# Patient Record
Sex: MALE | Race: WHITE | NOT HISPANIC OR LATINO | Employment: OTHER | URBAN - METROPOLITAN AREA
[De-identification: names, ages, dates, MRNs, and addresses within clinical notes are randomized per-mention and may not be internally consistent; named-entity substitution may affect disease eponyms.]

---

## 2017-01-26 ENCOUNTER — OFFICE VISIT - RIVER FALLS (OUTPATIENT)
Dept: FAMILY MEDICINE | Facility: CLINIC | Age: 72
End: 2017-01-26

## 2017-02-23 ENCOUNTER — OFFICE VISIT - RIVER FALLS (OUTPATIENT)
Dept: FAMILY MEDICINE | Facility: CLINIC | Age: 72
End: 2017-02-23

## 2017-05-10 ENCOUNTER — OFFICE VISIT - RIVER FALLS (OUTPATIENT)
Dept: FAMILY MEDICINE | Facility: CLINIC | Age: 72
End: 2017-05-10

## 2017-09-11 ENCOUNTER — AMBULATORY - RIVER FALLS (OUTPATIENT)
Dept: FAMILY MEDICINE | Facility: CLINIC | Age: 72
End: 2017-09-11

## 2017-09-12 LAB
CHOLEST SERPL-MCNC: 267 MG/DL
CHOLEST/HDLC SERPL: 3.4 {RATIO}
CREAT SERPL-MCNC: 1.31 MG/DL (ref 0.7–1.18)
GLUCOSE BLD-MCNC: 105 MG/DL (ref 65–99)
HDLC SERPL-MCNC: 78 MG/DL
LDLC SERPL CALC-MCNC: 156 MG/DL
NONHDLC SERPL-MCNC: 189 MG/DL
PSA SERPL-MCNC: 11.9 NG/ML
TRIGL SERPL-MCNC: 191 MG/DL

## 2017-09-19 ENCOUNTER — OFFICE VISIT - RIVER FALLS (OUTPATIENT)
Dept: FAMILY MEDICINE | Facility: CLINIC | Age: 72
End: 2017-09-19

## 2018-01-09 ENCOUNTER — OFFICE VISIT - RIVER FALLS (OUTPATIENT)
Dept: FAMILY MEDICINE | Facility: CLINIC | Age: 73
End: 2018-01-09

## 2018-01-09 ASSESSMENT — MIFFLIN-ST. JEOR: SCORE: 1615.31

## 2018-03-29 ENCOUNTER — AMBULATORY - RIVER FALLS (OUTPATIENT)
Dept: FAMILY MEDICINE | Facility: CLINIC | Age: 73
End: 2018-03-29

## 2018-03-30 LAB
CREAT SERPL-MCNC: 1.59 MG/DL (ref 0.7–1.18)
GLUCOSE BLD-MCNC: 104 MG/DL (ref 65–99)
PSA SERPL-MCNC: 13 NG/ML

## 2018-04-05 ENCOUNTER — OFFICE VISIT - RIVER FALLS (OUTPATIENT)
Dept: FAMILY MEDICINE | Facility: CLINIC | Age: 73
End: 2018-04-05

## 2018-04-23 ENCOUNTER — OFFICE VISIT - RIVER FALLS (OUTPATIENT)
Dept: FAMILY MEDICINE | Facility: CLINIC | Age: 73
End: 2018-04-23

## 2018-04-23 ASSESSMENT — MIFFLIN-ST. JEOR: SCORE: 1598.99

## 2018-05-04 ENCOUNTER — OFFICE VISIT - RIVER FALLS (OUTPATIENT)
Dept: FAMILY MEDICINE | Facility: CLINIC | Age: 73
End: 2018-05-04

## 2018-10-15 ENCOUNTER — AMBULATORY - RIVER FALLS (OUTPATIENT)
Dept: FAMILY MEDICINE | Facility: CLINIC | Age: 73
End: 2018-10-15

## 2018-10-16 LAB
CREAT SERPL-MCNC: 1.41 MG/DL (ref 0.7–1.18)
GLUCOSE BLD-MCNC: 96 MG/DL (ref 65–99)
PSA SERPL-MCNC: 10.8 NG/ML

## 2018-10-25 ENCOUNTER — OFFICE VISIT - RIVER FALLS (OUTPATIENT)
Dept: FAMILY MEDICINE | Facility: CLINIC | Age: 73
End: 2018-10-25

## 2018-11-14 ENCOUNTER — OFFICE VISIT - RIVER FALLS (OUTPATIENT)
Dept: FAMILY MEDICINE | Facility: CLINIC | Age: 73
End: 2018-11-14

## 2018-11-19 ENCOUNTER — AMBULATORY - RIVER FALLS (OUTPATIENT)
Dept: FAMILY MEDICINE | Facility: CLINIC | Age: 73
End: 2018-11-19

## 2018-11-26 ENCOUNTER — AMBULATORY - RIVER FALLS (OUTPATIENT)
Dept: FAMILY MEDICINE | Facility: CLINIC | Age: 73
End: 2018-11-26

## 2019-05-06 ENCOUNTER — AMBULATORY - RIVER FALLS (OUTPATIENT)
Dept: FAMILY MEDICINE | Facility: CLINIC | Age: 74
End: 2019-05-06

## 2019-05-07 LAB
ALP SERPL-CCNC: 82 UNIT/L (ref 40–115)
BUN SERPL-MCNC: 24 MG/DL (ref 7–25)
BUN/CREAT RATIO - HISTORICAL: 18 (ref 6–22)
CALCIUM SERPL-MCNC: 9.9 MG/DL (ref 8.6–10.3)
CHLORIDE BLD-SCNC: 99 MMOL/L (ref 98–110)
CHOLEST SERPL-MCNC: 227 MG/DL
CHOLEST/HDLC SERPL: 3.2 {RATIO}
CO2 SERPL-SCNC: 32 MMOL/L (ref 20–32)
CREAT SERPL-MCNC: 1.32 MG/DL (ref 0.7–1.18)
CREAT UR-MCNC: 64 MG/DL (ref 20–320)
EGFRCR SERPLBLD CKD-EPI 2021: 53 ML/MIN/1.73M2
ERYTHROCYTE [DISTWIDTH] IN BLOOD BY AUTOMATED COUNT: 14 % (ref 11–15)
GLUCOSE BLD-MCNC: 96 MG/DL (ref 65–99)
HCT VFR BLD AUTO: 37 % (ref 38.5–50)
HDLC SERPL-MCNC: 70 MG/DL
HGB BLD-MCNC: 12.7 GM/DL (ref 13.2–17.1)
LDLC SERPL CALC-MCNC: 123 MG/DL
MCH RBC QN AUTO: 31.1 PG (ref 27–33)
MCHC RBC AUTO-ENTMCNC: 34.3 GM/DL (ref 32–36)
MCV RBC AUTO: 90.7 FL (ref 80–100)
NONHDLC SERPL-MCNC: 157 MG/DL
PHOSPHATE SERPL-MCNC: 3.4 MG/DL (ref 2.1–4.3)
PLATELET # BLD AUTO: 177 10*3/UL (ref 140–400)
PMV BLD: 9.3 FL (ref 7.5–12.5)
POTASSIUM BLD-SCNC: 5.2 MMOL/L (ref 3.5–5.3)
PROT UR-MCNC: 62 MG/DL (ref 5–25)
PROT/CREAT 24H UR: 969 MG/G{CREAT} (ref 22–128)
PSA SERPL-MCNC: 15.8 NG/ML
PTH-INTACT SERPL-MCNC: 25 PG/ML (ref 14–64)
RBC # BLD AUTO: 4.08 10*6/UL (ref 4.2–5.8)
SODIUM SERPL-SCNC: 137 MMOL/L (ref 135–146)
TRIGL SERPL-MCNC: 225 MG/DL
WBC # BLD AUTO: 4.3 10*3/UL (ref 3.8–10.8)

## 2019-05-14 ENCOUNTER — OFFICE VISIT - RIVER FALLS (OUTPATIENT)
Dept: FAMILY MEDICINE | Facility: CLINIC | Age: 74
End: 2019-05-14

## 2019-06-03 ENCOUNTER — OFFICE VISIT - RIVER FALLS (OUTPATIENT)
Dept: FAMILY MEDICINE | Facility: CLINIC | Age: 74
End: 2019-06-03

## 2019-06-14 ENCOUNTER — RECORDS - HEALTHEAST (OUTPATIENT)
Dept: ADMINISTRATIVE | Facility: OTHER | Age: 74
End: 2019-06-14

## 2019-07-11 ENCOUNTER — OFFICE VISIT - RIVER FALLS (OUTPATIENT)
Dept: FAMILY MEDICINE | Facility: CLINIC | Age: 74
End: 2019-07-11

## 2019-07-11 ENCOUNTER — RECORDS - HEALTHEAST (OUTPATIENT)
Dept: ADMINISTRATIVE | Facility: OTHER | Age: 74
End: 2019-07-11

## 2019-07-11 ASSESSMENT — MIFFLIN-ST. JEOR: SCORE: 1621.67

## 2019-07-12 ENCOUNTER — COMMUNICATION - RIVER FALLS (OUTPATIENT)
Dept: FAMILY MEDICINE | Facility: CLINIC | Age: 74
End: 2019-07-12

## 2019-07-12 LAB
BUN SERPL-MCNC: 36 MG/DL (ref 7–25)
BUN/CREAT RATIO - HISTORICAL: 21 (ref 6–22)
CALCIUM SERPL-MCNC: 10.1 MG/DL (ref 8.6–10.3)
CHLORIDE BLD-SCNC: 98 MMOL/L (ref 98–110)
CO2 SERPL-SCNC: 31 MMOL/L (ref 20–32)
CREAT SERPL-MCNC: 1.72 MG/DL (ref 0.7–1.18)
EGFRCR SERPLBLD CKD-EPI 2021: 38 ML/MIN/1.73M2
ERYTHROCYTE [DISTWIDTH] IN BLOOD BY AUTOMATED COUNT: 13.2 % (ref 11–15)
GLUCOSE BLD-MCNC: 96 MG/DL (ref 65–99)
HCT VFR BLD AUTO: 36.3 % (ref 38.5–50)
HGB BLD-MCNC: 12.4 GM/DL (ref 13.2–17.1)
MCH RBC QN AUTO: 31.4 PG (ref 27–33)
MCHC RBC AUTO-ENTMCNC: 34.2 GM/DL (ref 32–36)
MCV RBC AUTO: 91.9 FL (ref 80–100)
PLATELET # BLD AUTO: 152 10*3/UL (ref 140–400)
PMV BLD: 9.8 FL (ref 7.5–12.5)
POTASSIUM BLD-SCNC: 5.2 MMOL/L (ref 3.5–5.3)
RBC # BLD AUTO: 3.95 10*6/UL (ref 4.2–5.8)
SODIUM SERPL-SCNC: 137 MMOL/L (ref 135–146)
WBC # BLD AUTO: 5.3 10*3/UL (ref 3.8–10.8)

## 2019-07-25 ENCOUNTER — ANESTHESIA - HEALTHEAST (OUTPATIENT)
Dept: SURGERY | Facility: HOSPITAL | Age: 74
End: 2019-07-25

## 2019-07-25 ASSESSMENT — MIFFLIN-ST. JEOR: SCORE: 1625.76

## 2019-07-26 ENCOUNTER — SURGERY - HEALTHEAST (OUTPATIENT)
Dept: SURGERY | Facility: HOSPITAL | Age: 74
End: 2019-07-26

## 2019-11-29 ENCOUNTER — OFFICE VISIT - RIVER FALLS (OUTPATIENT)
Dept: FAMILY MEDICINE | Facility: CLINIC | Age: 74
End: 2019-11-29

## 2019-11-29 ASSESSMENT — MIFFLIN-ST. JEOR: SCORE: 1640.72

## 2019-12-04 ENCOUNTER — OFFICE VISIT - RIVER FALLS (OUTPATIENT)
Dept: FAMILY MEDICINE | Facility: CLINIC | Age: 74
End: 2019-12-04

## 2019-12-04 LAB
FLUAV AG SPEC QL IA: NEGATIVE
FLUBV AG SPEC QL IA: NEGATIVE

## 2019-12-04 ASSESSMENT — MIFFLIN-ST. JEOR: SCORE: 1633.46

## 2019-12-17 ENCOUNTER — OFFICE VISIT - RIVER FALLS (OUTPATIENT)
Dept: FAMILY MEDICINE | Facility: CLINIC | Age: 74
End: 2019-12-17

## 2019-12-17 ASSESSMENT — MIFFLIN-ST. JEOR: SCORE: 1649.79

## 2020-01-03 ENCOUNTER — RECORDS - HEALTHEAST (OUTPATIENT)
Dept: ADMINISTRATIVE | Facility: OTHER | Age: 75
End: 2020-01-03

## 2020-01-03 LAB
ALT SERPL W/O P-5'-P-CCNC: 15 IU/L (ref 8–45)
CREAT SERPL-MCNC: 6.08 MG/DL
GFR ESTIMATE EXT - HISTORICAL: 9 ML/MIN/1.73M2
GFR ESTIMATE, IF BLACK EXT - HISTORICAL: 11 ML/MIN/1.73M2

## 2020-01-04 ENCOUNTER — RECORDS - HEALTHEAST (OUTPATIENT)
Dept: ADMINISTRATIVE | Facility: OTHER | Age: 75
End: 2020-01-04

## 2020-01-06 LAB
A/G RATIO - HISTORICAL: 1.3 MG/DL
ALBUMIN SERPL-MCNC: 3.9 G/DL
ALBUMIN SERPL-MCNC: 3.9 G/DL
ALP SERPL-CCNC: 75 UNIT/L
ALT SERPL W P-5'-P-CCNC: 15 UNIT/L
ANION GAP SERPL CALCULATED.3IONS-SCNC: 13 MEQ/L
AST SERPL W P-5'-P-CCNC: 27 UNIT/L
BILIRUB DIRECT SERPL-MCNC: 0.2 MG/DL
BILIRUB SERPL-MCNC: 0.5 MG/DL
BUN SERPL-MCNC: 60 MG/DL
BUN/CREAT RATIO - HISTORICAL: 10
CALCIUM SERPL-MCNC: 9.5 MEQ/DL
CHLORIDE BLD-SCNC: 106 MEQ/L
CO2 SERPL-SCNC: 22 MEQ/L
CREAT SERPL-MCNC: 5.98 MG/DL
GFR ESTIMATE EXT - HISTORICAL: 9 ML/MIN
GLOBULIN: 3.1 G/DL
GLUCOSE BLD-MCNC: 103 MG/DL
INR PPP: 1.1
PHOSPHATE SERPL-MCNC: 4.3 MG/DL
POTASSIUM BLD-SCNC: 4.5 MEQ/L
PROT SERPL-MCNC: 7 GM/DL
PROTHROMBIN TIME: 13.8 S
SODIUM SERPL-SCNC: 141 MEQ/L

## 2020-01-08 ENCOUNTER — OFFICE VISIT - RIVER FALLS (OUTPATIENT)
Dept: FAMILY MEDICINE | Facility: CLINIC | Age: 75
End: 2020-01-08

## 2020-01-08 ASSESSMENT — MIFFLIN-ST. JEOR: SCORE: 1613.5

## 2020-01-15 ENCOUNTER — AMBULATORY - RIVER FALLS (OUTPATIENT)
Dept: FAMILY MEDICINE | Facility: CLINIC | Age: 75
End: 2020-01-15

## 2020-01-16 LAB
BUN SERPL-MCNC: 30 MG/DL (ref 7–25)
BUN/CREAT RATIO - HISTORICAL: 12 (ref 6–22)
CALCIUM SERPL-MCNC: 9.9 MG/DL (ref 8.6–10.3)
CHLORIDE BLD-SCNC: 101 MMOL/L (ref 98–110)
CO2 SERPL-SCNC: 29 MMOL/L (ref 20–32)
CREAT SERPL-MCNC: 2.5 MG/DL (ref 0.7–1.18)
EGFRCR SERPLBLD CKD-EPI 2021: 24 ML/MIN/1.73M2
GLUCOSE BLD-MCNC: 98 MG/DL (ref 65–139)
POTASSIUM BLD-SCNC: 4.4 MMOL/L (ref 3.5–5.3)
SODIUM SERPL-SCNC: 138 MMOL/L (ref 135–146)

## 2020-01-22 ENCOUNTER — OFFICE VISIT - RIVER FALLS (OUTPATIENT)
Dept: FAMILY MEDICINE | Facility: CLINIC | Age: 75
End: 2020-01-22

## 2020-01-22 ENCOUNTER — AMBULATORY - RIVER FALLS (OUTPATIENT)
Dept: FAMILY MEDICINE | Facility: CLINIC | Age: 75
End: 2020-01-22

## 2020-01-22 ASSESSMENT — MIFFLIN-ST. JEOR: SCORE: 1604.43

## 2020-01-29 ENCOUNTER — AMBULATORY - RIVER FALLS (OUTPATIENT)
Dept: FAMILY MEDICINE | Facility: CLINIC | Age: 75
End: 2020-01-29

## 2020-01-29 ENCOUNTER — COMMUNICATION - RIVER FALLS (OUTPATIENT)
Dept: FAMILY MEDICINE | Facility: CLINIC | Age: 75
End: 2020-01-29

## 2020-01-30 LAB
CHOLEST SERPL-MCNC: 180 MG/DL
CHOLEST/HDLC SERPL: 2.7 {RATIO}
HDLC SERPL-MCNC: 67 MG/DL
LDLC SERPL CALC-MCNC: 86 MG/DL
NONHDLC SERPL-MCNC: 113 MG/DL
TRIGL SERPL-MCNC: 167 MG/DL

## 2020-02-04 ENCOUNTER — COMMUNICATION - RIVER FALLS (OUTPATIENT)
Dept: FAMILY MEDICINE | Facility: CLINIC | Age: 75
End: 2020-02-04

## 2020-02-25 ENCOUNTER — AMBULATORY - RIVER FALLS (OUTPATIENT)
Dept: FAMILY MEDICINE | Facility: CLINIC | Age: 75
End: 2020-02-25

## 2020-02-25 LAB
CHOLEST SERPL-MCNC: 162 MG/DL
HDLC SERPL-MCNC: 64 MG/DL
LDLC SERPL CALC-MCNC: 66 MG/DL
NON HDL CHOL. (LDL+VLDL): 98 MG/DL
TRIGLYCERIDES (HISTORICAL CONVERSION): 308 MG/DL

## 2020-02-26 LAB
CHOLEST SERPL-MCNC: 162 MG/DL
CHOLEST/HDLC SERPL: 2.5 {RATIO}
HDLC SERPL-MCNC: 64 MG/DL
LDLC SERPL CALC-MCNC: 66 MG/DL
NONHDLC SERPL-MCNC: 98 MG/DL
TRIGL SERPL-MCNC: 308 MG/DL

## 2020-03-31 ENCOUNTER — OFFICE VISIT - RIVER FALLS (OUTPATIENT)
Dept: FAMILY MEDICINE | Facility: CLINIC | Age: 75
End: 2020-03-31

## 2020-03-31 ASSESSMENT — MIFFLIN-ST. JEOR: SCORE: 1641.62

## 2020-05-06 ENCOUNTER — AMBULATORY - RIVER FALLS (OUTPATIENT)
Dept: FAMILY MEDICINE | Facility: CLINIC | Age: 75
End: 2020-05-06

## 2020-05-06 LAB
CREAT SERPL-MCNC: 1.76 MG/DL (ref 0.7–1.18)
GFR ESTIMATE EXT - HISTORICAL: 37 ML/MIN/1.73M2
GFR ESTIMATE, IF BLACK EXT - HISTORICAL: 43 ML/MIN/1.73M2

## 2020-05-06 ASSESSMENT — MIFFLIN-ST. JEOR: SCORE: 1667.93

## 2020-05-07 LAB
BUN SERPL-MCNC: 15 MG/DL (ref 7–25)
BUN/CREAT RATIO - HISTORICAL: 9 (ref 6–22)
CALCIUM SERPL-MCNC: 9.7 MG/DL (ref 8.6–10.3)
CHLORIDE BLD-SCNC: 99 MMOL/L (ref 98–110)
CO2 SERPL-SCNC: 31 MMOL/L (ref 20–32)
CREAT SERPL-MCNC: 1.76 MG/DL (ref 0.7–1.18)
EGFRCR SERPLBLD CKD-EPI 2021: 37 ML/MIN/1.73M2
GLUCOSE BLD-MCNC: 121 MG/DL (ref 65–99)
POTASSIUM BLD-SCNC: 4.8 MMOL/L (ref 3.5–5.3)
SODIUM SERPL-SCNC: 137 MMOL/L (ref 135–146)

## 2020-05-08 ENCOUNTER — OFFICE VISIT - RIVER FALLS (OUTPATIENT)
Dept: FAMILY MEDICINE | Facility: CLINIC | Age: 75
End: 2020-05-08

## 2020-05-08 ENCOUNTER — COMMUNICATION - RIVER FALLS (OUTPATIENT)
Dept: FAMILY MEDICINE | Facility: CLINIC | Age: 75
End: 2020-05-08

## 2020-05-21 ENCOUNTER — COMMUNICATION - RIVER FALLS (OUTPATIENT)
Dept: FAMILY MEDICINE | Facility: CLINIC | Age: 75
End: 2020-05-21

## 2020-06-03 ENCOUNTER — RECORDS - HEALTHEAST (OUTPATIENT)
Dept: ADMINISTRATIVE | Facility: OTHER | Age: 75
End: 2020-06-03

## 2020-06-04 ENCOUNTER — OFFICE VISIT - RIVER FALLS (OUTPATIENT)
Dept: FAMILY MEDICINE | Facility: CLINIC | Age: 75
End: 2020-06-04

## 2020-06-04 ASSESSMENT — MIFFLIN-ST. JEOR: SCORE: 1663.4

## 2020-07-17 ENCOUNTER — OFFICE VISIT - RIVER FALLS (OUTPATIENT)
Dept: FAMILY MEDICINE | Facility: CLINIC | Age: 75
End: 2020-07-17

## 2020-07-17 ASSESSMENT — MIFFLIN-ST. JEOR: SCORE: 1677

## 2020-07-21 ENCOUNTER — AMBULATORY - HEALTHEAST (OUTPATIENT)
Dept: OTOLARYNGOLOGY | Facility: TELEHEALTH | Age: 75
End: 2020-07-21

## 2020-07-21 DIAGNOSIS — K13.70 LESION OF MOUTH: ICD-10-CM

## 2020-08-11 ENCOUNTER — OFFICE VISIT - HEALTHEAST (OUTPATIENT)
Dept: OTOLARYNGOLOGY | Facility: CLINIC | Age: 75
End: 2020-08-11

## 2020-08-11 DIAGNOSIS — K13.79 MASS OF ORAL CAVITY: ICD-10-CM

## 2020-08-14 LAB
LAB AP CHARGES (HE HISTORICAL CONVERSION): ABNORMAL
PATH REPORT.COMMENTS IMP SPEC: ABNORMAL
PATH REPORT.COMMENTS IMP SPEC: ABNORMAL
PATH REPORT.FINAL DX SPEC: ABNORMAL
PATH REPORT.GROSS SPEC: ABNORMAL
PATH REPORT.MICROSCOPIC SPEC OTHER STN: ABNORMAL
PATH REPORT.RELEVANT HX SPEC: ABNORMAL
RESULT FLAG (HE HISTORICAL CONVERSION): ABNORMAL

## 2020-08-17 ENCOUNTER — COMMUNICATION - HEALTHEAST (OUTPATIENT)
Dept: OTOLARYNGOLOGY | Facility: CLINIC | Age: 75
End: 2020-08-17

## 2020-08-17 DIAGNOSIS — C06.9 MALIGNANT NEOPLASM OF MOUTH (H): ICD-10-CM

## 2020-08-17 DIAGNOSIS — C06.0 MALIGNANT NEOPLASM OF CHEEK MUCOSA (H): ICD-10-CM

## 2020-08-31 ENCOUNTER — HOSPITAL ENCOUNTER (OUTPATIENT)
Dept: PET IMAGING | Facility: HOSPITAL | Age: 75
Discharge: HOME | End: 2020-08-31
Attending: OTOLARYNGOLOGY

## 2020-08-31 DIAGNOSIS — C06.0 MALIGNANT NEOPLASM OF CHEEK MUCOSA (H): ICD-10-CM

## 2020-08-31 DIAGNOSIS — C06.9 MALIGNANT NEOPLASM OF MOUTH (H): ICD-10-CM

## 2020-08-31 LAB — GLUCOSE BLDC GLUCOMTR-MCNC: 101 MG/DL (ref 70–139)

## 2020-09-01 ENCOUNTER — COMMUNICATION - HEALTHEAST (OUTPATIENT)
Dept: OTOLARYNGOLOGY | Facility: CLINIC | Age: 75
End: 2020-09-01

## 2020-09-15 ENCOUNTER — OFFICE VISIT - HEALTHEAST (OUTPATIENT)
Dept: OTOLARYNGOLOGY | Facility: CLINIC | Age: 75
End: 2020-09-15

## 2020-09-15 DIAGNOSIS — R94.02 ABNORMAL PET SCAN OF HEAD: ICD-10-CM

## 2020-09-15 DIAGNOSIS — R91.1 LUNG NODULE: ICD-10-CM

## 2020-09-17 ENCOUNTER — COMMUNICATION - HEALTHEAST (OUTPATIENT)
Dept: OTOLARYNGOLOGY | Facility: CLINIC | Age: 75
End: 2020-09-17

## 2020-09-21 ENCOUNTER — OFFICE VISIT - HEALTHEAST (OUTPATIENT)
Dept: PULMONOLOGY | Facility: OTHER | Age: 75
End: 2020-09-21

## 2020-09-21 DIAGNOSIS — C34.91 MALIGNANT NEOPLASM OF RIGHT LUNG, UNSPECIFIED PART OF LUNG (H): ICD-10-CM

## 2020-09-21 DIAGNOSIS — R06.09 DOE (DYSPNEA ON EXERTION): ICD-10-CM

## 2020-09-21 DIAGNOSIS — R91.8 LUNG MASS: ICD-10-CM

## 2020-09-21 ASSESSMENT — MIFFLIN-ST. JEOR: SCORE: 1676.33

## 2020-09-22 ENCOUNTER — RECORDS - HEALTHEAST (OUTPATIENT)
Dept: RADIOLOGY | Facility: CLINIC | Age: 75
End: 2020-09-22

## 2020-09-25 ENCOUNTER — COMMUNICATION - HEALTHEAST (OUTPATIENT)
Dept: OTOLARYNGOLOGY | Facility: CLINIC | Age: 75
End: 2020-09-25

## 2020-09-28 ENCOUNTER — COMMUNICATION - HEALTHEAST (OUTPATIENT)
Dept: INTENSIVE CARE | Facility: CLINIC | Age: 75
End: 2020-09-28

## 2020-09-28 DIAGNOSIS — R91.8 LUNG MASS: ICD-10-CM

## 2020-09-29 ENCOUNTER — AMBULATORY - HEALTHEAST (OUTPATIENT)
Dept: SURGERY | Facility: AMBULATORY SURGERY CENTER | Age: 75
End: 2020-09-29

## 2020-09-29 DIAGNOSIS — Z11.59 ENCOUNTER FOR SCREENING FOR OTHER VIRAL DISEASES: ICD-10-CM

## 2020-10-01 ENCOUNTER — OFFICE VISIT - HEALTHEAST (OUTPATIENT)
Dept: PULMONOLOGY | Facility: OTHER | Age: 75
End: 2020-10-01

## 2020-10-01 DIAGNOSIS — R91.8 PULMONARY NODULES: ICD-10-CM

## 2020-10-01 ASSESSMENT — MIFFLIN-ST. JEOR: SCORE: 1676.33

## 2020-10-06 ENCOUNTER — OFFICE VISIT - RIVER FALLS (OUTPATIENT)
Dept: FAMILY MEDICINE | Facility: CLINIC | Age: 75
End: 2020-10-06

## 2020-10-06 ENCOUNTER — AMBULATORY - HEALTHEAST (OUTPATIENT)
Dept: MULTI SPECIALTY CLINIC | Facility: CLINIC | Age: 75
End: 2020-10-06

## 2020-10-06 LAB
CREAT SERPL-MCNC: 7.18 MG/DL (ref 0.7–1.18)
CREAT SERPL-MCNC: 7.18 MG/DL (ref 7–25)
GFR ESTIMATE EXT - HISTORICAL: 7 ML/MIN/1.73M2
GFR ESTIMATE EXT - HISTORICAL: 7 ML/MIN/1.73M2
GFR ESTIMATE, IF BLACK EXT - HISTORICAL: 8 ML/MIN/1.73M2
GFR ESTIMATE, IF BLACK EXT - HISTORICAL: 8 ML/MIN/1.73M2

## 2020-10-06 ASSESSMENT — MIFFLIN-ST. JEOR: SCORE: 1702.41

## 2020-10-07 LAB
BUN SERPL-MCNC: 33 MG/DL (ref 7–25)
BUN/CREAT RATIO - HISTORICAL: 5 (ref 6–22)
CALCIUM SERPL-MCNC: 9.3 MG/DL (ref 8.6–10.3)
CHLORIDE BLD-SCNC: 103 MMOL/L (ref 98–110)
CO2 SERPL-SCNC: 19 MMOL/L (ref 20–32)
CREAT SERPL-MCNC: 7.18 MG/DL (ref 0.7–1.18)
EGFRCR SERPLBLD CKD-EPI 2021: 7 ML/MIN/1.73M2
GLUCOSE BLD-MCNC: 92 MG/DL (ref 65–99)
POTASSIUM BLD-SCNC: 5 MMOL/L (ref 3.5–5.3)
SODIUM SERPL-SCNC: 135 MMOL/L (ref 135–146)

## 2020-10-10 ENCOUNTER — COMMUNICATION - HEALTHEAST (OUTPATIENT)
Dept: SCHEDULING | Facility: CLINIC | Age: 75
End: 2020-10-10

## 2020-10-12 ENCOUNTER — OFFICE VISIT - RIVER FALLS (OUTPATIENT)
Dept: FAMILY MEDICINE | Facility: CLINIC | Age: 75
End: 2020-10-12

## 2020-10-12 ENCOUNTER — COMMUNICATION - HEALTHEAST (OUTPATIENT)
Dept: OTOLARYNGOLOGY | Facility: CLINIC | Age: 75
End: 2020-10-12

## 2020-10-12 ASSESSMENT — MIFFLIN-ST. JEOR: SCORE: 1679.73

## 2020-10-13 ENCOUNTER — COMMUNICATION - HEALTHEAST (OUTPATIENT)
Dept: OTOLARYNGOLOGY | Facility: CLINIC | Age: 75
End: 2020-10-13

## 2020-10-13 LAB — MAGNESIUM SERPL-MCNC: 1.4 MG/DL (ref 1.5–2.5)

## 2020-10-14 ENCOUNTER — OFFICE VISIT - RIVER FALLS (OUTPATIENT)
Dept: FAMILY MEDICINE | Facility: CLINIC | Age: 75
End: 2020-10-14

## 2020-10-14 ASSESSMENT — MIFFLIN-ST. JEOR: SCORE: 1681.54

## 2020-10-16 ENCOUNTER — AMBULATORY - HEALTHEAST (OUTPATIENT)
Dept: SURGERY | Facility: HOSPITAL | Age: 75
End: 2020-10-16

## 2020-10-16 DIAGNOSIS — Z11.59 ENCOUNTER FOR SCREENING FOR OTHER VIRAL DISEASES: ICD-10-CM

## 2020-10-19 ENCOUNTER — COMMUNICATION - RIVER FALLS (OUTPATIENT)
Dept: FAMILY MEDICINE | Facility: CLINIC | Age: 75
End: 2020-10-19

## 2020-10-19 ENCOUNTER — RECORDS - HEALTHEAST (OUTPATIENT)
Dept: HEALTH INFORMATION MANAGEMENT | Facility: CLINIC | Age: 75
End: 2020-10-19

## 2020-10-20 ENCOUNTER — OFFICE VISIT - RIVER FALLS (OUTPATIENT)
Dept: FAMILY MEDICINE | Facility: CLINIC | Age: 75
End: 2020-10-20

## 2020-10-20 ASSESSMENT — MIFFLIN-ST. JEOR: SCORE: 1681.54

## 2020-10-21 ENCOUNTER — AMBULATORY - HEALTHEAST (OUTPATIENT)
Dept: SURGERY | Facility: HOSPITAL | Age: 75
End: 2020-10-21

## 2020-10-21 DIAGNOSIS — Z11.59 ENCOUNTER FOR SCREENING FOR OTHER VIRAL DISEASES: ICD-10-CM

## 2020-10-23 ENCOUNTER — COMMUNICATION - HEALTHEAST (OUTPATIENT)
Dept: ONCOLOGY | Facility: CLINIC | Age: 75
End: 2020-10-23

## 2020-10-26 ENCOUNTER — COMMUNICATION - HEALTHEAST (OUTPATIENT)
Dept: OTOLARYNGOLOGY | Facility: CLINIC | Age: 75
End: 2020-10-26

## 2020-10-27 ENCOUNTER — AMBULATORY - HEALTHEAST (OUTPATIENT)
Dept: SURGERY | Facility: AMBULATORY SURGERY CENTER | Age: 75
End: 2020-10-27

## 2020-10-27 DIAGNOSIS — Z11.59 ENCOUNTER FOR SCREENING FOR OTHER VIRAL DISEASES: ICD-10-CM

## 2020-11-02 ENCOUNTER — AMBULATORY - HEALTHEAST (OUTPATIENT)
Dept: LAB | Facility: CLINIC | Age: 75
End: 2020-11-02

## 2020-11-02 DIAGNOSIS — Z11.59 ENCOUNTER FOR SCREENING FOR OTHER VIRAL DISEASES: ICD-10-CM

## 2020-11-04 ENCOUNTER — COMMUNICATION - HEALTHEAST (OUTPATIENT)
Dept: SCHEDULING | Facility: CLINIC | Age: 75
End: 2020-11-04

## 2020-11-05 ENCOUNTER — ANESTHESIA - HEALTHEAST (OUTPATIENT)
Dept: SURGERY | Facility: AMBULATORY SURGERY CENTER | Age: 75
End: 2020-11-05

## 2020-11-05 ASSESSMENT — MIFFLIN-ST. JEOR
SCORE: 1703.54
SCORE: 1703.54

## 2020-11-06 ENCOUNTER — HOSPITAL ENCOUNTER (OUTPATIENT)
Dept: SURGERY | Facility: AMBULATORY SURGERY CENTER | Age: 75
Discharge: HOME | End: 2020-11-06
Attending: OTOLARYNGOLOGY | Admitting: OTOLARYNGOLOGY

## 2020-11-06 ENCOUNTER — RECORDS - HEALTHEAST (OUTPATIENT)
Dept: LAB | Facility: HOSPITAL | Age: 75
End: 2020-11-06

## 2020-11-06 ENCOUNTER — SURGERY - HEALTHEAST (OUTPATIENT)
Dept: SURGERY | Facility: AMBULATORY SURGERY CENTER | Age: 75
End: 2020-11-06

## 2020-11-06 DIAGNOSIS — C02.9 TONGUE CANCER (H): ICD-10-CM

## 2020-11-06 DIAGNOSIS — R94.02 ABNORMAL PET SCAN OF HEAD: ICD-10-CM

## 2020-11-06 LAB
HBV SURFACE AG SERPL QL IA: NEGATIVE
HCV AB SERPL QL IA: NEGATIVE
HIV 1+2 AB+HIV1 P24 AG SERPL QL IA: NEGATIVE
HIV 1,2 ANTIBODY: NEGATIVE
POTASSIUM BLD-SCNC: 4.4 MMOL/L (ref 3.5–5)

## 2020-11-10 ENCOUNTER — COMMUNICATION - HEALTHEAST (OUTPATIENT)
Dept: PULMONOLOGY | Facility: OTHER | Age: 75
End: 2020-11-10

## 2020-11-11 ENCOUNTER — AMBULATORY - HEALTHEAST (OUTPATIENT)
Dept: LAB | Facility: CLINIC | Age: 75
End: 2020-11-11

## 2020-11-11 DIAGNOSIS — Z11.59 ENCOUNTER FOR SCREENING FOR OTHER VIRAL DISEASES: ICD-10-CM

## 2020-11-12 ENCOUNTER — ANESTHESIA - HEALTHEAST (OUTPATIENT)
Dept: SURGERY | Facility: HOSPITAL | Age: 75
End: 2020-11-12

## 2020-11-12 ENCOUNTER — COMMUNICATION - HEALTHEAST (OUTPATIENT)
Dept: SCHEDULING | Facility: CLINIC | Age: 75
End: 2020-11-12

## 2020-11-12 LAB
SARS-COV-2 PCR COMMENT: NORMAL
SARS-COV-2 RNA SPEC QL NAA+PROBE: NEGATIVE
SARS-COV-2 VIRUS SPECIMEN SOURCE: NORMAL

## 2020-11-12 ASSESSMENT — MIFFLIN-ST. JEOR: SCORE: 1703.54

## 2020-11-13 ENCOUNTER — SURGERY - HEALTHEAST (OUTPATIENT)
Dept: SURGERY | Facility: HOSPITAL | Age: 75
End: 2020-11-13

## 2020-11-23 ENCOUNTER — COMMUNICATION - HEALTHEAST (OUTPATIENT)
Dept: OTOLARYNGOLOGY | Facility: CLINIC | Age: 75
End: 2020-11-23

## 2020-11-23 ENCOUNTER — AMBULATORY - RIVER FALLS (OUTPATIENT)
Dept: FAMILY MEDICINE | Facility: CLINIC | Age: 75
End: 2020-11-23

## 2020-11-24 ENCOUNTER — COMMUNICATION - RIVER FALLS (OUTPATIENT)
Dept: FAMILY MEDICINE | Facility: CLINIC | Age: 75
End: 2020-11-24

## 2020-11-24 LAB
BASOPHILS # BLD MANUAL: 50 10*3/UL (ref 0–200)
BASOPHILS NFR BLD MANUAL: 0.8 %
BUN SERPL-MCNC: 18 MG/DL (ref 7–25)
BUN/CREAT RATIO - HISTORICAL: 9 (ref 6–22)
CALCIUM SERPL-MCNC: 9.5 MG/DL (ref 8.6–10.3)
CHLORIDE BLD-SCNC: 100 MMOL/L (ref 98–110)
CO2 SERPL-SCNC: 27 MMOL/L (ref 20–32)
CREAT SERPL-MCNC: 1.92 MG/DL (ref 0.7–1.18)
EGFRCR SERPLBLD CKD-EPI 2021: 33 ML/MIN/1.73M2
EOSINOPHIL # BLD MANUAL: 341 10*3/UL (ref 15–500)
EOSINOPHIL NFR BLD MANUAL: 5.5 %
ERYTHROCYTE [DISTWIDTH] IN BLOOD BY AUTOMATED COUNT: 12.6 % (ref 11–15)
GLUCOSE BLD-MCNC: 246 MG/DL (ref 65–99)
HCT VFR BLD AUTO: 32.2 % (ref 38.5–50)
HGB BLD-MCNC: 10.9 GM/DL (ref 13.2–17.1)
LYMPHOCYTES # BLD MANUAL: 1029 10*3/UL (ref 850–3900)
LYMPHOCYTES NFR BLD MANUAL: 16.6 %
MCH RBC QN AUTO: 32.3 PG (ref 27–33)
MCHC RBC AUTO-ENTMCNC: 33.9 GM/DL (ref 32–36)
MCV RBC AUTO: 95.5 FL (ref 80–100)
MONOCYTES # BLD MANUAL: 583 10*3/UL (ref 200–950)
MONOCYTES NFR BLD MANUAL: 9.4 %
NEUTROPHILS # BLD MANUAL: 4197 10*3/UL (ref 1500–7800)
NEUTROPHILS NFR BLD MANUAL: 67.7 %
PLATELET # BLD AUTO: 221 10*3/UL (ref 140–400)
PMV BLD: 9.5 FL (ref 7.5–12.5)
POTASSIUM BLD-SCNC: 4.7 MMOL/L (ref 3.5–5.3)
RBC # BLD AUTO: 3.37 10*6/UL (ref 4.2–5.8)
SODIUM SERPL-SCNC: 137 MMOL/L (ref 135–146)
WBC # BLD AUTO: 6.2 10*3/UL (ref 3.8–10.8)

## 2020-12-02 ENCOUNTER — OFFICE VISIT - RIVER FALLS (OUTPATIENT)
Dept: FAMILY MEDICINE | Facility: CLINIC | Age: 75
End: 2020-12-02

## 2020-12-02 ASSESSMENT — MIFFLIN-ST. JEOR: SCORE: 1713.29

## 2020-12-07 ENCOUNTER — COMMUNICATION - HEALTHEAST (OUTPATIENT)
Dept: ONCOLOGY | Facility: CLINIC | Age: 75
End: 2020-12-07

## 2020-12-08 ENCOUNTER — OFFICE VISIT - RIVER FALLS (OUTPATIENT)
Dept: FAMILY MEDICINE | Facility: CLINIC | Age: 75
End: 2020-12-08

## 2020-12-08 ASSESSMENT — MIFFLIN-ST. JEOR: SCORE: 1722.36

## 2020-12-11 ENCOUNTER — OFFICE VISIT - RIVER FALLS (OUTPATIENT)
Dept: FAMILY MEDICINE | Facility: CLINIC | Age: 75
End: 2020-12-11

## 2020-12-11 ASSESSMENT — MIFFLIN-ST. JEOR: SCORE: 1716.92

## 2020-12-12 ENCOUNTER — COMMUNICATION - RIVER FALLS (OUTPATIENT)
Dept: FAMILY MEDICINE | Facility: CLINIC | Age: 75
End: 2020-12-12

## 2020-12-12 LAB — BNP SERPL-MCNC: 58 PG/ML

## 2020-12-14 ENCOUNTER — AMBULATORY - RIVER FALLS (OUTPATIENT)
Dept: FAMILY MEDICINE | Facility: CLINIC | Age: 75
End: 2020-12-14

## 2020-12-14 ENCOUNTER — COMMUNICATION - RIVER FALLS (OUTPATIENT)
Dept: FAMILY MEDICINE | Facility: CLINIC | Age: 75
End: 2020-12-14

## 2020-12-16 ENCOUNTER — RECORDS - HEALTHEAST (OUTPATIENT)
Dept: RADIOLOGY | Facility: CLINIC | Age: 75
End: 2020-12-16

## 2020-12-16 ENCOUNTER — OFFICE VISIT - HEALTHEAST (OUTPATIENT)
Dept: RADIATION ONCOLOGY | Facility: CLINIC | Age: 75
End: 2020-12-16

## 2020-12-16 DIAGNOSIS — C34.12 MALIGNANT NEOPLASM OF UPPER LOBE OF LEFT LUNG (H): ICD-10-CM

## 2020-12-17 ENCOUNTER — OFFICE VISIT - HEALTHEAST (OUTPATIENT)
Dept: OTOLARYNGOLOGY | Facility: CLINIC | Age: 75
End: 2020-12-17

## 2020-12-17 DIAGNOSIS — C02.9 TONGUE CANCER (H): ICD-10-CM

## 2020-12-23 ENCOUNTER — HOSPITAL ENCOUNTER (OUTPATIENT)
Dept: RESPIRATORY THERAPY | Facility: CLINIC | Age: 75
Discharge: HOME | End: 2020-12-23
Attending: INTERNAL MEDICINE

## 2020-12-23 ENCOUNTER — OFFICE VISIT - HEALTHEAST (OUTPATIENT)
Dept: PULMONOLOGY | Facility: OTHER | Age: 75
End: 2020-12-23

## 2020-12-23 DIAGNOSIS — R06.09 DOE (DYSPNEA ON EXERTION): ICD-10-CM

## 2020-12-23 DIAGNOSIS — J44.9 COPD, GROUP B, BY GOLD 2017 CLASSIFICATION (H): ICD-10-CM

## 2020-12-23 LAB — HGB BLD-MCNC: 11.8 G/DL (ref 14–18)

## 2020-12-23 ASSESSMENT — MIFFLIN-ST. JEOR: SCORE: 1708.08

## 2020-12-29 ENCOUNTER — OFFICE VISIT - RIVER FALLS (OUTPATIENT)
Dept: FAMILY MEDICINE | Facility: CLINIC | Age: 75
End: 2020-12-29

## 2020-12-29 ASSESSMENT — MIFFLIN-ST. JEOR: SCORE: 1710.57

## 2021-01-04 ENCOUNTER — HOSPITAL ENCOUNTER (OUTPATIENT)
Dept: PET IMAGING | Facility: HOSPITAL | Age: 76
Setting detail: RADIATION/ONCOLOGY SERIES
Discharge: STILL ADMITTED OR ADMINISTRATIVE | End: 2021-01-04
Attending: RADIOLOGY

## 2021-01-04 DIAGNOSIS — C34.12 MALIGNANT NEOPLASM OF UPPER LOBE OF LEFT LUNG (H): ICD-10-CM

## 2021-01-04 LAB — GLUCOSE BLDC GLUCOMTR-MCNC: 152 MG/DL (ref 70–139)

## 2021-01-04 ASSESSMENT — MIFFLIN-ST. JEOR: SCORE: 1709.22

## 2021-01-06 ENCOUNTER — OFFICE VISIT - HEALTHEAST (OUTPATIENT)
Dept: RADIATION ONCOLOGY | Facility: CLINIC | Age: 76
End: 2021-01-06

## 2021-01-06 DIAGNOSIS — C34.12 MALIGNANT NEOPLASM OF UPPER LOBE OF LEFT LUNG (H): ICD-10-CM

## 2021-01-08 ENCOUNTER — AMBULATORY - HEALTHEAST (OUTPATIENT)
Dept: CT IMAGING | Facility: HOSPITAL | Age: 76
End: 2021-01-08

## 2021-01-08 ENCOUNTER — COMMUNICATION - HEALTHEAST (OUTPATIENT)
Dept: ONCOLOGY | Facility: CLINIC | Age: 76
End: 2021-01-08

## 2021-01-08 DIAGNOSIS — Z11.59 ENCOUNTER FOR SCREENING FOR OTHER VIRAL DISEASES: ICD-10-CM

## 2021-01-14 ENCOUNTER — COMMUNICATION - HEALTHEAST (OUTPATIENT)
Dept: ADMINISTRATIVE | Facility: HOSPITAL | Age: 76
End: 2021-01-14

## 2021-01-17 ENCOUNTER — AMBULATORY - HEALTHEAST (OUTPATIENT)
Dept: FAMILY MEDICINE | Facility: CLINIC | Age: 76
End: 2021-01-17

## 2021-01-17 DIAGNOSIS — Z11.59 ENCOUNTER FOR SCREENING FOR OTHER VIRAL DISEASES: ICD-10-CM

## 2021-01-18 ENCOUNTER — COMMUNICATION - HEALTHEAST (OUTPATIENT)
Dept: SCHEDULING | Facility: CLINIC | Age: 76
End: 2021-01-18

## 2021-01-19 ENCOUNTER — OFFICE VISIT - RIVER FALLS (OUTPATIENT)
Dept: FAMILY MEDICINE | Facility: CLINIC | Age: 76
End: 2021-01-19
Payer: COMMERCIAL

## 2021-01-19 ENCOUNTER — VIRTUAL VISIT (OUTPATIENT)
Dept: PHARMACY | Facility: PHYSICIAN GROUP | Age: 76
End: 2021-01-19
Payer: COMMERCIAL

## 2021-01-19 DIAGNOSIS — E78.5 DYSLIPIDEMIA: ICD-10-CM

## 2021-01-19 DIAGNOSIS — J44.9 CHRONIC OBSTRUCTIVE PULMONARY DISEASE, UNSPECIFIED COPD TYPE (H): Primary | ICD-10-CM

## 2021-01-19 DIAGNOSIS — I25.10 CORONARY ARTERY DISEASE, ANGINA PRESENCE UNSPECIFIED, UNSPECIFIED VESSEL OR LESION TYPE, UNSPECIFIED WHETHER NATIVE OR TRANSPLANTED HEART: ICD-10-CM

## 2021-01-19 DIAGNOSIS — R52 GENERALIZED PAIN: ICD-10-CM

## 2021-01-19 DIAGNOSIS — I10 HYPERTENSION, UNSPECIFIED TYPE: ICD-10-CM

## 2021-01-19 DIAGNOSIS — Z78.9 TAKES DIETARY SUPPLEMENTS: ICD-10-CM

## 2021-01-19 DIAGNOSIS — K21.9 GASTROESOPHAGEAL REFLUX DISEASE, UNSPECIFIED WHETHER ESOPHAGITIS PRESENT: ICD-10-CM

## 2021-01-19 DIAGNOSIS — R60.0 LOWER LEG EDEMA: ICD-10-CM

## 2021-01-19 DIAGNOSIS — K59.00 CONSTIPATION, UNSPECIFIED CONSTIPATION TYPE: ICD-10-CM

## 2021-01-19 DIAGNOSIS — N18.9 CHRONIC KIDNEY DISEASE, UNSPECIFIED CKD STAGE: ICD-10-CM

## 2021-01-19 PROCEDURE — 99607 MTMS BY PHARM ADDL 15 MIN: CPT | Mod: TEL | Performed by: PHARMACIST

## 2021-01-19 PROCEDURE — 99605 MTMS BY PHARM NP 15 MIN: CPT | Mod: TEL | Performed by: PHARMACIST

## 2021-01-19 RX ORDER — METOPROLOL SUCCINATE 25 MG/1
25 TABLET, EXTENDED RELEASE ORAL DAILY
COMMUNITY
Start: 2019-12-17 | End: 2022-03-04

## 2021-01-19 RX ORDER — ALBUTEROL SULFATE 90 UG/1
2 AEROSOL, METERED RESPIRATORY (INHALATION) EVERY 6 HOURS PRN
COMMUNITY
End: 2022-03-03

## 2021-01-19 NOTE — PROGRESS NOTES
Medication Therapy Management (MTM) Encounter    ASSESSMENT:                            Medication Adherence/Access: rosuvastatin discrepancy, inhaler cost - see below.    Renal Function:     CrCl  Cockcroft and Gault (ideal BW): 28.1 ml/min  Cockcroft and Gault (actual BW): 36 ml/min  Consider using adjusted body weight for obese patients (usually >30% over ideal BW)  Currently patient is 28.15% over IBW.  Cockcroft and Gault (adjusted BW): 31.3 ml/min  Estimated GFR  Simplified 4-variable MDRD study formula: 28.4 ml/min/1.73m2      COPD: recommend change to triple therapy inhaler to ease regimen and also to save on inhaler cost. Education provided about [employer] Patient Assistance Program. Patient needs to meet $600 out of pocket spend and then will likely qualify.    Pain: recommend using plain acetaminophen (without the diphenhydramine) due to BEERs risks with diphenhydramine and since pain in the primary concern in the evening.    Dyslipidemia:  Discrepancy noted as both PCP And [facility] provider has rosuvastatin on med list (10 mg at [facility], 40 mg at cards); Patient stopped months ago due to LEILA (unknown date?) and has not been taking. Patient would likely benefit from statin for secondary ASCVD prevention - acknowledge the caution d/t renal function.  Will discuss with PCP/nephrologist and determine whether to restart and/or contact Dr. Valentine.  With current renal function ~30 ml/min, would recommend 5-10 mg/day.  Unclear what statin therapy he was on with last fasting lipid panel (2/2020) but regardless due for annual lipid panel 2/2021.     Hypertension/CAD/CKD/ lower leg edema : BP well controlled, following with cards and vascular clinic.  Notable that cards note states both aspirin and clopidogrel, yet other chart notes state that DAPT stopped 10/2020. Patient does not note taking either med today. Will confirm with PCP.    GERD: symptoms stable, but last mag level was low. Recommend repeat magnesium  "lab.    Constipation: Stable    Vitamins/Supplements: recommend mag and vit B12 levels.    PLAN:                            1. You can finish your current supply of Advair and Spiriva.  When you finish these inhalers, stop both (at the same time) and switch to the Trelegy inhaler. When you start the Trelegy inhaler, you will inhale 1 puff by mouth once daily in the morning. Rinse your mouth after use.     The benefit of the Trelegy inhaler is that it is only once daily (and contains all 3 drugs for you!).  The other benefit is that we can get you set up to get the medication at no cost from the  once you meet certain requirements.  - Your annual household income for 2 people needs to be less than $42,264  - You need to spend $600 on medications in the calendar year 2021 (this can include ANY and ALL meds for those in your household - you and your wife).   I am sending you the paperwork for this program, but you should contact me when you get close to this $600 spend and then we will work on getting the paperwork into the  together.    2. I recommend that you use regular tylenol (acetaminophen) without the \"PM.\"  The \"PM\" means that the medication contains diphenhydramine (generic benadryl) and this medication has side effects such as daytime drowsiness, increased risk of falling and memory impairment.    3. MTM will discuss statin / aspirin with PCP.    Future labs: Recommend magnesium lab, vit B12 lab    Follow-up: recommend follow-up in 3 months - once you've been on the Trelegy inhaler for a few weeks - or sooner if you meet the $600 out of pocket spend sooner.  Feel free to reach out sooner if you have medication questions or concerns.    Addended on January 26, 2021.  Message received from PCP.  Called Patient to communicate plan:  1. Patient will start taking rosuvastatin 10 mg daily - Rx sent.  2. Patient will contact Dr. Valentine's office re: antiplatelet medication, notify them he is " not taking aspirin or clopidogrel and inquire whether to restart one or the other.    Still planning for follow-up in 3 month - Patient will call back to schedule. RTC was entered.    ---  ---------------------  From: Yong Boyd MD   To: Wiley Cottrell Kaity;     Sent: 1/25/2021 10:38:16 AM CST  Subject: RE: MTM recs     1.)  Rosuvastatin stopped in setting of LEILA.  He should restart rosuvastatin at 10mg qhs  2.) DAPT also stopped in setting of LEILA and acute anemia and concerns for GI bleed.  Previously, I told Mitchell to address this with cardiology about timeframe of reintroduction    ---------------------  From: Wiley Cottrell Kaity   To: Yong Boyd MD;     Sent: 1/22/2021 8:16:09 PM CST  Subject: MTM recs     Looks like Mitchell was referred for inhaler cost - we reviewed this, and I did a full MTM review.    Couple questions for you:  1. there is a discrepancy re: statin therapy. our med list says rosu 10, cards says rosu 40, pt states not taking stopped d/t LEILA months ago - see my assessment for details. he is on zetia. Do you want to restart statin?  2. wanted to confirm that he is not on any antiplatelet or anticoagulant therapy at this time. I see hx of anemia and concern for GIB, but wanted to make sure with his extensive CV hx.    See MTM note for other details.  KB      SUBJECTIVE/OBJECTIVE:                          Lenny Chau is a 75 year old male called for an initial visit. He was referred to me from Yong Boyd MD.      Reason for visit: questions about inhaler cost.    Allergies/ADRs: None  Tobacco: He reports that he quit smoking about 11 years ago. He has never used smokeless tobacco.  Alcohol: 1-2 drinks (vodka)/day.  Caffeine: 1-2 cups/day of coffee  Activity: no routine.  Past Medical History: Reviewed in chart - history of prostate cancer, lung cancer, unilateral nephrectomy, oropharyngeal cancer.  Social: lives with wife.    Medication Adherence/Access:   Patient uses pill box(es), sets  this up himself.  Patient takes medications 2 time(s) per day.   Per patient, misses medication 0 times per week, estimates maybe once/month.  Medication barriers: trouble affording inhalers - see below.  The patient fills medications at [facility]: NO, fills medications at [facility] [place].    AM:  Multivitamin  zinc  spiriva  advair  Omeprazole (has been skipping in the mornings)  Ezetimibe 10 mg  Metoprolol succinate ER 25 mg    PM:  advair 500  Magnesium 250 mg  Vitamin b12 500 mcg  Omeprazole 20 mg    As needed:   Albuterol inhaler -     COPD:  Patient follows with [facility] pulmonology. Last appointment 12/23/2020 - recommended for 6 mo followup.  History of R sided lung cancer x2.  Tobacco status: former smoker - quit 2009. 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  Current Medications:  Albuterol MDI. Uses 4-5x/week - using 2 puffs. Has a spacer to use this.  Patient has albuterol nebs on his med list in clinic, but does not have a nebulizer at home - removed from med list today.  ICS/LABA- Advair 500-50 mcg dose: 1 puff(s) twice daily  LAMA- Spiriva Handihaler inhale contents of 1 capsule by mouth once daily.  His biggest concern today is that these inhalers are really expensive for him and he is wondering if there is a cheaper alternative.  $360/3 months of one inhaler  $316/3 months of the other inhaler  Both inhalers were purchased in 12/2020 - so he has about 2 months remaining for both inhalers.  Called pharmacy together today during visit.  LPU dates:  spiriva 12/31/2020.   advair 12/9/2020.  Abuterol: 8/9/2020.  Patient rinses their mouth after using steroid inhaler.   Pt reports the following symptoms: cough daily, phlegm in chest, increased shortness of breath with activity.  COPD assessment test (CAT):  1. I never cough  0 - 1 - 2 - 3 - 4 - 5  I cough all the time =  2  2. I have no phlegm (mucus) in my chest  0 - 1 - 2 - 3 - 4 - 5  My chest is completely full of phlegm (mucus) = 3  3. My chest does not feel tight at all   0 - 1 - 2 - 3 - 4 - 5  my chest feels very tight = 0  4.  When I walk up a hill or one flight of stairs I am not breathless   0 - 1 - 2 - 3 - 4 - 5  when I walk up a hill or one flight of stairs I am very breathless = 3  5.  I am not limited in doing any activities at home  0 - 1 - 2 - 3 - 4 - 5  I am very limited in doing activities at home = 3  6.  I am confident leaving my home despite my lung condition  0 - 1 - 2 - 3 - 4 - 5  I am not at all confident leaving my home because of medical condition = 0  7.  I sleep soundly  0 - 1 - 2 - 3 - 4 - 5  I do not sleep soundly because of medical condition = 3  8.  I have lots of energy  0 - 1 - 2 - 3 - 4 - 5  I have no energy at all = 3.  COPD assessment test (CAT) history:  Date: 1/19/2021 (TODAY): 17    Pain:  Current Meds:  Acetaminophen-diphenhydramine: taking this before bed if he is in pain and unable to sleep. Biggest factor that keeps him from sleeping is the arthritis pain in his legs.    Dyslipidemia:  Current therapy includes Ezetimibe (Zetia) 10mg once daily. States that he is no longer taking the rosuvastatin, he is not sure why, but states that it was stopped several months ago and he has not taken since then. Notable that the rosuvastatin 10 mg is on Patient's med list prescribed by [facility] 12/29/2020.  Noted that per Dr. Valentine's note 1/8/2021 - Patient was taking rosuvastatin 40 mg.  Pt reports no significant myalgias or other side effects.  No 10-year ASCVD risk due to: secondary prevention.    Medication History: rosuvastatin- see above - Patient is not taking.    Hypertension/CAD/CKD/ lower leg edema :  Follows with Dr. Valentine. Last appointment with Dr. Valentine was 1/8/2021.  Follows with vascular clinic/Dr. David.  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  Thoracic AAA, history of abdominal AAA s/p endovascular repair.  PCI of RCA 2019 following NSTEMI; DAPT stopped 10/2020.  Current Meds:  Metoprolol succinate 25 mg: once daily  Nitroglycerin 0.4 mg sublingual tab: Place 1 tablet under the tongue at onset of chest pain.  May repeat x 3 doses q 5 min.  Call 911 after first dose if pain persists. Pt use: states that he has at home in case he needs it. Discussed ensuring that this is not .  Medication History: clopidogrel/aspirin (per chart)  Avoiding ACEi/ARB due to CKD.  ECHO: 2020 EF: 55%  Patient does not self-monitor blood pressure.           GERD:  Current medications include: Prilosec (omeprazole) 20 mg once daily. States that he used to take twice daily, but now just once daily. Seems to be effective.  Reports that years ago he had endoscopy and was really close to a bleed. Was given a medication twice daily (prevacid) for treatment, then switched to omeprazole  Mag level: takes mag supplement - see below.       Constipation: Patient normally has BMs 1-2 times/day. No concerns at this time.  Miralax (PEG powder): 17 grams (1 capful) mixed in liquid once daily - Patient  Using intermittently as needed.    Vitamins/Supplements:  Multivitamin: once daily  Magnesium oxide 250 mg: once daily in the morning. States that he has been taking this for about 1 year for the cramping in his legs, seems to help.  Vitamin B12 (cyanocobalamin) 500 mcg: once daily - states that he was started on this on , has been on ever since.  Zinc 50 m tab daily - taking for immunuty  No results found for: B12    Today's Vitals: There were no vitals taken for this visit.  ----------------    I spent 47 minutes with this patient today. All changes were made via collaborative practice agreement with Shade Boyd - other recommendations made to BRKATERINE.. A copy of the visit note was provided to the patient's primary care provider.    The patient was mailed a summary of  these recommendations. MTM Coord to mail.    Sara Cottrell PharmD  Medication Therapy Management (MTM) Pharmacist  [facility] (Tu/Th/Fr)  Clinic Phone: [phone]  Pager: [phone]    Telemedicine Visit Details  Type of service:  Telephone visit  Start Time: 2:00 pm  End Time: 2:47 pm  Originating Location (patient location): Home  Distant Location (provider location):  [facility] MTM      Medication Therapy Recommendations  Chronic obstructive pulmonary disease, unspecified COPD type (H)    Current Medication: fluticasone-salmeterol (ADVAIR) 500-50 MCG/DOSE inhaler   Rationale: Cannot afford medication product - Cost - Adherence   Recommendation: Change Medication   Status: Accepted per CPA         Dyslipidemia    Current Medication: rosuvastatin (CRESTOR) 10 MG tablet   Rationale: Does not understand instructions - Adherence - Adherence   Recommendation: Provide Adherence Intervention - checking with cards/PCP re: statin   Status: Contact Provider - Awaiting Response         Generalized pain    Current Medication: diphenhydrAMINE-acetaminophen (TYLENOL PM)  MG tablet   Rationale: Unsafe medication for the patient - Adverse medication event - Safety   Recommendation: Change Medication - acetaminophen 500 MG tablet   Status: Patient Agreed - Adherence/Education

## 2021-01-21 ENCOUNTER — HOSPITAL ENCOUNTER (OUTPATIENT)
Dept: RADIOLOGY | Facility: HOSPITAL | Age: 76
Discharge: HOME | End: 2021-01-21
Attending: RADIOLOGY

## 2021-01-21 ENCOUNTER — HOSPITAL ENCOUNTER (OUTPATIENT)
Dept: CT IMAGING | Facility: HOSPITAL | Age: 76
Setting detail: RADIATION/ONCOLOGY SERIES
Discharge: STILL ADMITTED OR ADMINISTRATIVE | End: 2021-01-21
Attending: RADIOLOGY

## 2021-01-21 ENCOUNTER — HOSPITAL ENCOUNTER (OUTPATIENT)
Dept: CT IMAGING | Facility: HOSPITAL | Age: 76
Discharge: HOME | End: 2021-01-21
Attending: RADIOLOGY

## 2021-01-21 DIAGNOSIS — C34.12 MALIGNANT NEOPLASM OF UPPER LOBE OF LEFT LUNG (H): ICD-10-CM

## 2021-01-21 DIAGNOSIS — Z98.890 STATUS POST BIOPSY: ICD-10-CM

## 2021-01-21 LAB
HGB BLD-MCNC: 13.2 G/DL (ref 14–18)
INR PPP: 0.95 (ref 0.9–1.1)
PLATELET # BLD AUTO: 211 THOU/UL (ref 140–440)

## 2021-01-21 ASSESSMENT — MIFFLIN-ST. JEOR: SCORE: 1718.29

## 2021-01-22 ENCOUNTER — COMMUNICATION - RIVER FALLS (OUTPATIENT)
Dept: FAMILY MEDICINE | Facility: CLINIC | Age: 76
End: 2021-01-22
Payer: COMMERCIAL

## 2021-01-22 LAB
CAP COMMENT: ABNORMAL
LAB AP CHARGES (HE HISTORICAL CONVERSION): ABNORMAL
LAB AP INITIAL CYTO EVAL (HE HISTORICAL CONVERSION): ABNORMAL
LAB MED GENERAL PATH INTERP (HE HISTORICAL CONVERSION): ABNORMAL
PATH REPORT.COMMENTS IMP SPEC: ABNORMAL
PATH REPORT.COMMENTS IMP SPEC: ABNORMAL
PATH REPORT.FINAL DX SPEC: ABNORMAL
PATH REPORT.MICROSCOPIC SPEC OTHER STN: ABNORMAL
PATH REPORT.RELEVANT HX SPEC: ABNORMAL
RESULT FLAG (HE HISTORICAL CONVERSION): ABNORMAL
SPECIMEN DESCRIPTION: ABNORMAL

## 2021-01-22 RX ORDER — ZINC GLUCONATE 50 MG
1 TABLET ORAL DAILY
COMMUNITY
End: 2022-07-01

## 2021-01-22 RX ORDER — POLYETHYLENE GLYCOL 3350 17 G/17G
17 POWDER, FOR SOLUTION ORAL DAILY
COMMUNITY
Start: 2020-10-12

## 2021-01-22 RX ORDER — NITROGLYCERIN 0.4 MG/1
0.4 TABLET SUBLINGUAL
COMMUNITY
Start: 2019-12-19 | End: 2022-03-08

## 2021-01-22 RX ORDER — EZETIMIBE 10 MG/1
10 TABLET ORAL DAILY
COMMUNITY
Start: 2020-02-04 | End: 2021-06-15

## 2021-01-22 RX ORDER — MULTIVITAMIN,THERAPEUTIC
1 TABLET ORAL DAILY
COMMUNITY
End: 2021-11-08

## 2021-01-22 RX ORDER — TIOTROPIUM BROMIDE 18 UG/1
CAPSULE ORAL; RESPIRATORY (INHALATION)
COMMUNITY
Start: 2020-06-04 | End: 2021-06-15

## 2021-01-22 RX ORDER — ROSUVASTATIN CALCIUM 10 MG/1
10 TABLET, COATED ORAL DAILY
COMMUNITY
End: 2021-06-15

## 2021-01-23 NOTE — PATIENT INSTRUCTIONS
"Recommendations from today's MTM visit:                                                    MTM (medication therapy management) is a service provided by a clinical pharmacist designed to help you get the most of out of your medicines.      1. You can finish your current supply of Advair and Spiriva.  When you finish these inhalers, stop both (at the same time) and switch to the Trelegy inhaler. When you start the Trelegy inhaler, you will inhale 1 puff by mouth once daily in the morning. Rinse your mouth after use.     The benefit of the Trelegy inhaler is that it is only once daily (and contains all 3 drugs for you!).  The other benefit is that we can get you set up to get the medication at no cost from the  once you meet certain requirements.  - Your annual household income for 2 people needs to be less than $42,264  - You need to spend $600 on medications in the calendar year 2021 (this can include ANY and ALL meds for those in your household - you and your wife).   I am sending you the paperwork for this program, but you should contact me when you get close to this $600 spend and then we will work on getting the paperwork into the  together.    2. I recommend that you use regular tylenol (acetaminophen) without the \"PM.\"  The \"PM\" means that the medication contains diphenhydramine (generic benadryl) and this medication has side effects such as daytime drowsiness, increased risk of falling and memory impairment.    3. I will discuss your statin therapy with Dr. Boyd and get back to you if we want you to restart this medication.    4. With next labs, we will draw your magnesium and vitamin B12 levels.    It was great to speak with you today.  I value your experience and would be very thankful for your time with providing feedback on our clinic survey. You may receive a survey via email or text message in the next few days.     Next MTM visit:  Recommend follow-up in 3 months - once you've " been on the Trelegy inhaler for a few weeks - or sooner if you meet the $600 out of pocket spend sooner.   Feel free to reach out sooner if you have medication questions or concerns.  You can call clinic to schedule an appointment.    To schedule another MTM appointment, please call the clinic directly or you may call the MTM scheduling line at [phone] or toll-free at [phone].     My Clinical Pharmacist's contact information:                                                      It was a pleasure talking with you today!  Please feel free to contact me with any questions or concerns you have.      Sara Cottrell, PharmD  Medication Therapy Management (MTM) Pharmacist  [facility] (Tu/Th/Fr)  Clinic Phone: [phone]  Pager: [phone]

## 2021-01-28 ENCOUNTER — AMBULATORY - HEALTHEAST (OUTPATIENT)
Dept: RADIATION ONCOLOGY | Facility: HOSPITAL | Age: 76
End: 2021-01-28

## 2021-01-28 DIAGNOSIS — C34.12 MALIGNANT NEOPLASM OF UPPER LOBE OF LEFT LUNG (H): ICD-10-CM

## 2021-02-11 ENCOUNTER — COMMUNICATION - RIVER FALLS (OUTPATIENT)
Dept: FAMILY MEDICINE | Facility: CLINIC | Age: 76
End: 2021-02-11
Payer: COMMERCIAL

## 2021-02-15 ENCOUNTER — AMBULATORY - HEALTHEAST (OUTPATIENT)
Dept: RADIATION ONCOLOGY | Facility: HOSPITAL | Age: 76
End: 2021-02-15

## 2021-02-15 DIAGNOSIS — C34.12 MALIGNANT NEOPLASM OF UPPER LOBE OF LEFT LUNG (H): ICD-10-CM

## 2021-02-23 ENCOUNTER — AMBULATORY - HEALTHEAST (OUTPATIENT)
Dept: RADIATION ONCOLOGY | Facility: HOSPITAL | Age: 76
End: 2021-02-23

## 2021-03-02 ENCOUNTER — AMBULATORY - HEALTHEAST (OUTPATIENT)
Dept: RADIATION ONCOLOGY | Facility: CLINIC | Age: 76
End: 2021-03-02

## 2021-03-02 ENCOUNTER — AMBULATORY - HEALTHEAST (OUTPATIENT)
Dept: LAB | Facility: CLINIC | Age: 76
End: 2021-03-02

## 2021-03-02 DIAGNOSIS — C34.12 MALIGNANT NEOPLASM OF UPPER LOBE OF LEFT LUNG (H): ICD-10-CM

## 2021-03-03 ENCOUNTER — COMMUNICATION - HEALTHEAST (OUTPATIENT)
Dept: SCHEDULING | Facility: CLINIC | Age: 76
End: 2021-03-03

## 2021-03-09 ENCOUNTER — AMBULATORY - RIVER FALLS (OUTPATIENT)
Dept: FAMILY MEDICINE | Facility: CLINIC | Age: 76
End: 2021-03-09
Payer: COMMERCIAL

## 2021-03-10 ENCOUNTER — COMMUNICATION - HEALTHEAST (OUTPATIENT)
Dept: ONCOLOGY | Facility: CLINIC | Age: 76
End: 2021-03-10

## 2021-03-10 ENCOUNTER — COMMUNICATION - RIVER FALLS (OUTPATIENT)
Dept: FAMILY MEDICINE | Facility: CLINIC | Age: 76
End: 2021-03-10
Payer: COMMERCIAL

## 2021-03-10 ENCOUNTER — OFFICE VISIT - HEALTHEAST (OUTPATIENT)
Dept: RADIATION ONCOLOGY | Facility: CLINIC | Age: 76
End: 2021-03-10

## 2021-03-10 DIAGNOSIS — C34.12 MALIGNANT NEOPLASM OF UPPER LOBE OF LEFT LUNG (H): ICD-10-CM

## 2021-03-10 LAB
A/G RATIO - HISTORICAL: 1.4 (ref 1–2.5)
ALBUMIN SERPL-MCNC: 3.8 GM/DL (ref 3.6–5.1)
ALP SERPL-CCNC: 121 UNIT/L (ref 35–144)
ALT SERPL W P-5'-P-CCNC: 24 UNIT/L (ref 9–46)
AST SERPL W P-5'-P-CCNC: 35 UNIT/L (ref 10–35)
BILIRUB DIRECT SERPL-MCNC: 0.2 MG/DL
BILIRUB INDIRECT SERPL-MCNC: 0.8 MG/DL (ref 0.2–1.2)
BILIRUB SERPL-MCNC: 1 MG/DL (ref 0.2–1.2)
BUN SERPL-MCNC: 16 MG/DL (ref 7–25)
BUN/CREAT RATIO - HISTORICAL: 9 (ref 6–22)
CALCIUM SERPL-MCNC: 10.3 MG/DL (ref 8.6–10.3)
CHLORIDE BLD-SCNC: 99 MMOL/L (ref 98–110)
CO2 SERPL-SCNC: 30 MMOL/L (ref 20–32)
CREAT SERPL-MCNC: 1.71 MG/DL (ref 0.7–1.18)
EGFRCR SERPLBLD CKD-EPI 2021: 38 ML/MIN/1.73M2
ERYTHROCYTE [DISTWIDTH] IN BLOOD BY AUTOMATED COUNT: 14.3 % (ref 11–15)
GLOBULIN: 2.7 (ref 1.9–3.7)
GLUCOSE BLD-MCNC: 129 MG/DL (ref 65–99)
HCT VFR BLD AUTO: 39.8 % (ref 38.5–50)
HGB BLD-MCNC: 13.7 GM/DL (ref 13.2–17.1)
MAGNESIUM SERPL-MCNC: 1.9 MG/DL (ref 1.5–2.5)
MCH RBC QN AUTO: 33.2 PG (ref 27–33)
MCHC RBC AUTO-ENTMCNC: 34.4 GM/DL (ref 32–36)
MCV RBC AUTO: 96.4 FL (ref 80–100)
PLATELET # BLD AUTO: 131 10*3/UL (ref 140–400)
PMV BLD: 9.5 FL (ref 7.5–12.5)
POTASSIUM BLD-SCNC: 4.9 MMOL/L (ref 3.5–5.3)
PROT SERPL-MCNC: 6.5 GM/DL (ref 6.1–8.1)
PSA SERPL-MCNC: 23.5 NG/ML
RBC # BLD AUTO: 4.13 10*6/UL (ref 4.2–5.8)
SODIUM SERPL-SCNC: 138 MMOL/L (ref 135–146)
VIT B12 SERPL-MCNC: 810 PG/ML (ref 200–1100)
WBC # BLD AUTO: 4.6 10*3/UL (ref 3.8–10.8)

## 2021-03-16 ENCOUNTER — OFFICE VISIT - RIVER FALLS (OUTPATIENT)
Dept: FAMILY MEDICINE | Facility: CLINIC | Age: 76
End: 2021-03-16
Payer: COMMERCIAL

## 2021-03-16 ASSESSMENT — MIFFLIN-ST. JEOR: SCORE: 1709.66

## 2021-03-24 ENCOUNTER — COMMUNICATION - HEALTHEAST (OUTPATIENT)
Dept: RADIATION ONCOLOGY | Facility: CLINIC | Age: 76
End: 2021-03-24

## 2021-05-09 ENCOUNTER — COMMUNICATION - HEALTHEAST (OUTPATIENT)
Dept: SCHEDULING | Facility: CLINIC | Age: 76
End: 2021-05-09

## 2021-05-11 ENCOUNTER — COMMUNICATION - HEALTHEAST (OUTPATIENT)
Dept: ADMINISTRATIVE | Facility: HOSPITAL | Age: 76
End: 2021-05-11

## 2021-05-12 ENCOUNTER — COMMUNICATION - HEALTHEAST (OUTPATIENT)
Dept: CARDIOLOGY | Facility: CLINIC | Age: 76
End: 2021-05-12

## 2021-05-12 DIAGNOSIS — I49.1 PAC (PREMATURE ATRIAL CONTRACTION): ICD-10-CM

## 2021-05-12 DIAGNOSIS — I25.118 CORONARY ARTERY DISEASE OF NATIVE HEART WITH STABLE ANGINA PECTORIS, UNSPECIFIED VESSEL OR LESION TYPE (H): ICD-10-CM

## 2021-05-12 DIAGNOSIS — R94.31 ABNORMAL ELECTROCARDIOGRAM: ICD-10-CM

## 2021-05-12 LAB
ALP SERPL-CCNC: 119 UNIT/L
ALT SERPL W P-5'-P-CCNC: 33 UNIT/L
AST SERPL W P-5'-P-CCNC: 57 UNIT/L
BILIRUB SERPL-MCNC: 1.1 MG/DL
BUN SERPL-MCNC: 16 MG/DL
CALCIUM SERPL-MCNC: 7.7 MEQ/DL
CHLORIDE BLD-SCNC: 105 MEQ/L
CREAT SERPL-MCNC: 1.66 MG/DL
HGB BLD-MCNC: 11.1 G/DL
PLATELET # BLD AUTO: 34 X10
POTASSIUM BLD-SCNC: 5 MEQ/L
PROT SERPL-MCNC: 4.6 GM/DL
SODIUM SERPL-SCNC: 136 MEQ/L
WBC # BLD AUTO: 4.9 X10

## 2021-05-13 ENCOUNTER — OFFICE VISIT - RIVER FALLS (OUTPATIENT)
Dept: FAMILY MEDICINE | Facility: CLINIC | Age: 76
End: 2021-05-13
Payer: COMMERCIAL

## 2021-05-21 ENCOUNTER — OFFICE VISIT - RIVER FALLS (OUTPATIENT)
Dept: FAMILY MEDICINE | Facility: CLINIC | Age: 76
End: 2021-05-21
Payer: COMMERCIAL

## 2021-05-21 ASSESSMENT — MIFFLIN-ST. JEOR: SCORE: 1672.47

## 2021-05-30 NOTE — ANESTHESIA PREPROCEDURE EVALUATION
Anesthesia Evaluation      Patient summary reviewed     Airway   Mallampati: II  Neck ROM: full   Pulmonary - normal exam   (+) COPD, shortness of breath, a smoker (q '09)                         Cardiovascular   (+) hypertension, CAD, , hypercholesterolemia,     Rhythm: regular  Rate: normal,      ROS comment: AAA     Neuro/Psych - negative ROS     Endo/Other - negative ROS      GI/Hepatic/Renal    (+) GERD, esophageal disease,  chronic renal disease CRI,           Dental    (+) poor dentition                       Anesthesia Plan  Planned anesthetic: MAC    ASA 3   Induction: intravenous   Anesthetic plan and risks discussed with: patient  Anesthesia plan special considerations: antiemetics,   Post-op plan: routine recovery

## 2021-05-30 NOTE — ANESTHESIA POSTPROCEDURE EVALUATION
Patient: Lenny Chau  ENDOSCOPIC ULTRASOUND FINE NEEDLE ASPIRATION, GASTRIC BIOPSIES OF POLYPS  Anesthesia type: MAC    Patient location: PACU  Last vitals:   Vitals Value Taken Time   /65 7/26/2019  9:20 AM   Temp 36.4  C (97.5  F) 7/26/2019  8:50 AM   Pulse 61 7/26/2019  9:29 AM   Resp 23 7/26/2019  9:29 AM   SpO2 95 % 7/26/2019  9:28 AM   Vitals shown include unvalidated device data.  Post vital signs: stable  Level of consciousness: awake and responds to simple questions  Post-anesthesia pain: pain controlled  Post-anesthesia nausea and vomiting: no  Pulmonary: unassisted, return to baseline  Cardiovascular: stable and blood pressure at baseline  Hydration: adequate  Anesthetic events: no    QCDR Measures:  ASA# 11 - Elisabeth-op Cardiac Arrest: ASA11B - Patient did NOT experience unanticipated cardiac arrest  ASA# 12 - Elisabeth-op Mortality Rate: ASA12B - Patient did NOT die  ASA# 13 - PACU Re-Intubation Rate: NA - No ETT / LMA used for case  ASA# 10 - Composite Anes Safety: ASA10A - No serious adverse event    Additional Notes:

## 2021-05-30 NOTE — ANESTHESIA CARE TRANSFER NOTE
Last vitals:   Vitals:    07/26/19 0850   BP: 106/61   Pulse: (!) 53   Resp: 20   Temp: 36.4  C (97.5  F)   SpO2: 100%     Patient's level of consciousness is drowsy but awake. Denies pain. Denies nausea.   Spontaneous respirations: yes  Maintains airway independently: yes  Dentition unchanged: yes  Oropharynx: oropharynx clear of all foreign objects    QCDR Measures:  ASA# 20 - Surgical Safety Checklist: WHO surgical safety checklist completed prior to induction    PQRS# 430 - Adult PONV Prevention: 4558F - Pt received => 2 anti-emetic agents (different classes) preop & intraop  ASA# 8 - Peds PONV Prevention: NA - Not pediatric patient, not GA or 2 or more risk factors NOT present  PQRS# 424 - Elisabeth-op Temp Management: 4559F - At least one body temp DOCUMENTED => 35.5C or 95.9F within required timeframe  PQRS# 426 - PACU Transfer Protocol: - Transfer of care checklist used  ASA# 14 - Acute Post-op Pain: ASA14B - Patient did NOT experience pain >= 7 out of 10   Duration Of Freeze Thaw-Cycle (Seconds): 0 Render Post-Care Instructions In Note?: no Detail Level: Detailed Total Number Of Aks Treated: 9

## 2021-06-03 VITALS — WEIGHT: 192.6 LBS | HEIGHT: 71 IN | BODY MASS INDEX: 26.96 KG/M2

## 2021-06-04 VITALS — WEIGHT: 202 LBS | BODY MASS INDEX: 28.17 KG/M2

## 2021-06-05 VITALS
SYSTOLIC BLOOD PRESSURE: 120 MMHG | WEIGHT: 209 LBS | HEIGHT: 72 IN | BODY MASS INDEX: 28.31 KG/M2 | HEART RATE: 107 BPM | OXYGEN SATURATION: 95 % | DIASTOLIC BLOOD PRESSURE: 72 MMHG

## 2021-06-05 VITALS
HEART RATE: 95 BPM | DIASTOLIC BLOOD PRESSURE: 85 MMHG | WEIGHT: 213.8 LBS | SYSTOLIC BLOOD PRESSURE: 158 MMHG | BODY MASS INDEX: 29.82 KG/M2 | OXYGEN SATURATION: 97 % | TEMPERATURE: 98.2 F

## 2021-06-05 VITALS
TEMPERATURE: 97.8 F | HEART RATE: 92 BPM | BODY MASS INDEX: 29.39 KG/M2 | SYSTOLIC BLOOD PRESSURE: 141 MMHG | DIASTOLIC BLOOD PRESSURE: 87 MMHG | WEIGHT: 213.7 LBS | OXYGEN SATURATION: 95 %

## 2021-06-05 VITALS
SYSTOLIC BLOOD PRESSURE: 106 MMHG | BODY MASS INDEX: 27.36 KG/M2 | OXYGEN SATURATION: 97 % | HEIGHT: 72 IN | WEIGHT: 202 LBS | DIASTOLIC BLOOD PRESSURE: 64 MMHG | HEART RATE: 82 BPM

## 2021-06-05 VITALS
SYSTOLIC BLOOD PRESSURE: 153 MMHG | BODY MASS INDEX: 29.02 KG/M2 | DIASTOLIC BLOOD PRESSURE: 88 MMHG | WEIGHT: 208.1 LBS | TEMPERATURE: 98.3 F | OXYGEN SATURATION: 95 % | HEART RATE: 70 BPM

## 2021-06-05 VITALS — HEIGHT: 72 IN | BODY MASS INDEX: 28.17 KG/M2 | WEIGHT: 208 LBS

## 2021-06-05 VITALS
SYSTOLIC BLOOD PRESSURE: 106 MMHG | WEIGHT: 202 LBS | RESPIRATION RATE: 12 BRPM | OXYGEN SATURATION: 97 % | DIASTOLIC BLOOD PRESSURE: 60 MMHG | HEART RATE: 68 BPM | HEIGHT: 72 IN | BODY MASS INDEX: 27.36 KG/M2

## 2021-06-05 VITALS — HEIGHT: 71 IN | BODY MASS INDEX: 29.82 KG/M2 | WEIGHT: 213 LBS

## 2021-06-05 VITALS
HEIGHT: 72 IN | BODY MASS INDEX: 28.17 KG/M2 | BODY MASS INDEX: 28.17 KG/M2 | WEIGHT: 208 LBS | WEIGHT: 208 LBS | HEIGHT: 72 IN

## 2021-06-05 VITALS — HEIGHT: 71 IN | WEIGHT: 211 LBS | BODY MASS INDEX: 29.54 KG/M2

## 2021-06-10 NOTE — TELEPHONE ENCOUNTER
Gave results of biopsy of left cheek - Squamous cell carcinoma. Patient has a history of SCC right jaw resection, reconstruction, radiation and chemotherapy. I will get PET/CT.

## 2021-06-10 NOTE — PROGRESS NOTES
HISTORY OF PRESENT ILLNESS  Asked to be seen by Dr. Boyd for evaluation of mouth lesions. Patient reports a lesion on the inside of the left cheek and tongue. He has had cancer in his mouth twice before. He had a major resection and radiation in 2009 at [facility]. He had free flap reconstruction of the oral cavity. He reports he has done fine without recurrence until recently. He felt a lesion on the left cheek that he is concerned is cancer. In addition he has one of the right cheek and something on the tongue. No new lumps or bumps in the neck.      REVIEW OF SYSTEMS  Review of Systems: a 10-system review was performed. Pertinent positives are noted in the HPI and on a separate scanned document in the chart.    PMH, PSH, FH and SH has documented in the EHR.      EXAM    CONSTITUTIONAL  General Appearance:  Normal, well developed, well nourished, no obvious distress  Ability to Communicate:  communicates appropriately.    HEAD AND FACE  Appearance and Symmetry:  Normal, no scalp or facial scarring or suspicious lesions.    EARS  Clinical speech reception threshold:  Normal, able to hear normal speech.  Auricle:  Normal, Auricles without scars, lesions, masses.  External auditory canal:  Normal, External auditory canal normal.  Tympanic membrane:  Normal, Tympanic membranes normal without swelling or erythema.    NOSE (speculum or scope)  Architecture:  Normal, Grossly normal external nasal architecture with no masses or lesions.  Mucosa:  Normal mucosa, No polyps or masses.  Septum:  Normal, Septum non-obstructing.  Turbinates:  Normal, No turbinate abnormalities    ORAL CAVITY AND OROPHARYNX  Lips:  Normal.  Dental and gingiva:  Normal, No obvious dental or gingival disease.  Mucosa:  1.5 cm lesion right and left posterior buccal mucosa. Both lesions are are suspicious..  Tongue:  5mm left lateral shallow tongue ulcer. Hard and soft palate:  Normal, Hard and soft palate without cleft or mucosal  lesions.  Oral pharynx:  Normal, Posterior pharynx without lesions or remarkable asymmetry.  Saliva:  Normal, Clear saliva.  Masses:  Normal, No palpable masses or pathologically enlarged lymph nodes.    NECK  Masses/lymph nodes:  Normal, No worrisome neck masses or lymph nodes.  Salivary glands:  Normal, Parotid and submandibular glands.  Trachea and larynx position:  Normal, Trachea and larynx midline.  Thyroid:  Normal, No thyroid abnormality.  Tenderness:  Normal, No cervical tenderness.  Suppleness:  Normal, Neck supple    NEUROLOGICAL  Speech pattern:  Normal, Proasaic    RESPIRATORY  Symmetry and Respiratory effort:  Normal, Symmetric chest movement and expansion with no increased intercostal retractions or use of accessory muscles.     IMPRESSION  Bilateral cheek lesions. Likely aphthous ulcer left lateral tongue.     RECOMMENDATION  The bases of the lesions on the buccal mucosa were infiltrated with lidocaine plus epinephrine solution. After allowing adequate time for anesthesia biopsy forceps was used to obtain tissue from each lesion. The lesions were placed into separate containers and sent to pathology.    Alon Nunez MD

## 2021-06-11 NOTE — PROGRESS NOTES
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 He has a complicated history and his oncologic history is also complicated.  He had two prior early stage right lung cancers that were apparently resected in their entirety and may have been synchronous primary lung cancers. It does not appear he had the appropriate recommended follow up at [facility]. The MELODY lesion was known to be present in 2016 and appears to be slightly larger now with significant FDG-avidity on the recent PET/CT. It is likely another primary lung cancer, although metastasis from previous lung cancers is possible. He also appears to have simultaneous left buccal squamous cell cancer as well as laryngeal cancer for which he's following with ENT.     Recommendations:  #MELODY FDG-avid lesion, suspicious for primary lung adenocarcinoma without evidence of metastasis, likely early stage IA based on imaging:  - I will review the recent PET/CT with our thoracic tumor conference given the complexity of the situation and multiple cancers present.  - PFT's ASAP  - discussed potential treatment options of the MELODY lesion including resection (will depend on PFT's) vs. SBRT. This will depend on his PFT's and lung function. May require biopsy of the lesion but hoping to avoid this given underlying emphysema.    #history of COPD, unable to access prior PFT's. Appears stable, with chronic dyspnea on exertion.   - continue Advair and spiriva for now  - PFT's as above  - will explore his COPD further at the next visit.    #RHM:  - UTD with  "pneumococcal vaccinations   - needs flu shot - he says he'll get this with his PMD next week.    All questions answered.  Follow up in approx 1 month.     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   Notable additionally history includes 2 prior right sided lung cancers - RUL and RML s/p wedge resection surgery by Dr. Carter Velazquez in 2015. He had complete margins and no lymph node involvement at the time.  He says he followed up with Dr. Velazquez \"for a while\" but then appears to have been lost to follow up. He had a known subsolid MELODY lesion measuring 2.3cm at the time that he says he was just monitoring through surveillance scans.  Regarding his COPD he had PFTs years ago but I don't have those results.  He takes medium dose Advair HFA 1 puff two times a day as well as spiriva. Rarely uses rescue inhaler. He does have chronic dyspnea on exertion but in general is able to remain active without breathing limitations.   Regarding his mouth lesions he is followed by Dr. Alon Nunez from ENT. He is planning to have surgery on the left cheek for the cheek cancer. He also has suspicious left vocal cord lesion, he is not sure what the plan is for this lesion.     ROS:  A 12-system review was obtained and was negative with the exception of the symptoms endorsed in the history of present illness.    PMH:  Past Medical History:   Diagnosis Date     AAA (abdominal aortic aneurysm) (H)      [place] esophagus      Cancer (H)     lung     Chronic kidney disease      COPD (chronic " obstructive pulmonary disease) (H)      Coronary artery disease      Degenerative joint disease      Head and neck cancer (H)      Hyperlipidemia      Hypertension      Prostate cancer (H)      Shortness of breath     with exertion       PSH:  Past Surgical History:   Procedure Laterality Date     ABDOMINAL AORTIC ANEURYSM REPAIR       ESOPHAGOGASTRODUODENOSCOPY N/A 7/26/2019    Procedure: ENDOSCOPIC ULTRASOUND FINE NEEDLE ASPIRATION, GASTRIC BIOPSIES OF POLYPS;  Surgeon: Quoc Edwards MD;  Location: [facility] [place];  Service: Gastroenterology     NEPHRECTOMY Left     for benign hemangioma     placement of stent      for AAA     right hip surgery      for fracture     wedge resection Right     isolated pulmonary nodule       Allergies:  No Known Allergies    Family HX:  No family history on file.    Social Hx:  Social History     Socioeconomic History     Marital status:      Spouse name: Not on file     Number of children: Not on file     Years of education: Not on file     Highest education level: Not on file   Occupational History     Not on file   Social Needs     Financial resource strain: Not on file     Food insecurity     Worry: Not on file     Inability: Not on file     Transportation needs     Medical: Not on file     Non-medical: Not on file   Tobacco Use     Smoking status: Former Smoker     Packs/day: 2.00     Years: 50.00     Pack years: 100.00     Last attempt to quit: 12/25/2009     Years since quitting: 10.7     Smokeless tobacco: Never Used   Substance and Sexual Activity     Alcohol use: Yes     Alcohol/week: 2.0 standard drinks     Types: 2 Shots of liquor per week     Drug use: Never     Sexual activity: Not on file   Lifestyle     Physical activity     Days per week: Not on file     Minutes per session: Not on file     Stress: Not on file   Relationships     Social connections     Talks on phone: Not on file     Gets together: Not on file     Attends Spiritism service: Not on file      Active member of club or organization: Not on file     Attends meetings of clubs or organizations: Not on file     Relationship status: Not on file     Intimate partner violence     Fear of current or ex partner: Not on file     Emotionally abused: Not on file     Physically abused: Not on file     Forced sexual activity: Not on file   Other Topics Concern     Not on file   Social History Narrative     Not on file       Current Meds:  Current Outpatient Medications   Medication Sig Dispense Refill     albuterol (PROAIR HFA;PROVENTIL HFA;VENTOLIN HFA) 90 mcg/actuation inhaler Inhale 2 puffs every 4 (four) hours as needed for wheezing.       albuterol (PROVENTIL) 2.5 mg /3 mL (0.083 %) nebulizer solution Take 2.5 mg by nebulization every 6 (six) hours as needed for wheezing.       cyanocobalamin (VITAMIN B-12) 500 MCG tablet Take 500 mcg by mouth at bedtime.              fluticasone propion-salmeterol (ADVAIR HFA) 115-21 mcg/actuation inhaler Inhale 2 puffs 2 (two) times a day.       fluticasone propionate (FLONASE) 50 mcg/actuation nasal spray Apply 1 spray into each nostril daily as needed.              ibuprofen-diphenhydramine cit (ADVIL PM) 200-38 mg Tab Take 2 tablets by mouth at bedtime as needed.       lansoprazole (PREVACID) 15 MG capsule Take 15 mg by mouth at bedtime.              lisinopril-hydrochlorothiazide (PRINZIDE,ZESTORETIC) 20-12.5 mg per tablet Take 1 tablet by mouth at bedtime.              magnesium oxide 250 mg magnesium Tab Take 250 mg by mouth at bedtime.              metoprolol tartrate (LOPRESSOR) 50 MG tablet Take 50 mg by mouth 2 (two) times a day.       multivitamin therapeutic tablet Take 1 tablet by mouth daily.       pravastatin (PRAVACHOL) 80 MG tablet Take 80 mg by mouth at bedtime.       No current facility-administered medications for this visit.        Physical Exam:  There were no vitals taken for this visit.  Gen: awake, alert, oriented, no distress  HEENT: nasal turbinates  are unremarkable, no oropharyngeal lesions, no cervical or supraclavicular lymphadenopathy  CV: RRR, no M/G/R  Resp: CTAB, no focal crackles or wheezes  Abd: soft, nontender, no palpable organomegaly  Skin: no apparent rashes  Ext: no cyanosis, clubbing or edema  Neuro: alert, nonfocal    Labs:  Reviewed  Final Diagnosis A) RIGHT UPPER LUNG LOBE, WEDGE RESECTION:  1. Adenocarcinoma (50% lepidic and 50% acinar)  2. Benign resection margins  3. Emphysema with focal bullae  4. Please see comment and Staging Parameters    B) RIGHT MIDDLE LUNG LOBE, WEDGE RESECTION:  1. Adenocarcinoma (75% lepidic and 25% acinar)  2. Benign resection margins  3. Focal emphysematous changes  4. Please see comment and Staging Parameters    C) SUBCARINAL LYMPH NODES, STATION 7, REGIONAL RESECTION:  4 benign lymph nodes    D) PARATRACHEAL LYMPH NODES, STATION 4R,  REGIONAL RESECTION:  4 benign lymph nodes     Comment For Staging purposes I have assumed that the upper and middle lobe lung cancers are related. Though they are morphologically similar, they may not be related, instead representing synchronous separate primaries. If they are synchronous separate primaries, then the correct Staging for each tumor would be pT1a(m2)N0M0, Stage IA.    Dr. MATHEUS Jerome has reviewed slides A3 and B3 concurring with the invasive tumor size and the tumor stage.     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 No change in the groundglass opacity in the left upper lobe. Multiple small pulmonary nodules appear stable.          Imaging studies:  IMPRESSION:      1. Findings suspicious for left vocal region squamous cell carcinoma without metastasis.     2. Findings suspicious for left upper lobe adenocarcinoma without metastasis.    Last CT chest from [facility], 2016:  Result Narrative   [facility] MEDICAL IMAGING  CT CHEST  W  6/8/2016 9:14 AM    INDICATION: Lung Cancer (hcc).  TECHNIQUE: Routine chest. Dose reduction techniques were used.   IV CONTRAST: Omnipaque 350 60mL.  COMPARISON: 02/17/2016.    FINDINGS:   LUNGS AND PLEURA: Post treatment changes right hemithorax. Subsolid predominantly ground-glass opacity nodule left upper lobe, stable. Paraseptal emphysema in the upper lungs. Ground-glass opacity at the right lung base, series 2 image 233, stable and this may represent atelectasis.    MEDIASTINUM: Normal pulmonary arteries. Atheromatous changes in a normal caliber thoracic aorta. Coronary artery calcification. No hilar or mediastinal lymphadenopathy.    LIMITED UPPER ABDOMEN: Patent upper abdominal aortic stent graft.    MUSCULOSKELETAL: Negative.    CONCLUSION:   1.  No change since 02/17/2016.  2.  Recommend continued follow-up of subsolid predominantly ground-glass nodule left upper lobe.         PFT's  None available

## 2021-06-11 NOTE — TELEPHONE ENCOUNTER
Spoke with pt and scheduled surgery with Dr. Nunez at [facility] on 10/16/2020. Covid test scheduled for [facility] on 10/13/2020. Will send out all surgery information via mail.

## 2021-06-11 NOTE — PROGRESS NOTES
HISTORY OF PRESENT ILLNESS  Patient returns today to discuss PET CT findings. Activity noted in the left cheek and left upper chest. Patient reports pain in the left cheek and left tongue.     REVIEW OF SYSTEMS  Review of Systems: a 10-system review was performed. Pertinent positives are noted in the HPI and on a separate scanned document in the chart.    PMH, PSH, FH and SH has documented in the EHR.      EXAM    CONSTITUTIONAL  General Appearance:  Normal, well developed, well nourished, no obvious distress  Ability to Communicate:  communicates appropriately.    HEAD AND FACE  Appearance and Symmetry:  Normal, no scalp or facial scarring or suspicious lesions.    ORAL CAVITY AND OROPHARYNX  Lips:  Normal.  Dental and gingiva:  Normal, No obvious dental or gingival disease.  Mucosa:  1.5 cm lesion left buccal mucosa which is mobile.   Tongue:  5mm suspicious lesion left anterior tongue that is also painful to touch.  Hard and soft palate:  Normal, Hard and soft palate without cleft or mucosal lesions.  Oral pharynx:  Normal, Posterior pharynx without lesions or remarkable asymmetry.  Saliva:  Normal, Clear saliva.  Masses:  Normal, No palpable masses or pathologically enlarged lymph nodes.    NECK  Masses/lymph nodes:  Normal, No worrisome neck masses or lymph nodes.  Salivary glands:  Normal, Parotid and submandibular glands.  Trachea and larynx position:  Normal, Trachea and larynx midline.  Thyroid:  Normal, No thyroid abnormality.  Tenderness:  Normal, No cervical tenderness.  Suppleness:  Normal, Neck supple    NEUROLOGICAL  Speech pattern:  Normal, Proasaic    RESPIRATORY  Symmetry and Respiratory effort:  Normal, Symmetric chest movement and expansion with no increased intercostal retractions or use of accessory muscles.     PET/CT reviewed.   Images reviewed. Hot spots left cheek, larynx and upper left lobe of lung.    IMPRESSION  1. Biopsy proven cancer left cheek. Also suspicious activity in the  larynx.  2. Suspicious lesion left lung    RECOMMENDATION  1. Excisional biopsy left cheek and tongue and DL and possible biopsy of the larynx. Discussed procedure, goals and risks. Wll arrange in near future.  3. Referral to pulmonology for evaluation and management of the left upper lobe lesion.     Alon Nunez MD

## 2021-06-11 NOTE — TELEPHONE ENCOUNTER
Left message on answering machine regarding the findings of the PET/CT. Explained that the cancer in the left cheek does not appear to have associated lymph nodes. However PET did  something in the left upper lobe of the lung. I advised follow up to discussed findings and next steps. Left the [facility] number.

## 2021-06-11 NOTE — TELEPHONE ENCOUNTER
I spoke with Mitchell on the phone.  We reviewed the recommendations by the thoracic tumor board next week, namely consultation with our thoracic surgeon to discuss MELODY segmentectomy vs. lobectomy for the suspicious MELODY lesion.   Mitchell agrees to proceed.  I will place the referral and have our office reach out to him to schedule a visit with Dr. Magaña in clinic.  All questions answered.    Naman Summers MD (Avi)  [facility] Pulmonary & Critical Care  Pager [phone]  Clinic [phone]

## 2021-06-12 NOTE — TELEPHONE ENCOUNTER
New Patient Oncology Nurse Navigator Note     Referring provider: Dr. Matt Magaña     Referring Clinic/Organization: [facility]     Referred to (specialty): Radiation Oncology    Requested provider (if applicable): NA     Date Referral Received: 10/1/2020. Request to schedule consult after surgery with ENT per providers and patient.     Evaluation for : Enlarging MELODY Lung Nodule-SBRT     Clinical History (per Nurse review of records provided):  Pt was diagnosed with oral cancer in 1994. He does not recall the name of the facility where he had his initial surgery. History of multiple recurrences with surgical resections and radiation tx to R jaw in 2009. Patient has not had systemic tx. Left renal mass was noted during a work up for surgery with ENT. He underwent left nephrectomy 3/26/09. Was found to be a benign hemangioma with extensive hemorrhage. Pt underwent R upper and middle lobe wedge resection in 2015 for adenocarcinoma. Recently he was referred to Dr. Magaña for a growing left upper lobe lung nodule, suspicious for cancer, and surgery was not advised. He was referred to radiation oncology for discussion of SBRT.         Clinical Assessment / Barriers to Care (Per Nurse): No known barriers to care       Records Location (Care Everywhere, Media, etc.):   *CT chest at [facility] 2/17/2016 and 6/8/2016. Reports in Care Everywhere and images in NIL.  *CT abd/pelvis 2/25/2019 at [facility]. Report in Care Everywhere and images in NIL.  *PET/CT 8/31/2020 at [facility].  *PFTs scheduled on 12/23/2020 at [facility].  *RIGHT THORACOTOMY, WEDGE RESECTION RIGHT UPPER AND MIDDLE LOBE, MEDIASTINAL LYMPH NODE DISSECTION with Dr. Velazquez at [facility] on 11/5/2015. Operative note and path report in Care Everywhere.  *LEFT RADICAL NEPHRECTOMY  with Dr. Agustín Owens at [facility] on 3/26/2009. Operative note and path reports available in Care Everywhere.  *RADICAL RECECTION OF  "RETROMOLAR TRIGONE WITH MARGINAL MANDIBULAR RESECTION; TITANIUM PLATE RECONSTRUCTION OF MANDIBLE; RADICAL FOREARM FREE FLAP RECONSTRUCTION OF DEFECT; SKIN GRAFT SPLIT THICKNESS FROM (L) THIGH for squamaous cell cancer oral pharynx with Dr. Alon Nunez at [facility] on 10/19/2009. Op note and path reports in Care Everywhere.  *Excision of carcinoma left buccal mucosa and left anterior tongue with Dr. Alon Nunez at [facility] on 11/6/2020. Op note and path report in [facility].   *ESOPHAGOGASTRODUODENOSCOPY,  ENDOSCOPIC ULTRASOUND with Dr. Quoc Edwards at [facility] for continued monitoring of a pancreatic cyst. Report in Epic.  *Radiation Therapy to jaw in 2009. Pt can't recall name of facility. He received treatment in [facility].  *Primary care provider notes, Dr. Shade Boyd at [facility], available in media.       Records Needed:   *Radiation treatment summary-from [facility]?   *Post op notes from Dr. Alon Nunez-appt pending.     Additional testing needed prior to consult: May require updated imaging. Patient is aware.      10/23/2020: Referral to radiation oncology for a growing MELODY lung nodule received from Dr. Matt Magaña.  I called Lenny to assist with scheduling an appt and I was unable to reach him.  I left a message on his cell phone stating my name, organization and purpose of my call.  I requested a call back at his earliest convenience.  I provided my contact information.  Lenny \"Mitchell\" returned my call.  We discussed timing of his radiation oncology consult and I relayed that Dr. Magaña and Dr. Batista, radiation oncologist, are both comfortable with scheduling his radiation consult after his oral cancer resection.  This surgery takes priority as the lung nodule is slow growing.  Mitchell said he's been very busy with appointments and is waiting to hear back from ENT to reschedule his surgery.  He prefers to wait on scheduling the radiation consult until " December.  I will call Mitchell back in December to see how he is doing at that time and assist with scheduling a consult.  I provided my contact information and welcomed calls with health updates or if he wishes to schedule an appt earlier.  12/7/2020: I called Lenny to assist with scheduling a radiation oncology consult but I was unable to reach him. I left a message on his voicemail requesting a call back at his earliest convenience. I provided my contact information.  Mitchell returned my call and he has been scheduled for a consult with Dr. Batista on Wednesday, December 16th at 1:30 pm, 1:00 RN appt. Mitchell was informed of the visitor restrictions in place due to COVID-19. He will arrive unaccompanied.  He may have his daughter, Georgette, or sister, Rebecca, join the appt by phone.   I mailed the appt letter, new patient intake, medication list, person to person communication and LUDIVINA with return stamped envelope to Mitchell's home address per his request. I asked that he sign and date the LUDIVINA and return as soon as able. I shared what he can expect with his appt. I provided my contact information and welcomed calls.

## 2021-06-12 NOTE — H&P (VIEW-ONLY)
[facility] Daily Progress Note    Assessment/Plan:  Principal Problem:    Acute renal failure (ARF) (H)     LOS: 1 day   Code status:No CPR- Do NOT Intubate  75 y.o. old male past medical history significant for chronic kidney disease stage III-IV baseline creatinine around 2 follows with Dr. 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  At preop for oral surgery 10/16/2020 creatinine noted to be elevated at 7.16.  Directly admitted to [facility].  Started on gentle IV fluids.  Lisinopril held.  Nephrology consulted.  Creatinine improving slowly.  Black stool noted by nursing staff.  Admission hemoglobin 7.9 now down to 7.3.  Patient admits to having black stools for the last 2 to 3 weeks.  Guaiac testing ordered.  GI consultation ordered.    -Acute on chronic kidney disease stage III-IV  -Solitary right kidney  -Status post left nephrectomy benign hemangioma  Patient's prior baseline creatinine is 2 from June 2020.  10/8 preop eval up to 7.1.   Admission creatinine 7.3 now down to 6.4.    Right kidney ultrasound negative.  No hydronephrosis.  Continue gentle IV hydration normal saline 75 mL/h.  Renal diet.  Holding lisinopril and daily magnesium.  Associated nephrology consultation pending.  -Black stool  -Ragsdale's esophagus history  Notes black stool for the last 2 to 3 weeks.  Has not seen anyone for this.  Estimates his last EGD was over 5 years ago.  Notes appetite has been diminished over the last few weeks as well.  Denies abdominal pain.  PPI increased to twice daily.  At this time hold his aspirin, Plavix and subcu heparin.  We will try to capture stool for Hemoccult testing.  Recheck hemoglobin this afternoon.  [place] [facility] consultation ordered.  EGD?  -Coronary artery disease.  MI history.   Stent x1.  -Essential hypertension  -Chronic systolic heart failure EF 45 to 50%  Hold his lisinopril at this time.  Continue metoprolol and Norvasc with hold parameters.  Echocardiogram from 11/25/2019 reviewed.  EF was 45 to 50%.  Had some hypokinesis consistent with prior MI.  -COPD  Continue albuterol MDIs as needed.  Continue daily Spiriva and Advair utilize today.  -Hyperlipidemia  Continue home Zetia.  Due to worsening kidney function will hold Crestor at this time.  Discussed with pharmacy and if patient's creatinine is back to his baseline of 2, then could resume his home dose of Crestor 40 mg nightly.  -COVID19 PCR status undetermined  Asymptomatic PCR COVID swab results pending.  Utilize standard precaution.  -Anticoagulation   As noted above due to black stools will hold his baby aspirin, Plavix and subcutaneous heparin.  Place SCDs.    Barriers to Discharge: Acute on chronic kidney disease, black stool, nephrology and GI consultations    Disposition: Likely here at least 2 more days    Subjective:  Last evening patient had some black stools.  Upon further discussion admits that this is been going on for last 2 to 3 weeks but has not seen a bite.  Notes that his appetite has been diminished during this time as well.  Denies any weight loss.  No nausea or vomiting.  No abdominal pain.  No shortness of breath.  No chest pain.  Tolerating a bland renal diet breakfast this morning.  Wife is present and supportive.  Questions were answered to verbalized satisfaction.    Review of Systems:   As per below and subjective, all others negative.  Pertinent items are noted in HPI.    Allergies  No Known Allergies    Current Medications Reviewed via EHR List    Objective:  Vital signs in last 24 hours:  Temp:  [97.5  F (36.4  C)-98.3  F (36.8  C)] 98.3  F (36.8  C)  Heart Rate:  [] 86  Resp:  [16-21] 16  BP: ()/(54-69) 126/63  SpO2:  [93 %-97 %] 94 %  Weight:   Wt Readings from Last 1 Encounters:    10/09/20 0205 209 lb 12.8 oz (95.2 kg)   10/08/20 1344 209 lb 12.8 oz (95.2 kg)     Weight change:   Body mass index is 28.85 kg/m .    Intake/Output last 3 shifts:  I/O last 3 completed shifts:  In: 600 [P.O.:600]  Out: 1775 [Urine:1775]  Intake/Output this shift:  I/O this shift:  In: 360 [P.O.:360]  Out: 350 [Urine:350]    Physical Exam:  General appearance: alert, appears stated age, cooperative and no distress  Head: Normocephalic, without obvious abnormality, atraumatic  Lungs: clear to auscultation bilaterally  Heart: regular rate and rhythm, S1, S2 normal, no murmur, click, rub or gallop  Abdomen: soft, non-tender; bowel sounds normal; no masses,  no organomegaly  Extremities: extremities normal, atraumatic, no cyanosis or edema, Homans sign is negative, no sign of DVT, no edema, redness or tenderness in the calves or thighs and no ulcers, gangrene or trophic changes  Pulses: 2+ and symmetric  Skin: Skin color, texture, turgor normal. No rashes or lesions  Neurologic: Grossly normal    Imaging:  Personally Reviewed.  Us Kidney Right    Result Date: 10/8/2020  EXAM: US KIDNEY RIGHT LOCATION: [facility] DATE/TIME: 10/8/2020 5:58 PM INDICATION: elevated creatinine COMPARISON: None. TECHNIQUE: Routine Right Renal and Bladder Ultrasound. FINDINGS: RIGHT KIDNEY: 11 x 6 x 6 cm. No hydronephrosis. 2.5 cm benign cyst lower pole. Left nephrectomy. BLADDER: Normal.     Normal right kidney, without hydronephrosis.      Lab Results:  Personally Reviewed.   Fingerstick Blood Glucose: No results for input(s): POCGLUFGR in the last 48 hours.    Last Hbg A1C: No results found for: HGBA1C   Lab Results   Component Value Date    INR 1.12 (H) 10/08/2020     Recent Results (from the past 24 hour(s))   ECG 12 lead MUSE    Collection Time: 10/08/20  2:40 PM   Result Value Ref Range    SYSTOLIC BLOOD PRESSURE      DIASTOLIC BLOOD PRESSURE      VENTRICULAR RATE 61 BPM    ATRIAL RATE 61 BPM    P-R INTERVAL  250 ms    QRS DURATION 156 ms    Q-T INTERVAL 460 ms    QTC CALCULATION (BEZET) 463 ms    P Axis 83 degrees    R AXIS -74 degrees    T AXIS 24 degrees    MUSE DIAGNOSIS       Sinus rhythm with 1st degree A-V block  Left axis deviation  Right bundle branch block  Abnormal ECG  No previous ECGs available  Confirmed by KRISTINA KELLER MD LOC:[facility] ([id number]) on 10/8/2020 4:27:00 PM     Basic Metabolic Panel    Collection Time: 10/08/20  2:57 PM   Result Value Ref Range    Sodium 137 136 - 145 mmol/L    Potassium 5.1 (H) 3.5 - 5.0 mmol/L    Chloride 104 98 - 107 mmol/L    CO2 20 (L) 22 - 31 mmol/L    Anion Gap, Calculation 13 5 - 18 mmol/L    Glucose 110 70 - 125 mg/dL    Calcium 9.4 8.5 - 10.5 mg/dL    BUN 36 (H) 8 - 28 mg/dL    Creatinine 7.30 (HH) 0.70 - 1.30 mg/dL    GFR MDRD Af Amer 9 (L) >60 mL/min/1.73m2    GFR MDRD Non Af Amer 7 (L) >60 mL/min/1.73m2   Hepatic Profile    Collection Time: 10/08/20  2:57 PM   Result Value Ref Range    Bilirubin, Total 0.4 0.0 - 1.0 mg/dL    Bilirubin, Direct 0.2 <=0.5 mg/dL    Protein, Total 6.6 6.0 - 8.0 g/dL    Albumin 2.9 (L) 3.5 - 5.0 g/dL    Alkaline Phosphatase 152 (H) 45 - 120 U/L    AST 34 0 - 40 U/L    ALT 19 0 - 45 U/L   INR    Collection Time: 10/08/20  2:57 PM   Result Value Ref Range    INR 1.12 (H) 0.90 - 1.10   Magnesium    Collection Time: 10/08/20  2:57 PM   Result Value Ref Range    Magnesium 2.4 1.8 - 2.6 mg/dL   Phosphorus    Collection Time: 10/08/20  2:57 PM   Result Value Ref Range    Phosphorus 3.3 2.5 - 4.5 mg/dL   Thyroid Cascade    Collection Time: 10/08/20  2:57 PM   Result Value Ref Range    TSH 1.09 0.30 - 5.00 uIU/mL   HM1 (CBC with Diff)    Collection Time: 10/08/20  2:57 PM   Result Value Ref Range    WBC 5.6 4.0 - 11.0 thou/uL    RBC 2.36 (L) 4.40 - 6.20 mill/uL    Hemoglobin 7.9 (L) 14.0 - 18.0 g/dL    Hematocrit 23.6 (L) 40.0 - 54.0 %     80 - 100 fL    MCH 33.5 27.0 - 34.0 pg    MCHC 33.5 32.0 - 36.0 g/dL    RDW 14.2 11.0 - 14.5 %    Platelets  132 (L) 140 - 440 thou/uL    MPV 8.2 (L) 8.5 - 12.5 fL   Manual Differential    Collection Time: 10/08/20  2:57 PM   Result Value Ref Range    Total Neutrophils % 46 (L) 50 - 70 %    Lymphocytes % 23 20 - 40 %    Monocytes % 5 2 - 10 %    Eosinophils %  24 (H) 0 - 6 %    Basophils % 2 0 - 2 %    Immature Granulocyte % - Manual 0 <=0 %    Total Neutrophils Absolute 2.6 2.0 - 7.7 thou/ul    Lymphocytes Absolute 1.3 0.8 - 4.4 thou/uL    Monocytes Absolute 0.3 0.0 - 0.9 thou/uL    Eosinophils Absolute 1.3 (H) 0.0 - 0.4 thou/uL    Basophils Absolute 0.1 0.0 - 0.2 thou/uL    Immature Granulocyte Absolute - Manual 0.0 <=0.0 thou/uL    Platelet Estimate Decreased (!) Normal    Ovalocytes 1+ (!) Negative   Urinalysis-UC if Indicated    Collection Time: 10/08/20  5:59 PM   Result Value Ref Range    Color, UA Straw Colorless, Yellow, Straw, Light Yellow    Clarity, UA Clear Clear    Glucose, UA Negative Negative    Bilirubin, UA Negative Negative    Ketones, UA Negative Negative    Specific Gravity, UA 1.005 1.001 - 1.030    Blood, UA Moderate (!) Negative    pH, UA 5.0 4.5 - 8.0    Protein, UA 30 mg/dL (!) Negative mg/dL    Urobilinogen, UA <2.0 E.U./dL <2.0 E.U./dL, 2.0 E.U./dL    Nitrite, UA Negative Negative    Leukocytes, UA Negative Negative    Bacteria, UA Few (!) None Seen hpf    RBC, UA 0-2 None Seen, 0-2 hpf    WBC, UA 0-5 None Seen, 0-5 hpf    Squam Epithel, UA 0-5 None Seen, 0-5 lpf   Protein/Creatinine Ratio, Urine    Collection Time: 10/08/20  5:59 PM   Result Value Ref Range     Protein, Random Urine 37 mg/dL    Creatinine, Urine 40.7 mg/dL    Protein/Creatinine Ratio, Random Urine 0.91    Basic Metabolic Panel    Collection Time: 10/09/20  5:54 AM   Result Value Ref Range    Sodium 137 136 - 145 mmol/L    Potassium 5.1 (H) 3.5 - 5.0 mmol/L    Chloride 105 98 - 107 mmol/L    CO2 23 22 - 31 mmol/L    Anion Gap, Calculation 9 5 - 18 mmol/L    Glucose 125 70 - 125 mg/dL    Calcium 8.9 8.5 - 10.5 mg/dL    BUN 39 (H)  8 - 28 mg/dL    Creatinine 6.46 (HH) 0.70 - 1.30 mg/dL    GFR MDRD Af Amer 10 (L) >60 mL/min/1.73m2    GFR MDRD Non Af Amer 8 (L) >60 mL/min/1.73m2   Hemoglobin    Collection Time: 10/09/20  9:26 AM   Result Value Ref Range    Hemoglobin 7.3 (L) 14.0 - 18.0 g/dL       Pending Labs     Order Current Status    Asymptomatic COVID-19 Virus (CORONAVIRUS) PCR In process    COVID-19 Virus PCR MRF In process          Enrique Thomas MD  Date: 10/9/2020  Time: 10:03 AM  [facility]

## 2021-06-12 NOTE — ANESTHESIA CARE TRANSFER NOTE
Last vitals:   Vitals:    11/06/20 0653   BP: 157/86   Pulse: 75   Resp: 18   Temp: 36.6  C (97.9  F)   SpO2: 95%     Patient's level of consciousness is drowsy  Spontaneous respirations: yes  Maintains airway independently: yes  Dentition unchanged: yes  Oropharynx: oropharynx clear of all foreign objects    QCDR Measures:  ASA# 20 - Surgical Safety Checklist: WHO surgical safety checklist completed prior to induction    PQRS# 430 - Adult PONV Prevention: 4558F - Pt received => 2 anti-emetic agents (different classes) preop & intraop  ASA# 8 - Peds PONV Prevention: 4558F - Pt received => 2 anti-emetic agents (different classes) preop & intraop  PQRS# 424 - Elisabeth-op Temp Management: 4559F - At least one body temp DOCUMENTED => 35.5C or 95.9F within required timeframe  PQRS# 426 - PACU Transfer Protocol: - Transfer of care checklist used  ASA# 14 - Acute Post-op Pain: ASA14B - Patient did NOT experience pain >= 7 out of 10

## 2021-06-12 NOTE — OP NOTE
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 After induction of general anesthesia, the patient was orotracheally intubated. The patient was then prepped and draped in the normal fashion for the procedure. Prior to removing the cancer I performed direct laryngoscopy using the anesthesia's laryngoscope. The oropharynx, hypopharynx and larynx were examined. There was no evidence of malignancy. There was a benign appearing cyst with white/yellow fluid located in the right pharynx. I then turned my attention to the oral cavity. The patient has a biopsy proven squamous cell carcinoma of the left buccal mucosa that was approximately 2-3 cm in maximum diameter. In addition the patient had a suspicious lesion on the left lateral tongue. Using the cautery on cutting current, the incision of the buccal tumor was outlined with visually and palpably clear margins. Using a forceps, the specimen was grasped and the cautery was used to remove the lesion. There was a small layer of muscle at the deep margin. Specimen was submitted to pathology with an additional anterior margin to ensure margins were clear. Pathology confirmed clear margins. I then turned my attention to the tongue lesion that was approximately 1cm. The incision outline was made with cutting current. The lesion was then removed with cautery down to the level of tongue musculature. Additional superior and inferior margins were sent separately. Pathology confirmed that the margins were clear. Hemostasis was obtained  with cautery. Both wounds were close with interrupted 3-0 chromic suture. The tongue was closed with simple sutures. The buccal mucosa was closed with simple alternating with mattress sutures. At the completion of the procedure the patient was returned to anesthesia. .     Findings:  Buccal squamous cell carcinoma, left lateral tongue cancer    Specimen(s):  Sent to pathology    EBL:  20ml    Complications:  None    Disposition: The patient was extubated in the operating room and was transferred to the PACU in stable condition.     Alon Nunez MD  [facility]  [phone] (o)

## 2021-06-12 NOTE — TELEPHONE ENCOUNTER
Spoke to Mitchell over the phone today to help him r/s his surgery with Dr. Nunez at [facility] to  11/6  Covid test scheduled at [facility] on 11/2 at 11:30am.    [employer] message sent.

## 2021-06-12 NOTE — ANESTHESIA POSTPROCEDURE EVALUATION
Patient: Lenny Chau  Procedure(s):  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 Need pathology for margins (Left)  Anesthesia type: general    Patient location: Phase II Recovery  Last vitals:   Vitals Value Taken Time   /91 11/06/20 1032   Temp 36  C (96.8  F) 11/06/20 0940   Pulse 85 11/06/20 1049   Resp 16 11/06/20 1032   SpO2 92 % 11/06/20 1049   Vitals shown include unvalidated device data.  Post vital signs: stable  Level of consciousness: awake and responds to simple questions  Post-anesthesia pain: pain controlled  Post-anesthesia nausea and vomiting: no  Pulmonary: unassisted, return to baseline  Cardiovascular: stable and blood pressure at baseline  Hydration: adequate  Anesthetic events: no    QCDR Measures:  ASA# 11 - Elisabeth-op Cardiac Arrest: ASA11B - Patient did NOT experience unanticipated cardiac arrest  ASA# 12 - Elisabeth-op Mortality Rate: ASA12B - Patient did NOT die  ASA# 13 - PACU Re-Intubation Rate: ASA13B - Patient did NOT require a new airway mgmt  ASA# 10 - Composite Anes Safety: ASA10A - No serious adverse event    Additional Notes:

## 2021-06-12 NOTE — ANESTHESIA PREPROCEDURE EVALUATION
Anesthesia Evaluation        Airway    Pulmonary    (+) COPD moderate, shortness of breath, a smoker (100 pack years (quit 2009))    ROS comment: HX of RUL wedge resectionfor lung CA                         Cardiovascular   (+) hypertension, past MI, CAD, CABG/stent (RICARDO after NSTEMI 11/2019 (1 yr ago) - has been off DAPT x 2 months 2/2 GI bleed), , PVD (AAA s/p stent repair)    ECG reviewed (1st degree AV block RBBB, LAD)        Neuro/Psych - negative ROS     Endo/Other       Comments: HX of head and neck cancer in 2004, now w/ recurrent lesion of L cheek + PET abnormality of VC, s/f MDL + VC lesion    GI/Hepatic/Renal    (+) GERD well controlled,   chronic renal disease (CRI (solitary kidney after nephrectomy for renal CA) - baselineCr 1.6-1.9. Recent LEILA w/ Cr 7, now impoved to 2.6 after cessation of lisinopril),           Dental                         Anesthesia Plan  Planned anesthetic: general endotracheal  ETA - 7.0 ETT taped to R (confirmed with surgeon).  Succinylcholine for intubation, low doses of rocuronium to maintain paralysis. Sugammadex for reversal.   Maintain baseline MAP given CAD  Decadron 10, Zofran 4  Preop MDI use    ASA 3     Anesthetic plan and risks discussed with: patient and child/children  Anesthesia plan special considerations: antiemetics,   Post-op plan: routine recovery  DNR/DNI status   DNR/DNI status discussed with patient.  Plan is for suspension of DNR

## 2021-06-12 NOTE — TELEPHONE ENCOUNTER
SAHLEE Whalen from [place] called to discuss patient with Dr. Nunez. He was recently hospitalized at [facility]. Message sent to Dr. Nunez.    Ada Rosa RN  [facility]  [phone]

## 2021-06-13 NOTE — TELEPHONE ENCOUNTER
Patient called stating he had a biopsy of his tongue on 11/06/20 by Dr. Nunez and he says the tip of his tongue is still sore. Pain is a 2-3/10 and is constant. He has had the pain since surgery. No other symptoms. Incision looks good per patient. Patient would like appointment scheduled with Dr. Nunez to have it looked at.      Ada Rosa RN  [facility]  [phone]

## 2021-06-13 NOTE — PROGRESS NOTES
Patient here ambulatory for radiation consult for his lung nodule.  Patient states he had previous radiation to the H&N in 2009 at [facility].  15 minutes spent in review of radiation process and potential side effects.  Written information given for review: ACS RT book, RT to chest handout, Kamar RT handouts, radiosurgery pathway reviewed, doctor bio card, insurance PA handout and welcome letter.  Seen by Dr. Batista.  Plan RTC for follow up and/or CT simulation as directed by physician.

## 2021-06-13 NOTE — ANESTHESIA CARE TRANSFER NOTE
Last vitals:   Vitals:    11/13/20 0919   BP: 105/69   Pulse: 84   Resp: 16   Temp: 36.1  C (96.9  F)   SpO2: 100%     Patient's level of consciousness is drowsy  Spontaneous respirations: yes  Maintains airway independently: yes  Dentition unchanged: yes  Oropharynx: oropharynx clear of all foreign objects    QCDR Measures:  ASA# 20 - Surgical Safety Checklist: WHO surgical safety checklist completed prior to induction    PQRS# 430 - Adult PONV Prevention: 4558F - Pt received => 2 anti-emetic agents (different classes) preop & intraop  ASA# 8 - Peds PONV Prevention: NA - Not pediatric patient, not GA or 2 or more risk factors NOT present  PQRS# 424 - Elisabeth-op Temp Management: 4559F - At least one body temp DOCUMENTED => 35.5C or 95.9F within required timeframe  PQRS# 426 - PACU Transfer Protocol: - Transfer of care checklist used  ASA# 14 - Acute Post-op Pain: ASA14B - Patient did NOT experience pain >= 7 out of 10

## 2021-06-13 NOTE — PROGRESS NOTES
HISTORY OF PRESENT ILLNESS  Patient comes in for recheck of his tongue and cheek. He reports that he was doing well after surgery. He was concerned about persistent pain under the left tongue. The cheek has felt fine. He admits that the pain under the tongue was worse when he made the appointment.    REVIEW OF SYSTEMS  Review of Systems: a 10-system review was performed. Pertinent positives are noted in the HPI and on a separate scanned document in the chart.    PMH, PSH, FH and SH has documented in the EHR.      EXAM    CONSTITUTIONAL  General Appearance:  Normal, well developed, well nourished, no obvious distress  Ability to Communicate:  communicates appropriately.    HEAD AND FACE  Appearance and Symmetry:  Normal, no scalp or facial scarring or suspicious lesions.    EYE  Normal external eye, conjunctiva, lids, cornea.     EARS  Clinical speech reception threshold:  Normal, able to hear normal speech.  Auricle:  Normal, Auricles without scars, lesions, masses.    NOSE (speculum or scope)  Architecture:  Normal, Grossly normal external nasal architecture with no masses or lesions.    ORAL CAVITY AND OROPHARYNX  Lips:  Normal.  Dental and gingiva:  Normal, No obvious dental or gingival disease.  Mucosa: Scarring left posterior buccal mucosa with 3-4mm of remaining granulation.  Tongue:  Scarring under the left tongue with and 3-4 area of remaining granulation.   Hard and soft palate:  Normal, Hard and soft palate without cleft or mucosal lesions.  Oral pharynx:  Normal, Posterior pharynx without lesions or remarkable asymmetry.  Saliva:  Normal, Clear saliva.  Masses:  Normal, No palpable masses or pathologically enlarged lymph nodes.    NECK  Masses/lymph nodes:  Normal, No worrisome neck masses or lymph nodes.  Salivary glands:  Normal, Parotid and submandibular glands.  Trachea and larynx position:  Normal, Trachea and larynx midline.  Thyroid:  Normal, No thyroid abnormality.  Tenderness:  Normal, No  cervical tenderness.  Suppleness:  Normal, Neck supple    CARDIOVASCULAR  Regular rate and rhythm.  No cyanosis, clubbing or edema.    PULSES  Carotid pulses normal    NEUROLOGICAL  Speech pattern:  Normal, Proasaic    RESPIRATORY  Symmetry and Respiratory effort:  Normal, Symmetric chest movement and expansion with no increased intercostal retractions or use of accessory muscles.     IMPRESSION  It appears that the tongue and cheek are healing as expected.    RECOMMENDATION  Patient expressed relief. He will follow up radiation for an opinion regarding his lung.    Alon Nunez MD

## 2021-06-13 NOTE — ANESTHESIA PREPROCEDURE EVALUATION
Anesthesia Evaluation      Patient summary reviewed     Airway   Mallampati: II  Neck ROM: full   Pulmonary - normal exam   (+) COPD, shortness of breath, a smoker (q '09)                         Cardiovascular   (+) hypertension, CAD, CHF, , hypercholesterolemia, PVD    Rhythm: regular  Rate: normal,      ROS comment: AAA     Neuro/Psych - negative ROS     Endo/Other - negative ROS      GI/Hepatic/Renal    (+) GERD, esophageal disease,  chronic renal disease CRI,           Dental    (+) poor dentition                         Anesthesia Plan  Planned anesthetic: MAC    ASA 3   Induction: intravenous   Anesthetic plan and risks discussed with: patient  Anesthesia plan special considerations: antiemetics,   Post-op plan: routine recovery

## 2021-06-13 NOTE — ANESTHESIA POSTPROCEDURE EVALUATION
Patient: Lenny Chau  Procedure(s):  ESOPHAGOGASTRODUODENOSCOPY,  ENDOSCOPIC ULTRASOUND  Anesthesia type: MAC    Patient location: Phase II Recovery  Last vitals:   Vitals Value Taken Time   /67 11/13/20 0948   Temp 36.7  C (98  F) 11/13/20 0948   Pulse 97 11/13/20 0948   Resp 20 11/13/20 0948   SpO2 95 % 11/13/20 0948     Post vital signs: stable  Level of consciousness: awake and responds to simple questions  Post-anesthesia pain: pain controlled  Post-anesthesia nausea and vomiting: no  Pulmonary: unassisted, return to baseline  Cardiovascular: stable and blood pressure at baseline  Hydration: adequate  Anesthetic events: no    QCDR Measures:  ASA# 11 - Elisabeth-op Cardiac Arrest: ASA11B - Patient did NOT experience unanticipated cardiac arrest  ASA# 12 - Elisabeth-op Mortality Rate: ASA12B - Patient did NOT die  ASA# 13 - PACU Re-Intubation Rate: NA - No ETT / LMA used for case  ASA# 10 - Composite Anes Safety: ASA10A - No serious adverse event    Additional Notes:

## 2021-06-13 NOTE — CONSULTS
"Consults   [facility] Radiation Oncology Consult Note     Patient: Lenny Chau  MRN: [id number]  Date of Service: 2020          Matt Magaña MD  [address] [facility] [address]  [place] [address]       Dear Dr. Magaña:    Thank you very much for referring this patient for consideration of radiotherapy. As you know Mr. Chau is a 75 y.o. 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  The patient is not candidate for surgery and is therefore referred to radiation oncology for evaluation and consideration of possible SBRT.    HISTORY OF PRESENT ILLNESS:   Mr. Chau is a 75 y.o. male who is a chronic smoker and quit smoking since .  The patient had a complicated history of multiple cancer in the past as detailed as followin.  Low risk prostate cancer, clinical stage T1c N0 M0, recent grade 3+3 \"6 and the last PSA was 10.4 in 2006.  The patient is currently on clinical observation with no therapy.  I do not have the most recent PSA at the time of evaluation.    2.  Floor of mouth cancer, status post surgical resection in 1994 with no adjuvant therapy.    3.  Left kidney hemangioma, status post left nephrectomy on 3/26/2009.    4.  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  Patient had local recurrence and is status post surgical resection on 2009.    5.  Non-small cell lung cancer involving right lung, stage I, status post right " thoracotomy and excision of right upper lobe and right middle lobe lung tumor 11/5/2015 with no evidence of lymph node metastasis and no adjuvant therapy.  Patient lost to follow-up since 2016.    6.  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    Patient had two prior early stage right lung cancers that were apparently resected in their entirety and may have been synchronous primary lung cancers. It does not appear he had the appropriate recommended follow up at [facility]. The MELODY lesion was known to be present in 2016 and appears to be slightly larger now with significant FDG-avidity on the recent PET/CT. It is likely another primary lung cancer, although metastasis from previous lung cancers is possible.  The patient had a restaging PET CT scan on 8/31/2020.  The scan showed enlarging FDG avid left upper lobe mass measuring 2.1 x 2.5 cm with central solid component consistent with primary lung cancer without metastasis.  There was also a FDG avid lesion in the left buccal region consistent with squamous cell carcinoma without metastasis.  The patient had a surgical resection for his left buccal/lateral tongue squamous cell carcinoma 11/6/2020 by KERRY Renee with pathology showed squamous cell carcinoma with negative margin.  He was determined not a good candidate for lung surgery given his complicated medical history and health status.  The patient is therefore referred to radiation oncology for evaluation and consideration of possible definitive radiation therapy using SBRT for his left upper lobe lung cancer.    CHEMOTHERAPY HISTORY: Concurrent Chemotherapy: No    RADIATION THERAPY HISTORY: Prior Radiation: Yes    IMPLANTED CARDIAC DEVICE: none     Current Outpatient Medications   Medication Sig Dispense Refill     albuterol (PROAIR HFA;PROVENTIL HFA;VENTOLIN HFA) 90 mcg/actuation  inhaler Inhale 2 puffs every 4 (four) hours as needed for wheezing.       albuterol (PROVENTIL) 2.5 mg /3 mL (0.083 %) nebulizer solution Take 2.5 mg by nebulization every 6 (six) hours as needed for wheezing.       cyanocobalamin (VITAMIN B-12) 500 MCG tablet Take 500 mcg by mouth at bedtime.              ezetimibe (ZETIA) 10 mg tablet Take 10 mg by mouth daily.       fluticasone propion-salmeteroL (ADVAIR) 500-50 mcg/dose DISKUS Inhale 2 puffs 2 (two) times a day.        magnesium oxide 250 mg magnesium Tab Take 250 mg by mouth at bedtime.              metoprolol succinate (TOPROL-XL) 25 MG Take 25 mg by mouth at bedtime.       multivitamin therapeutic tablet Take 1 tablet by mouth daily.       omeprazole (PRILOSEC) 20 MG capsule Take 1 capsule (20 mg total) by mouth 2 (two) times a day before meals. 60 capsule 0     polyethylene glycol (MIRALAX) 17 gram packet Take 1 packet (17 g total) by mouth daily.  0     SPIRIVA WITH HANDIHALER 18 mcg inhalation capsule Place 1 capsule into inhaler and inhale daily.        No current facility-administered medications for this visit.      Past Medical History:   Diagnosis Date     AAA (abdominal aortic aneurysm) (H)      Anemia      [place] esophagus      Cancer (H)     lung     CHF (congestive heart failure) (H)      Chronic kidney disease      COPD (chronic obstructive pulmonary disease) (H)      Coronary artery disease      Degenerative joint disease      Head and neck cancer (H)      History of transfusion      Hyperlipidemia      Hypertension      Lung cancer (H)     s/p RUL RML wedge resection in 2016 by Dr. Carter Velazquez     Lung mass     MELODY FDG-avid 2.5cm     Myocardial infarction (H)     November 2019     Oral cancer (H)      Prostate cancer (H)      Shortness of breath     with exertion     Past Surgical History:   Procedure Laterality Date     ABDOMINAL AORTIC ANEURYSM REPAIR       CORONARY STENT PLACEMENT  12/2019    x1     ESOPHAGOGASTRODUODENOSCOPY N/A  07/26/2019    Procedure: ENDOSCOPIC ULTRASOUND FINE NEEDLE ASPIRATION, GASTRIC BIOPSIES OF POLYPS;  Surgeon: Quoc Edwards MD;  Location: [facility];  Service: Gastroenterology     MOUTH LESION EXCISIONAL BIOPSY Left 11/6/2020    Procedure: 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 Need pathology for margins;  Surgeon: Alon Nunez MD;  Location: [facility];  Service: ENT     NEPHRECTOMY Left     for benign hemangioma     Oral cancer resection  1994     placement of stent      for AAA     TX ESOPHAGOGASTRODUODENOSCOPY US SCOPE W/ADJ STRXRS N/A 11/13/2020    Procedure: ESOPHAGOGASTRODUODENOSCOPY,  ENDOSCOPIC ULTRASOUND;  Surgeon: Quoc Edwards MD;  Location: [facility];  Service: Gastroenterology     right hip surgery      for fracture     wedge resection Right 2015    Lung cancer isolated pulmonary nodule     Patient has no known allergies.  Family History   Problem Relation Age of Onset     Cancer Sister      Kidney disease Maternal Aunt      Social History     Socioeconomic History     Marital status:      Spouse name: Not on file     Number of children: Not on file     Years of education: Not on file     Highest education level: Not on file   Occupational History     Not on file   Social Needs     Financial resource strain: Not on file     Food insecurity     Worry: Not on file     Inability: Not on file     Transportation needs     Medical: Not on file     Non-medical: Not on file   Tobacco Use     Smoking status: Former Smoker     Packs/day: 2.00     Years: 50.00     Pack years: 100.00     Quit date: 12/25/2009     Years since quitting: 10.9     Smokeless tobacco: Never Used   Substance and Sexual Activity     Alcohol use: Yes     Alcohol/week: 14.0 standard drinks     Types: 14 Shots of liquor per week     Drug use: Never     Sexual activity: Not on file   Lifestyle     Physical activity     Days per week: Not on file     Minutes per session: Not on  file     Stress: Not on file   Relationships     Social connections     Talks on phone: Not on file     Gets together: Not on file     Attends Quaker service: Not on file     Active member of club or organization: Not on file     Attends meetings of clubs or organizations: Not on file     Relationship status: Not on file     Intimate partner violence     Fear of current or ex partner: Not on file     Emotionally abused: Not on file     Physically abused: Not on file     Forced sexual activity: Not on file   Other Topics Concern     Not on file   Social History Narrative     Not on file        Review of Systems:      General  General (WDL): All general elements are within defined limits  EENT  ENT (WDL): Exceptions to WDL  Glasses or Contacts: Yes - Chronic (Greater than 3 months)(reading glasses)  Hearing loss: Yes - Chronic (Greater than 3 months)(left side)  Respiratory   Respiratory (WDL): Exceptions to WDL  Cough: Yes - Chronic (Greater than 3 months)(COPD, productive clear phlegm)  Cardiovascular  Cardiovascular (WDL): Exceptions to WDL  Irregular Heart Beat: Yes - Chronic (Greater than 3 months)  Endocrine  Endocrine (WDL): All endocrine elements are within defined limits  Gastrointestinal  Gastrointestinal (WDL): Exceptions to WDL  Difficulty Swallowing: Yes - Chronic (Greater than 3 months)(due to previous H&N cancer/Radiation)  Musculoskeletal  Musculoskeletal (WDL): Exceptions to WDL  Joint pain: Yes - Chronic (Greater than 3 months)(occ shoulders)  Pain interfering with walking: Yes - Chronic (Greater than 3 months)  Muscle pain or stiffness: Yes - Chronic (Greater than 3 months)  Recent fall: Yes - Chronic (Greater than 3 months)(d/t poor balance)  Integumentary                  Neurological  Neurological (WDL): Exceptions to WDL  Difficulty with speech: Yes - Chronic (Greater than 3 months)(H&N cancer )  Dominant Hand: Right  Difficulty with Balance: Yes - Chronic (Greater than 3 months)(past couple  years)  Psychological/Emotional   Psychological/Emotional (WDL): All psychological/emotional elements are within defined limits  Hematological/Lymphatic  Hematological/Lymphatic (WDL): All hematological/lymphatic elements are within defined limits  Dermatologic  Dermatologic (WDL): Exceptions to WDL  Hair Loss: Yes - Chronic (Greater than 3 months)  Genitourinary/Reproductive  Genitourinary/Reproductive (WDL): Exceptions to WDL  Urinary Frequency: Yes - Chronic (Greater than 3 months)  Urination more than 2 times a night: Yes - Chronic (Greater than 3 months)(nocturia x3-4)  Urinary Urgency: Yes - Chronic (Greater than 3 months)  Sensation of incomplete emptying of bladder: Yes - Chronic (Greater than 3 months)  Reproductive     Pain              Currently in Pain: No/denies  Accompanied by       Imaging: Reviewed    Pathology: Reviewed    ECOG Peformance Status  ECOG Performance Status: 0  Distress Assessment Score: No distress    Objective:      PHYSICAL EXAMINATION:    /87   Pulse 92   Temp 97.8  F (36.6  C) (Oral)   Wt 213 lb 11.2 oz (96.9 kg)   SpO2 95%   BMI 29.39 kg/m      Gen: Alert, in NAD  Eyes: PERRL, EOMI, sclera anicteric  Pulm: No wheezing, stridor or respiratory distress  CV: Well-perfused, no cyanosis, no pedal edema  Abdominal: BS+, soft, nontender, nondistended, no hepatomegaly  Back: No step-offs or pain to palpation along the thoracolumbar spine  Rectal: Deferred  : Deferred  Musculoskeletal: Normal muscle bulk and tone  Skin: Normal color and turgor  Neurologic: A/Ox3, CN II-XII intact, normal gait and station  Psychiatric: Appropriate mood and affect    Intent of Therapy: Curative  Side effects that may occur during or within weeks after Radiation Therapy      Fatigue and general weakness     Darkening, irritation, itchiness, redness, dryness and peeling of the skin of the chest    Loss of chest and armpit hair    Painful swallowing limiting solid and liquid intake and causing  dehydration    Nausea, vomiting and decrease in appetite    Side effects that may occur months or years after Radiation Therapy       Development of another tumor or cancer    Lung inflammation or fibrosis causing cough, fever, and shortness of breath     Fracture of ribs    Permanent narrowing or obstruction of the esophagus    Fluid compressing the lung (pleural effusion)    Coronary artery blockage causing angina pain or a heart attack    Thickening, telangiectasias (development of spider like blood vessels in the skin) and ulceration of the skin of the chest    Poor healing after a trauma or surgery in the irradiated areas    Nerve damage resulting in loss of strength or sensation    The risks, benefits and alternatives to radiation therapy were outlined with the patient. All questions were answered and a consent was signed.     Impression     75 y.o. 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  The patient is not candidate for surgery and is therefore referred to radiation oncology for evaluation and consideration of possible SBRT.    Assessment & Plan:     I have personally reviewed his upcoming medical record today.  I have also reviewed his most recent radiology study including PET CT scan.  This is a 75-year-old gentleman with multiple history of cancer in the past as detailed in the history of present illness.  Patient had recent finding of left upper lobe FDG avid lung mass consistent with early stage lung cancer.  Possible treatment options for lung cancer including surgery, systemic  therapy, and radiation therapy has been discussed with the patient in detail and at great lengths.  The possible risks and side effects of radiation therapy has also been explained to the patient.  Questions are answered to patient satisfaction.  He is not a good candidate for surgery due to his poor medical status.  Therefore I agree the patient is a good candidate to consider definitive radiation therapy using SBRT for his presumably early stage left lung cancer.  The pros and cons of different options has also been discussed with the patient in detail and at the great lengths.  The patient is considering SBRT for his left upper lobe lung cancer and wished to think about it before make a final decision.  I think this would be okay.  His last PET CT scan was 4 months ago and I will schedule patient to have a repeat PET CT scan to document current status of lung cancer.  Patient is scheduled return to radiation oncology 1 week after PET CT scan for office follow-up and discussion of possible treatment recommendation.  Patient will continue his follow-up with Dr. Alon Nunez, ENT for his head neck cancer.    Again, thank you very much for the referral and allowing me to participate in the care of this patient.  If you have any questions or concerns about this consultation, please do not hesitate to call.  I spent approximately 60 minutes today with the patient and 80% time was used for counseling.      Sincerely,        Mariana Batista MD, PhD  Department of Radiation Oncology   [facility]  Tel: [phone]  Page: [phone]    [facility]  [address] [place] [address]     [facility]   [address]    [place] [address]    CC:  Patient Care Team:  Shade Boyd MD as PCP - General (Internal Medicine)  Alon Nunez MD as Assigned Surgical Provider  Matt Magaña MD as Assigned Heart and Vascular Provider  Naman Summers MD as Assigned Pulmonology Provider  Mariana Batista MD  as Physician (Radiation Oncology)

## 2021-06-13 NOTE — TELEPHONE ENCOUNTER
Update: 1/6/2020. [facility] can note locate the archived 2009 paper chart.  Update: 12/30/2020.  [facility] 2009 Radiation treatment planning records not available yet. Second archive search has been requested.     Records in Epic Chart Review / Radiology Images in Nil.  Care Everywhere:  [facility], [facility] & [facility].  12/15/2020 - Medical Records has requested 2009 - 2015 [facility] records.    Epic Notes  11/13/2020 - Op Note / Endoscopy, Dr. Quoc Edwards.  11/6/2020 - Op Note / Laryngoscopy, Dr. Alon Nunez.  10/11/2020 - Progress Notes / [facility], Dr. Errol Branham.  10/11/2020 - Discharge Summary / Admission 10/8/2020  10/9/2020 - Consults / [facility], Dr Juan A Ryan.  10/1/2020 - Progress Notes / Consult - Thoracic Surgery, Dr. Magaña. Referring MD.  9/21/2020 - Progress Notes / Consult - Pulmonary, Dr. Naman Summers.  9/15/2020 - Progress Notes / ENT, Dr. Alon Nunez.  8/11/2015 - Progress Notes / Consult - ENT, Dr. Alon Nunez.    Labs  11/6/2020 - Surgical Pathology.  8/11/2020 - Surgical Pathology.    Imaging  10/8/2020 - US Kidney right.  8/31/2020 - [place] PET CT.    Media  11/13/2020 - History & Physical, Dr. Shade Boyd /[facility]. PCP    Care Everywhere - [facility]  11/5/2015 - Surgery / Right Thoracotomy & Pathology, Dr. Carter Velazquez.  10/2/2015 - OP Visit / Oncology Consult, Malig neoplasm,left lung. Dr. Martell.  1/12/2010 - OP Visit / Hosp Oncology, Oral month cancer. Dr. Zayra Rosa.  10/19/2009 - Surgery / Tracheotomy, Retromolar Trigone & Patholgy, Dr. Alon Nunez.  3/26/2009 - Surgery / Left Radical Nephrectomy & Pathology, Dr. Agustín Owens.    Other Results - [facility] Images in Nil.  2/25/2019 - CT Abdomen.  6/8/2016 - CT Chest.  2/17/2016 - CT Chest.

## 2021-06-14 NOTE — PROCEDURES
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  The biopsy confirmed moderately differentiated adenocarcinoma.      INDICATION: After discussion with patient about various treatment options, the patient elected to proceed with definitive radiation therapy using SBRT technique for his lung cancer. The patient is here for simulation.     CONSENT: The possible risks and side effects of radiation therapy have been discussed with the patient in detail and at a great length. The patient's questions are answered to patient's satisfaction. Written consent was obtained.     SIMULATION: Patient is in supine position with SBRT frame and VacLok to help keep the same position during the radiation therapy. Tentative isocenter is set in the center of thoracic region. We will acquire 4D scan to help us better locate target and design radiation therapy field. We are also going to use the respiratory gating technique to help us to better locate the movement of the tumor.     BLOCKS: Custom blocks will be drawn to minimize radiation to normal tissues and to protect normal organs including, but not limited to, spinal cord, brachial plexus, lungs, bronchial tree, esophagus, liver, heart/large vessels, spleen, stomach, small bowel, diaphragm, bone and soft tissue.     DOSAGE: I plan to give him radiation therapy at 1000 cGy each fraction to a total of 5000 cGy in 5 fractions over 2 weeks. I will consider using SBRT/IGRT technique to help us to better locate the  target and to protect normal tissue.         Mariana Batista MD, PhD  Department of Radiation Oncology   [facility]  Tel: [phone]  Page: [phone]    [facility]  [address] [place] [address]     [address]   [place] [address]

## 2021-06-14 NOTE — PROCEDURES
[facility]    Procedure: Imaging Procedure Note    Date/Time: 1/21/2021 9:23 AM  Performed by: Gato Johnson MD  Authorized by: Gato Johnson MD       Universal Protocol    Site marked: Yes    Prior images obtained and reviewed: Yes    Required items: required blood products, implants, devices, and special equipment available    Patient identity confirmed: verbally with patient and provided demographic data    Reevaluation: Patient was reevaluated immediately before administering moderate or deep sedation or anesthesia    Confirmation checklist: patient's identity using two indicators, relevant allergies, procedure was appropriate and matched the consent or emergent situation and correct equipment/implants were available    Time out: Immediately prior to procedure a time out was called to verify the correct patient, procedure, equipment, support staff and site/side marked as required    Universal Protocol: [facility] Universal Protocol was followed    Preparation: Patient was prepped and draped in the usual sterile fashion    ESBL (mL): 0    Anesthesia    Local anesthesia used?: Yes    Local anesthetic: lidocaine 1% without epinephrine    Anesthetic total (mL): 8    Sedation    Patient sedation: Yes    Sedation: fentanyl and midazolam    Vital signs: Vital signs monitored during sedation    Post-procedure    Description of procedure: EXAM:  1. PERCUTANEOUS FIDUCIAL MARKER PLACEMENT FOR LEFT UPPER LOBE NODULE   2. CT GUIDANCE  3. CONSCIOUS SEDATION    LOCATION: [facility]  DATE/TIME: 1/21/2021 9:23 AM    INDICATION: Left upper lobe hypermetabolic nodule.  TECHNIQUE: Dose reduction techniques were used.    PROCEDURE: Informed consent obtained. Time out performed. The site was prepped and draped in sterile fashion. 10 mL of 1% lidocaine was infused into the local soft tissues. Using standard technique and under direct CT guidance, a 20 gauge needle was  placed in the localized lesion. 1 fiducial marker placed.    The patient tolerated the procedure well. No immediate complications.    RADIOLOGIC SUPERVISION AND INTERPRETATION:   CT GUIDANCE: Images demonstrate the localizing needle to be in good position. Fiducial markers in good position.    SEDATION: Versed 2 mg. Fentanyl 100 mcg. The procedure was performed with administration intravenous conscious sedation with appropriate preoperative, intraoperative, and postoperative evaluation.    30 minutes of supervised face to face conscious sedation time was provided by a radiology nurse under my direct supervision.    IMPRESSION:  Successful CT-guided fiducial marker placement into left upper lobe nodule.      Patient tolerance: Patient tolerated the procedure well with no immediate complications   Length of time physician present for 1:1 monitoring during sedation: 30

## 2021-06-14 NOTE — PROCEDURES
Procedures  Clinical Treatment Planning Note    The complex radiotherapy planning will be completed for his lung cancer. The patient had a planning CT earlier today for planning. The treatment aids were used for planning, including headrest and SBRT Board to help keep the same position during the daily radiation therapy. The therapy planning is necessary to reduce radiotherapy dose to the normal critical organs which are not possible with simple treatment. In addition, dose to the target and the critical structures requires three-dimensional analysis of the isodose distribution. The planning will be done to reduce dose to lungs, liver, esophagus, heart, great vessels, bronchial trees, nerves, spinal cord, bone and soft tissue.    I will contour the clinical tumor volume  CTV , with expanded volume of planning treatment volume  PTV  on the treatment planning system. The critical structures will be outlined, including lungs, liver, esophagus, heart, great vessels, bronchial trees, nerves, spinal cord, bone and soft tissue.    Treatment planning will be done on the computer treatment planning system. The multiple fields will be used to achieve optimal coverage of the target volume. Dose distribution to the above critical structures will be reviewed. Isodose distribution along with the X, Y, Z plan will also be reviewed. Custom blocking will be used to shield normal structures. The beam s eye views will be reviewed and the digital reconstructed image will be reviewed on the planning software. The patient will receive 5000 cGy in 5 treatments targeted to the lung tumors using 6 MV photons with SBRT/IGRT.      Mariana Batista MD, PhD  Department of Radiation Oncology   [facility]  Tel: [phone]  Page: [phone]    [facility]  [address] [place] [address]     [address] [place] [address]

## 2021-06-14 NOTE — H&P (VIEW-ONLY)
[facility] Radiation Oncology Follow Up Note    Patient: Lenny Chau  MRN: [id number]  Date of Service: 2021          Mr. Chau is a 75 y.o. male who is a chronic smoker and quit smoking since .  The patient had a complicated history of multiple cancer in the past as detailed as followin.  Low risk prostate cancer, clinical stage T1c N0 M0, recent grade 3+3=6 and the last PSA was 10.4 in 2006.  The patient is currently on clinical observation with no therapy.  I do not have the most recent PSA at the time of evaluation.     2.  Floor of mouth cancer, status post surgical resection in 1994 with no adjuvant therapy.     3.  Left kidney hemangioma, status post left nephrectomy on 3/26/2009.     4.  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  Patient had local recurrence and is status post surgical resection on 2009.     5.  Non-small cell lung cancer involving right lung, stage I, status post right thoracotomy and excision of right upper lobe and right middle lobe lung tumor 2015 with no evidence of lymph node metastasis and no adjuvant therapy.  Patient lost to follow-up since .     6.  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 Alon Nunez, ENT on 2020.     Patient had two prior early stage right lung cancers that were apparently resected in their entirety and may have been synchronous primary lung cancers. It does not appear he had the appropriate recommended follow up at [facility]. The MELODY lesion was known to be present in 2016 and appears to be slightly larger now with significant FDG-avidity on the recent PET/CT. It is likely another primary lung cancer, although metastasis from previous lung cancers is possible.  The patient had a  restaging PET CT scan on 8/31/2020.  The scan showed enlarging FDG avid left upper lobe mass measuring 2.1 x 2.5 cm with central solid component consistent with primary lung cancer without metastasis.  There was also a FDG avid lesion in the left buccal region consistent with squamous cell carcinoma without metastasis.  The patient had a surgical resection for his left buccal/lateral tongue squamous cell carcinoma 11/6/2020 by Dr. Alon Nunez, ENT with pathology showed squamous cell carcinoma with negative margin.  He was determined not a good candidate for lung surgery given his complicated medical history and health status.  He was seen and evaluated initially on 12/16/2020 and a staging PET CT scan was recommended.  He had a PET CT scan 3 days ago and is here for evaluation and discussion of possible treatment options.     CHEMOTHERAPY HISTORY: Concurrent Chemotherapy: No     RADIATION THERAPY HISTORY: Prior Radiation: Yes     IMPLANTED CARDIAC DEVICE: none     The following portions of the patient's history were reviewed and updated as appropriate: allergies, current medications, past family history, past medical history, past social history, past surgical history and problem list.    Review of Systems    General  Constitutional (WDL): Exceptions to WDL  Fatigue: None  EENT  Eye Disorder (WDL): All eye disorder elements are within defined limits(wears redaing glasses)  Ear Disorder (WDL): Exceptions to WDL([place] left ear d/t H&N cancer w/radiation)  Respiratory       Respiratory (WDL): Exceptions to WDL  Cough: Mild symptoms, nonprescription intervention indicated  Dyspnea: Shortness of breath with moderate exertion  Cardiovascular  Cardiovascular (WDL): Exceptions to WDL(irregular heartbeat)  Edema: Yes  Edema Limbs: 5 - 10% inter-limb discrepancy in volume or circumference at point of greatest visible difference, swelling or obscuration of anatomic architecture on close inspection(bilateral LE's R>L)  Endocrine      Gastrointestinal  Gastrointestinal (WDL): All gastrointestinal elements are within defined limits  Musculoskeletal  Musculoskeletal and Connetive Tissue Disorders (WDL): All Musculoskeletal and Connetive Tissue Disorder elements are within defined limits  Integumentary               Integumentary (WDL): Exceptions to WDL  Neurological  Neurosensory (WDL): Exceptions to WDL  Ataxia: Asymptomatic, clinical or diagnostic observations only, intervention not indicated  Psychological/Emotional   Patient Coping: Accepting  Hematological/Lymphatic  Lymph (WDL): All lymph disorder elements are within defined limits  Dermatologic     Genitourinary/Reproductive  Genitourinary (WDL): Exceptions to WDL  Urinary Frequency: Present(nocturia x2-4)  Urinary Retention: Urinary, suprapubic or intermittent catheter placement not indicated, able to void with some residual  Reproductive     Pain              Currently in Pain: No/denies  AUA Assessment                                  Accompanied by  Accompanied by: Alone    Imaging: Imaging results 30 days: Pft Complete    Result Date: 12/23/2020  Testing met ATS standards. FEV1/FVC is 54 and is reduced. FEV1 is 61% predicted and is reduced. FVC is 84% predicted and normal. There was no improvement in spirometry after a single inhaled does of bronchodilator. TLC is 102% predicted and is normal. RV is 132% predicted and is increased. DLCO is 84% predicted and is normal when it is corrected for hemoglobin. Flow volume loop normal: No - scooping of expiratory limb consistent with obstruction Impression: Pulmonary Function Test is abnormal.  Spirometry demonstrates moderate obstruction and is not consistent with reversibility. Lung volume testing There is no hyperinflation. There is air-trapping. Diffusion capacity is normal. Magda Jones MD Pulmonary and Critical Care Medicine [facility] Office: [phone]     Nm Pet Ct Skull To Mid Thigh    Result Date: 1/4/2021  EXAM: NM PET  CT SKULL TO MID THIGH LOCATION: [facility] DATE/TIME: 1/4/2021 3:05 PM INDICATION: Lung cancer, restaging. Malignant neoplasm of upper lobe of left lung. Personal history of head and neck squamous cell carcinoma involving left buccal mucosa and tongue, interval surgical resection. Subsequent treatment strategy. COMPARISON: PET/CT 08/31/2020. TECHNIQUE: Serum glucose level 152 mg/dL. One hour post intravenous administration of 9.0 mCi F-18 FDG, PET imaging was performed from the skull base to the mid thighs utilizing attenuation correction with concurrent axial CT and PET/CT image fusion. Dose reduction techniques were used. FINDINGS: Since 08/31/2020, interval postoperative changes left oral cavity; a prior focus of uptake in the left buccal region is no longer evident. Mixed solid and groundglass pulmonary nodule in the posterior left upper lobe with solid component measuring up to approximately 1.4 cm has not substantially changed in size, morphology or degree of moderate uptake since 08/31/2020 (SUVmax 5.2, previously 4.6). No evidence of metastasis. No FDG avid adenopathy in the chest or elsewhere. No suspicious focal uptake in the liver or skeleton. Normal adrenal glands. Multiple healing bilateral rib fractures with interval decrease in associated low-level inflammatory uptake. Postoperative changes right upper lobe wedge resection. No evidence of local recurrence. Marked atherosclerotic calcifications including the coronary arteries. Diffuse hepatic steatosis. Left nephrectomy and adrenalectomy. Benign simple right renal cysts, no follow-up needed. Aortobiiliac stent graft spanning a fusiform infrarenal abdominal aortic aneurysm. Excluded aneurysm sac measures up to 6.5 cm, similar to prior. Few colonic diverticula. Right MIRNA. Moderate scattered degenerative changes in the spine. Mild thoracolumbar curve.     1. Unchanged FDG avid mixed solid and groundglass pulmonary nodule in the  posterior left upper lobe, suspicious for pulmonary adenocarcinoma. No evidence of michael or distant metastasis. 2. Interval resection of prior FDG avid left oral cavity buccal lesion. No evidence of local recurrence or metastasis.        Impression      75 y.o. male with a complicated history including right retromolar trigone tumor status post surgery and postop radiation therapy, left squamous cell carcinoma involving left buccal mucosa and the lateral tongue status post surgery on 11/6/2020, non-small cell lung cancer involving right lung status post surgical resection, low risk prostate cancer on clinical observation and recent diagnosis of FDG avid left upper lobe lung mass consistent with early stage lung cancer.  The patient is not candidate for surgery and is therefore referred to radiation oncology for evaluation and consideration of possible SBRT.     Assessment & Plan:      I have personally reviewed his upcoming medical record today.  I have also reviewed his most recent radiology study including PET CT scan.  This is a 75-year-old gentleman with multiple history of cancer in the past as detailed in the history of present illness.  Patient had recent finding of left upper lobe FDG avid lung mass consistent with early stage lung cancer.  Possible treatment options for lung cancer including surgery, systemic therapy, and radiation therapy has been discussed with the patient in detail and at great lengths.  The possible risks and side effects of radiation therapy has also been explained to the patient.  Questions are answered to patient satisfaction.  He is not a good candidate for surgery due to his poor medical status.  Therefore I agree the patient is a good candidate to consider definitive radiation therapy using SBRT for his presumably early stage left lung cancer.  The pros and cons of different options has also been discussed with the patient in detail and at the great lengths.  After long  discussion, the patient elected to proceed with SBRT as his treatment choice for his left lung cancer being aware of potential risks and side effect involved.  We therefore will schedule patient to have biopsy/fiducial placement in the near future followed by simulation 1 week after.  I plan to give her radiation therapy to a total dose of 5000 cGy in 5 treatments using SBRT.     I spent approximately 30 minutes today with the patient and 80% time was used for counseling.         Mariana Batista MD, PhD  Department of Radiation Oncology   [facility]  Tel: [phone]  Page: [phone]    [facility]  [address] [place] [address]     [id number] [place] [address]      CC:  Patient Care Team:  Shade Boyd MD as PCP - General (Internal Medicine)  Alon Nunez MD as Assigned Surgical Provider  Matt Magaña MD as Assigned Heart and Vascular Provider  Naman Summers MD as Assigned Pulmonology Provider  Mariana Batista MD as Physician (Radiation Oncology)  Mariana Batista MD as Assigned Cancer Care Provider

## 2021-06-14 NOTE — PROGRESS NOTES
[facility] Radiation Oncology Follow Up Note    Patient: Lenny Chau  MRN: [id number]  Date of Service: 2021          Mr. Chau is a 75 y.o. male who is a chronic smoker and quit smoking since .  The patient had a complicated history of multiple cancer in the past as detailed as followin.  Low risk prostate cancer, clinical stage T1c N0 M0, recent grade 3+3=6 and the last PSA was 10.4 in 2006.  The patient is currently on clinical observation with no therapy.  I do not have the most recent PSA at the time of evaluation.     2.  Floor of mouth cancer, status post surgical resection in 1994 with no adjuvant therapy.     3.  Left kidney hemangioma, status post left nephrectomy on 3/26/2009.     4.  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  Patient had local recurrence and is status post surgical resection on 2009.     5.  Non-small cell lung cancer involving right lung, stage I, status post right thoracotomy and excision of right upper lobe and right middle lobe lung tumor 2015 with no evidence of lymph node metastasis and no adjuvant therapy.  Patient lost to follow-up since .     6.  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 Alon Nunez, ENT on 2020.     Patient had two prior early stage right lung cancers that were apparently resected in their entirety and may have been synchronous primary lung cancers. It does not appear he had the appropriate recommended follow up at [facility]. The MELODY lesion was known to be present in 2016 and appears to be slightly larger now with significant FDG-avidity on the recent PET/CT. It is likely another primary lung cancer, although metastasis from previous lung cancers is possible.  The patient had a  restaging PET CT scan on 8/31/2020.  The scan showed enlarging FDG avid left upper lobe mass measuring 2.1 x 2.5 cm with central solid component consistent with primary lung cancer without metastasis.  There was also a FDG avid lesion in the left buccal region consistent with squamous cell carcinoma without metastasis.  The patient had a surgical resection for his left buccal/lateral tongue squamous cell carcinoma 11/6/2020 by Dr. Alon Nunez, ENT with pathology showed squamous cell carcinoma with negative margin.  He was determined not a good candidate for lung surgery given his complicated medical history and health status.  He was seen and evaluated initially on 12/16/2020 and a staging PET CT scan was recommended.  He had a PET CT scan 3 days ago and is here for evaluation and discussion of possible treatment options.     CHEMOTHERAPY HISTORY: Concurrent Chemotherapy: No     RADIATION THERAPY HISTORY: Prior Radiation: Yes     IMPLANTED CARDIAC DEVICE: none     The following portions of the patient's history were reviewed and updated as appropriate: allergies, current medications, past family history, past medical history, past social history, past surgical history and problem list.    Review of Systems    General  Constitutional (WDL): Exceptions to WDL  Fatigue: None  EENT  Eye Disorder (WDL): All eye disorder elements are within defined limits(wears redaing glasses)  Ear Disorder (WDL): Exceptions to WDL([place] left ear d/t H&N cancer w/radiation)  Respiratory       Respiratory (WDL): Exceptions to WDL  Cough: Mild symptoms, nonprescription intervention indicated  Dyspnea: Shortness of breath with moderate exertion  Cardiovascular  Cardiovascular (WDL): Exceptions to WDL(irregular heartbeat)  Edema: Yes  Edema Limbs: 5 - 10% inter-limb discrepancy in volume or circumference at point of greatest visible difference, swelling or obscuration of anatomic architecture on close inspection(bilateral LE's R>L)  Endocrine      Gastrointestinal  Gastrointestinal (WDL): All gastrointestinal elements are within defined limits  Musculoskeletal  Musculoskeletal and Connetive Tissue Disorders (WDL): All Musculoskeletal and Connetive Tissue Disorder elements are within defined limits  Integumentary               Integumentary (WDL): Exceptions to WDL  Neurological  Neurosensory (WDL): Exceptions to WDL  Ataxia: Asymptomatic, clinical or diagnostic observations only, intervention not indicated  Psychological/Emotional   Patient Coping: Accepting  Hematological/Lymphatic  Lymph (WDL): All lymph disorder elements are within defined limits  Dermatologic     Genitourinary/Reproductive  Genitourinary (WDL): Exceptions to WDL  Urinary Frequency: Present(nocturia x2-4)  Urinary Retention: Urinary, suprapubic or intermittent catheter placement not indicated, able to void with some residual  Reproductive     Pain              Currently in Pain: No/denies  AUA Assessment                                  Accompanied by  Accompanied by: Alone    Imaging: Imaging results 30 days: Pft Complete    Result Date: 12/23/2020  Testing met ATS standards. FEV1/FVC is 54 and is reduced. FEV1 is 61% predicted and is reduced. FVC is 84% predicted and normal. There was no improvement in spirometry after a single inhaled does of bronchodilator. TLC is 102% predicted and is normal. RV is 132% predicted and is increased. DLCO is 84% predicted and is normal when it is corrected for hemoglobin. Flow volume loop normal: No - scooping of expiratory limb consistent with obstruction Impression: Pulmonary Function Test is abnormal.  Spirometry demonstrates moderate obstruction and is not consistent with reversibility. Lung volume testing There is no hyperinflation. There is air-trapping. Diffusion capacity is normal. Magda Jones MD Pulmonary and Critical Care Medicine [facility] Office: [phone]     Nm Pet Ct Skull To Mid Thigh    Result Date: 1/4/2021  EXAM: NM PET  CT SKULL TO MID THIGH LOCATION: [facility] DATE/TIME: 1/4/2021 3:05 PM INDICATION: Lung cancer, restaging. Malignant neoplasm of upper lobe of left lung. Personal history of head and neck squamous cell carcinoma involving left buccal mucosa and tongue, interval surgical resection. Subsequent treatment strategy. COMPARISON: PET/CT 08/31/2020. TECHNIQUE: Serum glucose level 152 mg/dL. One hour post intravenous administration of 9.0 mCi F-18 FDG, PET imaging was performed from the skull base to the mid thighs utilizing attenuation correction with concurrent axial CT and PET/CT image fusion. Dose reduction techniques were used. FINDINGS: Since 08/31/2020, interval postoperative changes left oral cavity; a prior focus of uptake in the left buccal region is no longer evident. Mixed solid and groundglass pulmonary nodule in the posterior left upper lobe with solid component measuring up to approximately 1.4 cm has not substantially changed in size, morphology or degree of moderate uptake since 08/31/2020 (SUVmax 5.2, previously 4.6). No evidence of metastasis. No FDG avid adenopathy in the chest or elsewhere. No suspicious focal uptake in the liver or skeleton. Normal adrenal glands. Multiple healing bilateral rib fractures with interval decrease in associated low-level inflammatory uptake. Postoperative changes right upper lobe wedge resection. No evidence of local recurrence. Marked atherosclerotic calcifications including the coronary arteries. Diffuse hepatic steatosis. Left nephrectomy and adrenalectomy. Benign simple right renal cysts, no follow-up needed. Aortobiiliac stent graft spanning a fusiform infrarenal abdominal aortic aneurysm. Excluded aneurysm sac measures up to 6.5 cm, similar to prior. Few colonic diverticula. Right MIRNA. Moderate scattered degenerative changes in the spine. Mild thoracolumbar curve.     1. Unchanged FDG avid mixed solid and groundglass pulmonary nodule in the  posterior left upper lobe, suspicious for pulmonary adenocarcinoma. No evidence of michael or distant metastasis. 2. Interval resection of prior FDG avid left oral cavity buccal lesion. No evidence of local recurrence or metastasis.        Impression      75 y.o. male with a complicated history including right retromolar trigone tumor status post surgery and postop radiation therapy, left squamous cell carcinoma involving left buccal mucosa and the lateral tongue status post surgery on 11/6/2020, non-small cell lung cancer involving right lung status post surgical resection, low risk prostate cancer on clinical observation and recent diagnosis of FDG avid left upper lobe lung mass consistent with early stage lung cancer.  The patient is not candidate for surgery and is therefore referred to radiation oncology for evaluation and consideration of possible SBRT.     Assessment & Plan:      I have personally reviewed his upcoming medical record today.  I have also reviewed his most recent radiology study including PET CT scan.  This is a 75-year-old gentleman with multiple history of cancer in the past as detailed in the history of present illness.  Patient had recent finding of left upper lobe FDG avid lung mass consistent with early stage lung cancer.  Possible treatment options for lung cancer including surgery, systemic therapy, and radiation therapy has been discussed with the patient in detail and at great lengths.  The possible risks and side effects of radiation therapy has also been explained to the patient.  Questions are answered to patient satisfaction.  He is not a good candidate for surgery due to his poor medical status.  Therefore I agree the patient is a good candidate to consider definitive radiation therapy using SBRT for his presumably early stage left lung cancer.  The pros and cons of different options has also been discussed with the patient in detail and at the great lengths.  After long  discussion, the patient elected to proceed with SBRT as his treatment choice for his left lung cancer being aware of potential risks and side effect involved.  We therefore will schedule patient to have biopsy/fiducial placement in the near future followed by simulation 1 week after.  I plan to give her radiation therapy to a total dose of 5000 cGy in 5 treatments using SBRT.     I spent approximately 30 minutes today with the patient and 80% time was used for counseling.         Mariana Batista MD, PhD  Department of Radiation Oncology   [facility]  Tel: [phone]  Page: [phone]    [facility]  [address] [place] [address]     [id number] [facility] [place] [address]      CC:  Patient Care Team:  Shade Boyd MD as PCP - General (Internal Medicine)  Alon Nunez MD as Assigned Surgical Provider  Matt Magaña MD as Assigned Heart and Vascular Provider  Naman Summers MD as Assigned Pulmonology Provider  Mariana Batista MD as Physician (Radiation Oncology)  Mariana Batista MD as Assigned Cancer Care Provider

## 2021-06-14 NOTE — PROCEDURES
Procedures  SBRT Special Treatment Procedure Note  Date of Service: 1/28/2021    Diagnosis:  Non-small cell lung cancer involving left upper lobe, stage T1N0M0    Medical Indication:  To escalate the radiotherapy dose to a curative dose (5000 cGy) using stereotactic body radiotherapy technique and to spare surrounding lung and normal tissues from excessive toxicity.  SBRT planning was completed today to prepare for lung cancer treatment.  SBRT planning is chosen to avoid the critical structures, which cannot be done adequately with conventional planning due to the dose constraints of the normal surrounding critical organs.  In addition, the treatment will be high dose per fraction with complete course to be done in 5 sessions. The patient previously had CT scan acquisition for planning and special treatment aids used include.  The patient was simulated in a supine position. After the contouring of the GTV, ITV (MIPS 90%) and MIPS Averages, a clinical tumor volume (CTV), expanded volume of planned treatment volume (PTV) was carried out on the treatment planning system.  Critical structures outlined included both right and left lung, brachial plexus, spinal cord, esophagus, and airway. Extra efforts were required to immobilize the patient using the custom designed stereotactic frame as well as planning.  Extra efforts during the planning process included contouring of critical structures, GTV, ITV, CTV, PTV and evaluating the DVH and coverage of the PTV.    The patient is going to have SBRT technique for his lung cancer. I have explained to the patient this is a very complicated process which will require an extensive amount of time for planning, delivering and monitoring the patient. The patient understands well and wished to proceed.       Mariana Batista MD, PhD  Department of Radiation Oncology   [facility]  Tel: [phone]  Page: [phone]    [address] [address]      [facility]  [address] [address]

## 2021-06-14 NOTE — PRE-PROCEDURE
Procedure Name: CT guided left upper lobe nodule biopsy with fiducial placement and chest tube if needed. Moderate sedation  Date/Time: 1/21/2021 8:06 AM  Written consent obtained?: Yes  Risks and benefits: Risks, benefits and alternatives were discussed  Consent given by: patient  Expected level of sedation: moderate  ASA Class: Class 2- mild systemic disease, no acute problems, no functional limitations  Patient states understanding of procedure being performed: Yes  Patient's understanding of procedure matches consent: Yes  Procedure consent matches procedure scheduled: Yes  Appropriately NPO: yes  Lungs: lungs clear with good breath sounds bilaterally  Heart: normal heart sounds and rate  History & Physical reviewed: History and physical reviewed and no updates needed  Statement of review: I have reviewed the lab findings, diagnostic data, medications, and the plan for sedation

## 2021-06-14 NOTE — PRE-PROCEDURE
Procedure Name: CT guided left upper lobe nodule biopsy with fiducial placement and chest tube if needed. Moderate sedation  Date/Time: 1/21/2021 8:05 AM  Written consent obtained?: Yes  Risks and benefits: Risks, benefits and alternatives were discussed  Consent given by: patient  Expected level of sedation: moderate  ASA Class: Class 2- mild systemic disease, no acute problems, no functional limitations  Patient states understanding of procedure being performed: Yes  Patient's understanding of procedure matches consent: Yes  Procedure consent matches procedure scheduled: Yes  Appropriately NPO: yes  Lungs: lungs clear with good breath sounds bilaterally  Heart: normal heart sounds and rate  History & Physical reviewed: History and physical reviewed and no updates needed  Statement of review: I have reviewed the lab findings, diagnostic data, medications, and the plan for sedation       Dressing: dry sterile dressing

## 2021-06-14 NOTE — PROGRESS NOTES
RESPIRATORY CARE NOTE     Patient Name: Lenny Chau  Today's Date: 12/23/2020     Complete PFT done. Pt performed tests with good effort. Test results meet ATS criteria. Results scanned into epic. Pt left in no distress.       Marian Mason RRT

## 2021-06-14 NOTE — PROGRESS NOTES
Patient here ambulatory for follow up on his lung cancer.  Patient had a recent PET scan and here for results prior to starting any radiation.  Seen by Dr. Batista.  Plan RTC for follow up and/or CT simulation as directed by physician.

## 2021-06-14 NOTE — PROGRESS NOTES
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 He has a complicated oncologic history. He had two prior early stage right lung cancers that were apparently resected in their entirety and may have been synchronous primary lung cancers. He is being followed by thoracic surgery (not a surgical candidate), ENT and radiation oncology. Presents today to go over PFT's and discuss COPD management.  Lungs are clear and oxygenation saturation are normal today.      Recommendations:  #MELODY FDG-avid lesion, suspicious for primary lung adenocarcinoma without evidence of metastasis, likely early stage IA based on imaging:  - not a surgical candidate. Appreciate evaluation by Dr. Magaña from thoracic surgery.  - continue follow up with Dr. Batista from radiation oncology.   - repeat PET/CT 1/4/2021 per Dr. Batista     #COPD GOLD B as above: PFT's today confirmed moderate obstruction without reversibility, normal DLco. Appears stable on ICS/LABA + LAMA + MUKESH therapy.   - continue Advair diskus 500-50 2 puffs two times a day. Rinse/gargle after use  - continue spiriva once daily  - continue albuterol as needed. Add spacer, discussed in clinic.   - he will let me know if he wants to explore other inhalers  - pulmonary rehab referral, discussed health benefits, he agrees to proceed.      #RHM:  - UTD with pneumococcal vaccinations + flu shot.      All questions answered.  Follow up in approx 6 months or sooner if needed.       Naman Summers MD (Avi)  [facility]  Pager [phone]  Clinic [phone]    CCx: COPD, lung nodules, lung cancer follow up    HPI: Interim history: I last saw Mitchell on 9/21/2020. Since that time, he saw radiation oncology and thoracic surgery. He was deemed not to be a surgical candidate due to comorbid medical conditions.  He did undergo resection of HNSCC by Dr. Nunez from ENT.    Today, he reports he's doing OK. Breathing is stable. He does get winded after 1 flight of stairs. Minimal cough. No hemoptysis.  Using advair and spiriva as directed. Using rescue inhaler daily.     ROS:  A 12-system review was obtained and was negative with the exception of the symptoms endorsed in the history of present illness.    PMH:  Past Medical History:   Diagnosis Date     AAA (abdominal aortic aneurysm) (H)      Anemia      [place] esophagus      Cancer (H)     lung     CHF (congestive heart failure) (H)      Chronic kidney disease      COPD (chronic obstructive pulmonary disease) (H)      Coronary artery disease      Degenerative joint disease      Head and neck cancer (H)      History of transfusion      Hyperlipidemia      Hypertension      Lung cancer (H)     s/p RUL RML wedge resection in 2016 by Dr. Carter Velazquez     Lung mass     MELODY FDG-avid 2.5cm     Myocardial infarction (H)     November 2019     Oral cancer (H)      Prostate cancer (H)      Shortness of breath     with exertion       PSH:  Past Surgical History:   Procedure Laterality Date     ABDOMINAL AORTIC ANEURYSM REPAIR       CORONARY STENT PLACEMENT  12/2019    x1     ESOPHAGOGASTRODUODENOSCOPY N/A 07/26/2019    Procedure: ENDOSCOPIC ULTRASOUND FINE NEEDLE ASPIRATION, GASTRIC BIOPSIES OF POLYPS;  Surgeon: Quoc Edwards MD;  [place] [facility] [place]  Service: Gastroenterology     MOUTH LESION EXCISIONAL BIOPSY Left 11/6/2020    Procedure: Excision of carcinoma left buccal mucosa and left anterior  tongue. Need pathology for margins;  Surgeon: Alon Nunez MD;  Location: [facility] OR;  Service: ENT     NEPHRECTOMY Left     for benign hemangioma     Oral cancer resection       placement of stent      for AAA     KS ESOPHAGOGASTRODUODENOSCOPY US SCOPE W/ADJ STRXRS N/A 2020    Procedure: ESOPHAGOGASTRODUODENOSCOPY,  ENDOSCOPIC ULTRASOUND;  Surgeon: Quoc Edwards MD;  Location: [facility] OR;  Service: Gastroenterology     right hip surgery      for fracture     wedge resection Right 2015    Lung cancer isolated pulmonary nodule       Allergies:  No Known Allergies    Family HX:  Family History   Problem Relation Age of Onset     Cancer Sister      Kidney disease Maternal Aunt        Social Hx:  Social History     Socioeconomic History     Marital status:      Spouse name: Not on file     Number of children: Not on file     Years of education: Not on file     Highest education level: Not on file   Occupational History     Not on file   Social Needs     Financial resource strain: Not on file     Food insecurity     Worry: Not on file     Inability: Not on file     Transportation needs     Medical: Not on file     Non-medical: Not on file   Tobacco Use     Smoking status: Former Smoker     Packs/day: 2.00     Years: 50.00     Pack years: 100.00     Quit date: 2009     Years since quittin.0     Smokeless tobacco: Never Used   Substance and Sexual Activity     Alcohol use: Yes     Alcohol/week: 14.0 standard drinks     Types: 14 Shots of liquor per week     Drug use: Never     Sexual activity: Not on file   Lifestyle     Physical activity     Days per week: Not on file     Minutes per session: Not on file     Stress: Not on file   Relationships     Social connections     Talks on phone: Not on file     Gets together: Not on file     Attends Christian service: Not on file     Active member of club or organization: Not on file     Attends meetings of clubs or organizations: Not  "on file     Relationship status: Not on file     Intimate partner violence     Fear of current or ex partner: Not on file     Emotionally abused: Not on file     Physically abused: Not on file     Forced sexual activity: Not on file   Other Topics Concern     Not on file   Social History Narrative     Not on file       Current Meds:  Current Outpatient Medications   Medication Sig Dispense Refill     albuterol (PROAIR HFA;PROVENTIL HFA;VENTOLIN HFA) 90 mcg/actuation inhaler Inhale 2 puffs every 4 (four) hours as needed for wheezing.       cyanocobalamin (VITAMIN B-12) 500 MCG tablet Take 500 mcg by mouth at bedtime.              ezetimibe (ZETIA) 10 mg tablet Take 10 mg by mouth daily.       fluticasone propion-salmeteroL (ADVAIR) 500-50 mcg/dose DISKUS Inhale 2 puffs 2 (two) times a day.        magnesium oxide 250 mg magnesium Tab Take 250 mg by mouth at bedtime.              metoprolol succinate (TOPROL-XL) 25 MG Take 25 mg by mouth at bedtime.       multivitamin therapeutic tablet Take 1 tablet by mouth daily.       omeprazole (PRILOSEC) 20 MG capsule Take 1 capsule (20 mg total) by mouth 2 (two) times a day before meals. 60 capsule 0     polyethylene glycol (MIRALAX) 17 gram packet Take 1 packet (17 g total) by mouth daily.  0     SPIRIVA WITH HANDIHALER 18 mcg inhalation capsule Place 1 capsule into inhaler and inhale daily.        No current facility-administered medications for this visit.        Physical Exam:  /72   Pulse (!) 107   Ht 5' 11.5\" (1.816 m)   Wt 209 lb (94.8 kg)   SpO2 95%   BMI 28.74 kg/m    Gen: awake, alert, oriented, no distress  HEENT: nasal turbinates are unremarkable, no oropharyngeal lesions, no cervical or supraclavicular lymphadenopathy  CV: RRR, no M/G/R  Resp: CTAB, no focal crackles or wheezes  Abd: soft, nontender, no palpable organomegaly  Skin: no apparent rashes  Ext: no cyanosis, clubbing or edema  Neuro: alert, nonfocal    Labs:  Reviewed  Final Diagnosis A) RIGHT " UPPER LUNG LOBE, WEDGE RESECTION:  1. Adenocarcinoma (50% lepidic and 50% acinar)  2. Benign resection margins  3. Emphysema with focal bullae  4. Please see comment and Staging Parameters    B) RIGHT MIDDLE LUNG LOBE, WEDGE RESECTION:  1. Adenocarcinoma (75% lepidic and 25% acinar)  2. Benign resection margins  3. Focal emphysematous changes  4. Please see comment and Staging Parameters    C) SUBCARINAL LYMPH NODES, STATION 7, REGIONAL RESECTION:  4 benign lymph nodes    D) PARATRACHEAL LYMPH NODES, STATION 4R,  REGIONAL RESECTION:  4 benign lymph nodes     Comment For Staging purposes I have assumed that the upper and middle lobe lung cancers are related. Though they are morphologically similar, they may not be related, instead representing synchronous separate primaries. If they are synchronous separate primaries, then the correct Staging for each tumor would be pT1a(m2)N0M0, Stage IA.    Dr. MATHEUS Jerome has reviewed slides A3 and B3 concurring with the invasive tumor size and the tumor stage.     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 No change in the groundglass opacity in the left upper lobe. Multiple small pulmonary nodules appear stable.              Imaging studies:  PET/CT  IMPRESSION:      1. Findings suspicious for left vocal region squamous cell carcinoma without metastasis.     2. Findings suspicious for left upper lobe adenocarcinoma without metastasis.     Last CT chest from [facility], 2016:  Result Narrative   [facility] MEDICAL IMAGING  CT CHEST W  6/8/2016 9:14 AM    INDICATION: Lung Cancer (hcc).  TECHNIQUE: Routine chest. Dose reduction techniques were used.   IV CONTRAST: Omnipaque 350 60mL.  COMPARISON: 02/17/2016.    FINDINGS:   LUNGS AND PLEURA: Post treatment changes right hemithorax. Subsolid predominantly ground-glass opacity nodule left upper lobe, stable.  Paraseptal emphysema in the upper lungs. Ground-glass opacity at the right lung base, series 2 image 233, stable and this may represent atelectasis.    MEDIASTINUM: Normal pulmonary arteries. Atheromatous changes in a normal caliber thoracic aorta. Coronary artery calcification. No hilar or mediastinal lymphadenopathy.    LIMITED UPPER ABDOMEN: Patent upper abdominal aortic stent graft.    MUSCULOSKELETAL: Negative.    CONCLUSION:   1.  No change since 02/17/2016.  2.  Recommend continued follow-up of subsolid predominantly ground-glass nodule left upper lobe.         Pulmonary Function Testing  12/23/2020  FEV1 1.93L 61%  FVC 84%  Ratio (post-BD) 0.53  No BD response  %  %  RV/  DLco 84% silke for hgb  Impression: moderate obstruction, no BD response, no hyperinflation, no air trapping, DLco normal when corrected for hgb.

## 2021-06-14 NOTE — TELEPHONE ENCOUNTER
I called Lenyn following his appointment in the clinic this week to answer any additional questions he may have and offer resources as needed. His visit went well and he has no further questions about the plan of care at this time. He has been scheduled for fiducial placement on Jan 21st.  I told Lenny he can anticipate a call to schedule a COVID test 3-4 days prior to his procedure date. Lenny was appreciative of the call and does not have any questions or needs at this time. I welcomed calls.

## 2021-06-14 NOTE — PROCEDURES
[facility]    Procedure: Imaging Procedure Note    Date/Time: 1/21/2021 9:12 AM  Performed by: Gato Johnson MD  Authorized by: Gato Johnson MD       Universal Protocol    Site marked: Yes    Prior images obtained and reviewed: Yes    Required items: required blood products, implants, devices, and special equipment available    Patient identity confirmed: verbally with patient    Reevaluation: Patient was reevaluated immediately before administering moderate or deep sedation or anesthesia    Confirmation checklist: patient's identity using two indicators, relevant allergies, procedure was appropriate and matched the consent or emergent situation and correct equipment/implants were available    Time out: Immediately prior to procedure a time out was called to verify the correct patient, procedure, equipment, support staff and site/side marked as required    Universal Protocol: Joint Commission Universal Protocol was followed    Preparation: Patient was prepped and draped in the usual sterile fashion    ESBL (mL): 0    Anesthesia    Local anesthesia used?: Yes    Local anesthetic: lidocaine 1% without epinephrine    Anesthetic total (mL): 8    Sedation    Patient sedation: Yes    Sedation: fentanyl and midazolam    Vital signs: Vital signs monitored during sedation    Post-procedure    Description of procedure: EXAM:  1. PERCUTANEOUS BIOPSY LEFT UPPER LOBE NODULE  2. CT GUIDANCE  3. CONSCIOUS SEDATION    LOCATION: [facility]  DATE/TIME: 1/21/2021 9:12 AM    INDICATION: Left lung cancer, biopsy and markers for radiation therapy.  TECHNIQUE: Dose reduction techniques were used.    PROCEDURE: Informed consent obtained. Site marked. Prior images reviewed. Required items made available. Patient identity confirmed verbally and with arm band. Patient reevaluated immediately before administering sedation. Universal protocol was followed. Time out performed. The site  was prepped and draped in sterile fashion. 10 mL of 1% lidocaine was infused into the local soft tissues. Using standard technique and under direct CT guidance, a 20-gauge biopsy device was used to obtain four core biopsies. Tissue was submitted to Pathology and was adequate by preliminary review by a pathologist.    The patient tolerated the procedure well. No immediate complications.    RADIOLOGIC SUPERVISION AND INTERPRETATION:   CT GUIDANCE: Images demonstrate the biopsy needle to be in good position.    SEDATION: Versed 2 mg. Fentanyl 100 mcg. The procedure was performed with administration intravenous conscious sedation with appropriate preoperative, intraoperative, and postoperative evaluation.    30 minutes of supervised face to face conscious sedation time was provided by a radiology nurse under my direct supervision.    IMPRESSION:  Successful CT-guided biopsy left upper lobe nodule.      Patient tolerance: Patient tolerated the procedure well with no immediate complications   Length of time physician present for 1:1 monitoring during sedation: 30

## 2021-06-15 ENCOUNTER — OFFICE VISIT (OUTPATIENT)
Dept: PHARMACY | Facility: PHYSICIAN GROUP | Age: 76
End: 2021-06-15
Payer: COMMERCIAL

## 2021-06-15 ENCOUNTER — OFFICE VISIT - RIVER FALLS (OUTPATIENT)
Dept: FAMILY MEDICINE | Facility: CLINIC | Age: 76
End: 2021-06-15
Payer: COMMERCIAL

## 2021-06-15 DIAGNOSIS — I10 HYPERTENSION, UNSPECIFIED TYPE: ICD-10-CM

## 2021-06-15 DIAGNOSIS — Z78.9 ALCOHOL USE: ICD-10-CM

## 2021-06-15 DIAGNOSIS — N18.9 CHRONIC KIDNEY DISEASE, UNSPECIFIED CKD STAGE: ICD-10-CM

## 2021-06-15 DIAGNOSIS — E78.5 DYSLIPIDEMIA: ICD-10-CM

## 2021-06-15 DIAGNOSIS — R60.0 LOWER LEG EDEMA: ICD-10-CM

## 2021-06-15 DIAGNOSIS — J44.9 CHRONIC OBSTRUCTIVE PULMONARY DISEASE, UNSPECIFIED COPD TYPE (H): Primary | ICD-10-CM

## 2021-06-15 DIAGNOSIS — I25.10 CORONARY ARTERY DISEASE, ANGINA PRESENCE UNSPECIFIED, UNSPECIFIED VESSEL OR LESION TYPE, UNSPECIFIED WHETHER NATIVE OR TRANSPLANTED HEART: ICD-10-CM

## 2021-06-15 DIAGNOSIS — Z78.9 TAKES DIETARY SUPPLEMENTS: ICD-10-CM

## 2021-06-15 DIAGNOSIS — R52 GENERALIZED PAIN: ICD-10-CM

## 2021-06-15 PROCEDURE — 99607 MTMS BY PHARM ADDL 15 MIN: CPT | Performed by: PHARMACIST

## 2021-06-15 PROCEDURE — 99606 MTMS BY PHARM EST 15 MIN: CPT | Performed by: PHARMACIST

## 2021-06-15 RX ORDER — FERROUS SULFATE 325(65) MG
1 TABLET ORAL DAILY
COMMUNITY
End: 2021-07-08

## 2021-06-15 RX ORDER — ACETAMINOPHEN 500 MG
500-1000 TABLET ORAL EVERY 6 HOURS PRN
COMMUNITY
End: 2022-03-03

## 2021-06-15 RX ORDER — LANOLIN ALCOHOL/MO/W.PET/CERES
100 CREAM (GRAM) TOPICAL DAILY
COMMUNITY
Start: 2021-05-16 | End: 2022-09-29

## 2021-06-15 RX ORDER — FLUOROURACIL 50 MG/G
CREAM TOPICAL 2 TIMES DAILY
COMMUNITY
End: 2021-09-28

## 2021-06-15 RX ORDER — FOLIC ACID 1 MG/1
1 TABLET ORAL DAILY
COMMUNITY
Start: 2021-05-11 | End: 2021-06-24

## 2021-06-15 ASSESSMENT — MIFFLIN-ST. JEOR: SCORE: 1669.13

## 2021-06-15 NOTE — TELEPHONE ENCOUNTER
I noted Lenny completed his radiation treatment today. I called to check in and he said treatment went well. He has no questions or needs at this time. I welcomed calls and verified he has my contact information.

## 2021-06-15 NOTE — PROGRESS NOTES
RADIATION ONCOLOGY WEEKLY TREATMENT VISIT NOTE      Assessment / Impression       1. Malignant neoplasm of upper lobe of left lung (H)  CT Chest Without Contrast     Tolerating radiation therapy well.  All questions and concerns addressed.    Plan:     Follow-up with radiation oncology in 2 months.    Subjective:      HPI: Lenny hCau is a 75 y.o. male with    1. Malignant neoplasm of upper lobe of left lung (H)  CT Chest Without Contrast       The following portions of the patient's history were reviewed and updated as appropriate: allergies, current medications, past family history, past medical history, past social history, past surgical history and problem list.    Assessment                  Body Site: Thoracic Site: Left lung  Stereotactic Radiosurgery: Yes  Stereotactic Radiosurgery date: 03/10/21  Today's Dose: 5000  Total Dose for Thoracic: 5000  Today's Fraction/Total Fraction Thoracic: 5/5  Voice Chances/Stridor/Larynx: 0: Normal  Pharynx and Esphogaus: 0: No change over baseline  Constipation: 0: None  Diarrhea W/O Colostomy: 0: None  Cough: 1: Mild, relieved by nonprescription medication  Hemoptysis: 0: None  Dyspnea: 2: Dyspnea on exertion                                              Sexuality Alteration                 Emotional Alteration Copin: Effective  Comfort Alteration KPS: 90% Can perform normal activity, minor signs of disease  Fatigue (ONS scale) : 3: Mild Fatigue  Pain Location: denies   Nutrition Alteration Anorexia: 0: None  Nausea: 0: None  Vomitin: None  Dyspepsia and/or Heartburn: 0: None  Pharynx and Esphogaus: 0: No change over baseline  Skin Alteration Skin Sensation: 0: No problem  Skin Reaction: 0: None  AUA Assessment                                  Accompanied by       Objective:     Exam: Examination reviewed no significant changes.    Vitals:    03/10/21 1013   BP: 153/88   Pulse: 70   Temp: 98.3  F (36.8  C)   TempSrc: Oral   SpO2: 95%   Weight: 208  lb 1.6 oz (94.4 kg)       Wt Readings from Last 8 Encounters:   03/10/21 208 lb 1.6 oz (94.4 kg)   01/21/21 213 lb (96.6 kg)   01/06/21 213 lb 12.8 oz (97 kg)   01/04/21 211 lb (95.7 kg)   12/23/20 209 lb (94.8 kg)   12/16/20 213 lb 11.2 oz (96.9 kg)   11/12/20 208 lb (94.3 kg)   11/05/20 208 lb (94.3 kg)       General: Alert and oriented, in no acute distress  Lenny has no Erythema.  Aria chart and setup information reviewed    Mariana Batista MD

## 2021-06-15 NOTE — PROCEDURES
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  The biopsy confirmed moderately differentiated adenocarcinoma.       INDICATION: After discussion with patient about various treatment options, the patient elected to proceed with definitive radiation therapy using SBRT technique for his lung cancer.  The patient was initially simulated on 1/28/2021.  Upon evaluation of his CT scan, there is significant evidence of pneumothorax and patient is recommended to have 2 weeks break to allow him to recover from his pneumothorax.  He is here today for simulation.     CONSENT: The possible risks and side effects of radiation therapy have been discussed with the patient in detail and at a great length. The patient's questions are answered to patient's satisfaction. Written consent was obtained.      SIMULATION: Patient is in supine position with SBRT frame and VacLok to help keep the same position during the radiation therapy. Tentative isocenter is set in the center of thoracic region. We will acquire 4D scan to help us better locate target and design radiation therapy field. We are also going to use the respiratory gating technique to help us to better locate the movement of the tumor.      BLOCKS: Custom blocks will be drawn to minimize radiation to normal tissues and to protect normal organs including, but not limited to, spinal cord, brachial plexus, lungs, bronchial tree, esophagus, liver, heart/large vessels, spleen, stomach, small bowel,  diaphragm, bone and soft tissue.      DOSAGE: I plan to give him radiation therapy at 1000 cGy each fraction to a total of 5000 cGy in 5 fractions over 2 weeks. I will consider using SBRT/IGRT technique to help us to better locate the target and to protect normal tissue.        Mariana Batista MD, PhD  Department of Radiation Oncology   [facility]  Tel: [phone]  Page: [phone]    [facility]  [address] [place] [address]     [place] [id number] [place] [address]

## 2021-06-15 NOTE — PATIENT INSTRUCTIONS
Recommendations from today's MTM visit:                                                    MTM (medication therapy management) is a service provided by a clinical pharmacist designed to help you get the most of out of your medicines.      1. Complete the [employer] Patient Assistance Program paperwork and return the paperwork to clinic; then we will mail to the  who makes the inhaler to get you free medication.    2. You can try using the albuterol 15 min before the Trelegy every morning to help delivery the Trelegy further into the lungs.    3. Try not to use the tylenol PM - as the diphenhydramine is not recommended for you. It is safer to take acetaminophen (generic tylenol).    4. Recommend a repeat magnesium lab with the next lab 2021. I recommend decreasing your daily alcohol intake as this can continue to lower your magnesium.    5. Discard the nitroglycerin that is . We want to make sure you carry around nitroglycerin that is not .    6. We may want you to restart some of the vitamins / supplements that you were started on in the hospital since you are still drinking high amounts of alcohol daily. I will confirm with Dr. Boyd and we will confirm with you.    Follow-up: 2-3 weeks by telephone with MTM to check in on the Patient Assistance Program for inhaler.  It was great to speak with you today.  I value your experience and would be very thankful for your time with providing feedback on our clinic survey. You may receive a survey via email or text message in the next few days.     To schedule another MTM appointment, please call the clinic directly or you may call the MTM scheduling line at [phone] or toll-free at [phone].     My Clinical Pharmacist's contact information:                                                      Please feel free to contact me with any questions or concerns you have.      Sara Cottrell, PharmPERLITA  Medication Therapy Management (MTM) Pharmacist

## 2021-06-15 NOTE — PROGRESS NOTES
Medication Therapy Management (MTM) Encounter    ASSESSMENT:                            Medication Adherence/Access: See below for considerations    COPD: improving with Trelegy use. [employer] Patient Assistance Program application process reviewed - see plan.   Also recommend using the MUKESH scheduled 15 minutes before controller inhaler to enhance delivery of controller meds; especially when experiencing flare. Due for appointment with pulm.    Pain: preference to avoid diphenhydramine and use plain acetaminophen.    Dyslipidemia: remaining off statin / ezetimibe d/t rhabdo history.    Hypertension/CAD/CKD/ lower leg edema: due for appointment with Dr. Valentine. Education provided about ensuring home nitroglycerin is not  and discard the current one he brought with today.    Vitamins/Supplements in setting of alcohol use: MTM will review vit/sup labs results and review with PCP.  Recommend increase magnesium to either #2 250 mg tabs/day or one 400 mg tab. recommend stop vitamin B12 since level elevated and re-check in 3-6 months.  Folate level WNL, okay to continue off folic acid (Patient has self-stopped).  Hemoglobin borderline low, no vitamin B1 level on file.  Could consider restart ferrous sulfate supplementation, but due to high alcohol intake, recommend priority to restart thiamine 100 mg daily.    PLAN:                            1. Complete the [employer] Patient Assistance Program paperwork and return the paperwork to clinic; then we will mail to the  who makes the inhaler to get you free medication.    2. You can try using the albuterol 15 min before the Trelegy every morning to help deliver the Trelegy further into the lungs.    3. Try not to use the tylenol PM - as the diphenhydramine is not recommended for you. It is safer to take acetaminophen (generic tylenol).    4. Recommend a repeat magnesium lab with the next lab 2021.    5. Discard the nitroglycerin that is . We want to make sure  you carry around nitroglycerin that is not .    6. We may want you to restart some of the vitamins / supplements that you were started on in the hospital since you are still drinking high amounts of alcohol daily. I will confirm with Dr. Boyd and we will confirm with you.    MTM: confirm vitamin supplementation with Dr. Boyd and follow-up with Patient.    Future: due for pulm appointment and cards appointment with Meme.    Follow-up: 2-3 weeks by telephone with MTM to check in on the Patient Assistance Program for inhaler.    Addended on 2021  Mann received from PCP (see below).     Called Patient to communicate.  Mag: Patient has 250 mg tabs - and would prefer to take 2 of these until gone, okay. Then will either purchase 400 mg tabs or continue #2 250 mg tabs.  Thiamine 100 mg/vitamin B1 - Patient will  over-the-counter.    Also discussed update on his [employer] Patient Assistance Program.  He was unable to find part D card, will need to call insurance for a new copy.  He was able to get the out of pocket spend report from the pharmacy.  MTM follow-up in place.    Recommend hgb/hct with next labs and vitamin B12 in 3-6 months.  KB    <Add Text>  ---------------------  From: Yong Boyd MD   To: Wiley Cottrell Kaity;     Sent: 2021 3:37:23 PM CDT  Subject: RE: MTM follow-up: response requested   I'm good with magnesium oxide 400mg daily, thiamine 100mg daily  I'm fine off folate and B12  ---------------------  From: Wiley Cottrell Kaity   To: Yong Boyd MD;     Sent: 2021 11:03:01 PM CDT  Subject: MTM follow-up: response requested   I saw Mitchell for MTM follow-up.  wanted to confirm vitamin/supplement plan with you - see note for details.  Pt is not taking folic acid, thiamine, or ferrous sulfate as recommended at [facility] d/c for alcohol use.  He's on mag oxide 250 (was recommended 400 mg bid).  Based on labs in care everywhere, my recs are as follows:  Recommend increase  magnesium to either #2 250 mg tabs/day or one 400 mg tab. recommend stop vitamin B12 since level elevated and re-check in 3-6 months.  Folate level WNL, okay to continue off folic acid (Patient has self-stopped).  Hemoglobin borderline low, no vitamin B1 level on file.  Could consider restart ferrous sulfate supplementation, but due to high alcohol intake, recommend priority to restart thiamine 100 mg daily.  Let me know your thoughts.  KB    SUBJECTIVE/OBJECTIVE:                          Lenny Chau is a 75 year old male coming in for a follow-up visit. He was referred to me from Shade Boyd .  Today's visit is a follow-up MT visit from 1/19/2021.     Reason for visit: 6 month follow-up (has been on Trelegy for about 1 month now).    Allergies/ADRs: None  Tobacco: He reports that he quit smoking about 11 years ago. He has never used smokeless tobacco.  Alcohol: drinking ~4-5 oz hard liquor daily.  Caffeine: 1-2 cups/day of coffee  Activity: no routine  Past Medical History: Reviewed in tish - history of prostate cancer, lung cancer, unilateral nephrectomy, oropharyngeal cancer.  Social: lives with wife.    Medication Adherence/Access:  Patient uses pill box(es), sets this up himself.  Patient takes medications 2 time(s) per day.   Per patient, misses medication 0 times per week, estimates maybe once/month.  Medication barriers: trouble affording inhalers - see below.  The patient fills medications at [facility]: NO, fills medications at [facility] [place].     COPD:  Patient follows with [facility] pulmonology. Last appointment 12/23/2020 - recommended for 6 mo followup.  History of R sided lung cancer x2.  Tobacco status: former smoker - quit 2009. 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  Current Medications:  Trelegy Ellipta (Fluticasone 200 mcg/Umeclidinium 62.5mcg- Vilanterol 25 mcg)  Inhaler: 1 inhalation once daily (received 3 inhalers at the pharmacy and it was about $400), now planning to apply for the [employer] Patient Assistance Program. Rinsing mouth out after trelegy, history of oral thrush with advair.  Reports that his breathing has been better since switching to the trelegy.  Albuterol MDI. Using about once daily as needed - using 2 puffs/day. Has a spacer to use this - but does not use.  Medication History: spiriva, advair (stopped both when started the Trelegy 5/2021 - when ran out of these two inhalers)  Pt reports the following symptoms- see CAT from today  COPD assessment test (CAT):  1. I never cough  0 - 1 - 2 - 3 - 4 - 5  I cough all the time = 2   2. I have no phlegm (mucus) in my chest  0 - 1 - 2 - 3 - 4 - 5  My chest is completely full of phlegm (mucus) = 2  3. My chest does not feel tight at all   0 - 1 - 2 - 3 - 4 - 5  my chest feels very tight = 1  4.  When I walk up a hill or one flight of stairs I am not breathless   0 - 1 - 2 - 3 - 4 - 5  when I walk up a hill or one flight of stairs I am very breathless = 4  5.  I am not limited in doing any activities at home  0 - 1 - 2 - 3 - 4 - 5  I am very limited in doing activities at home = 2  6.  I am confident leaving my home despite my lung condition  0 - 1 - 2 - 3 - 4 - 5  I am not at all confident leaving my home because of medical condition = 0  7.  I sleep soundly  0 - 1 - 2 - 3 - 4 - 5  I do not sleep soundly because of medical condition = 2  8.  I have lots of energy  0 - 1 - 2 - 3 - 4 - 5  I have no energy at all = 2  COPD assessment test (CAT) history  Date: 6/15/2021 (TODAY): 15  Date: 1/19/2021: 17    Pain:  Current Meds:  Acetaminophen  - recommended to start after last MTM appointment, preference this over tylenol PM. Patient is still using Acetaminophen-diphenhydramine before bed for arthritis pain in legs that keep him from sleeping well.     Dyslipidemia:  Patient was taking ezetimibe and  rosuvastatin until 2021 admission; had elevated CK on admission of 2786, at which his statin and ezetimibe were held. Had been having muscle pain, chary in lower extremities, then fell at home and was unable to get up hence when called EMS and was admitted for workup.  Discharged off both lipid therapies; Patient reports no large muscle aches/pains at this time.  No 10-year ASCVD risk due to: secondary prevention.    Medication History: rosuvastatin, ezetimibe.     Hypertension/CAD/CKD/ lower leg edema:  Follows with Dr. Valentine. Last appointment with Dr. Valentine was 2021, recommended follow-up in 6 months (due now).  Follows with vascular clinic/Dr. David.  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 Thoracic AAA, history of abdominal AAA s/p endovascular repair.  PCI of RCA 2019 following NSTEMI; DAPT stopped 10/2020.  Current Meds:  Aspirin 81 mg: once daily - restarted with Dr. Valentine since last MTM appt.  Metoprolol succinate 25 mg: once daily  Nitroglycerin 0.4 mg sublingual tab: Place 1 tablet under the tongue at onset of chest pain.  May repeat x 3 doses q 5 min.  Call 911 after first dose if pain persists. Pt use: has 2 bottles, one with him today and another at home. Bottle with today  some times in , Patient states one at home is newer.  Medication History: clopidogrel/aspirin (per chart)  Avoiding ACEi/ARB due to CKD.  ECHO: 2020 EF: 55%  Patient does not self-monitor blood pressure.  BP Readings from Last 3 Encounters:   21 108/74   03/10/21 (!) 153/88   21 (!) 158/85     Vitamins/Supplements in setting of alcohol use:  Per hospital discharge notes from 2021, recommended to continue folate, thiamine, MVI, increase magnesium oxide to 400 mg twice daily and recommend decreasing alcohol intake. Continue ferrous sulfate 325 mg.  Patient reports taking MVI, mag oxide (see below), no folate, thiamine or ferrous sulfate.  Multivitamin: once  daily  Magnesium oxide 250 mg: once daily in the morning - started for leg cramping in the past, history of hypomagnesemia. (per Patient on 6/15 - only taking 1 250 mg tab once daily, not twice daily).      Vitamin B12 (cyanocobalamin) 500 mcg: once daily - states that he was started on this on , has been on ever since.  Zinc 50 m tab daily - taking for immunuty            Today's Vitals: see vitals section.  ----------------    I spent 45 minutes with this patient today. All changes were made via collaborative practice agreement with Shade Boyd , other recommendations made to Patient's PCP. A copy of the visit note was provided to the patient's primary care provider.    The patient was given a summary of these recommendations.     Sara Cottrell, PharmD  Medication Therapy Management (MTM) Pharmacist  [place] [place] [facility] ()  Clinic Phone: [phone]  Pager: [phone]      Medication Therapy Recommendations  Chronic obstructive pulmonary disease, unspecified COPD type (H)    Current Medication: albuterol (PROAIR HFA/PROVENTIL HFA/VENTOLIN HFA) 108 (90 Base) MCG/ACT inhaler   Rationale: Frequency inappropriate - Dosage too low - Effectiveness   Recommendation: Change Administration Time   Status: Patient Agreed - Adherence/Education          Current Medication: Fluticasone-Umeclidin-Vilant (TRELEGY ELLIPTA) 200-62.5-25 MCG/INH AEPB   Rationale: Cannot afford medication product - Cost - Adherence   Recommendation: Referral to Service    Status: Patient Agreed - Adherence/Education   Note: 6/15: pt will apply for [employer] PAP         Coronary artery disease of native heart with stable angina pectoris, unspecified vessel or lesion type (H)    Current Medication: nitroGLYcerin (NITROSTAT) 0.4 MG sublingual tablet   Rationale: Does not understand instructions - Adherence - Adherence   Recommendation: Provide Education   Status: Patient Agreed - Adherence/Education          Generalized pain    Current Medication: diphenhydrAMINE-acetaminophen (TYLENOL PM)  MG tablet   Rationale: Unsafe medication for the patient - Adverse medication event - Safety   Recommendation: Change Medication   Status: Patient Agreed - Adherence/Education         Takes dietary supplements    Current Medication: Magnesium Oxide 250 MG TABS   Rationale: Patient prefers not to take - Adherence - Adherence   Recommendation: Provide Education   Status: Contact Provider - Awaiting Response   Note: will confirm preferred vit/sup with PCP

## 2021-06-15 NOTE — PROGRESS NOTES
SBRT/SRS Treatment Summary    Patient: Lenny Chau   MRN: [id number]  : 1945  Care Provider: Mariana Batista    Date of Service: 03/10/2021        Matt Magaña MD  [address] [place] [address] [address]  [place] [address]           Dear Dr. Magaña:     Your patient Mr. Lenny Chau completed his radiation therapy on 3/10/2021. As you know Mr. Chau is a 75 y.o. 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  The patient is not candidate for surgery due to his poor medical status.  He therefore received definitive radiation therapy using SBRT with a total dose of 5000 cGy in 5 treatments given from 3/2/2021-3/10/2021.  The patient tolerated radiation therapy very well with minimal side effect.  He is scheduled to return to radiation oncology in 2 months for routine post radiation therapy office follow-up and restaging CT chest.    Again, thank you very much for the referral and allowing me to participate in the care of this patient.  If you have any questions or concerns about this consultation, please do not hesitate to call.           Sincerely,      Mariana Batista MD, PhD  Department of Radiation Oncology   [facility]  Tel: [phone]  Page: [phone]    [facility]  [address] [place] [address]     [facility]  [address] [facility] [address]   [place] [address]      CC:  Patient Care Team:  Shade Boyd MD as PCP - General (Internal Medicine)  Alon Nunez MD as Assigned Surgical  Provider  Matt Magaña MD as Assigned Heart and Vascular Provider  Naman Summers MD as Assigned Pulmonology Provider  Mariana Batista MD as Physician (Radiation Oncology)  Mariana Batista MD as Assigned Cancer Care Provider  Yesi Garcia RN as Oncology Nurse Navigator (Hematology and Oncology)

## 2021-06-15 NOTE — PROGRESS NOTES
Pt ambulatory to radiation clinic for last tx. Discharge instructions given. Informed to call with any questions or concerns. Follow up appointment to be made on discharge.

## 2021-06-16 ENCOUNTER — HOSPITAL ENCOUNTER (OUTPATIENT)
Dept: CT IMAGING | Facility: CLINIC | Age: 76
Setting detail: RADIATION/ONCOLOGY SERIES
Discharge: STILL ADMITTED OR ADMINISTRATIVE | End: 2021-06-16
Attending: RADIOLOGY
Payer: COMMERCIAL

## 2021-06-16 DIAGNOSIS — C34.12 MALIGNANT NEOPLASM OF UPPER LOBE OF LEFT LUNG (H): ICD-10-CM

## 2021-06-16 PROBLEM — R94.02 ABNORMAL PET SCAN OF HEAD: Status: ACTIVE | Noted: 2020-09-29

## 2021-06-16 PROBLEM — K92.2 ACUTE UPPER GI BLEED: Status: ACTIVE | Noted: 2020-10-11

## 2021-06-16 PROBLEM — I49.1 PAC (PREMATURE ATRIAL CONTRACTION): Status: ACTIVE | Noted: 2021-05-10

## 2021-06-16 PROBLEM — M62.82 RHABDOMYOLYSIS: Status: ACTIVE | Noted: 2021-05-07

## 2021-06-16 PROBLEM — N18.30 ACUTE WORSENING OF STAGE 3 CHRONIC KIDNEY DISEASE (H): Status: ACTIVE | Noted: 2020-10-11

## 2021-06-16 PROBLEM — N17.9 ACUTE RENAL FAILURE (ARF) (H): Status: ACTIVE | Noted: 2020-10-08

## 2021-06-16 LAB
CREAT BLD-MCNC: 1.8 MG/DL (ref 0.7–1.3)
GFR SERPL CREATININE-BSD FRML MDRD: 37 ML/MIN/1.73M2

## 2021-06-16 NOTE — TELEPHONE ENCOUNTER
Called patient for routine follow up call s/p radiation for his lung cancer.  Patient states he is doing well and has no needs or complaints today.  Confirmed follow up appointments with patient and told him to call us if he needed anything between now and his next appointment.

## 2021-06-17 NOTE — TELEPHONE ENCOUNTER
----- Message from Neha Baumann MD sent at 5/11/2021  8:08 AM CDT -----  This is a patient Dr. Enrique Way so was discharged from [facility], needs an outpatient pharmacological stress nuclear, needs an outpatient 2-week ACT monitor, and need to follow-up with Dr. Way in [place] after this test, approximately 2 months.LF        Chart reviewed; orders placed for outpatient stress and 2 week CLARENCE. Message sent to schedulers to arrange these tests and then subsequent follow up with [facility] [facility]. [place] [place]

## 2021-06-18 NOTE — PATIENT INSTRUCTIONS - HE
Patient Instructions by Naman Summers MD at 12/23/2020  1:00 PM     Author: Naman Summers MD Service: -- Author Type: Physician    Filed: 12/23/2020  1:18 PM Encounter Date: 12/23/2020 Status: Signed    : Naman Summers MD (Physician)       Patient Education     Pulmonary Rehabilitation: Getting Started  Pulmonary rehabilitation (rehab) programs can help people with chronic lung disease like chronic obstructive pulmonary disease (COPD) which includes emphysema and chronic bronchitis. And, other conditions like cystic fibrosis, pulmonary fibrosis, and sarcoidosis. The programs are provided by healthcare professionals who will:    Teach you the skills you need to live and breathe better    Encourage you to put these skills to good use through lifestyle changes    Help you set realistic goals so you can make these changes gradually and effectively    Pulmonary rehabilitation professionals  The programs are usually led by healthcare providers including nurses and respiratory therapists. The team may also include exercise specialists, physical and occupational therapists, dietitians, pharmacists, and counselors. Although most programs take place in a group setting, these team members will help you one-on-one when you need it. If you are not in a pulmonary rehab program, ask your healthcare provider or nurse about programs in your area.  Pulmonary rehab programs  The programs may include:    Exercise training    Breathing techniques    How to do things more easily    Nutritional support    Information about your condition    Self-management skills    Help to stop smoking    Emotional support  Making changes that work for you  To reach your goals, youll probably need to make changes to your lifestyle. To help make changes go more smoothly:    Expect new emotions. Its common to resist or feel angry or scared about having to make changes. Youre not alone. Share your feelings with the pulmonary rehab team and  people close to you.    Prepare yourself for slow, steady progress. Change doesnt happen overnight. To feel your best, you need to commit yourself to practicing your new skills. Over time, youll be stronger, have more control of shortness of breath, and be able to do more. But only if you keep at it.    Get support. Get support from family and friends as you try new things. Tell the people in your life how they can help you reach your goals. Share your ideas and tips for success with other members of your pulmonary rehab group. And dont be embarrassed to ask for help.  My goals  Are there things you cant do now that youd like to be able to do when your pulmonary rehab program is finished? Check off the statements below that may apply to you.  I want to:  ? Breathe better.  ? Understand my lung disease and what I can do to feel better.  ? Have more energy to enjoy my family and friends.  ? Rely less on others.  ? Be able to walk further.  ? Be stronger.  ? Enjoy my hobbies and leisure activities.  ? Eat healthier foods.  ? Quit smoking.  ? Feel less anxious and depressed about my condition.  ? Travel and enjoy myself.  ? Have fewer visits to the hospital or emergency room.  Other goals:  ___________________________________________________________  ___________________________________________________________  ___________________________________________________________  ___________________________________________________________   Date Last Reviewed: 11/1/2016 2000-2019 [employer]. [address] [place] [address], [place] [address]. All rights reserved. This information is not intended as a substitute for professional medical care. Always follow your healthcare professional's instructions.

## 2021-06-20 ENCOUNTER — COMMUNICATION - RIVER FALLS (OUTPATIENT)
Dept: FAMILY MEDICINE | Facility: CLINIC | Age: 76
End: 2021-06-20
Payer: COMMERCIAL

## 2021-06-20 VITALS
BODY MASS INDEX: 28.35 KG/M2 | HEART RATE: 88 BPM | SYSTOLIC BLOOD PRESSURE: 108 MMHG | WEIGHT: 203.3 LBS | DIASTOLIC BLOOD PRESSURE: 74 MMHG

## 2021-06-20 NOTE — LETTER
Letter by Naman Summers MD at      Author: Naman Summers MD Service: -- Author Type: --    Filed:  Encounter Date: 9/21/2020 Status: (Other)         Alon BOOTH MD  [facility]  [address] [facility] [address]                                  September 21, 2020    Patient: Lenny Chau   MR Number: [id number]   YOB: 1945   Date of Visit: 9/21/2020     Dear Dr. Enrique MD:    Thank you for referring Lenny Chau to me for evaluation. Below are the relevant portions of my assessment and plan of care.    If you have questions, please do not hesitate to call me. I look forward to following Lenny along with you.    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 He has a complicated history and his oncologic history is also complicated.  He had two prior early stage right lung cancers that were apparently resected in their entirety and may have been synchronous primary lung cancers. It does not appear he had the appropriate recommended follow up at [facility]. The MELODY lesion was known to be present in 2016 and appears to be slightly larger now with significant FDG-avidity on the recent PET/CT. It is likely another primary lung cancer, although metastasis from previous lung cancers is possible. He also appears to have simultaneous left buccal squamous cell cancer as well as laryngeal cancer for which he's following with ENT.  "    Recommendations:  #MELODY FDG-avid lesion, suspicious for primary lung adenocarcinoma without evidence of metastasis, likely early stage IA based on imaging:  - I will review the recent PET/CT with our thoracic tumor conference given the complexity of the situation and multiple cancers present.  - PFT's ASAP  - discussed potential treatment options of the MELODY lesion including resection (will depend on PFT's) vs. SBRT. This will depend on his PFT's and lung function. May require biopsy of the lesion but hoping to avoid this given underlying emphysema.    #history of COPD, unable to access prior PFT's. Appears stable, with chronic dyspnea on exertion.   - continue Advair and spiriva for now  - PFT's as above  - will explore his COPD further at the next visit.    #RHM:  - UTD with pneumococcal vaccinations   - needs flu shot - he says he'll get this with his PMD next week.    All questions answered.  Follow up in approx 1 month.     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   Notable additionally history includes 2 prior right sided lung cancers - RUL and RML s/p wedge resection surgery by Dr. Carter Velazquez in 2015. He had complete margins and no lymph node involvement at the time.  He says he followed up with Dr. Velazquez \"for a while\" but then appears to have been lost to follow up. He had a known subsolid MELODY lesion measuring 2.3cm at the time that he says he was just monitoring through surveillance scans.  Regarding his COPD he had PFTs years ago but I don't have " those results.  He takes medium dose Advair HFA 1 puff two times a day as well as spiriva. Rarely uses rescue inhaler. He does have chronic dyspnea on exertion but in general is able to remain active without breathing limitations.   Regarding his mouth lesions he is followed by Dr. Alon Nunez from ENT. He is planning to have surgery on the left cheek for the cheek cancer. He also has suspicious left vocal cord lesion, he is not sure what the plan is for this lesion.     ROS:  A 12-system review was obtained and was negative with the exception of the symptoms endorsed in the history of present illness.    PMH:  Past Medical History:   Diagnosis Date   ? AAA (abdominal aortic aneurysm) (H)    ? Ragsdale esophagus    ? Cancer (H)     lung   ? Chronic kidney disease    ? COPD (chronic obstructive pulmonary disease) (H)    ? Coronary artery disease    ? Degenerative joint disease    ? Head and neck cancer (H)    ? Hyperlipidemia    ? Hypertension    ? Prostate cancer (H)    ? Shortness of breath     with exertion       PSH:  Past Surgical History:   Procedure Laterality Date   ? ABDOMINAL AORTIC ANEURYSM REPAIR     ? ESOPHAGOGASTRODUODENOSCOPY N/A 7/26/2019    Procedure: ENDOSCOPIC ULTRASOUND FINE NEEDLE ASPIRATION, GASTRIC BIOPSIES OF POLYPS;  Surgeon: Quoc Edwards MD;  [place] [facility] [place]  Service: Gastroenterology   ? NEPHRECTOMY Left     for benign hemangioma   ? placement of stent      for AAA   ? right hip surgery      for fracture   ? wedge resection Right     isolated pulmonary nodule       Allergies:  No Known Allergies    Family HX:  No family history on file.    Social Hx:  Social History     Socioeconomic History   ? Marital status:      Spouse name: Not on file   ? Number of children: Not on file   ? Years of education: Not on file   ? Highest education level: Not on file   Occupational History   ? Not on file   Social Needs   ? Financial resource strain: Not on file   ? Food insecurity      Worry: Not on file     Inability: Not on file   ? Transportation needs     Medical: Not on file     Non-medical: Not on file   Tobacco Use   ? Smoking status: Former Smoker     Packs/day: 2.00     Years: 50.00     Pack years: 100.00     Last attempt to quit: 12/25/2009     Years since quitting: 10.7   ? Smokeless tobacco: Never Used   Substance and Sexual Activity   ? Alcohol use: Yes     Alcohol/week: 2.0 standard drinks     Types: 2 Shots of liquor per week   ? Drug use: Never   ? Sexual activity: Not on file   Lifestyle   ? Physical activity     Days per week: Not on file     Minutes per session: Not on file   ? Stress: Not on file   Relationships   ? Social connections     Talks on phone: Not on file     Gets together: Not on file     Attends Cheondo[employer] service: Not on file     Active member of club or organization: Not on file     Attends meetings of clubs or organizations: Not on file     Relationship status: Not on file   ? Intimate partner violence     Fear of current or ex partner: Not on file     Emotionally abused: Not on file     Physically abused: Not on file     Forced sexual activity: Not on file   Other Topics Concern   ? Not on file   Social History Narrative   ? Not on file       Current Meds:  Current Outpatient Medications   Medication Sig Dispense Refill   ? albuterol (PROAIR HFA;PROVENTIL HFA;VENTOLIN HFA) 90 mcg/actuation inhaler Inhale 2 puffs every 4 (four) hours as needed for wheezing.     ? albuterol (PROVENTIL) 2.5 mg /3 mL (0.083 %) nebulizer solution Take 2.5 mg by nebulization every 6 (six) hours as needed for wheezing.     ? cyanocobalamin (VITAMIN B-12) 500 MCG tablet Take 500 mcg by mouth at bedtime.            ? fluticasone propion-salmeterol (ADVAIR HFA) 115-21 mcg/actuation inhaler Inhale 2 puffs 2 (two) times a day.     ? fluticasone propionate (FLONASE) 50 mcg/actuation nasal spray Apply 1 spray into each nostril daily as needed.            ? ibuprofen-diphenhydramine cit  (ADVIL PM) 200-38 mg Tab Take 2 tablets by mouth at bedtime as needed.     ? lansoprazole (PREVACID) 15 MG capsule Take 15 mg by mouth at bedtime.            ? lisinopril-hydrochlorothiazide (PRINZIDE,ZESTORETIC) 20-12.5 mg per tablet Take 1 tablet by mouth at bedtime.            ? magnesium oxide 250 mg magnesium Tab Take 250 mg by mouth at bedtime.            ? metoprolol tartrate (LOPRESSOR) 50 MG tablet Take 50 mg by mouth 2 (two) times a day.     ? multivitamin therapeutic tablet Take 1 tablet by mouth daily.     ? pravastatin (PRAVACHOL) 80 MG tablet Take 80 mg by mouth at bedtime.       No current facility-administered medications for this visit.        Physical Exam:  There were no vitals taken for this visit.  Gen: awake, alert, oriented, no distress  HEENT: nasal turbinates are unremarkable, no oropharyngeal lesions, no cervical or supraclavicular lymphadenopathy  CV: RRR, no M/G/R  Resp: CTAB, no focal crackles or wheezes  Abd: soft, nontender, no palpable organomegaly  Skin: no apparent rashes  Ext: no cyanosis, clubbing or edema  Neuro: alert, nonfocal    Labs:  Reviewed  Final Diagnosis A) RIGHT UPPER LUNG LOBE, WEDGE RESECTION:  1. Adenocarcinoma (50% lepidic and 50% acinar)  2. Benign resection margins  3. Emphysema with focal bullae  4. Please see comment and Staging Parameters    B) RIGHT MIDDLE LUNG LOBE, WEDGE RESECTION:  1. Adenocarcinoma (75% lepidic and 25% acinar)  2. Benign resection margins  3. Focal emphysematous changes  4. Please see comment and Staging Parameters    C) SUBCARINAL LYMPH NODES, STATION 7, REGIONAL RESECTION:  4 benign lymph nodes    D) PARATRACHEAL LYMPH NODES, STATION 4R,  REGIONAL RESECTION:  4 benign lymph nodes     Comment For Staging purposes I have assumed that the upper and middle lobe lung cancers are related. Though they are morphologically similar, they may not be related, instead representing synchronous separate primaries. If they are synchronous separate  primaries, then the correct Staging for each tumor would be pT1a(m2)N0M0, Stage IA.    Dr. MATHEUS Jerome has reviewed slides A3 and B3 concurring with the invasive tumor size and the tumor stage.     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 No change in the groundglass opacity in the left upper lobe. Multiple small pulmonary nodules appear stable.          Imaging studies:  IMPRESSION:      1. Findings suspicious for left vocal region squamous cell carcinoma without metastasis.     2. Findings suspicious for left upper lobe adenocarcinoma without metastasis.    Last CT chest from [facility], 2016:  Result Narrative   [facility] MEDICAL IMAGING  CT CHEST W  6/8/2016 9:14 AM    INDICATION: Lung Cancer (hcc).  TECHNIQUE: Routine chest. Dose reduction techniques were used.   IV CONTRAST: Omnipaque 350 60mL.  COMPARISON: 02/17/2016.    FINDINGS:   LUNGS AND PLEURA: Post treatment changes right hemithorax. Subsolid predominantly ground-glass opacity nodule left upper lobe, stable. Paraseptal emphysema in the upper lungs. Ground-glass opacity at the right lung base, series 2 image 233, stable and this may represent atelectasis.    MEDIASTINUM: Normal pulmonary arteries. Atheromatous changes in a normal caliber thoracic aorta. Coronary artery calcification. No hilar or mediastinal lymphadenopathy.    LIMITED UPPER ABDOMEN: Patent upper abdominal aortic stent graft.    MUSCULOSKELETAL: Negative.    CONCLUSION:   1.  No change since 02/17/2016.  2.  Recommend continued follow-up of subsolid predominantly ground-glass nodule left upper lobe.         PFT's  None available

## 2021-06-20 NOTE — LETTER
Letter by Matt Magaña MD at      Author: Matt Magaña MD Service: -- Author Type: --    Filed:  Encounter Date: 10/1/2020 Status: (Other)         Naman Summers MD  [address]  [facility] [address]                                  October 1, 2020    Patient: Lenny Chau   MR Number: [id number]   YOB: 1945   Date of Visit: 10/1/2020     Dear Dr. Kristopher MD:    Thank you for referring Lenny Chau to me for evaluation. Below are the relevant portions of my assessment and plan of care.    If you have questions, please do not hesitate to call me. I look forward to following Lenny along with you.    Sincerely,        MD KRYSTLE Sanders MD Jon Thomas V, MD Andrade, Rafael S, MD  10/1/2020  2:13 PM  Sign when Signing Visit  THORACIC SURGERY OFFICE NEW PATIENT VISIT      Dear Dr. Boyd,    I saw Mr. Chau at Dr. Persaud request in consultation for the evaluation and treatment of a growing LEFT upper lobe nodule, suspicious for malignancy.      HPI  Mr. Lenny Chau is a 75 y.o. year-old male with 2 stage I NSCLC resected with wedge resection on the right side by Dr. Velazquez in 2015. He has had a GGO since at least 2016 in the MELODY, but was then lost to follow-up. Now he has a solid nodule, PET avid, very consistent with a NSCLC.    Additionally, he has many health problems, he has a very unsteady gate, he is dealing with a possible vocal cord lesion as well as with oral cancer. He is scheduled for oral cancer resection on 10/16/2020.      Tests   PET-CT 8/31/2020: 2.5 cm GGO with 1.4 cm solid component (increased in size since 2016) with SUV 4.6. Bilateral rib fractures.      PFT (pending)    PMH  Cystic lesion of the body of the pancreas - he has an EUS 7/26/2019; aspiration of the cyst was negative on cytology; 1 year follow-up EUS was recommended.  Past Medical History:   Diagnosis Date   ? AAA (abdominal aortic aneurysm) (H)    ? Ragsdale  "esophagus    ? Cancer (H)     lung   ? Chronic kidney disease    ? COPD (chronic obstructive pulmonary disease) (H)    ? Coronary artery disease    ? Degenerative joint disease    ? Head and neck cancer (H)    ? Hyperlipidemia    ? Hypertension    ? Lung cancer (H)     s/p RUL RML wedge resection in 2016 by Dr. Carter Velazquez   ? Lung mass     MELODY FDG-avid 2.5cm   ? Prostate cancer (H)    ? Shortness of breath     with exertion      Past Surgical History:   Procedure Laterality Date   ? ABDOMINAL AORTIC ANEURYSM REPAIR     ? ESOPHAGOGASTRODUODENOSCOPY N/A 7/26/2019    Procedure: ENDOSCOPIC ULTRASOUND FINE NEEDLE ASPIRATION, GASTRIC BIOPSIES OF POLYPS;  Surgeon: Quoc Edwards MD;  Location: [facility] - [place];  Service: Gastroenterology   ? NEPHRECTOMY Left     for benign hemangioma   ? placement of stent      for AAA   ? right hip surgery      for fracture   ? wedge resection Right     isolated pulmonary nodule         ETOH 2-3 drinks/day  TOB quit smoking 2009, 100 pack years    Physical examination  /60   Pulse 68   Resp 12   Ht 5' 11.5\" (1.816 m)   Wt 202 lb (91.6 kg)   SpO2 97%   BMI 27.78 kg/m    He appears frail and older than hos stated age, he has an unsteady gait and had some difficulty getting up from his chair.    From a personal perspective, he is here with his daughter, Georgette. His wife was not feeling well, and didn't come to clinic.      IMPRESSION   1. Pulmonary nodules  Ambulatory referral to Radiation Therapy      75 y.o. year-old male with growing LEFT upper lobe nodule, suspicious for malignancy.  Oral cancer  Questionable PET + vocal cord lesion  Possible pancreatic mass    PLAN  I spent a total of 60 minutes with Mr. Chau and his daughter, Georgette, more than 50% of which were spent in counseling, coordination of care, and face-to-face time. I reviewed the plan as follows:    1) LEFT upper lobe nodule: we talked about TTNA, stereotactic radiation, and surgery. He does not want a " TTNA, and surgery is not a good option for him. He would require a S1-3 LEFT segmentectomy, and I do not think he would tolerate that, regardless of PFT. We will discuss him at Tumor Conference on 10/22, and I have referred him to Radiation Oncology for evaluation.    2) Oral cancer and questionable VC lesion: he has an exam under anesthesia with resection of oral lesion scheduled for 10/16 with Dr. Alon Nunez.  3) Pancreatic cyst: I have contacted Dr. Boyd's office (Dr. Boyd was not available, but I talked to a nurse on the team) so they can help with the necessary follow-up.    No further follow-up required in Thoracic Surgery clinic.      They had all their questions answered and were in agreement with the plan.  I appreciate the opportunity to participate in the care of your patient and will keep you updated.    Sincerely,

## 2021-06-21 NOTE — LETTER
Letter by Naman Summers MD at      Author: Naman Summers MD Service: -- Author Type: --    Filed:  Encounter Date: 12/23/2020 Status: (Other)         Shade Boyd MD  [address] [facility] [address]                                  December 23, 2020    Patient: Lenny Chau   MR Number: [id number]   YOB: 1945   Date of Visit: 12/23/2020     Dear Dr. Oliver MD:    Thank you for referring Lenny Chau to me for evaluation. Below are the relevant portions of my assessment and plan of care.    If you have questions, please do not hesitate to call me. I look forward to following Lenny along with you.    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 He has a complicated oncologic history. He had two prior early stage right lung cancers that were apparently resected in their entirety and may have been synchronous primary lung cancers. He is being followed by thoracic surgery (not a surgical candidate), ENT and radiation oncology. Presents today to go over PFT's and discuss COPD management.  Lungs are clear and oxygenation saturation are normal today.      Recommendations:  #MELODY FDG-avid lesion, suspicious for primary lung adenocarcinoma without evidence of metastasis, likely early stage IA based on  imaging:  - not a surgical candidate. Appreciate evaluation by Dr. Magaña from thoracic surgery.  - continue follow up with Dr. Batista from radiation oncology.   - repeat PET/CT 1/4/2021 per Dr. Batista     #COPD GOLD B as above: PFT's today confirmed moderate obstruction without reversibility, normal DLco. Appears stable on ICS/LABA + LAMA + MUKESH therapy.   - continue Advair diskus 500-50 2 puffs two times a day. Rinse/gargle after use  - continue spiriva once daily  - continue albuterol as needed. Add spacer, discussed in clinic.   - he will let me know if he wants to explore other inhalers  - pulmonary rehab referral, discussed health benefits, he agrees to proceed.      #RHM:  - UTD with pneumococcal vaccinations + flu shot.      All questions answered.  Follow up in approx 6 months or sooner if needed.      Naman (Arnold Summers MD  [facility] Pulmonary & Critical Care  Pager [phone]  Clinic [phone]    CCx: COPD, lung nodules, lung cancer follow up    HPI: Interim history: I last saw Mitchell on 9/21/2020. Since that time, he saw radiation oncology and thoracic surgery. He was deemed not to be a surgical candidate due to comorbid medical conditions.  He did undergo resection of HNSCC by Dr. Nunez from ENT.    Today, he reports he's doing OK. Breathing is stable. He does get winded after 1 flight of stairs. Minimal cough. No hemoptysis.  Using advair and spiriva as directed. Using rescue inhaler daily.     ROS:  A 12-system review was obtained and was negative with the exception of the symptoms endorsed in the history of present illness.    PMH:  Past Medical History:   Diagnosis Date   ? AAA (abdominal aortic aneurysm) (H)    ? Anemia    ? Ragsdale esophagus    ? Cancer (H)     lung   ? CHF (congestive heart failure) (H)    ? Chronic kidney disease    ? COPD (chronic obstructive pulmonary disease) (H)    ? Coronary artery disease    ? Degenerative joint disease    ? Head and neck cancer  (H)    ? History of transfusion    ? Hyperlipidemia    ? Hypertension    ? Lung cancer (H)     s/p RUL RML wedge resection in 2016 by Dr. Carter Velazquez   ? Lung mass     MELODY FDG-avid 2.5cm   ? Myocardial infarction (H)     November 2019   ? Oral cancer (H)    ? Prostate cancer (H)    ? Shortness of breath     with exertion       PSH:  Past Surgical History:   Procedure Laterality Date   ? ABDOMINAL AORTIC ANEURYSM REPAIR     ? CORONARY STENT PLACEMENT  12/2019    x1   ? ESOPHAGOGASTRODUODENOSCOPY N/A 07/26/2019    Procedure: ENDOSCOPIC ULTRASOUND FINE NEEDLE ASPIRATION, GASTRIC BIOPSIES OF POLYPS;  Surgeon: Quoc Edwards MD;  Location: [facility];  Service: Gastroenterology   ? MOUTH LESION EXCISIONAL BIOPSY Left 11/6/2020    Procedure: 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 Need pathology for margins;  Surgeon: Alon Nunez MD;  Location: [facility];  Service: ENT   ? NEPHRECTOMY Left     for benign hemangioma   ? Oral cancer resection  1994   ? placement of stent      for AAA   ? MO ESOPHAGOGASTRODUODENOSCOPY US SCOPE W/ADJ STRXRS N/A 11/13/2020    Procedure: ESOPHAGOGASTRODUODENOSCOPY,  ENDOSCOPIC ULTRASOUND;  Surgeon: Quoc Edwards MD;  Location: [facility];  Service: Gastroenterology   ? right hip surgery      for fracture   ? wedge resection Right 2015    Lung cancer isolated pulmonary nodule       Allergies:  No Known Allergies    Family HX:  Family History   Problem Relation Age of Onset   ? Cancer Sister    ? Kidney disease Maternal Aunt        Social Hx:  Social History     Socioeconomic History   ? Marital status:      Spouse name: Not on file   ? Number of children: Not on file   ? Years of education: Not on file   ? Highest education level: Not on file   Occupational History   ? Not on file   Social Needs   ? Financial resource strain: Not on file   ? Food insecurity     Worry: Not on file     Inability: Not on file   ? Transportation needs      Medical: Not on file     Non-medical: Not on file   Tobacco Use   ? Smoking status: Former Smoker     Packs/day: 2.00     Years: 50.00     Pack years: 100.00     Quit date: 2009     Years since quittin.0   ? Smokeless tobacco: Never Used   Substance and Sexual Activity   ? Alcohol use: Yes     Alcohol/week: 14.0 standard drinks     Types: 14 Shots of liquor per week   ? Drug use: Never   ? Sexual activity: Not on file   Lifestyle   ? Physical activity     Days per week: Not on file     Minutes per session: Not on file   ? Stress: Not on file   Relationships   ? Social connections     Talks on phone: Not on file     Gets together: Not on file     Attends Spiritism service: Not on file     Active member of club or organization: Not on file     Attends meetings of clubs or organizations: Not on file     Relationship status: Not on file   ? Intimate partner violence     Fear of current or ex partner: Not on file     Emotionally abused: Not on file     Physically abused: Not on file     Forced sexual activity: Not on file   Other Topics Concern   ? Not on file   Social History Narrative   ? Not on file       Current Meds:  Current Outpatient Medications   Medication Sig Dispense Refill   ? albuterol (PROAIR HFA;PROVENTIL HFA;VENTOLIN HFA) 90 mcg/actuation inhaler Inhale 2 puffs every 4 (four) hours as needed for wheezing.     ? cyanocobalamin (VITAMIN B-12) 500 MCG tablet Take 500 mcg by mouth at bedtime.            ? ezetimibe (ZETIA) 10 mg tablet Take 10 mg by mouth daily.     ? fluticasone propion-salmeteroL (ADVAIR) 500-50 mcg/dose DISKUS Inhale 2 puffs 2 (two) times a day.      ? magnesium oxide 250 mg magnesium Tab Take 250 mg by mouth at bedtime.            ? metoprolol succinate (TOPROL-XL) 25 MG Take 25 mg by mouth at bedtime.     ? multivitamin therapeutic tablet Take 1 tablet by mouth daily.     ? omeprazole (PRILOSEC) 20 MG capsule Take 1 capsule (20 mg total) by mouth 2 (two) times a day  "before meals. 60 capsule 0   ? polyethylene glycol (MIRALAX) 17 gram packet Take 1 packet (17 g total) by mouth daily.  0   ? SPIRIVA WITH HANDIHALER 18 mcg inhalation capsule Place 1 capsule into inhaler and inhale daily.        No current facility-administered medications for this visit.        Physical Exam:  /72   Pulse (!) 107   Ht 5' 11.5\" (1.816 m)   Wt 209 lb (94.8 kg)   SpO2 95%   BMI 28.74 kg/m    Gen: awake, alert, oriented, no distress  HEENT: nasal turbinates are unremarkable, no oropharyngeal lesions, no cervical or supraclavicular lymphadenopathy  CV: RRR, no M/G/R  Resp: CTAB, no focal crackles or wheezes  Abd: soft, nontender, no palpable organomegaly  Skin: no apparent rashes  Ext: no cyanosis, clubbing or edema  Neuro: alert, nonfocal    Labs:  Reviewed  Final Diagnosis A) RIGHT UPPER LUNG LOBE, WEDGE RESECTION:  1. Adenocarcinoma (50% lepidic and 50% acinar)  2. Benign resection margins  3. Emphysema with focal bullae  4. Please see comment and Staging Parameters    B) RIGHT MIDDLE LUNG LOBE, WEDGE RESECTION:  1. Adenocarcinoma (75% lepidic and 25% acinar)  2. Benign resection margins  3. Focal emphysematous changes  4. Please see comment and Staging Parameters    C) SUBCARINAL LYMPH NODES, STATION 7, REGIONAL RESECTION:  4 benign lymph nodes    D) PARATRACHEAL LYMPH NODES, STATION 4R,  REGIONAL RESECTION:  4 benign lymph nodes     Comment For Staging purposes I have assumed that the upper and middle lobe lung cancers are related. Though they are morphologically similar, they may not be related, instead representing synchronous separate primaries. If they are synchronous separate primaries, then the correct Staging for each tumor would be pT1a(m2)N0M0, Stage IA.    Dr. MATHEUS Jerome has reviewed slides A3 and B3 concurring with the invasive tumor size and the tumor stage.     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 No change in the groundglass opacity in the left upper lobe. Multiple small pulmonary nodules appear stable.              Imaging studies:  PET/CT  IMPRESSION:      1. Findings suspicious for left vocal region squamous cell carcinoma without metastasis.     2. Findings suspicious for left upper lobe adenocarcinoma without metastasis.     Last CT chest from [facility], 2016:  Result Narrative   [facility] MEDICAL IMAGING  CT CHEST W  6/8/2016 9:14 AM    INDICATION: Lung Cancer (hcc).  TECHNIQUE: Routine chest. Dose reduction techniques were used.   IV CONTRAST: Omnipaque 350 60mL.  COMPARISON: 02/17/2016.    FINDINGS:   LUNGS AND PLEURA: Post treatment changes right hemithorax. Subsolid predominantly ground-glass opacity nodule left upper lobe, stable. Paraseptal emphysema in the upper lungs. Ground-glass opacity at the right lung base, series 2 image 233, stable and this may represent atelectasis.    MEDIASTINUM: Normal pulmonary arteries. Atheromatous changes in a normal caliber thoracic aorta. Coronary artery calcification. No hilar or mediastinal lymphadenopathy.    LIMITED UPPER ABDOMEN: Patent upper abdominal aortic stent graft.    MUSCULOSKELETAL: Negative.    CONCLUSION:   1.  No change since 02/17/2016.  2.  Recommend continued follow-up of subsolid predominantly ground-glass nodule left upper lobe.         Pulmonary Function Testing  12/23/2020  FEV1 1.93L 61%  FVC 84%  Ratio (post-BD) 0.53  No BD response  %  %  RV/  DLco 84% silke for hgb  Impression: moderate obstruction, no BD response, no hyperinflation, no air trapping, DLco normal when corrected for hgb.

## 2021-06-21 NOTE — LETTER
Letter by Naman Summers MD at      Author: Naman Summers MD Service: -- Author Type: --    Filed:  Encounter Date: 11/10/2020 Status: (Other)         Lenny Chau  [address]  [place] [address]    November 10, 2020    Dear Mr. Chau,    Welcome to [facility]! Your appointment information is below.   Please bring the following to your appointment:    Insurance Card, so we may scan it for our records    Drivers license or valid ID, so we may scan it for our records    Co-pay (as applicable per your insurance plan)    A current list of your medications including over the counter products such as vitamins and supplements    Your medical records including copies of X-Ray films if you are transferring your care from another clinic.  If you do not have your records, please fill out the release of information form and we will request those records.     Provider: Naman Summers MD  Appointment Date: December 23, 2020  Arrival Time: 1:00  PM    Location: [facility]         [address]        [place], [address]    **Please allow adequate time for your commute and parking. If you are more than 10 minutes late, you may be asked to reschedule.     If you need to cancel or reschedule your appointment, please notify us at least 24 hours prior to your appointment time so we are able to make this time available for another patient.    Thank you for choosing the [facility] for your health care needs. If you have any questions, please do not hesitate to contact us at any time at   [phone]. We look forward to caring for you.     Sincerely,     [facility] staff

## 2021-06-21 NOTE — LETTER
Letter by Yesi Garcia RN at      Author: Yesi Garcia RN Service: -- Author Type: --    Filed:  Encounter Date: 12/7/2020 Status: (Other)       Dear Lenny Chau    Thank you for choosing [facility] for your care.  We are committed to providing you with the highest quality and compassionate healthcare services.  The following information pertains to your first appointment with our clinic.    Date/Time of appointment:  Wednesday, December 16th 2020 at 1:00 pm.      Note: Please arrive by 1:00 pm.  This allows time to complete forms, possible labs and nursing assessment.    Due to the current COVID-19 pandemic, visitor restrictions are place.  You must arrive to your appointment unaccompanied, however, a friend or family member is welcome to join the appointment by phone.      For your protection and the protection of other patients and staff, please arrive for your appointment wearing a mask. Homemade masks, earloop masks, and clothing, such as a bandana or scarf, that covers your nose and mouth are acceptable. If you are interested, our website [email or web address] has instructions on how to make a mask.      Name of your Physician: Dr. Mariana Batista    What to bring to your appointment:    Completed Patient History/Initial Nursing Assessment, Medication/Allergy List and Person to Person Communication (these forms were sent to you).    Your current insurance card(s).    Parking:    Please refer to the map included to direct you to [facility].    The Radiation Oncology parking lot is west of the main entrance, this is free parking and is right next to the Cancer Care Entrance.    Come in the Cancer Care Entrance and check in.        We hope these instructions are helpful to you.  If you have any questions or concerns, please call us at [phone].  It is our pleasure to assist you.    Warm Regards,  Yesi Garcia  Nurse  Navigator  [phone]

## 2021-06-23 ENCOUNTER — OFFICE VISIT - HEALTHEAST (OUTPATIENT)
Dept: RADIATION ONCOLOGY | Facility: CLINIC | Age: 76
End: 2021-06-23

## 2021-06-23 DIAGNOSIS — C34.12 MALIGNANT NEOPLASM OF UPPER LOBE OF LEFT LUNG (H): ICD-10-CM

## 2021-06-25 ENCOUNTER — OFFICE VISIT - HEALTHEAST (OUTPATIENT)
Dept: PULMONOLOGY | Facility: OTHER | Age: 76
End: 2021-06-25

## 2021-06-25 DIAGNOSIS — J44.9 COPD, GROUP B, BY GOLD 2017 CLASSIFICATION (H): ICD-10-CM

## 2021-06-25 ASSESSMENT — MIFFLIN-ST. JEOR: SCORE: 1672.93

## 2021-06-26 NOTE — PROGRESS NOTES
Patient here ambulatory accompanied by daughter for follow up s/p radiation for lung cancer.  Patient had recent scan and is here today for results.  Seen by Dr. Batista.  Plan RTC for follow up as directed by physician.

## 2021-06-26 NOTE — PROGRESS NOTES
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  The biopsy confirmed moderately differentiated adenocarcinoma.        TYPE OF RADIATION THERAPY ADMINISTERED: SBRT to the left upper lobe with a total dose of 5000 cGy in 5 treatments given from 3/2/2021-3/10/202.     INTERVAL SINCE COMPLETION OF RADIATION THERAPY: 3 months.      SUBJECTIVE:  Mr. Chau is a 75 y.o. male who is a chronic smoker and quit smoking since .  The patient had a complicated history of multiple cancer in the past as detailed as followin.  Low risk prostate cancer, clinical stage T1c N0 M0, recent grade 3+3 =6 and the last PSA was 10.4 in 2006.  The patient is currently on clinical observation with no therapy.  I do not have the most recent PSA at the time of evaluation.     2.  Floor of mouth cancer, status post surgical resection in 1994 with no adjuvant therapy.     3.  Left kidney hemangioma, status post left nephrectomy on 3/26/2009.     4.  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  Patient had local recurrence and is status post surgical resection on 2009.     5.  Non-small cell lung cancer involving right lung, stage I, status post right thoracotomy and excision of right upper lobe and right middle lobe  lung tumor 11/5/2015 with no evidence of lymph node metastasis and no adjuvant therapy.  Patient lost to follow-up since 2016.     6.  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     Patient had two prior early stage right lung cancers that were apparently resected in their entirety and may have been synchronous primary lung cancers. It does not appear he had the appropriate recommended follow up at [facility]. The MELODY lesion was known to be present in 2016 and appears to be slightly larger now with significant FDG-avidity on the recent PET/CT. It is likely another primary lung cancer, although metastasis from previous lung cancers is possible.  The patient had a restaging PET CT scan on 8/31/2020.  The scan showed enlarging FDG avid left upper lobe mass measuring 2.1 x 2.5 cm with central solid component consistent with primary lung cancer without metastasis.  There was also a FDG avid lesion in the left buccal region consistent with squamous cell carcinoma without metastasis.  The patient had a surgical resection for his left buccal/lateral tongue squamous cell carcinoma 11/6/2020 by KERRY Renee with pathology showed squamous cell carcinoma with negative margin.  He was determined not a good candidate for lung surgery given his complicated medical history and health status.  He therefore received definitive radiation therapy using SBRT with a total dose of 5000 cGy in 5 treatments given from 3/2/2021-3/10/2021. The patient tolerated radiation therapy very well with minimal side effect.    The patient has been doing well since completion of radiation therapy.  He denies any pain or discomfort related to the therapy at the time of evaluation.  The patient is here for routine post therapy office follow-up.    Medications were reviewed and are up to date on EPIC.    The following portions of  the patient's history were reviewed and updated as appropriate: allergies, current medications, past family history, past medical history, past social history, past surgical history and problem list.    Review of Systems:      General  Constitutional (WDL): All constitutional elements are within defined limits  EENT  Eye Disorder (WDL): All eye disorder elements are within defined limits(wears reading glasses)  Ear Disorder (WDL): Exceptions to WDL([place] left ear d/t H&N cancer w/radiation)  Respiratory   Respiratory (WDL): Exceptions to WDL  Cough: Mild symptoms, nonprescription intervention indicated  Dyspnea: Shortness of breath with moderate exertion  Cardiovascular  Cardiovascular (WDL): Exceptions to WDL(irregular heartbeat)  Edema: Yes  Edema Limbs: 5 - 10% inter-limb discrepancy in volume or circumference at point of greatest visible difference, swelling or obscuration of anatomic architecture on close inspection(bilateral LE R>L)  Endocrine     Gastrointestinal  Gastrointestinal (WDL): All gastrointestinal elements are within defined limits  Musculoskeletal  Musculoskeletal and Connetive Tissue Disorders (WDL): All Musculoskeletal and Connetive Tissue Disorder elements are within defined limits  Integumentary               Integumentary (WDL): Exceptions to WDL(skin cancer top of head)  Neurological  Neurosensory (WDL): Exceptions to WDL  Ataxia: Asymptomatic, clinical or diagnostic observations only, intervention not indicated  Psychological/Emotional   Patient Coping: Open/discussion  Hematological/Lymphatic  Lymph (WDL): All lymph disorder elements are within defined limits  Dermatologic     Genitourinary/Reproductive  Genitourinary (WDL): Exceptions to WDL  Urinary Frequency: Present(nocturia x2-4)  Urinary Retention: Urinary, suprapubic or intermittent catheter placement not indicated, able to void with some residual  Reproductive     Pain              Currently in Pain: Yes  Pain Frequency:  Intermittent  Location: right hip  Pain Intervention(s): Repositioned;Rest  Response to Interventions: good  Accompanied by  Accompanied by: Alone    Objective:     PHYSICAL EXAMINATION:    BP (!) 156/92   Pulse 88   Temp 97.7  F (36.5  C) (Oral)   Wt 203 lb 8 oz (92.3 kg)   SpO2 95%   BMI 28.38 kg/m      Gen: Alert, in NAD  Eyes: PERRL, EOMI, sclera anicteric  HENT     Head: NC/AT     Ears: No external auricular lesions     Nose/sinus: No rhinorrhea or epistaxis     Oropharynx: MMM, no visible oral lesions  Neck: Supple, full ROM, no LAD  Pulm: No wheezing, stridor or respiratory distress  CV: Well-perfused, no cyanosis, no pedal edema  Abdominal: BS+, soft, nontender, nondistended, no hepatomegaly  Back: No step-offs or pain to palpation along the thoracolumbar spine  Rectal: Deferred  : Deferred  Musculoskeletal: Normal muscle bulk and tone  Skin: Normal color and turgor  Neurologic: A/Ox3, CN II-XII intact, normal gait and station  Psychiatric: Appropriate mood and affect     Imaging: Imaging results 30 days: Ct Chest With Contrast    Result Date: 6/17/2021  EXAM: CT CHEST W CONTRAST LOCATION: [facility] DATE/TIME: 6/16/2021 2:27 PM INDICATION: lung cancer, post SBRT in 3/2021 follow up COMPARISON: 01/04/2021. TECHNIQUE: CT chest with IV contrast. Multiplanar reformats were obtained. Dose reduction techniques were used. CONTRAST: Iopamidol ([id number]-370) 75mL FINDINGS: LUNGS AND PLEURA: Spiculated left upper lobe nodule measures 16 x 14 x 13 mm on axial 67, coronal 85, and sagittal 127. Fiducial marker noted within the anterior aspect of the lesion. Postsurgical change in the right upper lobe. Mild emphysema. No pulmonary mass or consolidation. No pleural effusion or pneumothorax. MEDIASTINUM/AXILLAE: Prominent atherosclerotic calcification and mild atheromatous plaque along the aortic arch with prominent ductus diverticulum. No aortic aneurysm. Pulmonary arteries normal in  caliber. No abnormally enlarged axillary or intrathoracic  lymph nodes. Cardiac chambers unremarkable. No pericardial effusion. CORONARY ARTERY CALCIFICATION: Severe. UPPER ABDOMEN: Stable cyst in the tail the pancreas measuring approximately 2 cm without associated FDG uptake. MUSCULOSKELETAL: No suspicious bone lesion. Multiple old rib fractures bilaterally.     1.  Spiculated left upper lobe nodule consistent with history of lung cancer. When compared to prior PET/CT from 01/04/2021, the surrounding groundglass component has largely resolved, and the solid component is unchanged.       Impression     The patient is a 75-year-old gentleman with multiple history of cancer in the past and a new finding of left upper lobe lung cancer.  The patient received stereotactic radiosurgery 3 months ago with restaging CT chest showed no evidence of recurrence.    Assessment & Plan:     1.  I have personally reviewed his restaging CT scan and compared to the previous CT scan and radiation therapy field.  The findings is consistent with post radiation therapy changes with no evidence of recurrence.  He is scheduled return to radiation oncology in 4 months for office follow-up and CT chest.    2.  Continue follow-up with Dr. Alon Nunez, ENT for his head neck cancer as planned.    3.  Continue follow-up with Dr. Shade Boyd, PCP for other medical needs.      Face to face time  30 minutes with > 75% spent on consultation, education and coordination of care.    Mariana Batista MD, PhD  Department of Radiation Oncology   [facility]  Tel: [phone]  Page: [phone]    [facility]  [address] [place] [address]     [facility]   [address] [place] [address]    CC:  Patient Care Team:  Shade Boyd MD as PCP - General (Internal Medicine)  Alon Nunez MD as Assigned Surgical Provider  Matt Magaña MD as Assigned Heart and Vascular Provider  Naman Summers MD as Assigned  Pulmonology Provider  Mariana Batista MD as Physician (Radiation Oncology)  Mariana Batista MD as Assigned Cancer Care Provider

## 2021-06-29 NOTE — PROGRESS NOTES
Progress Notes by Matt Magaña MD at 10/1/2020 10:00 AM     Author: Matt Magaña MD Service: -- Author Type: Physician    Filed: 10/1/2020  2:14 PM Encounter Date: 10/1/2020 Status: Signed    : Matt Magaña MD (Physician)       THORACIC SURGERY OFFICE NEW PATIENT VISIT      Dear Dr. Boyd,    I saw Mr. Chau at Dr. Persaud request in consultation for the evaluation and treatment of a growing LEFT upper lobe nodule, suspicious for malignancy.      HPI  Mr. Lenny Chau is a 75 y.o. year-old male with 2 stage I NSCLC resected with wedge resection on the right side by Dr. Velazquez in 2015. He has had a GGO since at least 2016 in the MELODY, but was then lost to follow-up. Now he has a solid nodule, PET avid, very consistent with a NSCLC.    Additionally, he has many health problems, he has a very unsteady gate, he is dealing with a possible vocal cord lesion as well as with oral cancer. He is scheduled for oral cancer resection on 10/16/2020.      Tests   PET-CT 8/31/2020: 2.5 cm GGO with 1.4 cm solid component (increased in size since 2016) with SUV 4.6. Bilateral rib fractures.      PFT (pending)    PMH  Cystic lesion of the body of the pancreas - he has an EUS 7/26/2019; aspiration of the cyst was negative on cytology; 1 year follow-up EUS was recommended.  Past Medical History:   Diagnosis Date   ? AAA (abdominal aortic aneurysm) (H)    ? [place] esophagus    ? Cancer (H)     lung   ? Chronic kidney disease    ? COPD (chronic obstructive pulmonary disease) (H)    ? Coronary artery disease    ? Degenerative joint disease    ? Head and neck cancer (H)    ? Hyperlipidemia    ? Hypertension    ? Lung cancer (H)     s/p RUL RML wedge resection in 2016 by Dr. Carter Velazquez   ? Lung mass     [place] FDG-avid 2.5cm   ? Prostate cancer (H)    ? Shortness of breath     with exertion      Past Surgical History:   Procedure Laterality Date   ? ABDOMINAL AORTIC ANEURYSM REPAIR     ? ESOPHAGOGASTRODUODENOSCOPY  "N/A 7/26/2019    Procedure: ENDOSCOPIC ULTRASOUND FINE NEEDLE ASPIRATION, GASTRIC BIOPSIES OF POLYPS;  Surgeon: Quoc Edwards MD;  Location: [facility];  Service: Gastroenterology   ? NEPHRECTOMY Left     for benign hemangioma   ? placement of stent      for AAA   ? right hip surgery      for fracture   ? wedge resection Right     isolated pulmonary nodule         ETOH 2-3 drinks/day  TOB quit smoking 2009, 100 pack years    Physical examination  /60   Pulse 68   Resp 12   Ht 5' 11.5\" (1.816 m)   Wt 202 lb (91.6 kg)   SpO2 97%   BMI 27.78 kg/m    He appears frail and older than hos stated age, he has an unsteady gait and had some difficulty getting up from his chair.    From a personal perspective, he is here with his daughter, Georgette. His wife was not feeling well, and didn't come to clinic.      IMPRESSION   1. Pulmonary nodules  Ambulatory referral to Radiation Therapy      75 y.o. year-old male with growing LEFT upper lobe nodule, suspicious for malignancy.  Oral cancer  Questionable PET + vocal cord lesion  Possible pancreatic mass    PLAN  I spent a total of 60 minutes with Mr. Chau and his daughter, Georgette, more than 50% of which were spent in counseling, coordination of care, and face-to-face time. I reviewed the plan as follows:    1) LEFT upper lobe nodule: we talked about TTNA, stereotactic radiation, and surgery. He does not want a TTNA, and surgery is not a good option for him. He would require a S1-3 LEFT segmentectomy, and I do not think he would tolerate that, regardless of PFT. We will discuss him at Tumor Conference on 10/22, and I have referred him to Radiation Oncology for evaluation.    2) Oral cancer and questionable VC lesion: he has an exam under anesthesia with resection of oral lesion scheduled for 10/16 with Dr. Alon Nunez.  3) Pancreatic cyst: I have contacted Dr. Boyd's office (Dr. Boyd was not available, but I talked to a nurse on the team) so they can help " with the necessary follow-up.    No further follow-up required in Thoracic Surgery clinic.      They had all their questions answered and were in agreement with the plan.  I appreciate the opportunity to participate in the care of your patient and will keep you updated.    Sincerely,

## 2021-07-03 NOTE — ADDENDUM NOTE
Addendum Note by Mariana Jordan MD at 1/28/2021 10:00 AM     Author: Mariana Jordan MD Service: -- Author Type: Physician    Filed: 1/28/2021 10:56 AM Encounter Date: 1/28/2021 Status: Signed    : Mariana Jordan MD (Physician)    Addended by: MARIANA JORDAN on: 1/28/2021 10:56 AM        Modules accepted: Orders

## 2021-07-03 NOTE — ADDENDUM NOTE
Addendum Note by Salbador Nunez MD at 8/11/2020  1:20 PM     Author: Salbador Nunez MD Service: -- Author Type: Physician    Filed: 8/11/2020  2:43 PM Encounter Date: 8/11/2020 Status: Signed    : Salbador Nunez MD (Physician)    Addended by: SALBADOR NUNEZ on: 8/11/2020 02:43 PM        Modules accepted: Orders

## 2021-07-05 PROBLEM — N28.89 RENAL MASS: Status: ACTIVE | Noted: 2020-02-14

## 2021-07-05 PROBLEM — I49.9 CARDIAC ARRHYTHMIA: Status: ACTIVE | Noted: 2021-06-18

## 2021-07-06 VITALS
OXYGEN SATURATION: 95 % | SYSTOLIC BLOOD PRESSURE: 130 MMHG | BODY MASS INDEX: 28.42 KG/M2 | HEART RATE: 76 BPM | DIASTOLIC BLOOD PRESSURE: 88 MMHG | HEIGHT: 71 IN | WEIGHT: 203 LBS

## 2021-07-06 VITALS
OXYGEN SATURATION: 95 % | BODY MASS INDEX: 28.38 KG/M2 | DIASTOLIC BLOOD PRESSURE: 92 MMHG | HEART RATE: 88 BPM | TEMPERATURE: 97.7 F | SYSTOLIC BLOOD PRESSURE: 156 MMHG | WEIGHT: 203.5 LBS

## 2021-07-07 NOTE — LETTER
Letter by Naman Summers MD at      Author: Naman Summers MD Service: -- Author Type: --    Filed:  Encounter Date: 6/25/2021 Status: (Other)         Shade Boyd MD  [address] [facility] [address]                                  June 25, 2021    Patient: Lenny Chau   MR Number: [id number]   YOB: 1945   Date of Visit: 6/25/2021     Dear Dr. Oliver MD:    Thank you for referring Lenny Chau to me for evaluation. Below are the relevant portions of my assessment and plan of care.    If you have questions, please do not hesitate to call me. I look forward to following Lenny along with you.    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 He is doing well with his cancer treatment. COPD seems well controlled and now on ICS/LABA/LAMA triple inhaler (started by his PMD).      Recommendations:  #MELODY FDG-avid lesion, suspicious for primary lung adenocarcinoma without evidence of metastasis, likely early stage IA based on imaging:  - not a surgical candidate. Appreciate evaluation by Dr. Magaña from thoracic surgery.  - continue follow up with Dr. Batista from radiation oncology.   - repeat imaging per Dr. Batista.      #COPD GOLD:  - continue once daily trelegy, rinse/gargle/spit after use.   - continue albuterol as needed  - declines pulmonary  rehab at this time   - he is a candidate for LDCT, but currently undergoing surveillance imaging under the direction of Dr. Batista - hold off for now.       #RHM:  - UTD with pneumococcal vaccinations + flu shot.  Also received both doses of COVID-19 vaccine.      All questions answered.  Follow up in approx 6 months.     Naman Summers MD (Avi)  [facility] Pulmonary & Critical Care  Pager [phone]  Clinic [phone]    CCx: COPD, lung nodules, lung cancer follow up    HPI: Interim history: I last saw Mitchell on 12/23/2020. Since that time, he's been doing well.  He was switched to Trelegy. He likes it better than advair + spiriva due to convenience. Using albuterol daily before the trelegy, this was recommended by the pharmacist at his PMD's office.   I referred him to pulmonary rehab but he's not interested.     ROS:  A 12-system review was obtained and was negative with the exception of the symptoms endorsed in the history of present illness.    PMH:  Past Medical History:   Diagnosis Date   ? AAA (abdominal aortic aneurysm) (H)    ? Anemia    ? Ragsdale esophagus    ? Cancer (H)     lung   ? CHF (congestive heart failure) (H)    ? Chronic kidney disease    ? COPD, group B, by GOLD 2017 classification (H)    ? Coronary artery disease    ? Coronary artery disease of native heart with stable angina pectoris, unspecified vessel or lesion type (H)    ? Degenerative joint disease    ? Head and neck cancer (H)    ? History of transfusion    ? Hyperlipidemia    ? Hypertension    ? Lung cancer (H)     s/p RUL RML wedge resection in 2016 by Dr. Carter Velazquez   ? Lung mass     MELODY FDG-avid 2.5cm   ? Myocardial infarction (H)     November 2019   ? Oral cancer (H)    ? Prostate cancer (H)    ? Shortness of breath     with exertion       PSH:  Past Surgical History:   Procedure Laterality Date   ? ABDOMINAL AORTIC ANEURYSM REPAIR     ? CORONARY STENT PLACEMENT  12/2019    x1   ? CT BIOPSY LUNG  1/21/2021    ? ESOPHAGOGASTRODUODENOSCOPY N/A 2019    Procedure: ENDOSCOPIC ULTRASOUND FINE NEEDLE ASPIRATION, GASTRIC BIOPSIES OF POLYPS;  Surgeon: Quoc Edwards MD;  [place] [facility] [place]  Service: Gastroenterology   ? MOUTH LESION EXCISIONAL BIOPSY Left 2020    Procedure: 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 Need pathology for margins;  Surgeon: Alon Nunez MD;  [place] [facility] [place];  Service: ENT   ? NEPHRECTOMY Left     for benign hemangioma   ? Oral cancer resection     ? placement of stent      for AAA   ? MS ESOPHAGOGASTRODUODENOSCOPY US SCOPE W/ADJ STRXRS N/A 2020    Procedure: ESOPHAGOGASTRODUODENOSCOPY,  ENDOSCOPIC ULTRASOUND;  Surgeon: Quoc Edwards MD;  [place] [facility] [place]  Service: Gastroenterology   ? right hip surgery      for fracture   ? wedge resection Right 2015    Lung cancer isolated pulmonary nodule       Allergies:  No Known Allergies    Family HX:  Family History   Problem Relation Age of Onset   ? Cancer Sister    ? Kidney disease Maternal Aunt        Social Hx:  Social History     Socioeconomic History   ? Marital status:      Spouse name: Not on file   ? Number of children: Not on file   ? Years of education: Not on file   ? Highest education level: Not on file   Occupational History   ? Not on file   Social Needs   ? Financial resource strain: Not on file   ? Food insecurity     Worry: Not on file     Inability: Not on file   ? Transportation needs     Medical: Not on file     Non-medical: Not on file   Tobacco Use   ? Smoking status: Former Smoker     Packs/day: 2.00     Years: 50.00     Pack years: 100.00     Quit date: 2009     Years since quittin.5   ? Smokeless tobacco: Never Used   Substance and Sexual Activity   ? Alcohol use: Yes     Alcohol/week: 14.0 standard drinks     Types: 14 Shots of liquor per week   ? Drug use: Never   ? Sexual activity: Not on file   Lifestyle   ? Physical activity      Days per week: Not on file     Minutes per session: Not on file   ? Stress: Not on file   Relationships   ? Social connections     Talks on phone: Not on file     Gets together: Not on file     Attends Sikhism service: Not on file     Active member of club or organization: Not on file     Attends meetings of clubs or organizations: Not on file     Relationship status: Not on file   ? Intimate partner violence     Fear of current or ex partner: Not on file     Emotionally abused: Not on file     Physically abused: Not on file     Forced sexual activity: Not on file   Other Topics Concern   ? Not on file   Social History Narrative   ? Not on file       Current Meds:  Current Outpatient Medications   Medication Sig Dispense Refill   ? acetaminophen (TYLENOL) 325 MG tablet Take 325-650 mg by mouth.     ? albuterol (PROAIR HFA;PROVENTIL HFA;VENTOLIN HFA) 90 mcg/actuation inhaler Inhale 2 puffs every 4 (four) hours as needed for wheezing.     ? aspirin 81 MG EC tablet Take 81 mg by mouth every evening.      ? cholecalciferol, vitamin D3, 1,000 unit (25 mcg) tablet Take 1,000 Units by mouth daily.     ? cyanocobalamin (VITAMIN B-12) 500 MCG tablet Take 500 mcg by mouth at bedtime.            ? ferrous sulfate 325 (65 FE) MG tablet Take 1 tablet by mouth daily with breakfast. Not currently taking     ? fluorouraciL (EFUDEX) 5 % cream      ? fluticasone-umeclidin-vilanter (TRELEGY ELLIPTA) 200-62.5-25 mcg DsDv Inhale 1 puff daily. (this replaces advair and spiriva)     ? folic acid (FOLVITE) 1 MG tablet Take 1 tablet (1 mg total) by mouth daily. 30 tablet 3   ? magnesium oxide (MAG-OX) 400 mg (241.3 mg magnesium) tablet Take 1 tablet (400 mg total) by mouth daily. 30 tablet 0   ? metoprolol succinate (TOPROL-XL) 25 MG Take 25 mg by mouth daily.      ? multivitamin therapeutic tablet Take 1 tablet by mouth daily.     ? nitroglycerin (NITROSTAT) 0.4 MG SL tablet Place 0.4 mg under the tongue every 5 (five) minutes as  "needed for chest pain.     ? omeprazole (PRILOSEC) 20 MG capsule Take 1 capsule (20 mg total) by mouth 2 (two) times a day before meals. 60 capsule 0   ? polyethylene glycol (MIRALAX) 17 gram packet Take 17 g by mouth daily as needed.     ? thiamine 100 MG tablet Take 1 tablet (100 mg total) by mouth daily.  0   ? zinc gluconate 50 mg tablet Take 1 tablet by mouth daily.        No current facility-administered medications for this visit.        Physical Exam:  /88   Pulse 76   Ht 5' 11\" (1.803 m)   Wt 203 lb (92.1 kg)   SpO2 95%   BMI 28.31 kg/m    Gen: awake, alert, oriented, no distress  HEENT: nasal turbinates are unremarkable, no oropharyngeal lesions, no cervical or supraclavicular lymphadenopathy  CV: RRR, no M/G/R  Resp: CTAB, no focal crackles or wheezes  Abd: soft, nontender, no palpable organomegaly  Skin: no apparent rashes  Ext: no cyanosis, clubbing or edema  Neuro: alert, nonfocal    Labs:  Reviewed  Final Diagnosis A) RIGHT UPPER LUNG LOBE, WEDGE RESECTION:  1. Adenocarcinoma (50% lepidic and 50% acinar)  2. Benign resection margins  3. Emphysema with focal bullae  4. Please see comment and Staging Parameters    B) RIGHT MIDDLE LUNG LOBE, WEDGE RESECTION:  1. Adenocarcinoma (75% lepidic and 25% acinar)  2. Benign resection margins  3. Focal emphysematous changes  4. Please see comment and Staging Parameters    C) SUBCARINAL LYMPH NODES, STATION 7, REGIONAL RESECTION:  4 benign lymph nodes    D) PARATRACHEAL LYMPH NODES, STATION 4R,  REGIONAL RESECTION:  4 benign lymph nodes     Comment For Staging purposes I have assumed that the upper and middle lobe lung cancers are related. Though they are morphologically similar, they may not be related, instead representing synchronous separate primaries. If they are synchronous separate primaries, then the correct Staging for each tumor would be pT1a(m2)N0M0, Stage IA.    Dr. MATHEUS Jerome has reviewed slides A3 and B3 concurring with the invasive tumor " size and the tumor stage.     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 No change in the groundglass opacity in the left upper lobe. Multiple small pulmonary nodules appear stable.              Imaging studies:  PET/CT  IMPRESSION:      1. Findings suspicious for left vocal region squamous cell carcinoma without metastasis.     2. Findings suspicious for left upper lobe adenocarcinoma without metastasis.     Last CT chest from [facility], 2016:  Result Narrative   [facility] MEDICAL IMAGING  CT CHEST W  6/8/2016 9:14 AM    INDICATION: Lung Cancer (hcc).  TECHNIQUE: Routine chest. Dose reduction techniques were used.   IV CONTRAST: Omnipaque 350 60mL.  COMPARISON: 02/17/2016.    FINDINGS:   LUNGS AND PLEURA: Post treatment changes right hemithorax. Subsolid predominantly ground-glass opacity nodule left upper lobe, stable. Paraseptal emphysema in the upper lungs. Ground-glass opacity at the right lung base, series 2 image 233, stable and this may represent atelectasis.    MEDIASTINUM: Normal pulmonary arteries. Atheromatous changes in a normal caliber thoracic aorta. Coronary artery calcification. No hilar or mediastinal lymphadenopathy.    LIMITED UPPER ABDOMEN: Patent upper abdominal aortic stent graft.    MUSCULOSKELETAL: Negative.    CONCLUSION:   1.  No change since 02/17/2016.  2.  Recommend continued follow-up of subsolid predominantly ground-glass nodule left upper lobe.         Pulmonary Function Testing  12/23/2020  FEV1 1.93L 61%  FVC 84%  Ratio (post-BD) 0.53  No BD response  %  %  RV/  DLco 84% silke for hgb  Impression: moderate obstruction, no BD response, no hyperinflation, no air trapping, DLco normal when corrected for hgb.

## 2021-07-07 NOTE — PROGRESS NOTES
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 He is doing well with his cancer treatment. COPD seems well controlled and now on ICS/LABA/LAMA triple inhaler (started by his PMD).      Recommendations:  #MELODY FDG-avid lesion, suspicious for primary lung adenocarcinoma without evidence of metastasis, likely early stage IA based on imaging:  - not a surgical candidate. Appreciate evaluation by Dr. Magaña from thoracic surgery.  - continue follow up with Dr. Batista from radiation oncology.   - repeat imaging per Dr. Batista.      #COPD GOLD:  - continue once daily trelegy, rinse/gargle/spit after use.   - continue albuterol as needed  - declines pulmonary rehab at this time   - he is a candidate for LDCT, but currently undergoing surveillance imaging under the direction of Dr. Batista - hold off for now.       #RHM:  - UTD with pneumococcal vaccinations + flu shot.  Also received both doses of COVID-19 vaccine.   - recommend he get the flu shot every Fall.      All questions answered.  Follow up in approx 6 months.     Naman Summers MD (Avi)  [facility] Pulmonary & Critical Care  Pager [phone]  Clinic [phone]    CCx: COPD, lung nodules, lung cancer follow up    HPI: Interim history: I last saw Mitchell on 12/23/2020. Since that time, he's been doing well.  He was switched to Trelegy. He likes it better than advair + spiriva due to convenience. Using albuterol daily before the trelegy,  this was recommended by the pharmacist at his PMD's office.   I referred him to pulmonary rehab but he's not interested.     ROS:  A 12-system review was obtained and was negative with the exception of the symptoms endorsed in the history of present illness.    PMH:  Past Medical History:   Diagnosis Date     AAA (abdominal aortic aneurysm) (H)      Anemia      Ragsdale esophagus      Cancer (H)     lung     CHF (congestive heart failure) (H)      Chronic kidney disease      COPD, group B, by GOLD 2017 classification (H)      Coronary artery disease      Coronary artery disease of native heart with stable angina pectoris, unspecified vessel or lesion type (H)      Degenerative joint disease      Head and neck cancer (H)      History of transfusion      Hyperlipidemia      Hypertension      Lung cancer (H)     s/p RUL RML wedge resection in 2016 by Dr. Carter Velazquez     Lung mass     MELODY FDG-avid 2.5cm     Myocardial infarction (H)     November 2019     Oral cancer (H)      Prostate cancer (H)      Shortness of breath     with exertion       PSH:  Past Surgical History:   Procedure Laterality Date     ABDOMINAL AORTIC ANEURYSM REPAIR       CORONARY STENT PLACEMENT  12/2019    x1     CT BIOPSY LUNG  1/21/2021     ESOPHAGOGASTRODUODENOSCOPY N/A 07/26/2019    Procedure: ENDOSCOPIC ULTRASOUND FINE NEEDLE ASPIRATION, GASTRIC BIOPSIES OF POLYPS;  Surgeon: Quoc Edwards MD;  [place] [facility] [place]     MOUTH LESION EXCISIONAL BIOPSY Left 11/6/2020    Procedure: 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 Need pathology for margins;  Surgeon: Alon Nunez MD;  [facility];  Service: ENT     NEPHRECTOMY Left     for benign hemangioma     Oral cancer resection  1994     placement of stent      for AAA     CT ESOPHAGOGASTRODUODENOSCOPY US SCOPE W/ADJ STRXRS N/A 11/13/2020    Procedure: ESOPHAGOGASTRODUODENOSCOPY,  ENDOSCOPIC ULTRASOUND;  Surgeon: Grace  Quoc ESCALERA MD;  Location: [facility] OR;  Service: Gastroenterology     right hip surgery      for fracture     wedge resection Right 2015    Lung cancer isolated pulmonary nodule       Allergies:  No Known Allergies    Family HX:  Family History   Problem Relation Age of Onset     Cancer Sister      Kidney disease Maternal Aunt        Social Hx:  Social History     Socioeconomic History     Marital status:      Spouse name: Not on file     Number of children: Not on file     Years of education: Not on file     Highest education level: Not on file   Occupational History     Not on file   Social Needs     Financial resource strain: Not on file     Food insecurity     Worry: Not on file     Inability: Not on file     Transportation needs     Medical: Not on file     Non-medical: Not on file   Tobacco Use     Smoking status: Former Smoker     Packs/day: 2.00     Years: 50.00     Pack years: 100.00     Quit date: 2009     Years since quittin.5     Smokeless tobacco: Never Used   Substance and Sexual Activity     Alcohol use: Yes     Alcohol/week: 14.0 standard drinks     Types: 14 Shots of liquor per week     Drug use: Never     Sexual activity: Not on file   Lifestyle     Physical activity     Days per week: Not on file     Minutes per session: Not on file     Stress: Not on file   Relationships     Social connections     Talks on phone: Not on file     Gets together: Not on file     Attends Anabaptism service: Not on file     Active member of club or organization: Not on file     Attends meetings of clubs or organizations: Not on file     Relationship status: Not on file     Intimate partner violence     Fear of current or ex partner: Not on file     Emotionally abused: Not on file     Physically abused: Not on file     Forced sexual activity: Not on file   Other Topics Concern     Not on file   Social History Narrative     Not on file       Current Meds:  Current Outpatient Medications   Medication  "Sig Dispense Refill     acetaminophen (TYLENOL) 325 MG tablet Take 325-650 mg by mouth.       albuterol (PROAIR HFA;PROVENTIL HFA;VENTOLIN HFA) 90 mcg/actuation inhaler Inhale 2 puffs every 4 (four) hours as needed for wheezing.       aspirin 81 MG EC tablet Take 81 mg by mouth every evening.        cholecalciferol, vitamin D3, 1,000 unit (25 mcg) tablet Take 1,000 Units by mouth daily.       cyanocobalamin (VITAMIN B-12) 500 MCG tablet Take 500 mcg by mouth at bedtime.              ferrous sulfate 325 (65 FE) MG tablet Take 1 tablet by mouth daily with breakfast. Not currently taking       fluorouraciL (EFUDEX) 5 % cream        fluticasone-umeclidin-vilanter (TRELEGY ELLIPTA) 200-62.5-25 mcg DsDv Inhale 1 puff daily. (this replaces advair and spiriva)       folic acid (FOLVITE) 1 MG tablet Take 1 tablet (1 mg total) by mouth daily. 30 tablet 3     magnesium oxide (MAG-OX) 400 mg (241.3 mg magnesium) tablet Take 1 tablet (400 mg total) by mouth daily. 30 tablet 0     metoprolol succinate (TOPROL-XL) 25 MG Take 25 mg by mouth daily.        multivitamin therapeutic tablet Take 1 tablet by mouth daily.       nitroglycerin (NITROSTAT) 0.4 MG SL tablet Place 0.4 mg under the tongue every 5 (five) minutes as needed for chest pain.       omeprazole (PRILOSEC) 20 MG capsule Take 1 capsule (20 mg total) by mouth 2 (two) times a day before meals. 60 capsule 0     polyethylene glycol (MIRALAX) 17 gram packet Take 17 g by mouth daily as needed.       thiamine 100 MG tablet Take 1 tablet (100 mg total) by mouth daily.  0     zinc gluconate 50 mg tablet Take 1 tablet by mouth daily.        No current facility-administered medications for this visit.        Physical Exam:  /88   Pulse 76   Ht 5' 11\" (1.803 m)   Wt 203 lb (92.1 kg)   SpO2 95%   BMI 28.31 kg/m    Gen: awake, alert, oriented, no distress  HEENT: nasal turbinates are unremarkable, no oropharyngeal lesions, no cervical or supraclavicular lymphadenopathy  CV: " RRR, no M/G/R  Resp: CTAB, no focal crackles or wheezes  Abd: soft, nontender, no palpable organomegaly  Skin: no apparent rashes  Ext: no cyanosis, clubbing or edema  Neuro: alert, nonfocal    Labs:  Reviewed  Final Diagnosis A) RIGHT UPPER LUNG LOBE, WEDGE RESECTION:  1. Adenocarcinoma (50% lepidic and 50% acinar)  2. Benign resection margins  3. Emphysema with focal bullae  4. Please see comment and Staging Parameters    B) RIGHT MIDDLE LUNG LOBE, WEDGE RESECTION:  1. Adenocarcinoma (75% lepidic and 25% acinar)  2. Benign resection margins  3. Focal emphysematous changes  4. Please see comment and Staging Parameters    C) SUBCARINAL LYMPH NODES, STATION 7, REGIONAL RESECTION:  4 benign lymph nodes    D) PARATRACHEAL LYMPH NODES, STATION 4R,  REGIONAL RESECTION:  4 benign lymph nodes     Comment For Staging purposes I have assumed that the upper and middle lobe lung cancers are related. Though they are morphologically similar, they may not be related, instead representing synchronous separate primaries. If they are synchronous separate primaries, then the correct Staging for each tumor would be pT1a(m2)N0M0, Stage IA.    Dr. MATHEUS Jerome has reviewed slides A3 and B3 concurring with the invasive tumor size and the tumor stage.     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 No change in the groundglass opacity in the left upper lobe. Multiple small pulmonary nodules appear stable.              Imaging studies:  PET/CT  IMPRESSION:      1. Findings suspicious for left vocal region squamous cell carcinoma without metastasis.     2. Findings suspicious for left upper lobe adenocarcinoma without metastasis.     Last CT chest from [facility], 2016:  Result Narrative   [facility] MEDICAL IMAGING  CT CHEST W  6/8/2016 9:14 AM    INDICATION: Lung Cancer (hcc).  TECHNIQUE: Routine chest. Dose  reduction techniques were used.   IV CONTRAST: Omnipaque 350 60mL.  COMPARISON: 02/17/2016.    FINDINGS:   LUNGS AND PLEURA: Post treatment changes right hemithorax. Subsolid predominantly ground-glass opacity nodule left upper lobe, stable. Paraseptal emphysema in the upper lungs. Ground-glass opacity at the right lung base, series 2 image 233, stable and this may represent atelectasis.    MEDIASTINUM: Normal pulmonary arteries. Atheromatous changes in a normal caliber thoracic aorta. Coronary artery calcification. No hilar or mediastinal lymphadenopathy.    LIMITED UPPER ABDOMEN: Patent upper abdominal aortic stent graft.    MUSCULOSKELETAL: Negative.    CONCLUSION:   1.  No change since 02/17/2016.  2.  Recommend continued follow-up of subsolid predominantly ground-glass nodule left upper lobe.         Pulmonary Function Testing  12/23/2020  FEV1 1.93L 61%  FVC 84%  Ratio (post-BD) 0.53  No BD response  %  %  RV/  DLco 84% silke for hgb  Impression: moderate obstruction, no BD response, no hyperinflation, no air trapping, DLco normal when corrected for hgb.

## 2021-07-08 ENCOUNTER — OFFICE VISIT - RIVER FALLS (OUTPATIENT)
Dept: FAMILY MEDICINE | Facility: CLINIC | Age: 76
End: 2021-07-08
Payer: COMMERCIAL

## 2021-07-08 ENCOUNTER — VIRTUAL VISIT (OUTPATIENT)
Dept: PHARMACY | Facility: PHYSICIAN GROUP | Age: 76
End: 2021-07-08
Payer: COMMERCIAL

## 2021-07-08 DIAGNOSIS — Z78.9 TAKES DIETARY SUPPLEMENTS: ICD-10-CM

## 2021-07-08 DIAGNOSIS — J44.9 CHRONIC OBSTRUCTIVE PULMONARY DISEASE, UNSPECIFIED COPD TYPE (H): Primary | ICD-10-CM

## 2021-07-08 DIAGNOSIS — Z78.9 ALCOHOL USE: ICD-10-CM

## 2021-07-08 PROCEDURE — 99606 MTMS BY PHARM EST 15 MIN: CPT | Performed by: PHARMACIST

## 2021-07-08 NOTE — PATIENT INSTRUCTIONS
"Recommendations from today's MTM visit:                                                    MTM (medication therapy management) is a service provided by a clinical pharmacist designed to help you get the most of out of your medicines.      1. See updated medication list for the vitamins/supplements that you should be taking.  Multivitamin once daily  Thiamine 100 mg once daily  Vitamin D3 200 international unit(s) once daily  Zinc 50 mg once daily  Magnesium oxide 400-500 mg once daily  - no vitamin B12 (cyanocobalamin) or ferrous sulfate. We will draw these labs in a few months.    2. Once we receive your Patient Assistance Program paperwork in clinic, we will send to the  for processing.    3. If your breathing does not return to your baseline or if it gets worse, follow-up with Dr. Boyd in clinic for lung check. We may consider using a different rescue inhaler with two medications in it or changing to nebulized medications.    Follow-up: MTM will process Patient Assistance Program paperwork and then arrange follow-up with pt.    It was great to speak with you today.  I value your experience and would be very thankful for your time with providing feedback on our clinic survey. You may receive a survey via email or text message in the next few days.     To schedule another MTM appointment, please call the clinic directly or you may call the MTM scheduling line at [phone] or toll-free at [phone].     My Clinical Pharmacist's contact information:                                                      Please feel free to contact me with any questions or concerns you have.      Sara Cottrell, PharmD  Medication Therapy Management (MTM) Pharmacist    Patient Education   1\">     Patient Education    Thiamine (Vitamin B1) Oral tablet    Thiamine Hydrochloride (Vitamin B1) Oral tablet    Thiamine Hydrochloride (Vitamin B1) Solution for injection    Thiamine Mononitrate (Vitamin B1) Oral " tablet  Thiamine (Vitamin B1) Oral tablet  What is this medicine?  THIAMINE (THAHY uh min) is a vitamin B1. It is added to a healthy diet to prevent or to treat low vitamin B1 levels.  This vitamin may be used for other purposes; ask your health care provider or pharmacist if you have questions.  What should I tell my health care provider before I take this medicine?  They need to know if you have any of the following conditions:    Wernicke's disease    an unusual or allergic reaction to B vitamins, other medicines, foods, dyes, or preservatives    pregnant or trying to get pregnant    breast-feeding  How should I use this medicine?  Take this medicine by mouth with a glass of water. Follow the directions on the package or prescription label. For best results take this medicine with food. Take your medicine at regular intervals. Do not take your medicine more often than directed.  Talk to your pediatrician regarding the use of this medicine in children. While this medicine may be prescribed for selected conditions, precautions do apply.  Overdosage: If you think you have taken too much of this medicine contact a poison control center or emergency room at once.  NOTE: This medicine is only for you. Do not share this medicine with others.  What if I miss a dose?  If you miss a dose, take it as soon as you can. If it is almost time for your next dose, take only that dose. Do not take double or extra doses.  What may interact with this medicine?  Interactions are not expected.  This list may not describe all possible interactions. Give your health care provider a list of all the medicines, herbs, non-prescription drugs, or dietary supplements you use. Also tell them if you smoke, drink alcohol, or use illegal drugs. Some items may interact with your medicine.  What should I watch for while using this medicine?  Follow a healthy diet. Taking a vitamin supplement does not replace the need for a balanced diet. Some foods  that have this vitamin naturally are yeast, beans, peas, nuts, pork, and beef. Limit alcohol, smoking and stress.  Too much of this vitamin can be unsafe. Talk to your doctor or health care provider about how much is right for you.  What side effects may I notice from receiving this medicine?  Side effects that you should report to your doctor or health care professional as soon as possible:    allergic reactions like skin rash, itching or hives, swelling of the face, lips, or tongue    chest tightness    fast, irregular heartbeat    irritable, restless    nausea, vomiting    sweating    unusually bleeding or bruising  Side effects that usually do not require medical attention (report to your doctor or health care professional if they continue or are bothersome):    sneezing  This list may not describe all possible side effects. Call your doctor for medical advice about side effects. You may report side effects to FDA at 0-062-FDA-1413.  Where should I keep my medicine?  Keep out of the reach of children.  Store at room temperature between 15 and 30 degrees C (59 and 85 degrees F). Protect from heat and light. Throw away any unused medicine after the expiration date.  NOTE:This sheet is a summary. It may not cover all possible information. If you have questions about this medicine, talk to your doctor, pharmacist, or health care provider. Copyright  2016 Gold Standard

## 2021-07-08 NOTE — PROGRESS NOTES
Medication Therapy Management (MTM) Encounter    ASSESSMENT:                            Medication Adherence/Access: See below for considerations    COPD: plan in place to process Patient Assistance Program.   Breathing worsened with increased humidity as expected. Encouraged continued MUKESH usage as needed as Patient is. Discussed option for future adding EMMANUEL (with neb machine) or in combivent inhaler. Patient will self-monitor breathing and follow-up with primary care provider if does not return to his baseline.    Vitamins/Supplements in setting of alcohol use: Patient misunderstanding about recommended vitamins/supplements. Education provided about correct vitamins/supplements to take.    PLAN:                            1. See updated medication list for the vitamins/supplements that you should be taking.  Multivitamin once daily  Thiamine 100 mg once daily  Vitamin D3 200 international unit(s) once daily  Zinc 50 mg once daily  Magnesium oxide 400-500 mg once daily  - no vitamin B12 (cyanocobalamin) or ferrous sulfate. We will draw these labs in a few months.    2. Once we receive your Patient Assistance Program paperwork in clinic, we will send to the  for processing.    3. If your breathing does not return to your baseline or if it gets worse, follow-up with Dr. Boyd in clinic for lung check. We may consider using a different rescue inhaler with two medications in it or changing to nebulized medications.    Future: vit B12, hgb/hct in 2-5 months.    Follow-up: MTM will process Patient Assistance Program paperwork and then arrange follow-up with pt. Awaiting receipt in clinic.    Addended on July 13, 2021  [place] Patient Assistance Program paperwork received from Patient.  Will fax paperwork to  today.Called to Patient to communicate.  Planning for MTM follow-up in 2 months, sooner if needed. RTC entered.  KB    Addended on August 3, 2021  Paperwork received with social security benefit  info, faxed to [employer] today.  KB    SUBJECTIVE/OBJECTIVE:                          Lenny Chau is a 75 year old male called for a follow-up visit. He was referred to me from Shade Boyd.  Today's visit is a follow-up MT visit from 6/15/2021.    Reason for visit: COPD follow-up and vitamins.    Allergies/ADRs: None  Tobacco: He reports that he quit smoking about 11 years ago. He quit after 50.00 years of use. He has never used smokeless tobacco.  Alcohol: drinking ~4-5 oz hard liquor daily.  Caffeine: 1-2 cups/day of coffee  Activity: no routine  Past Medical History: Reviewed in [facility] - history of prostate cancer, lung cancer, unilateral nephrectomy, oropharyngeal cancer.  Social: lives with wife.    Medication Adherence/Access:  Patient uses pill box(es), sets this up himself.  Patient takes medications 2 time(s) per day.   Per patient, misses medication 0 times per week, estimates maybe once/month.  Medication barriers: trouble affording inhalers - see below.  The patient fills medications at [facility]: NO, fills medications at [facility] [place].    COPD:  Patient follows with [facility]ology. Last appointment 6/2021- no med changes.  History of R sided lung cancer x2.  Tobacco status: former smoker - quit 2009.  Current Medications:  Trelegy Ellipta (Fluticasone 200 mcg/Umeclidinium 62.5mcg- Vilanterol 25 mcg) Inhaler: 1 inhalation once daily (received 3 inhalers at the pharmacy and it was about $400). Rinsing mouth out after trelegy, history of oral thrush with advair.  Completed Patient Assistance Program paperwork and mailed to clinic last week, not yet in Patient chart at this time.  Albuterol MDI. Patient use: states that he has need about three times daily over the past few days, which is more than he historically needed. Also using the albuterol scheduled before the Trelegy in the AM.  Has a spacer for the MDI, but does not use it.  Medication History: spiriva, advair (switched to Trelegy  2021))  Pt reports the following symptoms - increased shortness of breath past few days with increased heat/humidity - hence why using the MUKESH more frequently, and does get relief from it.  COPD assessment test (CAT) history  Date: 6/15/2021: 15  Date: 2021: 17    Vitamins/Supplements in setting of alcohol use:  Multivitamin: once daily  Magnesium oxide 400-500 mg: once daily in the morning (okay to take #2 250 mg tabs or #1 400 mg tab).      Zinc 50 m tab daily - taking for immunity  Thiamine 100 mg: once daily - recommended to start at hosp discharge, Patient didn't then recommended to start at 2021 MTM appointment. Patient use - states that he has not picked this up yet. Thought we were waiting on labs/ info from Dr. Boyd.  Still taking a Vit B12 - although was recommended to stop 2021 d/t elevated level.  Medication History: folate (stopped 2021 d/t normal level), ferrous sulfate (was on hosp discharge list but Patient is not taking).                 Today's Vitals: There were no vitals taken for this visit.  ----------------    I spent 9 minutes with this patient today. All changes were made via collaborative practice agreement with Shade Boyd. A copy of the visit note was provided to the patient's primary care provider.    The patient was mailed a summary of these recommendations. MTM Coord to mail.     Sara Cottrell PharmD  Medication Therapy Management (MTM) Pharmacist  [place] [place] [facility] ()  Clinic Phone: [phone]  Pager: [phone]    Telemedicine Visit Details  Type of service:  Telephone visit  Start Time: 1:07 pm  End Time: 1:16 pm  Originating Location (patient location): Home  Distant Location (provider location):  [facility]      Medication Therapy Recommendations  Takes dietary supplements    Current Medication: thiamine (B-1) 100 MG tablet   Rationale: Does not understand instructions - Adherence - Adherence    Recommendation: Provide Education   Status: Accepted per CPA   Note: edu provided about which supplements/vitamins to take

## 2021-07-20 ENCOUNTER — COMMUNICATION - RIVER FALLS (OUTPATIENT)
Dept: FAMILY MEDICINE | Facility: CLINIC | Age: 76
End: 2021-07-20
Payer: COMMERCIAL

## 2021-07-23 ENCOUNTER — AMBULATORY - RIVER FALLS (OUTPATIENT)
Dept: FAMILY MEDICINE | Facility: CLINIC | Age: 76
End: 2021-07-23
Payer: COMMERCIAL

## 2021-07-26 LAB
BUN SERPL-MCNC: 29 MG/DL (ref 7–25)
BUN/CREAT RATIO - HISTORICAL: 18 (ref 6–22)
CALCIUM SERPL-MCNC: 10 MG/DL (ref 8.6–10.3)
CALCIUM SERPL-MCNC: 10 MG/DL (ref 8.6–10.3)
CHLORIDE BLD-SCNC: 101 MMOL/L (ref 98–110)
CO2 SERPL-SCNC: 30 MMOL/L (ref 20–32)
CREAT SERPL-MCNC: 1.59 MG/DL (ref 0.7–1.18)
CREAT UR-MCNC: 86 MG/DL (ref 20–320)
EGFRCR SERPLBLD CKD-EPI 2021: 42 ML/MIN/1.73M2
GLUCOSE BLD-MCNC: 121 MG/DL (ref 65–99)
HGB BLD-MCNC: 14.6 GM/DL (ref 13.2–17.1)
MICROALBUMIN UR-MCNC: 58.7 MG/DL
MICROALBUMIN/CREAT UR: 683 MG/G{CREAT}
PHOSPHATE SERPL-MCNC: 3.5 MG/DL (ref 2.1–4.3)
POTASSIUM BLD-SCNC: 5 MMOL/L (ref 3.5–5.3)
PTH-INTACT SERPL-MCNC: 29 PG/ML (ref 14–64)
SODIUM SERPL-SCNC: 139 MMOL/L (ref 135–146)

## 2021-07-28 ENCOUNTER — COMMUNICATION - RIVER FALLS (OUTPATIENT)
Dept: FAMILY MEDICINE | Facility: CLINIC | Age: 76
End: 2021-07-28
Payer: COMMERCIAL

## 2021-07-29 ENCOUNTER — OFFICE VISIT - RIVER FALLS (OUTPATIENT)
Dept: FAMILY MEDICINE | Facility: CLINIC | Age: 76
End: 2021-07-29
Payer: COMMERCIAL

## 2021-07-29 ASSESSMENT — MIFFLIN-ST. JEOR: SCORE: 1676.55

## 2021-09-05 ENCOUNTER — HEALTH MAINTENANCE LETTER (OUTPATIENT)
Age: 76
End: 2021-09-05

## 2021-09-21 ENCOUNTER — AMBULATORY - RIVER FALLS (OUTPATIENT)
Dept: FAMILY MEDICINE | Facility: CLINIC | Age: 76
End: 2021-09-21
Payer: COMMERCIAL

## 2021-09-22 ENCOUNTER — COMMUNICATION - RIVER FALLS (OUTPATIENT)
Dept: FAMILY MEDICINE | Facility: CLINIC | Age: 76
End: 2021-09-22
Payer: COMMERCIAL

## 2021-09-22 LAB
HCT VFR BLD AUTO: 38 % (ref 38.5–50)
HGB BLD-MCNC: 13.7 GM/DL (ref 13.2–17.1)
VIT B12 SERPL-MCNC: 561 PG/ML (ref 200–1100)

## 2021-09-28 ENCOUNTER — OFFICE VISIT - RIVER FALLS (OUTPATIENT)
Dept: FAMILY MEDICINE | Facility: CLINIC | Age: 76
End: 2021-09-28
Payer: COMMERCIAL

## 2021-09-28 ENCOUNTER — OFFICE VISIT (OUTPATIENT)
Dept: PHARMACY | Facility: PHYSICIAN GROUP | Age: 76
End: 2021-09-28
Payer: COMMERCIAL

## 2021-09-28 VITALS
BODY MASS INDEX: 29.37 KG/M2 | SYSTOLIC BLOOD PRESSURE: 168 MMHG | HEIGHT: 71 IN | OXYGEN SATURATION: 96 % | WEIGHT: 209.8 LBS | HEART RATE: 65 BPM | DIASTOLIC BLOOD PRESSURE: 103 MMHG

## 2021-09-28 DIAGNOSIS — Z78.9 TAKES DIETARY SUPPLEMENTS: ICD-10-CM

## 2021-09-28 DIAGNOSIS — I25.10 CORONARY ARTERY DISEASE, ANGINA PRESENCE UNSPECIFIED, UNSPECIFIED VESSEL OR LESION TYPE, UNSPECIFIED WHETHER NATIVE OR TRANSPLANTED HEART: ICD-10-CM

## 2021-09-28 DIAGNOSIS — N18.9 CHRONIC KIDNEY DISEASE, UNSPECIFIED CKD STAGE: ICD-10-CM

## 2021-09-28 DIAGNOSIS — J44.9 CHRONIC OBSTRUCTIVE PULMONARY DISEASE, UNSPECIFIED COPD TYPE (H): Primary | ICD-10-CM

## 2021-09-28 DIAGNOSIS — Z78.9 ALCOHOL USE: ICD-10-CM

## 2021-09-28 DIAGNOSIS — I10 HYPERTENSION, UNSPECIFIED TYPE: ICD-10-CM

## 2021-09-28 DIAGNOSIS — R60.0 LOWER LEG EDEMA: ICD-10-CM

## 2021-09-28 DIAGNOSIS — R52 GENERALIZED PAIN: ICD-10-CM

## 2021-09-28 PROCEDURE — 99607 MTMS BY PHARM ADDL 15 MIN: CPT | Performed by: PHARMACIST

## 2021-09-28 PROCEDURE — 99606 MTMS BY PHARM EST 15 MIN: CPT | Performed by: PHARMACIST

## 2021-09-28 RX ORDER — FERROUS SULFATE 325(65) MG
325 TABLET ORAL EVERY OTHER DAY
COMMUNITY

## 2021-09-28 ASSESSMENT — MIFFLIN-ST. JEOR
SCORE: 1698.78
SCORE: 1698.78

## 2021-09-28 NOTE — PATIENT INSTRUCTIONS
Recommendations from today's MTM visit:                                                    MTM (medication therapy management) is a service provided by a clinical pharmacist designed to help you get the most of out of your medicines.      1. Start using the albuterol about 15 minutes before activity as needed. This can help to open up your lungs and decrease shortness of breath with activity.  2. Continue the ferrous sulfate tabs - take 1 tablet by mouth every other day.  3. If drinking alcohol, limit acetaminophen to 2000 mg/day. If not drinking alcohol, then can increase to 8102-1079 mg/day.  4. Recommend stopping ibuprofen due to risks on your stomach and kidneys, and the potential that it is increasing your blood pressure.  5. Refill Trelegy prescription before the end of the year.  6. Start checking your blood pressure at home about 3 days/week and report numbers back to clinic in 2 weeks.    Follow-up: call clinic with BP readings in 2 weeks.  MTM appointment  - 4 months, make an appointment at the .    It was great to speak with you today.  I value your experience and would be very thankful for your time with providing feedback on our clinic survey. You may receive a survey via email or text message in the next few days.     To schedule another MTM appointment, please call the clinic directly or you may call the MTM scheduling line at [phone] or toll-free at [phone].     My Clinical Pharmacist's contact information:                                                      Please feel free to contact me with any questions or concerns you have.      Sara Cottrell, PharmD  Medication Therapy Management (MTM) Pharmacist

## 2021-09-28 NOTE — PROGRESS NOTES
Medication Therapy Management (MTM) Encounter    ASSESSMENT:                            Medication Adherence/Access: See below for considerations    COPD: relatively stable. Recommend using MUKESH pre-activity to decrease shortness of breath with exertion.  Will send albuterol Rx to [facility] for free inhaler through Patient Assistance Program.    Vitamins/Supplements in setting of alcohol use: recommend continuing current vit/sup. Education provided again today about recommendation to decrease alcohol intake.   Despite okay to stop ferrous sulfate in the past, reasonable to continue today if Patient willing to take as his hgb/hct are improved compared to historical labs. Recommend taking iron every other day.    Pain: discussed risk / benefit of oral pain medications with his sole kidney, high alcohol intake, esophageal history. Recommend max 2000 mg acetaminophen with current alcohol use, but if Patient stopped drinking, okay to increase to 3000 to 4000 mg acetaminophen /day.  recommend against NSAID use completely.  With high alcohol intake, and age risks, caution other oral medications addressing pain - although did discuss options to consider: TCA (caution anticholinergic), SNRI such as duloxetine, gabapentin/pregabalin. Stressed the safety concerns with high alcohol intake and using these medications.   Will review with primary care provider.    Hypertension/CAD/CKD/ lower leg edema:  Blood pressure has been elevated past few months in clinic with 4/5 last readings over 140/90.  Primary care provider is out of clinic today and metoprolol is prescribed by cardiology, therefore will contact cardiology with blood pressure readings and consider metoprolol dose increase.  Recommend that Patient start checking home blood pressure and call clinic with readings in 2 weeks.  Also discussed that increased NSAID use is likely contributing to increased blood pressures.    PLAN:                            1. Start using the  albuterol about 15 minutes before activity as needed. This can help to open up your lungs and decrease shortness of breath with activity.  2. Continue the ferrous sulfate tabs - take 1 tablet by mouth every other day.  3. If drinking alcohol, limit acetaminophen to 2000 mg/day. If not drinking alcohol, then can increase to 3425-4459 mg/day.  4. recommend stopping ibuprofen due to risks on your stomach and kidneys.  5. Refill Trelegy prescription before the end of the year.  6. Start checking your blood pressure at home about 3 days/week and report numbers back to clinic in 2 weeks.    MTM called to cardiology, Dr. Valentine's office.  Left VM with Debra, nurse with Dr. Valentine today regarding higher blood pressures and consider dose increase of metoprolol succinate. Requested call back with recommendations and if changes, communicate to Patient directly.    MTM to send albuterol prescription through [facility] - will have primary care provider sign when he returns to office.    Follow-up: 4 months - make an appointment at the .    Addended on September 30, 2021  Patient called - see below.  I returned call and discussed; agree with recommendation. Added to med list. Recommend Patient take the amlodipine 2.5 mg at bedtime and check blood pressure over next 2 weeks and call to clinic with results. Patient also instructed to make follow-up appointment with cards/PCP.  Patient's home readings (from wrist cuff):  Yesterday: 125/72  Today: 164/84    <Add Addendum>  ---------------------  From: Elvis/Connie TORRES (Phone Messages Pool ([id number]_[facility]))   To: Ronit, Pharm D , Sara;     Sent: 9/30/2021 3:54:22 PM CDT  Subject: General Message   Call from pt at 1551 stating that Cardiologist Dr. Mckeon called him and said that his BP is high and he needs to start Amlodipine. Patient wanting to check in with ANNA MARIE and see if this is ok to take with the rest of his medications. He states that he was just seen by KB. Please  call pt back at [phone]    Addended on October 8, 2021  Mann from PCP - see below.  Call attempt to check on BP for patient and notify of communication below. No answer, left voicemail to return call to clinic with blood pressure results and make appointment with primary care provider.  ANNA MARIE    <Add Text>  ---------------------  From: Yong Boyd MD   To: Wiley Cottrell Kaity;     Sent: 10/5/2021 6:23:25 AM CDT  Subject: RE: MTM note route - response requested   Lots of not great options here.  For bp, I'm fine with amlodipine introduction.  I've been avoiding ARB/ACEi given his repeated LEILA hx  Agree with recs to stop NSAIDs for many, many reasons.  Could offer him voltaren.  Alternatively, I would discuss butrans patch or tramadol for him in the future.  ---------------------  From: Wiley Cottrell Kaity   To: Yong Boyd MD;     Sent: 9/30/2021 4:52:28 PM CDT  Subject: FW: MTM note route - response requested     FYI re: BP - cards started amlodipine 2.5 mg and recommended clinic visit with cards / PCP next available, so I entered RTC to see you for BP check.  ANNA MARIE  ---------------------  From: Wiley Cottrell Kaity   To: Yong Boyd MD;     Sent: 9/29/2021 10:52:05 AM CDT  Subject: MTM note route - response requested     I know you're out of office so will look for your response upon your return.  1. Mitchell's bp has been elevated the past 4/5 clinic readings. I recommended he check at home and called to Dr. Valentine's office bc you're out and Dr. Valentine is prescriber of his metoprolol. (note that he's been taking ibuprofen which may be contributing to worsened htn)  2. pain management - looking for your thoughts on this.  see my note subj and assessment for details. I'm hesitant to start medication therapy with concomitant alcohol intake.. but if we could replace alcohol with medication..  ANNA MARIE    Addended on October 8, 2021  Patient returned call.  States his blood pressure have been improving. Home  readings:  10/4: 133/89  10/5: 140/94  10/6: 132/90  10/7: 134/82    States that he's tried the voltaren gel for pain, and it helps temporarily. Has tried x1 application (bought OTC).   I recommended that he increase use to up to four times daily.     Patient planning to follow-up with [facility] - transferred to scheduling to make an appointment.    MTM will check in after primary care provider appointment.  KB    I recommend trying Diclofenac (voltaren) 1% topical gel: apply 2 gram(s) topically to upper extremities (max of 8 grams/joint/day) or 4 gram(s) topically to lower extremities (max of 16 grams/joint per day) up to 4 times daily as needed for pain. Max of 32 grams/day total.    SUBJECTIVE/OBJECTIVE:                          Lenny Chau is a 76 year old male coming in for a follow-up visit. He was referred to me from Shade Boyd.  Today's visit is a follow-up MTM visit from 7/8/2021.     Reason for visit: 2 month follow-up.    Allergies/ADRs: Reviewed in chart  Past Medical History: Reviewed in chart  Tobacco: He reports that he quit smoking about 11 years ago. He has a 100.00 pack-year smoking history. He has never used smokeless tobacco.  Alcohol: 2-3 drinks/day, each containing 1-2 oz of vodka. Using this for pain management.  Caffeine: 1-2 cups/day of coffee  Activity: no routine  Past Medical History: Reviewed in tish - history of prostate cancer, lung cancer, unilateral nephrectomy, oropharyngeal cancer.  Social: lives with wife.    Medication Adherence/Access:   Patient uses pill box(es), sets this up himself.  Patient takes medications 2 time(s) per day.   Per patient, misses medication 0 times per week, estimates maybe once/month.  Medication barriers: trouble affording inhalers - has [facility] Patient Assistance Program for 2021 year.  The patient fills medications at [facility]: NO, fills medications at [facility] in [place] in [place].    COPD:  Patient follows with [facility] pulmonology. Last appointment 6/2021- no med  changes.  History of R sided lung cancer x2.  Tobacco status: former smoker - quit 2009.  Current Medications:  Trelegy Ellipta (Fluticasone 200 mcg/Umeclidinium 62.5mcg- Vilanterol 25 mcg) Inhaler: 1 inhalation once daily. Rinsing mouth out after trelegy, history of oral thrush with advair.  Access through [employer] Patient Assistance Program through 12/31/2021. Reminded to refill before the end of the year.  Albuterol MDI. Patient use:  using the albuterol scheduled before the Trelegy in the AM, then as needed - finds that he often needs this with activity.  Interested in getting this through the [employer] Patient Assistance Program as well.  Has a spacer for the MDI, but does not use it.  Medication History: spiriva, advair (switched to Trelegy 5/2021)  Pt reports the following symptoms - increased shortness of breath with activity  COPD assessment test (CAT):  1. I never cough  0 - 1 - 2 - 3 - 4 - 5  I cough all the time = 1  2. I have no phlegm (mucus) in my chest  0 - 1 - 2 - 3 - 4 - 5  My chest is completely full of phlegm (mucus) = 1  3. My chest does not feel tight at all   0 - 1 - 2 - 3 - 4 - 5  my chest feels very tight = 3  4. When I walk up a hill or a flight of stairs I am not out of breath  0 - 1 - 2 - 3 - 4 - 5  when I walk up a hill or one flight of stairs I am completely out of breath = 5  5. I am not limited to doing any activities at home  0 - 1 - 2 - 3 - 4 - 5  I am very limited in doing all activities at home = 2  6. I am confident leaving my home despite my lung condition  0 - 1 - 2 - 3 - 4 - 5  I am not at all confident leaving my home because of my lung condition = 1  7. I sleep soundly  0 - 1 - 2 - 3 - 4 - 5  I do not sleep soundly because of medical condition = 2  8. I have lots of energy  0 - 1 - 2 - 3 - 4 - 5  I have no energy at all = 1  COPD assessment test (CAT) history  Date 9/28/2021 (TODAY): 16  Date: 6/15/2021: 15  Date: 1/19/2021: 17     Vitamins/Supplements in setting of  alcohol use:  States that he did make the vitamin /supplement changes recommended at last MT appointment.  Ferrous Sulfate 325 mg (65 FE): 2-3 times/week - he had restarted this on his own. He is planning to increase this d/t low hematocrit on his last labs, which he brings with to the appointment today.  Multivitamin: once daily  Magnesium oxide 250 m tabs once daily in the morning.  Zinc gluconate 50 m tab daily - taking for immunity  Vitamin D 5000 int units: once daily (states that sometimes he has 1000 international unit(s))  Thiamine (vit B1) 100 mg: once daily.  Medication History: folate (stopped 2021 d/t normal level), ferrous sulfate (was on hosp discharge list but Patient is not taking), vit B12  (stopped d/t high level)    Pain:  Pain is most bothersome in right hip; history of hip surgery, per Patient they repaired his ball/femus, not the full joint (socket).unfortunately did not fully resolve issue and pain has returned.  Pain is worse as the day goes on, every step he takes it worsens it. Achy pain at hip site. If pivot or twist in the evening, this worsens that pain. Lately has been having numbness/prickly feeling in his shin, no pain in knee.  Uses a cold pack in the evening when watching TV.  Tried physical therapy - did not feel that this was effective. Discussed with ortho provider who stated the only other option is surgery with full replacement, but Mitchell is not interested in this and he said the surgeon said they wouldn't necessarily recommend it either.  Therefore, Mitchell states that he's drinking alcohol to treat the pain.  Current Meds:  Acetaminophen 500 mg: takes 2 tabs at a time, usually later in the daytime, recommended max 2000 mg. Per Patient he is taking up to 6 tabs/day.  Ibuprofen 200 mg: using 1-2 tabs 5 nights/week.  Medication History: lower back pain about 10-12 years ago and took oxycodone, hydrocodone and gabapentin; had therapy for about 8 weeks and a minor surgery.  "Felt that the oxycodone was the most beneficial for this pain, although not sure if it was much more helpful than acetaminophen.    Hypertension/CAD/CKD/ lower leg edema :  Follows with Dr. Valentine. Last appointment with Dr. Valentine was 1/8/2021.  Follows with vascular clinic/Dr. David.  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 Thoracic AAA, history of abdominal AAA s/p endovascular repair.  PCI of RCA 11/2019 following NSTEMI; DAPT stopped 10/2020.  Current Meds:  Metoprolol succinate 25 mg: once daily  Nitroglycerin 0.4 mg sublingual tab: Place 1 tablet under the tongue at onset of chest pain.  May repeat x 3 doses q 5 min.  Call 911 after first dose if pain persists. Pt use: none recently; has not used in long time.  Medication History: clopidogrel/aspirin (per chart)  Avoiding ACEi/ARB due to CKD.  ECHO: 12/28/2020 EF: 55%  Patient does not self-monitor blood pressure. used to and has home blood pressure monitor cuff.  No chest pain or headaches today. Seldom has headaches.  BP Readings from Last 3 Encounters:   09/28/21 (!) 168/103   06/25/21 130/88   06/23/21 (!) 156/92     Pulse Readings from Last 3 Encounters:   09/28/21 65   06/25/21 76   06/23/21 88       Today's Vitals: BP (!) 168/103   Pulse 65   Ht 5' 11\" (1.803 m)   Wt 209 lb 12.8 oz (95.2 kg)   SpO2 96%   BMI 29.26 kg/m    ----------------    I spent 40 minutes with this patient today. All changes were made via collaborative practice agreement with Shade Boyd MD. Other changes recommended to cardiology. A copy of the visit note was provided to the patient's primary care provider.    The patient was given a summary of these recommendations.     Sara Cottrell, Sky  Medication Therapy Management (MTM) Pharmacist  [place] [place] [facility] (Tu/Th/Fr)  Clinic Phone: [phone]  Pager: [phone]     Medication Therapy Recommendations  Chronic obstructive pulmonary disease, unspecified COPD type (H) "    Current Medication: Fluticasone-Umeclidin-Vilant (TRELEGY ELLIPTA) 200-62.5-25 MCG/INH AEPB   Rationale: Cannot afford medication product - Cost - Adherence   Recommendation: Referral to Service    Status: Patient Agreed - Adherence/Education   Note: 6/15: pt will apply for [employer] PAP  7/13 update: ppk faxed to [facility]  9/29: pt received med through [employer] [facility]         COPD, group B, by GOLD 2017 classification (H)    Current Medication: albuterol (PROAIR HFA/PROVENTIL HFA/VENTOLIN HFA) 108 (90 Base) MCG/ACT inhaler   Rationale: Incorrect administration - Dosage too low - Effectiveness   Recommendation: Increase Frequency   Status: Patient Agreed - Adherence/Education          Current Medication: albuterol (PROAIR HFA/PROVENTIL HFA/VENTOLIN HFA) 108 (90 Base) MCG/ACT inhaler   Rationale: Cannot afford medication product - Cost - Adherence   Recommendation: Referral to Service    Status: Contact Provider - Awaiting Response   Note: will send Rx to [facility] for albuterol         Generalized pain    Current Medication: acetaminophen (TYLENOL) 500 MG tablet   Rationale: Dose too high - Dosage too high - Safety   Recommendation: Decrease Dose   Status: Patient Agreed - Adherence/Education          Rationale: Unsafe medication for the patient - Adverse medication event - Safety   Recommendation: Discontinue Medication   Status: Patient Agreed - Adherence/Education   Note: stop ibuprofen         Hypertension    Current Medication: metoprolol succinate ER (TOPROL-XL) 25 MG 24 hr tablet   Rationale: Dose too low - Dosage too low - Effectiveness   Recommendation: Increase Dose   Status: Contact Provider - Awaiting Response   Note: recommendation called to cards

## 2021-09-29 ENCOUNTER — COMMUNICATION - RIVER FALLS (OUTPATIENT)
Dept: FAMILY MEDICINE | Facility: CLINIC | Age: 76
End: 2021-09-29
Payer: COMMERCIAL

## 2021-09-30 RX ORDER — AMLODIPINE BESYLATE 2.5 MG/1
2.5 TABLET ORAL AT BEDTIME
COMMUNITY
Start: 2021-09-29 | End: 2021-10-28

## 2021-10-18 ENCOUNTER — HOSPITAL ENCOUNTER (OUTPATIENT)
Dept: CT IMAGING | Facility: CLINIC | Age: 76
Discharge: HOME | End: 2021-10-18
Attending: RADIOLOGY | Admitting: RADIOLOGY
Payer: MEDICARE

## 2021-10-18 DIAGNOSIS — C34.12 MALIGNANT NEOPLASM OF UPPER LOBE OF LEFT LUNG (H): ICD-10-CM

## 2021-10-18 LAB
CREAT BLD-MCNC: 1.6 MG/DL (ref 0.7–1.3)
GFR SERPL CREATININE-BSD FRML MDRD: 41 ML/MIN/1.73M2

## 2021-10-18 PROCEDURE — 82565 ASSAY OF CREATININE: CPT

## 2021-10-18 PROCEDURE — 71260 CT THORAX DX C+: CPT

## 2021-10-18 PROCEDURE — 250N000011 HC RX IP 250 OP 636: Performed by: RADIOLOGY

## 2021-10-18 RX ORDER — IOPAMIDOL 755 MG/ML
75 INJECTION, SOLUTION INTRAVASCULAR ONCE
Status: COMPLETED | OUTPATIENT
Start: 2021-10-18 | End: 2021-10-18

## 2021-10-18 RX ADMIN — IOPAMIDOL 75 ML: 755 INJECTION, SOLUTION INTRAVENOUS at 13:35

## 2021-10-20 ENCOUNTER — OFFICE VISIT (OUTPATIENT)
Dept: RADIATION ONCOLOGY | Facility: CLINIC | Age: 76
End: 2021-10-20
Attending: RADIOLOGY
Payer: COMMERCIAL

## 2021-10-20 VITALS
HEART RATE: 89 BPM | RESPIRATION RATE: 20 BRPM | TEMPERATURE: 97.8 F | SYSTOLIC BLOOD PRESSURE: 118 MMHG | OXYGEN SATURATION: 94 % | DIASTOLIC BLOOD PRESSURE: 65 MMHG

## 2021-10-20 DIAGNOSIS — C34.12 MALIGNANT NEOPLASM OF UPPER LOBE OF LEFT LUNG (H): Primary | ICD-10-CM

## 2021-10-20 PROCEDURE — 99214 OFFICE O/P EST MOD 30 MIN: CPT | Performed by: RADIOLOGY

## 2021-10-20 PROCEDURE — G0463 HOSPITAL OUTPT CLINIC VISIT: HCPCS

## 2021-10-20 NOTE — PROGRESS NOTES
"Patient here ambulatory with wheeled walker accompanied by daughter for follow up s/p radiation for his lung cancer.  Patient has some shortness of breath and cough with his COPD but feels this has not worsened.  CT scan done last week and here today for results.  Seen by Dr. Batista.  Plan return to clinic for follow up as directed by physician.    Oncology Rooming Note    October 20, 2021 2:18 PM   Lenny Chau is a 76 year old male who presents for:    Chief Complaint   Patient presents with     Oncology Clinic Visit     Follow up with Dr. Batista     Initial Vitals: /65 (BP Location: Right arm, Patient Position: Sitting, Cuff Size: Adult Regular)   Pulse 89   Temp 97.8  F (36.6  C) (Oral)   Resp 20   SpO2 94%  Estimated body mass index is 29.26 kg/m  as calculated from the following:    Height as of 9/28/21: 1.803 m (5' 11\").    Weight as of 9/28/21: 95.2 kg (209 lb 12.8 oz). There is no height or weight on file to calculate BSA.  Mild Pain (2) Comment: Data Unavailable   No LMP for male patient.  Allergies reviewed: Yes  Medications reviewed: Yes    Medications: Medication refills not needed today.  Pharmacy name entered into [employer]: [id number] [place] [address]    Clinical concerns: Right hip pain seeing orthopedic surgeon about this Dr. Batista was notified.      Anne-Marie Durbin RN            "

## 2021-10-20 NOTE — LETTER
10/20/2021         RE: Lenny Chau  [address]  [place] [address]        Dear Colleague,    Thank you for referring your patient, Lenny Chau, to the [facility] RADIATION ONCOLOGY [place]. Please see a copy of my visit note below.    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  The biopsy confirmed moderately differentiated adenocarcinoma.        TYPE OF RADIATION THERAPY ADMINISTERED: SBRT to the left upper lobe with a total dose of 5000 cGy in 5 treatments given from 3/2/2021-3/10/202.     INTERVAL SINCE COMPLETION OF RADIATION THERAPY: 7 months.      SUBJECTIVE:  Mr. Chau is a 76 y.o. male who is a chronic smoker and quit smoking since .  The patient had a complicated history of multiple cancer in the past as detailed as followin.  Low risk prostate cancer, clinical stage T1c N0 M0, recent grade 3+3 =6 and the last PSA was 10.4 in 2006.  The patient is currently on clinical observation with no therapy.  I do not have the most recent PSA at the time of evaluation.     2.  Floor of mouth cancer, status post surgical resection in 1994 with no adjuvant therapy.     3.  Left kidney hemangioma, status post left nephrectomy on 3/26/2009.     4.  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  Patient had local recurrence and is status post surgical resection on 9/11/2009.     5.  Non-small cell lung cancer involving right lung, stage I, status post right thoracotomy and excision of right upper lobe and right middle lobe lung tumor 11/5/2015 with no evidence of lymph node metastasis and no adjuvant therapy.  Patient lost to follow-up since 2016.     6.  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     Patient had two prior early stage right lung cancers that were apparently resected in their entirety and may have been synchronous primary lung cancers. It does not appear he had the appropriate recommended follow up at [facility]. The MELODY lesion was known to be present in 2016 and appears to be slightly larger now with significant FDG-avidity on the recent PET/CT. It is likely another primary lung cancer, although metastasis from previous lung cancers is possible.  The patient had a restaging PET CT scan on 8/31/2020.  The scan showed enlarging FDG avid left upper lobe mass measuring 2.1 x 2.5 cm with central solid component consistent with primary lung cancer without metastasis.  There was also a FDG avid lesion in the left buccal region consistent with squamous cell carcinoma without metastasis.  The patient had a surgical resection for his left buccal/lateral tongue squamous cell carcinoma 11/6/2020 by KERRY Renee with pathology showed squamous cell carcinoma with negative margin.  He was determined not a good candidate for lung surgery given his complicated medical history and health status.  He therefore received definitive radiation therapy using SBRT with a total dose of 5000 cGy in 5 treatments given from 3/2/2021-3/10/2021. The patient tolerated radiation therapy very well with minimal side effect.     The patient has been doing well since  completion of radiation therapy.  He denies any pain or discomfort related to the therapy at the time of evaluation.  The patient is here for routine post therapy office follow-up.    Medications were reviewed and are up to date on EPIC.    The following portions of the patient's history were reviewed and updated as appropriate: allergies, current medications, past family history, past medical history, past social history, past surgical history and problem list.    Review of Systems:      General  Constitutional  Constitutional (WDL): All constitutional elements are within defined limits  EENT  Eye Disorders  Eye Disorder (WDL): All eye disorder elements are within defined limits (wears glasses)  Ear Disorders  Ear Disorder (WDL): All ear disorder elements are within defined limits  Respiratory  Respiratory  Respiratory (WDL): Exceptions to WDL  Cough: Mild symptoms OR nonprescription intervention indicated  Dyspnea: Shortness of breath with minimal exertion OR limiting instrumental ADL  Cardiovascular  Cardiovascular  Cardiovascular (WDL): Exceptions to WDL  Edema: Yes  Edema Limbs: 5 - 10% inter-limb discrepancy in volume or circumference at point of greatest visible difference OR swelling or obscuration of anatomic architecture on close inspection (occasionally bilateral LE)  Gastrointestinal  Gastrointestinal  Gastrointestinal (WDL): Exceptions to WDL  Anorexia: Loss of appetite without alteration in eating habits (vodka every day)  Nausea: Loss of appetite without alteration in eating habits  Dysphagia: Symptomatic, able to eat regular diet (since H&N cancer treatment)  Dry Mouth: Symptomatic (e.g., dry or thick saliva) without significant dietary alteration OR unstimulated saliva flow greater than 0.2 mL/min  Musculoskeletal  Musculoskeletal and Connective Tissue Disorders  Musculoskeletal & Connective (WDL): Exceptions to WDL  Bone Pain: Mild pain (right hip seeing  orthopedic)  Integumentary  Integumentary  Integumentary (WDL): All integumentary elements are within defined limits  Neurological  Neurosensory  Neurosensory (WDL): Exceptions to WDL  Ataxia: Asymptomatic OR clinical or diagnostic observations only OR intervention not indicated (uses walker)  Peripheral Sensory Neuropathy: Asymptomatic (right shin)  Genitourinary/Reproductive  Genitourinary  Genitourinary (WDL): Exceptions to WDL  Urinary Frequency: Present (nocturia x4)  Lymphatic  Lymph System Disorders  Lymph (WDL): All lymph elements are within defined limits  Pain  Pain Score: Mild Pain (2)  AUA Assessment                                                              Accompanied by  Accompanied By: family (daughter, Georgette)    Objective:      PHYSICAL EXAMINATION:    /65 (BP Location: Right arm, Patient Position: Sitting, Cuff Size: Adult Regular)   Pulse 89   Temp 97.8  F (36.6  C) (Oral)   Resp 20   SpO2 94%     Gen: Alert, in NAD  Eyes: PERRL, EOMI, sclera anicteric  HENT     Head: NC/AT     Ears: No external auricular lesions     Nose/sinus: No rhinorrhea or epistaxis     Oropharynx: MMM, no visible oral lesions  Neck: Supple, full ROM, no LAD  Pulm: No wheezing, stridor or respiratory distress  CV: Well-perfused, no cyanosis, no pedal edema  Abdominal: BS+, soft, nontender, nondistended, no hepatomegaly  Back: No step-offs or pain to palpation along the thoracolumbar spine  Rectal: Deferred  : Deferred  Musculoskeletal: Normal muscle bulk and tone  Skin: Normal color and turgor  Neurologic: A/Ox3, CN II-XII intact, normal gait and station  Psychiatric: Appropriate mood and affect     Imaging: Imaging results 30 days: CT Chest w Contrast    Result Date: 10/18/2021  EXAM: CT CHEST W CONTRAST LOCATION: [facility] DATE/TIME: 10/18/2021 1:19 PM INDICATION: Follow-up left upper lobe lung cancer, post SBRT in 3/2021. Previous history of right upper and middle lobe  adenocarcinoma status post wedge resections. COMPARISON: CT 6/16/2021 and 8/31/2020 PET CT TECHNIQUE: CT chest with IV contrast. Multiplanar reformats were obtained. Dose reduction techniques were used. CONTRAST: 75 mL Isovue-370. FINDINGS: LUNGS AND PLEURA: Left upper lobe fiducial marker with previous spiculated left upper lobe mass obscured by new surrounding consolidative and groundglass opacity compatible with posttreatment radiation fibrosis. New 8 mm irregular solid nodular opacity image 113 series 4 suspicious for metastatic disease versus new primary lung cancer. Stable postop changes from right upper and middle lobe wedge resections. Biapical centrilobular and paraseptal emphysema redemonstrated. Central airways are patent. No pleural effusion MEDIASTINUM/AXILLAE: No mass/adenopathy nor pericardial effusion. The thoracic aorta and heart are normal in size. CORONARY ARTERY CALCIFICATION: Severe. UPPER ABDOMEN: Severe hepatic steatosis. Stable 17 mm cyst within the pancreatic tail compatible with IPMN. Previous left nephrectomy. MUSCULOSKELETAL: Old healed rib fracture deformities. No suspicious osseous lesions.     IMPRESSION: 1.  New consolidative and groundglass opacities around previous left upper lobe nodule compatible with posttreatment radiation fibrosis. 2.  New 8 mm irregular left upper lobe solid nodule which could represent either metastatic lesion or primary lung cancer. 3.  Stable 17 mm pancreatic cystic lesion likely representing IPMN. This could be followed up with CT in 2 years. REFERENCE: Revisions of international consensus Fukuoka guidelines for the management of IPMN of the pancreas. Pancreatology 2017;17(5):738-753. Largest cyst between 10-20 mm: CT or MRI/MRCP every 6 months for 1 year and then yearly for 2 years and then every 2 years if no change.        Impression     The patient is a 76-year-old gentleman with multiple history of cancer in the past and a new finding of left upper  lobe lung cancer.  The patient received stereotactic radiosurgery 7 months ago.    Assessment & Plan:     1.  I have personally reviewed his restaging CT scan and compared to the previous CT scan and radiation therapy field.  The findings of treatment area is consistent with post radiation therapy changes.  There is a new 8 mm small nodule in the left upper lobe right outside a treatment area could represent either metastatic lesion or primary lung cancer. The area is small to biopsy.  The patient is recommended to return to radiation oncology in 2 months for office follow-up and CT chest.  If the lesion continue to progress, we will consider staging PET and biopsy at that time.     2.  Continue follow-up with Dr. Alon Nunez, ENT for his head neck cancer as planned.     3.  Continue follow-up with Dr. Shade Boyd, PCP for other medical needs.        Face to face time  30 minutes with > 75% spent on consultation, education and coordination of care.        Mariana Batista MD, PhD  Department of Radiation Oncology   [facility]  Tel: [phone]  Page: [phone]    [facility]  [address] [place] [address]     [place] [id number]   [place] [address]    CC:  Patient Care Team:  Shade Boyd MD as PCP - General (Internal Medicine)  Mariana Batista MD as MD (Hematology & Oncology)  [facility] (Pharmacist)  Alon Nunez MD as Assigned Surgical Provider  Mariana Batista MD as Assigned Cancer Care Provider  Matt Magaña MD as Assigned Heart and Vascular Provider      Patient here ambulatory with wheeled walker accompanied by daughter for follow up s/p radiation for his lung cancer.  Patient has some shortness of breath and cough with his COPD but feels this has not worsened.  CT scan done last week and here today for results.  Seen by Dr. Batista.  Plan return to clinic for follow up as directed by physician.    Oncology Rooming Note    October 20,  "2021 2:18 PM   Lenny Chau is a 76 year old male who presents for:    Chief Complaint   Patient presents with     Oncology Clinic Visit     Follow up with Dr. Batista     Initial Vitals: /65 (BP Location: Right arm, Patient Position: Sitting, Cuff Size: Adult Regular)   Pulse 89   Temp 97.8  F (36.6  C) (Oral)   Resp 20   SpO2 94%  Estimated body mass index is 29.26 kg/m  as calculated from the following:    Height as of 9/28/21: 1.803 m (5' 11\").    Weight as of 9/28/21: 95.2 kg (209 lb 12.8 oz). There is no height or weight on file to calculate BSA.  Mild Pain (2) Comment: Data Unavailable   No LMP for male patient.  Allergies reviewed: Yes  Medications reviewed: Yes    Medications: Medication refills not needed today.  Pharmacy name entered into [employer]: [facility] [id number] [place] [address]    Clinical concerns: Right hip pain seeing orthopedic surgeon about this Dr. Batista was notified.      Anne-Marie Durbin RN                Again, thank you for allowing me to participate in the care of your patient.        Sincerely,        Mariana Batista MD    "

## 2021-10-20 NOTE — PROGRESS NOTES
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  The biopsy confirmed moderately differentiated adenocarcinoma.        TYPE OF RADIATION THERAPY ADMINISTERED: SBRT to the left upper lobe with a total dose of 5000 cGy in 5 treatments given from 3/2/2021-3/10/202.     INTERVAL SINCE COMPLETION OF RADIATION THERAPY: 7 months.      SUBJECTIVE:  Mr. Chau is a 76 y.o. male who is a chronic smoker and quit smoking since .  The patient had a complicated history of multiple cancer in the past as detailed as followin.  Low risk prostate cancer, clinical stage T1c N0 M0, recent grade 3+3 =6 and the last PSA was 10.4 in 2006.  The patient is currently on clinical observation with no therapy.  I do not have the most recent PSA at the time of evaluation.     2.  Floor of mouth cancer, status post surgical resection in 1994 with no adjuvant therapy.     3.  Left kidney hemangioma, status post left nephrectomy on 3/26/2009.     4.  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  Patient had local recurrence and is status post surgical resection on 2009.     5.  Non-small cell lung cancer involving right lung, stage I, status post right thoracotomy and excision of right upper lobe and right middle  lobe lung tumor 11/5/2015 with no evidence of lymph node metastasis and no adjuvant therapy.  Patient lost to follow-up since 2016.     6.  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     Patient had two prior early stage right lung cancers that were apparently resected in their entirety and may have been synchronous primary lung cancers. It does not appear he had the appropriate recommended follow up at [facility]. The MELODY lesion was known to be present in 2016 and appears to be slightly larger now with significant FDG-avidity on the recent PET/CT. It is likely another primary lung cancer, although metastasis from previous lung cancers is possible.  The patient had a restaging PET CT scan on 8/31/2020.  The scan showed enlarging FDG avid left upper lobe mass measuring 2.1 x 2.5 cm with central solid component consistent with primary lung cancer without metastasis.  There was also a FDG avid lesion in the left buccal region consistent with squamous cell carcinoma without metastasis.  The patient had a surgical resection for his left buccal/lateral tongue squamous cell carcinoma 11/6/2020 by KERRY Renee with pathology showed squamous cell carcinoma with negative margin.  He was determined not a good candidate for lung surgery given his complicated medical history and health status.  He therefore received definitive radiation therapy using SBRT with a total dose of 5000 cGy in 5 treatments given from 3/2/2021-3/10/2021. The patient tolerated radiation therapy very well with minimal side effect.     The patient has been doing well since completion of radiation therapy.  He denies any pain or discomfort related to the therapy at the time of evaluation.  The patient is here for routine post therapy office follow-up.    Medications were reviewed and are up to date on EPIC.    The following  portions of the patient's history were reviewed and updated as appropriate: allergies, current medications, past family history, past medical history, past social history, past surgical history and problem list.    Review of Systems:      General  Constitutional  Constitutional (WDL): All constitutional elements are within defined limits  EENT  Eye Disorders  Eye Disorder (WDL): All eye disorder elements are within defined limits (wears glasses)  Ear Disorders  Ear Disorder (WDL): All ear disorder elements are within defined limits  Respiratory  Respiratory  Respiratory (WDL): Exceptions to WDL  Cough: Mild symptoms OR nonprescription intervention indicated  Dyspnea: Shortness of breath with minimal exertion OR limiting instrumental ADL  Cardiovascular  Cardiovascular  Cardiovascular (WDL): Exceptions to WDL  Edema: Yes  Edema Limbs: 5 - 10% inter-limb discrepancy in volume or circumference at point of greatest visible difference OR swelling or obscuration of anatomic architecture on close inspection (occasionally bilateral LE)  Gastrointestinal  Gastrointestinal  Gastrointestinal (WDL): Exceptions to WDL  Anorexia: Loss of appetite without alteration in eating habits (vodka every day)  Nausea: Loss of appetite without alteration in eating habits  Dysphagia: Symptomatic, able to eat regular diet (since H&N cancer treatment)  Dry Mouth: Symptomatic (e.g., dry or thick saliva) without significant dietary alteration OR unstimulated saliva flow greater than 0.2 mL/min  Musculoskeletal  Musculoskeletal and Connective Tissue Disorders  Musculoskeletal & Connective (WDL): Exceptions to WDL  Bone Pain: Mild pain (right hip seeing orthopedic)  Integumentary  Integumentary  Integumentary (WDL): All integumentary elements are within defined limits  Neurological  Neurosensory  Neurosensory (WDL): Exceptions to WDL  Ataxia: Asymptomatic OR clinical or diagnostic observations only OR intervention not indicated (uses  walker)  Peripheral Sensory Neuropathy: Asymptomatic (right shin)  Genitourinary/Reproductive  Genitourinary  Genitourinary (WDL): Exceptions to WDL  Urinary Frequency: Present (nocturia x4)  Lymphatic  Lymph System Disorders  Lymph (WDL): All lymph elements are within defined limits  Pain  Pain Score: Mild Pain (2)  AUA Assessment                                                              Accompanied by  Accompanied By: family (daughter, Georgette)    Objective:      PHYSICAL EXAMINATION:    /65 (BP Location: Right arm, Patient Position: Sitting, Cuff Size: Adult Regular)   Pulse 89   Temp 97.8  F (36.6  C) (Oral)   Resp 20   SpO2 94%     Gen: Alert, in NAD  Eyes: PERRL, EOMI, sclera anicteric  HENT     Head: NC/AT     Ears: No external auricular lesions     Nose/sinus: No rhinorrhea or epistaxis     Oropharynx: MMM, no visible oral lesions  Neck: Supple, full ROM, no LAD  Pulm: No wheezing, stridor or respiratory distress  CV: Well-perfused, no cyanosis, no pedal edema  Abdominal: BS+, soft, nontender, nondistended, no hepatomegaly  Back: No step-offs or pain to palpation along the thoracolumbar spine  Rectal: Deferred  : Deferred  Musculoskeletal: Normal muscle bulk and tone  Skin: Normal color and turgor  Neurologic: A/Ox3, CN II-XII intact, normal gait and station  Psychiatric: Appropriate mood and affect     Imaging: Imaging results 30 days: CT Chest w Contrast    Result Date: 10/18/2021  EXAM: CT CHEST W CONTRAST LOCATION: [facility] DATE/TIME: 10/18/2021 1:19 PM INDICATION: Follow-up left upper lobe lung cancer, post SBRT in 3/2021. Previous history of right upper and middle lobe adenocarcinoma status post wedge resections. COMPARISON: CT 6/16/2021 and 8/31/2020 PET CT TECHNIQUE: CT chest with IV contrast. Multiplanar reformats were obtained. Dose reduction techniques were used. CONTRAST: 75 mL Isovue-370. FINDINGS: LUNGS AND PLEURA: Left upper lobe fiducial marker with  previous spiculated left upper lobe mass obscured by new surrounding consolidative and groundglass opacity compatible with posttreatment radiation fibrosis. New 8 mm irregular solid nodular opacity image 113 series 4 suspicious for metastatic disease versus new primary lung cancer. Stable postop changes from right upper and middle lobe wedge resections. Biapical centrilobular and paraseptal emphysema redemonstrated. Central airways are patent. No pleural effusion MEDIASTINUM/AXILLAE: No mass/adenopathy nor pericardial effusion. The thoracic aorta and heart are normal in size. CORONARY ARTERY CALCIFICATION: Severe. UPPER ABDOMEN: Severe hepatic steatosis. Stable 17 mm cyst within the pancreatic tail compatible with IPMN. Previous left nephrectomy. MUSCULOSKELETAL: Old healed rib fracture deformities. No suspicious osseous lesions.     IMPRESSION: 1.  New consolidative and groundglass opacities around previous left upper lobe nodule compatible with posttreatment radiation fibrosis. 2.  New 8 mm irregular left upper lobe solid nodule which could represent either metastatic lesion or primary lung cancer. 3.  Stable 17 mm pancreatic cystic lesion likely representing IPMN. This could be followed up with CT in 2 years. REFERENCE: Revisions of international consensus Fukuoka guidelines for the management of IPMN of the pancreas. Pancreatology 2017;17(5):738-753. Largest cyst between 10-20 mm: CT or MRI/MRCP every 6 months for 1 year and then yearly for 2 years and then every 2 years if no change.        Impression     The patient is a 76-year-old gentleman with multiple history of cancer in the past and a new finding of left upper lobe lung cancer.  The patient received stereotactic radiosurgery 7 months ago.    Assessment & Plan:     1.  I have personally reviewed his restaging CT scan and compared to the previous CT scan and radiation therapy field.  The findings of treatment area is consistent with post radiation therapy  changes.  There is a new 8 mm small nodule in the left upper lobe right outside a treatment area could represent either metastatic lesion or primary lung cancer. The area is small to biopsy.  The patient is recommended to return to radiation oncology in 2 months for office follow-up and CT chest.  If the lesion continue to progress, we will consider staging PET and biopsy at that time.     2.  Continue follow-up with Dr. Alon Nunez, ENT for his head neck cancer as planned.     3.  Continue follow-up with Dr. Shade Boyd, PCP for other medical needs.        Face to face time  30 minutes with > 75% spent on consultation, education and coordination of care.        Mariana Batista MD, PhD  Department of Radiation Oncology   [facility]  Tel: [phone]  Page: [phone]    [facility]  [address] [place] [address]     [address]   [place] [address]    CC:  Patient Care Team:  Shade Boyd MD as PCP - General (Internal Medicine)  Mariana Batista MD as MD (Hematology & Oncology)  [facility] as Pharmacist (Pharmacist)  Alon Nunez MD as Assigned Surgical Provider  Mariana Batista MD as Assigned Cancer Care Provider  Matt Magaña MD as Assigned Heart and Vascular Provider

## 2021-10-26 ENCOUNTER — OFFICE VISIT - RIVER FALLS (OUTPATIENT)
Dept: FAMILY MEDICINE | Facility: CLINIC | Age: 76
End: 2021-10-26
Payer: COMMERCIAL

## 2021-10-26 ASSESSMENT — MIFFLIN-ST. JEOR: SCORE: 1734.61

## 2021-10-28 ENCOUNTER — TELEPHONE (OUTPATIENT)
Dept: PHARMACY | Facility: PHYSICIAN GROUP | Age: 76
End: 2021-10-28

## 2021-10-28 NOTE — TELEPHONE ENCOUNTER
[facility] Outbound Call:    Reason for call:   Patient left letter in MTM box with home blood pressure readings and note about [employer] denial, stating he thinks he has to pay $147/month for Trelegy.    Home blood pressure readings are:  9/29: 7pm 125/72  9/30: 4:30 164/84  10/4: 5:40 133/89  10/5: 7:15 140/94  10/6: 6:30 132/90  10/7: 11:15 134/82  10/8: 6:00 123/75  10/10: 8:00 11/94  10/12: 7:20 136/81  10/14: 5:45 132/79  10/16: 7:30 133/83  10/20: 2:33 118/85  *note that since this time, Patient has had appointment with PCP and amlodipine was stopped d/t orthostatic hypotension.    I called [employer] Patient assistance program inquiring about Trelegy [facility] Patient Assistance Program access and [facility] confirmed that Patient is approved for Trelegy through 12/31/2021. However, the denial letter was for ventolin - as [facility] is removing this from their Patient Assistance Program.    Then I called and communicated with Patient informing him to request a Trelegy refill from [employer] before the end of the year.  Planning for MTM follow-up 1/2021 - RTC in place.    Sraa Cottrell PharmD  Medication Therapy Management (MTM) Pharmacist  [place] [place] [facility] (Tu/Th/Fr)  Clinic Phone: [phone]  Pager: [phone]

## 2021-11-01 ENCOUNTER — OFFICE VISIT - RIVER FALLS (OUTPATIENT)
Dept: FAMILY MEDICINE | Facility: CLINIC | Age: 76
End: 2021-11-01
Payer: COMMERCIAL

## 2021-11-01 ASSESSMENT — MIFFLIN-ST. JEOR: SCORE: 1726.9

## 2021-11-02 LAB
BUN SERPL-MCNC: 10 MG/DL (ref 7–25)
BUN/CREAT RATIO - HISTORICAL: 4 (ref 6–22)
CALCIUM SERPL-MCNC: 10.3 MG/DL (ref 8.6–10.3)
CHLORIDE BLD-SCNC: 91 MMOL/L (ref 98–110)
CO2 SERPL-SCNC: 29 MMOL/L (ref 20–32)
CREAT SERPL-MCNC: 2.49 MG/DL (ref 0.7–1.18)
EGFRCR SERPLBLD CKD-EPI 2021: 24 ML/MIN/1.73M2
GLUCOSE BLD-MCNC: 166 MG/DL (ref 65–99)
POTASSIUM BLD-SCNC: 4.6 MMOL/L (ref 3.5–5.3)
SODIUM SERPL-SCNC: 133 MMOL/L (ref 135–146)

## 2021-11-03 ENCOUNTER — COMMUNICATION - RIVER FALLS (OUTPATIENT)
Dept: FAMILY MEDICINE | Facility: CLINIC | Age: 76
End: 2021-11-03
Payer: COMMERCIAL

## 2021-11-08 ENCOUNTER — APPOINTMENT (OUTPATIENT)
Dept: ULTRASOUND IMAGING | Facility: CLINIC | Age: 76
DRG: 682 | End: 2021-11-08
Attending: EMERGENCY MEDICINE
Payer: MEDICARE

## 2021-11-08 ENCOUNTER — HOSPITAL ENCOUNTER (INPATIENT)
Facility: CLINIC | Age: 76
LOS: 15 days | Discharge: NURSING, REHAB OR LONG-TERM CARE | DRG: 682 | End: 2021-11-23
Attending: EMERGENCY MEDICINE | Admitting: FAMILY MEDICINE
Payer: MEDICARE

## 2021-11-08 ENCOUNTER — APPOINTMENT (OUTPATIENT)
Dept: NUCLEAR MEDICINE | Facility: CLINIC | Age: 76
DRG: 682 | End: 2021-11-08
Attending: EMERGENCY MEDICINE
Payer: MEDICARE

## 2021-11-08 ENCOUNTER — APPOINTMENT (OUTPATIENT)
Dept: RADIOLOGY | Facility: CLINIC | Age: 76
DRG: 682 | End: 2021-11-08
Attending: EMERGENCY MEDICINE
Payer: MEDICARE

## 2021-11-08 DIAGNOSIS — I95.1 ORTHOSTATIC HYPOTENSION: ICD-10-CM

## 2021-11-08 DIAGNOSIS — N18.30 ACUTE RENAL FAILURE SUPERIMPOSED ON STAGE 3 CHRONIC KIDNEY DISEASE, UNSPECIFIED ACUTE RENAL FAILURE TYPE, UNSPECIFIED WHETHER STAGE 3A OR 3B CKD (H): ICD-10-CM

## 2021-11-08 DIAGNOSIS — F32.A DEPRESSION, UNSPECIFIED DEPRESSION TYPE: ICD-10-CM

## 2021-11-08 DIAGNOSIS — R42 ORTHOSTATIC LIGHTHEADEDNESS: ICD-10-CM

## 2021-11-08 DIAGNOSIS — R09.89 SUSPECTED PULMONARY EMBOLISM: ICD-10-CM

## 2021-11-08 DIAGNOSIS — R60.0 BILATERAL LOWER EXTREMITY EDEMA: ICD-10-CM

## 2021-11-08 DIAGNOSIS — I10 HYPERTENSION: ICD-10-CM

## 2021-11-08 DIAGNOSIS — I49.9 CARDIAC ARRHYTHMIA: ICD-10-CM

## 2021-11-08 DIAGNOSIS — R17 ELEVATED BILIRUBIN: ICD-10-CM

## 2021-11-08 DIAGNOSIS — I25.118 CORONARY ARTERY DISEASE OF NATIVE HEART WITH STABLE ANGINA PECTORIS, UNSPECIFIED VESSEL OR LESION TYPE (H): Primary | ICD-10-CM

## 2021-11-08 DIAGNOSIS — N17.9 ACUTE RENAL FAILURE SUPERIMPOSED ON STAGE 3 CHRONIC KIDNEY DISEASE, UNSPECIFIED ACUTE RENAL FAILURE TYPE, UNSPECIFIED WHETHER STAGE 3A OR 3B CKD (H): ICD-10-CM

## 2021-11-08 DIAGNOSIS — F10.10 ALCOHOL ABUSE: ICD-10-CM

## 2021-11-08 LAB
ALBUMIN SERPL-MCNC: 2.4 G/DL (ref 3.5–5)
ALBUMIN UR-MCNC: 50 MG/DL
ALP SERPL-CCNC: 217 U/L (ref 45–120)
ALT SERPL W P-5'-P-CCNC: 45 U/L (ref 0–45)
ANION GAP SERPL CALCULATED.3IONS-SCNC: 13 MMOL/L (ref 5–18)
APPEARANCE UR: CLEAR
AST SERPL W P-5'-P-CCNC: 98 U/L (ref 0–40)
BASOPHILS # BLD AUTO: 0.2 10E3/UL (ref 0–0.2)
BASOPHILS NFR BLD AUTO: 2 %
BILIRUB DIRECT SERPL-MCNC: 1.1 MG/DL
BILIRUB SERPL-MCNC: 2.5 MG/DL (ref 0–1)
BILIRUB UR QL STRIP: ABNORMAL
BNP SERPL-MCNC: 99 PG/ML (ref 0–80)
BUN SERPL-MCNC: 11 MG/DL (ref 8–28)
CALCIUM SERPL-MCNC: 10.1 MG/DL (ref 8.5–10.5)
CHLORIDE BLD-SCNC: 95 MMOL/L (ref 98–107)
CO2 SERPL-SCNC: 26 MMOL/L (ref 22–31)
COLOR UR AUTO: YELLOW
CREAT SERPL-MCNC: 2.26 MG/DL (ref 0.7–1.3)
D DIMER PPP FEU-MCNC: 1.79 UG/ML FEU (ref 0–0.5)
EOSINOPHIL # BLD AUTO: 0.5 10E3/UL (ref 0–0.7)
EOSINOPHIL NFR BLD AUTO: 7 %
ERYTHROCYTE [DISTWIDTH] IN BLOOD BY AUTOMATED COUNT: 13.4 % (ref 10–15)
GFR SERPL CREATININE-BSD FRML MDRD: 27 ML/MIN/1.73M2
GLUCOSE BLD-MCNC: 139 MG/DL (ref 70–125)
GLUCOSE UR STRIP-MCNC: NEGATIVE MG/DL
HCT VFR BLD AUTO: 42.6 % (ref 40–53)
HGB BLD-MCNC: 14.1 G/DL (ref 13.3–17.7)
HGB UR QL STRIP: NEGATIVE
HOLD SPECIMEN: NORMAL
HOLD SPECIMEN: NORMAL
HYALINE CASTS: 49 /LPF
IMM GRANULOCYTES # BLD: 0.1 10E3/UL
IMM GRANULOCYTES NFR BLD: 2 %
INR PPP: 1.22 (ref 0.85–1.15)
KETONES UR STRIP-MCNC: 10 MG/DL
LACTATE SERPL-SCNC: 1.4 MMOL/L (ref 0.7–2)
LACTATE SERPL-SCNC: 2.3 MMOL/L (ref 0.7–2)
LACTATE SERPL-SCNC: 2.6 MMOL/L (ref 0.7–2)
LEUKOCYTE ESTERASE UR QL STRIP: ABNORMAL
LYMPHOCYTES # BLD AUTO: 1.5 10E3/UL (ref 0.8–5.3)
LYMPHOCYTES NFR BLD AUTO: 23 %
MAGNESIUM SERPL-MCNC: 1.9 MG/DL (ref 1.8–2.6)
MCH RBC QN AUTO: 36.2 PG (ref 26.5–33)
MCHC RBC AUTO-ENTMCNC: 33.1 G/DL (ref 31.5–36.5)
MCV RBC AUTO: 109 FL (ref 78–100)
MONOCYTES # BLD AUTO: 1.1 10E3/UL (ref 0–1.3)
MONOCYTES NFR BLD AUTO: 17 %
MUCOUS THREADS #/AREA URNS LPF: PRESENT /LPF
NEUTROPHILS # BLD AUTO: 3.3 10E3/UL (ref 1.6–8.3)
NEUTROPHILS NFR BLD AUTO: 49 %
NITRATE UR QL: NEGATIVE
NRBC # BLD AUTO: 0 10E3/UL
NRBC BLD AUTO-RTO: 0 /100
PH UR STRIP: 5.5 [PH] (ref 5–7)
PHOSPHATE SERPL-MCNC: 2 MG/DL (ref 2.5–4.5)
PLATELET # BLD AUTO: 246 10E3/UL (ref 150–450)
POTASSIUM BLD-SCNC: 4.1 MMOL/L (ref 3.5–5)
PROT SERPL-MCNC: 5.8 G/DL (ref 6–8)
RBC # BLD AUTO: 3.9 10E6/UL (ref 4.4–5.9)
RBC URINE: 3 /HPF
SARS-COV-2 RNA RESP QL NAA+PROBE: NEGATIVE
SODIUM SERPL-SCNC: 134 MMOL/L (ref 136–145)
SP GR UR STRIP: 1.02 (ref 1–1.03)
SQUAMOUS EPITHELIAL: 1 /HPF
TROPONIN I SERPL-MCNC: 0.06 NG/ML (ref 0–0.29)
TROPONIN I SERPL-MCNC: 0.07 NG/ML (ref 0–0.29)
TSH SERPL DL<=0.005 MIU/L-ACNC: 3.21 UIU/ML (ref 0.3–5)
UROBILINOGEN UR STRIP-MCNC: 3 MG/DL
WBC # BLD AUTO: 6.6 10E3/UL (ref 4–11)
WBC URINE: 5 /HPF

## 2021-11-08 PROCEDURE — 250N000011 HC RX IP 250 OP 636: Performed by: EMERGENCY MEDICINE

## 2021-11-08 PROCEDURE — 258N000003 HC RX IP 258 OP 636: Performed by: FAMILY MEDICINE

## 2021-11-08 PROCEDURE — 83605 ASSAY OF LACTIC ACID: CPT | Performed by: EMERGENCY MEDICINE

## 2021-11-08 PROCEDURE — 76705 ECHO EXAM OF ABDOMEN: CPT

## 2021-11-08 PROCEDURE — 343N000001 HC RX 343: Performed by: EMERGENCY MEDICINE

## 2021-11-08 PROCEDURE — 99223 1ST HOSP IP/OBS HIGH 75: CPT | Performed by: FAMILY MEDICINE

## 2021-11-08 PROCEDURE — 82746 ASSAY OF FOLIC ACID SERUM: CPT | Performed by: FAMILY MEDICINE

## 2021-11-08 PROCEDURE — C9803 HOPD COVID-19 SPEC COLLECT: HCPCS

## 2021-11-08 PROCEDURE — 83605 ASSAY OF LACTIC ACID: CPT | Performed by: FAMILY MEDICINE

## 2021-11-08 PROCEDURE — 81001 URINALYSIS AUTO W/SCOPE: CPT | Performed by: EMERGENCY MEDICINE

## 2021-11-08 PROCEDURE — 82248 BILIRUBIN DIRECT: CPT | Performed by: EMERGENCY MEDICINE

## 2021-11-08 PROCEDURE — 84100 ASSAY OF PHOSPHORUS: CPT | Performed by: FAMILY MEDICINE

## 2021-11-08 PROCEDURE — 85379 FIBRIN DEGRADATION QUANT: CPT | Performed by: EMERGENCY MEDICINE

## 2021-11-08 PROCEDURE — 84443 ASSAY THYROID STIM HORMONE: CPT | Performed by: FAMILY MEDICINE

## 2021-11-08 PROCEDURE — 250N000011 HC RX IP 250 OP 636: Performed by: FAMILY MEDICINE

## 2021-11-08 PROCEDURE — 93005 ELECTROCARDIOGRAM TRACING: CPT | Performed by: EMERGENCY MEDICINE

## 2021-11-08 PROCEDURE — 250N000013 HC RX MED GY IP 250 OP 250 PS 637: Performed by: FAMILY MEDICINE

## 2021-11-08 PROCEDURE — 258N000003 HC RX IP 258 OP 636: Performed by: EMERGENCY MEDICINE

## 2021-11-08 PROCEDURE — 210N000002 HC R&B HEART CARE

## 2021-11-08 PROCEDURE — 82310 ASSAY OF CALCIUM: CPT | Performed by: EMERGENCY MEDICINE

## 2021-11-08 PROCEDURE — 99285 EMERGENCY DEPT VISIT HI MDM: CPT | Mod: 25

## 2021-11-08 PROCEDURE — 82607 VITAMIN B-12: CPT | Performed by: FAMILY MEDICINE

## 2021-11-08 PROCEDURE — 85025 COMPLETE CBC W/AUTO DIFF WBC: CPT | Performed by: EMERGENCY MEDICINE

## 2021-11-08 PROCEDURE — 96361 HYDRATE IV INFUSION ADD-ON: CPT

## 2021-11-08 PROCEDURE — 83880 ASSAY OF NATRIURETIC PEPTIDE: CPT | Performed by: EMERGENCY MEDICINE

## 2021-11-08 PROCEDURE — 36415 COLL VENOUS BLD VENIPUNCTURE: CPT | Performed by: FAMILY MEDICINE

## 2021-11-08 PROCEDURE — 78580 LUNG PERFUSION IMAGING: CPT

## 2021-11-08 PROCEDURE — A9540 TC99M MAA: HCPCS | Performed by: EMERGENCY MEDICINE

## 2021-11-08 PROCEDURE — 87635 SARS-COV-2 COVID-19 AMP PRB: CPT | Performed by: FAMILY MEDICINE

## 2021-11-08 PROCEDURE — 93970 EXTREMITY STUDY: CPT

## 2021-11-08 PROCEDURE — 80053 COMPREHEN METABOLIC PANEL: CPT | Performed by: EMERGENCY MEDICINE

## 2021-11-08 PROCEDURE — 83735 ASSAY OF MAGNESIUM: CPT | Performed by: FAMILY MEDICINE

## 2021-11-08 PROCEDURE — 36415 COLL VENOUS BLD VENIPUNCTURE: CPT | Performed by: EMERGENCY MEDICINE

## 2021-11-08 PROCEDURE — 71046 X-RAY EXAM CHEST 2 VIEWS: CPT

## 2021-11-08 PROCEDURE — 87040 BLOOD CULTURE FOR BACTERIA: CPT | Performed by: EMERGENCY MEDICINE

## 2021-11-08 PROCEDURE — 85610 PROTHROMBIN TIME: CPT | Performed by: FAMILY MEDICINE

## 2021-11-08 PROCEDURE — 84484 ASSAY OF TROPONIN QUANT: CPT | Performed by: FAMILY MEDICINE

## 2021-11-08 PROCEDURE — 96374 THER/PROPH/DIAG INJ IV PUSH: CPT

## 2021-11-08 PROCEDURE — 84484 ASSAY OF TROPONIN QUANT: CPT | Performed by: EMERGENCY MEDICINE

## 2021-11-08 RX ORDER — HALOPERIDOL 5 MG/ML
2.5-5 INJECTION INTRAMUSCULAR EVERY 6 HOURS PRN
Status: DISCONTINUED | OUTPATIENT
Start: 2021-11-08 | End: 2021-11-11

## 2021-11-08 RX ORDER — FLUMAZENIL 0.1 MG/ML
0.2 INJECTION, SOLUTION INTRAVENOUS
Status: DISCONTINUED | OUTPATIENT
Start: 2021-11-08 | End: 2021-11-23 | Stop reason: HOSPADM

## 2021-11-08 RX ORDER — HEPARIN SODIUM 5000 [USP'U]/.5ML
5000 INJECTION, SOLUTION INTRAVENOUS; SUBCUTANEOUS EVERY 12 HOURS
Status: DISCONTINUED | OUTPATIENT
Start: 2021-11-08 | End: 2021-11-23 | Stop reason: HOSPADM

## 2021-11-08 RX ORDER — POLYETHYLENE GLYCOL 3350 17 G/17G
17 POWDER, FOR SOLUTION ORAL DAILY PRN
Status: DISCONTINUED | OUTPATIENT
Start: 2021-11-08 | End: 2021-11-23 | Stop reason: HOSPADM

## 2021-11-08 RX ORDER — LORAZEPAM 2 MG/ML
1-2 INJECTION INTRAMUSCULAR EVERY 30 MIN PRN
Status: DISCONTINUED | OUTPATIENT
Start: 2021-11-08 | End: 2021-11-11

## 2021-11-08 RX ORDER — ONDANSETRON 4 MG/1
4 TABLET, ORALLY DISINTEGRATING ORAL EVERY 6 HOURS PRN
Status: DISCONTINUED | OUTPATIENT
Start: 2021-11-08 | End: 2021-11-23 | Stop reason: HOSPADM

## 2021-11-08 RX ORDER — POLYETHYLENE GLYCOL 3350 17 G/17G
17 POWDER, FOR SOLUTION ORAL DAILY
Status: DISCONTINUED | OUTPATIENT
Start: 2021-11-09 | End: 2021-11-23 | Stop reason: HOSPADM

## 2021-11-08 RX ORDER — NITROGLYCERIN 0.4 MG/1
0.4 TABLET SUBLINGUAL EVERY 5 MIN PRN
Status: DISCONTINUED | OUTPATIENT
Start: 2021-11-08 | End: 2021-11-23 | Stop reason: HOSPADM

## 2021-11-08 RX ORDER — MULTIPLE VITAMINS W/ MINERALS TAB 9MG-400MCG
1 TAB ORAL DAILY
Status: DISCONTINUED | OUTPATIENT
Start: 2021-11-09 | End: 2021-11-23 | Stop reason: HOSPADM

## 2021-11-08 RX ORDER — AMOXICILLIN 250 MG
2 CAPSULE ORAL 2 TIMES DAILY PRN
Status: DISCONTINUED | OUTPATIENT
Start: 2021-11-08 | End: 2021-11-23 | Stop reason: HOSPADM

## 2021-11-08 RX ORDER — MULTIPLE VITAMINS W/ MINERALS TAB 9MG-400MCG
1 TAB ORAL DAILY
COMMUNITY

## 2021-11-08 RX ORDER — ONDANSETRON 2 MG/ML
4 INJECTION INTRAMUSCULAR; INTRAVENOUS ONCE
Status: COMPLETED | OUTPATIENT
Start: 2021-11-08 | End: 2021-11-08

## 2021-11-08 RX ORDER — PANTOPRAZOLE SODIUM 20 MG/1
40 TABLET, DELAYED RELEASE ORAL
Status: DISCONTINUED | OUTPATIENT
Start: 2021-11-08 | End: 2021-11-23 | Stop reason: HOSPADM

## 2021-11-08 RX ORDER — SODIUM CHLORIDE, SODIUM LACTATE, POTASSIUM CHLORIDE, CALCIUM CHLORIDE 600; 310; 30; 20 MG/100ML; MG/100ML; MG/100ML; MG/100ML
INJECTION, SOLUTION INTRAVENOUS CONTINUOUS
Status: DISCONTINUED | OUTPATIENT
Start: 2021-11-08 | End: 2021-11-09

## 2021-11-08 RX ORDER — ALBUTEROL SULFATE 90 UG/1
2 AEROSOL, METERED RESPIRATORY (INHALATION) EVERY 6 HOURS PRN
Status: DISCONTINUED | OUTPATIENT
Start: 2021-11-08 | End: 2021-11-23 | Stop reason: HOSPADM

## 2021-11-08 RX ORDER — POLYETHYLENE GLYCOL 3350 17 G/17G
17 POWDER, FOR SOLUTION ORAL DAILY
Status: DISCONTINUED | OUTPATIENT
Start: 2021-11-09 | End: 2021-11-08

## 2021-11-08 RX ORDER — ASPIRIN 81 MG/1
81 TABLET ORAL AT BEDTIME
Status: DISCONTINUED | OUTPATIENT
Start: 2021-11-08 | End: 2021-11-23 | Stop reason: HOSPADM

## 2021-11-08 RX ORDER — GABAPENTIN 100 MG/1
100 CAPSULE ORAL 3 TIMES DAILY
Status: DISCONTINUED | OUTPATIENT
Start: 2021-11-08 | End: 2021-11-11

## 2021-11-08 RX ORDER — AMOXICILLIN 250 MG
1 CAPSULE ORAL 2 TIMES DAILY PRN
Status: DISCONTINUED | OUTPATIENT
Start: 2021-11-08 | End: 2021-11-23 | Stop reason: HOSPADM

## 2021-11-08 RX ORDER — CLONIDINE HYDROCHLORIDE 0.1 MG/1
0.1 TABLET ORAL EVERY 8 HOURS
Status: DISCONTINUED | OUTPATIENT
Start: 2021-11-08 | End: 2021-11-10

## 2021-11-08 RX ORDER — MAGNESIUM OXIDE 400 MG/1
400 TABLET ORAL AT BEDTIME
Status: DISCONTINUED | OUTPATIENT
Start: 2021-11-08 | End: 2021-11-23 | Stop reason: HOSPADM

## 2021-11-08 RX ORDER — ONDANSETRON 2 MG/ML
4 INJECTION INTRAMUSCULAR; INTRAVENOUS EVERY 6 HOURS PRN
Status: DISCONTINUED | OUTPATIENT
Start: 2021-11-08 | End: 2021-11-23 | Stop reason: HOSPADM

## 2021-11-08 RX ORDER — OLANZAPINE 5 MG/1
5-10 TABLET, ORALLY DISINTEGRATING ORAL EVERY 6 HOURS PRN
Status: DISCONTINUED | OUTPATIENT
Start: 2021-11-08 | End: 2021-11-11

## 2021-11-08 RX ORDER — LIDOCAINE 40 MG/G
CREAM TOPICAL
Status: DISCONTINUED | OUTPATIENT
Start: 2021-11-08 | End: 2021-11-23 | Stop reason: HOSPADM

## 2021-11-08 RX ORDER — ZINC GLUCONATE 50 MG
50 TABLET ORAL DAILY
Status: DISCONTINUED | OUTPATIENT
Start: 2021-11-09 | End: 2021-11-23 | Stop reason: HOSPADM

## 2021-11-08 RX ORDER — LORAZEPAM 1 MG/1
1-2 TABLET ORAL EVERY 30 MIN PRN
Status: DISCONTINUED | OUTPATIENT
Start: 2021-11-08 | End: 2021-11-11

## 2021-11-08 RX ADMIN — GABAPENTIN 100 MG: 100 CAPSULE ORAL at 20:52

## 2021-11-08 RX ADMIN — ASPIRIN 81 MG: 81 TABLET, COATED ORAL at 20:52

## 2021-11-08 RX ADMIN — HEPARIN SODIUM 5000 UNITS: 5000 INJECTION, SOLUTION INTRAVENOUS; SUBCUTANEOUS at 21:19

## 2021-11-08 RX ADMIN — CLONIDINE HYDROCHLORIDE 0.1 MG: 0.1 TABLET ORAL at 20:52

## 2021-11-08 RX ADMIN — KIT FOR THE PREPARATION OF TECHNETIUM TC 99M ALBUMIN AGGREGATED 8 MILLICURIE: 2.5 INJECTION, POWDER, FOR SOLUTION INTRAVENOUS at 15:10

## 2021-11-08 RX ADMIN — ONDANSETRON 4 MG: 2 INJECTION INTRAMUSCULAR; INTRAVENOUS at 13:54

## 2021-11-08 RX ADMIN — PANTOPRAZOLE SODIUM 40 MG: 20 TABLET, DELAYED RELEASE ORAL at 20:52

## 2021-11-08 RX ADMIN — MAGNESIUM OXIDE TAB 400 MG (241.3 MG ELEMENTAL MG) 400 MG: 400 (241.3 MG) TAB at 21:17

## 2021-11-08 RX ADMIN — SODIUM CHLORIDE, POTASSIUM CHLORIDE, SODIUM LACTATE AND CALCIUM CHLORIDE: 600; 310; 30; 20 INJECTION, SOLUTION INTRAVENOUS at 20:46

## 2021-11-08 RX ADMIN — SODIUM CHLORIDE 1000 ML: 9 INJECTION, SOLUTION INTRAVENOUS at 17:04

## 2021-11-08 RX ADMIN — SODIUM CHLORIDE 1000 ML: 9 INJECTION, SOLUTION INTRAVENOUS at 11:41

## 2021-11-08 ASSESSMENT — ACTIVITIES OF DAILY LIVING (ADL)
ADLS_ACUITY_SCORE: 8
ADLS_ACUITY_SCORE: 8
ADLS_ACUITY_SCORE: 10
ADLS_ACUITY_SCORE: 10

## 2021-11-08 ASSESSMENT — MIFFLIN-ST. JEOR: SCORE: 1654.32

## 2021-11-08 NOTE — ED NOTES
Per EDT pt is vomiting and his daughter reported to her that he is a daily drinker.  MD notified of emesis and alcohol use history and pt given zofran.  Pt reports he drinks about a liter of vodka a day and last drank this morning about 0800.

## 2021-11-08 NOTE — ED NOTES
Hourly rounding completed on patient.  Updated on plan of care.  Will continue to monitor.  Call light in reach.    Pt complains of buttocks pain due to the bed and given a pillow to help prop him to his left side.  Pt updated waiting for d-dimer lab test and that CT scan was cancelled due to kidney function.  Daughter at bedside.

## 2021-11-08 NOTE — ED PROVIDER NOTES
EMERGENCY DEPARTMENT ENCOUNTER      NAME: Lenny Chau  AGE: 76 year old male  YOB: 1945  MRN: [id number]  EVALUATION DATE & TIME: No admission date for patient encounter.    PCP: Shade Boyd    ED PROVIDER: Cary Schaefer MD    Chief Complaint   Patient presents with     Hypotension     Leg Swelling         FINAL IMPRESSION:  1. Orthostatic lightheadedness    2. Orthostatic hypotension          ED COURSE & MEDICAL DECISION MAKING:    Pertinent Labs & Imaging studies reviewed. (See chart for details)  76 year old male with history of CAD, CHF, head neck prostate lung cancer, CKD, HTN HLD who presents to the Emergency Department for evaluation of orthostatic hypotension lightheadedness dizziness upon standing and edema of his bilateral legs.  His edema started after taking Norvasc, which has since been discontinued.  I strongly suspect that the edema is related to medication side effect.  Patient hypotensive initially concern for dehydration, electrolyte abnormality, symptomatic anemia, arrhythmia.  My concern for bilateral DVT/PE is low.  He has mild erythema of the legs but this does not look frankly cellulitic nor has he had fevers or chills for sepsis.    Patient placed on monitor, IV established and blood obtained.  Given 1 L normal saline bolus.  Had prompt improvement of blood pressure with this.  CBC unremarkable.  BMP notable for creatinine of 2.26 up from 1.8 just recently on 10/26.  LFTs minimally elevated with T bili of 2.5, alk phos 217 (both up slightly from previous)  and AST 98 (down slightly from previous).  Lactate slightly elevated at 2.6.  Troponin unremarkable.  BNP 99.  D-dimer very low at 1.79.  Renal function is poor and he has solitary kidney so will obtain VQ scan.  Results of this are pending at time of dictation.  Urinalysis and repeat lactate also pending at time of dictation.  Patient signed out to oncoming physician awaiting results of the above. If above are  unremarkable and able to ambulate without issues, would be okay with discharge to home and discontinue metoprolol given this has been ongoing issue for weeks. Close pcp f/u for BP check and repeat renal function.      ED Course as of 11/08/21 1418   Mon Nov 08, 2021   1120 I met with the patient to gather history and to perform my initial exam. I discussed the plan for care while in the Emergency Department. PPE (gloves, eye protection, and surgical mask) was worn by me during patient encounters while patient wore mask.    1151 Lactic Acid(!): 2.6   1151 BP: 118/74   1204 Creatinine(!): 2.26  Up from 1.8, just recently from [place] 10/26   1205 Hepatic function panel(!)  Similar to prev   1250 D-Dimer Quantitative(!): 1.79   1329 Patient feels improved. Daughter is at bedside.   1359 Episode of vomiting. Notes heavy etoh use. Drinks 1L vodka usually daily, last this AM          At the conclusion of the encounter I discussed the results of all of the tests and the disposition. The questions were answered. The patient or family acknowledged understanding and was agreeable with the care plan.      MEDICATIONS GIVEN IN THE EMERGENCY:  Medications   sodium chloride (PF) 0.9% PF flush 3 mL (has no administration in time range)   sodium chloride (PF) 0.9% PF flush 3 mL (has no administration in time range)   0.9% sodium chloride BOLUS (0 mLs Intravenous Stopped 11/8/21 1355)   ondansetron (ZOFRAN) injection 4 mg (4 mg Intravenous Given 11/8/21 1354)       NEW PRESCRIPTIONS STARTED AT TODAY'S ER VISIT  New Prescriptions    No medications on file          =================================================================    HPI    Patient information was obtained from: Patient    Use of : N/A        Lenny Chau is a 76 year old male with pertinent medical history of CHF, CAD s/p stent, lung cancer, CKD3, s/p left nephrectomy, who presents for evaluation of hypotension.    Per chart review, patient was seen in ED  "on 11/26  for orthostatic hypotension, weakness, fatigue, lower extremity swelling, and being unstable on hs feet for several days after starting amlodipine. Slightly elevated creatinine at 1.8 and slightly low sodium. No signs of heart failure. Patient improved with IV fluids. He had slightly elevated troponin at 0.066 but did not want to stay in the hospital and denied chest pain. Troponin appears to chronically run at 0.066. Hemoglobin was normal.    Patient reports 4 days of hypotension with associated lightheadedness and dizziness with standing. At home he reports blood pressures blow 100 systolic. He notes 1 week of swelling and redness to bilateral lower extremities and notes pain and tingling to bilateral lower legs described as \"walking on needles\". Denies black or bloody stool. No chest pain or shortness of breath. Denies open sores or wounds.    Metoprolol was decreased from 52mg to 12.5mg 4 days ago. He was recently placed on amlodipine but discontinued use after 4 days due to orthostatic hypotension. Patient has not taken amlodipine for over one week but swelling has persisted. Patient denies a history of DM, blood clots, or a bleeding disorder. Denies prescribed diuretics.      No additional medical concerns or complaints at this time.      REVIEW OF SYSTEMS  Constitutional:  Denies fever, chills, weight loss or weakness  Eyes:  No pain, discharge, redness  HENT:  Denies sore throat, ear pain, congestion  Respiratory: No SOB, wheeze or cough  Cardiovascular:  No CP, palpitations  GI:  Denies abdominal pain, nausea, vomiting, diarrhea, melena, hematochezia  : Denies dysuria, denies hematuria  Musculoskeletal: Positive for pain and swelling to bilateral lower extremities. Denies any new joint pain, or loss of function.  Skin: Positive for redness to bilateral lower legs. Denies rash, pallor, open sores or wounds  Neurologic: Positive for lightheadedness and dizziness. Denies headache, focal weakness or " sensory changes  Lymph: Denies swollen nodes    All other systems negative unless noted in HPI.      PAST MEDICAL HISTORY:  Past Medical History:   Diagnosis Date     AAA (abdominal aortic aneurysm) (H)      Anemia      [place] esophagus      Cancer (H)     lung     CHF (congestive heart failure) (H)      Chronic kidney disease      COPD, group B, by GOLD 2017 classification (H)      Coronary artery disease      Coronary artery disease of native heart with stable angina pectoris, unspecified vessel or lesion type (H)      Degenerative joint disease      Head and neck cancer (H)      History of transfusion      Hyperlipidemia      Hypertension      Lung cancer (H)     s/p RUL RML wedge resection in 2016 by Dr. Carter Velazquez     Lung mass     MELODY FDG-avid 2.5cm     Myocardial infarction (H)     November 2019     Oral cancer (H)      Prostate cancer (H)      Shortness of breath     with exertion       PAST SURGICAL HISTORY:  Past Surgical History:   Procedure Laterality Date     ABDOMINAL AORTIC ANEURYSM REPAIR       CORONARY STENT PLACEMENT  12/2019    x1     CT BIOPSY LUNG  1/21/2021     ESOPHAGOSCOPY, GASTROSCOPY, DUODENOSCOPY (EGD), COMBINED N/A 07/26/2019    Procedure: ENDOSCOPIC ULTRASOUND FINE NEEDLE ASPIRATION, GASTRIC BIOPSIES OF POLYPS;  Surgeon: Quoc Edwards MD;  [place] [facility] [place]  Service: [place]enterology     EXCISE LESION INTRAORAL Left 11/6/2020    Procedure: 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 Need pathology for margins;  Surgeon: Alon Nunez MD;  Location: [facility];  Service: ENT     NEPHRECTOMY Left     for benign hemangioma     OTHER SURGICAL HISTORY Right 2015    wedge resectionLung cancer isolated pulmonary nodule     OTHER SURGICAL HISTORY      placement of stentfor AAA     OTHER SURGICAL HISTORY      right hip surgeryfor fracture     OTHER SURGICAL HISTORY  1994    Oral cancer resection     IA ESOPHAGOGASTRODUODENOSCOPY US SCOPE W/ADJ  STRXRS N/A 11/13/2020    Procedure: ESOPHAGOGASTRODUODENOSCOPY,  ENDOSCOPIC ULTRASOUND;  Surgeon: Quoc Edwards MD;  [place] [facility] [place];  Service: Gastroenterology       CURRENT MEDICATIONS:    Prior to Admission Medications   Prescriptions Last Dose Informant Patient Reported? Taking?   Fluticasone-Umeclidin-Vilant (TRELEGY ELLIPTA) 200-62.5-25 MCG/INH AEPB   Yes No   Sig: Inhale 1 puff into the lungs daily    MAGNESIUM OXIDE PO   Yes No   Sig: Take 500 mg by mouth daily Taking #2 250 mg tabs once daily   Multiple Vitamins-Minerals (ONCOVITE) TABS   Yes No   Sig: Take 1 tablet by mouth daily   Vitamin D3 (CHOLECALCIFEROL) 125 MCG (5000 UT) tablet   Yes No   Sig: Take 1 tablet by mouth daily    acetaminophen (TYLENOL) 500 MG tablet   Yes No   Sig: Take 500-1,000 mg by mouth every 6 hours as needed for mild pain Not to exceed 2000 mg/day   albuterol (PROAIR HFA/PROVENTIL HFA/VENTOLIN HFA) 108 (90 Base) MCG/ACT inhaler   Yes No   Sig: Inhale 2 puffs into the lungs every 6 hours as needed   aspirin (ASA) 81 MG EC tablet   Yes No   Sig: Take 81 mg by mouth daily   diclofenac (VOLTAREN) 1 % topical gel   Yes No   Sig: apply 2 gram(s) topically to upper extremities (max of 8 grams/joint/day) or 4 gram(s) topically to lower extremities (max of 16 grams/joint per day) up to 4 times daily as needed for pain. Max of 32 grams/day total.   ferrous sulfate (FEROSUL) 325 (65 Fe) MG tablet   Yes No   Sig: Take 325 mg by mouth every other day With breakfast   metoprolol succinate ER (TOPROL-XL) 25 MG 24 hr tablet   Yes No   Sig: Take 1 tablet by mouth daily   nitroGLYcerin (NITROSTAT) 0.4 MG sublingual tablet   Yes No   Sig: Place 0.4 mg under the tongue Place 1 tablet under the tongue at onset of chest pain.  May repeat x 3 doses q 5 min.  Call 911 after first dose if pain persists.   omeprazole (PRILOSEC) 20 MG DR capsule   Yes No   Sig: Take 20 mg by mouth daily Before a meal   polyethylene glycol (MIRALAX) 17  "GM/Dose powder   Yes No   Sig: Take 17 g by mouth daily As needed   thiamine (B-1) 100 MG tablet   Yes No   Sig: Take 100 mg by mouth daily   zinc gluconate 50 MG tablet   Yes No   Sig: Take 1 tablet by mouth daily      Facility-Administered Medications: None       ALLERGIES:  No Known Allergies    FAMILY HISTORY:  Family History   Problem Relation Age of Onset     Cancer Sister      Kidney Disease Maternal Aunt        SOCIAL HISTORY:  Social History     Tobacco Use     Smoking status: Former Smoker     Packs/day: 2.00     Years: 50.00     Pack years: 100.00     Quit date: 2009     Years since quittin.8     Smokeless tobacco: Never Used   Substance Use Topics     Alcohol use: Yes     Alcohol/week: 14.0 standard drinks     Drug use: Never        VITALS:  Patient Vitals for the past 24 hrs:   BP Temp Temp src Pulse Resp SpO2 Height Weight   21 1415 110/63 -- -- 91 22 94 % -- --   21 1345 111/61 -- -- 98 24 96 % -- --   21 1300 121/72 -- -- 98 24 99 % -- --   21 1230 129/72 -- -- 91 18 99 % -- --   21 1145 118/74 -- -- 84 25 98 % -- --   21 1120 (!) 64/48 97.7  F (36.5  C) Oral 83 24 97 % 1.803 m (5' 11\") 90.7 kg (200 lb)       PHYSICAL EXAM    General Appearance: Well-appearing, well-nourished, no acute distress. Cantankerous. Generally has poor color.  Head:  Normocephalic, atraumatic  Eyes:  PERRL, conjunctiva/corneas clear, EOM's intact  ENT:  Lips, mucosa, and tongue normal; membranes are moist without pallor  Neck:  Supple, symmetrical, trachea midline, no adenopathy  Chest:  No tenderness or deformity, no crepitus  Cardio: Hypotensive with 60s/40s. Not reflexively tachycardic. Regular rate and rhythm, no murmur/gallop/rub, 2+ pulses symmetric in all extremities  Pulm:  No respiratory distress, clear to auscultation bilaterally  Back:  No midline tenderness to palpation, no paraspinal tenderness, No CVA tenderness, normal ROM  Abdomen:  Soft, non-tender, non " distended,no rebound or guarding.  Extremities: From mid-shin distally bilaterally mild erythema with 2+ edema. No open sores. Chronic venous stasis skin changes. Extremities normal, atraumatic, no cyanosis or edema, full ROM and motor tone intact, bilateral pulses intact upper and lower  Skin:  Skin warm, dry, no rashes  Neuro:  Alert and oriented ×3, moving all extremities, no gross sensory defects     RADIOLOGY/LABS:  Reviewed all pertinent imaging. Please see official radiology report. All pertinent labs reviewed and interpreted.    Results for orders placed or performed during the hospital encounter of 11/08/21   Lactic acid whole blood   Result Value Ref Range    Lactic Acid 2.6 (H) 0.7 - 2.0 mmol/L   Basic metabolic panel   Result Value Ref Range    Sodium 134 (L) 136 - 145 mmol/L    Potassium 4.1 3.5 - 5.0 mmol/L    Chloride 95 (L) 98 - 107 mmol/L    Carbon Dioxide (CO2) 26 22 - 31 mmol/L    Anion Gap 13 5 - 18 mmol/L    Urea Nitrogen 11 8 - 28 mg/dL    Creatinine 2.26 (H) 0.70 - 1.30 mg/dL    Calcium 10.1 8.5 - 10.5 mg/dL    Glucose 139 (H) 70 - 125 mg/dL    GFR Estimate 27 (L) >60 mL/min/1.73m2   Hepatic function panel   Result Value Ref Range    Bilirubin Total 2.5 (H) 0.0 - 1.0 mg/dL    Bilirubin Direct 1.1 (H) <=0.5 mg/dL    Protein Total 5.8 (L) 6.0 - 8.0 g/dL    Albumin 2.4 (L) 3.5 - 5.0 g/dL    Alkaline Phosphatase 217 (H) 45 - 120 U/L    AST 98 (H) 0 - 40 U/L    ALT 45 0 - 45 U/L   Troponin I (now)   Result Value Ref Range    Troponin I 0.06 0.00 - 0.29 ng/mL   B-Type Natriuretic Peptide ([facility] Only)   Result Value Ref Range    BNP 99 (H) 0 - 80 pg/mL   CBC with platelets and differential   Result Value Ref Range    WBC Count 6.6 4.0 - 11.0 10e3/uL    RBC Count 3.90 (L) 4.40 - 5.90 10e6/uL    Hemoglobin 14.1 13.3 - 17.7 g/dL    Hematocrit 42.6 40.0 - 53.0 %     (H) 78 - 100 fL    MCH 36.2 (H) 26.5 - 33.0 pg    MCHC 33.1 31.5 - 36.5 g/dL    RDW 13.4 10.0 - 15.0 %    Platelet Count 246 150 -  450 10e3/uL    % Neutrophils 49 %    % Lymphocytes 23 %    % Monocytes 17 %    % Eosinophils 7 %    % Basophils 2 %    % Immature Granulocytes 2 %    NRBCs per 100 WBC 0 <1 /100    Absolute Neutrophils 3.3 1.6 - 8.3 10e3/uL    Absolute Lymphocytes 1.5 0.8 - 5.3 10e3/uL    Absolute Monocytes 1.1 0.0 - 1.3 10e3/uL    Absolute Eosinophils 0.5 0.0 - 0.7 10e3/uL    Absolute Basophils 0.2 0.0 - 0.2 10e3/uL    Absolute Immature Granulocytes 0.1 (H) <=0.0 10e3/uL    Absolute NRBCs 0.0 10e3/uL   Extra Red Top Tube   Result Value Ref Range    Hold Specimen JIC    Extra Blue Top Tube   Result Value Ref Range    Hold Specimen JIC    D dimer quantitative   Result Value Ref Range    D-Dimer Quantitative 1.79 (H) 0.00 - 0.50 ug/mL FEU       EKG:  Performed at: 1132    Impression: Sinus rhythm with 1st degree AV block. Right bundle branch block. Left anterior fascicular block. Bifascicular block.    Rate: 82 bpm  Rhythm: Sinus  Axis: -88  NC Interval: 248 ms  QRS Interval: 152 ms  QTc Interval: 495 ms  ST Changes: None  Comparison: 5/8/21; T wave inversion no longer evident in inferior leads.  I have independently reviewed and interpreted the EKG(s) documented above.    The creation of this record is based on the scribe s observations of the work being performed by Cary Schaefer MD and the provider s statements to them. It was created on his behalf by Donya, a trained medical scribe. This document has been checked and approved by the attending provider.    Cary Schaefer MD  Emergency Medicine  [facility] EMERGENCY ROOM  [address] [id number] [phone]  Dept: [phone]     Cary Schaefer MD  11/08/21 1697       Cary Schaefer MD  11/08/21 8314

## 2021-11-08 NOTE — ED TRIAGE NOTES
Patient has hypotension, bilateral leg swelling, lightheadedness with position changes x 4 days. He also had metoprolol dose change 4 days ago from 25 down to 12.5mg.

## 2021-11-09 ENCOUNTER — APPOINTMENT (OUTPATIENT)
Dept: PHYSICAL THERAPY | Facility: CLINIC | Age: 76
DRG: 682 | End: 2021-11-09
Attending: FAMILY MEDICINE
Payer: MEDICARE

## 2021-11-09 ENCOUNTER — APPOINTMENT (OUTPATIENT)
Dept: CARDIOLOGY | Facility: CLINIC | Age: 76
DRG: 682 | End: 2021-11-09
Attending: FAMILY MEDICINE
Payer: MEDICARE

## 2021-11-09 LAB
ALBUMIN SERPL-MCNC: 2 G/DL (ref 3.5–5)
ALP SERPL-CCNC: 174 U/L (ref 45–120)
ALT SERPL W P-5'-P-CCNC: 40 U/L (ref 0–45)
ANION GAP SERPL CALCULATED.3IONS-SCNC: 10 MMOL/L (ref 5–18)
AST SERPL W P-5'-P-CCNC: 104 U/L (ref 0–40)
ATRIAL RATE - MUSE: 82 BPM
BILIRUB DIRECT SERPL-MCNC: 1 MG/DL
BILIRUB SERPL-MCNC: 2 MG/DL (ref 0–1)
BUN SERPL-MCNC: 10 MG/DL (ref 8–28)
CALCIUM SERPL-MCNC: 9 MG/DL (ref 8.5–10.5)
CHLORIDE BLD-SCNC: 99 MMOL/L (ref 98–107)
CO2 SERPL-SCNC: 27 MMOL/L (ref 22–31)
CREAT SERPL-MCNC: 2.06 MG/DL (ref 0.7–1.3)
DIASTOLIC BLOOD PRESSURE - MUSE: NORMAL MMHG
ERYTHROCYTE [DISTWIDTH] IN BLOOD BY AUTOMATED COUNT: 13.4 % (ref 10–15)
FOLATE SERPL-MCNC: 14.2 NG/ML
GFR SERPL CREATININE-BSD FRML MDRD: 30 ML/MIN/1.73M2
GLUCOSE BLD-MCNC: 100 MG/DL (ref 70–125)
HCT VFR BLD AUTO: 37.6 % (ref 40–53)
HGB BLD-MCNC: 11.7 G/DL (ref 13.3–17.7)
INR PPP: 1.24 (ref 0.85–1.15)
INTERPRETATION ECG - MUSE: NORMAL
LACTATE SERPL-SCNC: 1.1 MMOL/L (ref 0.7–2)
LVEF ECHO: NORMAL
MCH RBC QN AUTO: 35.1 PG (ref 26.5–33)
MCHC RBC AUTO-ENTMCNC: 31.1 G/DL (ref 31.5–36.5)
MCV RBC AUTO: 113 FL (ref 78–100)
P AXIS - MUSE: 18 DEGREES
PLATELET # BLD AUTO: 146 10E3/UL (ref 150–450)
POTASSIUM BLD-SCNC: 3.9 MMOL/L (ref 3.5–5)
PR INTERVAL - MUSE: 248 MS
PROT SERPL-MCNC: 4.8 G/DL (ref 6–8)
QRS DURATION - MUSE: 152 MS
QT - MUSE: 424 MS
QTC - MUSE: 495 MS
R AXIS - MUSE: -88 DEGREES
RBC # BLD AUTO: 3.33 10E6/UL (ref 4.4–5.9)
SODIUM SERPL-SCNC: 136 MMOL/L (ref 136–145)
SYSTOLIC BLOOD PRESSURE - MUSE: NORMAL MMHG
T AXIS - MUSE: 66 DEGREES
TROPONIN I SERPL-MCNC: 0.08 NG/ML (ref 0–0.29)
VENTRICULAR RATE- MUSE: 82 BPM
VIT B12 SERPL-MCNC: 1673 PG/ML (ref 213–816)
WBC # BLD AUTO: 4 10E3/UL (ref 4–11)

## 2021-11-09 PROCEDURE — 83605 ASSAY OF LACTIC ACID: CPT | Performed by: FAMILY MEDICINE

## 2021-11-09 PROCEDURE — 84484 ASSAY OF TROPONIN QUANT: CPT | Performed by: FAMILY MEDICINE

## 2021-11-09 PROCEDURE — 99233 SBSQ HOSP IP/OBS HIGH 50: CPT | Performed by: FAMILY MEDICINE

## 2021-11-09 PROCEDURE — 210N000002 HC R&B HEART CARE

## 2021-11-09 PROCEDURE — 85014 HEMATOCRIT: CPT | Performed by: FAMILY MEDICINE

## 2021-11-09 PROCEDURE — 250N000013 HC RX MED GY IP 250 OP 250 PS 637: Performed by: FAMILY MEDICINE

## 2021-11-09 PROCEDURE — 97162 PT EVAL MOD COMPLEX 30 MIN: CPT | Mod: GP

## 2021-11-09 PROCEDURE — 255N000002 HC RX 255 OP 636: Performed by: FAMILY MEDICINE

## 2021-11-09 PROCEDURE — 258N000003 HC RX IP 258 OP 636: Performed by: FAMILY MEDICINE

## 2021-11-09 PROCEDURE — 97530 THERAPEUTIC ACTIVITIES: CPT | Mod: GP

## 2021-11-09 PROCEDURE — 250N000011 HC RX IP 250 OP 636: Performed by: FAMILY MEDICINE

## 2021-11-09 PROCEDURE — 80053 COMPREHEN METABOLIC PANEL: CPT | Performed by: FAMILY MEDICINE

## 2021-11-09 PROCEDURE — 36415 COLL VENOUS BLD VENIPUNCTURE: CPT | Performed by: FAMILY MEDICINE

## 2021-11-09 PROCEDURE — 85610 PROTHROMBIN TIME: CPT | Performed by: FAMILY MEDICINE

## 2021-11-09 PROCEDURE — P9041 ALBUMIN (HUMAN),5%, 50ML: HCPCS | Performed by: FAMILY MEDICINE

## 2021-11-09 PROCEDURE — 93306 TTE W/DOPPLER COMPLETE: CPT | Mod: 26 | Performed by: INTERNAL MEDICINE

## 2021-11-09 RX ORDER — FUROSEMIDE 10 MG/ML
20 INJECTION INTRAMUSCULAR; INTRAVENOUS EVERY 12 HOURS
Status: DISCONTINUED | OUTPATIENT
Start: 2021-11-09 | End: 2021-11-10

## 2021-11-09 RX ORDER — ALBUMIN, HUMAN INJ 5% 5 %
25 SOLUTION INTRAVENOUS ONCE
Status: COMPLETED | OUTPATIENT
Start: 2021-11-09 | End: 2021-11-09

## 2021-11-09 RX ADMIN — ALBUMIN HUMAN 25 G: 0.05 INJECTION, SOLUTION INTRAVENOUS at 16:01

## 2021-11-09 RX ADMIN — MAGNESIUM OXIDE TAB 400 MG (241.3 MG ELEMENTAL MG) 400 MG: 400 (241.3 MG) TAB at 20:37

## 2021-11-09 RX ADMIN — METOPROLOL SUCCINATE 12.5 MG: 25 TABLET, EXTENDED RELEASE ORAL at 12:25

## 2021-11-09 RX ADMIN — Medication 125 MCG: at 12:26

## 2021-11-09 RX ADMIN — GABAPENTIN 100 MG: 100 CAPSULE ORAL at 12:25

## 2021-11-09 RX ADMIN — CLONIDINE HYDROCHLORIDE 0.1 MG: 0.1 TABLET ORAL at 12:24

## 2021-11-09 RX ADMIN — HEPARIN SODIUM 5000 UNITS: 5000 INJECTION, SOLUTION INTRAVENOUS; SUBCUTANEOUS at 20:38

## 2021-11-09 RX ADMIN — GABAPENTIN 100 MG: 100 CAPSULE ORAL at 20:37

## 2021-11-09 RX ADMIN — HEPARIN SODIUM 5000 UNITS: 5000 INJECTION, SOLUTION INTRAVENOUS; SUBCUTANEOUS at 12:26

## 2021-11-09 RX ADMIN — GABAPENTIN 100 MG: 100 CAPSULE ORAL at 16:10

## 2021-11-09 RX ADMIN — PANTOPRAZOLE SODIUM 40 MG: 20 TABLET, DELAYED RELEASE ORAL at 17:06

## 2021-11-09 RX ADMIN — SODIUM CHLORIDE, POTASSIUM CHLORIDE, SODIUM LACTATE AND CALCIUM CHLORIDE: 600; 310; 30; 20 INJECTION, SOLUTION INTRAVENOUS at 12:33

## 2021-11-09 RX ADMIN — ASPIRIN 81 MG: 81 TABLET, COATED ORAL at 20:37

## 2021-11-09 RX ADMIN — Medication 100 MG: at 12:24

## 2021-11-09 RX ADMIN — MULTIPLE VITAMINS W/ MINERALS TAB 1 TABLET: TAB at 12:25

## 2021-11-09 RX ADMIN — FUROSEMIDE 20 MG: 10 INJECTION, SOLUTION INTRAMUSCULAR; INTRAVENOUS at 17:04

## 2021-11-09 RX ADMIN — Medication 50 MG: at 12:25

## 2021-11-09 RX ADMIN — CLONIDINE HYDROCHLORIDE 0.1 MG: 0.1 TABLET ORAL at 20:40

## 2021-11-09 RX ADMIN — PERFLUTREN 3 ML: 6.52 INJECTION, SUSPENSION INTRAVENOUS at 10:43

## 2021-11-09 ASSESSMENT — ACTIVITIES OF DAILY LIVING (ADL)
ADLS_ACUITY_SCORE: 10
WHICH_OF_THE_ABOVE_FUNCTIONAL_RISKS_HAD_A_RECENT_ONSET_OR_CHANGE?: AMBULATION
DOING_ERRANDS_INDEPENDENTLY_DIFFICULTY: YES
ADLS_ACUITY_SCORE: 10
DIFFICULTY_EATING/SWALLOWING: NO
ADLS_ACUITY_SCORE: 10
WALKING_OR_CLIMBING_STAIRS_DIFFICULTY: YES
ADLS_ACUITY_SCORE: 7
TOILETING_ISSUES: NO
ADLS_ACUITY_SCORE: 11
DRESSING/BATHING_DIFFICULTY: NO
ADLS_ACUITY_SCORE: 10
ADLS_ACUITY_SCORE: 7
ADLS_ACUITY_SCORE: 10
ADLS_ACUITY_SCORE: 10
FALL_HISTORY_WITHIN_LAST_SIX_MONTHS: NO
ADLS_ACUITY_SCORE: 10
ADLS_ACUITY_SCORE: 7
DEPENDENT_IADLS:: INDEPENDENT
CONCENTRATING,_REMEMBERING_OR_MAKING_DECISIONS_DIFFICULTY: NO
ADLS_ACUITY_SCORE: 10
WALKING_OR_CLIMBING_STAIRS: AMBULATION DIFFICULTY, REQUIRES EQUIPMENT
ADLS_ACUITY_SCORE: 10
ADLS_ACUITY_SCORE: 7
EQUIPMENT_CURRENTLY_USED_AT_HOME: WALKER, ROLLING
WEAR_GLASSES_OR_BLIND: NO
ADLS_ACUITY_SCORE: 10
DIFFICULTY_COMMUNICATING: NO
ADLS_ACUITY_SCORE: 10
ADLS_ACUITY_SCORE: 10
ADLS_ACUITY_SCORE: 7
ADLS_ACUITY_SCORE: 11
ADLS_ACUITY_SCORE: 10

## 2021-11-09 ASSESSMENT — MIFFLIN-ST. JEOR: SCORE: 1731.44

## 2021-11-09 NOTE — PROGRESS NOTES
11/09/21 1530   Quick Adds   Type of Visit Initial PT Evaluation   Living Environment   People in home spouse   Current Living Arrangements apartment   Home Accessibility no concerns   Living Environment Comments lives on main level of apartment   Self-Care   Usual Activity Tolerance good   Current Activity Tolerance fair   Equipment Currently Used at Home   (4WW)   Disability/Function   Mobility Management uses 4WW   Fall history within last six months no   General Information   Onset of Illness/Injury or Date of Surgery 11/08/21   Referring Physician Enrique Thomas MD   Patient/Family Therapy Goals Statement (PT) home   Pertinent History of Current Problem (include personal factors and/or comorbidities that impact the POC) bilat LE edema, hypotension, ETOH, orthostatic lightheadedness   Existing Precautions/Restrictions no known precautions/restrictions   Weight-Bearing Status - LLE full weight-bearing   Weight-Bearing Status - RLE full weight-bearing   Bed Mobility   Bed Mobility supine-sit   Supine-Sit [place] (Bed Mobility) moderate assist (50% patient effort);verbal cues   Transfers   Transfers sit-stand transfer   Sit-Stand Transfer   Sit-Stand [place] (Transfers) moderate assist (50% patient effort);verbal cues   Assistive Device (Sit-Stand Transfers) walker, 4-wheeled   Gait/Stairs (Locomotion)   [place] Level (Gait) contact guard;minimum assist (75% patient effort);verbal cues   Assistive Device (Gait) walker, front-wheeled   Distance in Feet (Required for LE Total Joints) 2x10'   Pattern (Gait) step-through   Deviations/Abnormal Patterns (Gait) fr[place] decreased;gait speed decreased;stride length decreased   Clinical Impression   Criteria for Skilled Therapeutic Intervention yes, treatment indicated   PT Diagnosis (PT) impaired functional mobility   Influenced by the following impairments weakness, impaired balance   Functional limitations due to impairments gait, transfers   Clinical  Presentation Stable/Uncomplicated   Clinical Presentation Rationale pt presents as medically diagnosed   Clinical Decision Making (Complexity) moderate complexity   Therapy Frequency (PT) Daily   Predicted Duration of Therapy Intervention (days/wks) 1 week   Planned Therapy Interventions (PT) gait training;home exercise program;patient/family education;stair training;strengthening;transfer training   Anticipated Equipment Needs at Discharge (PT) walker, rolling  (4WW)   Risk & Benefits of therapy have been explained care plan/treatment goals reviewed;patient   PT Discharge Planning    PT Discharge Recommendation (DC Rec) Transitional Care Facility   PT Rationale for DC Rec requiring Mod Ax1 for sit to stands and bed mobility, limited tolerance for activity due to weakness and dizziness   Total Evaluation Time   Total Evaluation Time (Minutes) 5

## 2021-11-09 NOTE — PROGRESS NOTES
[facility] MEDICINE  PROGRESS NOTE     Code Status: No CPR- Do NOT Intubate       Identification/Summary:   76 year old male with PMH signficant for CAD, lung cancer, prostate and oral cancer, CHF, COPD, Ragsdale's, CKD and alcohol abuse.  11/8 admitted with bilateral lower extremity edema, pain, LHD.  LEILA creatinine 2.2.  Admits to 1 L vodka per day.  After IV fluids lactic acidosis resolved and creatinine 2.06.  Low albumin.  Will give Lasix 20 mg IV every 12 hours and albumin infusion.  Likely here 2-3 more days.     Assessment and Plan:  -Bilateral lower extremity edema  -Bilateral lower extremity pain/neuropathy  Significant findings: BNP 99, troponin 0.06, D-dimer 1.79.  Normal B12 and folate.  VQ scan low probability for pulmonary embolism.  Chest x-ray shows prior lung resections otherwise negative.   Bilateral lower extremity ultrasound negative for DVT.  Right upper quadrant abdominal ultrasound shows hepatic steatosis with gallbladder sludge otherwise normal.  11/8 given gabapentin 3 times daily.  Lactated Ringer's at 75 mL/h.  11/9 with kidney function improved will try to diurese.  Give albumin infusion x1 and Lasix 20 mg IV every 12 hours.  Follow strict I's and O's and daily weights.  -Lactic acidosis  -Acute on chronic kidney disease stage III  -Hyponatremia  May 2021 creatinine 1.66.    Given IV fluids at admission.  Admission creatinine 2.26--> 2.06.  Admission lactic 2.6--> 2.3--> 1.4.    11/9 okay discontinue IV fluids.  Diuresis and albumin as above.  Follow BMP.  -Alcohol abuse  Patient admits to drinking 1 L of vodka per day.  Denies history of withdrawal but also cannot recall the last time he had a day of abstinence from alcohol.  Utilize CIWA protocols at this time with lorazepam.  Social work chemical dependency consultation.  -Hypoalbuminemia  -Moderate protein caloric malnutrition  Albumin infusion as noted above.  RD consultation ordered.  -Coronary  artery disease.  MI 2019.  Stent x1.  -Essential hypertension  -Chronic systolic heart failure  May 2021 echocardiogram EF 55%.  Mild hypertrophy otherwise normal.  11/8 given home metoprolol XL 12.5 mg daily with hold parameters.  11/9 echocardiogram EF 55 to 60% borderline LVH and mild MR.  -New bifascicular block  Previous EKGs had left axis deviation along with right bundle branch block.  Admission EKG shows new bifascicular block.  Serial troponins negative.  Echo as above.  No further work-up at this time.  -Ragsdale's esophagus  Continue home omeprazole 20 mg daily.  -COPD  Continue home Trelegy Ellipta inhaler and albuterol as needed.  -Abnormal UA  Unclear if patient could be dealing with a UTI or not.  Denies any dysuria.  Urinalysis was abnormal but not severe enough to trigger urine culture.  Hold off on antibiotics at this time.      -COVID19 PCR status negative from 11/8/2021  Patient is vaccinated.  Standard precautions.  -Anticoagulation   INR 1.22.  Continue home aspirin 81 mg nightly.  Utilize subcutaneous heparin.  Fluids: Discontinue IV fluids  Pain meds: na  Therapy: PT OT ordered for tomorrow.  Urban:Not present  Current Diet  Orders Placed This Encounter      No Added Salt 4 Gram Sodium    Supplements  None    Barriers to Discharge: LEILA, lower extremity edema, alcohol withdrawal    Disposition: Hopeful discharge in 2 to 3 days    Interval History/Subjective:  Patient feeling a little bit better.  His feet are less painful.  No chest pain.  No shortness of breath.  No nausea or vomiting.  No evidence of withdrawal at this time.  Questions answered to verbalized satisfaction.    Physical Exam/Objective:  Temp:  [97.6  F (36.4  C)-99  F (37.2  C)] 98.3  F (36.8  C)  Pulse:  [68-99] 74  Resp:  [11-27] 27  BP: (102-134)/(55-71) 114/71  SpO2:  [92 %-99 %] 94 %  Wt Readings from Last 4 Encounters:   11/08/21 90.7 kg (200 lb)   09/28/21 95.2 kg (209 lb 12.8 oz)   06/25/21 92.1 kg (203 lb)   06/23/21  92.3 kg (203 lb 8 oz)     Body mass index is 27.89 kg/m .    Constitutional: awake, alert, cooperative, no apparent distress, and appears stated age and fatigued  ENT: Normocephalic, without obvious abnormality, atraumatic, external ears without lesions, oral pharynx with moist mucous membranes, tonsils without erythema or exudates, gums normal and good dentition.  Respiratory: No increased work of breathing, good air exchange, clear to auscultation bilaterally, no crackles or wheezing  Cardiovascular: Normal apical impulse, regular rate and rhythm, normal S1 and S2, no S3 or S4, and no murmur noted  GI: Positive bowel sounds soft nondistended.  Right upper quadrant liver enlargement noted.  Skin: Stable erythema to the lower extremities bilaterally.  Musculoskeletal: Motor strength is 5 out of 5.  Lower extremity edema stable at 2+ bilaterally.   Neurologic: Cranial nerves II-XII are grossly intact. Sensory:  Sensory intact  Neuropsychiatric: General: normal, calm and normal eye contact Level of consciousness: alert / normal Affect: normal Orientation: oriented to self, place, time and situation Memory and insight: normal, memory for past and recent events intact and thought process normal      Medications:   Personally Reviewed.  Medications     lactated ringers 75 mL/hr at 11/09/21 1233       aspirin  81 mg Oral At Bedtime     cloNIDine  0.1 mg Oral Q8H     Fluticasone-Umeclidin-Vilanterol  1 puff Inhalation Daily     gabapentin  100 mg Oral TID     heparin ANTICOAGULANT  5,000 Units Subcutaneous Q12H     magnesium oxide  400 mg Oral At Bedtime     metoprolol succinate ER  12.5 mg Oral Daily     multivitamin w/minerals  1 tablet Oral Daily     pantoprazole  40 mg Oral Daily with supper     polyethylene glycol  17 g Oral Daily     sodium chloride (PF)  3 mL Intracatheter Q8H     sodium chloride (PF)  3 mL Intracatheter Q8H     thiamine  100 mg Oral Daily     Vitamin D3  125 mcg Oral Daily     zinc gluconate  50 mg  Oral Daily       Data reviewed today: I personally reviewed all new medications, labs, imaging/diagnostics reports over the past 24 hours. Pertinent findings include:    Imaging:   Recent Results (from the past 24 hour(s))   NM Lung Scan Perfusion Particulate    Narrative    EXAM: NM LUNG SCAN PERFUSION PARTICULATE  LOCATION: [facility]  DATE/TIME: 11/8/2021 3:09 PM    INDICATION: 4 days of hypotension, lightheadedness hypoxia and elevated d-dimer. Pulmonary embolus suspected, low to intermediate probability. History of left upper lobe lung cancer, status post SBRT and wedge resections of right upper and middle lobe   adenocarcinoma.  COMPARISON: 2 view chest 11/8/2021, 6 chest CT 10/18/2021  TECHNIQUE: 8.0 mCi technetium-99m MAA, IV. Standard lung perfusion imaging.    FINDINGS: Matched left upper lobe subsegmental perfusion defect corresponding to radiation fibrosis/posttreatment changes. No additional segmental/subsegmental perfusion defects.      Impression    IMPRESSION:   1.  Pulmonary embolus absent, low probability scan.   XR Chest 2 Views    Narrative    EXAM: XR CHEST 2 VW  LOCATION: [facility]  DATE/TIME: 11/8/2021 3:27 PM    INDICATION: PE suspected, low/intermediate prob, positive D-dimer  COMPARISON: Chest CT 10/18/2021      Impression    IMPRESSION: Stable posttreatment changes left upper lobe and postop wedge resections right upper and middle lobes. No new focal consolidation, pneumothorax nor pleural effusion.   US Lower Extremity Venous Duplex Bilateral    Narrative    EXAM: US LOWER EXTREMITY VENOUS DUPLEX BILATERAL  LOCATION: [facility]  DATE/TIME: 11/8/2021 7:36 PM    INDICATION: bilateral lower leg swelling and pain  COMPARISON: 1/25/2021  TECHNIQUE: Venous Duplex ultrasound of bilateral lower extremities with and without compression, augmentation and duplex. Color flow and spectral Doppler with waveform analysis  performed.    FINDINGS: Exam includes the common femoral, femoral, popliteal veins as well as segmentally visualized deep calf veins and greater saphenous vein.     RIGHT: No deep vein thrombosis. No superficial thrombophlebitis. No popliteal cyst.    LEFT: No deep vein thrombosis. No superficial thrombophlebitis. No popliteal cyst.      Impression    IMPRESSION:  1.  No deep venous thrombosis in the bilateral lower extremities.   Abdomen US, limited (RUQ only)    Narrative    EXAM: US ABDOMEN LIMITED  LOCATION: [facility]  DATE/TIME: 2021 7:36 PM    INDICATION: elevated bilirubin. Lung cancer.  COMPARISON: CT 10/18/2021  TECHNIQUE: Limited abdominal ultrasound.    FINDINGS:    GALLBLADDER: Filled with sludge. No definite stone seen. Normal wall thickness.    BILE DUCTS: No biliary dilatation. The common duct measures 4 mm.    LIVER: Increased echogenicity from diffuse fatty infiltration. Heterogeneous echotexture. Deeper portions of the liver are not diagnostically visualized. No focal mass seen.    RIGHT KIDNEY: No hydronephrosis.It contains a 2.9 cm cyst.    PANCREAS: The visualized portions are normal.    No ascites.      Impression    IMPRESSION:  1.  Diffuse hepatic steatosis.  2.  Sludge containing gallbladder.       Echocardiogram Complete   Result Value    LVEF  55-60%    Narrative    [id number]  [id number]  [id number]  [id number]^[id number] [address]     Name: CELIA LUTZ  MRN: [id number]  : 1945  Study Date: 2021 09:14 AM  Age: 76 yrs  Gender: Male  Patient Location: [facility]  Reason For Study: RBBB & Left Anterior Fasicular Block, Edema  Ordering Physician: DHEERAJ MATHEWS  Performed By: ANGELO     BSA: 2.1 m2  Height: 71 in  Weight: 200 lb  ______________________________________________________________________________  Procedure  Complete Portable Echo Adult. Definity (NDC #80818-584) given  intravenously.  ______________________________________________________________________________  Interpretation Summary     There is borderline concentric left ventricular hypertrophy.  The visual ejection fraction is 55-60%.  There is mild (1+) mitral regurgitation.  There is trace to mild tricuspid regurgitation.  ______________________________________________________________________________  Left Ventricle  The left ventricle is normal in size. There is borderline concentric left  ventricular hypertrophy. Diastolic Doppler findings (E/E' ratio and/or other  parameters) suggest left ventricular filling pressures are indeterminate. The  visual ejection fraction is 55-60%. No regional wall motion abnormalities  noted.     Right Ventricle  The right ventricle is normal in size and function.     Atria  Normal left atrial size. Right atrial size is normal. Intact atrial septum.     Mitral Valve  Mitral valve leaflets appear normal. There is mild (1+) mitral regurgitation.     Tricuspid Valve  Tricuspid valve leaflets appear normal. There is trace to mild tricuspid  regurgitation.     Aortic Valve  Aortic valve leaflets appear normal. There is no evidence of aortic stenosis  or clinically significant aortic regurgitation.     Pulmonic Valve  The pulmonic valve is not well visualized.     Vessels  Borderline aortic root dilation. Normal size ascending aorta. IVC diameter  <2.1 cm collapsing >50% with sniff suggests a normal RA pressure of 3 mmHg.     Pericardium  There is no pericardial effusion.  ______________________________________________________________________________  MMode/2D Measurements & Calculations  IVSd: 0.95 cm     LVIDd: 5.4 cm  LVIDs: 3.9 cm  LVPWd: 1.2 cm  FS: 28.4 %  LV mass(C)d: 226.5 grams  LV mass(C)dI: 107.4 grams/m2  Ao root diam: 3.9 cm  LA dimension: 3.9 cm  asc Aorta Diam: 3.8 cm  LA/Ao: 1.0  LVOT diam: 2.2 cm  LVOT area: 3.7 cm2  LA Volume Indexed (AL/bp): 21.9 ml/m2  RWT: 0.44     Time  Measurements  MM HR: 61.0 BPM     Doppler Measurements & Calculations  MV E max rafa: 65.5 cm/sec  MV A max rafa: 78.6 cm/sec  MV E/A: 0.83  MV dec time: 0.20 sec  Ao V2 max: 186.8 cm/sec  Ao max P.0 mmHg  Ao V2 mean: 122.0 cm/sec  Ao mean P.0 mmHg  Ao V2 VTI: 34.8 cm  CAMILLE(I,D): 1.8 cm2  CAMILLE(V,D): 1.6 cm2  LV V1 max P.6 mmHg  LV V1 max: 80.0 cm/sec  LV V1 VTI: 16.8 cm  SV(LVOT): 63.1 ml  SI(LVOT): 29.9 ml/m2  AV Rafa Ratio (DI): 0.43  CAMILLE Index (cm2/m2): 0.86  E/E' av.9  Lateral E/e': 6.7  Medial E/e': 11.1     ______________________________________________________________________________  Report approved by: Eliza Narvaez 2021 11:16 AM             Labs:  Echocardiogram Complete   Final Result      Abdomen US, limited (RUQ only)   Final Result   IMPRESSION:   1.  Diffuse hepatic steatosis.   2.  Sludge containing gallbladder.            US Lower Extremity Venous Duplex Bilateral   Final Result   IMPRESSION:   1.  No deep venous thrombosis in the bilateral lower extremities.      XR Chest 2 Views   Final Result   IMPRESSION: Stable posttreatment changes left upper lobe and postop wedge resections right upper and middle lobes. No new focal consolidation, pneumothorax nor pleural effusion.      NM Lung Scan Perfusion Particulate   Final Result   IMPRESSION:    1.  Pulmonary embolus absent, low probability scan.        Recent Results (from the past 24 hour(s))   Lactic acid whole blood    Collection Time: 21  2:22 PM   Result Value Ref Range    Lactic Acid 2.3 (H) 0.7 - 2.0 mmol/L   UA with Microscopic reflex to Culture    Collection Time: 21  5:38 PM    Specimen: Urine, Midstream   Result Value Ref Range    Color Urine Yellow Colorless, Straw, Light Yellow, Yellow    Appearance Urine Clear Clear    Glucose Urine Negative Negative mg/dL    Bilirubin Urine 0.5 mg/dL (A) Negative    Ketones Urine 10  (A) Negative mg/dL    Specific Gravity Urine 1.018 1.001 - 1.030    Blood Urine  Negative Negative    pH Urine 5.5 5.0 - 7.0    Protein Albumin Urine 50  (A) Negative mg/dL    Urobilinogen Urine 3.0 (A) <2.0 mg/dL    Nitrite Urine Negative Negative    Leukocyte Esterase Urine 25 Sarah/uL (A) Negative    Mucus Urine Present (A) None Seen /LPF    RBC Urine 3 (H) <=2 /HPF    WBC Urine 5 <=5 /HPF    Squamous Epithelials Urine 1 <=1 /HPF    Hyaline Casts Urine 49 (H) <=2 /LPF   Asymptomatic COVID-19 Virus (Coronavirus) by PCR Nasopharyngeal    Collection Time: 11/08/21  7:13 PM    Specimen: Nasopharyngeal; Swab   Result Value Ref Range    SARS CoV2 PCR Negative Negative   Troponin I    Collection Time: 11/08/21  8:44 PM   Result Value Ref Range    Troponin I 0.07 0.00 - 0.29 ng/mL   TSH with free T4 reflex    Collection Time: 11/08/21  8:44 PM   Result Value Ref Range    TSH 3.21 0.30 - 5.00 uIU/mL   Magnesium    Collection Time: 11/08/21  8:44 PM   Result Value Ref Range    Magnesium 1.9 1.8 - 2.6 mg/dL   Phosphorus    Collection Time: 11/08/21  8:44 PM   Result Value Ref Range    Phosphorus 2.0 (L) 2.5 - 4.5 mg/dL   INR    Collection Time: 11/08/21  8:44 PM   Result Value Ref Range    INR 1.22 (H) 0.85 - 1.15   Folate    Collection Time: 11/08/21  8:44 PM   Result Value Ref Range    Folic Acid 14.2 >=3.5 ng/mL   Lactic acid whole blood    Collection Time: 11/08/21  8:44 PM   Result Value Ref Range    Lactic Acid 1.4 0.7 - 2.0 mmol/L   Lactic acid whole blood    Collection Time: 11/09/21 12:24 AM   Result Value Ref Range    Lactic Acid 1.1 0.7 - 2.0 mmol/L   Troponin I    Collection Time: 11/09/21  2:40 AM   Result Value Ref Range    Troponin I 0.08 0.00 - 0.29 ng/mL   Hepatic function panel    Collection Time: 11/09/21  2:40 AM   Result Value Ref Range    Bilirubin Total 2.0 (H) 0.0 - 1.0 mg/dL    Bilirubin Direct 1.0 (H) <=0.5 mg/dL    Protein Total 4.8 (L) 6.0 - 8.0 g/dL    Albumin 2.0 (L) 3.5 - 5.0 g/dL    Alkaline Phosphatase 174 (H) 45 - 120 U/L     (H) 0 - 40 U/L    ALT 40 0 - 45 U/L    Basic metabolic panel    Collection Time: 11/09/21  2:40 AM   Result Value Ref Range    Sodium 136 136 - 145 mmol/L    Potassium 3.9 3.5 - 5.0 mmol/L    Chloride 99 98 - 107 mmol/L    Carbon Dioxide (CO2) 27 22 - 31 mmol/L    Anion Gap 10 5 - 18 mmol/L    Urea Nitrogen 10 8 - 28 mg/dL    Creatinine 2.06 (H) 0.70 - 1.30 mg/dL    Calcium 9.0 8.5 - 10.5 mg/dL    Glucose 100 70 - 125 mg/dL    GFR Estimate 30 (L) >60 mL/min/1.73m2   CBC with platelets    Collection Time: 11/09/21  2:40 AM   Result Value Ref Range    WBC Count 4.0 4.0 - 11.0 10e3/uL    RBC Count 3.33 (L) 4.40 - 5.90 10e6/uL    Hemoglobin 11.7 (L) 13.3 - 17.7 g/dL    Hematocrit 37.6 (L) 40.0 - 53.0 %     (H) 78 - 100 fL    MCH 35.1 (H) 26.5 - 33.0 pg    MCHC 31.1 (L) 31.5 - 36.5 g/dL    RDW 13.4 10.0 - 15.0 %    Platelet Count 146 (L) 150 - 450 10e3/uL   INR    Collection Time: 11/09/21  2:40 AM   Result Value Ref Range    INR 1.24 (H) 0.85 - 1.15   Echocardiogram Complete    Collection Time: 11/09/21  9:45 AM   Result Value Ref Range    LVEF  55-60%        Pending Labs:  Unresulted Labs Ordered in the Past 30 Days of this Admission     Date and Time Order Name Status Description    11/8/2021 11:25 AM Blood Culture Peripheral Blood Preliminary     11/8/2021 11:25 AM Blood Culture Peripheral Blood Preliminary           Enrique Thomas MD  [facility]  Phone: #[phone]

## 2021-11-09 NOTE — PHARMACY-ADMISSION MEDICATION HISTORY
Pharmacy Note - Admission Medication History    Pertinent Provider Information: N/A     ______________________________________________________________________    Prior To Admission (PTA) med list completed and updated in EMR.       PTA Med List   Medication Sig Last Dose     acetaminophen (TYLENOL) 500 MG tablet Take 500-1,000 mg by mouth every 6 hours as needed for mild pain Not to exceed 2000 mg/day Unknown at Unknown time     albuterol (PROAIR HFA/PROVENTIL HFA/VENTOLIN HFA) 108 (90 Base) MCG/ACT inhaler Inhale 2 puffs into the lungs every 6 hours as needed Unknown at will bring     aspirin (ASA) 81 MG EC tablet Take 81 mg by mouth At Bedtime  11/7/2021 at PM     ferrous sulfate (FEROSUL) 325 (65 Fe) MG tablet Take 325 mg by mouth every other day With breakfast Unknown at Unknown time     Fluticasone-Umeclidin-Vilant (TRELEGY ELLIPTA) 200-62.5-25 MCG/INH AEPB Inhale 1 puff into the lungs daily  11/6/2021 at AM, will bring in AM     Magnesium Oxide 250 MG TABS Take 500 mg by mouth At Bedtime 11/7/2021 at PM     metoprolol succinate ER (TOPROL-XL) 25 MG 24 hr tablet Take 12.5 mg by mouth daily  11/7/2021 at AM     multivitamin w/minerals (THERA-VIT-M) tablet Take 1 tablet by mouth daily 11/7/2021 at AM     nitroGLYcerin (NITROSTAT) 0.4 MG sublingual tablet Place 0.4 mg under the tongue Place 1 tablet under the tongue at onset of chest pain.  May repeat x 3 doses q 5 min.  Call 911 after first dose if pain persists. Unknown at Unknown time     omeprazole (PRILOSEC) 20 MG DR capsule Take 20 mg by mouth daily (with dinner) Before a meal 11/7/2021 at PM     polyethylene glycol (MIRALAX) 17 GM/Dose powder Take 17 g by mouth daily As needed Unknown at Unknown time     thiamine (B-1) 100 MG tablet Take 100 mg by mouth daily 11/7/2021 at AM     Vitamin D3 (CHOLECALCIFEROL) 125 MCG (5000 UT) tablet Take 1 tablet by mouth daily  11/7/2021 at AM     zinc gluconate 50 MG tablet Take 1 tablet by mouth daily 11/7/2021 at AM        Information source(s): Patient and CareEverywhere/SureScripts  Method of interview communication: in-person    Summary of Changes to PTA Med List  New: N/A  Discontinued: Voltaren gel  Changed: metoprolol dose    Patient was asked about OTC/herbal products specifically.  PTA med list reflects this.    In the past week, patient estimated taking medication this percent of the time:  greater than 90%.    Allergies were reviewed, assessed, and updated with the patient.      Medications currently not available for use during hospital stay. Family/Patient representative states they will bring albuterol, Trelegy inhalers to [facility].    The information provided in this note is only as accurate as the sources available at the time of the update(s).    Thank you for the opportunity to participate in the care of this patient.    [facility]  11/8/2021 7:21 PM

## 2021-11-09 NOTE — ED NOTES
Pt is alert. VSS. IV in place and infusing per orders. CIWA scores minimal this shift. Pt's last drink was 0800 yesterday. Pt states he typically drinks 1L of vodka a day. Pt had difficulty voiding this shift, although able to void 100mL. Telemetry running SR with 1st AVB, BBB, and prolonged QT. Pt ambulates with assist of one and walker (home). Calls appropriately. ...Debra Garcia RN

## 2021-11-09 NOTE — H&P
[facility] MEDICINE ADMISSION HISTORY AND PHYSICAL   Date of Admission: 11/8/2021  Lenny Chau, 1945, [id number]    Identification/Summary:   Lenny Chau is a 76 year old male with PMH signficant for CAD, lung cancer, prostate and oral cancer, CHF, COPD, Ragsdale's, CKD and alcohol abuse.  Presented with bilateral lower extremity edema, pain, LHD..    Assessment and Plan:  -Bilateral lower extremity edema  -Bilateral lower extremity pain/neuropathy  Inpatient cardiac telemetry admission.  Expect length of stay greater than 2 nights.  Significant findings: BNP 99, troponin 0 0.06, D-dimer 1.79.  VQ scan low probability for pulmonary embolism.  Chest x-ray shows prior lung resections otherwise negative.  Unclear etiology to patient's symptoms.  Could be due to combination of DVTs versus hepatic failure from his alcoholism.  Check bilateral lower extremity ultrasounds as well as a ultrasound of the right upper quadrant.  Order gabapentin 100 mg 3 times daily for the foot pain.  Check B12 and folate levels.  -Lactic acidosis  -Acute on chronic kidney disease stage III  -Hyponatremia  May 2021 creatinine 1.66.  Admission creatinine 2.26.  Admission lactic 2.6--> 2.3 after given 2 L of normal saline bolus.  We will continue IV hydration with lactated Ringer's at 75 mL/h.  Follow BMP and lactic acid level.  -Alcohol abuse  Patient admits to drinking 1 L of vodka per day.  Denies going through withdrawal but also cannot recall the last time he had a day of abstinence from alcohol.  Utilize [facility] protocols at this time with lorazepam.  Social work chemical dependency consultation.  -Coronary artery disease.  MI 2019.  Stent x1.  -Essential hypertension  -Chronic systolic heart failure  May 2021 echocardiogram EF 55%.  Mild hypertrophy otherwise normal.  If no clear answers on ultrasound may need to repeat echocardiogram.  Order home metoprolol XL 12.5 mg daily with hold parameters.  Follow  strict I's and O's and daily weights.  -Bifascicular block  Previous EKGs had left axis deviation along with right bundle branch block.  Admission EKG shows new bifascicular block.  Repeat echocardiogram.  Check serial troponins x3.  -Ragsdale's esophagus  Order home omeprazole 20 mg daily.  -COPD  Order home Trelegy Ellipta inhaler and albuterol as needed.  -Abnormal UA  Unclear if patient could be dealing with a UTI or not.  Denies any dysuria.  Urinalysis was abnormal.  Hold off on antibiotics at this time.  Follow urine culture.  -COVID19 PCR status negative from 11/8/2021  Patient is vaccinated.  Standard precautions.  -Anticoagulation   Order home aspirin 81 mg nightly.  Check an INR due to a potential liver disease.  If INR is not severely elevated utilize subcutaneous heparin.    -FoleyNot present    Code Status: No CPR- Do NOT Intubate    Disposition: Inpatient     Chief Complaint  lightheadedness dizziness lower extremity edema and redness     HISTORY     Lenny Chau is a 76 year old male with PMH of lung cancer, CHF, COPD, CAD, prostate cancer, AAA repair and Ragsdale's esophagus.  For 7 days has had increasing lower extremity edema and redness.  For the last 4 days has had lightheadedness and dizziness.  Was at the [place] ED little over a week ago with similar complaints.  LFTs were quite elevated and had mild troponin elevation.  They recommended admission to [facility] but patient declined.  Came home.  Symptoms worsened and so 11/8 presented to the [facility] emergency room.  Lactic acid was 2.6.  D-dimer 1.79.  Creatinine was elevated up to 2.26 normal baseline is 1.6.  Patient admits to drinking 1 L of vodka per day for an extended period of time.  Was given 2 L of normal saline bolus and lactic acid did somewhat improved.  VQ scan was negative for PE..  Has been vaccinated against Covid.  Has noted some increased swelling to his belly as well.  Denies personal history of stroke, diabetes,  DVT, PE, peptic ulcer disease or sleep apnea.  Daughter is present and supportive..  Questions answered to verbalize satisfaction.    Past Medical History     Past Medical History:  No date: AAA (abdominal aortic aneurysm) (H)  No date: Anemia  No date: Ragsdale esophagus  No date: Cancer (H)      Comment:  lung  No date: CHF (congestive heart failure) (H)  No date: Chronic kidney disease  No date: COPD, group B, by GOLD 2017 classification (H)  No date: Coronary artery disease  No date: Coronary artery disease of native heart with stable angina   pectoris, unspecified vessel or lesion type (H)  No date: Degenerative joint disease  No date: Head and neck cancer (H)  No date: History of transfusion  No date: Hyperlipidemia  No date: Hypertension  No date: Lung cancer (H)      Comment:  s/p RUL RML wedge resection in 2016 by Dr. Carter Velazquez  No date: Lung mass      Comment:  MELODY FDG-avid 2.5cm  No date: Myocardial infarction (H)      Comment:  November 2019  No date: Oral cancer (H)  No date: Prostate cancer (H)  No date: Shortness of breath      Comment:  with exertion     Patient Active Problem List    Diagnosis Date Noted     Orthostatic hypotension 11/08/2021     Priority: Medium     Alcohol abuse 11/08/2021     Priority: Medium     Orthostatic lightheadedness 11/08/2021     Priority: Medium     Elevated bilirubin 11/08/2021     Priority: Medium     Bilateral lower extremity edema 11/08/2021     Priority: Medium     Suspected pulmonary embolism 11/08/2021     Priority: Medium     Cardiac arrhythmia 06/18/2021     Priority: Medium     Formatting of this note might be different from the original.  IRBBB/ LAFB on EKG         Malignant neoplasm of head, neck and face (H) 06/18/2021     Priority: Medium     Formatting of this note might be different from the original.  Surgery right side- 10/19/2009- DrThomas- [facility]    Recent biopsy-  Recent recurrence 2009- will need adjuvant treatment         PAC (premature atrial  contraction) 05/10/2021     Priority: Medium     Coronary artery disease of native heart with stable angina pectoris, unspecified vessel or lesion type (H)      Priority: Medium     Adverse effect of drug, subsequent encounter      Priority: Medium     Stage 3a chronic kidney disease (H)      Priority: Medium     Rhabdomyolysis 05/07/2021     Priority: Medium     COPD, group B, by GOLD 2017 classification (H)      Priority: Medium     Acute worsening of stage 3 chronic kidney disease (H) 10/11/2020     Priority: Medium     Acute upper GI bleed 10/11/2020     Priority: Medium     Acute renal failure (ARF) (H) 10/08/2020     Priority: Medium     Abnormal PET scan of head 09/29/2020     Priority: Medium     Added automatically from request for surgery [id number]         Lung cancer (H)      Priority: Medium     s/p RUL RML wedge resection in 2016 by Dr. Carter Velazquez         Lung mass      Priority: Medium     MELODY FDG-avid 2.5cm         Renal mass 02/14/2020     Priority: Medium     Formatting of this note might be different from the original.  Enormous benign hemangioma removed with -Dr. Owens  Formatting of this note might be different from the original.  Enormous benign hemangioma removed with -Dr. Owens         Abdominal aortic aneurysm (AAA) (H) 03/30/2010     Priority: Medium     Formatting of this note might be different from the original.  5.6 cm infrarenal AAA, on 12/21/2010 CTA  Formatting of this note might be different from the original.  5.6 cm infrarenal AAA, on 12/21/2010 CTA         Uropathy, obstructive 03/30/2010     Priority: Medium     Formatting of this note might be different from the original.  Nocturia, frequency, prostate calcifications         Cellulitis and abscess of trunk 03/30/2009     Priority: Medium     Formatting of this note might be different from the original.  L flank         Hypertension 03/20/2009     Priority: Medium     Formatting of this note might be different from the  original.  Updated by system to replace inactive record  Formatting of this note might be different from the original.  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 Need pathology for margins;  Surgeon: Alon Nunez MD;  Location: [facility];  Service: ENT     NEPHRECTOMY Left     for benign hemangioma     OTHER SURGICAL HISTORY Right 2015    wedge resectionLung cancer isolated pulmonary nodule     OTHER SURGICAL HISTORY      placement of stentfor AAA     OTHER SURGICAL HISTORY      right hip surgeryfor fracture     OTHER SURGICAL HISTORY      Oral cancer resection     NC ESOPHAGOGASTRODUODENOSCOPY US SCOPE W/ADJ STRXRS N/A 2020    Procedure: ESOPHAGOGASTRODUODENOSCOPY,  ENDOSCOPIC ULTRASOUND;  Surgeon: Quoc Edwards MD;  Location: [facility] [place];  Service: Gastroenterology     Family History      Family History   Problem Relation Age of Onset     Cancer Sister      Kidney Disease Maternal Aunt       Social History      Social History     Tobacco Use     Smoking status: Former Smoker     Packs/day: 2.00     Years: 50.00     Pack years: 100.00     Quit date: 2009     Years since quittin.8     Smokeless tobacco: Never Used   Substance Use Topics     Alcohol use: Yes     Alcohol/week: 14.0 standard drinks     Comment: 1 liter of vodka per day     Drug use: Never      Allergies   No Known Allergies  Prior to Admission  Medications      Prior to Admission Medications   Prescriptions Last Dose Informant Patient Reported? Taking?   Fluticasone-Umeclidin-Vilant (TRELEGY ELLIPTA) 200-62.5-25 MCG/INH AEPB 11/6/2021 at AM, will bring in AM  Yes Yes   Sig: Inhale 1 puff into the lungs daily    Magnesium Oxide 250 MG TABS 11/7/2021 at PM  Yes Yes   Sig: Take 500 mg by mouth At Bedtime   Vitamin D3 (CHOLECALCIFEROL) 125 MCG (5000 UT) tablet 11/7/2021 at AM  Yes Yes   Sig: Take 1 tablet by mouth daily    acetaminophen (TYLENOL) 500 MG tablet Unknown at Unknown time  Yes Yes   Sig: Take 500-1,000 mg by mouth every 6 hours as needed for mild pain Not to exceed 2000 mg/day   albuterol (PROAIR HFA/PROVENTIL HFA/VENTOLIN HFA) 108 (90 Base) MCG/ACT inhaler Unknown at will bring  Yes Yes   Sig: Inhale 2 puffs into the lungs every 6 hours as needed   aspirin (ASA) 81 MG EC tablet 11/7/2021 at PM  Yes Yes   Sig: Take 81 mg by mouth At Bedtime    ferrous sulfate (FEROSUL) 325 (65 Fe) MG tablet Unknown at Unknown time  Yes Yes   Sig: Take 325 mg by mouth every other day With breakfast   metoprolol succinate ER (TOPROL-XL) 25 MG 24 hr tablet 11/7/2021 at AM  Yes Yes   Sig: Take 12.5 mg by mouth daily    multivitamin w/minerals (THERA-VIT-M) tablet 11/7/2021 at AM  Yes Yes   Sig: Take 1 tablet by mouth daily   nitroGLYcerin (NITROSTAT) 0.4 MG sublingual tablet Unknown at Unknown time  Yes Yes   Sig: Place 0.4 mg under the tongue Place 1 tablet under the tongue at onset of chest pain.  May repeat x 3 doses q 5 min.  Call 911 after first dose if pain persists.   omeprazole (PRILOSEC) 20 MG DR capsule 11/7/2021 at PM  Yes Yes   Sig: Take 20 mg by mouth daily (with dinner) Before a meal   polyethylene glycol (MIRALAX) 17 GM/Dose powder Unknown at Unknown time  Yes Yes   Sig: Take 17 g by mouth daily As needed   thiamine (B-1) 100 MG tablet 11/7/2021 at AM  Yes Yes   Sig: Take 100 mg by mouth daily   zinc gluconate 50 MG tablet 11/7/2021 at AM  Yes Yes   Sig:  Take 1 tablet by mouth daily      Facility-Administered Medications: None      Review of Systems     A 12 point comprehensive review of systems was negative except as noted above in HPI.    PHYSICAL EXAMINATION     Vitals      Temp:  [97.7  F (36.5  C)] 97.7  F (36.5  C)  Pulse:  [83-99] 91  Resp:  [14-26] 14  BP: ()/(48-74) 102/56  SpO2:  [94 %-99 %] 95 %    Examination     Constitutional: Very fatigued and weak.  Discomfort with lower extremity movement.  Eyes: Lids and lashes normal, pupils equal, round and reactive to light, extra ocular muscles intact, sclera clear, conjunctiva normal  ENT: Normocephalic, without obvious abnormality, atraumatic, sinuses nontender on palpation, external ears without lesions, oral pharynx with moist mucous membranes, tonsils without erythema or exudates, gums normal and good dentition.  Hematologic / Lymphatic: no cervical lymphadenopathy and no supraclavicular lymphadenopathy  Respiratory: No increased work of breathing, good air exchange, clear to auscultation bilaterally, no crackles or wheezing  Cardiovascular: Normal apical impulse, regular rate and rhythm, normal S1 and S2, no S3 or S4, and no murmur noted  GI: Mild enlargement to his abdomen.  Does have some right upper quadrant hepatomegaly.  No tenderness to palpation.  Positive bowel sounds.  Skin: Does have some redness to his lower extremities bilaterally.  And normal skin color, texture, turgor, no redness, warmth, or swelling, and no rashes  Musculoskeletal: There is no redness, warmth, or swelling of the joints.  Full range of motion noted.  Motor strength is 5 out of 5 all extremities bilaterally.  Tone is normal.  1-2+ lower extremity edema bilaterally.  Neurologic: Cranial nerves II-XII are grossly intact. Sensory:  Sensory intact  Significant increased sensitivity to the lower extremities bilaterally.  Deep Tendon Reflexes:  Reflexes are intact and symmetrical bilaterally  Neuropsychiatric: General:  normal, calm and normal eye contact Level of consciousness: alert / normal Affect: normal Orientation: oriented to self, place, time and situation Memory and insight: normal, memory for past and recent events intact and thought process normal      Pertinent Lab     Results for orders placed or performed during the hospital encounter of 11/08/21 (from the past 24 hour(s))   CBC with platelets differential    Narrative    The following orders were created for panel order CBC with platelets differential.  Procedure                               Abnormality         Status                     ---------                               -----------         ------                     CBC with platelets and d...[[id number]]  Abnormal            Final result                 Please view results for these tests on the individual orders.   Lactic acid whole blood   Result Value Ref Range    Lactic Acid 2.6 (H) 0.7 - 2.0 mmol/L   Basic metabolic panel   Result Value Ref Range    Sodium 134 (L) 136 - 145 mmol/L    Potassium 4.1 3.5 - 5.0 mmol/L    Chloride 95 (L) 98 - 107 mmol/L    Carbon Dioxide (CO2) 26 22 - 31 mmol/L    Anion Gap 13 5 - 18 mmol/L    Urea Nitrogen 11 8 - 28 mg/dL    Creatinine 2.26 (H) 0.70 - 1.30 mg/dL    Calcium 10.1 8.5 - 10.5 mg/dL    Glucose 139 (H) 70 - 125 mg/dL    GFR Estimate 27 (L) >60 mL/min/1.73m2   Hepatic function panel   Result Value Ref Range    Bilirubin Total 2.5 (H) 0.0 - 1.0 mg/dL    Bilirubin Direct 1.1 (H) <=0.5 mg/dL    Protein Total 5.8 (L) 6.0 - 8.0 g/dL    Albumin 2.4 (L) 3.5 - 5.0 g/dL    Alkaline Phosphatase 217 (H) 45 - 120 U/L    AST 98 (H) 0 - 40 U/L    ALT 45 0 - 45 U/L   Troponin I (now)   Result Value Ref Range    Troponin I 0.06 0.00 - 0.29 ng/mL   B-Type Natriuretic Peptide ([facility] Only)   Result Value Ref Range    BNP 99 (H) 0 - 80 pg/mL   CBC with platelets and differential   Result Value Ref Range    WBC Count 6.6 4.0 - 11.0 10e3/uL    RBC Count 3.90 (L) 4.40 - 5.90 10e6/uL     Hemoglobin 14.1 13.3 - 17.7 g/dL    Hematocrit 42.6 40.0 - 53.0 %     (H) 78 - 100 fL    MCH 36.2 (H) 26.5 - 33.0 pg    MCHC 33.1 31.5 - 36.5 g/dL    RDW 13.4 10.0 - 15.0 %    Platelet Count 246 150 - 450 10e3/uL    % Neutrophils 49 %    % Lymphocytes 23 %    % Monocytes 17 %    % Eosinophils 7 %    % Basophils 2 %    % Immature Granulocytes 2 %    NRBCs per 100 WBC 0 <1 /100    Absolute Neutrophils 3.3 1.6 - 8.3 10e3/uL    Absolute Lymphocytes 1.5 0.8 - 5.3 10e3/uL    Absolute Monocytes 1.1 0.0 - 1.3 10e3/uL    Absolute Eosinophils 0.5 0.0 - 0.7 10e3/uL    Absolute Basophils 0.2 0.0 - 0.2 10e3/uL    Absolute Immature Granulocytes 0.1 (H) <=0.0 10e3/uL    Absolute NRBCs 0.0 10e3/uL   Extra Tube ([place] Draw)    Narrative    The following orders were created for panel order Extra Tube ([place] Draw).  Procedure                               Abnormality         Status                     ---------                               -----------         ------                     Extra Red Top Tube[[id number]]                               Final result                 Please view results for these tests on the individual orders.   Extra Red Top Tube   Result Value Ref Range    Hold Specimen JIC    Extra Tube ([place] Draw)    Narrative    The following orders were created for panel order Extra Tube ([place] Draw).  Procedure                               Abnormality         Status                     ---------                               -----------         ------                     Extra Blue Top Tube[[id number]]                              Final result                 Please view results for these tests on the individual orders.   Extra Blue Top Tube   Result Value Ref Range    Hold Specimen JIC    D dimer quantitative   Result Value Ref Range    D-Dimer Quantitative 1.79 (H) 0.00 - 0.50 ug/mL FEU    Narrative    This D-dimer assay is intended for use in conjunction with a clinical pretest probability assessment  model to exclude pulmonary embolism (PE) and deep venous thrombosis (DVT) in outpatients suspected of PE or DVT. The cut-off value is 0.50 ug/mL FEU.   Lactic acid whole blood   Result Value Ref Range    Lactic Acid 2.3 (H) 0.7 - 2.0 mmol/L   NM Lung Scan Perfusion Particulate    Narrative    EXAM: NM LUNG SCAN PERFUSION PARTICULATE  LOCATION: [facility]  DATE/TIME: 11/8/2021 3:09 PM    INDICATION: 4 days of hypotension, lightheadedness hypoxia and elevated d-dimer. Pulmonary embolus suspected, low to intermediate probability. History of left upper lobe lung cancer, status post SBRT and wedge resections of right upper and middle lobe   adenocarcinoma.  COMPARISON: 2 view chest 11/8/2021, 6 chest CT 10/18/2021  TECHNIQUE: 8.0 mCi technetium-99m MAA, IV. Standard lung perfusion imaging.    FINDINGS: Matched left upper lobe subsegmental perfusion defect corresponding to radiation fibrosis/posttreatment changes. No additional segmental/subsegmental perfusion defects.      Impression    IMPRESSION:   1.  Pulmonary embolus absent, low probability scan.   XR Chest 2 Views    Narrative    EXAM: XR CHEST 2 VW  LOCATION: [facility]  DATE/TIME: 11/8/2021 3:27 PM    INDICATION: PE suspected, low/intermediate prob, positive D-dimer  COMPARISON: Chest CT 10/18/2021      Impression    IMPRESSION: Stable posttreatment changes left upper lobe and postop wedge resections right upper and middle lobes. No new focal consolidation, pneumothorax nor pleural effusion.   UA with Microscopic reflex to Culture    Specimen: Urine, Midstream   Result Value Ref Range    Color Urine Yellow Colorless, Straw, Light Yellow, Yellow    Appearance Urine Clear Clear    Glucose Urine Negative Negative mg/dL    Bilirubin Urine 0.5 mg/dL (A) Negative    Ketones Urine 10  (A) Negative mg/dL    Specific Gravity Urine 1.018 1.001 - 1.030    Blood Urine Negative Negative    pH Urine 5.5 5.0 - 7.0    Protein  Albumin Urine 50  (A) Negative mg/dL    Urobilinogen Urine 3.0 (A) <2.0 mg/dL    Nitrite Urine Negative Negative    Leukocyte Esterase Urine 25 Sarah/uL (A) Negative    Mucus Urine Present (A) None Seen /LPF    RBC Urine 3 (H) <=2 /HPF    WBC Urine 5 <=5 /HPF    Squamous Epithelials Urine 1 <=1 /HPF    Hyaline Casts Urine 49 (H) <=2 /LPF    Narrative    Urine Culture not indicated   Asymptomatic COVID-19 Virus (Coronavirus) by PCR Nasopharyngeal    Specimen: Nasopharyngeal; Swab   Result Value Ref Range    SARS CoV2 PCR Negative Negative    Narrative    Testing was performed using the daina  SARS-CoV-2 & Influenza A/B Assay on the daina  [place]  System.  This test should be ordered for the detection of SARS-COV-2 in individuals who meet SARS-CoV-2 clinical and/or epidemiological criteria. Test performance is unknown in asymptomatic patients.  This test is for in vitro diagnostic use under the FDA EUA for laboratories certified under CLIA to perform moderate and/or high complexity testing. This test has not been FDA cleared or approved.  A negative test does not rule out the presence of PCR inhibitors in the specimen or target RNA in concentration below the limit of detection for the assay. The possibility of a false negative should be considered if the patient's recent exposure or clinical presentation suggests COVID-19.  [facility] are certified under the Clinical Laboratory Improvement Amendments of 1988 (CLIA-88) as qualified to perform moderate and/or high complexity laboratory testing.   US Lower Extremity Venous Duplex Bilateral    Narrative    EXAM: US LOWER EXTREMITY VENOUS DUPLEX BILATERAL  LOCATION: [facility]  DATE/TIME: 11/8/2021 7:36 PM    INDICATION: bilateral lower leg swelling and pain  COMPARISON: 1/25/2021  TECHNIQUE: Venous Duplex ultrasound of bilateral lower extremities with and without compression, augmentation and duplex. Color flow and spectral  Doppler with waveform analysis performed.    FINDINGS: Exam includes the common femoral, femoral, popliteal veins as well as segmentally visualized deep calf veins and greater saphenous vein.     RIGHT: No deep vein thrombosis. No superficial thrombophlebitis. No popliteal cyst.    LEFT: No deep vein thrombosis. No superficial thrombophlebitis. No popliteal cyst.      Impression    IMPRESSION:  1.  No deep venous thrombosis in the bilateral lower extremities.   Abdomen US, limited ([facility] only)    Narrative    EXAM: US ABDOMEN LIMITED  LOCATION: [facility]  DATE/TIME: 11/8/2021 7:36 PM    INDICATION: elevated bilirubin. Lung cancer.  COMPARISON: CT 10/18/2021  TECHNIQUE: Limited abdominal ultrasound.    FINDINGS:    GALLBLADDER: Filled with sludge. No definite stone seen. Normal wall thickness.    BILE DUCTS: No biliary dilatation. The common duct measures 4 mm.    LIVER: Increased echogenicity from diffuse fatty infiltration. Heterogeneous echotexture. Deeper portions of the liver are not diagnostically visualized. No focal mass seen.    RIGHT KIDNEY: No hydronephrosis.It contains a 2.9 cm cyst.    PANCREAS: The visualized portions are normal.    No ascites.      Impression    IMPRESSION:  1.  Diffuse hepatic steatosis.  2.  Sludge containing gallbladder.           Pertinent Radiology     Radiology Results:   Recent Results (from the past 24 hour(s))   NM Lung Scan Perfusion Particulate    Narrative    EXAM: NM LUNG SCAN PERFUSION PARTICULATE  LOCATION: [facility]  DATE/TIME: 11/8/2021 3:09 PM    INDICATION: 4 days of hypotension, lightheadedness hypoxia and elevated d-dimer. Pulmonary embolus suspected, low to intermediate probability. History of left upper lobe lung cancer, status post SBRT and wedge resections of right upper and middle lobe   adenocarcinoma.  COMPARISON: 2 view chest 11/8/2021, 6 chest CT 10/18/2021  TECHNIQUE: 8.0 mCi technetium-99m MAA, IV.  Standard lung perfusion imaging.    FINDINGS: Matched left upper lobe subsegmental perfusion defect corresponding to radiation fibrosis/posttreatment changes. No additional segmental/subsegmental perfusion defects.      Impression    IMPRESSION:   1.  Pulmonary embolus absent, low probability scan.   XR Chest 2 Views    Narrative    EXAM: XR CHEST 2 VW  LOCATION: [facility]  DATE/TIME: 11/8/2021 3:27 PM    INDICATION: PE suspected, low/intermediate prob, positive D-dimer  COMPARISON: Chest CT 10/18/2021      Impression    IMPRESSION: Stable posttreatment changes left upper lobe and postop wedge resections right upper and middle lobes. No new focal consolidation, pneumothorax nor pleural effusion.   US Lower Extremity Venous Duplex Bilateral    Narrative    EXAM: US LOWER EXTREMITY VENOUS DUPLEX BILATERAL  LOCATION: [facility]  DATE/TIME: 11/8/2021 7:36 PM    INDICATION: bilateral lower leg swelling and pain  COMPARISON: 1/25/2021  TECHNIQUE: Venous Duplex ultrasound of bilateral lower extremities with and without compression, augmentation and duplex. Color flow and spectral Doppler with waveform analysis performed.    FINDINGS: Exam includes the common femoral, femoral, popliteal veins as well as segmentally visualized deep calf veins and greater saphenous vein.     RIGHT: No deep vein thrombosis. No superficial thrombophlebitis. No popliteal cyst.    LEFT: No deep vein thrombosis. No superficial thrombophlebitis. No popliteal cyst.      Impression    IMPRESSION:  1.  No deep venous thrombosis in the bilateral lower extremities.   Abdomen US, limited (RUQ only)    Narrative    EXAM: US ABDOMEN LIMITED  LOCATION: [facility]  DATE/TIME: 11/8/2021 7:36 PM    INDICATION: elevated bilirubin. Lung cancer.  COMPARISON: CT 10/18/2021  TECHNIQUE: Limited abdominal ultrasound.    FINDINGS:    GALLBLADDER: Filled with sludge. No definite stone seen.  Normal wall thickness.    BILE DUCTS: No biliary dilatation. The common duct measures 4 mm.    LIVER: Increased echogenicity from diffuse fatty infiltration. Heterogeneous echotexture. Deeper portions of the liver are not diagnostically visualized. No focal mass seen.    RIGHT KIDNEY: No hydronephrosis.It contains a 2.9 cm cyst.    PANCREAS: The visualized portions are normal.    No ascites.      Impression    IMPRESSION:  1.  Diffuse hepatic steatosis.  2.  Sludge containing gallbladder.         EKG Results: personally reviewed.   Sinus rhythm with 1st degree A-V block Right bundle branch block Left anterior fascicular block ** Bifascicular block ** Abnormal ECG When compared with ECG of 08-MAY-2021 12:54, T wave inversion no longer evident in Inferior leads    Contains abnormal data Echocardiogram Complete  Order: [id number]   Status: Final result     Visible to patient: Yes (not seen)     Next appt: 12/20/2021 at 01:20 PM in Radiology. ([employer] [facility] CT Rm 2)     0 Result Notes    Details    Reading Physician Reading Date Result Priority   Enrique Way MD  [phone] 5/9/2021 Routine   Provider, Historical 5/9/2021      Narrative & Impression    Left Ventricle: Normal left ventricular size and systolic function.The calculated left ventricular ejection fraction is 55%. This represents a normal ejection fraction. Mild concentric hypertrophy noted. E/e' 8 to 15, which is equivocal for estimating   LV filling pressures.Left ventricular diastolic function is normal.    The left ventricular wall motion is normal.    Right Ventricle: Normal right ventricular size and systolic function. TAPSE is normal, which is consistent with normal right ventricular systolic function.    Thoracic Aorta: The aortic root is mildly dilated.    No hemodynamically significant valvular heart abnormalities.    No previous study for comparison       Advance Care Planning        Enrique Thomas MD  [facility]  [facility]   Phone: #[phone]

## 2021-11-09 NOTE — CONSULTS
Care Management Initial Consult    General Information  Assessment completed with: Patient, Mitchell  Type of CM/SW Visit: Initial Assessment    Primary Care Provider verified and updated as needed: Yes   Readmission within the last 30 days: no previous admission in last 30 days      Reason for Consult: discharge planning  Advance Care Planning: Advance Care Planning Reviewed: no concerns identified          Communication Assessment  Patient's communication style: spoken language (English or Bilingual)    Hearing Difficulty or Deaf: no   Wear Glasses or Blind: no    Cognitive  Cognitive/Neuro/Behavioral: WDL                      Living Environment:   People in home: spouse     Current living Arrangements: apartment      Able to return to prior arrangements: yes       Family/Social Support:  Care provided by: self  Provides care for: spouse  Marital Status:   Wife,Children          Description of Support System: Supportive,Involved    Support Assessment: Adequate social supports    Current Resources:   Patient receiving home care services: No     Community Resources: None  Equipment currently used at home: walker, rolling,shower chair  Supplies currently used at home: None    Employment/Financial:  Employment Status: retired        Financial Concerns: No concerns identified   Referral to Financial Counselor: No       Lifestyle & Psychosocial Needs:  Social Determinants of Health     Tobacco Use: Medium Risk     Smoking Tobacco Use: Former Smoker     Smokeless Tobacco Use: Never Used   Alcohol Use: Not on file   Financial Resource Strain: Not on file   Food Insecurity: Not on file   Transportation Needs: Not on file   Physical Activity: Not on file   Stress: Not on file   Social Connections: Not on file   Intimate Partner Violence: Not on file   Depression: Not on file   Housing Stability: Not on file       Functional Status:  Prior to admission patient needed assistance:   Dependent ADLs:: Independent  Dependent  IADLs:: Independent  Assesssment of Functional Status: At functional baseline    Mental Health Status:  Mental Health Status: No Current Concerns       Chemical Dependency Status:  Chemical Dependency Status: Current Concern             Values/Beliefs:  Spiritual, Cultural Beliefs, Latter-day Practices, Values that affect care: no               Additional Information:  ERICA assessed. Pt resides in an apt with his his wife, he reports independence with ADLs and IADLs at baseline. Pt states that he is the primary caregiver for his wife who is physically independent but has severe anxiety and requires some supervision. Pt uses a 4WW and shower chair at home, no other equipment or services. Of note, pt endorses drinking 1L vodka per day, however he states he does not wish to be approached with CD resources. Discharge goal to return home with family transport. No anticipated CM needs.    Nika Barajas LGSW

## 2021-11-10 ENCOUNTER — APPOINTMENT (OUTPATIENT)
Dept: PHYSICAL THERAPY | Facility: CLINIC | Age: 76
DRG: 682 | End: 2021-11-10
Payer: MEDICARE

## 2021-11-10 ENCOUNTER — APPOINTMENT (OUTPATIENT)
Dept: OCCUPATIONAL THERAPY | Facility: CLINIC | Age: 76
DRG: 682 | End: 2021-11-10
Attending: FAMILY MEDICINE
Payer: MEDICARE

## 2021-11-10 ENCOUNTER — APPOINTMENT (OUTPATIENT)
Dept: RADIOLOGY | Facility: CLINIC | Age: 76
DRG: 682 | End: 2021-11-10
Attending: HOSPITALIST
Payer: MEDICARE

## 2021-11-10 LAB
ALBUMIN SERPL-MCNC: 2.1 G/DL (ref 3.5–5)
ALBUMIN UR-MCNC: NEGATIVE MG/DL
ALP SERPL-CCNC: 155 U/L (ref 45–120)
ALT SERPL W P-5'-P-CCNC: 35 U/L (ref 0–45)
ANION GAP SERPL CALCULATED.3IONS-SCNC: 9 MMOL/L (ref 5–18)
APPEARANCE UR: CLEAR
AST SERPL W P-5'-P-CCNC: 89 U/L (ref 0–40)
BILIRUB DIRECT SERPL-MCNC: 0.9 MG/DL
BILIRUB SERPL-MCNC: 1.7 MG/DL (ref 0–1)
BILIRUB UR QL STRIP: NEGATIVE
BUN SERPL-MCNC: 9 MG/DL (ref 8–28)
CALCIUM SERPL-MCNC: 9.1 MG/DL (ref 8.5–10.5)
CHLORIDE BLD-SCNC: 101 MMOL/L (ref 98–107)
CO2 SERPL-SCNC: 27 MMOL/L (ref 22–31)
COLOR UR AUTO: NORMAL
CREAT SERPL-MCNC: 1.97 MG/DL (ref 0.7–1.3)
ERYTHROCYTE [DISTWIDTH] IN BLOOD BY AUTOMATED COUNT: 13.3 % (ref 10–15)
GFR SERPL CREATININE-BSD FRML MDRD: 32 ML/MIN/1.73M2
GLUCOSE BLD-MCNC: 111 MG/DL (ref 70–125)
GLUCOSE UR STRIP-MCNC: NEGATIVE MG/DL
HCT VFR BLD AUTO: 32.9 % (ref 40–53)
HGB BLD-MCNC: 10.5 G/DL (ref 13.3–17.7)
HGB UR QL STRIP: NEGATIVE
KETONES UR STRIP-MCNC: NEGATIVE MG/DL
LACTATE SERPL-SCNC: 1.4 MMOL/L (ref 0.7–2)
LEUKOCYTE ESTERASE UR QL STRIP: NEGATIVE
MCH RBC QN AUTO: 35.1 PG (ref 26.5–33)
MCHC RBC AUTO-ENTMCNC: 31.9 G/DL (ref 31.5–36.5)
MCV RBC AUTO: 110 FL (ref 78–100)
NITRATE UR QL: NEGATIVE
PH UR STRIP: 5 [PH] (ref 5–7)
PLATELET # BLD AUTO: 151 10E3/UL (ref 150–450)
POTASSIUM BLD-SCNC: 3.4 MMOL/L (ref 3.5–5)
PROCALCITONIN SERPL-MCNC: 0.72 NG/ML (ref 0–0.49)
PROT SERPL-MCNC: 4.5 G/DL (ref 6–8)
RBC # BLD AUTO: 2.99 10E6/UL (ref 4.4–5.9)
SODIUM SERPL-SCNC: 137 MMOL/L (ref 136–145)
SP GR UR STRIP: 1.01 (ref 1–1.03)
UROBILINOGEN UR STRIP-MCNC: <2 MG/DL
WBC # BLD AUTO: 3.8 10E3/UL (ref 4–11)

## 2021-11-10 PROCEDURE — 250N000013 HC RX MED GY IP 250 OP 250 PS 637: Performed by: FAMILY MEDICINE

## 2021-11-10 PROCEDURE — 97166 OT EVAL MOD COMPLEX 45 MIN: CPT | Mod: GO

## 2021-11-10 PROCEDURE — 81003 URINALYSIS AUTO W/O SCOPE: CPT | Performed by: HOSPITALIST

## 2021-11-10 PROCEDURE — 83605 ASSAY OF LACTIC ACID: CPT | Performed by: FAMILY MEDICINE

## 2021-11-10 PROCEDURE — 97535 SELF CARE MNGMENT TRAINING: CPT | Mod: GO

## 2021-11-10 PROCEDURE — 97116 GAIT TRAINING THERAPY: CPT | Mod: GP

## 2021-11-10 PROCEDURE — 85027 COMPLETE CBC AUTOMATED: CPT | Performed by: FAMILY MEDICINE

## 2021-11-10 PROCEDURE — 71045 X-RAY EXAM CHEST 1 VIEW: CPT

## 2021-11-10 PROCEDURE — 80053 COMPREHEN METABOLIC PANEL: CPT | Performed by: FAMILY MEDICINE

## 2021-11-10 PROCEDURE — 36415 COLL VENOUS BLD VENIPUNCTURE: CPT | Performed by: FAMILY MEDICINE

## 2021-11-10 PROCEDURE — 258N000003 HC RX IP 258 OP 636: Performed by: FAMILY MEDICINE

## 2021-11-10 PROCEDURE — 210N000002 HC R&B HEART CARE

## 2021-11-10 PROCEDURE — 250N000011 HC RX IP 250 OP 636: Performed by: FAMILY MEDICINE

## 2021-11-10 PROCEDURE — 82728 ASSAY OF FERRITIN: CPT | Performed by: FAMILY MEDICINE

## 2021-11-10 PROCEDURE — 87149 DNA/RNA DIRECT PROBE: CPT | Performed by: HOSPITALIST

## 2021-11-10 PROCEDURE — 82310 ASSAY OF CALCIUM: CPT | Performed by: FAMILY MEDICINE

## 2021-11-10 PROCEDURE — 84145 PROCALCITONIN (PCT): CPT | Performed by: FAMILY MEDICINE

## 2021-11-10 PROCEDURE — 87186 SC STD MICRODIL/AGAR DIL: CPT | Performed by: HOSPITALIST

## 2021-11-10 PROCEDURE — 82248 BILIRUBIN DIRECT: CPT | Performed by: FAMILY MEDICINE

## 2021-11-10 PROCEDURE — 99233 SBSQ HOSP IP/OBS HIGH 50: CPT | Performed by: FAMILY MEDICINE

## 2021-11-10 RX ORDER — PIPERACILLIN SODIUM, TAZOBACTAM SODIUM 3; .375 G/15ML; G/15ML
3.38 INJECTION, POWDER, LYOPHILIZED, FOR SOLUTION INTRAVENOUS EVERY 8 HOURS
Status: DISCONTINUED | OUTPATIENT
Start: 2021-11-10 | End: 2021-11-15

## 2021-11-10 RX ORDER — FUROSEMIDE 20 MG
20 TABLET ORAL
Status: DISCONTINUED | OUTPATIENT
Start: 2021-11-10 | End: 2021-11-11

## 2021-11-10 RX ORDER — PIPERACILLIN SODIUM, TAZOBACTAM SODIUM 3; .375 G/15ML; G/15ML
3.38 INJECTION, POWDER, LYOPHILIZED, FOR SOLUTION INTRAVENOUS ONCE
Status: COMPLETED | OUTPATIENT
Start: 2021-11-10 | End: 2021-11-10

## 2021-11-10 RX ADMIN — Medication 125 MCG: at 08:35

## 2021-11-10 RX ADMIN — VANCOMYCIN HYDROCHLORIDE 1750 MG: 5 INJECTION, POWDER, LYOPHILIZED, FOR SOLUTION INTRAVENOUS at 14:07

## 2021-11-10 RX ADMIN — FUROSEMIDE 20 MG: 20 TABLET ORAL at 12:16

## 2021-11-10 RX ADMIN — GABAPENTIN 100 MG: 100 CAPSULE ORAL at 14:07

## 2021-11-10 RX ADMIN — POLYETHYLENE GLYCOL 3350 17 G: 17 POWDER, FOR SOLUTION ORAL at 08:33

## 2021-11-10 RX ADMIN — GABAPENTIN 100 MG: 100 CAPSULE ORAL at 19:37

## 2021-11-10 RX ADMIN — HEPARIN SODIUM 5000 UNITS: 5000 INJECTION, SOLUTION INTRAVENOUS; SUBCUTANEOUS at 08:33

## 2021-11-10 RX ADMIN — FUROSEMIDE 20 MG: 20 TABLET ORAL at 18:08

## 2021-11-10 RX ADMIN — PIPERACILLIN AND TAZOBACTAM 3.38 G: 3; .375 INJECTION, POWDER, LYOPHILIZED, FOR SOLUTION INTRAVENOUS at 18:08

## 2021-11-10 RX ADMIN — GABAPENTIN 100 MG: 100 CAPSULE ORAL at 08:35

## 2021-11-10 RX ADMIN — PANTOPRAZOLE SODIUM 40 MG: 20 TABLET, DELAYED RELEASE ORAL at 18:08

## 2021-11-10 RX ADMIN — MAGNESIUM OXIDE TAB 400 MG (241.3 MG ELEMENTAL MG) 400 MG: 400 (241.3 MG) TAB at 19:37

## 2021-11-10 RX ADMIN — PIPERACILLIN AND TAZOBACTAM 3.38 G: 3; .375 INJECTION, POWDER, LYOPHILIZED, FOR SOLUTION INTRAVENOUS at 12:13

## 2021-11-10 RX ADMIN — Medication 100 MG: at 08:35

## 2021-11-10 RX ADMIN — ASPIRIN 81 MG: 81 TABLET, COATED ORAL at 19:37

## 2021-11-10 RX ADMIN — CLONIDINE HYDROCHLORIDE 0.1 MG: 0.1 TABLET ORAL at 05:02

## 2021-11-10 RX ADMIN — MULTIPLE VITAMINS W/ MINERALS TAB 1 TABLET: TAB at 08:35

## 2021-11-10 RX ADMIN — CLONIDINE HYDROCHLORIDE 0.1 MG: 0.1 TABLET ORAL at 12:16

## 2021-11-10 RX ADMIN — LORAZEPAM 1 MG: 1 TABLET ORAL at 19:37

## 2021-11-10 RX ADMIN — Medication 50 MG: at 08:36

## 2021-11-10 ASSESSMENT — ACTIVITIES OF DAILY LIVING (ADL)
ADLS_ACUITY_SCORE: 11
ADLS_ACUITY_SCORE: 12
ADLS_ACUITY_SCORE: 11
ADLS_ACUITY_SCORE: 11
ADLS_ACUITY_SCORE: 12
ADLS_ACUITY_SCORE: 11
ADLS_ACUITY_SCORE: 11
ADLS_ACUITY_SCORE: 12
ADLS_ACUITY_SCORE: 11
ADLS_ACUITY_SCORE: 12
ADLS_ACUITY_SCORE: 11
ADLS_ACUITY_SCORE: 11

## 2021-11-10 ASSESSMENT — MIFFLIN-ST. JEOR: SCORE: 1725.99

## 2021-11-10 NOTE — PROGRESS NOTES
"CLINICAL NUTRITION SERVICES - ASSESSMENT NOTE      REASON FOR ASSESSMENT  Lenny Chau is a/an 76 year old male assessed by the dietitian for Provider Order - malnutrition risk    Pt admitted with fever, bilateral LE edema, CKD, ETOH abuse, COPD, CHF, Barretts esophagus; hx of lung CA    NUTRITION HISTORY  He states his appetite is better here. He was to the point of mostly drinking (ETOH) with very little food intake at home. He states he has never been through treatment but doesn't want to be here again. He is not wanting any supplements at this time.     CURRENT NUTRITION ORDERS  Diet: LILLIAM  Intake/Tolerance: 100%    LABS  Labs reviewed; k-3.4, Cr-1.97, Alb-2.1    MEDICATIONS  Medications reviewed; lasix, MVI    ANTHROPOMETRICS  Height: 180.3 cm (5' 11\")  Most Recent Weight: 97.9 kg (215 lb 12.8 oz)    IBW: 75 kg  BMI: Obesity Grade I BMI 30-34.9  Weight History:   Wt Readings from Last 5 Encounters:   11/10/21 97.9 kg (215 lb 12.8 oz)   09/28/21 95.2 kg (209 lb 12.8 oz)   06/25/21 92.1 kg (203 lb)   06/23/21 92.3 kg (203 lb 8 oz)   06/15/21 92.2 kg (203 lb 4.8 oz)       Dosing Weight: 75 kg    ASSESSED NUTRITION NEEDS  Estimated Energy Needs: 4254-1253 kcals/day (25 - 30 kcals/kg)  Justification: Maintenance  Estimated Protein Needs: 75-90 grams protein/day (1 - 1.2 grams of pro/kg)  Justification: Maintenance  Estimated Fluid Needs: 1323-0848 mL/day (25 - 30 mL/kg)   Justification: Maintenance    PHYSICAL FINDINGS  See malnutrition section below.  +2-+3 bilateral LE edema       MALNUTRITION:  % Weight Loss:  None noted  % Intake:  </= 50% for >/= 5 days (severe malnutrition)  Subcutaneous Fat Loss:  None observed  Muscle Loss:  None observed  Fluid Retention:  Severe +2-+3    NUTRITION DIAGNOSIS  None at this time     INTERVENTIONS  Implementation  Continue to encourage po  Pt not wanting supplements    Goals  Continue to meet est needs      Monitoring/Evaluation  Progress toward goals will be monitored and " evaluated per protocol.

## 2021-11-10 NOTE — PLAN OF CARE
Problem: Adult Inpatient Plan of Care  Goal: Readiness for Transition of Care  Outcome: Improving     Problem: Risk for Delirium  Goal: Improved Sleep  Outcome: Improving     Patient is alert and oriented. Pt is a CIWA scoring 2 and 0 this shift. Pt's BP dropped to 81/49 during transfer from bed to chair with OT. Writer went into room and assess Pt. He stated he felt lightheaded during transfer, but felt better when sitting in the chair. BP recheck was 105/57. MD was notified and clonidine was discontinued. Pt is normal sinus with a first degree block and a bundle branch block. Will continue to monitor.     Corrina Zaragoza RN

## 2021-11-10 NOTE — PLAN OF CARE
Problem: Adult Inpatient Plan of Care  Goal: Optimal Comfort and Wellbeing  Outcome: No Change     Problem: Adult Inpatient Plan of Care  Goal: Readiness for Transition of Care  Outcome: No Change     Patient is alert and oriented. Pt denies pain. Admission was completed with daughter Georgette Mitchell) at the bedside to help answer questions when needed. Pt is up with assistance of 1, gait belt, and 4 wheeled walker. Pt is unsteady on his feet. He states he feels weak and has pins and needles in his feet when ambulating. Pt is agreeable to using bedside commode and urinal for safety. Bed alarms are also activated for safety. Pt is a CIWA and is not scoring currently. Albumin bolus was given (see MAR) and IV lasix was started. Pt bladder scanned for 284 and outputted 200. Pt is normal sinus rhythm with a first degree block and a bundle branch block. Will continue to monitor.     Corrina Zaragoza RN

## 2021-11-10 NOTE — PROGRESS NOTES
11/10/21 1315   Quick Adds   Type of Visit Initial Occupational Therapy Evaluation   Living Environment   People in home spouse   Current Living Arrangements apartment   Living Environment Comments handicap accessible   Self-Care   Equipment Currently Used at Home shower chair;grab bar, toilet;grab bar, tub/shower  (4WW)   Activity/Exercise/Self-Care Comment pt indep ADLs/IADLs at baseline, caregiver for wife (for cognition, not physically)   Disability/Function   Fall history within last six months no   General Information   Onset of Illness/Injury or Date of Surgery 11/08/21   Referring Physician Enrique Thomas   Additional Occupational Profile Info/Pertinent History of Current Problem BLE edema, ETOH- drinks 1L vodka per day, lung cancer   General Observations and Info BP dropped once in chair, nursing aware. entered in vitals flowsheet   Cognitive Status Examination   Affect/Mental Status (Cognitive) agitated  (but cooperative)   Cognitive Status Comments further assessment rec'd   Pain Assessment   Patient Currently in Pain Yes, see Vital Sign flowsheet   Integumentary/Edema   Integumentary/Edema Comments BLE edema, painful   Range of Motion Comprehensive   General Range of Motion no range of motion deficits identified   Strength Comprehensive (MMT)   General Manual Muscle Testing (MMT) Assessment no strength deficits identified   Bed Mobility   Bed Mobility supine-sit;scooting/bridging   Scooting/Bridging [place] (Bed Mobility) minimum assist (75% patient effort)   Supine-Sit [place] (Bed Mobility) minimum assist (75% patient effort)   Assistive Device (Bed Mobility) bed rails   Transfers   Transfers sit-stand transfer;bed-chair transfer   Transfer Comments CGA   Balance   Balance Comments pt unsteady   Activities of Daily Living   BADL Assessment lower body dressing   Lower Body Dressing Assessment   [place] Level (Lower Body Dressing) contact guard assist   Clinical Impression   Criteria  for Skilled Therapeutic Interventions Met (OT) yes   OT Diagnosis decr ADL indep   OT Problem List-Impairments impacting ADL pain;mobility;activity tolerance impaired   Assessment of Occupational Performance 3-5 Performance Deficits   Identified Performance Deficits bed mobility, dressing, transfers   Planned Therapy Interventions (OT) ADL retraining   Clinical Decision Making Complexity (OT) moderate complexity   Therapy Frequency (OT) Daily   Predicted Duration of Therapy 3-5 days   Risk & Benefits of therapy have been explained evaluation/treatment results reviewed;patient   OT Discharge Planning    OT Discharge Recommendation (DC Rec) Transitional Care Facility   OT Rationale for DC Rec pt needing A of 1, not at baseline. pt unsteady   Total Evaluation Time (Minutes)   Total Evaluation Time (Minutes) 5   Nikia Martins, OTR/L

## 2021-11-10 NOTE — PLAN OF CARE
Problem: Fever  Goal: Body Temperature in Desired Range  Outcome: No Change   Elevated temp., patient flushed and warm to touch. Dr. Groves notified and new orders received. CXR and Blood cx pending.    Problem: Hypertension Comorbidity  Goal: Blood Pressure in Desired Range  Outcome: Declining   Low bp. Lactic acid protocol ran with lactic 1.4. Reinforced to patient to not get OOB without assistance. Bed alarm in place. Will continue to monitor.

## 2021-11-10 NOTE — PROGRESS NOTES
[facility] MEDICINE  PROGRESS NOTE     Code Status: No CPR- Do NOT Intubate       Identification/Summary:   76 year old male with PMH signficant for CAD, lung cancer, prostate and oral cancer, CHF, COPD, Ragsdale's, CKD and alcohol abuse.  11/8 admitted with bilateral lower extremity edema, pain, LHD.  LEILA creatinine 2.2.  Admits to 1 L vodka per day.  After IV fluids lactic acidosis resolved and creatinine 2.06.  Low albumin.  11/9 given Lasix 20 mg IV every 12 hours and albumin infusion.    Overnight fever spike 1-1.8.  Unclear etiology.  Empirically starting Zosyn and vancomycin.  Likely here 2-3 more days.     Assessment and Plan:  -Nocturnal fever  Unclear etiology.  T-max 101.8.  Blood work done last evening normal.  Chest x-ray clear.  Negative urinalysis.  This morning procalcitonin 0.72 and white count at 3.8.  Lower extremities erythema appear improved.  Will empirically treat with Zosyn and vancomycin.  Pharmacy to dose.  -Bilateral lower extremity edema  -Bilateral lower extremity pain/neuropathy  Significant findings: BNP 99, troponin 0.06, D-dimer 1.79.  Normal B12 and folate.  VQ scan low probability for pulmonary embolism.  Chest x-ray shows prior lung resections otherwise negative.   Bilateral lower extremity ultrasound negative for DVT.  Right upper quadrant abdominal ultrasound shows hepatic steatosis with gallbladder sludge otherwise normal.  11/8 given gabapentin 3 times daily.  Lactated Ringer's at 75 mL/h.  11/9 with kidney function improved will try to diurese.  Gave albumin infusion x1 and Lasix 20 mg IV every 12 hours.  11/10 some slight improvement.  Transition Lasix to orals 20 twice daily.  Follow strict I's and O's and daily weights.  -Lactic acidosis  -Acute on chronic kidney disease stage III  -Hyponatremia  May 2021 creatinine 1.66.    Given IV fluids at admission.  Admission creatinine 2.26--> 2.06--> 1.97.  Admission lactic 2.6--> 2.3--> 1.4.     11/9 okay discontinue IV fluids.    Diuresis and albumin as above.  Follow BMP.  -Alcohol abuse  Patient admits to drinking 1 L of vodka per day.  Denies history of withdrawal but also cannot recall the last time he had a day of abstinence from alcohol.  Utilize [facility] protocols at this time with lorazepam.    11/10 no evidence of withdrawal so far.  Social work chemical dependency consultation.  -Hypoalbuminemia  -Moderate protein caloric malnutrition  Albumin infusion as noted above.  RD consultation ordered.  -Coronary artery disease.  MI 2019.  Stent x1.  -Essential hypertension  -Chronic systolic heart failure  May 2021 echocardiogram EF 55%.  Mild hypertrophy otherwise normal.  11/8 given home metoprolol XL 12.5 mg daily with hold parameters.  11/9 echocardiogram EF 55 to 60% borderline LVH and mild MR.  -New bifascicular block  Previous EKGs had left axis deviation along with right bundle branch block.  Admission EKG shows new bifascicular block.  Serial troponins negative.  Echo as above.  No further work-up at this time.  -Ragsdale's esophagus  Continue home omeprazole 20 mg daily.  -COPD  Continue home Trelegy Ellipta inhaler and albuterol as needed.  -Abnormal UA  Unclear if patient could be dealing with a UTI or not.  Denies any dysuria.  Urinalysis was abnormal but not severe enough to trigger urine culture.  Hold off on antibiotics at this time.       -COVID19 PCR status negative from 11/8/2021  Patient is vaccinated.  Standard precautions.  -Anticoagulation   INR 1.22.  Continue home aspirin 81 mg nightly.  Utilize subcutaneous heparin.  Fluids: Discontinue IV fluids  Pain meds: na  Therapy: PT OT ordered  Urban:Not present  Current Diet  Orders Placed This Encounter      No Added Salt 4 Gram Sodium    Supplements  None    Barriers to Discharge: Fever, LEILA and IV antibiotics    Disposition: At least 2 to 3 days    Interval History/Subjective:  Last evening had a fever spike up to 1-1.8.  No respiratory  symptoms.  No dysuria.  No nausea or vomiting.  No symptoms of alcohol withdrawal.  Fatigued this morning from staying up last night.  Daughter is present and supportive.  Questions answered to verbalized satisfaction.    Physical Exam/Objective:  Temp:  [98.2  F (36.8  C)-101.7  F (38.7  C)] 99  F (37.2  C)  Pulse:  [64-85] 72  Resp:  [21-28] 24  BP: ()/(51-71) 108/57  SpO2:  [88 %-96 %] 92 %  Wt Readings from Last 4 Encounters:   11/10/21 97.9 kg (215 lb 12.8 oz)   09/28/21 95.2 kg (209 lb 12.8 oz)   06/25/21 92.1 kg (203 lb)   06/23/21 92.3 kg (203 lb 8 oz)     Body mass index is 30.1 kg/m .    Constitutional: awake, alert, cooperative, no apparent distress, and appears stated age and fatigued  ENT: Normocephalic, without obvious abnormality, atraumatic, external ears without lesions, oral pharynx with moist mucous membranes, tonsils without erythema or exudates, gums normal and good dentition.  Respiratory: No increased work of breathing, good air exchange, clear to auscultation bilaterally, no crackles or wheezing  Cardiovascular: Normal apical impulse, regular rate and rhythm, normal S1 and S2, no S3 or S4, and no murmur noted  GI: No scars, normal bowel sounds, soft, non-distended, non-tender, no masses palpated, no hepatosplenomegally  Skin: Mild erythema to lower extremities right greater than left appear perhaps slightly improved compared to previous exam.  Musculoskeletal: Strength 5 out of 5 and equal bilaterally.  Slight improvement to edema bilaterally.   Neurologic: Cranial nerves II-XII are grossly intact. Sensory:  Sensory intact  Neuropsychiatric: General: normal, calm and normal eye contact Level of consciousness: alert / normal Affect: normal Orientation: oriented to self, place, time and situation Memory and insight: normal, memory for past and recent events intact and thought process normal      Medications:   Personally Reviewed.  Medications       aspirin  81 mg Oral At Bedtime      cloNIDine  0.1 mg Oral Q8H     Fluticasone-Umeclidin-Vilanterol  1 puff Inhalation Daily     furosemide  20 mg Intravenous Q12H     gabapentin  100 mg Oral TID     heparin ANTICOAGULANT  5,000 Units Subcutaneous Q12H     magnesium oxide  400 mg Oral At Bedtime     metoprolol succinate ER  12.5 mg Oral Daily     multivitamin w/minerals  1 tablet Oral Daily     pantoprazole  40 mg Oral Daily with supper     piperacillin-tazobactam  3.375 g Intravenous Once    Followed by     piperacillin-tazobactam  3.375 g Intravenous Q8H     polyethylene glycol  17 g Oral Daily     sodium chloride (PF)  3 mL Intracatheter Q8H     sodium chloride (PF)  3 mL Intracatheter Q8H     thiamine  100 mg Oral Daily     Vitamin D3  125 mcg Oral Daily     zinc gluconate  50 mg Oral Daily       Data reviewed today: I personally reviewed all new medications, labs, imaging/diagnostics reports over the past 24 hours. Pertinent findings include:    Imaging:   Recent Results (from the past 24 hour(s))   XR Chest Port 1 View    Narrative    EXAM: XR CHEST PORT 1 VIEW  LOCATION: [facility]  DATE/TIME: 11/10/2021 2:29 AM    INDICATION: Fever.  COMPARISON: 11/8/2021.    FINDINGS: The heart size is normal. The thoracic aorta is calcified. Stable surgical clip and scarring in the left upper lobe. The lungs are otherwise clear. Old right rib fractures. No pneumothorax.      Impression    IMPRESSION: No acute abnormality.       Labs:  XR Chest Port 1 View   Final Result   IMPRESSION: No acute abnormality.      Echocardiogram Complete   Final Result      Abdomen US, limited (RUQ only)   Final Result   IMPRESSION:   1.  Diffuse hepatic steatosis.   2.  Sludge containing gallbladder.            US Lower Extremity Venous Duplex Bilateral   Final Result   IMPRESSION:   1.  No deep venous thrombosis in the bilateral lower extremities.      XR Chest 2 Views   Final Result   IMPRESSION: Stable posttreatment changes left upper lobe and  postop wedge resections right upper and middle lobes. No new focal consolidation, pneumothorax nor pleural effusion.      NM Lung Scan Perfusion Particulate   Final Result   IMPRESSION:    1.  Pulmonary embolus absent, low probability scan.        Recent Results (from the past 24 hour(s))   UA reflex to Microscopic and Culture    Collection Time: 11/10/21 12:14 AM    Specimen: Urine, Midstream   Result Value Ref Range    Color Urine Light Yellow Colorless, Straw, Light Yellow, Yellow    Appearance Urine Clear Clear    Glucose Urine Negative Negative mg/dL    Bilirubin Urine Negative Negative    Ketones Urine Negative Negative mg/dL    Specific Gravity Urine 1.007 1.001 - 1.030    Blood Urine Negative Negative    pH Urine 5.0 5.0 - 7.0    Protein Albumin Urine Negative Negative mg/dL    Urobilinogen Urine <2.0 <2.0 mg/dL    Nitrite Urine Negative Negative    Leukocyte Esterase Urine Negative Negative   Lactic Acid STAT    Collection Time: 11/10/21 12:15 AM   Result Value Ref Range    Lactic Acid 1.4 0.7 - 2.0 mmol/L   Hepatic function panel    Collection Time: 11/10/21  5:33 AM   Result Value Ref Range    Bilirubin Total 1.7 (H) 0.0 - 1.0 mg/dL    Bilirubin Direct 0.9 (H) <=0.5 mg/dL    Protein Total 4.5 (L) 6.0 - 8.0 g/dL    Albumin 2.1 (L) 3.5 - 5.0 g/dL    Alkaline Phosphatase 155 (H) 45 - 120 U/L    AST 89 (H) 0 - 40 U/L    ALT 35 0 - 45 U/L   Basic metabolic panel    Collection Time: 11/10/21  5:33 AM   Result Value Ref Range    Sodium 137 136 - 145 mmol/L    Potassium 3.4 (L) 3.5 - 5.0 mmol/L    Chloride 101 98 - 107 mmol/L    Carbon Dioxide (CO2) 27 22 - 31 mmol/L    Anion Gap 9 5 - 18 mmol/L    Urea Nitrogen 9 8 - 28 mg/dL    Creatinine 1.97 (H) 0.70 - 1.30 mg/dL    Calcium 9.1 8.5 - 10.5 mg/dL    Glucose 111 70 - 125 mg/dL    GFR Estimate 32 (L) >60 mL/min/1.73m2   CBC with platelets    Collection Time: 11/10/21  8:49 AM   Result Value Ref Range    WBC Count 3.8 (L) 4.0 - 11.0 10e3/uL    RBC Count 2.99 (L)  4.40 - 5.90 10e6/uL    Hemoglobin 10.5 (L) 13.3 - 17.7 g/dL    Hematocrit 32.9 (L) 40.0 - 53.0 %     (H) 78 - 100 fL    MCH 35.1 (H) 26.5 - 33.0 pg    MCHC 31.9 31.5 - 36.5 g/dL    RDW 13.3 10.0 - 15.0 %    Platelet Count 151 150 - 450 10e3/uL   Procalcitonin    Collection Time: 11/10/21  8:49 AM   Result Value Ref Range    Procalcitonin 0.72 (H) 0.00 - 0.49 ng/mL       Pending Labs:  Unresulted Labs Ordered in the Past 30 Days of this Admission     Date and Time Order Name Status Description    11/9/2021 11:59 PM Blood Culture Arm, Right In process     11/9/2021 11:59 PM Blood Culture Hand, Right In process     11/8/2021 11:25 AM Blood Culture Peripheral Blood Preliminary     11/8/2021 11:25 AM Blood Culture Peripheral Blood Preliminary             Enrique Thomas MD  [facility]  Phone: #[phone]

## 2021-11-10 NOTE — PROGRESS NOTES
Care Management Follow Up    Length of Stay (days): 2    Expected Discharge Date: 11/12/2021     Concerns to be Addressed: diagnostic work up in process        Patient plan of care discussed at interdisciplinary rounds: Yes    Anticipated Discharge Disposition:  TCU     Anticipated Discharge Services: TCU   Anticipated Discharge DME:  n/a    Education Provided on the Discharge Plan:  yes  Patient/Family in Agreement with the Plan:  yes    Referrals Placed by CM/SW:  TCU      Additional Information: Diagnostic work up for fever.     Assessment History: Pt resides in an apt with his his wife, he reports independence with ADLs and IADLs at baseline. Pt states that he is the primary caregiver for his wife who is physically independent but has severe anxiety and requires some supervision. Pt uses a 4WW and shower chair at home, no other equipment or services. Of note, pt endorses drinking 1L vodka per day, however he states he does not wish to be approached with CD resources.    Covid Vaccination Moderna 1/28/21, 2/28/21.    Met with patient and his daughter Georgette to discuss recommendation for TCU. Patient states agreement but would not want to go to [facility] where he has been before. Both state agreement for TCU near [place], [place] or [place] or even [facility]. Referrals faxed. PAS needed.        Meera Rhodes RN

## 2021-11-10 NOTE — PHARMACY-VANCOMYCIN DOSING SERVICE
"Pharmacy Vancomycin Initial Note  Date of Service November 10, 2021  Patient's  [id number]  76 year old, male    Indication: Sepsis    Current estimated CrCl = Estimated Creatinine Clearance: 37.4 mL/min (A) (based on SCr of 1.97 mg/dL (H)).    Creatinine for last 3 days  2021: 11:32 AM Creatinine 2.26 mg/dL  2021:  2:40 AM Creatinine 2.06 mg/dL  11/10/2021:  5:33 AM Creatinine 1.97 mg/dL    Recent Vancomycin Level(s) for last 3 days  No results found for requested labs within last 72 hours.      Vancomycin IV Administrations (past 72 hours)      No vancomycin orders with administrations in past 72 hours.                Nephrotoxins and other renal medications (From now, onward)    Start     Dose/Rate Route Frequency Ordered Stop    21 1200  vancomycin 1000 mg in 0.9% NaCl 250 mL intermittent infusion 1,000 mg         1,000 mg  over 60 Minutes Intravenous EVERY 24 HOURS 11/10/21 1124      11/10/21 1702  piperacillin-tazobactam (ZOSYN) 3.375 g vial to attach to  mL bag        Note to Pharmacy: Extended infusion dosing to start 6 hours after initial infusion.   \"Followed by\" Linked Group Details    3.375 g  over 240 Minutes Intravenous EVERY 8 HOURS 11/10/21 1103      11/10/21 1130  piperacillin-tazobactam (ZOSYN) 3.375 g vial to attach to  mL bag        \"Followed by\" Linked Group Details    3.375 g  over 30 Minutes Intravenous ONCE 11/10/21 1103      11/10/21 1130  vancomycin 1750 mg in 0.9% NaCl 500 ml intermittent infusion 1,750 mg         1,750 mg  over 2 Hours Intravenous ONCE 11/10/21 1113      21 1430  furosemide (LASIX) injection 20 mg         20 mg  over 1-3 Minutes Intravenous EVERY 12 HOURS 21 1410            Contrast Orders - past 72 hours (72h ago, onward)            Start     Dose/Rate Route Frequency Ordered Stop    21 1100  perflutren lipid microsphere (DEFINITY) injection SUSP 3 mL         3 mL Intravenous ONCE 21 1043 21 1043    21 " 1530  technetium pertechnetate with albumin (Tc99m MAA) radioisotope injection 7-9 millicurie         7-9 millicurie Intravenous ONCE 11/08/21 1509 11/08/21 1510          [employer] Prediction of Planned Initial Vancomycin Regimen  Loading dose: 1750 mg at 12:00 11/10/2021.  Regimen: 1000 mg IV every 24 hours.  Start time: 11:23 on 11/10/2021  Exposure target: AUC24 (range)400-600 mg/L.hr   AUC24,ss: 487 mg/L.hr  Probability of AUC24 > 400: 71 %  Ctrough,ss: 16.2 mg/L  Probability of Ctrough,ss > 20: 30 %  Probability of nephrotoxicity (Lodise LINA 2009): 12 %      Hx of CKDIII.  Follow scr closely       Plan:  1. Start vancomycin  1750 mg loading dose, followed by 1000 mg IV q24h.   2. Vancomycin monitoring method: AUC  3. Vancomycin therapeutic monitoring goal: 400-600 mg*h/L  4. Pharmacy will check vancomycin levels as appropriate in 1-3 Days.    5. Serum creatinine levels will be ordered daily for the first week of therapy and at least twice weekly for subsequent weeks.      Mary Kay Keller [facility]

## 2021-11-11 ENCOUNTER — APPOINTMENT (OUTPATIENT)
Dept: PHYSICAL THERAPY | Facility: CLINIC | Age: 76
DRG: 682 | End: 2021-11-11
Payer: MEDICARE

## 2021-11-11 ENCOUNTER — APPOINTMENT (OUTPATIENT)
Dept: CARDIOLOGY | Facility: CLINIC | Age: 76
DRG: 682 | End: 2021-11-11
Attending: INTERNAL MEDICINE
Payer: MEDICARE

## 2021-11-11 ENCOUNTER — APPOINTMENT (OUTPATIENT)
Dept: CT IMAGING | Facility: CLINIC | Age: 76
DRG: 682 | End: 2021-11-11
Attending: INTERNAL MEDICINE
Payer: MEDICARE

## 2021-11-11 LAB
ALBUMIN SERPL-MCNC: 2 G/DL (ref 3.5–5)
ALP SERPL-CCNC: 160 U/L (ref 45–120)
ALT SERPL W P-5'-P-CCNC: 39 U/L (ref 0–45)
ANION GAP SERPL CALCULATED.3IONS-SCNC: 8 MMOL/L (ref 5–18)
AST SERPL W P-5'-P-CCNC: 100 U/L (ref 0–40)
BILIRUB DIRECT SERPL-MCNC: 0.9 MG/DL
BILIRUB SERPL-MCNC: 1.6 MG/DL (ref 0–1)
BUN SERPL-MCNC: 8 MG/DL (ref 8–28)
CALCIUM SERPL-MCNC: 9.2 MG/DL (ref 8.5–10.5)
CHLORIDE BLD-SCNC: 102 MMOL/L (ref 98–107)
CO2 SERPL-SCNC: 29 MMOL/L (ref 22–31)
CREAT SERPL-MCNC: 2.23 MG/DL (ref 0.7–1.3)
ENTEROCOCCUS FAECALIS: NOT DETECTED
ENTEROCOCCUS FAECIUM: NOT DETECTED
FERRITIN SERPL-MCNC: 461 NG/ML (ref 27–300)
GFR SERPL CREATININE-BSD FRML MDRD: 27 ML/MIN/1.73M2
GLUCOSE BLD-MCNC: 125 MG/DL (ref 70–125)
IRON SATN MFR SERPL: 56 % (ref 20–50)
IRON SERPL-MCNC: 58 UG/DL (ref 42–175)
LACTATE SERPL-SCNC: 2.2 MMOL/L (ref 0.7–2)
LISTERIA SPECIES (DETECTED/NOT DETECTED): NOT DETECTED
LVEF ECHO: NORMAL
PLATELET # BLD AUTO: 148 10E3/UL (ref 150–450)
POTASSIUM BLD-SCNC: 3.3 MMOL/L (ref 3.5–5)
PROT SERPL-MCNC: 4.7 G/DL (ref 6–8)
SODIUM SERPL-SCNC: 139 MMOL/L (ref 136–145)
STAPHYLOCOCCUS AUREUS: NOT DETECTED
STAPHYLOCOCCUS EPIDERMIDIS: NOT DETECTED
STAPHYLOCOCCUS LUGDUNENSIS: NOT DETECTED
STAPHYLOCOCCUS SPECIES: NOT DETECTED
STREPTOCOCCUS AGALACTIAE: NOT DETECTED
STREPTOCOCCUS ANGINOSUS GROUP: NOT DETECTED
STREPTOCOCCUS PNEUMONIAE: NOT DETECTED
STREPTOCOCCUS PYOGENES: NOT DETECTED
STREPTOCOCCUS SPECIES: NOT DETECTED
TIBC SERPL-MCNC: 103 UG/DL (ref 313–563)
TRANSFERRIN SERPL-MCNC: 82 MG/DL (ref 212–360)

## 2021-11-11 PROCEDURE — 250N000013 HC RX MED GY IP 250 OP 250 PS 637: Performed by: PHYSICIAN ASSISTANT

## 2021-11-11 PROCEDURE — 85049 AUTOMATED PLATELET COUNT: CPT | Performed by: FAMILY MEDICINE

## 2021-11-11 PROCEDURE — 258N000003 HC RX IP 258 OP 636: Performed by: FAMILY MEDICINE

## 2021-11-11 PROCEDURE — 250N000011 HC RX IP 250 OP 636: Performed by: PHYSICIAN ASSISTANT

## 2021-11-11 PROCEDURE — 87040 BLOOD CULTURE FOR BACTERIA: CPT | Performed by: FAMILY MEDICINE

## 2021-11-11 PROCEDURE — 250N000011 HC RX IP 250 OP 636: Performed by: FAMILY MEDICINE

## 2021-11-11 PROCEDURE — 255N000002 HC RX 255 OP 636: Performed by: FAMILY MEDICINE

## 2021-11-11 PROCEDURE — 97530 THERAPEUTIC ACTIVITIES: CPT | Mod: GP

## 2021-11-11 PROCEDURE — 83605 ASSAY OF LACTIC ACID: CPT | Performed by: FAMILY MEDICINE

## 2021-11-11 PROCEDURE — 93306 TTE W/DOPPLER COMPLETE: CPT | Mod: 26 | Performed by: INTERNAL MEDICINE

## 2021-11-11 PROCEDURE — 99223 1ST HOSP IP/OBS HIGH 75: CPT | Performed by: PHYSICIAN ASSISTANT

## 2021-11-11 PROCEDURE — 99233 SBSQ HOSP IP/OBS HIGH 50: CPT | Performed by: FAMILY MEDICINE

## 2021-11-11 PROCEDURE — P9047 ALBUMIN (HUMAN), 25%, 50ML: HCPCS | Performed by: PHYSICIAN ASSISTANT

## 2021-11-11 PROCEDURE — 83540 ASSAY OF IRON: CPT | Performed by: PHYSICIAN ASSISTANT

## 2021-11-11 PROCEDURE — 999N000208 ECHOCARDIOGRAM COMPLETE

## 2021-11-11 PROCEDURE — 99223 1ST HOSP IP/OBS HIGH 75: CPT | Performed by: INTERNAL MEDICINE

## 2021-11-11 PROCEDURE — 80053 COMPREHEN METABOLIC PANEL: CPT | Performed by: FAMILY MEDICINE

## 2021-11-11 PROCEDURE — 210N000002 HC R&B HEART CARE

## 2021-11-11 PROCEDURE — 71250 CT THORAX DX C-: CPT

## 2021-11-11 PROCEDURE — 36415 COLL VENOUS BLD VENIPUNCTURE: CPT | Performed by: FAMILY MEDICINE

## 2021-11-11 PROCEDURE — 250N000013 HC RX MED GY IP 250 OP 250 PS 637: Performed by: FAMILY MEDICINE

## 2021-11-11 RX ORDER — ALBUMIN (HUMAN) 12.5 G/50ML
50 SOLUTION INTRAVENOUS 2 TIMES DAILY
Status: COMPLETED | OUTPATIENT
Start: 2021-11-11 | End: 2021-11-13

## 2021-11-11 RX ORDER — BUMETANIDE 0.5 MG/1
0.5 TABLET ORAL
Status: DISCONTINUED | OUTPATIENT
Start: 2021-11-11 | End: 2021-11-13

## 2021-11-11 RX ORDER — GABAPENTIN 100 MG/1
200 CAPSULE ORAL 3 TIMES DAILY
Status: DISCONTINUED | OUTPATIENT
Start: 2021-11-11 | End: 2021-11-13

## 2021-11-11 RX ADMIN — VANCOMYCIN HYDROCHLORIDE 1000 MG: 5 INJECTION, POWDER, LYOPHILIZED, FOR SOLUTION INTRAVENOUS at 13:12

## 2021-11-11 RX ADMIN — GABAPENTIN 100 MG: 100 CAPSULE ORAL at 08:59

## 2021-11-11 RX ADMIN — PIPERACILLIN AND TAZOBACTAM 3.38 G: 3; .375 INJECTION, POWDER, LYOPHILIZED, FOR SOLUTION INTRAVENOUS at 16:45

## 2021-11-11 RX ADMIN — METOPROLOL SUCCINATE 12.5 MG: 25 TABLET, EXTENDED RELEASE ORAL at 08:59

## 2021-11-11 RX ADMIN — PERFLUTREN 5 ML: 6.52 INJECTION, SUSPENSION INTRAVENOUS at 14:44

## 2021-11-11 RX ADMIN — HEPARIN SODIUM 5000 UNITS: 5000 INJECTION, SOLUTION INTRAVENOUS; SUBCUTANEOUS at 20:58

## 2021-11-11 RX ADMIN — ASPIRIN 81 MG: 81 TABLET, COATED ORAL at 22:02

## 2021-11-11 RX ADMIN — GABAPENTIN 200 MG: 100 CAPSULE ORAL at 20:58

## 2021-11-11 RX ADMIN — MULTIPLE VITAMINS W/ MINERALS TAB 1 TABLET: TAB at 08:59

## 2021-11-11 RX ADMIN — MAGNESIUM OXIDE TAB 400 MG (241.3 MG ELEMENTAL MG) 400 MG: 400 (241.3 MG) TAB at 22:02

## 2021-11-11 RX ADMIN — POLYETHYLENE GLYCOL 3350 17 G: 17 POWDER, FOR SOLUTION ORAL at 08:58

## 2021-11-11 RX ADMIN — Medication 100 MG: at 08:59

## 2021-11-11 RX ADMIN — Medication 50 MG: at 08:59

## 2021-11-11 RX ADMIN — PANTOPRAZOLE SODIUM 40 MG: 20 TABLET, DELAYED RELEASE ORAL at 16:45

## 2021-11-11 RX ADMIN — PIPERACILLIN AND TAZOBACTAM 3.38 G: 3; .375 INJECTION, POWDER, LYOPHILIZED, FOR SOLUTION INTRAVENOUS at 08:58

## 2021-11-11 RX ADMIN — BUMETANIDE 0.5 MG: 0.5 TABLET ORAL at 12:13

## 2021-11-11 RX ADMIN — Medication 125 MCG: at 08:59

## 2021-11-11 RX ADMIN — HEPARIN SODIUM 5000 UNITS: 5000 INJECTION, SOLUTION INTRAVENOUS; SUBCUTANEOUS at 08:59

## 2021-11-11 RX ADMIN — ALBUMIN HUMAN 50 G: 0.25 SOLUTION INTRAVENOUS at 12:13

## 2021-11-11 RX ADMIN — BUMETANIDE 0.5 MG: 0.5 TABLET ORAL at 16:45

## 2021-11-11 RX ADMIN — PIPERACILLIN AND TAZOBACTAM 3.38 G: 3; .375 INJECTION, POWDER, LYOPHILIZED, FOR SOLUTION INTRAVENOUS at 00:30

## 2021-11-11 RX ADMIN — ALBUMIN HUMAN 50 G: 0.25 SOLUTION INTRAVENOUS at 22:01

## 2021-11-11 RX ADMIN — ALBUTEROL SULFATE 2 PUFF: 90 INHALANT RESPIRATORY (INHALATION) at 08:59

## 2021-11-11 RX ADMIN — GABAPENTIN 200 MG: 100 CAPSULE ORAL at 13:12

## 2021-11-11 ASSESSMENT — ACTIVITIES OF DAILY LIVING (ADL)
ADLS_ACUITY_SCORE: 12

## 2021-11-11 ASSESSMENT — MIFFLIN-ST. JEOR: SCORE: 1730.13

## 2021-11-11 NOTE — PLAN OF CARE
A&O, increased agitation, CIWA 8, ativan x1. BLE edema +2/3, zee. Male purewick in place. Up to commode w AO1, no BM.

## 2021-11-11 NOTE — CONSULTS
[facility]    Infectious Disease Consultation     Date of Admission:  11/8/2021  Date of Consult (When I saw the patient): 11/11/21    Assessment & Plan   Lenny Chau is a 76 year old male who was admitted on 11/8/2021.     Impression:  1. Coronary artery disease, lung cancer, prostate cancer, oral cancer  2. Congestive heart failure, worsening lower extremity edema  3. Ragsdale's  4. Alcohol use  5. Chronic kidney disease  6. Admitted with worsening lower extremity edema, acute kidney injury  7. Blood cultures positive for gram-positive cocci in clusters  8. Fever  9. Hepatic steatosis, gallbladder sludge    Recommendations  1. Follow blood cultures, identification and susceptibility pending  2. Repeat blood cultures in process  3. CT chest abdomen and pelvis, echocardiogram  4. In empiric vancomycin and Zosyn, focus based on final identification and susceptibility results  5. Monitor CBC, CMP  6. Discussed with patient and patient's family member at bedside  Thank you for consulting infectious disease.  Will follow with you.  Anticipate patient will remain hospitalized until repeat blood cultures remain negative for at least 48 hours and there is significant clinical improvement.    Sapna Simms MD  [facility]  [phone]        Reason for Consult   Reason for consult: I was asked to evaluate this patient for positive blood culture    Primary Care Physician   Shade Boyd    Chief Complaint   Worsening lower extremity edema    History is obtained from the patient and medical records    History of Present Illness   Lenny Chau is a 76 year old male who presents with worsening lower extremity edema weakness.  Patient has multiple comorbid condition including cancer, heart failure, alcohol intake, presented with worsening edema, noticed to have weakness and fever.  Blood culture showed gram-positive cocci in 1 blood culture bottle out of 2.  Patient  has been started on empiric antibiotics and ID is consulted to assist with further evaluation and management.  Patient's main complaint right now is weakness in lower extremity edema and pain.  Denies any  recent sick contacts.      Past Medical History   I have reviewed this patient's medical history and updated it with pertinent information if needed.   Past Medical History:   Diagnosis Date     AAA (abdominal aortic aneurysm) (H)      Anemia      Ragsdale esophagus      Cancer (H)     lung     CHF (congestive heart failure) (H)      Chronic kidney disease      COPD, group B, by GOLD 2017 classification (H)      Coronary artery disease      Coronary artery disease of native heart with stable angina pectoris, unspecified vessel or lesion type (H)      Degenerative joint disease      Head and neck cancer (H)      History of transfusion      Hyperlipidemia      Hypertension      Lung cancer (H)     s/p RUL RML wedge resection in 2016 by Dr. Carter Velazquez     Lung mass     MELODY FDG-avid 2.5cm     Myocardial infarction (H)     November 2019     Oral cancer (H)      Prostate cancer (H)      Shortness of breath     with exertion       Past Surgical History   I have reviewed this patient's surgical history and updated it with pertinent information if needed.  Past Surgical History:   Procedure Laterality Date     ABDOMINAL AORTIC ANEURYSM REPAIR       CORONARY STENT PLACEMENT  12/2019    x1     CT BIOPSY LUNG  1/21/2021     ESOPHAGOSCOPY, GASTROSCOPY, DUODENOSCOPY (EGD), COMBINED N/A 07/26/2019    Procedure: ENDOSCOPIC ULTRASOUND FINE NEEDLE ASPIRATION, GASTRIC BIOPSIES OF POLYPS;  Surgeon: Quoc Edwards MD;  [place] [facility] [place]  Service: Gastroenterology     EXCISE LESION INTRAORAL Left 11/6/2020    Procedure: 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 Need pathology for margins;  Surgeon: Alon Nunez MD;  Location: [facility];  Service: ENT     NEPHRECTOMY Left     for benign  hemangioma     OTHER SURGICAL HISTORY Right 2015    wedge resectionLung cancer isolated pulmonary nodule     OTHER SURGICAL HISTORY      placement of stentfor AAA     OTHER SURGICAL HISTORY      right hip surgeryfor fracture     OTHER SURGICAL HISTORY  1994    Oral cancer resection     ME ESOPHAGOGASTRODUODENOSCOPY US SCOPE W/ADJ STRXRS N/A 11/13/2020    Procedure: ESOPHAGOGASTRODUODENOSCOPY,  ENDOSCOPIC ULTRASOUND;  Surgeon: Quoc Edwards MD;  Location: [facility];  Service: Gastroenterology       Prior to Admission Medications   Prior to Admission Medications   Prescriptions Last Dose Informant Patient Reported? Taking?   Fluticasone-Umeclidin-Vilant (TRELEGY ELLIPTA) 200-62.5-25 MCG/INH AEPB 11/6/2021 at AM, will bring in AM  Yes Yes   Sig: Inhale 1 puff into the lungs daily    Magnesium Oxide 250 MG TABS 11/7/2021 at PM  Yes Yes   Sig: Take 500 mg by mouth At Bedtime   Vitamin D3 (CHOLECALCIFEROL) 125 MCG (5000 UT) tablet 11/7/2021 at AM  Yes Yes   Sig: Take 1 tablet by mouth daily    acetaminophen (TYLENOL) 500 MG tablet Unknown at Unknown time  Yes Yes   Sig: Take 500-1,000 mg by mouth every 6 hours as needed for mild pain Not to exceed 2000 mg/day   albuterol (PROAIR HFA/PROVENTIL HFA/VENTOLIN HFA) 108 (90 Base) MCG/ACT inhaler Unknown at will bring  Yes Yes   Sig: Inhale 2 puffs into the lungs every 6 hours as needed   aspirin (ASA) 81 MG EC tablet 11/7/2021 at PM  Yes Yes   Sig: Take 81 mg by mouth At Bedtime    ferrous sulfate (FEROSUL) 325 (65 Fe) MG tablet Unknown at Unknown time  Yes Yes   Sig: Take 325 mg by mouth every other day With breakfast   metoprolol succinate ER (TOPROL-XL) 25 MG 24 hr tablet 11/7/2021 at AM  Yes Yes   Sig: Take 12.5 mg by mouth daily    multivitamin w/minerals (THERA-VIT-M) tablet 11/7/2021 at AM  Yes Yes   Sig: Take 1 tablet by mouth daily   nitroGLYcerin (NITROSTAT) 0.4 MG sublingual tablet Unknown at Unknown time  Yes Yes   Sig: Place 0.4 mg under the tongue Place  1 tablet under the tongue at onset of chest pain.  May repeat x 3 doses q 5 min.  Call 911 after first dose if pain persists.   omeprazole (PRILOSEC) 20 MG DR capsule 11/7/2021 at PM  Yes Yes   Sig: Take 20 mg by mouth daily (with dinner) Before a meal   polyethylene glycol (MIRALAX) 17 GM/Dose powder Unknown at Unknown time  Yes Yes   Sig: Take 17 g by mouth daily As needed   thiamine (B-1) 100 MG tablet 11/7/2021 at AM  Yes Yes   Sig: Take 100 mg by mouth daily   zinc gluconate 50 MG tablet 11/7/2021 at AM  Yes Yes   Sig: Take 1 tablet by mouth daily      Facility-Administered Medications: None     Allergies   No Known Allergies    Immunization History   Immunization History   Administered Date(s) Administered     COVID-19,PF,Moderna 01/28/2021, 02/28/2021     FLU 6-35 months 11/07/2006     Flu 65+ Years 11/05/2019     Flu, Unspecified 09/24/2012     Influenza (High Dose) 3 valent vaccine 09/08/2011, 10/08/2013     Influenza (IIV3) PF 10/31/2008, 09/16/2014     Influenza Vaccine IM > 6 months Valent IIV4 (Alfuria,Fluzone) 10/07/2014     Influenza, Quad, High Dose, Pf, 65yr+ (Fluzone HD) 10/09/2020     Pneumo Conj 13-V (2010&after) 04/22/2015     Pneumococcal 23 valent 02/16/2007, 03/30/2009, 03/06/2012, 10/16/2014     Td (Adult), Adsorbed 02/03/2005     Zoster vaccine, live 03/23/2013       Social History   I have reviewed this patient's social history and updated it with pertinent information if needed. Lenny Chau  reports that he quit smoking about 11 years ago. He has a 100.00 pack-year smoking history. He has never used smokeless tobacco. He reports current alcohol use of about 14.0 standard drinks of alcohol per week. He reports that he does not use drugs.    Family History   I have reviewed this patient's family history and updated it with pertinent information if needed.   Family History   Problem Relation Age of Onset     Cancer Sister      Kidney Disease Maternal Aunt        Review of Systems   The 10  point Review of Systems is negative other than noted in the HPI or here.     Physical Exam   Temp: 98.2  F (36.8  C) Temp src: Oral BP: 134/69 Pulse: 96   Resp: 20 SpO2: 90 % O2 Device: None (Room air) Oxygen Delivery: 2 LPM  Vital Signs with Ranges  Temp:  [98.2  F (36.8  C)-99.7  F (37.6  C)] 98.2  F (36.8  C)  Pulse:  [70-96] 96  Resp:  [20-24] 20  BP: (100-134)/(55-90) 134/69  SpO2:  [86 %-95 %] 90 %  216 lbs 11.39 oz  Body mass index is 30.23 kg/m .    GENERAL APPEARANCE: Fatigued, lying in bed  EYES: Eyes grossly normal to inspection  HENT: Dry mucosa  NECK: Supple  RESP: Normal breathing pattern at rest  CV: Lower extremity edema  LYMPHATICS: No extremity swelling  ABDOMEN: soft, nontender, without hepatosplenomegaly or masses and bowel sounds normal  MS: extremities edema, tender  SKIN: Tenderness on leg  Neuro: Deconditioned      Data   All laboratory data reviewed  Lab Results   Component Value Date    WBC 3.8 (L) 11/10/2021    WBC 4.0 11/09/2021    WBC 6.6 11/08/2021    WBC 4.9 05/09/2021    WBC 5.1 05/08/2021    HGB 10.5 (L) 11/10/2021    HGB 11.7 (L) 11/09/2021    HGB 14.1 11/08/2021    HGB 11.1 (L) 05/10/2021    HGB 11.9 (L) 05/09/2021    HCT 32.9 (L) 11/10/2021    HCT 37.6 (L) 11/09/2021    HCT 42.6 11/08/2021    HCT 35.6 (L) 05/09/2021    HCT 36.7 (L) 05/08/2021     (L) 11/11/2021     11/10/2021     (L) 11/09/2021     11/08/2021    PLT 84 (L) 05/09/2021     11/11/2021     11/10/2021     11/09/2021     (L) 11/08/2021     05/10/2021    POTASSIUM 3.3 (L) 11/11/2021    POTASSIUM 3.4 (L) 11/10/2021    POTASSIUM 3.9 11/09/2021    POTASSIUM 4.1 11/08/2021    POTASSIUM 5.0 05/10/2021    CHLORIDE 102 11/11/2021    CHLORIDE 101 11/10/2021    CHLORIDE 99 11/09/2021    CHLORIDE 95 (L) 11/08/2021    CHLORIDE 105 05/10/2021    CO2 29 11/11/2021    CO2 27 11/10/2021    CO2 27 11/09/2021    CO2 26 11/08/2021    CO2 27 05/10/2021    BUN 8 11/11/2021    BUN 9  11/10/2021    BUN 10 11/09/2021    BUN 11 11/08/2021    BUN 16 05/10/2021    CR 2.23 (H) 11/11/2021    CR 1.97 (H) 11/10/2021    CR 2.06 (H) 11/09/2021    CR 2.26 (H) 11/08/2021    CR 1.6 (H) 10/18/2021     11/11/2021     11/10/2021     11/09/2021     (H) 11/08/2021     (H) 05/10/2021    DD 1.79 (H) 11/08/2021     (H) 11/11/2021    AST 89 (H) 11/10/2021     (H) 11/09/2021    AST 98 (H) 11/08/2021    AST 67 (H) 05/10/2021    ALT 39 11/11/2021    ALT 35 11/10/2021    ALT 40 11/09/2021    ALT 45 11/08/2021    ALT 33 05/10/2021     (H) 05/08/2021    ALKPHOS 160 (H) 11/11/2021    ALKPHOS 155 (H) 11/10/2021    ALKPHOS 174 (H) 11/09/2021    ALKPHOS 217 (H) 11/08/2021    ALKPHOS 119 05/10/2021    BILITOTAL 1.6 (H) 11/11/2021    BILITOTAL 1.7 (H) 11/10/2021    BILITOTAL 2.0 (H) 11/09/2021    BILITOTAL 2.5 (H) 11/08/2021    BILITOTAL 1.1 (H) 05/10/2021    INR 1.24 (H) 11/09/2021    INR 1.22 (H) 11/08/2021    INR 0.95 01/21/2021    INR 1.12 (H) 10/08/2020     MICRO:  30-Day Micro Results    Collected Updated Procedure Result Status    11/11/2021 1210 11/11/2021 1213 Blood Culture Peripheral Blood [[id number]]   Peripheral Blood    In process Component Value   No component results              11/11/2021 1210 11/11/2021 1213 Blood Culture Peripheral Blood [[id number]]   Peripheral Blood    In process Component Value   No component results              11/10/2021 0020 11/11/2021 0946 Blood Culture Hand, Right [[id number]]   Blood from Hand, Right    Preliminary result Component Value   Culture No growth after 1 day P              11/10/2021 0015 11/11/2021 1127 Blood Culture Arm, Right [[id number]]   (Abnormal)   Blood from Arm, Right    Preliminary result Component Value   Culture Positive on the 1st day of incubation Abnormal  P    Gram positive cocci in clusters Panic  P    1 of 2 bottles              11/10/2021 0015 11/11/2021 1400 Verigene GP Panel  [[id number]]    Blood from Arm, Right    Final result Component Value   Staphylococcus species Not Detected   Staphylococcus aureus Not Detected   Staphylococcus epidermidis Not Detected   Staphylococcus lugdunensis Not Detected   Enterococcus faecalis Not Detected   Enterococcus faecium Not Detected   Streptococcus species Not Detected   Streptococcus agalactiae Not Detected   Streptococcus anginosus group Not Detected   Streptococcus pneumoniae Not Detected   Streptococcus pyogenes Not Detected   Listeria species Not Detected           11/08/2021 1913 11/08/2021 1957 Asymptomatic COVID-19 Virus (Coronavirus) by PCR Nasopharyngeal [[id number]]    Swab from Nasopharyngeal    Final result Component Value   SARS CoV2 PCR Negative   NEGATIVE: SARS-CoV-2 (COVID-19) RNA not detected, presumed negative.           11/08/2021 1140 11/11/2021 1646 Blood Culture Peripheral Blood [[id number]]   Peripheral Blood    Preliminary result Component Value   Culture No growth after 3 days P              11/08/2021 1131 11/11/2021 1646 Blood Culture Peripheral Blood [[id number]]   Peripheral Blood    Preliminary result        RADIOLOGY:    XR Chest 2 Views    Result Date: 11/8/2021  EXAM: XR CHEST 2 VW LOCATION: [facility] DATE/TIME: 11/8/2021 3:27 PM INDICATION: PE suspected, low/intermediate prob, positive D-dimer COMPARISON: Chest CT 10/18/2021     IMPRESSION: Stable posttreatment changes left upper lobe and postop wedge resections right upper and middle lobes. No new focal consolidation, pneumothorax nor pleural effusion.    NM Lung Scan Perfusion Particulate    Result Date: 11/8/2021  EXAM: NM LUNG SCAN PERFUSION PARTICULATE LOCATION: [facility] DATE/TIME: 11/8/2021 3:09 PM INDICATION: 4 days of hypotension, lightheadedness hypoxia and elevated d-dimer. Pulmonary embolus suspected, low to intermediate probability. History of left upper lobe lung cancer, status post SBRT  and wedge resections of right upper and middle lobe adenocarcinoma. COMPARISON: 2 view chest 2021, 6 chest CT 10/18/2021 TECHNIQUE: 8.0 mCi technetium-99m MAA, IV. Standard lung perfusion imaging. FINDINGS: Matched left upper lobe subsegmental perfusion defect corresponding to radiation fibrosis/posttreatment changes. No additional segmental/subsegmental perfusion defects.     IMPRESSION: 1.  Pulmonary embolus absent, low probability scan.    Echocardiogram Complete    Result Date: 2021  [id number]^LUCR[place]IA^[place] [address]  Name: CELIA LUTZ MRN: [id number] : 1945 Study Date: 2021 02:15 PM Age: 76 yrs Gender: Male Patient [facility]: [facility] Reason For Study: Endocarditis Ordering Physician: SAMUEL SINGER Performed By: ACE  BSA: 2.2 m2 Height: 71 in Weight: 216 lb HR: 72 ______________________________________________________________________________ Procedure Complete Echo Adult. Definity (NDC #09895-259) given intravenously. 5mL given. LOT#[id number]. Technically difficult study. Poor acoustic windows. ______________________________________________________________________________ Interpretation Summary  The left ventricle is mildly dilated with normal left ventricular wall thickness. Left ventricular function is normal.The ejection fraction is 55-60%. Normal right ventricle size and systolic function. No significant valve disease identified. No evidence of endocarditis identified, however (especially with poor acoustic windows), transthoracic echocardiography does not have high sensitivity for identifying endocarditis. If suspicion remains for this condition consider evaluation with a transesophageal echocardiogram. ______________________________________________________________________________ I      WMSI = 1.00     % Normal = 100  X - Cannot   0 -                      (2) - Mildly 2 -          Segments  Size Interpret     Hyperkinetic 1 - Normal  Hypokinetic  Hypokinetic  1-2     small                                                    7 -          3-5    moderate 3 - Akinetic 4 -          5 -         6 - Akinetic Dyskinetic   6-14    large              Dyskinetic   Aneurysmal  w/scar       w/scar       15-16   diffuse  Left Ventricle The left ventricle is mildly dilated. Left ventricular function is normal.The ejection fraction is 55-60%. There is normal left ventricular wall thickness. Left ventricular diastolic function is indeterminate. No regional wall motion abnormalities noted.  Right Ventricle Normal right ventricle size and systolic function.  Atria Normal left atrial size. Right atrial size is normal. There is no color Doppler evidence of an atrial shunt.  Mitral Valve Mitral valve leaflets appear normal. There is no evidence of mitral stenosis or clinically significant mitral regurgitation.  Tricuspid Valve Tricuspid valve leaflets appear normal. There is no evidence of tricuspid stenosis or clinically significant tricuspid regurgitation. Right ventricle systolic pressure estimate normal.  Aortic Valve The aortic valve is trileaflet with aortic valve sclerosis. No aortic regurgitation is present. No aortic stenosis is present.  Pulmonic Valve The pulmonic valve is not well seen, but is grossly normal. This degree of valvular regurgitation is within normal limits. There is trace pulmonic valvular regurgitation.  Vessels The aorta root is normal. Normal size ascending aorta. IVC diameter <2.1 cm collapsing >50% with sniff suggests a normal RA pressure of 3 mmHg.  Pericardium There is no pericardial effusion.  Rhythm Sinus rhythm was noted.  ______________________________________________________________________________ MMode/2D Measurements & Calculations IVSd: 0.65 cm LVIDd: 6.0 cm LVIDs: 4.4 cm LVPWd: 0.96 cm  FS: 26.7 % LV mass(C)d: 187.1 grams LV mass(C)dI: 85.9 grams/m2 LVOT diam: 2.5 cm LVOT area: 5.0 cm2 LA Volume  (BP): 63.7 ml LA Volume Index (BP): 29.2 ml/m2  LA Volume Indexed (AL/bp): 31.2 ml/m2 RWT: 0.32  Time Measurements MM HR: 72.0 BPM  Doppler Measurements & Calculations MV E max rafa: 65.1 cm/sec MV A max rafa: 66.5 cm/sec MV E/A: 0.98 MV dec slope: 440.4 cm/sec2 MV dec time: 0.15 sec Ao V2 max: 161.3 cm/sec Ao max PG: 10.0 mmHg Ao V2 mean: 113.8 cm/sec Ao mean P.8 mmHg Ao V2 VTI: 32.8 cm CAMILLE(I,D): 3.2 cm2 CAMILLE(V,D): 3.2 cm2 LV V1 max P.3 mmHg LV V1 max: 103.7 cm/sec LV V1 VTI: 20.8 cm SV(LVOT): 104.9 ml SI(LVOT): 48.2 ml/m2 PA acc time: 0.13 sec AV Rafa Ratio (DI): 0.64  CAMILLE Index (cm2/m2): 1.5 E/E': 6.9 E/E' av.8 Lateral E/e': 6.9 Medial E/e': 16.6 Peak E' Rafa: 9.5 cm/sec  ______________________________________________________________________________ Report approved by: Eliza Mullen 2021 03:31 PM       Echocardiogram Complete    Result Date: 2021  [id number]^[place] [address]  Name: CELIA LUTZ MRN: [id number] : 1945 Study Date: 2021 09:14 AM Age: 76 yrs Gender: Male Patient Location: [facility] Reason For Study: RBBB & Left Anterior Fasicular Block, Edema Ordering Physician: DHEERAJ MATHEWS Performed By: ANGELO  BSA: 2.1 m2 Height: 71 in Weight: 200 lb ______________________________________________________________________________ Procedure Complete Portable Echo Adult. Definity (NDC #07869-341) given intravenously. ______________________________________________________________________________ Interpretation Summary  There is borderline concentric left ventricular hypertrophy. The visual ejection fraction is 55-60%. There is mild (1+) mitral regurgitation. There is trace to mild tricuspid regurgitation. ______________________________________________________________________________ Left Ventricle The left ventricle is normal in size. There is borderline concentric left ventricular hypertrophy.  Diastolic Doppler findings (E/E' ratio and/or other parameters) suggest left ventricular filling pressures are indeterminate. The visual ejection fraction is 55-60%. No regional wall motion abnormalities noted.  Right Ventricle The right ventricle is normal in size and function.  Atria Normal left atrial size. Right atrial size is normal. Intact atrial septum.  Mitral Valve Mitral valve leaflets appear normal. There is mild (1+) mitral regurgitation.  Tricuspid Valve Tricuspid valve leaflets appear normal. There is trace to mild tricuspid regurgitation.  Aortic Valve Aortic valve leaflets appear normal. There is no evidence of aortic stenosis or clinically significant aortic regurgitation.  Pulmonic Valve The pulmonic valve is not well visualized.  Vessels Borderline aortic root dilation. Normal size ascending aorta. IVC diameter <2.1 cm collapsing >50% with sniff suggests a normal RA pressure of 3 mmHg.  Pericardium There is no pericardial effusion. ______________________________________________________________________________ MMode/2D Measurements & Calculations IVSd: 0.95 cm  LVIDd: 5.4 cm LVIDs: 3.9 cm LVPWd: 1.2 cm FS: 28.4 % LV mass(C)d: 226.5 grams LV mass(C)dI: 107.4 grams/m2 Ao root diam: 3.9 cm LA dimension: 3.9 cm asc Aorta Diam: 3.8 cm LA/Ao: 1.0 LVOT diam: 2.2 cm LVOT area: 3.7 cm2 LA Volume Indexed (AL/bp): 21.9 ml/m2 RWT: 0.44  Time Measurements MM HR: 61.0 BPM  Doppler Measurements & Calculations MV E max rafa: 65.5 cm/sec MV A max rafa: 78.6 cm/sec MV E/A: 0.83 MV dec time: 0.20 sec Ao V2 max: 186.8 cm/sec Ao max P.0 mmHg Ao V2 mean: 122.0 cm/sec Ao mean P.0 mmHg Ao V2 VTI: 34.8 cm CAMILLE(I,D): 1.8 cm2 CAMILLE(V,D): 1.6 cm2 LV V1 max P.6 mmHg LV V1 max: 80.0 cm/sec LV V1 VTI: 16.8 cm SV(LVOT): 63.1 ml SI(LVOT): 29.9 ml/m2 AV Rafa Ratio (DI): 0.43 CAMILLE Index (cm2/m2): 0.86 E/E' av.9 Lateral E/e': 6.7 Medial E/e': 11.1   ______________________________________________________________________________ Report approved by: Eliza Narvaez 11/09/2021 11:16 AM       US Lower Extremity Venous Duplex Bilateral    Result Date: 11/8/2021  EXAM: US LOWER EXTREMITY VENOUS DUPLEX BILATERAL LOCATION: [facility] DATE/TIME: 11/8/2021 7:36 PM INDICATION: bilateral lower leg swelling and pain COMPARISON: 1/25/2021 TECHNIQUE: Venous Duplex ultrasound of bilateral lower extremities with and without compression, augmentation and duplex. Color flow and spectral Doppler with waveform analysis performed. FINDINGS: Exam includes the common femoral, femoral, popliteal veins as well as segmentally visualized deep calf veins and greater saphenous vein. RIGHT: No deep vein thrombosis. No superficial thrombophlebitis. No popliteal cyst. LEFT: No deep vein thrombosis. No superficial thrombophlebitis. No popliteal cyst.     IMPRESSION: 1.  No deep venous thrombosis in the bilateral lower extremities.    Abdomen US, limited (RUQ only)    Result Date: 11/8/2021  EXAM: US ABDOMEN LIMITED LOCATION: [facility] DATE/TIME: 11/8/2021 7:36 PM INDICATION: elevated bilirubin. Lung cancer. COMPARISON: CT 10/18/2021 TECHNIQUE: Limited abdominal ultrasound. FINDINGS: GALLBLADDER: Filled with sludge. No definite stone seen. Normal wall thickness. BILE DUCTS: No biliary dilatation. The common duct measures 4 mm. LIVER: Increased echogenicity from diffuse fatty infiltration. Heterogeneous echotexture. Deeper portions of the liver are not diagnostically visualized. No focal mass seen. RIGHT KIDNEY: No hydronephrosis.It contains a 2.9 cm cyst. PANCREAS: The visualized portions are normal. No ascites.     IMPRESSION: 1.  Diffuse hepatic steatosis. 2.  Sludge containing gallbladder.     XR Chest Port 1 View    Result Date: 11/10/2021  EXAM: XR CHEST PORT 1 VIEW LOCATION: [facility] DATE/TIME: 11/10/2021  2:29 AM INDICATION: Fever. COMPARISON: 11/8/2021. FINDINGS: The heart size is normal. The thoracic aorta is calcified. Stable surgical clip and scarring in the left upper lobe. The lungs are otherwise clear. Old right rib fractures. No pneumothorax.     IMPRESSION: No acute abnormality.    CT Chest w Contrast    Result Date: 10/18/2021  EXAM: CT CHEST W CONTRAST LOCATION: [facility] DATE/TIME: 10/18/2021 1:19 PM INDICATION: Follow-up left upper lobe lung cancer, post SBRT in 3/2021. Previous history of right upper and middle lobe adenocarcinoma status post wedge resections. COMPARISON: CT 6/16/2021 and 8/31/2020 PET CT TECHNIQUE: CT chest with IV contrast. Multiplanar reformats were obtained. Dose reduction techniques were used. CONTRAST: 75 mL Isovue-370. FINDINGS: LUNGS AND PLEURA: Left upper lobe fiducial marker with previous spiculated left upper lobe mass obscured by new surrounding consolidative and groundglass opacity compatible with posttreatment radiation fibrosis. New 8 mm irregular solid nodular opacity image 113 series 4 suspicious for metastatic disease versus new primary lung cancer. Stable postop changes from right upper and middle lobe wedge resections. Biapical centrilobular and paraseptal emphysema redemonstrated. Central airways are patent. No pleural effusion MEDIASTINUM/AXILLAE: No mass/adenopathy nor pericardial effusion. The thoracic aorta and heart are normal in size. CORONARY ARTERY CALCIFICATION: Severe. UPPER ABDOMEN: Severe hepatic steatosis. Stable 17 mm cyst within the pancreatic tail compatible with IPMN. Previous left nephrectomy. MUSCULOSKELETAL: Old healed rib fracture deformities. No suspicious osseous lesions.     IMPRESSION: 1.  New consolidative and groundglass opacities around previous left upper lobe nodule compatible with posttreatment radiation fibrosis. 2.  New 8 mm irregular left upper lobe solid nodule which could represent either metastatic  lesion or primary lung cancer. 3.  Stable 17 mm pancreatic cystic lesion likely representing IPMN. This could be followed up with CT in 2 years. REFERENCE: Revisions of international consensus [place] guidelines for the management of IPMN of the pancreas. Pancreatology 2017;17(5):738-753. Largest cyst between 10-20 mm: CT or MRI/MRCP every 6 months for 1 year and then yearly for 2 years and then every 2 years if no change.

## 2021-11-11 NOTE — PLAN OF CARE
Pt had uneventful shift. CIWA 2,2. No medication needed. IV abx infusing. Pt remains afebrile. Vitally stable. Asleep between cares overnight. Will continue to monitor and follow plan of care.    Problem: Adult Inpatient Plan of Care  Goal: Absence of Hospital-Acquired Illness or Injury  Outcome: Improving  Intervention: Identify and Manage Fall Risk  Recent Flowsheet Documentation  Taken 11/11/2021 0000 by Shelley Gao RN  Safety Promotion/Fall Prevention:   activity supervised   assistive device/personal items within reach   bed alarm on   lighting adjusted   room door open   room near nurse's station   room organization consistent   safety round/check completed  Intervention: Prevent Skin Injury  Recent Flowsheet Documentation  Taken 11/11/2021 0000 by Shelley Gao RN  Body Position: position changed independently     Problem: Risk for Delirium  Goal: Improved Behavioral Control  Outcome: Improving     Problem: Fever  Goal: Body Temperature in Desired Range  Outcome: Improving

## 2021-11-11 NOTE — PROGRESS NOTES
Care Management Follow Up    Length of Stay (days): 3    Expected Discharge Date: 11/13/2021     Concerns to be Addressed: infection work up in process        Patient plan of care discussed at interdisciplinary rounds: yes    Anticipated Discharge Disposition:  TCU     Anticipated Discharge Services:  TCU    Anticipated Discharge DME:  n/a     Education Provided on the Discharge Plan:  yes  Patient/Family in Agreement with the Plan:  yes    Referrals Placed by CM/SW:  TCU           Additional Information: Diagnostic work up for fever. Therapy recommending TCU. ID consult.     Assessment History: Pt resides in an apt with his his wife, he reports independence with ADLs and IADLs at baseline. Pt states that he is the primary caregiver for his wife who is physically independent but has severe anxiety and requires some supervision. Pt uses a 4WW and shower chair at home, no other equipment or services. Of note, pt endorses drinking 1L vodka per day, however he states he does not wish to be approached with CD resources.     Covid Vaccination Moderna 1/28/21, 2/28/21.     Met with patient and his daughter Georgette to discuss recommendation for TCU 11/10. Patient states agreement but would not want to go to [facility] where he has been before. Both state agreement for TCU near [place], [place] or [facility] or even [facility].     Unable to reach Georgette today for update. [facility] unable to accept. Two referrals pending. Another referral faxed. Patient is not medically ready for discharge.        Meera Rhodes RN

## 2021-11-11 NOTE — CONSULTS
RENAL CONSULT NOTE    REQUESTING PHYSICIAN: Dr Thomas     REASON FOR CONSULT: LEILA on CKD, Hypervolemia     ASSESSMENT/PLAN:  LEILA on CKD IIIb: Baseline cr in the 1.5-1.8 range on chart review.  Now with rising serum creatinine to the low 2 range.  Nonoliguric.  Very bland urinalysis 11/10/2021, no proteinuria, pyuria, or microscopic hematuria.  Does have reduced nephron mass, status post left nephrectomy on 3/26/2009 left kidney hemangioma.  Patient tells me the last 2 weeks he has had not much of an appetite, has had some nausea, decreased p.o. intake and overall not feeling well, likely contributing to a prerenal state with his mild elevation in creatinine upon admission.  Creatinine did get a little bit better with IV fluids, but he is volume up on exam and will need diuresis.  Did have quite brisk diuresis net in 2.3 L negative 11/10/21, looking little bit more intravascularly dry on labs.  Likely mild LEILA secondary to the above.     Volume: Patient appears to be volume up on exam, pitting peripheral edema lower extremities extending up into the thighs.  Patient also has some degree of liver disease, and albumin quite low.  He is likely third spacing some of the fluids secondary to his hypoalbuminemia.  He is appearing a little bit more intravascularly dry despite being volume up on exam as evidenced by escalating sodium, chloride, and bicarb.  For that reason we will dose some albumin 50 g twice daily for 2 days, change diuretics from Lasix to Bumex as this will be more bioavailable.  Bumex will be dosed 0.5 mg p.o. twice daily for the next 2 days.  Patient with poor appetite, moderate protein caloric malnutrition, will start patient on Nepro protein supplements with meals as well.    Lytes/acid/base: Initially with mild hyponatremia, likely secondary to hypervolemia.  Improving with diuresis.  Mild hypokalemia likely secondary to loop diuretics.  Being replaced.     Anemia: Macrocytic.  History of alcohol  abuse.  B12 and folic acid within normal limits this admit.  Will check iron studies.  Erythropoietin level.  Also has history of Ragsdale's esophagus and previous GIB.  No obvious signs of GI blood loss.    Nocturnal fever: Unclear etiology.  T-max 101.8 Fahrenheit.  Blood cultures no growth to date.  Chest x-ray clear.  UA clear.  Mildly elevated pro-Kota 0.72, white count at 3.8.  Lower extremity erythema improving, still warm to the touch.  Empirically started on Zosyn and vancomycin.    Bilateral lower extremity pain/neuropathy: Did have elevated D-dimer, negative VQ scan and lower extremity US.  Started on gabapentin renally dosed with some relief.  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    Chronic systolic heart failure: 5/2021 echocardiogram showed EF 55%.  Mild hypertrophy otherwise normal.  On low-dose beta-blocker at baseline.  11/9 echocardiogram EF 55 to 60% borderline LVH and mild MR.    CAD: MI in 2019.  PCI x1.    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  Follows with oncology.    Ragsdale's esophagus: Continue home PPI    Alcohol abuse: Drinks a liter of vodka per day.  On protocols.  Social work chemical dependency consultation.    Discussed with Dr Thomas       HPI:   Mitchell is a 76-year-old male past medical history significant for CAD, lung cancer, prostate cancer, oral cancer, skin cancer, left hemangioma s/p nephrectomy 2009, COPD, Ragsdale's esophagus, alcohol abuse, and CKD stage IIIb.  Admitted 11/8 for bilateral lower extremity edema, pain, and mild LEILA on CKD.    Was given some albumin, and diuretics now with rising serum creatinine.  Spiking fever overnight 11/8 unclear etiology and was empirically started on Zosyn and vancomycin    Tells me 2 weeks prior to admission he was not feeling well, having increased  swelling in his lower extremities and pain in the bilateral lower extremities  His appetite has been quite decreased, not eating much over the past couple weeks  Has had some nausea but no vomiting  Does mention some occasional looser stools  Denies any history of NSAID use, but does take Tylenol occasionally  Good urine output, denies any dysuria or gross hematuria  No hematochezia or melena  Mentions some shortness of breath with exertion, but not typically at rest  No orthopneic symptoms  He drinks 1 L of vodka per day for many years  He is interested in cutting back and stopping      REVIEW OF SYSTEMS:  Complete 12 point review of systems was negative other than those noted in the HPI      Past Medical History:   Diagnosis Date     AAA (abdominal aortic aneurysm) (H)      Anemia      Ragsdale esophagus      Cancer (H)     lung     CHF (congestive heart failure) (H)      Chronic kidney disease      COPD, group B, by GOLD 2017 classification (H)      Coronary artery disease      Coronary artery disease of native heart with stable angina pectoris, unspecified vessel or lesion type (H)      Degenerative joint disease      Head and neck cancer (H)      History of transfusion      Hyperlipidemia      Hypertension      Lung cancer (H)     s/p RUL RML wedge resection in 2016 by Dr. Carter Velazquez     Lung mass     MELODY FDG-avid 2.5cm     Myocardial infarction (H)     November 2019     Oral cancer (H)      Prostate cancer (H)      Shortness of breath     with exertion       No current facility-administered medications on file prior to encounter.  acetaminophen (TYLENOL) 500 MG tablet, Take 500-1,000 mg by mouth every 6 hours as needed for mild pain Not to exceed 2000 mg/day  albuterol (PROAIR HFA/PROVENTIL HFA/VENTOLIN HFA) 108 (90 Base) MCG/ACT inhaler, Inhale 2 puffs into the lungs every 6 hours as needed  aspirin (ASA) 81 MG EC tablet, Take 81 mg by mouth At Bedtime   ferrous sulfate (FEROSUL) 325 (65 Fe) MG tablet, Take  325 mg by mouth every other day With breakfast  Fluticasone-Umeclidin-Vilant (TRELEGY ELLIPTA) 200-62.5-25 MCG/INH AEPB, Inhale 1 puff into the lungs daily   Magnesium Oxide 250 MG TABS, Take 500 mg by mouth At Bedtime  metoprolol succinate ER (TOPROL-XL) 25 MG 24 hr tablet, Take 12.5 mg by mouth daily   multivitamin w/minerals (THERA-VIT-M) tablet, Take 1 tablet by mouth daily  nitroGLYcerin (NITROSTAT) 0.4 MG sublingual tablet, Place 0.4 mg under the tongue Place 1 tablet under the tongue at onset of chest pain.  May repeat x 3 doses q 5 min.  Call 911 after first dose if pain persists.  omeprazole (PRILOSEC) 20 MG DR capsule, Take 20 mg by mouth daily (with dinner) Before a meal  polyethylene glycol (MIRALAX) 17 GM/Dose powder, Take 17 g by mouth daily As needed  thiamine (B-1) 100 MG tablet, Take 100 mg by mouth daily  Vitamin D3 (CHOLECALCIFEROL) 125 MCG (5000 UT) tablet, Take 1 tablet by mouth daily   zinc gluconate 50 MG tablet, Take 1 tablet by mouth daily        No current outpatient medications on file.      ALLERGIES/SENSITIVITIES:  No Known Allergies  Social History     Tobacco Use     Smoking status: Former Smoker     Packs/day: 2.00     Years: 50.00     Pack years: 100.00     Quit date: 2009     Years since quittin.8     Smokeless tobacco: Never Used   Substance Use Topics     Alcohol use: Yes     Alcohol/week: 14.0 standard drinks     Comment: 1 liter of vodka per day     Drug use: Never     I have reviewed this patient's family history and updated it with pertinent information if needed.  Family History   Problem Relation Age of Onset     Cancer Sister      Kidney Disease Maternal Aunt          PHYSICAL EXAM:  Physical Exam   Temp: 99.1  F (37.3  C) Temp src: Oral BP: (!) 119/90 (reported to the RN) Pulse: 76   Resp: 21 SpO2: 95 % O2 Device: Nasal cannula Oxygen Delivery: 2 LPM  Vitals:    21 1515 11/10/21 0555 21 0600   Weight: 98.4 kg (217 lb) 97.9 kg (215 lb 12.8 oz) 98.3 kg  (216 lb 11.4 oz)     Vital Signs with Ranges  Temp:  [98.2  F (36.8  C)-99.7  F (37.6  C)] 99.1  F (37.3  C)  Pulse:  [73-81] 76  Resp:  [21-24] 21  BP: ()/(47-90) 119/90  SpO2:  [86 %-95 %] 95 %  I/O last 3 completed shifts:  In: 513 [P.O.:410; I.V.:103]  Out: 2850 [Urine:2850]    Patient Vitals for the past 72 hrs:   Weight   11/11/21 0600 98.3 kg (216 lb 11.4 oz)   11/10/21 0555 97.9 kg (215 lb 12.8 oz)   11/09/21 1515 98.4 kg (217 lb)   11/08/21 1120 90.7 kg (200 lb)       General: Alert, NAD  HENT: Supple, Non-tender, no obvious JVD  Eyes: No scleral icterus  Cardiovascular: RRR, no rub, gallop, or murmur.  Pitting lower extremity edema extending from ankles into thighs  Respiratory: CTAB, non-labored.   Gastrointestinal: Soft, non-tender, non-distended  Musculoskeletal: Grossly intact  Integumentary: Mild erythema to the bilateral lower extremities, right greater than left.  Warm to touch.  Neurologic: Grossly intact  Psychiatric: Cooperative, appropriate mood and affect    Laboratory:     Recent Labs   Lab 11/11/21  0523 11/10/21  0849 11/09/21 0240 11/08/21  1132   WBC  --  3.8* 4.0 6.6   RBC  --  2.99* 3.33* 3.90*   HGB  --  10.5* 11.7* 14.1   HCT  --  32.9* 37.6* 42.6   * 151 146* 246       Basic Metabolic Panel:  Recent Labs   Lab 11/11/21  0523 11/10/21  0533 11/09/21  0240 11/08/21  1132    137 136 134*   POTASSIUM 3.3* 3.4* 3.9 4.1   CHLORIDE 102 101 99 95*   CO2 29 27 27 26   BUN 8 9 10 11   CR 2.23* 1.97* 2.06* 2.26*    111 100 139*   BRADLEY 9.2 9.1 9.0 10.1       INR  Recent Labs   Lab 11/09/21  0240 11/08/21  2044   INR 1.24* 1.22*       Recent Labs   Lab Test 11/11/21  0523 11/10/21  0533   POTASSIUM 3.3* 3.4*   CHLORIDE 102 101   BUN 8 9      Recent Labs   Lab Test 11/11/21  0523 11/10/21  0533 11/10/21  0014 11/09/21 0240 11/08/21  1738   ALBUMIN 2.0* 2.1*  --    < >  --    BILITOTAL 1.6* 1.7*  --    < >  --    ALT 39 35  --    < >  --    * 89*  --    < >  --     PROTEIN  --   --  Negative  --  50 *    < > = values in this interval not displayed.       Personally reviewed today's laboratory studies      Thank you for involving us in the care of this patient. We will continue to follow along with you.      Mono Richardson  [facility]  [phone]

## 2021-11-11 NOTE — PROGRESS NOTES
[facility] MEDICINE  PROGRESS NOTE     Code Status: No CPR- Do NOT Intubate       Identification/Summary:   76 year old male with PMH signficant for CAD, lung cancer, prostate and oral cancer, CHF, COPD, Ragsdale's, CKD and alcohol abuse.  11/8 admitted with bilateral lower extremity edema, pain, LHD.  LEILA creatinine 2.2.  Admits to 1 L vodka per day.  After IV fluids lactic acidosis resolved and creatinine 2.06.  Low albumin.  Nephrology consultation.  Utilizing albumin and diuresis.  11/9 PM fever spikes 101.8.  Unclear etiology.  11/10 empirically started Zosyn and vancomycin.  11/11 some fever improvement.  1 of 2 blood cultures GPC in clusters.  Repeat blood cultures drawn and ID consulted.  Likely here 2-3 more days.     Assessment and Plan:  -Nocturnal fever  -Positive blood culture 1 of 2 GPC in clusters  Unclear etiology.  T-max 101.8.  Blood work done normal.  Chest x-ray clear.  Negative urinalysis.  Blood cultures drawn.  11/10 procalcitonin 0.72 and white count at 3.8.  Lower extremities erythema appear improved.  Empirically started Zosyn and vancomycin.    11/11 + blood culture 1 of 2 with GPC in clusters.  Continue current antibiotics.  Infectious disease consulted.  2 more blood cultures drawn.  -Bilateral lower extremity edema  Significant findings: BNP 99, troponin 0.06, D-dimer 1.79.  Normal B12 and folate.  VQ scan low probability for pulmonary embolism.  Chest x-ray shows prior lung resections otherwise negative.   Bilateral lower extremity ultrasound negative for DVT.  Right upper quadrant abdominal ultrasound shows hepatic steatosis with gallbladder sludge otherwise normal.  11/8 Lactated Ringer's at 75 mL/h.  11/9 with kidney function improved initiated diurese.  Gave albumin infusion x1 and Lasix 20 mg IV every 12 hours.  11/10 Transitioned Lasix to orals 20 twice daily.  11/11 creatinine worsened to 2.23.  Appreciate nephrology consultation.  Discussed  case with ASHLEE Gomez.  Will utilize albumin and low-dose Bumex.    -Bilateral lower extremity pain/neuropathy  11/8 given gabapentin 100 mg 3 times daily.  Some slight improvement to pain.  11/11 increase gabapentin to 200 mg 3 times daily.  -Lactic acidosis  -Acute on chronic kidney disease stage III  -Hyponatremia  May 2021 creatinine 1.66.    Given IV fluids at admission.  Admission creatinine 2.26--> 2.06--> 1.97--> 2.23.  Admission lactic 2.6--> 2.3--> 1.4.    11/9 okay discontinue IV fluids.    Diuresis, albumin and nephrology consultation as above.  Follow BMP.  -Alcohol abuse  Patient admits to drinking 1 L of vodka per day.  Denies history of withdrawal but also cannot recall the last time he had a day of abstinence from alcohol.  Utilize CIWA protocols at this time with lorazepam.    11/11 no evidence of withdrawal.  Okay to discontinue CIWA protocols.  Social work chemical dependency consultation.  -Hypoalbuminemia  -Moderate protein caloric malnutrition  Albumin infusion as noted above.  RD consultation ordered.  -Coronary artery disease.  MI 2019.  Stent x1.  -Essential hypertension  -Chronic systolic heart failure  May 2021 echocardiogram EF 55%.  Mild hypertrophy otherwise normal.  11/8 given home metoprolol XL 12.5 mg daily with hold parameters.  11/9 echocardiogram EF 55 to 60% borderline LVH and mild MR.  11/10 discontinued clonidine as it may be contributing to some hypotension.  -New bifascicular block  Previous EKGs had left axis deviation along with right bundle branch block.  Admission EKG shows new bifascicular block.  Serial troponins negative.  Echo as above.  No further work-up at this time.  -Ragsdale's esophagus  Continue home omeprazole 20 mg daily.  -COPD  Continue home Trelegy Ellipta inhaler and albuterol as needed.  -Abnormal UA  Unclear if patient could be dealing with a UTI or not.  Denies any dysuria.  Urinalysis was abnormal but not severe enough to trigger urine  culture.  Hold off on antibiotics at this time.       -COVID19 PCR status negative from 11/8/2021  Patient is vaccinated.  Standard precautions.  -Anticoagulation   INR 1.22.  Continue home aspirin 81 mg nightly.  Utilize subcutaneous heparin.  Fluids: Saline lock  Therapy: PT OT recommending TCU at this time  Urban:Not present  Current Diet  Orders Placed This Encounter      No Added Salt 4 Gram Sodium    Supplements  Active Nourishment Order   Procedures     Snacks/Supplements Adult: Nepro Oral Supplement; With Meals     Barriers to Discharge: Positive blood culture, LEILA, IV antibiotics    Disposition: Likely here least 2-3 more days    Interval History/Subjective:  Patient fevers last evening improved.  Just went up to 99.  Overall doing okay.  Scoring low on CIWA protocols.  No chest pain.  No cough.  No shortness of breath.  No nausea or vomiting.  Daughter is present and supportive.  Questions answered to verbalized satisfaction.    Physical Exam/Objective:  Temp:  [98.2  F (36.8  C)-99.7  F (37.6  C)] 99.1  F (37.3  C)  Pulse:  [73-81] 76  Resp:  [21-24] 21  BP: ()/(47-90) 119/90  SpO2:  [86 %-95 %] 95 %  Wt Readings from Last 4 Encounters:   11/11/21 98.3 kg (216 lb 11.4 oz)   09/28/21 95.2 kg (209 lb 12.8 oz)   06/25/21 92.1 kg (203 lb)   06/23/21 92.3 kg (203 lb 8 oz)     Body mass index is 30.23 kg/m .    Constitutional: awake, alert, cooperative, no apparent distress, and appears stated age and fatigued  ENT: Normocephalic, without obvious abnormality, atraumatic, external ears without lesions, oral pharynx with moist mucous membranes, tonsils without erythema or exudates, gums normal and good dentition.  Respiratory: No increased work of breathing, good air exchange, clear to auscultation bilaterally, no crackles or wheezing  Cardiovascular: Normal apical impulse, regular rate and rhythm, normal S1 and S2, no S3 or S4, and no murmur noted  GI: No scars, normal bowel sounds, soft, non-distended,  non-tender, no masses palpated, no hepatosplenomegally  Skin: normal skin color, texture, turgor, no redness, warmth, or swelling, and no rashes  Musculoskeletal: Lower extremity edema improved.  Erythema decreased in intensity.    Neurologic: Cranial nerves II-XII are grossly intact. Sensory:  Sensory intact  Not quite as sensitive to palpation as before.  Neuropsychiatric: General: normal, calm and normal eye contact Level of consciousness: alert / normal Affect: normal Orientation: oriented to self, place, time and situation Memory and insight: normal, memory for past and recent events intact and thought process normal      Medications:   Personally Reviewed.  Medications       albumin human  50 g Intravenous BID     aspirin  81 mg Oral At Bedtime     bumetanide  0.5 mg Oral BID     Fluticasone-Umeclidin-Vilanterol  1 puff Inhalation Daily     gabapentin  100 mg Oral TID     heparin ANTICOAGULANT  5,000 Units Subcutaneous Q12H     magnesium oxide  400 mg Oral At Bedtime     metoprolol succinate ER  12.5 mg Oral Daily     multivitamin w/minerals  1 tablet Oral Daily     pantoprazole  40 mg Oral Daily with supper     piperacillin-tazobactam  3.375 g Intravenous Q8H     polyethylene glycol  17 g Oral Daily     sodium chloride (PF)  3 mL Intracatheter Q8H     sodium chloride (PF)  3 mL Intracatheter Q8H     thiamine  100 mg Oral Daily     vancomycin  1,000 mg Intravenous Q24H     Vitamin D3  125 mcg Oral Daily     zinc gluconate  50 mg Oral Daily       Data reviewed today: I personally reviewed all new medications, labs, imaging/diagnostics reports over the past 24 hours. Pertinent findings include:    Imaging:   No results found for this or any previous visit (from the past 24 hour(s)).    Labs:  XR Chest Port 1 View   Final Result   IMPRESSION: No acute abnormality.      Echocardiogram Complete   Final Result      Abdomen US, limited (RUQ only)   Final Result   IMPRESSION:   1.  Diffuse hepatic steatosis.   2.   Sludge containing gallbladder.            US Lower Extremity Venous Duplex Bilateral   Final Result   IMPRESSION:   1.  No deep venous thrombosis in the bilateral lower extremities.      XR Chest 2 Views   Final Result   IMPRESSION: Stable posttreatment changes left upper lobe and postop wedge resections right upper and middle lobes. No new focal consolidation, pneumothorax nor pleural effusion.      NM Lung Scan Perfusion Particulate   Final Result   IMPRESSION:    1.  Pulmonary embolus absent, low probability scan.        Recent Results (from the past 24 hour(s))   Hepatic function panel    Collection Time: 11/11/21  5:23 AM   Result Value Ref Range    Bilirubin Total 1.6 (H) 0.0 - 1.0 mg/dL    Bilirubin Direct 0.9 (H) <=0.5 mg/dL    Protein Total 4.7 (L) 6.0 - 8.0 g/dL    Albumin 2.0 (L) 3.5 - 5.0 g/dL    Alkaline Phosphatase 160 (H) 45 - 120 U/L     (H) 0 - 40 U/L    ALT 39 0 - 45 U/L   Basic metabolic panel    Collection Time: 11/11/21  5:23 AM   Result Value Ref Range    Sodium 139 136 - 145 mmol/L    Potassium 3.3 (L) 3.5 - 5.0 mmol/L    Chloride 102 98 - 107 mmol/L    Carbon Dioxide (CO2) 29 22 - 31 mmol/L    Anion Gap 8 5 - 18 mmol/L    Urea Nitrogen 8 8 - 28 mg/dL    Creatinine 2.23 (H) 0.70 - 1.30 mg/dL    Calcium 9.2 8.5 - 10.5 mg/dL    Glucose 125 70 - 125 mg/dL    GFR Estimate 27 (L) >60 mL/min/1.73m2   Platelet count    Collection Time: 11/11/21  5:23 AM   Result Value Ref Range    Platelet Count 148 (L) 150 - 450 10e3/uL       Pending Labs:  Unresulted Labs Ordered in the Past 30 Days of this Admission     Date and Time Order Name Status Description    11/11/2021  9:29 AM Ferritin In process     11/11/2021  9:19 AM Iron binding panel In process     11/9/2021 11:59 PM Blood Culture Arm, Right Preliminary     11/9/2021 11:59 PM Blood Culture Hand, Right Preliminary     11/8/2021 11:25 AM Blood Culture Peripheral Blood Preliminary     11/8/2021 11:25 AM Blood Culture Peripheral Blood Preliminary            Enrique Thomas MD  [facility] Medicine  [facility]  Phone: #[phone]

## 2021-11-12 ENCOUNTER — APPOINTMENT (OUTPATIENT)
Dept: OCCUPATIONAL THERAPY | Facility: CLINIC | Age: 76
DRG: 682 | End: 2021-11-12
Payer: MEDICARE

## 2021-11-12 LAB
ALBUMIN SERPL-MCNC: 3.1 G/DL (ref 3.5–5)
ALP SERPL-CCNC: 127 U/L (ref 45–120)
ALT SERPL W P-5'-P-CCNC: 28 U/L (ref 0–45)
ANION GAP SERPL CALCULATED.3IONS-SCNC: 14 MMOL/L (ref 5–18)
AST SERPL W P-5'-P-CCNC: 70 U/L (ref 0–40)
BILIRUB DIRECT SERPL-MCNC: 0.8 MG/DL
BILIRUB SERPL-MCNC: 1.6 MG/DL (ref 0–1)
BUN SERPL-MCNC: 6 MG/DL (ref 8–28)
CALCIUM SERPL-MCNC: 9.9 MG/DL (ref 8.5–10.5)
CHLORIDE BLD-SCNC: 98 MMOL/L (ref 98–107)
CO2 SERPL-SCNC: 30 MMOL/L (ref 22–31)
CREAT SERPL-MCNC: 2.07 MG/DL (ref 0.7–1.3)
ERYTHROCYTE [DISTWIDTH] IN BLOOD BY AUTOMATED COUNT: 13.7 % (ref 10–15)
GFR SERPL CREATININE-BSD FRML MDRD: 30 ML/MIN/1.73M2
GLUCOSE BLD-MCNC: 126 MG/DL (ref 70–125)
HCT VFR BLD AUTO: 33.8 % (ref 40–53)
HGB BLD-MCNC: 11 G/DL (ref 13.3–17.7)
LACTATE SERPL-SCNC: 2.1 MMOL/L (ref 0.7–2)
MCH RBC QN AUTO: 35.6 PG (ref 26.5–33)
MCHC RBC AUTO-ENTMCNC: 32.5 G/DL (ref 31.5–36.5)
MCV RBC AUTO: 109 FL (ref 78–100)
PHOSPHATE SERPL-MCNC: 2.6 MG/DL (ref 2.5–4.5)
PLATELET # BLD AUTO: 166 10E3/UL (ref 150–450)
POTASSIUM BLD-SCNC: 3.2 MMOL/L (ref 3.5–5)
POTASSIUM BLD-SCNC: 3.5 MMOL/L (ref 3.5–5)
PROT SERPL-MCNC: 5.5 G/DL (ref 6–8)
RBC # BLD AUTO: 3.09 10E6/UL (ref 4.4–5.9)
SODIUM SERPL-SCNC: 142 MMOL/L (ref 136–145)
WBC # BLD AUTO: 3.8 10E3/UL (ref 4–11)

## 2021-11-12 PROCEDURE — 36415 COLL VENOUS BLD VENIPUNCTURE: CPT | Performed by: INTERNAL MEDICINE

## 2021-11-12 PROCEDURE — P9047 ALBUMIN (HUMAN), 25%, 50ML: HCPCS | Performed by: PHYSICIAN ASSISTANT

## 2021-11-12 PROCEDURE — 99233 SBSQ HOSP IP/OBS HIGH 50: CPT | Performed by: INTERNAL MEDICINE

## 2021-11-12 PROCEDURE — 250N000013 HC RX MED GY IP 250 OP 250 PS 637: Performed by: FAMILY MEDICINE

## 2021-11-12 PROCEDURE — 82668 ASSAY OF ERYTHROPOIETIN: CPT | Performed by: PHYSICIAN ASSISTANT

## 2021-11-12 PROCEDURE — 84075 ASSAY ALKALINE PHOSPHATASE: CPT | Performed by: FAMILY MEDICINE

## 2021-11-12 PROCEDURE — 258N000003 HC RX IP 258 OP 636: Performed by: FAMILY MEDICINE

## 2021-11-12 PROCEDURE — 84132 ASSAY OF SERUM POTASSIUM: CPT | Performed by: FAMILY MEDICINE

## 2021-11-12 PROCEDURE — 97535 SELF CARE MNGMENT TRAINING: CPT | Mod: GO

## 2021-11-12 PROCEDURE — 87040 BLOOD CULTURE FOR BACTERIA: CPT | Performed by: INTERNAL MEDICINE

## 2021-11-12 PROCEDURE — 99233 SBSQ HOSP IP/OBS HIGH 50: CPT | Performed by: PHYSICIAN ASSISTANT

## 2021-11-12 PROCEDURE — 83605 ASSAY OF LACTIC ACID: CPT | Performed by: FAMILY MEDICINE

## 2021-11-12 PROCEDURE — 82248 BILIRUBIN DIRECT: CPT | Performed by: FAMILY MEDICINE

## 2021-11-12 PROCEDURE — 99233 SBSQ HOSP IP/OBS HIGH 50: CPT | Performed by: FAMILY MEDICINE

## 2021-11-12 PROCEDURE — 36415 COLL VENOUS BLD VENIPUNCTURE: CPT | Performed by: FAMILY MEDICINE

## 2021-11-12 PROCEDURE — 85027 COMPLETE CBC AUTOMATED: CPT | Performed by: PHYSICIAN ASSISTANT

## 2021-11-12 PROCEDURE — 250N000011 HC RX IP 250 OP 636: Performed by: FAMILY MEDICINE

## 2021-11-12 PROCEDURE — 84460 ALANINE AMINO (ALT) (SGPT): CPT | Performed by: FAMILY MEDICINE

## 2021-11-12 PROCEDURE — 84450 TRANSFERASE (AST) (SGOT): CPT | Performed by: FAMILY MEDICINE

## 2021-11-12 PROCEDURE — 210N000002 HC R&B HEART CARE

## 2021-11-12 PROCEDURE — 250N000013 HC RX MED GY IP 250 OP 250 PS 637: Performed by: PHYSICIAN ASSISTANT

## 2021-11-12 PROCEDURE — 84100 ASSAY OF PHOSPHORUS: CPT | Performed by: PHYSICIAN ASSISTANT

## 2021-11-12 PROCEDURE — 250N000011 HC RX IP 250 OP 636: Performed by: PHYSICIAN ASSISTANT

## 2021-11-12 RX ORDER — BUMETANIDE 0.25 MG/ML
1 INJECTION INTRAMUSCULAR; INTRAVENOUS ONCE
Status: COMPLETED | OUTPATIENT
Start: 2021-11-12 | End: 2021-11-12

## 2021-11-12 RX ORDER — BUMETANIDE 0.5 MG/1
0.5 TABLET ORAL
Status: DISCONTINUED | OUTPATIENT
Start: 2021-11-13 | End: 2021-11-13

## 2021-11-12 RX ORDER — MIDODRINE HYDROCHLORIDE 2.5 MG/1
2.5 TABLET ORAL ONCE
Status: COMPLETED | OUTPATIENT
Start: 2021-11-12 | End: 2021-11-12

## 2021-11-12 RX ORDER — MIDODRINE HYDROCHLORIDE 2.5 MG/1
2.5 TABLET ORAL 2 TIMES DAILY
Status: DISCONTINUED | OUTPATIENT
Start: 2021-11-12 | End: 2021-11-23 | Stop reason: HOSPADM

## 2021-11-12 RX ADMIN — PIPERACILLIN AND TAZOBACTAM 3.38 G: 3; .375 INJECTION, POWDER, LYOPHILIZED, FOR SOLUTION INTRAVENOUS at 16:20

## 2021-11-12 RX ADMIN — ALBUMIN HUMAN 50 G: 0.25 SOLUTION INTRAVENOUS at 23:02

## 2021-11-12 RX ADMIN — GABAPENTIN 200 MG: 100 CAPSULE ORAL at 13:48

## 2021-11-12 RX ADMIN — ASPIRIN 81 MG: 81 TABLET, COATED ORAL at 22:00

## 2021-11-12 RX ADMIN — PIPERACILLIN AND TAZOBACTAM 3.38 G: 3; .375 INJECTION, POWDER, LYOPHILIZED, FOR SOLUTION INTRAVENOUS at 09:08

## 2021-11-12 RX ADMIN — HEPARIN SODIUM 5000 UNITS: 5000 INJECTION, SOLUTION INTRAVENOUS; SUBCUTANEOUS at 22:00

## 2021-11-12 RX ADMIN — MULTIPLE VITAMINS W/ MINERALS TAB 1 TABLET: TAB at 09:01

## 2021-11-12 RX ADMIN — PIPERACILLIN AND TAZOBACTAM 3.38 G: 3; .375 INJECTION, POWDER, LYOPHILIZED, FOR SOLUTION INTRAVENOUS at 01:34

## 2021-11-12 RX ADMIN — VANCOMYCIN HYDROCHLORIDE 1000 MG: 5 INJECTION, POWDER, LYOPHILIZED, FOR SOLUTION INTRAVENOUS at 11:18

## 2021-11-12 RX ADMIN — GABAPENTIN 200 MG: 100 CAPSULE ORAL at 22:00

## 2021-11-12 RX ADMIN — Medication 100 MG: at 09:01

## 2021-11-12 RX ADMIN — Medication 50 MG: at 09:01

## 2021-11-12 RX ADMIN — BUMETANIDE 1 MG: 0.25 INJECTION, SOLUTION INTRAMUSCULAR; INTRAVENOUS at 14:45

## 2021-11-12 RX ADMIN — HEPARIN SODIUM 5000 UNITS: 5000 INJECTION, SOLUTION INTRAVENOUS; SUBCUTANEOUS at 09:00

## 2021-11-12 RX ADMIN — POLYETHYLENE GLYCOL 3350 17 G: 17 POWDER, FOR SOLUTION ORAL at 09:00

## 2021-11-12 RX ADMIN — Medication 125 MCG: at 09:01

## 2021-11-12 RX ADMIN — PANTOPRAZOLE SODIUM 40 MG: 20 TABLET, DELAYED RELEASE ORAL at 16:19

## 2021-11-12 RX ADMIN — SODIUM CHLORIDE 500 ML: 9 INJECTION, SOLUTION INTRAVENOUS at 09:07

## 2021-11-12 RX ADMIN — ALBUMIN HUMAN 50 G: 0.25 SOLUTION INTRAVENOUS at 09:00

## 2021-11-12 RX ADMIN — ALBUTEROL SULFATE 2 PUFF: 90 INHALANT RESPIRATORY (INHALATION) at 09:06

## 2021-11-12 RX ADMIN — MIDODRINE HYDROCHLORIDE 2.5 MG: 2.5 TABLET ORAL at 13:48

## 2021-11-12 RX ADMIN — MAGNESIUM OXIDE TAB 400 MG (241.3 MG ELEMENTAL MG) 400 MG: 400 (241.3 MG) TAB at 22:04

## 2021-11-12 RX ADMIN — GABAPENTIN 200 MG: 100 CAPSULE ORAL at 09:01

## 2021-11-12 RX ADMIN — MIDODRINE HYDROCHLORIDE 2.5 MG: 2.5 TABLET ORAL at 16:19

## 2021-11-12 RX ADMIN — BUMETANIDE 0.5 MG: 0.5 TABLET ORAL at 17:21

## 2021-11-12 ASSESSMENT — ACTIVITIES OF DAILY LIVING (ADL)
ADLS_ACUITY_SCORE: 11
ADLS_ACUITY_SCORE: 12
ADLS_ACUITY_SCORE: 11

## 2021-11-12 NOTE — PROGRESS NOTES
RENAL PROGRESS NOTE    ASSESSMENT & PLAN:   LEILA on CKD IIIb: Baseline cr in the 1.5-1.8 range on chart review.  Now with rising serum creatinine to the low 2 range.  Nonoliguric.  Very bland urinalysis 11/10/2021, no proteinuria, pyuria, or microscopic hematuria.  Does have reduced nephron mass, status post left nephrectomy on 3/26/2009 left kidney hemangioma.  Patient tells me the last 2 weeks he has had not much of an appetite, has had some nausea, decreased p.o. intake and overall not feeling well, likely contributing to a prerenal state with his mild elevation in creatinine upon admission.  Creatinine did get a little bit better with IV fluids initially, but he is volume up on exam and will need diuresis.  Looking little bit more intravascularly dry on labs.  Likely mild LEILA secondary to the above.  We will plan on starting low-dose midodrine to support blood pressure and increase renal perfusion, continue albumin today, continue diuresis with Bumex.  Creatinine now downtrending and good response to Bumex.  Discussed with Dr. Thomas    Volume: Patient appears to be volume up on exam, pitting peripheral edema lower extremities extending up into the thighs.  Patient also has some degree of liver disease, and albumin quite low.  He is likely third spacing some of the fluids secondary to his hypoalbuminemia.  He is appearing a little bit more intravascularly dry despite being volume up on exam as evidenced by escalating sodium, chloride, and bicarb.  For that reason we will dose some albumin 50 g twice daily for 2 days, change diuretics from Lasix to Bumex as this will be more bioavailable. Patient with poor appetite, moderate protein caloric malnutrition, will start patient on Nepro protein supplements with meals as well.    Lytes/acid/base: Initially with mild hyponatremia, likely secondary to hypervolemia.  Improving with diuresis.  Mild hypokalemia likely secondary to loop diuretics.  Being replaced.      Anemia: Macrocytic.  History of alcohol abuse.  B12 and folic acid within normal limits this admit.  Will check iron studies.  Erythropoietin level.  Also has history of Ragsdale's esophagus and previous GIB.  No obvious signs of GI blood loss.     Nocturnal fever: Unclear etiology.  T-max 101.8 Fahrenheit.    Afebrile today.  Positive blood culture 1 of 2 GPC in clusters.  Chest x-ray clear.  UA clear.  Mildly elevated pro-Kota 0.72, white count at 3.8.  Lower extremity erythema improving, still warm to the touch.  Empirically started on Zosyn and vancomycin.     Bilateral lower extremity pain/neuropathy: Did have elevated D-dimer, negative VQ scan and lower extremity US.  Started on gabapentin renally dosed with some relief.  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     Chronic systolic heart failure: 5/2021 echocardiogram showed EF 55%.  Mild hypertrophy otherwise normal.  On low-dose beta-blocker at baseline.  11/9 echocardiogram EF 55 to 60% borderline LVH and mild MR.     CAD: MI in 2019.  PCI x1.     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  Follows with oncology.     Ragsdale's esophagus: Continue home PPI     Alcohol abuse: Drinks a liter of vodka per day.  On protocols.  Social work chemical dependency consultation.     Discussed with Dr Thomas     SUBJECTIVE:    Patient's daughter present during encounter  Some softer blood pressures today  Primary physician gave saline bolus  Patient asymptomatic at this time, no orthostatic symptoms  No shortness of breath  Still feels quite fatigued and weak  Appetite remains poor  We will start low-dose midodrine to support blood pressure  Diuresis with Bumex  Creatinine downtrending   Good output last 24    OBJECTIVE:  Physical Exam   Temp: 97.9  F (36.6  C) Temp src: Oral BP: 119/60 Pulse:  88   Resp: 18 SpO2: 91 % O2 Device: None (Room air)    Vitals:    11/09/21 1515 11/10/21 0555 11/11/21 0600   Weight: 98.4 kg (217 lb) 97.9 kg (215 lb 12.8 oz) 98.3 kg (216 lb 11.4 oz)     Vital Signs with Ranges  Temp:  [97.9  F (36.6  C)-99.5  F (37.5  C)] 97.9  F (36.6  C)  Pulse:  [78-96] 88  Resp:  [18-20] 18  BP: ()/(55-69) 119/60  SpO2:  [90 %-95 %] 91 %  I/O last 3 completed shifts:  In: 480 [P.O.:480]  Out: 1950 [Urine:1950]      Patient Vitals for the past 72 hrs:   Weight   11/11/21 0600 98.3 kg (216 lb 11.4 oz)   11/10/21 0555 97.9 kg (215 lb 12.8 oz)   11/09/21 1515 98.4 kg (217 lb)       Intake/Output Summary (Last 24 hours) at 11/12/2021 1300  Last data filed at 11/12/2021 0934  Gross per 24 hour   Intake 860 ml   Output 1950 ml   Net -1090 ml       PHYSICAL EXAM:  General: Alert, NAD  HENT: Supple, Non-tender, no obvious JVD  Eyes: No scleral icterus  Cardiovascular: RRR, no rub, gallop, or murmur.  Pitting lower extremity edema extending from ankles into thighs  Respiratory: CTAB, non-labored.   Gastrointestinal: Soft, non-tender, non-distended  Musculoskeletal: Grossly intact  Integumentary: Mild erythema to the bilateral lower extremities, right greater than left.  Warm to touch.  Neurologic: Grossly intact  Psychiatric: Cooperative, appropriate mood and affect    LABORATORY STUDIES:     Recent Labs   Lab 11/12/21  0520 11/11/21  0523 11/10/21  0849 11/09/21  0240 11/08/21  1132   WBC 3.8*  --  3.8* 4.0 6.6   RBC 3.09*  --  2.99* 3.33* 3.90*   HGB 11.0*  --  10.5* 11.7* 14.1   HCT 33.8*  --  32.9* 37.6* 42.6    148* 151 146* 246       Basic Metabolic Panel:  Recent Labs   Lab 11/12/21  0824 11/12/21  0520 11/11/21  0523 11/10/21  0533 11/09/21  0240 11/08/21  1132   NA  --  142 139 137 136 134*   POTASSIUM 3.5 3.2* 3.3* 3.4* 3.9 4.1   CHLORIDE  --  98 102 101 99 95*   CO2  --  30 29 27 27 26   BUN  --  6* 8 9 10 11   CR  --  2.07* 2.23* 1.97* 2.06* 2.26*   GLC  --  126* 125 111 100  139*   BRADLEY  --  9.9 9.2 9.1 9.0 10.1       INR  Recent Labs   Lab 11/09/21  0240 11/08/21 2044   INR 1.24* 1.22*        Recent Labs   Lab Test 11/12/21  0520 11/11/21  0523 11/10/21  0849 11/09/21 0240 11/08/21 2044   INR  --   --   --  1.24* 1.22*   WBC 3.8*  --  3.8* 4.0  --    HGB 11.0*  --  10.5* 11.7*  --     148* 151 146*  --        Personally reviewed current labs    Mono Richardson  [facility]  [phone]

## 2021-11-12 NOTE — PROGRESS NOTES
Care Management Follow Up    Length of Stay (days): 4    Expected Discharge Date: 11/14/2021     Concerns to be Addressed:     Discharge planning  Patient plan of care discussed at interdisciplinary rounds: Yes    Anticipated Discharge Disposition:  TCU     Anticipated Discharge Services:    Anticipated Discharge DME:        Private pay costs discussed: Not applicable    Additional Information:  SW left  for admissions at [facility] requesting call back about ability to accept pt. SW spoke with [facility] She reports [facility] full but will have [facility] review pt. She reports they are not taking weekend admissions but can review to see if they could potentially accept Monday.       MALA MoralesSW

## 2021-11-12 NOTE — PROGRESS NOTES
[facility] MEDICINE  PROGRESS NOTE     Code Status: No CPR- Do NOT Intubate       Identification/Summary:   76 year old male with PMH signficant for CAD, lung cancer, prostate and oral cancer, CHF, COPD, Ragsdale's, CKD and alcohol abuse.  11/8 admitted with bilateral lower extremity edema, pain, LHD.  LEILA creatinine 2.2.  Admits to 1 L vodka per day.  After IV fluids lactic acidosis resolved and creatinine 2.06.  Low albumin.  Nephrology consultation.  Utilizing albumin and diuresis.  11/9 PM fever spikes 101.8.  Unclear etiology.  11/10 empirically started Zosyn and vancomycin.  11/11 some fever improvement.  1 of 2 blood cultures GPC in clusters.  Repeat blood cultures drawn and ID consulted.   CT scan negative. 11/12 systolic blood pressure 70s. Asymptomatic. Holding Bumex and given 500 bolus. BP improved.     Assessment and Plan:  -Coronary artery disease.  MI 2019.  Stent x1.  -Essential hypertension  -Chronic systolic heart failure  -Hypotension  May 2021 echocardiogram EF 55%.  Mild hypertrophy otherwise normal.  11/8 given home metoprolol XL 12.5 mg daily with hold parameters.  11/9 echocardiogram EF 55 to 60% borderline LVH and mild MR.  11/10 discontinued clonidine as it may be contributing to some hypotension.  11/12 systolic BP down to the 70s. Asymptomatic. Giving normal saline bolus 500 mL x 1. Holding Bumex.  Further adjustments per nephrology.  -Nocturnal fever  -Positive blood culture 1 of 2 GPC in clusters  Unclear etiology.  T-max 101.8.  Blood work done normal.  Chest x-ray clear.  Negative urinalysis.  Blood cultures drawn.  11/10 procalcitonin 0.72 and white count at 3.8.  Lower extremities erythema appear improved.  Empirically started Zosyn and vancomycin.    11/11 + blood culture 1 of 2 with GPC in clusters.  Continue current antibiotics.  Infectious disease consult appreciated.  2 more blood cultures drawn. CT scan chest abdomen pelvis negative for acute  findings.  -Bilateral lower extremity edema  Significant findings: BNP 99, troponin 0.06, D-dimer 1.79.  Normal B12 and folate.  VQ scan low probability for pulmonary embolism.  Chest x-ray shows prior lung resections otherwise negative.   Bilateral lower extremity ultrasound negative for DVT.  Right upper quadrant abdominal ultrasound shows hepatic steatosis with gallbladder sludge otherwise normal.  11/8 Lactated Ringer's at 75 mL/h.  11/9 with kidney function improved initiated diurese.  Gave albumin infusion x1 and Lasix 20 mg IV every 12 hours.  11/10 Transitioned Lasix to orals 20 twice daily.  11/11 creatinine worsened to 2.23.  Appreciate nephrology consultation.  Utilize albumin and low-dose Bumex.    11/12 creatinine 2.07. Hypotension issues as described above.  -Bilateral lower extremity pain/neuropathy  11/8 given gabapentin 100 mg 3 times daily.  Some slight improvement to pain.  11/11 increased gabapentin to 200 mg 3 times daily.  -Lactic acidosis  -Acute on chronic kidney disease stage III  -Hyponatremia  May 2021 creatinine 1.66.    Given IV fluids at admission.  Admission creatinine 2.26--> 2.06--> 1.97--> 2.23--> 2.07.  Admission lactic 2.6--> 2.3--> 1.4.    11/9 okay discontinue IV fluids.    Diuresis, albumin and nephrology consultation as above.  Follow BMP.  -Alcohol abuse  Patient admits to drinking 1 L of vodka per day.  Denies history of withdrawal but also cannot recall the last time he had a day of abstinence from alcohol.  Utilize CIWA protocols at this time with lorazepam.    11/11 no evidence of withdrawal.   Discontinued CIWA protocols.  Social work chemical dependency consultation.  -Hypoalbuminemia  -Moderate protein caloric malnutrition  Albumin infusion as noted above.  RD consultation appreciated.  -New bifascicular block  Previous EKGs had left axis deviation along with right bundle branch block.  Admission EKG shows new bifascicular block.  Serial troponins negative.  Echo as  above.  No further work-up at this time.  -Ragsdale's esophagus  Continue home omeprazole 20 mg daily.  -Abnormal UA  Unclear if patient could be dealing with a UTI or not.  Denies any dysuria.  Urinalysis was abnormal but not severe enough to trigger urine culture.  -COPD  -Left upper lobe nodule 9 x 7 mm  -History of lung cancer.   -Status post right wedge resection 2016  MELODY nodule appears 1 mm larger compared to 10/18/2021. Irregular in shape.  Concern for possible primary cancer versus metastatic disease. Should follow-up with his pulmonologist/oncologist after discharge.  Continue home Trelegy Ellipta inhaler and albuterol as needed.  -Pancreatic cyst 17 mm  Follow-up CT in 2 years versus EUS.     -COVID19 PCR status negative from 11/8/2021  Patient is vaccinated.  Standard precautions.  -Anticoagulation   INR 1.22.  Continue home aspirin 81 mg nightly.  Utilize subcutaneous heparin.  Fluids: Saline lock  Therapy: PT OT recommending TCU at this time   Urban:Not present  Current Diet  Orders Placed This Encounter      No Added Salt 4 Gram Sodium    Supplements  Active Nourishment Order   Procedures     Snacks/Supplements Adult: Nepro Oral Supplement; With Meals     Barriers to Discharge: Hypotension, LEILA, IV antibiotics    Disposition: TCU recommended. Timing to be determined.    Interval History/Subjective:  Patient was hypotensive this morning systolically into the 70s when seated in the chair. Was asymptomatic. Denies any lightheadedness shortness of breath chest pain. After receiving 500 normal saline bolus blood pressure improved to 107 systolically. T-max 99 degrees. No family present at this time. Eating breakfast without any difficulty. Questions answered to verbalized satisfaction.    Physical Exam/Objective:  Temp:  [98.1  F (36.7  C)-99.5  F (37.5  C)] 99.5  F (37.5  C)  Pulse:  [70-96] 80  Resp:  [18-24] 18  BP: ()/(55-69) 113/66  SpO2:  [89 %-95 %] 94 %  Wt Readings from Last 4 Encounters:    11/11/21 98.3 kg (216 lb 11.4 oz)   09/28/21 95.2 kg (209 lb 12.8 oz)   06/25/21 92.1 kg (203 lb)   06/23/21 92.3 kg (203 lb 8 oz)     Body mass index is 30.23 kg/m .    Constitutional: awake, alert, cooperative, no apparent distress, and appears stated age  ENT: Normocephalic, without obvious abnormality, atraumatic, external ears without lesions, oral pharynx with moist mucous membranes, tonsils without erythema or exudates, gums normal and good dentition.  Respiratory: No increased work of breathing, good air exchange, clear to auscultation bilaterally, no crackles or wheezing  Cardiovascular: Normal apical impulse, regular rate and rhythm, normal S1 and S2, no S3 or S4, and no murmur noted  GI: No scars, normal bowel sounds, soft, non-distended, non-tender, no masses palpated, no hepatosplenomegally  Skin: normal skin color, texture, turgor, no redness, warmth, or swelling, and no rashes  Musculoskeletal: Stable mild erythema right greater than left lower extremities. There is trace edema now.   Neurologic: Cranial nerves II-XII are grossly intact. Sensory:  Sensory intact  Neuropsychiatric: General: normal, calm and normal eye contact Level of consciousness: alert / normal Affect: normal Orientation: oriented to self, place, time and situation Memory and insight: normal, memory for past and recent events intact and thought process normal      Medications:   Personally Reviewed.  Medications       albumin human  50 g Intravenous BID     aspirin  81 mg Oral At Bedtime     [Held by provider] bumetanide  0.5 mg Oral BID     Fluticasone-Umeclidin-Vilanterol  1 puff Inhalation Daily     gabapentin  200 mg Oral TID     heparin ANTICOAGULANT  5,000 Units Subcutaneous Q12H     magnesium oxide  400 mg Oral At Bedtime     metoprolol succinate ER  12.5 mg Oral Daily     multivitamin w/minerals  1 tablet Oral Daily     pantoprazole  40 mg Oral Daily with supper     piperacillin-tazobactam  3.375 g Intravenous Q8H      polyethylene glycol  17 g Oral Daily     sodium chloride (PF)  3 mL Intracatheter Q8H     sodium chloride (PF)  3 mL Intracatheter Q8H     thiamine  100 mg Oral Daily     vancomycin  1,000 mg Intravenous Q24H     Vitamin D3  125 mcg Oral Daily     zinc gluconate  50 mg Oral Daily       Data reviewed today: I personally reviewed all new medications, labs, imaging/diagnostics reports over the past 24 hours. Pertinent findings include:    Imaging:   Recent Results (from the past 24 hour(s))   Echocardiogram Complete   Result Value    LVEF  55-60%    [facility]    [id number]  [id number]  [id number] [address]     Name: CELIA LUTZ  MRN: [id number]  : 1945  Study Date: 2021 02:15 PM  Age: 76 yrs  Gender: Male  Patient Location: [facility]  Reason For Study: Endocarditis  Ordering Physician: SAMUEL SINGER  Performed By: ACE     BSA: 2.2 m2  Height: 71 in  Weight: 216 lb  HR: 72  ______________________________________________________________________________  Procedure  Complete Echo Adult. Definity (NDC #95035-354) given intravenously. 5mL given.  LOT#[id number]. Technically difficult study. Poor acoustic windows.  ______________________________________________________________________________  Interpretation Summary     The left ventricle is mildly dilated with normal left ventricular wall  thickness.  Left ventricular function is normal.The ejection fraction is 55-60%.  Normal right ventricle size and systolic function.  No significant valve disease identified.  No evidence of endocarditis identified, however (especially with poor acoustic  windows), transthoracic echocardiography does not have high sensitivity for  identifying endocarditis. If suspicion remains for this condition consider  evaluation with a transesophageal echocardiogram.  ______________________________________________________________________________  I      WMSI = 1.00     %  Normal = 100     X - Cannot   0 -                      (2) - Mildly 2 -          Segments  Size  Interpret    Hyperkinetic 1 - Normal  Hypokinetic  Hypokinetic  1-2     small                                                     7 -          3-5      moderate  3 - Akinetic 4 -          5 -         6 - Akinetic Dyskinetic   6-14    large               Dyskinetic   Aneurysmal  w/scar       w/scar       15-16   diffuse     Left Ventricle  The left ventricle is mildly dilated. Left ventricular function is normal.The  ejection fraction is 55-60%. There is normal left ventricular wall thickness.  Left ventricular diastolic function is indeterminate. No regional wall motion  abnormalities noted.     Right Ventricle  Normal right ventricle size and systolic function.     Atria  Normal left atrial size. Right atrial size is normal. There is no color  Doppler evidence of an atrial shunt.     Mitral Valve  Mitral valve leaflets appear normal. There is no evidence of mitral stenosis  or clinically significant mitral regurgitation.     Tricuspid Valve  Tricuspid valve leaflets appear normal. There is no evidence of tricuspid  stenosis or clinically significant tricuspid regurgitation. Right ventricle  systolic pressure estimate normal.     Aortic Valve  The aortic valve is trileaflet with aortic valve sclerosis. No aortic  regurgitation is present. No aortic stenosis is present.     Pulmonic Valve  The pulmonic valve is not well seen, but is grossly normal. This degree of  valvular regurgitation is within normal limits. There is trace pulmonic  valvular regurgitation.     Vessels  The aorta root is normal. Normal size ascending aorta. IVC diameter <2.1 cm  collapsing >50% with sniff suggests a normal RA pressure of 3 mmHg.     Pericardium  There is no pericardial effusion.     Rhythm  Sinus rhythm was noted.     ______________________________________________________________________________  MMode/2D Measurements &  Calculations  IVSd: 0.65 cm  LVIDd: 6.0 cm  LVIDs: 4.4 cm  LVPWd: 0.96 cm     FS: 26.7 %  LV mass(C)d: 187.1 grams  LV mass(C)dI: 85.9 grams/m2  LVOT diam: 2.5 cm  LVOT area: 5.0 cm2  LA Volume (BP): 63.7 ml  LA Volume Index (BP): 29.2 ml/m2     LA Volume Indexed (AL/bp): 31.2 ml/m2  RWT: 0.32     Time Measurements  MM HR: 72.0 BPM     Doppler Measurements & Calculations  MV E max rafa: 65.1 cm/sec  MV A max rafa: 66.5 cm/sec  MV E/A: 0.98  MV dec slope: 440.4 cm/sec2  MV dec time: 0.15 sec  Ao V2 max: 161.3 cm/sec  Ao max PG: 10.0 mmHg  Ao V2 mean: 113.8 cm/sec  Ao mean P.8 mmHg  Ao V2 VTI: 32.8 cm  CAMILLE(I,D): 3.2 cm2  CAMILLE(V,D): 3.2 cm2  LV V1 max P.3 mmHg  LV V1 max: 103.7 cm/sec  LV V1 VTI: 20.8 cm  SV(LVOT): 104.9 ml  SI(LVOT): 48.2 ml/m2  PA acc time: 0.13 sec  AV Rafa Ratio (DI): 0.64     CAMILLE Index (cm2/m2): 1.5  E/E': 6.9  E/E' av.8  Lateral E/e': 6.9  Medial E/e': 16.6  Peak E' Rafa: 9.5 cm/sec     ______________________________________________________________________________  Report approved by: Eliza Mullen 2021 03:31 PM         CT Chest Abdomen Pelvis w/o Contrast    Narrative    EXAM: CT CHEST ABDOMEN PELVIS W/O CONTRAST  LOCATION: [facility]  DATE/TIME: 2021 8:43 PM    INDICATION: Sepsis  COMPARISON: 10/18/2021 and 2019  TECHNIQUE: CT scan of the chest, abdomen, and pelvis was performed without IV contrast. Multiplanar reformats were obtained. Dose reduction techniques were used.   CONTRAST: None.    FINDINGS:   LUNGS AND PLEURA: Small pleural effusions. A left suprahilar opacity has not changed. There are fiducial markers in this region. A 9 mm x 7 mm left upper lobe nodule was previously 8 mm x 6 mm (series 4 image 80).  There are suture lines in the right   lung with right upper lung architectural distortion. Emphysema is present.    MEDIASTINUM/AXILLAE: The main pulmonary artery is 35 mm in diameter, which can be seen with pulmonary  hypertension.  The ascending aorta is 4.2 cm in diameter, which is unchanged.    CORONARY ARTERY CALCIFICATION: Severe.    HEPATOBILIARY: Hepatic steatosis.     PANCREAS: A pancreatic lesion has not significantly changed in size.     SPLEEN: Normal.    ADRENAL GLANDS: Normal.    KIDNEYS/BLADDER: A low-attenuation right renal lesion is probably a simple cyst and does not require follow-up. Status post left nephrectomy. No new soft tissue in the nephrectomy bed.     BOWEL: Normal.    LYMPH NODES: Normal.    VASCULATURE: Status post aortobiiliac stent graft placement for an infrarenal abdominal aorta aneurysm.     PELVIC ORGANS: Normal.    MUSCULOSKELETAL: Right hip arthroplasty. Nonacute rib fractures are again seen.       Impression    IMPRESSION:  1.  No source of sepsis identified.  2.  A left upper lobe nodule is 1 mm larger. Metastatic disease is possible.  3.  Otherwise no significant change from the prior study.  The pancreatic lesion can be followed as detailed below.      REFERENCE:  Revisions of international consensus [place] guidelines for the management of IPMN of the pancreas. Pancreatology 2017;17(5):738-753.    Largest cyst between 20-30 mm: EUS in 3-6 months and then annual surveillance alternating between MRI and EUS as appropriate.         Labs:  CT Chest Abdomen Pelvis w/o Contrast   Final Result   IMPRESSION:   1.  No source of sepsis identified.   2.  A left upper lobe nodule is 1 mm larger. Metastatic disease is possible.   3.  Otherwise no significant change from the prior study.   The pancreatic lesion can be followed as detailed below.         REFERENCE:   Revisions of international consensus [place] guidelines for the management of IPMN of the pancreas. Pancreatology 2017;17(5):738-753.      Largest cyst between 20-30 mm: EUS in 3-6 months and then annual surveillance alternating between MRI and EUS as appropriate.         Echocardiogram Complete   Final Result      XR Chest Port 1 View    Final Result   IMPRESSION: No acute abnormality.      Echocardiogram Complete   Final Result      Abdomen US, limited (RUQ only)   Final Result   IMPRESSION:   1.  Diffuse hepatic steatosis.   2.  Sludge containing gallbladder.            US Lower Extremity Venous Duplex Bilateral   Final Result   IMPRESSION:   1.  No deep venous thrombosis in the bilateral lower extremities.      XR Chest 2 Views   Final Result   IMPRESSION: Stable posttreatment changes left upper lobe and postop wedge resections right upper and middle lobes. No new focal consolidation, pneumothorax nor pleural effusion.      NM Lung Scan Perfusion Particulate   Final Result   IMPRESSION:    1.  Pulmonary embolus absent, low probability scan.        Recent Results (from the past 24 hour(s))   Blood Culture Peripheral Blood    Collection Time: 11/11/21 12:10 PM    Specimen: Peripheral Blood   Result Value Ref Range    Culture No growth after 12 hours    Blood Culture Peripheral Blood    Collection Time: 11/11/21 12:10 PM    Specimen: Peripheral Blood   Result Value Ref Range    Culture No growth after 12 hours    Lactic Acid STAT    Collection Time: 11/11/21 12:43 PM   Result Value Ref Range    Lactic Acid 2.2 (H) 0.7 - 2.0 mmol/L   Echocardiogram Complete    Collection Time: 11/11/21  2:56 PM   Result Value Ref Range    LVEF  55-60%    Renal panel    Collection Time: 11/12/21  5:20 AM   Result Value Ref Range    Sodium 142 136 - 145 mmol/L    Potassium 3.2 (L) 3.5 - 5.0 mmol/L    Chloride 98 98 - 107 mmol/L    Carbon Dioxide (CO2) 30 22 - 31 mmol/L    Anion Gap 14 5 - 18 mmol/L    Urea Nitrogen 6 (L) 8 - 28 mg/dL    Creatinine 2.07 (H) 0.70 - 1.30 mg/dL    Calcium 9.9 8.5 - 10.5 mg/dL    Glucose 126 (H) 70 - 125 mg/dL    Albumin 3.1 (L) 3.5 - 5.0 g/dL    Phosphorus 2.6 2.5 - 4.5 mg/dL    GFR Estimate 30 (L) >60 mL/min/1.73m2   CBC with platelets    Collection Time: 11/12/21  5:20 AM   Result Value Ref Range    WBC Count 3.8 (L) 4.0 - 11.0 10e3/uL     RBC Count 3.09 (L) 4.40 - 5.90 10e6/uL    Hemoglobin 11.0 (L) 13.3 - 17.7 g/dL    Hematocrit 33.8 (L) 40.0 - 53.0 %     (H) 78 - 100 fL    MCH 35.6 (H) 26.5 - 33.0 pg    MCHC 32.5 31.5 - 36.5 g/dL    RDW 13.7 10.0 - 15.0 %    Platelet Count 166 150 - 450 10e3/uL   ALT    Collection Time: 11/12/21  5:20 AM   Result Value Ref Range    ALT 28 0 - 45 U/L   AST    Collection Time: 11/12/21  5:20 AM   Result Value Ref Range    AST 70 (H) 0 - 40 U/L   Alkaline phosphatase    Collection Time: 11/12/21  5:20 AM   Result Value Ref Range    Alkaline Phosphatase 127 (H) 45 - 120 U/L   Bilirubin direct    Collection Time: 11/12/21  5:20 AM   Result Value Ref Range    Bilirubin Direct 0.8 (H) <=0.5 mg/dL   Bilirubin  total    Collection Time: 11/12/21  5:20 AM   Result Value Ref Range    Bilirubin Total 1.6 (H) 0.0 - 1.0 mg/dL   Protein total    Collection Time: 11/12/21  5:20 AM   Result Value Ref Range    Protein Total 5.5 (L) 6.0 - 8.0 g/dL   Potassium    Collection Time: 11/12/21  8:24 AM   Result Value Ref Range    Potassium 3.5 3.5 - 5.0 mmol/L       Pending Labs:  Unresulted Labs Ordered in the Past 30 Days of this Admission     Date and Time Order Name Status Description    11/12/2021 12:01 AM Blood Culture Hand, Right In process     11/12/2021 12:01 AM Blood Culture Peripheral Blood In process     11/12/2021 12:01 AM Erythropoietin In process     11/11/2021 11:42 AM Blood Culture Peripheral Blood Preliminary     11/11/2021 11:42 AM Blood Culture Peripheral Blood Preliminary     11/9/2021 11:59 PM Blood Culture Arm, Right Preliminary     11/9/2021 11:59 PM Blood Culture Hand, Right Preliminary     11/8/2021 11:25 AM Blood Culture Peripheral Blood Preliminary     11/8/2021 11:25 AM Blood Culture Peripheral Blood Preliminary             Enrique Thomas MD  [facility]  Phone: #[phone]

## 2021-11-12 NOTE — PROGRESS NOTES
Mitchell had a fairly decent day today. BP's low this morning, provider aware, 500cc bolus given, Bumex held than ultimately restarted and midodrine was added. Blood pressures were stable the reminder of the day. IV abx administered. Worked with therapies and up 1-2 assist with a walker. On K+ protocol, 3.5, will recheck in the morning. Plan to discharge to TCU when able. Daughter at the bedside and aware of plans.

## 2021-11-12 NOTE — PROGRESS NOTES
Mitchell had a fairly good day. ID and Nephrology consults placed. Lasix discontinued and albumin and Bumex started. IV abx given. Blood cultures positive, provider aware. ECHO completed. Plan for chest, abdomen, and pelvic CT sometime this evening. He worked with therapies and is transferring well 1 assist. Plan to discharge to TCU when cleared.

## 2021-11-12 NOTE — PROGRESS NOTES
[facility]    Infectious Disease Progress Note    Date of Service (when I saw the patient): 11/12/2021     Assessment & Plan     Lenny Chau is a 76 year old male who was admitted on 11/8/2021.   1. Coronary artery disease, cancer, prostate overall  2. Congestive heart failure which is worsening lower extremity edema  3. Alcohol use  4. Ragsdale's esophagus  5. Chronic kidney disease  6. Admitted worsening lower extremity edema  7. Blood cultures positive for gram gram-positive cocci: Micrococcus which is likely contaminant  8. Fever improving.  CT chest abdomen pelvis did not show any source of infection.  Echocardiogram without any evidence of endocarditis identified even though limited sensitivity with transthoracic echocardiogram  9. Hepatic steatosis gallbladder sludge    Recommendations:    1. Follow repeat blood cultures  2. Empiric vancomycin and Zosyn, de-escalate or stop antibiotics based on repeat culture results and clinical response  3. Test with patient, patient's daughter, and nurse at bedside  4. Infectious disease will chart check over the weekend and will follow up on 11/15/2021.  Dr. Robles is rounding this weekend.  Please call with questions    Sapna Simms MD  [facility]  [phone]          Interval History   Slightly better today, able to sit up  Intermittent coughing  Lower extremity swelling improved, still weak  Fever improved    Physical Exam   Temp: 98.4  F (36.9  C) Temp src: Oral BP: 138/73 Pulse: 75   Resp: 24 SpO2: 90 % O2 Device: None (Room air)    Vitals:    11/09/21 1515 11/10/21 0555 11/11/21 0600   Weight: 98.4 kg (217 lb) 97.9 kg (215 lb 12.8 oz) 98.3 kg (216 lb 11.4 oz)     Vital Signs with Ranges  Temp:  [97.9  F (36.6  C)-99.5  F (37.5  C)] 98.4  F (36.9  C)  Pulse:  [75-88] 75  Resp:  [18-24] 24  BP: ()/(55-73) 138/73  SpO2:  [90 %-95 %] 90 %    Constitutional: Except, chronically fatigued.  Slightly better  than 11/11/2021  Lungs: Basilar crackles  Cardiovascular: S1-S2  Abdomen: Normal bowel sounds, soft, non-distended, non-tender  Skin: Stasis lower extremity  Lower extremity: Edema  Neuro: Deconditioned awake coherent able to follow commands    Medications       albumin human  50 g Intravenous BID     aspirin  81 mg Oral At Bedtime     [START ON 11/13/2021] bumetanide  0.5 mg Oral BID     bumetanide  0.5 mg Oral BID     Fluticasone-Umeclidin-Vilanterol  1 puff Inhalation Daily     gabapentin  200 mg Oral TID     heparin ANTICOAGULANT  5,000 Units Subcutaneous Q12H     magnesium oxide  400 mg Oral At Bedtime     metoprolol succinate ER  12.5 mg Oral Daily     midodrine  2.5 mg Oral BID 09 12     multivitamin w/minerals  1 tablet Oral Daily     pantoprazole  40 mg Oral Daily with supper     piperacillin-tazobactam  3.375 g Intravenous Q8H     polyethylene glycol  17 g Oral Daily     sodium chloride (PF)  3 mL Intracatheter Q8H     sodium chloride (PF)  3 mL Intracatheter Q8H     thiamine  100 mg Oral Daily     vancomycin  1,000 mg Intravenous Q24H     Vitamin D3  125 mcg Oral Daily     zinc gluconate  50 mg Oral Daily       Data   All microbiology laboratory data reviewed.  Recent Labs   Lab Test 11/12/21  0520 11/11/21  0523 11/10/21  0849 11/09/21  0240   WBC 3.8*  --  3.8* 4.0   HGB 11.0*  --  10.5* 11.7*   HCT 33.8*  --  32.9* 37.6*   *  --  110* 113*    148* 151 146*     Recent Labs   Lab Test 11/12/21  0520 11/11/21  0523 11/10/21  0533   CR 2.07* 2.23* 1.97*       Collected Updated Procedure Result Status    11/12/2021 0526 11/12/2021 0529 Blood Culture Hand, Right [[id number]]   Blood from Hand, Right    In process Component Value   No component results              11/12/2021 0520 11/12/2021 0529 Blood Culture Peripheral Blood [[id number]]   Peripheral Blood    In process Component Value   No component results              11/11/2021 1210 11/12/2021 1746 Blood Culture Peripheral Blood  [[id number]]   Peripheral Blood    Preliminary result Component Value   Culture No growth after 1 day P              11/11/2021 1210 11/12/2021 1746 Blood Culture Peripheral Blood [[id number]]   Peripheral Blood    Preliminary result Component Value   Culture No growth after 1 day P              11/10/2021 0020 11/12/2021 0946 Blood Culture Hand, Right [[id number]]   Blood from Hand, Right    Preliminary result Component Value   Culture No growth after 2 days P              11/10/2021 0015 11/12/2021 1216 Blood Culture Arm, Right [[id number]]   (Abnormal)   Blood from Arm, Right    Preliminary result Component Value   Culture Positive on the 1st day of incubation Abnormal  P    Micrococcus species Panic  P    1 of 2 bottles              11/10/2021 0015 11/11/2021 1400 Verigene GP Panel [[id number]]    Blood from Arm, Right    Final result Component Value   Staphylococcus species Not Detected   Staphylococcus aureus Not Detected   Staphylococcus epidermidis Not Detected   Staphylococcus lugdunensis Not Detected   Enterococcus faecalis Not Detected   Enterococcus faecium Not Detected   Streptococcus species Not Detected   Streptococcus agalactiae Not Detected   Streptococcus anginosus group Not Detected   Streptococcus pneumoniae Not Detected   Streptococcus pyogenes Not Detected   Listeria species Not Detected        RADIOLOGY:    XR Chest 2 Views    Result Date: 11/8/2021  EXAM: XR CHEST 2 VW LOCATION: [facility] DATE/TIME: 11/8/2021 3:27 PM INDICATION: PE suspected, low/intermediate prob, positive D-dimer COMPARISON: Chest CT 10/18/2021     IMPRESSION: Stable posttreatment changes left upper lobe and postop wedge resections right upper and middle lobes. No new focal consolidation, pneumothorax nor pleural effusion.    NM Lung Scan Perfusion Particulate    Result Date: 11/8/2021  EXAM: NM LUNG SCAN PERFUSION PARTICULATE LOCATION: [facility] DATE/TIME:  2021 3:09 PM INDICATION: 4 days of hypotension, lightheadedness hypoxia and elevated d-dimer. Pulmonary embolus suspected, low to intermediate probability. History of left upper lobe lung cancer, status post SBRT and wedge resections of right upper and middle lobe adenocarcinoma. COMPARISON: 2 view chest 2021, 6 chest CT 10/18/2021 TECHNIQUE: 8.0 mCi technetium-99m MAA, IV. Standard lung perfusion imaging. FINDINGS: Matched left upper lobe subsegmental perfusion defect corresponding to radiation fibrosis/posttreatment changes. No additional segmental/subsegmental perfusion defects.     IMPRESSION: 1.  Pulmonary embolus absent, low probability scan.    Echocardiogram Complete    Result Date: 2021  [id number]^LUCRECIA^[place] [address]  Name: CELIA LUTZ MRN: [id number] : 1945 Study Date: 2021 02:15 PM Age: 76 yrs Gender: Male Patient Location: [facility] Reason For Study: Endocarditis Ordering Physician: SAMUEL SINGER Performed By: ACE  BSA: 2.2 m2 Height: 71 in Weight: 216 lb HR: 72 ______________________________________________________________________________ Procedure Complete Echo Adult. Definity (NDC #27545-675) given intravenously. 5mL given. LOT#[id number]. Technically difficult study. Poor acoustic windows. ______________________________________________________________________________ Interpretation Summary  The left ventricle is mildly dilated with normal left ventricular wall thickness. Left ventricular function is normal.The ejection fraction is 55-60%. Normal right ventricle size and systolic function. No significant valve disease identified. No evidence of endocarditis identified, however (especially with poor acoustic windows), transthoracic echocardiography does not have high sensitivity for identifying endocarditis. If suspicion remains for this condition consider evaluation with a transesophageal  echocardiogram. ______________________________________________________________________________ I      WMSI = 1.00     % Normal = 100  X - Cannot   0 -                      (2) - Mildly 2 -          Segments  Size Interpret    Hyperkinetic 1 - Normal  Hypokinetic  Hypokinetic  1-2     small                                                    7 -          3-5    moderate 3 - Akinetic 4 -          5 -         6 - Akinetic Dyskinetic   6-14    large              Dyskinetic   Aneurysmal  w/scar       w/scar       15-16   diffuse  Left Ventricle The left ventricle is mildly dilated. Left ventricular function is normal.The ejection fraction is 55-60%. There is normal left ventricular wall thickness. Left ventricular diastolic function is indeterminate. No regional wall motion abnormalities noted.  Right Ventricle Normal right ventricle size and systolic function.  Atria Normal left atrial size. Right atrial size is normal. There is no color Doppler evidence of an atrial shunt.  Mitral Valve Mitral valve leaflets appear normal. There is no evidence of mitral stenosis or clinically significant mitral regurgitation.  Tricuspid Valve Tricuspid valve leaflets appear normal. There is no evidence of tricuspid stenosis or clinically significant tricuspid regurgitation. Right ventricle systolic pressure estimate normal.  Aortic Valve The aortic valve is trileaflet with aortic valve sclerosis. No aortic regurgitation is present. No aortic stenosis is present.  Pulmonic Valve The pulmonic valve is not well seen, but is grossly normal. This degree of valvular regurgitation is within normal limits. There is trace pulmonic valvular regurgitation.  Vessels The aorta root is normal. Normal size ascending aorta. IVC diameter <2.1 cm collapsing >50% with sniff suggests a normal RA pressure of 3 mmHg.  Pericardium There is no pericardial effusion.  Rhythm Sinus rhythm was noted.   ______________________________________________________________________________ MMode/2D Measurements & Calculations IVSd: 0.65 cm LVIDd: 6.0 cm LVIDs: 4.4 cm LVPWd: 0.96 cm  FS: 26.7 % LV mass(C)d: 187.1 grams LV mass(C)dI: 85.9 grams/m2 LVOT diam: 2.5 cm LVOT area: 5.0 cm2 LA Volume (BP): 63.7 ml LA Volume Index (BP): 29.2 ml/m2  LA Volume Indexed (AL/bp): 31.2 ml/m2 RWT: 0.32  Time Measurements MM HR: 72.0 BPM  Doppler Measurements & Calculations MV E max rafa: 65.1 cm/sec MV A max rafa: 66.5 cm/sec MV E/A: 0.98 MV dec slope: 440.4 cm/sec2 MV dec time: 0.15 sec Ao V2 max: 161.3 cm/sec Ao max PG: 10.0 mmHg Ao V2 mean: 113.8 cm/sec Ao mean P.8 mmHg Ao V2 VTI: 32.8 cm CAMILLE(I,D): 3.2 cm2 CAMILLE(V,D): 3.2 cm2 LV V1 max P.3 mmHg LV V1 max: 103.7 cm/sec LV V1 VTI: 20.8 cm SV(LVOT): 104.9 ml SI(LVOT): 48.2 ml/m2 PA acc time: 0.13 sec AV Rafa Ratio (DI): 0.64  CAMILLE Index (cm2/m2): 1.5 E/E': 6.9 E/E' av.8 Lateral E/e': 6.9 Medial E/e': 16.6 Peak E' Rafa: 9.5 cm/sec  ______________________________________________________________________________ Report approved by: Eliza Mullen 2021 03:31 PM       Echocardiogram Complete    Result Date: 2021  [id number]^BISI^[place] [address]  Name: CELIA LUTZ MRN: [id number] : 1945 Study Date: 2021 09:14 AM Age: 76 yrs Gender: Male Patient Location: [facility] Reason For Study: RBBB & Left Anterior Fasicular Block, Edema Ordering Physician: DHEERAJ MATHEWS Performed By: ANGELO  BSA: 2.1 m2 Height: 71 in Weight: 200 lb ______________________________________________________________________________ Procedure Complete Portable Echo Adult. Definity (NDC #27002-128) given intravenously. ______________________________________________________________________________ Interpretation Summary  There is borderline concentric left ventricular hypertrophy. The visual ejection fraction is 55-60%.  There is mild (1+) mitral regurgitation. There is trace to mild tricuspid regurgitation. ______________________________________________________________________________ Left Ventricle The left ventricle is normal in size. There is borderline concentric left ventricular hypertrophy. Diastolic Doppler findings (E/E' ratio and/or other parameters) suggest left ventricular filling pressures are indeterminate. The visual ejection fraction is 55-60%. No regional wall motion abnormalities noted.  Right Ventricle The right ventricle is normal in size and function.  Atria Normal left atrial size. Right atrial size is normal. Intact atrial septum.  Mitral Valve Mitral valve leaflets appear normal. There is mild (1+) mitral regurgitation.  Tricuspid Valve Tricuspid valve leaflets appear normal. There is trace to mild tricuspid regurgitation.  Aortic Valve Aortic valve leaflets appear normal. There is no evidence of aortic stenosis or clinically significant aortic regurgitation.  Pulmonic Valve The pulmonic valve is not well visualized.  Vessels Borderline aortic root dilation. Normal size ascending aorta. IVC diameter <2.1 cm collapsing >50% with sniff suggests a normal RA pressure of 3 mmHg.  Pericardium There is no pericardial effusion. ______________________________________________________________________________ MMode/2D Measurements & Calculations IVSd: 0.95 cm  LVIDd: 5.4 cm LVIDs: 3.9 cm LVPWd: 1.2 cm FS: 28.4 % LV mass(C)d: 226.5 grams LV mass(C)dI: 107.4 grams/m2 Ao root diam: 3.9 cm LA dimension: 3.9 cm asc Aorta Diam: 3.8 cm LA/Ao: 1.0 LVOT diam: 2.2 cm LVOT area: 3.7 cm2 LA Volume Indexed (AL/bp): 21.9 ml/m2 RWT: 0.44  Time Measurements MM HR: 61.0 BPM  Doppler Measurements & Calculations MV E max raffy: 65.5 cm/sec MV A max raffy: 78.6 cm/sec MV E/A: 0.83 MV dec time: 0.20 sec Ao V2 max: 186.8 cm/sec Ao max P.0 mmHg Ao V2 mean: 122.0 cm/sec Ao mean P.0 mmHg Ao V2 VTI: 34.8 cm CAMILLE(I,D): 1.8 cm2 CAMILLE(V,D): 1.6  cm2 LV V1 max P.6 mmHg LV V1 max: 80.0 cm/sec LV V1 VTI: 16.8 cm SV(LVOT): 63.1 ml SI(LVOT): 29.9 ml/m2 AV Rafa Ratio (DI): 0.43 CAMILLE Index (cm2/m2): 0.86 E/E' av.9 Lateral E/e': 6.7 Medial E/e': 11.1  ______________________________________________________________________________ Report approved by: Eliza Narvaez 2021 11:16 AM       US Lower Extremity Venous Duplex Bilateral    Result Date: 2021  EXAM: US LOWER EXTREMITY VENOUS DUPLEX BILATERAL LOCATION: [facility] DATE/TIME: 2021 7:36 PM INDICATION: bilateral lower leg swelling and pain COMPARISON: 2021 TECHNIQUE: Venous Duplex ultrasound of bilateral lower extremities with and without compression, augmentation and duplex. Color flow and spectral Doppler with waveform analysis performed. FINDINGS: Exam includes the common femoral, femoral, popliteal veins as well as segmentally visualized deep calf veins and greater saphenous vein. RIGHT: No deep vein thrombosis. No superficial thrombophlebitis. No popliteal cyst. LEFT: No deep vein thrombosis. No superficial thrombophlebitis. No popliteal cyst.     IMPRESSION: 1.  No deep venous thrombosis in the bilateral lower extremities.    Abdomen US, limited (RUQ only)    Result Date: 2021  EXAM: US ABDOMEN LIMITED LOCATION: [facility] DATE/TIME: 2021 7:36 PM INDICATION: elevated bilirubin. Lung cancer. COMPARISON: CT 10/18/2021 TECHNIQUE: Limited abdominal ultrasound. FINDINGS: GALLBLADDER: Filled with sludge. No definite stone seen. Normal wall thickness. BILE DUCTS: No biliary dilatation. The common duct measures 4 mm. LIVER: Increased echogenicity from diffuse fatty infiltration. Heterogeneous echotexture. Deeper portions of the liver are not diagnostically visualized. No focal mass seen. RIGHT KIDNEY: No hydronephrosis.It contains a 2.9 cm cyst. PANCREAS: The visualized portions are normal. No ascites.     IMPRESSION: 1.   Diffuse hepatic steatosis. 2.  Sludge containing gallbladder.     XR Chest Port 1 View    Result Date: 11/10/2021  EXAM: XR CHEST PORT 1 VIEW LOCATION: [facility] DATE/TIME: 11/10/2021 2:29 AM INDICATION: Fever. COMPARISON: 11/8/2021. FINDINGS: The heart size is normal. The thoracic aorta is calcified. Stable surgical clip and scarring in the left upper lobe. The lungs are otherwise clear. Old right rib fractures. No pneumothorax.     IMPRESSION: No acute abnormality.    CT Chest Abdomen Pelvis w/o Contrast    Result Date: 11/11/2021  EXAM: CT CHEST ABDOMEN PELVIS W/O CONTRAST LOCATION: [facility] DATE/TIME: 11/11/2021 8:43 PM INDICATION: Sepsis COMPARISON: 10/18/2021 and 02/25/2019 TECHNIQUE: CT scan of the chest, abdomen, and pelvis was performed without IV contrast. Multiplanar reformats were obtained. Dose reduction techniques were used. CONTRAST: None. FINDINGS: LUNGS AND PLEURA: Small pleural effusions. A left suprahilar opacity has not changed. There are fiducial markers in this region. A 9 mm x 7 mm left upper lobe nodule was previously 8 mm x 6 mm (series 4 image 80).  There are suture lines in the right lung with right upper lung architectural distortion. Emphysema is present. MEDIASTINUM/AXILLAE: The main pulmonary artery is 35 mm in diameter, which can be seen with pulmonary hypertension.  The ascending aorta is 4.2 cm in diameter, which is unchanged. CORONARY ARTERY CALCIFICATION: Severe. HEPATOBILIARY: Hepatic steatosis. PANCREAS: A pancreatic lesion has not significantly changed in size. SPLEEN: Normal. ADRENAL GLANDS: Normal. KIDNEYS/BLADDER: A low-attenuation right renal lesion is probably a simple cyst and does not require follow-up. Status post left nephrectomy. No new soft tissue in the nephrectomy bed. BOWEL: Normal. LYMPH NODES: Normal. VASCULATURE: Status post aortobiiliac stent graft placement for an infrarenal abdominal aorta aneurysm.  PELVIC ORGANS: Normal. MUSCULOSKELETAL: Right hip arthroplasty. Nonacute rib fractures are again seen.     IMPRESSION: 1.  No source of sepsis identified. 2.  A left upper lobe nodule is 1 mm larger. Metastatic disease is possible. 3.  Otherwise no significant change from the prior study. The pancreatic lesion can be followed as detailed below. REFERENCE: Revisions of international consensus [place] guidelines for the management of IPMN of the pancreas. Pancreatology 2017;17(5):738-753. Largest cyst between 20-30 mm: EUS in 3-6 months and then annual surveillance alternating between MRI and EUS as appropriate.     CT Chest w Contrast    Result Date: 10/18/2021  EXAM: CT CHEST W CONTRAST LOCATION: [facility] DATE/TIME: 10/18/2021 1:19 PM INDICATION: Follow-up left upper lobe lung cancer, post SBRT in 3/2021. Previous history of right upper and middle lobe adenocarcinoma status post wedge resections. COMPARISON: CT 6/16/2021 and 8/31/2020 PET CT TECHNIQUE: CT chest with IV contrast. Multiplanar reformats were obtained. Dose reduction techniques were used. CONTRAST: 75 mL Isovue-370. FINDINGS: LUNGS AND PLEURA: Left upper lobe fiducial marker with previous spiculated left upper lobe mass obscured by new surrounding consolidative and groundglass opacity compatible with posttreatment radiation fibrosis. New 8 mm irregular solid nodular opacity image 113 series 4 suspicious for metastatic disease versus new primary lung cancer. Stable postop changes from right upper and middle lobe wedge resections. Biapical centrilobular and paraseptal emphysema redemonstrated. Central airways are patent. No pleural effusion MEDIASTINUM/AXILLAE: No mass/adenopathy nor pericardial effusion. The thoracic aorta and heart are normal in size. CORONARY ARTERY CALCIFICATION: Severe. UPPER ABDOMEN: Severe hepatic steatosis. Stable 17 mm cyst within the pancreatic tail compatible with IPMN. Previous left nephrectomy.  MUSCULOSKELETAL: Old healed rib fracture deformities. No suspicious osseous lesions.     IMPRESSION: 1.  New consolidative and groundglass opacities around previous left upper lobe nodule compatible with posttreatment radiation fibrosis. 2.  New 8 mm irregular left upper lobe solid nodule which could represent either metastatic lesion or primary lung cancer. 3.  Stable 17 mm pancreatic cystic lesion likely representing IPMN. This could be followed up with CT in 2 years. REFERENCE: Revisions of international consensus [place] guidelines for the management of IPMN of the pancreas. Pancreatology 2017;17(5):738-753. Largest cyst between 10-20 mm: CT or MRI/MRCP every 6 months for 1 year and then yearly for 2 years and then every 2 years if no change.     Attestation:  Total time on the floor involved in the patient's care: 35 minutes. Total time spent in chart review, Counseling/care coordination: >50%

## 2021-11-13 ENCOUNTER — APPOINTMENT (OUTPATIENT)
Dept: OCCUPATIONAL THERAPY | Facility: CLINIC | Age: 76
DRG: 682 | End: 2021-11-13
Payer: MEDICARE

## 2021-11-13 ENCOUNTER — APPOINTMENT (OUTPATIENT)
Dept: RADIOLOGY | Facility: CLINIC | Age: 76
DRG: 682 | End: 2021-11-13
Attending: FAMILY MEDICINE
Payer: MEDICARE

## 2021-11-13 PROBLEM — F32.A DEPRESSION: Status: ACTIVE | Noted: 2021-11-13

## 2021-11-13 PROBLEM — R50.9 FEVER: Status: ACTIVE | Noted: 2021-11-13

## 2021-11-13 LAB
ALBUMIN SERPL-MCNC: 3.7 G/DL (ref 3.5–5)
ALP SERPL-CCNC: 110 U/L (ref 45–120)
ALT SERPL W P-5'-P-CCNC: 20 U/L (ref 0–45)
ANION GAP SERPL CALCULATED.3IONS-SCNC: 14 MMOL/L (ref 5–18)
AST SERPL W P-5'-P-CCNC: 56 U/L (ref 0–40)
BACTERIA BLD CULT: ABNORMAL
BACTERIA BLD CULT: ABNORMAL
BACTERIA BLD CULT: NO GROWTH
BACTERIA BLD CULT: NO GROWTH
BILIRUB DIRECT SERPL-MCNC: 0.9 MG/DL
BILIRUB SERPL-MCNC: 1.9 MG/DL (ref 0–1)
BUN SERPL-MCNC: 7 MG/DL (ref 8–28)
CALCIUM SERPL-MCNC: 10.4 MG/DL (ref 8.5–10.5)
CHLORIDE BLD-SCNC: 96 MMOL/L (ref 98–107)
CO2 SERPL-SCNC: 32 MMOL/L (ref 22–31)
CREAT SERPL-MCNC: 2.13 MG/DL (ref 0.7–1.3)
EPO SERPL-ACNC: 49 MU/ML
GFR SERPL CREATININE-BSD FRML MDRD: 29 ML/MIN/1.73M2
GLUCOSE BLD-MCNC: 126 MG/DL (ref 70–125)
PHOSPHATE SERPL-MCNC: 2.1 MG/DL (ref 2.5–4.5)
POTASSIUM BLD-SCNC: 3 MMOL/L (ref 3.5–5)
POTASSIUM BLD-SCNC: 3.5 MMOL/L (ref 3.5–5)
PROT SERPL-MCNC: 5.8 G/DL (ref 6–8)
SODIUM SERPL-SCNC: 142 MMOL/L (ref 136–145)
VANCOMYCIN SERPL-MCNC: 11.6 MG/L

## 2021-11-13 PROCEDURE — 250N000013 HC RX MED GY IP 250 OP 250 PS 637: Performed by: PHYSICIAN ASSISTANT

## 2021-11-13 PROCEDURE — 82247 BILIRUBIN TOTAL: CPT | Performed by: FAMILY MEDICINE

## 2021-11-13 PROCEDURE — 250N000011 HC RX IP 250 OP 636: Performed by: FAMILY MEDICINE

## 2021-11-13 PROCEDURE — 84155 ASSAY OF PROTEIN SERUM: CPT | Performed by: FAMILY MEDICINE

## 2021-11-13 PROCEDURE — 82040 ASSAY OF SERUM ALBUMIN: CPT | Performed by: PHYSICIAN ASSISTANT

## 2021-11-13 PROCEDURE — 250N000013 HC RX MED GY IP 250 OP 250 PS 637: Performed by: INTERNAL MEDICINE

## 2021-11-13 PROCEDURE — 250N000013 HC RX MED GY IP 250 OP 250 PS 637: Performed by: FAMILY MEDICINE

## 2021-11-13 PROCEDURE — 258N000003 HC RX IP 258 OP 636: Performed by: FAMILY MEDICINE

## 2021-11-13 PROCEDURE — 36415 COLL VENOUS BLD VENIPUNCTURE: CPT | Performed by: INTERNAL MEDICINE

## 2021-11-13 PROCEDURE — 210N000002 HC R&B HEART CARE

## 2021-11-13 PROCEDURE — 84450 TRANSFERASE (AST) (SGOT): CPT | Performed by: FAMILY MEDICINE

## 2021-11-13 PROCEDURE — 80202 ASSAY OF VANCOMYCIN: CPT | Performed by: FAMILY MEDICINE

## 2021-11-13 PROCEDURE — 99232 SBSQ HOSP IP/OBS MODERATE 35: CPT | Performed by: INTERNAL MEDICINE

## 2021-11-13 PROCEDURE — 71046 X-RAY EXAM CHEST 2 VIEWS: CPT

## 2021-11-13 PROCEDURE — 84132 ASSAY OF SERUM POTASSIUM: CPT | Performed by: FAMILY MEDICINE

## 2021-11-13 PROCEDURE — 99233 SBSQ HOSP IP/OBS HIGH 50: CPT | Performed by: FAMILY MEDICINE

## 2021-11-13 PROCEDURE — 87040 BLOOD CULTURE FOR BACTERIA: CPT | Performed by: INTERNAL MEDICINE

## 2021-11-13 PROCEDURE — 97110 THERAPEUTIC EXERCISES: CPT | Mod: GO

## 2021-11-13 PROCEDURE — 36415 COLL VENOUS BLD VENIPUNCTURE: CPT | Performed by: FAMILY MEDICINE

## 2021-11-13 PROCEDURE — 82248 BILIRUBIN DIRECT: CPT | Performed by: FAMILY MEDICINE

## 2021-11-13 RX ORDER — GABAPENTIN 100 MG/1
200 CAPSULE ORAL 2 TIMES DAILY
Status: DISCONTINUED | OUTPATIENT
Start: 2021-11-13 | End: 2021-11-23 | Stop reason: HOSPADM

## 2021-11-13 RX ORDER — BUMETANIDE 0.5 MG/1
0.5 TABLET ORAL
Status: DISCONTINUED | OUTPATIENT
Start: 2021-11-13 | End: 2021-11-17

## 2021-11-13 RX ORDER — ACETAMINOPHEN 325 MG/1
325 TABLET ORAL EVERY 4 HOURS PRN
Status: DISCONTINUED | OUTPATIENT
Start: 2021-11-13 | End: 2021-11-23 | Stop reason: HOSPADM

## 2021-11-13 RX ORDER — ACETAMINOPHEN 650 MG/1
325 SUPPOSITORY RECTAL EVERY 4 HOURS PRN
Status: DISCONTINUED | OUTPATIENT
Start: 2021-11-13 | End: 2021-11-23 | Stop reason: HOSPADM

## 2021-11-13 RX ORDER — BUMETANIDE 0.5 MG/1
0.5 TABLET ORAL ONCE
Status: COMPLETED | OUTPATIENT
Start: 2021-11-13 | End: 2021-11-13

## 2021-11-13 RX ORDER — POTASSIUM CHLORIDE 1500 MG/1
40 TABLET, EXTENDED RELEASE ORAL ONCE
Status: COMPLETED | OUTPATIENT
Start: 2021-11-13 | End: 2021-11-13

## 2021-11-13 RX ORDER — GABAPENTIN 300 MG/1
300 CAPSULE ORAL AT BEDTIME
Status: DISCONTINUED | OUTPATIENT
Start: 2021-11-13 | End: 2021-11-19

## 2021-11-13 RX ORDER — DULOXETIN HYDROCHLORIDE 20 MG/1
20 CAPSULE, DELAYED RELEASE ORAL DAILY
Status: DISCONTINUED | OUTPATIENT
Start: 2021-11-13 | End: 2021-11-19

## 2021-11-13 RX ADMIN — HEPARIN SODIUM 5000 UNITS: 5000 INJECTION, SOLUTION INTRAVENOUS; SUBCUTANEOUS at 20:37

## 2021-11-13 RX ADMIN — Medication 100 MG: at 09:24

## 2021-11-13 RX ADMIN — ACETAMINOPHEN 325 MG: 325 TABLET ORAL at 15:25

## 2021-11-13 RX ADMIN — MAGNESIUM OXIDE TAB 400 MG (241.3 MG ELEMENTAL MG) 400 MG: 400 (241.3 MG) TAB at 21:40

## 2021-11-13 RX ADMIN — BUMETANIDE 0.5 MG: 0.5 TABLET ORAL at 15:24

## 2021-11-13 RX ADMIN — HEPARIN SODIUM 5000 UNITS: 5000 INJECTION, SOLUTION INTRAVENOUS; SUBCUTANEOUS at 09:24

## 2021-11-13 RX ADMIN — POTASSIUM CHLORIDE 40 MEQ: 1500 TABLET, EXTENDED RELEASE ORAL at 09:28

## 2021-11-13 RX ADMIN — Medication 50 MG: at 09:25

## 2021-11-13 RX ADMIN — MIDODRINE HYDROCHLORIDE 2.5 MG: 2.5 TABLET ORAL at 13:40

## 2021-11-13 RX ADMIN — Medication 5 MG: at 21:40

## 2021-11-13 RX ADMIN — PIPERACILLIN AND TAZOBACTAM 3.38 G: 3; .375 INJECTION, POWDER, LYOPHILIZED, FOR SOLUTION INTRAVENOUS at 01:44

## 2021-11-13 RX ADMIN — GABAPENTIN 200 MG: 100 CAPSULE ORAL at 13:39

## 2021-11-13 RX ADMIN — MIDODRINE HYDROCHLORIDE 2.5 MG: 2.5 TABLET ORAL at 09:25

## 2021-11-13 RX ADMIN — PIPERACILLIN AND TAZOBACTAM 3.38 G: 3; .375 INJECTION, POWDER, LYOPHILIZED, FOR SOLUTION INTRAVENOUS at 17:04

## 2021-11-13 RX ADMIN — DULOXETINE HYDROCHLORIDE 20 MG: 20 CAPSULE, DELAYED RELEASE ORAL at 15:32

## 2021-11-13 RX ADMIN — BUMETANIDE 0.5 MG: 0.5 TABLET ORAL at 20:37

## 2021-11-13 RX ADMIN — PANTOPRAZOLE SODIUM 40 MG: 20 TABLET, DELAYED RELEASE ORAL at 17:04

## 2021-11-13 RX ADMIN — GABAPENTIN 200 MG: 100 CAPSULE ORAL at 09:25

## 2021-11-13 RX ADMIN — Medication 125 MCG: at 09:24

## 2021-11-13 RX ADMIN — MULTIPLE VITAMINS W/ MINERALS TAB 1 TABLET: TAB at 09:25

## 2021-11-13 RX ADMIN — GABAPENTIN 200 MG: 100 CAPSULE ORAL at 15:25

## 2021-11-13 RX ADMIN — POLYETHYLENE GLYCOL 3350 17 G: 17 POWDER, FOR SOLUTION ORAL at 09:24

## 2021-11-13 RX ADMIN — VANCOMYCIN HYDROCHLORIDE 1000 MG: 5 INJECTION, POWDER, LYOPHILIZED, FOR SOLUTION INTRAVENOUS at 13:48

## 2021-11-13 RX ADMIN — GABAPENTIN 300 MG: 300 CAPSULE ORAL at 20:37

## 2021-11-13 RX ADMIN — PIPERACILLIN AND TAZOBACTAM 3.38 G: 3; .375 INJECTION, POWDER, LYOPHILIZED, FOR SOLUTION INTRAVENOUS at 09:24

## 2021-11-13 RX ADMIN — ASPIRIN 81 MG: 81 TABLET, COATED ORAL at 21:40

## 2021-11-13 ASSESSMENT — ACTIVITIES OF DAILY LIVING (ADL)
ADLS_ACUITY_SCORE: 15
ADLS_ACUITY_SCORE: 11
ADLS_ACUITY_SCORE: 15
ADLS_ACUITY_SCORE: 11
ADLS_ACUITY_SCORE: 15
ADLS_ACUITY_SCORE: 11
ADLS_ACUITY_SCORE: 15

## 2021-11-13 ASSESSMENT — MIFFLIN-ST. JEOR: SCORE: 1709.66

## 2021-11-13 NOTE — PROGRESS NOTES
RENAL PROGRESS NOTE    ASSESSMENT & PLAN:   LEILA on CKD IIIb: Baseline cr in the 1.5-1.8 range on chart review.  Now with rising serum creatinine to the low 2 range.  Diuretics responsive.  Very bland urinalysis 11/10/2021, no proteinuria, pyuria, or microscopic hematuria.  Overall appears volume up and needs to be on some diuretics. Cr stable compared to yesterday with diuretics and midodrine  Recs:  - resume low dose bumex along with midodrine if CXR with pulmonary edema  - trend I/Os and creatinine    Volume: Patient appears to be volume up on exam and definitely volume up by weights.  Patient also has some degree of liver disease, and albumin quite low.  He is likely third spacing some of the fluids secondary to his hypoalbuminemia.  Now on bumex    Lytes/acid/base: Initially with mild hyponatremia, likely secondary to hypervolemia.  Improving with diuresis.  Mild hypokalemia likely secondary to loop diuretics.  Being replaced.     Anemia: Macrocytic.  History of alcohol abuse.  B12 and folic acid within normal limits this admit.  Iron studies okay and Erythropoietin level reasonable.  Also has history of Ragsdale's esophagus and previous GIB.  No obvious signs of GI blood loss.  No need for FRANCISCO JAVIER at this time     Nocturnal fever: Unclear etiology. Did have a fever but positive blood culture 1 of 2 GPC in clusters.  ID now following and repeat cultures pending.  On Vanco and Zosyn, ideally we can stop these if cultures remain negative.     Bilateral lower extremity pain/neuropathy: Did have elevated D-dimer, negative VQ scan and lower extremity US.  Started on gabapentin renally dosed with some relief.  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     Chronic systolic heart failure: 5/2021 echocardiogram showed EF 55%.  Mild hypertrophy otherwise normal.  On low-dose beta-blocker at baseline.  11/9 echocardiogram EF 55 to 60% borderline LVH and mild MR.     CAD: MI in 2019.  PCI x1.     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  Follows with oncology.     Ragsdale's esophagus: Continue home PPI     Alcohol abuse: Drinks a liter of vodka per day.  On protocols.  Social work chemical dependency consultation.     Discussed with Dr Thomas             SUBJECTIVE:  Patient new to me.  Primary team put hold on bumex this morning but also needing some O2 this morning.  Being sent for XR.  Discussed with dr. Thomas, if some pulmonary edema on CXR, we need to continue with bumex.      OBJECTIVE:  Physical Exam   Temp: 98.4  F (36.9  C) Temp src: Oral BP: 136/63 Pulse: 73   Resp: 22 SpO2: 95 % O2 Device: Nasal cannula Oxygen Delivery: 2 LPM  Vitals:    11/10/21 0555 11/11/21 0600 11/13/21 0407   Weight: 97.9 kg (215 lb 12.8 oz) 98.3 kg (216 lb 11.4 oz) 96.3 kg (212 lb 3.2 oz)     Vital Signs with Ranges  Temp:  [97.9  F (36.6  C)-101.3  F (38.5  C)] 98.4  F (36.9  C)  Pulse:  [73-88] 73  Resp:  [18-25] 22  BP: ()/(55-73) 136/63  SpO2:  [85 %-95 %] 95 %  I/O last 3 completed shifts:  In: 1860 [P.O.:1060; I.V.:100; IV Piggyback:500]  Out: 2250 [Urine:2250]      Patient Vitals for the past 72 hrs:   Weight   11/13/21 0407 96.3 kg (212 lb 3.2 oz)   11/11/21 0600 98.3 kg (216 lb 11.4 oz)       Intake/Output Summary (Last 24 hours) at 11/12/2021 1300  Last data filed at 11/12/2021 0934  Gross per 24 hour   Intake 860 ml   Output 1950 ml   Net -1090 ml       PHYSICAL EXAM:  General: Alert, NAD  Eyes: No scleral icterus  Cardiovascular: RRR, no rub, gallop, or murmur.  Pitting lower extremity edema in legs  Respiratory: CTAB, non-labored.   Gastrointestinal: Soft, non-tender, non-distended  Musculoskeletal: Grossly intact  Integumentary: Mild erythema to the bilateral lower extremities, right greater than left.  Warm to touch.  Neurologic: Grossly  intact  Psychiatric: Cooperative, appropriate mood and affect    LABORATORY STUDIES:     Recent Labs   Lab 11/12/21  0520 11/11/21  0523 11/10/21  0849 11/09/21  0240 11/08/21  1132   WBC 3.8*  --  3.8* 4.0 6.6   RBC 3.09*  --  2.99* 3.33* 3.90*   HGB 11.0*  --  10.5* 11.7* 14.1   HCT 33.8*  --  32.9* 37.6* 42.6    148* 151 146* 246       Basic Metabolic Panel:  Recent Labs   Lab 11/13/21  0615 11/12/21  0824 11/12/21 0520 11/11/21  0523 11/10/21  0533 11/09/21  0240 11/08/21  1132     --  142 139 137 136 134*   POTASSIUM 3.0* 3.5 3.2* 3.3* 3.4* 3.9 4.1   CHLORIDE 96*  --  98 102 101 99 95*   CO2 32*  --  30 29 27 27 26   BUN 7*  --  6* 8 9 10 11   CR 2.13*  --  2.07* 2.23* 1.97* 2.06* 2.26*   *  --  126* 125 111 100 139*   BRADLEY 10.4  --  9.9 9.2 9.1 9.0 10.1       INR  Recent Labs   Lab 11/09/21  0240 11/08/21  2044   INR 1.24* 1.22*        Recent Labs   Lab Test 11/12/21  0520 11/11/21  0523 11/10/21  0849 11/09/21  0240 11/08/21  2044   INR  --   --   --  1.24* 1.22*   WBC 3.8*  --  3.8* 4.0  --    HGB 11.0*  --  10.5* 11.7*  --     148* 151 146*  --        Personally reviewed current labs    Mariusz Morales  [facility]  [phone]

## 2021-11-13 NOTE — PROGRESS NOTES
[facility]    Infectious Disease Progress Note    Date of Service (when I saw the patient): 11/13/2021     Assessment & Plan     Lenyn Chau is a 76 year old male who was admitted on 11/8/2021.   1. Coronary artery disease, cancer, prostate overall  2. Congestive heart failure which is worsening lower extremity edema  3. Alcohol use  4. Ragsdale's esophagus  5. Chronic kidney disease  6. Admitted worsening lower extremity edema  7. Blood cultures positive for gram gram-positive cocci: Micrococcus which is likely contaminant  8. Fever ongoing wihtout clear focus/source/cause.  CT chest abdomen pelvis did not show any source of infection.  Echocardiogram without any evidence of endocarditis identified even though limited sensitivity with transthoracic echocardiogram  9. Hepatic steatosis gallbladder sludge    Recommendations:    1. Follow repeat blood cultures--two more sent this am, early  2. Empiric vancomycin and Zosyn, de-escalate or stop antibiotics based on repeat culture results and clinical response      GENET Robles MD  [facility]  [phone]          Interval History   Constipation. Fever to 101 overnight.  Nothing obvious on exam points toward infection.     Physical Exam   Temp: 98.4  F (36.9  C) Temp src: Oral BP: 136/63 Pulse: 73   Resp: 22 SpO2: 95 % O2 Device: Nasal cannula Oxygen Delivery: 4 LPM  Vitals:    11/10/21 0555 11/11/21 0600 11/13/21 0407   Weight: 97.9 kg (215 lb 12.8 oz) 98.3 kg (216 lb 11.4 oz) 96.3 kg (212 lb 3.2 oz)     Vital Signs with Ranges  Temp:  [97.9  F (36.6  C)-101.3  F (38.5  C)] 98.4  F (36.9  C)  Pulse:  [73-88] 73  Resp:  [18-25] 22  BP: (109-138)/(55-73) 136/63  SpO2:  [85 %-95 %] 95 %    Constitutional: Except, chronically fatigued.  Slightly better than 11/11/2021  Lungs: Basilar crackles  Cardiovascular: S1-S2  Abdomen: Normal bowel sounds, soft, non-distended, non-tender  Skin: Stasis lower extremity  Lower  extremity: Edema  Neuro: Deconditioned awake coherent able to follow commands    Medications       aspirin  81 mg Oral At Bedtime     [Held by provider] bumetanide  0.5 mg Oral BID     bumetanide  0.5 mg Oral BID     Fluticasone-Umeclidin-Vilanterol  1 puff Inhalation Daily     gabapentin  200 mg Oral TID     heparin ANTICOAGULANT  5,000 Units Subcutaneous Q12H     magnesium oxide  400 mg Oral At Bedtime     metoprolol succinate ER  12.5 mg Oral Daily     midodrine  2.5 mg Oral BID 09 12     multivitamin w/minerals  1 tablet Oral Daily     pantoprazole  40 mg Oral Daily with supper     piperacillin-tazobactam  3.375 g Intravenous Q8H     polyethylene glycol  17 g Oral Daily     sodium chloride (PF)  3 mL Intracatheter Q8H     sodium chloride (PF)  3 mL Intracatheter Q8H     thiamine  100 mg Oral Daily     vancomycin  1,000 mg Intravenous Q24H     Vitamin D3  125 mcg Oral Daily     zinc gluconate  50 mg Oral Daily       Data   All microbiology laboratory data reviewed.  Recent Labs   Lab Test 11/12/21  0520 11/11/21  0523 11/10/21  0849 11/09/21  0240   WBC 3.8*  --  3.8* 4.0   HGB 11.0*  --  10.5* 11.7*   HCT 33.8*  --  32.9* 37.6*   *  --  110* 113*    148* 151 146*     Recent Labs   Lab Test 11/13/21  0615 11/12/21  0520 11/11/21  0523   CR 2.13* 2.07* 2.23*       Collected Updated Procedure Result Status    11/12/2021 0526 11/12/2021 0529 Blood Culture Hand, Right [[id number]]   Blood from Hand, Right    In process Component Value   No component results              11/12/2021 0520 11/12/2021 0529 Blood Culture Peripheral Blood [[id number]]   Peripheral Blood    In process Component Value   No component results              11/11/2021 1210 11/12/2021 1746 Blood Culture Peripheral Blood [[id number]]   Peripheral Blood    Preliminary result Component Value   Culture No growth after 1 day P              11/11/2021 1210 11/12/2021 1746 Blood Culture Peripheral Blood [[id number]]   Peripheral  Blood    Preliminary result Component Value   Culture No growth after 1 day P              11/10/2021 0020 11/12/2021 0946 Blood Culture Hand, Right [[id number]]   Blood from Hand, Right    Preliminary result Component Value   Culture No growth after 2 days P              11/10/2021 0015 11/12/2021 1216 Blood Culture Arm, Right [[id number]]   (Abnormal)   Blood from Arm, Right    Preliminary result Component Value   Culture Positive on the 1st day of incubation Abnormal  P    Micrococcus species Panic  P    1 of 2 bottles              11/10/2021 0015 11/11/2021 1400 Verigene GP Panel [[id number]]    Blood from Arm, Right    Final result Component Value   Staphylococcus species Not Detected   Staphylococcus aureus Not Detected   Staphylococcus epidermidis Not Detected   Staphylococcus lugdunensis Not Detected   Enterococcus faecalis Not Detected   Enterococcus faecium Not Detected   Streptococcus species Not Detected   Streptococcus agalactiae Not Detected   Streptococcus anginosus group Not Detected   Streptococcus pneumoniae Not Detected   Streptococcus pyogenes Not Detected   Listeria species Not Detected        RADIOLOGY:    XR Chest 2 Views    Result Date: 11/8/2021  EXAM: XR CHEST 2 VW LOCATION: [facility] DATE/TIME: 11/8/2021 3:27 PM INDICATION: PE suspected, low/intermediate prob, positive D-dimer COMPARISON: Chest CT 10/18/2021     IMPRESSION: Stable posttreatment changes left upper lobe and postop wedge resections right upper and middle lobes. No new focal consolidation, pneumothorax nor pleural effusion.    NM Lung Scan Perfusion Particulate    Result Date: 11/8/2021  EXAM: NM LUNG SCAN PERFUSION PARTICULATE LOCATION: [facility] DATE/TIME: 11/8/2021 3:09 PM INDICATION: 4 days of hypotension, lightheadedness hypoxia and elevated d-dimer. Pulmonary embolus suspected, low to intermediate probability. History of left upper lobe lung cancer, status post  SBRT and wedge resections of right upper and middle lobe adenocarcinoma. COMPARISON: 2 view chest 2021, 6 chest CT 10/18/2021 TECHNIQUE: 8.0 mCi technetium-99m MAA, IV. Standard lung perfusion imaging. FINDINGS: Matched left upper lobe subsegmental perfusion defect corresponding to radiation fibrosis/posttreatment changes. No additional segmental/subsegmental perfusion defects.     IMPRESSION: 1.  Pulmonary embolus absent, low probability scan.    Echocardiogram Complete    Result Date: 2021  [id number]^LUCRECIA^[place] [address]  Name: CELIA LUTZ MRN: [id number] : 1945 Study Date: 2021 02:15 PM Age: 76 yrs Gender: Male Patient Location: [facility] Reason For Study: Endocarditis Ordering Physician: SAMUEL SINGER Performed By: ACE  BSA: 2.2 m2 Height: 71 in Weight: 216 lb HR: 72 ______________________________________________________________________________ Procedure Complete Echo Adult. Definity (NDC #39907-647) given intravenously. 5mL given. LOT#[id number]. Technically difficult study. Poor acoustic windows. ______________________________________________________________________________ Interpretation Summary  The left ventricle is mildly dilated with normal left ventricular wall thickness. Left ventricular function is normal.The ejection fraction is 55-60%. Normal right ventricle size and systolic function. No significant valve disease identified. No evidence of endocarditis identified, however (especially with poor acoustic windows), transthoracic echocardiography does not have high sensitivity for identifying endocarditis. If suspicion remains for this condition consider evaluation with a transesophageal echocardiogram. ______________________________________________________________________________ I      WMSI = 1.00     % Normal = 100  X - Cannot   0 -                      (2) - Mildly 2 -          Segments  Size Interpret     Hyperkinetic 1 - Normal  Hypokinetic  Hypokinetic  1-2     small                                                    7 -          3-5    moderate 3 - Akinetic 4 -          5 -         6 - Akinetic Dyskinetic   6-14    large              Dyskinetic   Aneurysmal  w/scar       w/scar       15-16   diffuse  Left Ventricle The left ventricle is mildly dilated. Left ventricular function is normal.The ejection fraction is 55-60%. There is normal left ventricular wall thickness. Left ventricular diastolic function is indeterminate. No regional wall motion abnormalities noted.  Right Ventricle Normal right ventricle size and systolic function.  Atria Normal left atrial size. Right atrial size is normal. There is no color Doppler evidence of an atrial shunt.  Mitral Valve Mitral valve leaflets appear normal. There is no evidence of mitral stenosis or clinically significant mitral regurgitation.  Tricuspid Valve Tricuspid valve leaflets appear normal. There is no evidence of tricuspid stenosis or clinically significant tricuspid regurgitation. Right ventricle systolic pressure estimate normal.  Aortic Valve The aortic valve is trileaflet with aortic valve sclerosis. No aortic regurgitation is present. No aortic stenosis is present.  Pulmonic Valve The pulmonic valve is not well seen, but is grossly normal. This degree of valvular regurgitation is within normal limits. There is trace pulmonic valvular regurgitation.  Vessels The aorta root is normal. Normal size ascending aorta. IVC diameter <2.1 cm collapsing >50% with sniff suggests a normal RA pressure of 3 mmHg.  Pericardium There is no pericardial effusion.  Rhythm Sinus rhythm was noted.  ______________________________________________________________________________ MMode/2D Measurements & Calculations IVSd: 0.65 cm LVIDd: 6.0 cm LVIDs: 4.4 cm LVPWd: 0.96 cm  FS: 26.7 % LV mass(C)d: 187.1 grams LV mass(C)dI: 85.9 grams/m2 LVOT diam: 2.5 cm LVOT area: 5.0 cm2 LA Volume  (BP): 63.7 ml LA Volume Index (BP): 29.2 ml/m2  LA Volume Indexed (AL/bp): 31.2 ml/m2 RWT: 0.32  Time Measurements MM HR: 72.0 BPM  Doppler Measurements & Calculations MV E max rafa: 65.1 cm/sec MV A max rafa: 66.5 cm/sec MV E/A: 0.98 MV dec slope: 440.4 cm/sec2 MV dec time: 0.15 sec Ao V2 max: 161.3 cm/sec Ao max PG: 10.0 mmHg Ao V2 mean: 113.8 cm/sec Ao mean P.8 mmHg Ao V2 VTI: 32.8 cm CAMILLE(I,D): 3.2 cm2 CAMILLE(V,D): 3.2 cm2 LV V1 max P.3 mmHg LV V1 max: 103.7 cm/sec LV V1 VTI: 20.8 cm SV(LVOT): 104.9 ml SI(LVOT): 48.2 ml/m2 PA acc time: 0.13 sec AV Rafa Ratio (DI): 0.64  CAMILLE Index (cm2/m2): 1.5 E/E': 6.9 E/E' av.8 Lateral E/e': 6.9 Medial E/e': 16.6 Peak E' Rafa: 9.5 cm/sec  ______________________________________________________________________________ Report approved by: Eliza Mullen 2021 03:31 PM       Echocardiogram Complete    Result Date: 2021  [id number]^[place] [address]  Name: CELIA LUTZ MRN: [id number] : 1945 Study Date: 2021 09:14 AM Age: 76 yrs Gender: Male Patient Location: [facility] Reason For Study: RBBB & Left Anterior Fasicular Block, Edema Ordering Physician: DHEERAJ MATHEWS Performed By: ANGELO  BSA: 2.1 m2 Height: 71 in Weight: 200 lb ______________________________________________________________________________ Procedure Complete Portable Echo Adult. Definity (NDC #21679-909) given intravenously. ______________________________________________________________________________ Interpretation Summary  There is borderline concentric left ventricular hypertrophy. The visual ejection fraction is 55-60%. There is mild (1+) mitral regurgitation. There is trace to mild tricuspid regurgitation. ______________________________________________________________________________ Left Ventricle The left ventricle is normal in size. There is borderline concentric left ventricular hypertrophy.  Diastolic Doppler findings (E/E' ratio and/or other parameters) suggest left ventricular filling pressures are indeterminate. The visual ejection fraction is 55-60%. No regional wall motion abnormalities noted.  Right Ventricle The right ventricle is normal in size and function.  Atria Normal left atrial size. Right atrial size is normal. Intact atrial septum.  Mitral Valve Mitral valve leaflets appear normal. There is mild (1+) mitral regurgitation.  Tricuspid Valve Tricuspid valve leaflets appear normal. There is trace to mild tricuspid regurgitation.  Aortic Valve Aortic valve leaflets appear normal. There is no evidence of aortic stenosis or clinically significant aortic regurgitation.  Pulmonic Valve The pulmonic valve is not well visualized.  Vessels Borderline aortic root dilation. Normal size ascending aorta. IVC diameter <2.1 cm collapsing >50% with sniff suggests a normal RA pressure of 3 mmHg.  Pericardium There is no pericardial effusion. ______________________________________________________________________________ MMode/2D Measurements & Calculations IVSd: 0.95 cm  LVIDd: 5.4 cm LVIDs: 3.9 cm LVPWd: 1.2 cm FS: 28.4 % LV mass(C)d: 226.5 grams LV mass(C)dI: 107.4 grams/m2 Ao root diam: 3.9 cm LA dimension: 3.9 cm asc Aorta Diam: 3.8 cm LA/Ao: 1.0 LVOT diam: 2.2 cm LVOT area: 3.7 cm2 LA Volume Indexed (AL/bp): 21.9 ml/m2 RWT: 0.44  Time Measurements MM HR: 61.0 BPM  Doppler Measurements & Calculations MV E max rafa: 65.5 cm/sec MV A max arfa: 78.6 cm/sec MV E/A: 0.83 MV dec time: 0.20 sec Ao V2 max: 186.8 cm/sec Ao max P.0 mmHg Ao V2 mean: 122.0 cm/sec Ao mean P.0 mmHg Ao V2 VTI: 34.8 cm CAMILLE(I,D): 1.8 cm2 CAMILLE(V,D): 1.6 cm2 LV V1 max P.6 mmHg LV V1 max: 80.0 cm/sec LV V1 VTI: 16.8 cm SV(LVOT): 63.1 ml SI(LVOT): 29.9 ml/m2 AV Rafa Ratio (DI): 0.43 CAMILLE Index (cm2/m2): 0.86 E/E' av.9 Lateral E/e': 6.7 Medial E/e': 11.1   ______________________________________________________________________________ Report approved by: Eliza Narvaez 11/09/2021 11:16 AM       US Lower Extremity Venous Duplex Bilateral    Result Date: 11/8/2021  EXAM: US LOWER EXTREMITY VENOUS DUPLEX BILATERAL LOCATION: [facility] DATE/TIME: 11/8/2021 7:36 PM INDICATION: bilateral lower leg swelling and pain COMPARISON: 1/25/2021 TECHNIQUE: Venous Duplex ultrasound of bilateral lower extremities with and without compression, augmentation and duplex. Color flow and spectral Doppler with waveform analysis performed. FINDINGS: Exam includes the common femoral, femoral, popliteal veins as well as segmentally visualized deep calf veins and greater saphenous vein. RIGHT: No deep vein thrombosis. No superficial thrombophlebitis. No popliteal cyst. LEFT: No deep vein thrombosis. No superficial thrombophlebitis. No popliteal cyst.     IMPRESSION: 1.  No deep venous thrombosis in the bilateral lower extremities.    Abdomen US, limited (RUQ only)    Result Date: 11/8/2021  EXAM: US ABDOMEN LIMITED LOCATION: [facility] DATE/TIME: 11/8/2021 7:36 PM INDICATION: elevated bilirubin. Lung cancer. COMPARISON: CT 10/18/2021 TECHNIQUE: Limited abdominal ultrasound. FINDINGS: GALLBLADDER: Filled with sludge. No definite stone seen. Normal wall thickness. BILE DUCTS: No biliary dilatation. The common duct measures 4 mm. LIVER: Increased echogenicity from diffuse fatty infiltration. Heterogeneous echotexture. Deeper portions of the liver are not diagnostically visualized. No focal mass seen. RIGHT KIDNEY: No hydronephrosis.It contains a 2.9 cm cyst. PANCREAS: The visualized portions are normal. No ascites.     IMPRESSION: 1.  Diffuse hepatic steatosis. 2.  Sludge containing gallbladder.     XR Chest Port 1 View    Result Date: 11/10/2021  EXAM: XR CHEST PORT 1 VIEW LOCATION: [facility] DATE/TIME: 11/10/2021  2:29 AM INDICATION: Fever. COMPARISON: 11/8/2021. FINDINGS: The heart size is normal. The thoracic aorta is calcified. Stable surgical clip and scarring in the left upper lobe. The lungs are otherwise clear. Old right rib fractures. No pneumothorax.     IMPRESSION: No acute abnormality.    CT Chest Abdomen Pelvis w/o Contrast    Result Date: 11/11/2021  EXAM: CT CHEST ABDOMEN PELVIS W/O CONTRAST LOCATION: [facility] DATE/TIME: 11/11/2021 8:43 PM INDICATION: Sepsis COMPARISON: 10/18/2021 and 02/25/2019 TECHNIQUE: CT scan of the chest, abdomen, and pelvis was performed without IV contrast. Multiplanar reformats were obtained. Dose reduction techniques were used. CONTRAST: None. FINDINGS: LUNGS AND PLEURA: Small pleural effusions. A left suprahilar opacity has not changed. There are fiducial markers in this region. A 9 mm x 7 mm left upper lobe nodule was previously 8 mm x 6 mm (series 4 image 80).  There are suture lines in the right lung with right upper lung architectural distortion. Emphysema is present. MEDIASTINUM/AXILLAE: The main pulmonary artery is 35 mm in diameter, which can be seen with pulmonary hypertension.  The ascending aorta is 4.2 cm in diameter, which is unchanged. CORONARY ARTERY CALCIFICATION: Severe. HEPATOBILIARY: Hepatic steatosis. PANCREAS: A pancreatic lesion has not significantly changed in size. SPLEEN: Normal. ADRENAL GLANDS: Normal. KIDNEYS/BLADDER: A low-attenuation right renal lesion is probably a simple cyst and does not require follow-up. Status post left nephrectomy. No new soft tissue in the nephrectomy bed. BOWEL: Normal. LYMPH NODES: Normal. VASCULATURE: Status post aortobiiliac stent graft placement for an infrarenal abdominal aorta aneurysm. PELVIC ORGANS: Normal. MUSCULOSKELETAL: Right hip arthroplasty. Nonacute rib fractures are again seen.     IMPRESSION: 1.  No source of sepsis identified. 2.  A left upper lobe nodule is 1 mm larger. Metastatic  disease is possible. 3.  Otherwise no significant change from the prior study. The pancreatic lesion can be followed as detailed below. REFERENCE: Revisions of international consensus [place] guidelines for the management of IPMN of the pancreas. Pancreatology 2017;17(5):738-753. Largest cyst between 20-30 mm: EUS in 3-6 months and then annual surveillance alternating between MRI and EUS as appropriate.     CT Chest w Contrast    Result Date: 10/18/2021  EXAM: CT CHEST W CONTRAST LOCATION: [facility] DATE/TIME: 10/18/2021 1:19 PM INDICATION: Follow-up left upper lobe lung cancer, post SBRT in 3/2021. Previous history of right upper and middle lobe adenocarcinoma status post wedge resections. COMPARISON: CT 6/16/2021 and 8/31/2020 PET CT TECHNIQUE: CT chest with IV contrast. Multiplanar reformats were obtained. Dose reduction techniques were used. CONTRAST: 75 mL Isovue-370. FINDINGS: LUNGS AND PLEURA: Left upper lobe fiducial marker with previous spiculated left upper lobe mass obscured by new surrounding consolidative and groundglass opacity compatible with posttreatment radiation fibrosis. New 8 mm irregular solid nodular opacity image 113 series 4 suspicious for metastatic disease versus new primary lung cancer. Stable postop changes from right upper and middle lobe wedge resections. Biapical centrilobular and paraseptal emphysema redemonstrated. Central airways are patent. No pleural effusion MEDIASTINUM/AXILLAE: No mass/adenopathy nor pericardial effusion. The thoracic aorta and heart are normal in size. CORONARY ARTERY CALCIFICATION: Severe. UPPER ABDOMEN: Severe hepatic steatosis. Stable 17 mm cyst within the pancreatic tail compatible with IPMN. Previous left nephrectomy. MUSCULOSKELETAL: Old healed rib fracture deformities. No suspicious osseous lesions.     IMPRESSION: 1.  New consolidative and groundglass opacities around previous left upper lobe nodule compatible with  posttreatment radiation fibrosis. 2.  New 8 mm irregular left upper lobe solid nodule which could represent either metastatic lesion or primary lung cancer. 3.  Stable 17 mm pancreatic cystic lesion likely representing IPMN. This could be followed up with CT in 2 years. REFERENCE: Revisions of international consensus [place] guidelines for the management of IPMN of the pancreas. Pancreatology 2017;17(5):738-753. Largest cyst between 10-20 mm: CT or MRI/MRCP every 6 months for 1 year and then yearly for 2 years and then every 2 years if no change.     Attestation:  Total time on the floor involved in the patient's care: 35 minutes. Total time spent in chart review, Counseling/care coordination: >50%

## 2021-11-13 NOTE — PHARMACY-VANCOMYCIN DOSING SERVICE
Pharmacy Vancomycin Note  Date of Service 2021  Patient's  1945   76 year old, male    Indication: Sepsis  Day of Therapy: 3  Current vancomycin regimen:  1,000 mg IV q24h  Current vancomycin monitoring method: AUC  Current vancomycin therapeutic monitoring goal: 400-600 mg*h/L    Insight[employer] Prediction of Current Vancomycin Regimen    Regimen: 1000 mg IV every 24 hours.  Exposure target: AUC24 (range)400-600 mg/L.hr   AUC24,ss: 457 mg/L.hr  Probability of AUC24 > 400: 71 %  Ctrough,ss: 15.5 mg/L  Probability of Ctrough,ss > 20: 22 %  Probability of nephrotoxicity (Lodise LINA ): 11 %    Current estimated CrCl = Estimated Creatinine Clearance: 34.4 mL/min (A) (based on SCr of 2.13 mg/dL (H)).    Creatinine for last 3 days  2021:  5:23 AM Creatinine 2.23 mg/dL  2021:  5:20 AM Creatinine 2.07 mg/dL  2021:  6:15 AM Creatinine 2.13 mg/dL    Recent Vancomycin Levels (past 3 days)  2021:  6:15 AM Vancomycin 11.6 mg/L    Vancomycin IV Administrations (past 72 hours)                   vancomycin 1000 mg in 0.9% NaCl 250 mL intermittent infusion 1,000 mg (mg) 1,000 mg New Bag 21 1118     1,000 mg New Bag 21 1312    vancomycin 1750 mg in 0.9% NaCl 500 ml intermittent infusion 1,750 mg (mg) 1,750 mg Given 11/10/21 1407                Nephrotoxins and other renal medications (From now, onward)    Start     Dose/Rate Route Frequency Ordered Stop    21 0900  [Held by provider]  bumetanide (BUMEX) tablet 0.5 mg        (Held by provider since Sat 2021 at 0746 by Enrique Thomas MD.Hold Reason: Acute Kidney Injury.)    0.5 mg Oral 2 TIMES DAILY. 21 1310      21 1200  vancomycin 1000 mg in 0.9% NaCl 250 mL intermittent infusion 1,000 mg         1,000 mg  over 60 Minutes Intravenous EVERY 24 HOURS 11/10/21 1124      11/10/21 1702  piperacillin-tazobactam (ZOSYN) 3.375 g vial to attach to  mL bag        Note to Pharmacy: Extended infusion dosing  "to start 6 hours after initial infusion.   \"Followed by\" Linked Group Details    3.375 g  over 240 Minutes Intravenous EVERY 8 HOURS 11/10/21 1103               Contrast Orders - past 72 hours (72h ago, onward)            Start     Dose/Rate Route Frequency Ordered Stop    11/11/21 1500  perflutren lipid microsphere (DEFINITY) injection SUSP 5 mL         5 mL Intravenous ONCE 11/11/21 1505 11/11/21 1444          Interpretation of levels and current regimen:  Vancomycin level is reflective of -600    Has serum creatinine changed greater than 50% in last 72 hours: No    Renal Function: Worsening    [employer]ight[employer] Prediction of Planned New Vancomycin Regimen    Regimen: 1000 mg IV every 24 hours.  Exposure target: AUC24 (range)400-600 mg/L.hr   AUC24,ss: 457 mg/L.hr  Probability of AUC24 > 400: 71 %  Ctrough,ss: 15.5 mg/L  Probability of Ctrough,ss > 20: 22 %  Probability of nephrotoxicity (Lodise LINA 2009): 11 %      Plan:  1. Continue Current Dose  2. Vancomycin monitoring method: AUC  3. Vancomycin therapeutic monitoring goal: 400-600 mg*h/L  4. Pharmacy will check vancomycin levels as appropriate in 1-3 Days.  5. Serum creatinine levels will be ordered daily for the first week of therapy and at least twice weekly for subsequent weeks.    Kristie Mayo, PharmD, BCPS    "

## 2021-11-13 NOTE — PROGRESS NOTES
Mitchell did well today. Vitally stable, afebrile. Did remain on 2L nasal canula throughout the day. K+ replaced this morning, rechecked this afternoon, 3.5, redraw in the morning.Chest x-ray completed. Started on Cymbalta. Plan is to discharge to TCU when able.

## 2021-11-13 NOTE — PLAN OF CARE
Pt A&Ox4, VSS. RA majority of HS Now at 2L NC. Denies pain Although state BLE are sensitive. Good UOP. Slept adequately. Will continue to monitor and carry out plan of care.Zaynab Ospina, KENAN          Problem: Adult Inpatient Plan of Care  Goal: Plan of Care Review  Outcome: Improving  Flowsheets (Taken 11/13/2021 0617)  Plan of Care Reviewed With: (chitra) patient  Goal: Patient-Specific Goal (Individualized)  Outcome: Improving  Goal: Absence of Hospital-Acquired Illness or Injury  Outcome: Improving  Intervention: Prevent Skin Injury  Recent Flowsheet Documentation  Taken 11/13/2021 0400 by Zaynab Ospina, RN  Body Position: position changed independently  Taken 11/13/2021 0200 by Zaynab Ospina RN  Body Position: position changed independently  Taken 11/13/2021 0000 by Zaynab Ospina, RN  Body Position: position changed independently  Goal: Optimal Comfort and Wellbeing  Outcome: Improving  Goal: Readiness for Transition of Care  Outcome: Improving

## 2021-11-13 NOTE — PROGRESS NOTES
Familial info regarding antidepressants that have worked per MD request (MD alerted):  2 daughters on Prozac, 1 on Celexa, 1 on wellbutrin. All stated some amount of efficacy.

## 2021-11-13 NOTE — PROGRESS NOTES
[facility] MEDICINE  PROGRESS NOTE     Code Status: No CPR- Do NOT Intubate       Identification/Summary:   76 year old male with PMH signficant for CAD, lung cancer, prostate and oral cancer, CHF, COPD, Ragsdale's, CKD and alcohol abuse.  11/8 admitted with bilateral lower extremity edema, pain, LHD.  LEILA creatinine 2.2.  Admits to 1 L vodka per day.  After IV fluids lactic acidosis resolved and creatinine 2.06.  Low albumin.  Nephrology consultation.  Utilizing albumin and Bumex diuresis.  11/9 PM fever spikes 101.8.  Unclear etiology.  11/10 empirically started Zosyn and vancomycin.  11/11 some fever improvement.  1 of 2 blood cultures + micrococcus.  Repeat blood cultures NGTD and ID consulted.   CT scan negative. 11/12 hypotension responded to bolus.  11/13 hypoxia and congestion on chest x-ray.  Adding Cymbalta.     Assessment and Plan:  -Coronary artery disease.  MI 2019.  Stent x1.  -Essential hypertension  -Chronic systolic heart failure  -Hypotension  May 2021 echocardiogram EF 55%.  Mild hypertrophy otherwise normal.  11/8 given home metoprolol XL 12.5 mg daily with hold parameters.  11/9 echocardiogram EF 55 to 60% borderline LVH and mild MR.  11/10 discontinued clonidine as it may be contributing to some hypotension.  11/12 systolic BP down to the 70s. Asymptomatic. Giving normal saline bolus 500 mL x 1 with good BP response.  Bumex later resumed.  11/13 some hypoxia and temp 101.8.  Chest x-ray appears to show pulmonary edema.  Discussed case with Dr. Morales and will continue Bumex.  -Nocturnal fever  -Positive blood culture 1 of 2 GPC in clusters  Unclear etiology.  T-max 101.8.  Blood work done normal.  Chest x-ray clear.  Negative urinalysis.  Blood cultures drawn.  11/10 procalcitonin 0.72 and white count at 3.8.  Lower extremities erythema appear improved.  Empirically started Zosyn and vancomycin.    11/11 + blood culture 1 of 2 with micrococcus.  Continue  current antibiotics.   2 more blood cultures drawn. CT scan chest abdomen pelvis negative for acute findings.  11/13 subsequent blood cultures no growth to date.  Discussed potentially monitoring off antibiotics but patient wishes to continue at this time.  Infectious disease consult appreciated.  -Bilateral lower extremity edema  Significant findings: BNP 99, troponin 0.06, D-dimer 1.79.  Normal B12 and folate.  VQ scan low probability for pulmonary embolism.  Chest x-ray shows prior lung resections otherwise negative.   Bilateral lower extremity ultrasound negative for DVT.  Right upper quadrant abdominal ultrasound shows hepatic steatosis with gallbladder sludge otherwise normal.  11/8 Lactated Ringer's at 75 mL/h.  11/9 with kidney function improved initiated diurese.  Gave albumin infusion x1 and Lasix 20 mg IV every 12 hours.  11/10 Transitioned Lasix to orals 20 twice daily.  11/11 creatinine worsened to 2.23.  Appreciate nephrology consultation.  Utilize albumin and low-dose Bumex.    11/12 creatinine 2.07. Hypotension issues as described above.  -Bilateral lower extremity pain/neuropathy  -Depression  11/8 given gabapentin 100 mg 3 times daily.  Some slight improvement to pain.  11/11 increased gabapentin to 200 mg 3 times daily.  11/13 noting still some discomfort.  Has been feeling down.  No prior antidepressants.  Will increase bedtime gabapentin to 300 mg and start Cymbalta 20 mg daily.  -Lactic acidosis  -Acute on chronic kidney disease stage III  -Hyponatremia  May 2021 creatinine 1.66.    Given IV fluids at admission.  Admission creatinine 2.26--> 2.06--> 1.97--> 2.23--> 2.07--> 2.13.  Admission lactic 2.6--> 2.3--> 1.4.    11/9 discontinued IV fluids.    Diuresis, albumin and nephrology consultation as above.  Follow BMP.  -Alcohol abuse  Patient admits to drinking 1 L of vodka per day.  Denies history of withdrawal but also cannot recall the last time he had a day of abstinence from  alcohol.  Utilize CIWA protocols at this time with lorazepam.    11/11 no evidence of withdrawal.   Discontinued CIWA protocols.  Social work chemical dependency consultation.  -Hypoalbuminemia  -Moderate protein caloric malnutrition  Albumin infusion as noted above.  RD consultation appreciated.  -New bifascicular block  Previous EKGs had left axis deviation along with right bundle branch block.  Admission EKG shows new bifascicular block.  Serial troponins negative.  Echo as above.  No further work-up at this time.  -Ragsdale's esophagus  Continue home omeprazole 20 mg daily.  -Abnormal UA  Unclear if patient could be dealing with a UTI or not.  Denies any dysuria.  Urinalysis was abnormal but not severe enough to trigger urine culture.  -COPD  -Left upper lobe nodule 9 x 7 mm  -History of lung cancer.   -Status post right wedge resection 2016  MELODY nodule appears 1 mm larger compared to 10/18/2021. Irregular in shape.  Concern for possible primary cancer versus metastatic disease.   Will be following with Dr. Batista [facility] Rad Onc in late December.  Continue home Trelegy Ellipta inhaler and albuterol as needed.  -Pancreatic cyst 17 mm  Follow-up CT in 2 years versus EUS.     -COVID19 PCR status negative from 11/8/2021  Patient is vaccinated.  Standard precautions.  -Anticoagulation   INR 1.22.  Continue home aspirin 81 mg nightly.  Utilize subcutaneous heparin.  Fluids: Saline lock  Pain: Okay to give low-dose Tylenol as needed.  Therapy: PT OT recommending TCU at this time.  Urban:Not present  Current Diet  Orders Placed This Encounter      No Added Salt 4 Gram Sodium    Supplements  Active Nourishment Order   Procedures     Snacks/Supplements Adult: Nepro Oral Supplement; With Meals     Barriers to Discharge: Intermittent hypoxia, Bumex, IV antibiotics and fevers    Disposition: TCU recommended possibly in 2 to 3 days    Interval History/Subjective:  Last evening patient had a fever spike up to 101.8.  Did require  some supplemental oxygen.  Able to wean off of it this morning.  Admits he is feeling kind of down and depressed.  Lower extremities are substantially better with the edema but still quite painful.  Daughter is present and supportive.  Patient does have an upcoming appointment with [place] radiation oncologist in late December.  Has not been on antidepressants in the past.  Questions answered to verbalized satisfaction.    Physical Exam/Objective:  Temp:  [98.4  F (36.9  C)-101.3  F (38.5  C)] 98.4  F (36.9  C)  Pulse:  [73-86] 73  Resp:  [22-25] 22  BP: (100-138)/(55-73) 100/63  SpO2:  [85 %-95 %] 95 %  Wt Readings from Last 4 Encounters:   11/13/21 96.3 kg (212 lb 3.2 oz)   09/28/21 95.2 kg (209 lb 12.8 oz)   06/25/21 92.1 kg (203 lb)   06/23/21 92.3 kg (203 lb 8 oz)     Body mass index is 29.6 kg/m .    Constitutional: awake, alert, cooperative, no apparent distress, and appears stated age  ENT: Normocephalic, without obvious abnormality, atraumatic, external ears without lesions, oral pharynx with moist mucous membranes, tonsils without erythema or exudates, gums normal and good dentition.  Respiratory: Slight crackles throughout  Cardiovascular: Normal apical impulse, regular rate and rhythm, normal S1 and S2, no S3 or S4, and no murmur noted  GI: No scars, normal bowel sounds, soft, non-distended, non-tender, no masses palpated, no hepatosplenomegally  Skin: Improved erythema right greater than left lower leg.  Musculoskeletal: There is no redness, warmth, or swelling of the joints.  Motor strength is 5 out of 5 all extremities bilaterally.  Tone is normal.  Trace lower extremity edema bilaterally.  Neurologic: Cranial nerves II-XII are grossly intact. Sensory:  Sensory intact  Neuropsychiatric: General: normal, calm and normal eye contact Level of consciousness: alert / normal Affect: normal Orientation: oriented to self, place, time and situation Memory and insight: normal, memory for past and recent events  intact and thought process normal      Medications:   Personally Reviewed.  Medications       aspirin  81 mg Oral At Bedtime     [Held by provider] bumetanide  0.5 mg Oral BID     Fluticasone-Umeclidin-Vilanterol  1 puff Inhalation Daily     gabapentin  200 mg Oral TID     heparin ANTICOAGULANT  5,000 Units Subcutaneous Q12H     magnesium oxide  400 mg Oral At Bedtime     metoprolol succinate ER  12.5 mg Oral Daily     midodrine  2.5 mg Oral BID 09 12     multivitamin w/minerals  1 tablet Oral Daily     pantoprazole  40 mg Oral Daily with supper     piperacillin-tazobactam  3.375 g Intravenous Q8H     polyethylene glycol  17 g Oral Daily     sodium chloride (PF)  3 mL Intracatheter Q8H     sodium chloride (PF)  3 mL Intracatheter Q8H     thiamine  100 mg Oral Daily     vancomycin  1,000 mg Intravenous Q24H     Vitamin D3  125 mcg Oral Daily     zinc gluconate  50 mg Oral Daily       Data reviewed today: I personally reviewed all new medications, labs, imaging/diagnostics reports over the past 24 hours. Pertinent findings include:    Imaging:   No results found for this or any previous visit (from the past 24 hour(s)).    Labs:  CT Chest Abdomen Pelvis w/o Contrast   Final Result   IMPRESSION:   1.  No source of sepsis identified.   2.  A left upper lobe nodule is 1 mm larger. Metastatic disease is possible.   3.  Otherwise no significant change from the prior study.   The pancreatic lesion can be followed as detailed below.         REFERENCE:   Revisions of international consensus [place] guidelines for the management of IPMN of the pancreas. Pancreatology 2017;17(5):738-753.      Largest cyst between 20-30 mm: EUS in 3-6 months and then annual surveillance alternating between MRI and EUS as appropriate.         Echocardiogram Complete   Final Result      XR Chest Port 1 View   Final Result   IMPRESSION: No acute abnormality.      Echocardiogram Complete   Final Result      Abdomen US, limited (RUQ only)   Final  Result   IMPRESSION:   1.  Diffuse hepatic steatosis.   2.  Sludge containing gallbladder.            US Lower Extremity Venous Duplex Bilateral   Final Result   IMPRESSION:   1.  No deep venous thrombosis in the bilateral lower extremities.      XR Chest 2 Views   Final Result   IMPRESSION: Stable posttreatment changes left upper lobe and postop wedge resections right upper and middle lobes. No new focal consolidation, pneumothorax nor pleural effusion.      NM Lung Scan Perfusion Particulate   Final Result   IMPRESSION:    1.  Pulmonary embolus absent, low probability scan.      XR Chest 2 Views    (Results Pending)     Recent Results (from the past 24 hour(s))   Lactic Acid STAT    Collection Time: 11/12/21  5:18 PM   Result Value Ref Range    Lactic Acid 2.1 (H) 0.7 - 2.0 mmol/L   Vancomycin level    Collection Time: 11/13/21  6:15 AM   Result Value Ref Range    Vancomycin 11.6   mg/L   Renal panel    Collection Time: 11/13/21  6:15 AM   Result Value Ref Range    Sodium 142 136 - 145 mmol/L    Potassium 3.0 (L) 3.5 - 5.0 mmol/L    Chloride 96 (L) 98 - 107 mmol/L    Carbon Dioxide (CO2) 32 (H) 22 - 31 mmol/L    Anion Gap 14 5 - 18 mmol/L    Urea Nitrogen 7 (L) 8 - 28 mg/dL    Creatinine 2.13 (H) 0.70 - 1.30 mg/dL    Calcium 10.4 8.5 - 10.5 mg/dL    Glucose 126 (H) 70 - 125 mg/dL    Albumin 3.7 3.5 - 5.0 g/dL    Phosphorus 2.1 (L) 2.5 - 4.5 mg/dL    GFR Estimate 29 (L) >60 mL/min/1.73m2   ALT    Collection Time: 11/13/21  6:15 AM   Result Value Ref Range    ALT 20 0 - 45 U/L   AST    Collection Time: 11/13/21  6:15 AM   Result Value Ref Range    AST 56 (H) 0 - 40 U/L   Alkaline phosphatase    Collection Time: 11/13/21  6:15 AM   Result Value Ref Range    Alkaline Phosphatase 110 45 - 120 U/L   Bilirubin direct    Collection Time: 11/13/21  6:15 AM   Result Value Ref Range    Bilirubin Direct 0.9 (H) <=0.5 mg/dL   Bilirubin  total    Collection Time: 11/13/21  6:15 AM   Result Value Ref Range    Bilirubin Total 1.9  (H) 0.0 - 1.0 mg/dL   Protein total    Collection Time: 11/13/21  6:15 AM   Result Value Ref Range    Protein Total 5.8 (L) 6.0 - 8.0 g/dL   Potassium    Collection Time: 11/13/21 12:50 PM   Result Value Ref Range    Potassium 3.5 3.5 - 5.0 mmol/L       Pending Labs:  Unresulted Labs Ordered in the Past 30 Days of this Admission     Date and Time Order Name Status Description    11/13/2021 12:01 AM Blood Culture Peripheral Blood In process     11/13/2021 12:01 AM Blood Culture Peripheral Blood In process     11/12/2021 12:01 AM Blood Culture Hand, Right Preliminary     11/12/2021 12:01 AM Blood Culture Peripheral Blood Preliminary     11/11/2021 11:42 AM Blood Culture Peripheral Blood Preliminary     11/11/2021 11:42 AM Blood Culture Peripheral Blood Preliminary     11/9/2021 11:59 PM Blood Culture Hand, Right Preliminary     11/8/2021 11:25 AM Blood Culture Peripheral Blood Preliminary     11/8/2021 11:25 AM Blood Culture Peripheral Blood Preliminary             Enrique Thomas MD  [facility] Medicine  [facility]  Phone: #[phone]

## 2021-11-14 ENCOUNTER — APPOINTMENT (OUTPATIENT)
Dept: OCCUPATIONAL THERAPY | Facility: CLINIC | Age: 76
DRG: 682 | End: 2021-11-14
Payer: MEDICARE

## 2021-11-14 ENCOUNTER — APPOINTMENT (OUTPATIENT)
Dept: PHYSICAL THERAPY | Facility: CLINIC | Age: 76
DRG: 682 | End: 2021-11-14
Payer: MEDICARE

## 2021-11-14 LAB
ALBUMIN SERPL-MCNC: 3.2 G/DL (ref 3.5–5)
ALP SERPL-CCNC: 108 U/L (ref 45–120)
ALT SERPL W P-5'-P-CCNC: 21 U/L (ref 0–45)
ANION GAP SERPL CALCULATED.3IONS-SCNC: 11 MMOL/L (ref 5–18)
AST SERPL W P-5'-P-CCNC: 56 U/L (ref 0–40)
BILIRUB DIRECT SERPL-MCNC: 0.8 MG/DL
BILIRUB SERPL-MCNC: 1.8 MG/DL (ref 0–1)
BUN SERPL-MCNC: 9 MG/DL (ref 8–28)
CALCIUM SERPL-MCNC: 10.3 MG/DL (ref 8.5–10.5)
CHLORIDE BLD-SCNC: 96 MMOL/L (ref 98–107)
CO2 SERPL-SCNC: 32 MMOL/L (ref 22–31)
CREAT SERPL-MCNC: 2.11 MG/DL (ref 0.7–1.3)
GFR SERPL CREATININE-BSD FRML MDRD: 29 ML/MIN/1.73M2
GLUCOSE BLD-MCNC: 136 MG/DL (ref 70–125)
PLATELET # BLD AUTO: 177 10E3/UL (ref 150–450)
POTASSIUM BLD-SCNC: 3.5 MMOL/L (ref 3.5–5)
POTASSIUM BLD-SCNC: 3.7 MMOL/L (ref 3.5–5)
PROT SERPL-MCNC: 5.6 G/DL (ref 6–8)
SODIUM SERPL-SCNC: 139 MMOL/L (ref 136–145)

## 2021-11-14 PROCEDURE — 250N000013 HC RX MED GY IP 250 OP 250 PS 637: Performed by: INTERNAL MEDICINE

## 2021-11-14 PROCEDURE — 97535 SELF CARE MNGMENT TRAINING: CPT | Mod: GO

## 2021-11-14 PROCEDURE — 99232 SBSQ HOSP IP/OBS MODERATE 35: CPT | Performed by: INTERNAL MEDICINE

## 2021-11-14 PROCEDURE — 36415 COLL VENOUS BLD VENIPUNCTURE: CPT | Performed by: INTERNAL MEDICINE

## 2021-11-14 PROCEDURE — 85049 AUTOMATED PLATELET COUNT: CPT | Performed by: FAMILY MEDICINE

## 2021-11-14 PROCEDURE — 97110 THERAPEUTIC EXERCISES: CPT | Mod: GO

## 2021-11-14 PROCEDURE — 82040 ASSAY OF SERUM ALBUMIN: CPT | Performed by: FAMILY MEDICINE

## 2021-11-14 PROCEDURE — 36415 COLL VENOUS BLD VENIPUNCTURE: CPT | Performed by: FAMILY MEDICINE

## 2021-11-14 PROCEDURE — 210N000002 HC R&B HEART CARE

## 2021-11-14 PROCEDURE — 97110 THERAPEUTIC EXERCISES: CPT | Mod: GP | Performed by: PHYSICAL THERAPIST

## 2021-11-14 PROCEDURE — 99233 SBSQ HOSP IP/OBS HIGH 50: CPT | Performed by: INTERNAL MEDICINE

## 2021-11-14 PROCEDURE — 250N000013 HC RX MED GY IP 250 OP 250 PS 637: Performed by: PHYSICIAN ASSISTANT

## 2021-11-14 PROCEDURE — 250N000011 HC RX IP 250 OP 636: Performed by: FAMILY MEDICINE

## 2021-11-14 PROCEDURE — 80053 COMPREHEN METABOLIC PANEL: CPT | Performed by: FAMILY MEDICINE

## 2021-11-14 PROCEDURE — 258N000003 HC RX IP 258 OP 636: Performed by: FAMILY MEDICINE

## 2021-11-14 PROCEDURE — 250N000013 HC RX MED GY IP 250 OP 250 PS 637: Performed by: FAMILY MEDICINE

## 2021-11-14 PROCEDURE — 84132 ASSAY OF SERUM POTASSIUM: CPT | Performed by: INTERNAL MEDICINE

## 2021-11-14 RX ORDER — CALCIUM CARBONATE 500 MG/1
500 TABLET, CHEWABLE ORAL 3 TIMES DAILY PRN
Status: DISCONTINUED | OUTPATIENT
Start: 2021-11-14 | End: 2021-11-23 | Stop reason: HOSPADM

## 2021-11-14 RX ORDER — LORAZEPAM 0.5 MG/1
0.25 TABLET ORAL EVERY 8 HOURS PRN
Status: DISCONTINUED | OUTPATIENT
Start: 2021-11-14 | End: 2021-11-23 | Stop reason: HOSPADM

## 2021-11-14 RX ADMIN — BUMETANIDE 0.5 MG: 0.5 TABLET ORAL at 08:57

## 2021-11-14 RX ADMIN — PIPERACILLIN AND TAZOBACTAM 3.38 G: 3; .375 INJECTION, POWDER, LYOPHILIZED, FOR SOLUTION INTRAVENOUS at 04:50

## 2021-11-14 RX ADMIN — Medication 100 MG: at 08:57

## 2021-11-14 RX ADMIN — HEPARIN SODIUM 5000 UNITS: 5000 INJECTION, SOLUTION INTRAVENOUS; SUBCUTANEOUS at 08:58

## 2021-11-14 RX ADMIN — BUMETANIDE 0.5 MG: 0.5 TABLET ORAL at 16:55

## 2021-11-14 RX ADMIN — ALBUTEROL SULFATE 2 PUFF: 90 INHALANT RESPIRATORY (INHALATION) at 21:25

## 2021-11-14 RX ADMIN — CALCIUM CARBONATE (ANTACID) CHEW TAB 500 MG 500 MG: 500 CHEW TAB at 16:55

## 2021-11-14 RX ADMIN — PANTOPRAZOLE SODIUM 40 MG: 20 TABLET, DELAYED RELEASE ORAL at 16:14

## 2021-11-14 RX ADMIN — MIDODRINE HYDROCHLORIDE 2.5 MG: 2.5 TABLET ORAL at 08:58

## 2021-11-14 RX ADMIN — LORAZEPAM 0.25 MG: 0.5 TABLET ORAL at 11:48

## 2021-11-14 RX ADMIN — MULTIPLE VITAMINS W/ MINERALS TAB 1 TABLET: TAB at 08:58

## 2021-11-14 RX ADMIN — PIPERACILLIN AND TAZOBACTAM 3.38 G: 3; .375 INJECTION, POWDER, LYOPHILIZED, FOR SOLUTION INTRAVENOUS at 13:24

## 2021-11-14 RX ADMIN — Medication 50 MG: at 08:58

## 2021-11-14 RX ADMIN — GABAPENTIN 200 MG: 100 CAPSULE ORAL at 11:48

## 2021-11-14 RX ADMIN — VANCOMYCIN HYDROCHLORIDE 1000 MG: 5 INJECTION, POWDER, LYOPHILIZED, FOR SOLUTION INTRAVENOUS at 11:48

## 2021-11-14 RX ADMIN — DULOXETINE HYDROCHLORIDE 20 MG: 20 CAPSULE, DELAYED RELEASE ORAL at 08:57

## 2021-11-14 RX ADMIN — ASPIRIN 81 MG: 81 TABLET, COATED ORAL at 21:26

## 2021-11-14 RX ADMIN — POLYETHYLENE GLYCOL 3350 17 G: 17 POWDER, FOR SOLUTION ORAL at 08:57

## 2021-11-14 RX ADMIN — HEPARIN SODIUM 5000 UNITS: 5000 INJECTION, SOLUTION INTRAVENOUS; SUBCUTANEOUS at 21:26

## 2021-11-14 RX ADMIN — Medication 5 MG: at 23:47

## 2021-11-14 RX ADMIN — MAGNESIUM OXIDE TAB 400 MG (241.3 MG ELEMENTAL MG) 400 MG: 400 (241.3 MG) TAB at 21:26

## 2021-11-14 RX ADMIN — GABAPENTIN 200 MG: 100 CAPSULE ORAL at 08:57

## 2021-11-14 RX ADMIN — Medication 125 MCG: at 08:57

## 2021-11-14 RX ADMIN — CALCIUM CARBONATE (ANTACID) CHEW TAB 500 MG 500 MG: 500 CHEW TAB at 20:17

## 2021-11-14 RX ADMIN — PIPERACILLIN AND TAZOBACTAM 3.38 G: 3; .375 INJECTION, POWDER, LYOPHILIZED, FOR SOLUTION INTRAVENOUS at 21:26

## 2021-11-14 RX ADMIN — GABAPENTIN 300 MG: 300 CAPSULE ORAL at 21:26

## 2021-11-14 RX ADMIN — MIDODRINE HYDROCHLORIDE 2.5 MG: 2.5 TABLET ORAL at 11:48

## 2021-11-14 ASSESSMENT — ACTIVITIES OF DAILY LIVING (ADL)
ADLS_ACUITY_SCORE: 15
ADLS_ACUITY_SCORE: 17
ADLS_ACUITY_SCORE: 15
ADLS_ACUITY_SCORE: 17
ADLS_ACUITY_SCORE: 17
ADLS_ACUITY_SCORE: 15
ADLS_ACUITY_SCORE: 17
ADLS_ACUITY_SCORE: 15
ADLS_ACUITY_SCORE: 13
ADLS_ACUITY_SCORE: 17
ADLS_ACUITY_SCORE: 15
ADLS_ACUITY_SCORE: 15
ADLS_ACUITY_SCORE: 17
ADLS_ACUITY_SCORE: 15
ADLS_ACUITY_SCORE: 17
ADLS_ACUITY_SCORE: 15
ADLS_ACUITY_SCORE: 15
ADLS_ACUITY_SCORE: 17

## 2021-11-14 ASSESSMENT — MIFFLIN-ST. JEOR: SCORE: 1713.75

## 2021-11-14 NOTE — PROGRESS NOTES
[facility] MEDICINE PROGRESS NOTE      Length of stay: Day # 6    Identification/Summary: Lenny Chau is a 76 year old male with a past medical history of coronary artery disease, lung cancer, prostate and oral cancer, CHF, COPD, Ragsdale's esophagus, CKD and alcohol abuse  who was admitted with chief complaint of bilateral lower extremity edema on 11/8/2021.    Admitting impression of bilateral lower extremity edema    Brief history: For 7 days patient has been having increasing edema of the lower extremities with redness.  Has been feeling lightheaded and dizzy.  Was brought to the emergency room.  ER course: LFTs were quite elevated and had mild troponin elevation.  Patient was advised to be admitted at [facility] but patient declined and went home instead.  His symptoms worsened and so on 11/8/2021 went to the emergency room at [facility], lactic acid was elevated, D-dimer elevated, creatinine elevated at 2.26.  Patient does drink 1 L of vodka daily.  VQ scan was negative for PE.  He also has had some increasing distention of the abdomen.    Assessment and Plan:  Bilateral lower extremity edema, improving (from venous stasis, chronic liver, third spacing, congestive heart failure)  Third spacing.  Chronic systolic heart failure preserved ejection fraction, stable  Coronary artery disease with previous MI in 2019, stable  BNP is 99, troponin negative  Elevated D-dimer, VQ scan low probability for pulmonary embolism.  Chest x-ray showed prior lung resections otherwise negative.  Bilateral lower extremity ultrasound negative for DVT  Received albumin infusion and Lasix  Nephrology advising on fluid and electrolyte management  Echocardiogram unremarkable.  EF of 55 to 60% borderline LVH and mild MR  Currently on Bumex, midodrine    Bacteremia gram-positive cocci  Fever  Currently on Zosyn and vancomycin empirically  Negative CAT scan of the abdomen and pelvis and chest  Second blood  cultures so far negative to date.  Infectious disease consulting-no clear focus.  Echocardiogram without any evidence of endocarditis.  Limited sensitivity with transthoracic echocardiogram.  Continue empiric vancomycin and Zosyn.    Lactic acidosis, improved probably volume related  Initially received IV fluids  Subsequently received albumin infusion and Lasix  Currently hemodynamically stable  Blood cultures negative  CAT scan of the abdomen chest and pelvis was negative for any source of infection    Acute on chronic kidney disease, stage III, gradually improving  Hyponatremia, hypokalemia, electrolyte abnormalities  Hyponatremia  Bilateral lower extremity pain and neuropathy  Nephrology consulting on the case  Received albumin infusion and Lasix    History of depression  Alcohol abuse, alcoholic hepatitis, improved  Hepatic steatosis   on right upper quadrant abdominal ultrasound with gallbladder sludge   consult.    Hypoalbuminemia  Moderate protein caloric malnutrition  Nutritional support  Has received albumin infusion and Lasix    COPD, acute on chronic respiratory failure  Left upper lobe nodule 9 x 7 mm  History of lung cancer, status post wedge resection in 2016  Change in supplementation as needed  Concern for possible primary cancer versus metastatic cancer.  Will be following with radiation oncology in late December  Continue inhalers including Trelegy Ellipta inhaler and albuterol  Needing 2 L of oxygen right now. He quit smoking 20 years ago    Pancreatic cyst  Follow-up CAT scan in 2 years versus endoscopic ultrasound  Follow-up with primary care or GI  This is not an inpatient issue at this time.    History of prostate cancer, mouth cancer, non-small cell lung cancer  Follows up with oncology  Ragsdale's esophagus continue PPI      --------------------------------------------------------------------    Diet: No Added Salt 4 Gram Sodium  Snacks/Supplements Adult: Nepro Oral Supplement;  With Meals  DVT Prophylaxis: Heparin subcutaneous every 12 and aspirin  Code Status: No CPR- Do NOT Intubate  Living situation: Will need PT and OT evaluation possibly.  Anticipated possible discharge: To be determined    ----------------------------------------------------------------------    Cumulative essential/pertinent data reviewed:  Labs:  Creatinine initially 2.2 currently 2.1  CBC overall unremarkable.  Blood cultures so far negative    Transaminases slightly elevated ALT currently 108 AST 21  Imaging:  CT of the abdomen and chest and pelvis shows the following:  IMPRESSION:  1.  No source of sepsis identified.  2.  A left upper lobe nodule is 1 mm larger. Metastatic disease is possible.  3.  Otherwise no significant change from the prior study.  The pancreatic lesion can be followed as detailed below.    EKG: --  Echocardiogram shows the following:The left ventricle is mildly dilated with normal left ventricular wall  thickness.  Left ventricular function is normal.The ejection fraction is 55-60%.  Normal right ventricle size and systolic function.  No significant valve disease identified.  No evidence of endocarditis identified, however (especially with poor acoustic  windows), transthoracic echocardiography does not have high sensitivity for  identifying endocarditis. If suspicion remains for this condition consider  evaluation with a transesophageal echocardiogram.  ----------------------------------------------------------------------    Interval History/Subjective:  Looking comfortable.  He denies any chest pain at this time.  No shortness of breath.  No abdominal pain.  He uses inhalers at home.  He quit smoking about 20 years ago but he continues to drink regularly.  He lives with his wife in an apartment.  Rest of review of systems unremarkable.    Physical Exam/Objective:  Temp:  [97.9  F (36.6  C)-98.8  F (37.1  C)] 98.4  F (36.9  C)  Pulse:  [67-74] 74  Resp:  [18-20] 18  BP: (100-133)/(63-67)  104/64  SpO2:  [94 %-97 %] 97 %    Body mass index is 29.72 kg/m .    GENERAL:   Alert and conversant;  appears comfortable, in no acute distress,   HEAD:  Normocephalic, without obvious abnormality, atraumatic   EYES:  Grossly normal; pupils unremarkable    NOSE: Grossly normal   THROAT: Lips and mouth external: grossly normal,    NECK: No mass noted; no stiffness   BACK:      LUNGS:   Auscultation: Negative for wheezing or crackles, symmetric chest rise on inhalation, respirations unlabored    CHEST WALL:  No tenderness or deformity   HEART:  Regular rate and rhythm, significant murmurs: Negative,    ABDOMEN:   Soft, non-tender, bowel sounds normal; no masses, no peritoneal signs   EXTREMITIES:  Significantly improved edema of the lower extremities.  Some residual redness but no tenderness or warmth.  No tenderness of the calves   SKIN:  see extremities   NEURO: no signs of acute stroke or change from prior state   PSYCH: Cooperative, behavior is appropriate      Medications:   Personally Reviewed.    aspirin  81 mg Oral At Bedtime     bumetanide  0.5 mg Oral BID     DULoxetine  20 mg Oral Daily     Fluticasone-Umeclidin-Vilanterol  1 puff Inhalation Daily     gabapentin  200 mg Oral BID 09 12     gabapentin  300 mg Oral At Bedtime     heparin ANTICOAGULANT  5,000 Units Subcutaneous Q12H     magnesium oxide  400 mg Oral At Bedtime     metoprolol succinate ER  12.5 mg Oral Daily     midodrine  2.5 mg Oral BID 09 12     multivitamin w/minerals  1 tablet Oral Daily     pantoprazole  40 mg Oral Daily with supper     piperacillin-tazobactam  3.375 g Intravenous Q8H     polyethylene glycol  17 g Oral Daily     sodium chloride (PF)  3 mL Intracatheter Q8H     sodium chloride (PF)  3 mL Intracatheter Q8H     thiamine  100 mg Oral Daily     vancomycin  1,000 mg Intravenous Q24H     Vitamin D3  125 mcg Oral Daily     zinc gluconate  50 mg Oral Daily     Current Facility-Administered Medications   Medication Dose Route  Frequency     acetaminophen  325 mg Oral Q4H PRN    Or     acetaminophen  325 mg Rectal Q4H PRN     albuterol  2 puff Inhalation Q6H PRN     flumazenil  0.2 mg Intravenous q1 min prn     lidocaine 4%   Topical Q1H PRN     lidocaine (buffered or not buffered)  0.1-1 mL Other Q1H PRN     melatonin  5 mg Oral QPM PRN     nitroGLYcerin  0.4 mg Sublingual Q5 Min PRN     ondansetron  4 mg Oral Q6H PRN    Or     ondansetron  4 mg Intravenous Q6H PRN     polyethylene glycol  17 g Oral Daily PRN     senna-docusate  1 tablet Oral BID PRN    Or     senna-docusate  2 tablet Oral BID PRN     sodium chloride (PF)  3 mL Intracatheter q1 min prn     sodium chloride (PF)  3 mL Intracatheter q1 min prn           Fabrizio Vasquez MD  Hospitalist  [facility] Medicine  [facility]  Phone: #[phone]

## 2021-11-14 NOTE — PROGRESS NOTES
RENAL PROGRESS NOTE    ASSESSMENT & PLAN:   LEILA on CKD IIIb: Baseline cr in the 1.5-1.8 range on chart review.  Now with rising serum creatinine to the low 2 range.  Diuretics responsive.  Very bland urinalysis 11/10/2021, no proteinuria, pyuria, or microscopic hematuria.  Overall appears volume up and needs to be on some diuretics. Cr stable compared to yesterday with diuretics and midodrine  Recs:  - continue low dose bumex along with midodrine  - trend I/Os and creatinine    Volume: Patient appears to be volume up on exam and definitely volume up by weights.  Patient also has some degree of liver disease, and albumin quite low.  He is likely third spacing some of the fluids secondary to his hypoalbuminemia.  Continue on bumex    Lytes/acid/base: hyponatremia improved with diuresis.  Mild hypokalemia likely secondary to loop diuretics.  Being replaced.  His CO2 is trending up, will get a VBG tomorrow    Anemia: Macrocytic.  History of alcohol abuse.  B12 and folic acid within normal limits this admit.  Iron studies okay and Erythropoietin level reasonable.  Also has history of Ragsdale's esophagus and previous GIB.  No obvious signs of GI blood loss.  No need for FRANCISCO JAVIER at this time     Nocturnal fever: Unclear etiology. Did have a fever but positive blood culture 1 of 2 GPC in clusters.  ID now following and repeat cultures pending.  On Vanco and Zosyn, ideally we can stop these if cultures remain negative.     Bilateral lower extremity pain/neuropathy: Did have elevated D-dimer, negative VQ scan and lower extremity US.  Started on gabapentin renally dosed with some relief.  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     Chronic systolic heart failure: 5/2021 echocardiogram showed EF 55%.  Mild hypertrophy otherwise normal.  On low-dose beta-blocker at baseline.  11/9 echocardiogram EF 55 to 60% borderline LVH and mild MR.     CAD: MI in 2019.  PCI x1.     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  Follows with oncology.     Ragsdael's esophagus: Continue home PPI     Alcohol abuse: Drinks a liter of vodka per day.  On protocols.  Social work chemical dependency consultation.             SUBJECTIVE:  More awake than yesterday for me.  Edema appears about the same, better than on admit per patient.  Still on O2.  CXR looked hazy to me but read as no pulmonary edema.  He is GONZALES however, I think we should keep him on his same diuretic regimen.  Overall he remains ill from his multiple comorbidities and ongoing alcohol use.    OBJECTIVE:  Physical Exam   Temp: 98.4  F (36.9  C) Temp src: Oral BP: 104/64 Pulse: 74   Resp: 18 SpO2: 97 % O2 Device: Nasal cannula Oxygen Delivery: 2 LPM  Vitals:    11/11/21 0600 11/13/21 0407 11/14/21 0012   Weight: 98.3 kg (216 lb 11.4 oz) 96.3 kg (212 lb 3.2 oz) 96.7 kg (213 lb 1.6 oz)     Vital Signs with Ranges  Temp:  [97.9  F (36.6  C)-98.8  F (37.1  C)] 98.4  F (36.9  C)  Pulse:  [67-74] 74  Resp:  [18-20] 18  BP: (100-133)/(63-67) 104/64  SpO2:  [94 %-97 %] 97 %  I/O last 3 completed shifts:  In: 560 [P.O.:360; I.V.:200]  Out: 900 [Urine:900]      Patient Vitals for the past 72 hrs:   Weight   11/14/21 0012 96.7 kg (213 lb 1.6 oz)   11/13/21 0407 96.3 kg (212 lb 3.2 oz)       Intake/Output Summary (Last 24 hours) at 11/12/2021 1300  Last data filed at 11/12/2021 0934  Gross per 24 hour   Intake 860 ml   Output 1950 ml   Net -1090 ml       PHYSICAL EXAM:  General: Alert, NAD  Eyes: No scleral icterus  Cardiovascular: RRR, no rub, gallop, or murmur.  Pitting lower extremity edema in legs, stable  Respiratory: CTAB, non-labored.   Gastrointestinal: Soft, non-tender, non-distended  Musculoskeletal: Grossly intact  Integumentary: Mild erythema to the bilateral lower extremities, right greater than left.  Warm to  touch.  Neurologic: Grossly intact  Psychiatric: Cooperative, appropriate mood and affect    LABORATORY STUDIES:     Recent Labs   Lab 11/14/21  0250 11/12/21  0520 11/11/21  0523 11/10/21  0849 11/09/21  0240 11/08/21  1132   WBC  --  3.8*  --  3.8* 4.0 6.6   RBC  --  3.09*  --  2.99* 3.33* 3.90*   HGB  --  11.0*  --  10.5* 11.7* 14.1   HCT  --  33.8*  --  32.9* 37.6* 42.6    166 148* 151 146* 246       Basic Metabolic Panel:  Recent Labs   Lab 11/14/21  0250 11/13/21  1250 11/13/21 0615 11/12/21 0824 11/12/21 0520 11/11/21 0523 11/10/21  0533 11/09/21  0240     --  142  --  142 139 137 136   POTASSIUM 3.5 3.5 3.0* 3.5 3.2* 3.3* 3.4* 3.9   CHLORIDE 96*  --  96*  --  98 102 101 99   CO2 32*  --  32*  --  30 29 27 27   BUN 9  --  7*  --  6* 8 9 10   CR 2.11*  --  2.13*  --  2.07* 2.23* 1.97* 2.06*   *  --  126*  --  126* 125 111 100   BRADLEY 10.3  --  10.4  --  9.9 9.2 9.1 9.0       INR  Recent Labs   Lab 11/09/21 0240 11/08/21 2044   INR 1.24* 1.22*        Recent Labs   Lab Test 11/14/21  0250 11/12/21  0520 11/11/21  0523 11/10/21  0849 11/09/21 0240 11/08/21 2044   INR  --   --   --   --  1.24* 1.22*   WBC  --  3.8*  --  3.8* 4.0  --    HGB  --  11.0*  --  10.5* 11.7*  --     166   < > 151 146*  --     < > = values in this interval not displayed.       Personally reviewed current labs    [facility]  [facility]  [phone]

## 2021-11-14 NOTE — PROGRESS NOTES
[facility]    Infectious Disease Progress Note    Date of Service (when I saw the patient): 11/14/2021     Assessment & Plan     Lenny Chau is a 76 year old male who was admitted on 11/8/2021.   1. Coronary artery disease, cancer, prostate overall  2. Congestive heart failure which is worsening lower extremity edema  3. Alcohol use  4. Ragsdale's esophagus  5. Chronic kidney disease  6. Admitted worsening lower extremity edema  7. Blood cultures positive for gram gram-positive cocci: Micrococcus which is likely contaminant  8. Fever ongoing wihtout clear focus/source/cause.  CT chest abdomen pelvis did not show any source of infection.  Echocardiogram without any evidence of endocarditis identified even though limited sensitivity with transthoracic echocardiogram  9. Hepatic steatosis gallbladder sludge    Recommendations:    1. Follow repeat blood cultures--two more sent this am, early  Empiric vancomycin and Zosyn, stop probably Monday AM unless clinically indicated.      GENET Robles MD  [facility]  [phone]          Interval History   CC is anxiety  Exam benign for any new infection temps down.     Physical Exam   Temp: 98.4  F (36.9  C) Temp src: Oral BP: 104/64 Pulse: 74   Resp: 18 SpO2: 97 % O2 Device: Nasal cannula Oxygen Delivery: 2 LPM  Vitals:    11/11/21 0600 11/13/21 0407 11/14/21 0012   Weight: 98.3 kg (216 lb 11.4 oz) 96.3 kg (212 lb 3.2 oz) 96.7 kg (213 lb 1.6 oz)     Vital Signs with Ranges  Temp:  [97.9  F (36.6  C)-98.8  F (37.1  C)] 98.4  F (36.9  C)  Pulse:  [67-74] 74  Resp:  [18-20] 18  BP: (100-133)/(63-67) 104/64  SpO2:  [94 %-97 %] 97 %    Constitutional: Except, chronically fatigued.  Slightly better than 11/11/2021  Lungs: Basilar crackles  Cardiovascular: S1-S2  Abdomen: Normal bowel sounds, soft, non-distended, non-tender  Skin: Stasis lower extremity  Lower extremity: Edema  Neuro: Deconditioned awake coherent able to follow  commands    Medications       aspirin  81 mg Oral At Bedtime     bumetanide  0.5 mg Oral BID     DULoxetine  20 mg Oral Daily     Fluticasone-Umeclidin-Vilanterol  1 puff Inhalation Daily     gabapentin  200 mg Oral BID 09 12     gabapentin  300 mg Oral At Bedtime     heparin ANTICOAGULANT  5,000 Units Subcutaneous Q12H     magnesium oxide  400 mg Oral At Bedtime     metoprolol succinate ER  12.5 mg Oral Daily     midodrine  2.5 mg Oral BID 09 12     multivitamin w/minerals  1 tablet Oral Daily     pantoprazole  40 mg Oral Daily with supper     piperacillin-tazobactam  3.375 g Intravenous Q8H     polyethylene glycol  17 g Oral Daily     sodium chloride (PF)  3 mL Intracatheter Q8H     sodium chloride (PF)  3 mL Intracatheter Q8H     thiamine  100 mg Oral Daily     vancomycin  1,000 mg Intravenous Q24H     Vitamin D3  125 mcg Oral Daily     zinc gluconate  50 mg Oral Daily       Data   All microbiology laboratory data reviewed.  Recent Labs   Lab Test 11/14/21  0250 11/12/21  0520 11/11/21  0523 11/10/21  0849 11/09/21  0240   WBC  --  3.8*  --  3.8* 4.0   HGB  --  11.0*  --  10.5* 11.7*   HCT  --  33.8*  --  32.9* 37.6*   MCV  --  109*  --  110* 113*    166 148* 151 146*     Recent Labs   Lab Test 11/14/21  0250 11/13/21  0615 11/12/21  0520   CR 2.11* 2.13* 2.07*       Collected Updated Procedure Result Status    11/12/2021 0526 11/12/2021 0529 Blood Culture Hand, Right [[id number]]   Blood from Hand, Right    In process Component Value   No component results              11/12/2021 0520 11/12/2021 0529 Blood Culture Peripheral Blood [[id number]]   Peripheral Blood    In process Component Value   No component results              11/11/2021 1210 11/12/2021 1746 Blood Culture Peripheral Blood [[id number]]   Peripheral Blood    Preliminary result Component Value   Culture No growth after 1 day P              11/11/2021 1210 11/12/2021 1746 Blood Culture Peripheral Blood [[id number]]   Peripheral Blood     Preliminary result Component Value   Culture No growth after 1 day P              11/10/2021 0020 11/12/2021 0946 Blood Culture Hand, Right [[id number]]   Blood from Hand, Right    Preliminary result Component Value   Culture No growth after 2 days P              11/10/2021 0015 11/12/2021 1216 Blood Culture Arm, Right [[id number]]   (Abnormal)   Blood from Arm, Right    Preliminary result Component Value   Culture Positive on the 1st day of incubation Abnormal  P    Micrococcus species Panic  P    1 of 2 bottles              11/10/2021 0015 11/11/2021 1400 Verigene GP Panel [[id number]]    Blood from Arm, Right    Final result Component Value   Staphylococcus species Not Detected   Staphylococcus aureus Not Detected   Staphylococcus epidermidis Not Detected   Staphylococcus lugdunensis Not Detected   Enterococcus faecalis Not Detected   Enterococcus faecium Not Detected   Streptococcus species Not Detected   Streptococcus agalactiae Not Detected   Streptococcus anginosus group Not Detected   Streptococcus pneumoniae Not Detected   Streptococcus pyogenes Not Detected   Listeria species Not Detected        RADIOLOGY:    XR Chest 2 Views    Result Date: 11/8/2021  EXAM: XR CHEST 2 VW LOCATION: [facility] DATE/TIME: 11/8/2021 3:27 PM INDICATION: PE suspected, low/intermediate prob, positive D-dimer COMPARISON: Chest CT 10/18/2021     IMPRESSION: Stable posttreatment changes left upper lobe and postop wedge resections right upper and middle lobes. No new focal consolidation, pneumothorax nor pleural effusion.    NM Lung Scan Perfusion Particulate    Result Date: 11/8/2021  EXAM: NM LUNG SCAN PERFUSION PARTICULATE LOCATION: [facility] DATE/TIME: 11/8/2021 3:09 PM INDICATION: 4 days of hypotension, lightheadedness hypoxia and elevated d-dimer. Pulmonary embolus suspected, low to intermediate probability. History of left upper lobe lung cancer, status post SBRT  and wedge resections of right upper and middle lobe adenocarcinoma. COMPARISON: 2 view chest 2021, 6 chest CT 10/18/2021 TECHNIQUE: 8.0 mCi technetium-99m MAA, IV. Standard lung perfusion imaging. FINDINGS: Matched left upper lobe subsegmental perfusion defect corresponding to radiation fibrosis/posttreatment changes. No additional segmental/subsegmental perfusion defects.     IMPRESSION: 1.  Pulmonary embolus absent, low probability scan.    Echocardiogram Complete    Result Date: 2021  [id number]^LUCRECIA^[place] [address]  Name: CELIA LUTZ MRN: [id number] : 1945 Study Date: 2021 02:15 PM Age: 76 yrs Gender: Male Patient Location: [facility] Reason For Study: Endocarditis Ordering Physician: SAMUEL SINGER Performed By: ACE  BSA: 2.2 m2 Height: 71 in Weight: 216 lb HR: 72 ______________________________________________________________________________ Procedure Complete Echo Adult. Definity (NDC #73983-358) given intravenously. 5mL given. LOT#[id number]. Technically difficult study. Poor acoustic windows. ______________________________________________________________________________ Interpretation Summary  The left ventricle is mildly dilated with normal left ventricular wall thickness. Left ventricular function is normal.The ejection fraction is 55-60%. Normal right ventricle size and systolic function. No significant valve disease identified. No evidence of endocarditis identified, however (especially with poor acoustic windows), transthoracic echocardiography does not have high sensitivity for identifying endocarditis. If suspicion remains for this condition consider evaluation with a transesophageal echocardiogram. ______________________________________________________________________________ I      WMSI = 1.00     % Normal = 100  X - Cannot   0 -                      (2) - Mildly 2 -          Segments  Size Interpret     Hyperkinetic 1 - Normal  Hypokinetic  Hypokinetic  1-2     small                                                    7 -          3-5    moderate 3 - Akinetic 4 -          5 -         6 - Akinetic Dyskinetic   6-14    large              Dyskinetic   Aneurysmal  w/scar       w/scar       15-16   diffuse  Left Ventricle The left ventricle is mildly dilated. Left ventricular function is normal.The ejection fraction is 55-60%. There is normal left ventricular wall thickness. Left ventricular diastolic function is indeterminate. No regional wall motion abnormalities noted.  Right Ventricle Normal right ventricle size and systolic function.  Atria Normal left atrial size. Right atrial size is normal. There is no color Doppler evidence of an atrial shunt.  Mitral Valve Mitral valve leaflets appear normal. There is no evidence of mitral stenosis or clinically significant mitral regurgitation.  Tricuspid Valve Tricuspid valve leaflets appear normal. There is no evidence of tricuspid stenosis or clinically significant tricuspid regurgitation. Right ventricle systolic pressure estimate normal.  Aortic Valve The aortic valve is trileaflet with aortic valve sclerosis. No aortic regurgitation is present. No aortic stenosis is present.  Pulmonic Valve The pulmonic valve is not well seen, but is grossly normal. This degree of valvular regurgitation is within normal limits. There is trace pulmonic valvular regurgitation.  Vessels The aorta root is normal. Normal size ascending aorta. IVC diameter <2.1 cm collapsing >50% with sniff suggests a normal RA pressure of 3 mmHg.  Pericardium There is no pericardial effusion.  Rhythm Sinus rhythm was noted.  ______________________________________________________________________________ MMode/2D Measurements & Calculations IVSd: 0.65 cm LVIDd: 6.0 cm LVIDs: 4.4 cm LVPWd: 0.96 cm  FS: 26.7 % LV mass(C)d: 187.1 grams LV mass(C)dI: 85.9 grams/m2 LVOT diam: 2.5 cm LVOT area: 5.0 cm2 LA Volume  (BP): 63.7 ml LA Volume Index (BP): 29.2 ml/m2  LA Volume Indexed (AL/bp): 31.2 ml/m2 RWT: 0.32  Time Measurements MM HR: 72.0 BPM  Doppler Measurements & Calculations MV E max rafa: 65.1 cm/sec MV A max rafa: 66.5 cm/sec MV E/A: 0.98 MV dec slope: 440.4 cm/sec2 MV dec time: 0.15 sec Ao V2 max: 161.3 cm/sec Ao max PG: 10.0 mmHg Ao V2 mean: 113.8 cm/sec Ao mean P.8 mmHg Ao V2 VTI: 32.8 cm CAMILLE(I,D): 3.2 cm2 CAMILLE(V,D): 3.2 cm2 LV V1 max P.3 mmHg LV V1 max: 103.7 cm/sec LV V1 VTI: 20.8 cm SV(LVOT): 104.9 ml SI(LVOT): 48.2 ml/m2 PA acc time: 0.13 sec AV Rafa Ratio (DI): 0.64  CAMILLE Index (cm2/m2): 1.5 E/E': 6.9 E/E' av.8 Lateral E/e': 6.9 Medial E/e': 16.6 Peak E' Rafa: 9.5 cm/sec  ______________________________________________________________________________ Report approved by: Eliza Mullen 2021 03:31 PM       Echocardiogram Complete    Result Date: 2021  [id number]^[place] [address]  Name: CELIA LUTZ MRN: [id number] : 1945 Study Date: 2021 09:14 AM Age: 76 yrs Gender: Male Patient Location: [facility] Reason For Study: RBBB & Left Anterior Fasicular Block, Edema Ordering Physician: DHEERAJ MATHEWS Performed By: ANGELO  BSA: 2.1 m2 Height: 71 in Weight: 200 lb ______________________________________________________________________________ Procedure Complete Portable Echo Adult. Definity (NDC #47630-985) given intravenously. ______________________________________________________________________________ Interpretation Summary  There is borderline concentric left ventricular hypertrophy. The visual ejection fraction is 55-60%. There is mild (1+) mitral regurgitation. There is trace to mild tricuspid regurgitation. ______________________________________________________________________________ Left Ventricle The left ventricle is normal in size. There is borderline concentric left ventricular hypertrophy.  Diastolic Doppler findings (E/E' ratio and/or other parameters) suggest left ventricular filling pressures are indeterminate. The visual ejection fraction is 55-60%. No regional wall motion abnormalities noted.  Right Ventricle The right ventricle is normal in size and function.  Atria Normal left atrial size. Right atrial size is normal. Intact atrial septum.  Mitral Valve Mitral valve leaflets appear normal. There is mild (1+) mitral regurgitation.  Tricuspid Valve Tricuspid valve leaflets appear normal. There is trace to mild tricuspid regurgitation.  Aortic Valve Aortic valve leaflets appear normal. There is no evidence of aortic stenosis or clinically significant aortic regurgitation.  Pulmonic Valve The pulmonic valve is not well visualized.  Vessels Borderline aortic root dilation. Normal size ascending aorta. IVC diameter <2.1 cm collapsing >50% with sniff suggests a normal RA pressure of 3 mmHg.  Pericardium There is no pericardial effusion. ______________________________________________________________________________ MMode/2D Measurements & Calculations IVSd: 0.95 cm  LVIDd: 5.4 cm LVIDs: 3.9 cm LVPWd: 1.2 cm FS: 28.4 % LV mass(C)d: 226.5 grams LV mass(C)dI: 107.4 grams/m2 Ao root diam: 3.9 cm LA dimension: 3.9 cm asc Aorta Diam: 3.8 cm LA/Ao: 1.0 LVOT diam: 2.2 cm LVOT area: 3.7 cm2 LA Volume Indexed (AL/bp): 21.9 ml/m2 RWT: 0.44  Time Measurements MM HR: 61.0 BPM  Doppler Measurements & Calculations MV E max rafa: 65.5 cm/sec MV A max rafa: 78.6 cm/sec MV E/A: 0.83 MV dec time: 0.20 sec Ao V2 max: 186.8 cm/sec Ao max P.0 mmHg Ao V2 mean: 122.0 cm/sec Ao mean P.0 mmHg Ao V2 VTI: 34.8 cm CAMILLE(I,D): 1.8 cm2 CAMILLE(V,D): 1.6 cm2 LV V1 max P.6 mmHg LV V1 max: 80.0 cm/sec LV V1 VTI: 16.8 cm SV(LVOT): 63.1 ml SI(LVOT): 29.9 ml/m2 AV Rafa Ratio (DI): 0.43 CAMILLE Index (cm2/m2): 0.86 E/E' av.9 Lateral E/e': 6.7 Medial E/e': 11.1   ______________________________________________________________________________ Report approved by: Eliza Narvaez 11/09/2021 11:16 AM       US Lower Extremity Venous Duplex Bilateral    Result Date: 11/8/2021  EXAM: US LOWER EXTREMITY VENOUS DUPLEX BILATERAL LOCATION: [facility] DATE/TIME: 11/8/2021 7:36 PM INDICATION: bilateral lower leg swelling and pain COMPARISON: 1/25/2021 TECHNIQUE: Venous Duplex ultrasound of bilateral lower extremities with and without compression, augmentation and duplex. Color flow and spectral Doppler with waveform analysis performed. FINDINGS: Exam includes the common femoral, femoral, popliteal veins as well as segmentally visualized deep calf veins and greater saphenous vein. RIGHT: No deep vein thrombosis. No superficial thrombophlebitis. No popliteal cyst. LEFT: No deep vein thrombosis. No superficial thrombophlebitis. No popliteal cyst.     IMPRESSION: 1.  No deep venous thrombosis in the bilateral lower extremities.    Abdomen US, limited (RUQ only)    Result Date: 11/8/2021  EXAM: US ABDOMEN LIMITED LOCATION: [facility] DATE/TIME: 11/8/2021 7:36 PM INDICATION: elevated bilirubin. Lung cancer. COMPARISON: CT 10/18/2021 TECHNIQUE: Limited abdominal ultrasound. FINDINGS: GALLBLADDER: Filled with sludge. No definite stone seen. Normal wall thickness. BILE DUCTS: No biliary dilatation. The common duct measures 4 mm. LIVER: Increased echogenicity from diffuse fatty infiltration. Heterogeneous echotexture. Deeper portions of the liver are not diagnostically visualized. No focal mass seen. RIGHT KIDNEY: No hydronephrosis.It contains a 2.9 cm cyst. PANCREAS: The visualized portions are normal. No ascites.     IMPRESSION: 1.  Diffuse hepatic steatosis. 2.  Sludge containing gallbladder.     XR Chest Port 1 View    Result Date: 11/10/2021  EXAM: XR CHEST PORT 1 VIEW LOCATION: [facility] DATE/TIME: 11/10/2021  2:29 AM INDICATION: Fever. COMPARISON: 11/8/2021. FINDINGS: The heart size is normal. The thoracic aorta is calcified. Stable surgical clip and scarring in the left upper lobe. The lungs are otherwise clear. Old right rib fractures. No pneumothorax.     IMPRESSION: No acute abnormality.    CT Chest Abdomen Pelvis w/o Contrast    Result Date: 11/11/2021  EXAM: CT CHEST ABDOMEN PELVIS W/O CONTRAST LOCATION: [facility] DATE/TIME: 11/11/2021 8:43 PM INDICATION: Sepsis COMPARISON: 10/18/2021 and 02/25/2019 TECHNIQUE: CT scan of the chest, abdomen, and pelvis was performed without IV contrast. Multiplanar reformats were obtained. Dose reduction techniques were used. CONTRAST: None. FINDINGS: LUNGS AND PLEURA: Small pleural effusions. A left suprahilar opacity has not changed. There are fiducial markers in this region. A 9 mm x 7 mm left upper lobe nodule was previously 8 mm x 6 mm (series 4 image 80).  There are suture lines in the right lung with right upper lung architectural distortion. Emphysema is present. MEDIASTINUM/AXILLAE: The main pulmonary artery is 35 mm in diameter, which can be seen with pulmonary hypertension.  The ascending aorta is 4.2 cm in diameter, which is unchanged. CORONARY ARTERY CALCIFICATION: Severe. HEPATOBILIARY: Hepatic steatosis. PANCREAS: A pancreatic lesion has not significantly changed in size. SPLEEN: Normal. ADRENAL GLANDS: Normal. KIDNEYS/BLADDER: A low-attenuation right renal lesion is probably a simple cyst and does not require follow-up. Status post left nephrectomy. No new soft tissue in the nephrectomy bed. BOWEL: Normal. LYMPH NODES: Normal. VASCULATURE: Status post aortobiiliac stent graft placement for an infrarenal abdominal aorta aneurysm. PELVIC ORGANS: Normal. MUSCULOSKELETAL: Right hip arthroplasty. Nonacute rib fractures are again seen.     IMPRESSION: 1.  No source of sepsis identified. 2.  A left upper lobe nodule is 1 mm larger. Metastatic  disease is possible. 3.  Otherwise no significant change from the prior study. The pancreatic lesion can be followed as detailed below. REFERENCE: Revisions of international consensus [place] guidelines for the management of IPMN of the pancreas. Pancreatology 2017;17(5):738-753. Largest cyst between 20-30 mm: EUS in 3-6 months and then annual surveillance alternating between MRI and EUS as appropriate.     CT Chest w Contrast    Result Date: 10/18/2021  EXAM: CT CHEST W CONTRAST LOCATION: [facility] DATE/TIME: 10/18/2021 1:19 PM INDICATION: Follow-up left upper lobe lung cancer, post SBRT in 3/2021. Previous history of right upper and middle lobe adenocarcinoma status post wedge resections. COMPARISON: CT 6/16/2021 and 8/31/2020 PET CT TECHNIQUE: CT chest with IV contrast. Multiplanar reformats were obtained. Dose reduction techniques were used. CONTRAST: 75 mL Isovue-370. FINDINGS: LUNGS AND PLEURA: Left upper lobe fiducial marker with previous spiculated left upper lobe mass obscured by new surrounding consolidative and groundglass opacity compatible with posttreatment radiation fibrosis. New 8 mm irregular solid nodular opacity image 113 series 4 suspicious for metastatic disease versus new primary lung cancer. Stable postop changes from right upper and middle lobe wedge resections. Biapical centrilobular and paraseptal emphysema redemonstrated. Central airways are patent. No pleural effusion MEDIASTINUM/AXILLAE: No mass/adenopathy nor pericardial effusion. The thoracic aorta and heart are normal in size. CORONARY ARTERY CALCIFICATION: Severe. UPPER ABDOMEN: Severe hepatic steatosis. Stable 17 mm cyst within the pancreatic tail compatible with IPMN. Previous left nephrectomy. MUSCULOSKELETAL: Old healed rib fracture deformities. No suspicious osseous lesions.     IMPRESSION: 1.  New consolidative and groundglass opacities around previous left upper lobe nodule compatible with  posttreatment radiation fibrosis. 2.  New 8 mm irregular left upper lobe solid nodule which could represent either metastatic lesion or primary lung cancer. 3.  Stable 17 mm pancreatic cystic lesion likely representing IPMN. This could be followed up with CT in 2 years. REFERENCE: Revisions of international consensus [place] guidelines for the management of IPMN of the pancreas. Pancreatology 2017;17(5):738-753. Largest cyst between 10-20 mm: CT or MRI/MRCP every 6 months for 1 year and then yearly for 2 years and then every 2 years if no change.     Attestation:  Total time on the floor involved in the patient's care: 35 minutes. Total time spent in chart review, Counseling/care coordination: >50%

## 2021-11-14 NOTE — PROGRESS NOTES
Care Management Follow Up    Length of Stay (days): 6    Expected Discharge Date: 11/16/2021     Concerns to be Addressed:       Patient plan of care discussed at interdisciplinary rounds: Yes    Anticipated Discharge Disposition:       Anticipated Discharge Services:    Anticipated Discharge DME:      Patient/family educated on Medicare website which has current facility and service quality ratings:    Education Provided on the Discharge Plan:    Patient/Family in Agreement with the Plan:      Referrals Placed by CM/SW:    Private pay costs discussed: Not applicable    Additional Information:  Message left with admissions at [facility] to check on possible bed available 11/15      DAVI Dale

## 2021-11-15 LAB
ALBUMIN SERPL-MCNC: 3 G/DL (ref 3.5–5)
ALP SERPL-CCNC: 118 U/L (ref 45–120)
ALT SERPL W P-5'-P-CCNC: 20 U/L (ref 0–45)
ANION GAP SERPL CALCULATED.3IONS-SCNC: 13 MMOL/L (ref 5–18)
AST SERPL W P-5'-P-CCNC: 57 U/L (ref 0–40)
BACTERIA BLD CULT: NO GROWTH
BASE EXCESS BLDV CALC-SCNC: 11.5 MMOL/L
BILIRUB DIRECT SERPL-MCNC: 0.7 MG/DL
BILIRUB SERPL-MCNC: 1.7 MG/DL (ref 0–1)
BUN SERPL-MCNC: 13 MG/DL (ref 8–28)
CALCIUM SERPL-MCNC: 10 MG/DL (ref 8.5–10.5)
CHLORIDE BLD-SCNC: 94 MMOL/L (ref 98–107)
CO2 SERPL-SCNC: 29 MMOL/L (ref 22–31)
CREAT SERPL-MCNC: 2.15 MG/DL (ref 0.7–1.3)
ERYTHROCYTE [DISTWIDTH] IN BLOOD BY AUTOMATED COUNT: 14 % (ref 10–15)
GFR SERPL CREATININE-BSD FRML MDRD: 29 ML/MIN/1.73M2
GLUCOSE BLD-MCNC: 126 MG/DL (ref 70–125)
HCO3 BLDV-SCNC: 33 MMOL/L (ref 24–30)
HCT VFR BLD AUTO: 36.5 % (ref 40–53)
HGB BLD-MCNC: 11.7 G/DL (ref 13.3–17.7)
MCH RBC QN AUTO: 35.1 PG (ref 26.5–33)
MCHC RBC AUTO-ENTMCNC: 32.1 G/DL (ref 31.5–36.5)
MCV RBC AUTO: 110 FL (ref 78–100)
OXYHGB MFR BLDV: 82.2 % (ref 70–75)
PCO2 BLDV: 51 MM HG (ref 35–50)
PH BLDV: 7.45 [PH] (ref 7.35–7.45)
PLATELET # BLD AUTO: 211 10E3/UL (ref 150–450)
PO2 BLDV: 50 MM HG (ref 25–47)
POTASSIUM BLD-SCNC: 3.6 MMOL/L (ref 3.5–5)
PROT SERPL-MCNC: 5.6 G/DL (ref 6–8)
RBC # BLD AUTO: 3.33 10E6/UL (ref 4.4–5.9)
SAO2 % BLDV: 84.2 % (ref 70–75)
SODIUM SERPL-SCNC: 136 MMOL/L (ref 136–145)
WBC # BLD AUTO: 6.1 10E3/UL (ref 4–11)

## 2021-11-15 PROCEDURE — 36415 COLL VENOUS BLD VENIPUNCTURE: CPT | Performed by: FAMILY MEDICINE

## 2021-11-15 PROCEDURE — 210N000002 HC R&B HEART CARE

## 2021-11-15 PROCEDURE — 99232 SBSQ HOSP IP/OBS MODERATE 35: CPT | Performed by: INTERNAL MEDICINE

## 2021-11-15 PROCEDURE — 80053 COMPREHEN METABOLIC PANEL: CPT | Performed by: FAMILY MEDICINE

## 2021-11-15 PROCEDURE — 250N000013 HC RX MED GY IP 250 OP 250 PS 637: Performed by: PHYSICIAN ASSISTANT

## 2021-11-15 PROCEDURE — 250N000013 HC RX MED GY IP 250 OP 250 PS 637: Performed by: INTERNAL MEDICINE

## 2021-11-15 PROCEDURE — 82805 BLOOD GASES W/O2 SATURATION: CPT | Performed by: INTERNAL MEDICINE

## 2021-11-15 PROCEDURE — 250N000013 HC RX MED GY IP 250 OP 250 PS 637: Performed by: FAMILY MEDICINE

## 2021-11-15 PROCEDURE — 258N000003 HC RX IP 258 OP 636: Performed by: FAMILY MEDICINE

## 2021-11-15 PROCEDURE — 85027 COMPLETE CBC AUTOMATED: CPT | Performed by: INTERNAL MEDICINE

## 2021-11-15 PROCEDURE — 99233 SBSQ HOSP IP/OBS HIGH 50: CPT | Performed by: INTERNAL MEDICINE

## 2021-11-15 PROCEDURE — 250N000011 HC RX IP 250 OP 636: Performed by: FAMILY MEDICINE

## 2021-11-15 PROCEDURE — 99233 SBSQ HOSP IP/OBS HIGH 50: CPT | Performed by: NURSE PRACTITIONER

## 2021-11-15 RX ADMIN — HEPARIN SODIUM 5000 UNITS: 5000 INJECTION, SOLUTION INTRAVENOUS; SUBCUTANEOUS at 19:49

## 2021-11-15 RX ADMIN — CALCIUM CARBONATE (ANTACID) CHEW TAB 500 MG 500 MG: 500 CHEW TAB at 08:46

## 2021-11-15 RX ADMIN — PANTOPRAZOLE SODIUM 40 MG: 20 TABLET, DELAYED RELEASE ORAL at 16:54

## 2021-11-15 RX ADMIN — MIDODRINE HYDROCHLORIDE 2.5 MG: 2.5 TABLET ORAL at 11:21

## 2021-11-15 RX ADMIN — ASPIRIN 81 MG: 81 TABLET, COATED ORAL at 21:02

## 2021-11-15 RX ADMIN — Medication 50 MG: at 08:34

## 2021-11-15 RX ADMIN — GABAPENTIN 200 MG: 100 CAPSULE ORAL at 11:21

## 2021-11-15 RX ADMIN — GABAPENTIN 200 MG: 100 CAPSULE ORAL at 08:34

## 2021-11-15 RX ADMIN — PIPERACILLIN AND TAZOBACTAM 3.38 G: 3; .375 INJECTION, POWDER, LYOPHILIZED, FOR SOLUTION INTRAVENOUS at 05:24

## 2021-11-15 RX ADMIN — Medication 5 MG: at 21:02

## 2021-11-15 RX ADMIN — HEPARIN SODIUM 5000 UNITS: 5000 INJECTION, SOLUTION INTRAVENOUS; SUBCUTANEOUS at 08:33

## 2021-11-15 RX ADMIN — DULOXETINE HYDROCHLORIDE 20 MG: 20 CAPSULE, DELAYED RELEASE ORAL at 08:34

## 2021-11-15 RX ADMIN — MAGNESIUM OXIDE TAB 400 MG (241.3 MG ELEMENTAL MG) 400 MG: 400 (241.3 MG) TAB at 21:02

## 2021-11-15 RX ADMIN — ONDANSETRON 4 MG: 2 INJECTION INTRAMUSCULAR; INTRAVENOUS at 10:02

## 2021-11-15 RX ADMIN — BUMETANIDE 0.5 MG: 0.5 TABLET ORAL at 08:34

## 2021-11-15 RX ADMIN — Medication 100 MG: at 08:34

## 2021-11-15 RX ADMIN — MIDODRINE HYDROCHLORIDE 2.5 MG: 2.5 TABLET ORAL at 08:34

## 2021-11-15 RX ADMIN — GABAPENTIN 300 MG: 300 CAPSULE ORAL at 21:02

## 2021-11-15 RX ADMIN — BUMETANIDE 0.5 MG: 0.5 TABLET ORAL at 16:54

## 2021-11-15 RX ADMIN — Medication 125 MCG: at 08:34

## 2021-11-15 RX ADMIN — PIPERACILLIN AND TAZOBACTAM 3.38 G: 3; .375 INJECTION, POWDER, LYOPHILIZED, FOR SOLUTION INTRAVENOUS at 12:24

## 2021-11-15 RX ADMIN — MULTIPLE VITAMINS W/ MINERALS TAB 1 TABLET: TAB at 08:34

## 2021-11-15 RX ADMIN — METOPROLOL SUCCINATE 12.5 MG: 25 TABLET, EXTENDED RELEASE ORAL at 08:34

## 2021-11-15 RX ADMIN — VANCOMYCIN HYDROCHLORIDE 1000 MG: 5 INJECTION, POWDER, LYOPHILIZED, FOR SOLUTION INTRAVENOUS at 11:22

## 2021-11-15 ASSESSMENT — ACTIVITIES OF DAILY LIVING (ADL)
ADLS_ACUITY_SCORE: 17

## 2021-11-15 ASSESSMENT — MIFFLIN-ST. JEOR: SCORE: 1694.69

## 2021-11-15 NOTE — PLAN OF CARE
Problem: Adult Inpatient Plan of Care  Goal: Patient-Specific Goal (Individualized)  Outcome: Improving    Pt on bed alarms for safety. Assist of one with ambulation. He has a male pure wick in place with urine output. TELE: First degree A block, with a BBB x2. He received his IV antibiotics, vitally stable. Potassium protocol, recheck tomorrow.

## 2021-11-15 NOTE — PROGRESS NOTES
"Care Management Follow Up    Length of Stay (days): 7    Expected Discharge Date: 11/16/2021     Concerns to be Addressed: 02 Progression   Patient plan of care discussed at interdisciplinary rounds: Yes    Anticipated Discharge Disposition:  TCU    Anticipated Discharge Services:  TCU   Anticipated Discharge DME:  TBD     Patient/family educated on Medicare website which has current facility and service quality ratings:  Yes   Education Provided on the Discharge Plan:  Yes  Patient/Family in Agreement with the Plan:  Yes     Referrals Placed by CM/SW:  TCU's in [place]   Private pay costs discussed: Yes     Additional Information:  BRAN met with Pt and daughter,  Georgette who was visiting.  Pt has recommendation for TCU.  Additional referrals placed to TCU\"s in [place]- [facility] [phone] [facility] and should have openings possible tomorrow and will assess.  VM left for Karime with [facility]- [phone] and referral sent to [facility] with [facility] [phone] and fax [phone]- with currently no visitors due to outbreak. [facility] left VM for Heena with [facility].     4:02 PM  BRAN received call from Heena with [facility] and can accept Pt if ready tomorrow.  Pt may not be medically ready and Pt prefers [place] TCU.     DAVI Vance              "

## 2021-11-15 NOTE — PROGRESS NOTES
[facility] MEDICINE PROGRESS NOTE       Length of stay: Day # 7    Identification/Summary: Lenny Chau is a 76 year old male with a past medical history of coronary artery disease, lung cancer, prostate and oral cancer, CHF, COPD, Ragsdale's esophagus, CKD and alcohol abuse  who was admitted with chief complaint of bilateral lower extremity edema on 11/8/2021.     Admitting impression of bilateral lower extremity edema     Brief history: For 7 days patient has been having increasing edema of the lower extremities with redness.  Has been feeling lightheaded and dizzy.  Was brought to the emergency room.  ER course: LFTs were quite elevated and had mild troponin elevation.  Patient was advised to be admitted at [facility] but patient declined and went home instead.  His symptoms worsened and so on 11/8/2021 went to the emergency room at [facility], lactic acid was elevated, D-dimer elevated, creatinine elevated at 2.26.  Patient does drink 1 L of vodka daily.  VQ scan was negative for PE.  He also has had some increasing distention of the abdomen.     Assessment and Plan:  Bilateral lower extremity edema, improving (from venous stasis, chronic liver, third spacing, congestive heart failure), improved.  Third spacing, improved.  Chronic systolic heart failure preserved ejection fraction, stable  Coronary artery disease with previous MI in 2019, stable  BNP is 99, troponin negative x2  Elevated D-dimer, VQ scan low probability for pulmonary embolism.  Chest x-ray showed prior lung resections otherwise negative.  Bilateral lower extremity ultrasound negative for DVT  Received albumin infusion and Lasix  Nephrology advising on fluid and electrolyte management  Echocardiogram unremarkable.  EF of 55 to 60% borderline LVH and mild MR  Currently on Bumex, midodrine   Troponin negative x 3     Bacteremia gram-positive cocci  Fever  Currently on Zosyn and vancomycin empirically-consider  stopping them if cultures are negative.  Defer to ID.  Negative CAT scan of the abdomen and pelvis and chest  Second blood cultures so far negative to date.  Infectious disease consulting-no clear focus.  Echocardiogram without any evidence of endocarditis.  Limited sensitivity with transthoracic echocardiogram.  Continue empiric vancomycin and Zosyn.     Lactic acidosis, improved probably volume related  Initially received IV fluids  Subsequently received albumin infusion and Lasix  Currently hemodynamically stable  Blood cultures negative  CAT scan of the abdomen chest and pelvis was negative for any source of infection     Acute on chronic kidney disease, stage III, gradually improving and stable.  Hyponatremia, hypokalemia, electrolyte abnormalities  Hyponatremia  Bilateral lower extremity pain and neuropathy  Nephrology consulting on the case  Received albumin infusion and Lasix     History of depression  Alcohol abuse, alcoholic hepatitis, improved  Hepatic steatosis   on right upper quadrant abdominal ultrasound with gallbladder sludge   consult.     Hypoalbuminemia  Moderate protein caloric malnutrition  Nutritional support  Has received albumin infusion and Lasix     COPD, acute on chronic respiratory failure  Left upper lobe nodule 9 x 7 mm  History of lung cancer, status post wedge resection in 2016  Change in supplementation as needed  Concern for possible primary cancer versus metastatic cancer.  Will be following with radiation oncology in late December  Continue inhalers including Trelegy Ellipta inhaler and albuterol  Needing 2 L of oxygen right now. He quit smoking 20 years ago     Pancreatic cyst  Follow-up CAT scan in 2 years versus endoscopic ultrasound  Follow-up with primary care or GI  This is not an inpatient issue at this time.     History of prostate cancer, mouth cancer, non-small cell lung cancer  Follows up with oncology  Rgasdale's esophagus continue  PPI        --------------------------------------------------------------------     Diet: No Added Salt 4 Gram Sodium  Snacks/Supplements Adult: Nepro Oral Supplement; With Meals  DVT Prophylaxis: Heparin subcutaneous every 12 and aspirin  Code Status: No CPR- Do NOT Intubate  Living situation: Looking for TCU placement.  PT and OT evaluation appreciated.  Anticipated possible discharge: To be determined     ----------------------------------------------------------------------     Cumulative essential/pertinent data reviewed:  Labs:  Creatinine initially 2.2 currently 2.1  CBC overall unremarkable.  Blood cultures so far negative     Transaminases slightly elevated ALT currently 108 AST 21  Imaging:  CT of the abdomen and chest and pelvis shows the following:  IMPRESSION:  1.  No source of sepsis identified.  2.  A left upper lobe nodule is 1 mm larger. Metastatic disease is possible.  3.  Otherwise no significant change from the prior study.  The pancreatic lesion can be followed as detailed below.     EKG: --  Echocardiogram shows the following:The left ventricle is mildly dilated with normal left ventricular wall  thickness.  Left ventricular function is normal.The ejection fraction is 55-60%.  Normal right ventricle size and systolic function.  No significant valve disease identified.  No evidence of endocarditis identified, however (especially with poor acoustic  windows), transthoracic echocardiography does not have high sensitivity for  identifying endocarditis. If suspicion remains for this condition consider  evaluation with a transesophageal echocardiogram.    ----------------------------------------------------------------------    Interval History/Subjective:  Patient's daughter is present in the room during this examination and interview.  Overall doing fine.  Denies any major pain.  Has poor appetite.  No chest pressure or shortness of breath.  No abdominal pain.  Legs are much better in terms of  edema.  Rest of review of systems unremarkable.    Physical Exam/Objective:  Temp:  [97.6  F (36.4  C)-99.1  F (37.3  C)] 98  F (36.7  C)  Pulse:  [66-85] 80  Resp:  [18-20] 18  BP: ()/(58-73) 116/68  SpO2:  [91 %-96 %] 91 %    Body mass index is 29.14 kg/m .    GENERAL:   Alert and coherent and conversant.;  appears comfortable, in no acute distress,   HEAD:  Normocephalic, without obvious abnormality, atraumatic   EYES:  Grossly normal; pupils unremarkable    NOSE: Grossly normal   THROAT: Lips and mouth external: grossly normal,    NECK: No mass noted; no stiffness   BACK:      LUNGS:   Auscultation: Negative for wheezing or crackles, symmetric chest rise on inhalation, respirations unlabored    CHEST WALL:  No tenderness or deformity   HEART:  Regular rate and rhythm, significant murmurs: Negative,    ABDOMEN:   Soft, non-tender, bowel sounds normal; no masses, no peritoneal signs   EXTREMITIES: Defer examination of the operative site to surgery; status post   No tenderness of the calves.  Edema is  much improved.   SKIN:  No significant erythema or warmth but there is some venous stasis changes that are chronic over the pretibial area bilaterally.   NEURO: no signs of acute stroke or change from prior state   PSYCH: Cooperative, behavior is appropriate      Medications:   Personally Reviewed.    aspirin  81 mg Oral At Bedtime     bumetanide  0.5 mg Oral BID     DULoxetine  20 mg Oral Daily     Fluticasone-Umeclidin-Vilanterol  1 puff Inhalation Daily     gabapentin  200 mg Oral BID 09 12     gabapentin  300 mg Oral At Bedtime     heparin ANTICOAGULANT  5,000 Units Subcutaneous Q12H     magnesium oxide  400 mg Oral At Bedtime     metoprolol succinate ER  12.5 mg Oral Daily     midodrine  2.5 mg Oral BID 09 12     multivitamin w/minerals  1 tablet Oral Daily     pantoprazole  40 mg Oral Daily with supper     piperacillin-tazobactam  3.375 g Intravenous Q8H     polyethylene glycol  17 g Oral Daily     sodium  chloride (PF)  3 mL Intracatheter Q8H     sodium chloride (PF)  3 mL Intracatheter Q8H     thiamine  100 mg Oral Daily     vancomycin  1,000 mg Intravenous Q24H     Vitamin D3  125 mcg Oral Daily     zinc gluconate  50 mg Oral Daily     Current Facility-Administered Medications   Medication Dose Route Frequency     acetaminophen  325 mg Oral Q4H PRN    Or     acetaminophen  325 mg Rectal Q4H PRN     albuterol  2 puff Inhalation Q6H PRN     calcium carbonate  500 mg Oral TID PRN     flumazenil  0.2 mg Intravenous q1 min prn     lidocaine 4%   Topical Q1H PRN     lidocaine (buffered or not buffered)  0.1-1 mL Other Q1H PRN     LORazepam  0.25 mg Oral Q8H PRN     melatonin  5 mg Oral QPM PRN     nitroGLYcerin  0.4 mg Sublingual Q5 Min PRN     ondansetron  4 mg Oral Q6H PRN    Or     ondansetron  4 mg Intravenous Q6H PRN     polyethylene glycol  17 g Oral Daily PRN     senna-docusate  1 tablet Oral BID PRN    Or     senna-docusate  2 tablet Oral BID PRN     sodium chloride (PF)  3 mL Intracatheter q1 min prn     sodium chloride (PF)  3 mL Intracatheter q1 min prn           Fabrizio Vasquez MD  [facility]  Phone: #[phone]

## 2021-11-15 NOTE — PROGRESS NOTES
RENAL PROGRESS NOTE    PLAN:  NO changes today from renal perspective  Continue current low dose diuretics  TCU plans per [facility]    LEILA on CKD IIIb: creat stalled in the 2.1 range  Baseline cr in the 1.5-1.8 range on chart review.    Diuretics responsive.    Very bland urinalysis 11/10/2021, no proteinuria, pyuria, or microscopic hematuria.    PLAN:  - continue low dose bumex along with midodrine  - trend I/Os and creatinine    Volume: Patient appears to be volume up on exam and definitely volume up by weights.  Patient also has some degree of liver disease, and albumin quite low.  He is likely third spacing some of the fluids secondary to his hypoalbuminemia.  Continue on bumex    Lytes/acid/base: stabilized, hyponatremia improved with diuresis.  Mild hypokalemia likely secondary to loop diuretics, also stable, . Contraction alkalosis: mild, trend, no changes in diuretics today     Anemia: Macrocytic.  History of alcohol abuse.  B12 and folic acid within normal limits this admit.  Iron studies okay and Erythropoietin level reasonable.  Also has history of Ragsdale's esophagus and previous GIB.  No obvious signs of GI blood loss.  No need for FRANCISCO JAVIER at this time     Bacteremia:. Unclear source  ID following On Vanco and Zosyn, ECHO without veg     Bilateral lower extremity pain/neuropathy: Did have elevated D-dimer, negative VQ scan and lower extremity US.  Started on gabapentin renally dosed with some relief.  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     Chronic systolic heart failure: 5/2021 echocardiogram showed EF 55%.  Mild hypertrophy otherwise normal.  On low-dose beta-blocker at baseline.  11/9 echocardiogram EF 55 to 60% borderline LVH and mild MR.     CAD: MI in 2019.  PCI x1.    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  Follows with oncology.     Ragsdale's esophagus: Continue home PPI     Alcohol abuse: Drinks a liter of vodka per day.  On protocols.  Social work chemical dependency consultation.    Malnutrition:  On nutritional supp, had Alb infusions    SUBJECTIVE:  No sig complaints, feels a bit on the dry side (mouth), daughter is present.  NO sig pain, denies SOB, says edema is better    OBJECTIVE:  Physical Exam   Temp: 98  F (36.7  C) Temp src: Oral BP: 116/68 Pulse: 80   Resp: 18 SpO2: 91 % O2 Device: Nasal cannula Oxygen Delivery: 1 LPM  Vitals:    11/13/21 0407 11/14/21 0012 11/15/21 0343   Weight: 96.3 kg (212 lb 3.2 oz) 96.7 kg (213 lb 1.6 oz) 94.8 kg (208 lb 14.4 oz)     Vital Signs with Ranges  Temp:  [97.6  F (36.4  C)-99.1  F (37.3  C)] 98  F (36.7  C)  Pulse:  [66-85] 80  Resp:  [18-20] 18  BP: ()/(58-73) 116/68  SpO2:  [91 %-96 %] 91 %  I/O last 3 completed shifts:  In: 1000 [P.O.:900; I.V.:100]  Out: 1300 [Urine:1300]    Temp (24hrs), Av.5  F (36.9  C), Min:97.7  F (36.5  C), Max:99.1  F (37.3  C)      Patient Vitals for the past 72 hrs:   Weight   11/15/21 0343 94.8 kg (208 lb 14.4 oz)   21 001 96.7 kg (213 lb 1.6 oz)   21 96.3 kg (212 lb 3.2 oz)       Intake/Output Summary (Last 24 hours) at 11/15/2021 0919  Last data filed at 11/15/2021 0600  Gross per 24 hour   Intake 1000 ml   Output 1000 ml   Net 0 ml       PHYSICAL EXAM:  General - Alert and oriented x3, appears comfortable, NAD, sitting up in chair, legs elevated   Cardiovascular - BP controlled with Midodrine   Respiratory - on 2L 02 per NC   Abd: BS present, no guarding or pain with palpation, no ascites  Extremities - trace lower extremity edema bilaterally  Skin: dry, intact, no rash, good turgor, skin changes c/w prior edema improved   Neuro:  Grossly intact, no focal deficits  MSK:  Grossly intact  Psych:  Normal affect  :  Good UO     LABORATORY STUDIES:      Recent Labs   Lab 11/15/21  0419 11/14/21  0250 11/12/21  0520 11/11/21  0523 11/10/21  0849 11/09/21  0240 11/08/21  1132   WBC 6.1  --  3.8*  --  3.8* 4.0 6.6   RBC 3.33*  --  3.09*  --  2.99* 3.33* 3.90*   HGB 11.7*  --  11.0*  --  10.5* 11.7* 14.1   HCT 36.5*  --  33.8*  --  32.9* 37.6* 42.6    177 166 148* 151 146* 246       Basic Metabolic Panel:  Recent Labs   Lab 11/15/21  0419 11/14/21  1503 11/14/21 0250 11/13/21  1250 11/13/21 0615 11/12/21 0824 11/12/21  0520 11/11/21  0523 11/10/21  0533     --  139  --  142  --  142 139 137   POTASSIUM 3.6 3.7 3.5 3.5 3.0* 3.5 3.2* 3.3* 3.4*   CHLORIDE 94*  --  96*  --  96*  --  98 102 101   CO2 29  --  32*  --  32*  --  30 29 27   BUN 13  --  9  --  7*  --  6* 8 9   CR 2.15*  --  2.11*  --  2.13*  --  2.07* 2.23* 1.97*   *  --  136*  --  126*  --  126* 125 111   BRADLEY 10.0  --  10.3  --  10.4  --  9.9 9.2 9.1       INR  Recent Labs   Lab 11/09/21 0240 11/08/21  2044   INR 1.24* 1.22*        Recent Labs   Lab Test 11/15/21  0419 11/14/21  0250 11/12/21  0520 11/10/21  0849 11/09/21  0240 11/08/21  2044   INR  --   --   --   --  1.24* 1.22*   WBC 6.1  --  3.8*   < > 4.0  --    HGB 11.7*  --  11.0*   < > 11.7*  --     177 166   < > 146*  --     < > = values in this interval not displayed.       Personally reviewed current labs     ~ 25 minutes spent in exam, POC, education regarding renal disease and management.  >90% time spent in education and counseling and/or discussion with patient's care team    Miguelina Abel, [facility]  [facility]  [phone]

## 2021-11-15 NOTE — PROGRESS NOTES
Mitchell had a fairly good day today. States he feels really tired as he did not get much sleep last night. Continued to encourage him to be up to the chair throughout the day. Remained on 1L nasal canula. Male pure wick in place. Plan to discharge to TCU when appropriate.

## 2021-11-15 NOTE — PLAN OF CARE
Pt A&Ox3, VSS, HR 60-80 NSR w/ 1AVB, BBB. 1LNC, Denies pain unless legs are touched. Pt diuresing well. On purewick. Pt unable to stay asleep at night. Has intermittent confusion. Will continue to monitor and carry out plan of care.Zaynab Ospina RN                 Problem: Adult Inpatient Plan of Care  Goal: Plan of Care Review  Outcome: Improving  Flowsheets (Taken 11/15/2021 0635)  Plan of Care Reviewed With: patient  Goal: Patient-Specific Goal (Individualized)  Outcome: Improving  Goal: Absence of Hospital-Acquired Illness or Injury  Outcome: Improving  Intervention: Identify and Manage Fall Risk  Recent Flowsheet Documentation  Taken 11/15/2021 0400 by Zaynab Ospina RN  Safety Promotion/Fall Prevention:   activity supervised   bed alarm on  Taken 11/15/2021 0000 by Zaynab Ospina RN  Safety Promotion/Fall Prevention:   activity supervised   bed alarm on  Intervention: Prevent Skin Injury  Recent Flowsheet Documentation  Taken 11/15/2021 0400 by Zaynab Ospina RN  Body Position: position changed independently  Taken 11/15/2021 0000 by Zaynab Ospina RN  Body Position: position changed independently  Intervention: Prevent and Manage VTE (Venous Thromboembolism) Risk  Recent Flowsheet Documentation  Taken 11/15/2021 0400 by Zaynab Ospina RN  VTE Prevention/Management: anticoagulant therapy maintained  Taken 11/15/2021 0000 by Zaynab Ospina RN  VTE Prevention/Management: anticoagulant therapy maintained  Goal: Optimal Comfort and Wellbeing  Outcome: Improving  Goal: Readiness for Transition of Care  Outcome: Improving

## 2021-11-16 ENCOUNTER — APPOINTMENT (OUTPATIENT)
Dept: OCCUPATIONAL THERAPY | Facility: CLINIC | Age: 76
DRG: 682 | End: 2021-11-16
Payer: MEDICARE

## 2021-11-16 LAB
ALBUMIN SERPL-MCNC: 2.9 G/DL (ref 3.5–5)
ALP SERPL-CCNC: 123 U/L (ref 45–120)
ALT SERPL W P-5'-P-CCNC: 20 U/L (ref 0–45)
ANION GAP SERPL CALCULATED.3IONS-SCNC: 12 MMOL/L (ref 5–18)
AST SERPL W P-5'-P-CCNC: 55 U/L (ref 0–40)
BACTERIA BLD CULT: NO GROWTH
BACTERIA BLD CULT: NO GROWTH
BILIRUB DIRECT SERPL-MCNC: 0.6 MG/DL
BILIRUB SERPL-MCNC: 1.3 MG/DL (ref 0–1)
BUN SERPL-MCNC: 16 MG/DL (ref 8–28)
CALCIUM SERPL-MCNC: 10.4 MG/DL (ref 8.5–10.5)
CHLORIDE BLD-SCNC: 95 MMOL/L (ref 98–107)
CO2 SERPL-SCNC: 31 MMOL/L (ref 22–31)
CREAT SERPL-MCNC: 2.13 MG/DL (ref 0.7–1.3)
GFR SERPL CREATININE-BSD FRML MDRD: 29 ML/MIN/1.73M2
GLUCOSE BLD-MCNC: 125 MG/DL (ref 70–125)
POTASSIUM BLD-SCNC: 3.6 MMOL/L (ref 3.5–5)
PROT SERPL-MCNC: 5.8 G/DL (ref 6–8)
SARS-COV-2 RNA RESP QL NAA+PROBE: NEGATIVE
SODIUM SERPL-SCNC: 138 MMOL/L (ref 136–145)

## 2021-11-16 PROCEDURE — 36415 COLL VENOUS BLD VENIPUNCTURE: CPT | Performed by: FAMILY MEDICINE

## 2021-11-16 PROCEDURE — 80053 COMPREHEN METABOLIC PANEL: CPT | Performed by: FAMILY MEDICINE

## 2021-11-16 PROCEDURE — 87635 SARS-COV-2 COVID-19 AMP PRB: CPT | Performed by: INTERNAL MEDICINE

## 2021-11-16 PROCEDURE — 250N000011 HC RX IP 250 OP 636: Performed by: FAMILY MEDICINE

## 2021-11-16 PROCEDURE — 97535 SELF CARE MNGMENT TRAINING: CPT | Mod: GO

## 2021-11-16 PROCEDURE — 97129 THER IVNTJ 1ST 15 MIN: CPT | Mod: GO

## 2021-11-16 PROCEDURE — 97110 THERAPEUTIC EXERCISES: CPT | Mod: GO

## 2021-11-16 PROCEDURE — 99232 SBSQ HOSP IP/OBS MODERATE 35: CPT | Performed by: NURSE PRACTITIONER

## 2021-11-16 PROCEDURE — 99233 SBSQ HOSP IP/OBS HIGH 50: CPT | Performed by: INTERNAL MEDICINE

## 2021-11-16 PROCEDURE — 250N000013 HC RX MED GY IP 250 OP 250 PS 637: Performed by: PHYSICIAN ASSISTANT

## 2021-11-16 PROCEDURE — 250N000013 HC RX MED GY IP 250 OP 250 PS 637: Performed by: FAMILY MEDICINE

## 2021-11-16 PROCEDURE — 210N000002 HC R&B HEART CARE

## 2021-11-16 RX ADMIN — Medication 50 MG: at 08:52

## 2021-11-16 RX ADMIN — GABAPENTIN 300 MG: 300 CAPSULE ORAL at 20:34

## 2021-11-16 RX ADMIN — Medication 125 MCG: at 08:51

## 2021-11-16 RX ADMIN — MULTIPLE VITAMINS W/ MINERALS TAB 1 TABLET: TAB at 08:51

## 2021-11-16 RX ADMIN — GABAPENTIN 200 MG: 100 CAPSULE ORAL at 11:49

## 2021-11-16 RX ADMIN — POLYETHYLENE GLYCOL 3350 17 G: 17 POWDER, FOR SOLUTION ORAL at 08:50

## 2021-11-16 RX ADMIN — METOPROLOL SUCCINATE 12.5 MG: 25 TABLET, EXTENDED RELEASE ORAL at 08:52

## 2021-11-16 RX ADMIN — BUMETANIDE 0.5 MG: 0.5 TABLET ORAL at 17:24

## 2021-11-16 RX ADMIN — DULOXETINE HYDROCHLORIDE 20 MG: 20 CAPSULE, DELAYED RELEASE ORAL at 08:51

## 2021-11-16 RX ADMIN — MIDODRINE HYDROCHLORIDE 2.5 MG: 2.5 TABLET ORAL at 08:52

## 2021-11-16 RX ADMIN — MAGNESIUM OXIDE TAB 400 MG (241.3 MG ELEMENTAL MG) 400 MG: 400 (241.3 MG) TAB at 20:34

## 2021-11-16 RX ADMIN — MIDODRINE HYDROCHLORIDE 2.5 MG: 2.5 TABLET ORAL at 11:49

## 2021-11-16 RX ADMIN — BUMETANIDE 0.5 MG: 0.5 TABLET ORAL at 08:51

## 2021-11-16 RX ADMIN — Medication 100 MG: at 08:51

## 2021-11-16 RX ADMIN — HEPARIN SODIUM 5000 UNITS: 5000 INJECTION, SOLUTION INTRAVENOUS; SUBCUTANEOUS at 08:51

## 2021-11-16 RX ADMIN — HEPARIN SODIUM 5000 UNITS: 5000 INJECTION, SOLUTION INTRAVENOUS; SUBCUTANEOUS at 20:34

## 2021-11-16 RX ADMIN — PANTOPRAZOLE SODIUM 40 MG: 20 TABLET, DELAYED RELEASE ORAL at 17:24

## 2021-11-16 RX ADMIN — GABAPENTIN 200 MG: 100 CAPSULE ORAL at 08:51

## 2021-11-16 RX ADMIN — ASPIRIN 81 MG: 81 TABLET, COATED ORAL at 20:34

## 2021-11-16 ASSESSMENT — ACTIVITIES OF DAILY LIVING (ADL)
ADLS_ACUITY_SCORE: 19
ADLS_ACUITY_SCORE: 17
ADLS_ACUITY_SCORE: 19
ADLS_ACUITY_SCORE: 17
ADLS_ACUITY_SCORE: 19
ADLS_ACUITY_SCORE: 19
ADLS_ACUITY_SCORE: 17
ADLS_ACUITY_SCORE: 19
ADLS_ACUITY_SCORE: 17
ADLS_ACUITY_SCORE: 19
ADLS_ACUITY_SCORE: 19
ADLS_ACUITY_SCORE: 17
ADLS_ACUITY_SCORE: 19
ADLS_ACUITY_SCORE: 17
ADLS_ACUITY_SCORE: 17

## 2021-11-16 ASSESSMENT — MIFFLIN-ST. JEOR: SCORE: 1709.66

## 2021-11-16 NOTE — PROGRESS NOTES
RENAL PROGRESS NOTE    PLAN:  NO changes today from renal perspective  Continue current low dose diuretics with Midodrine suppot for discharge  TCU plans per [facility]  rec daily wts to monitor volume status outpt     LEILA on CKD IIIb: creat stalled in the 2.1 range  Baseline cr in the 1.5-1.8 range on chart review.    Diuretics responsive.    Very bland urinalysis 11/10/2021, no proteinuria, pyuria, or microscopic hematuria.    PLAN:  continue low dose bumex/ midodrine for discharge  trend I/Os  rec daily wts after discharge to better gauge volume status.     Volume: pt appears near euvolemic by exam, wts a bit erratic,   some degree of liver disease, and albumin quite low.  He is likely third spacing some of the fluids secondary to his hypoalbuminemia.  Continue on bumex, low Na diet     Lytes/acid/base: stabilized, hyponatremia improved with diuresis.  Mild hypokalemia likely secondary to loop diuretics, also stable, .     Anemia: Macrocytic.  History of alcohol abuse.  B12 and folic acid within normal limits this admit.  Iron studies okay and Erythropoietin level reasonable.  Also has history of Ragsdale's esophagus and previous GIB.  No obvious signs of GI blood loss.  No need for FRANCISCO JAVIER at this time     Bacteremia:. Now thought to be a contaminant.  ID following, DC'd  Vanco and Zosyn, ECHO without veg, monitoring off antibx     Bilateral lower extremity pain/neuropathy: Did have elevated D-dimer, negative VQ scan and lower extremity US.  Started on gabapentin renally dosed with some relief.  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     Chronic systolic heart failure: 5/2021 echocardiogram showed EF 55%.  Mild hypertrophy otherwise normal.  On low-dose beta-blocker at baseline.  11/9 echocardiogram EF 55 to 60% borderline LVH and mild MR.     CAD: MI in 2019.  PCI x1.    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  Follows with oncology.     Ragsdale's esophagus: Continue home PPI     Alcohol abuse: Drinks a liter of vodka per day.  On protocols.  Social work chemical dependency consultation.    Malnutrition:  On nutritional supp, had Alb infusions earlier in admit    SUBJECTIVE:  Chart reviewed, pt sound asleep during my rounds.     OBJECTIVE:  Physical Exam   Temp: 98.7  F (37.1  C) Temp src: Oral BP: 112/62 Pulse: 67   Resp: 16 SpO2: 95 % O2 Device: Nasal cannula Oxygen Delivery: 2 LPM  Vitals:    21 0012 11/15/21 0343 21 0600   Weight: 96.7 kg (213 lb 1.6 oz) 94.8 kg (208 lb 14.4 oz) 96.3 kg (212 lb 3.2 oz)     Vital Signs with Ranges  Temp:  [97.8  F (36.6  C)-98.9  F (37.2  C)] 98.7  F (37.1  C)  Pulse:  [67-74] 67  Resp:  [16-22] 16  BP: (105-127)/(56-86) 112/62  SpO2:  [89 %-95 %] 95 %  I/O last 3 completed shifts:  In: 120 [P.O.:120]  Out: 1050 [Urine:1050]    Temp (24hrs), Av.5  F (36.9  C), Min:97.7  F (36.5  C), Max:99.1  F (37.3  C)      Patient Vitals for the past 72 hrs:   Weight   21 0600 96.3 kg (212 lb 3.2 oz)   11/15/21 0343 94.8 kg (208 lb 14.4 oz)   21 0012 96.7 kg (213 lb 1.6 oz)       Intake/Output Summary (Last 24 hours) at 11/15/2021 09  Last data filed at 11/15/2021 0600  Gross per 24 hour   Intake 1000 ml   Output 1000 ml   Net 0 ml       PHYSICAL EXAM:  General - sleeping,  Supine, no family present  Cardiovascular - BP controlled with Midodrine   Respiratory - on 2L 02 per NC chronically   Abd: BS present, no guarding or pain with palpation, no ascites  Extremities - trace lower extremity edema bilaterally  Skin: dry, intact, no rash, good turgor, skin changes c/w prior edema improved   :  Good UO     LABORATORY STUDIES:     Recent Labs   Lab 11/15/21  0419 21  0250 21  0520 21  0523 11/10/21  0849   WBC 6.1   --  3.8*  --  3.8*   RBC 3.33*  --  3.09*  --  2.99*   HGB 11.7*  --  11.0*  --  10.5*   HCT 36.5*  --  33.8*  --  32.9*    177 166 148* 151       Basic Metabolic Panel:  Recent Labs   Lab 11/16/21  0442 11/15/21  0419 11/14/21  1503 11/14/21  0250 11/13/21  1250 11/13/21  0615 11/12/21  0824 11/12/21  0520 11/11/21  0523    136  --  139  --  142  --  142 139   POTASSIUM 3.6 3.6 3.7 3.5 3.5 3.0*   < > 3.2* 3.3*   CHLORIDE 95* 94*  --  96*  --  96*  --  98 102   CO2 31 29  --  32*  --  32*  --  30 29   BUN 16 13  --  9  --  7*  --  6* 8   CR 2.13* 2.15*  --  2.11*  --  2.13*  --  2.07* 2.23*    126*  --  136*  --  126*  --  126* 125   BRADLEY 10.4 10.0  --  10.3  --  10.4  --  9.9 9.2    < > = values in this interval not displayed.       INR  No lab results found in last 7 days.     Recent Labs   Lab Test 11/15/21  0419 11/14/21  0250 11/12/21  0520 11/10/21  0849 11/09/21  0240 11/08/21  2044   INR  --   --   --   --  1.24* 1.22*   WBC 6.1  --  3.8*   < > 4.0  --    HGB 11.7*  --  11.0*   < > 11.7*  --     177 166   < > 146*  --     < > = values in this interval not displayed.       Personally reviewed current labs     ~ 25 minutes spent in exam, POC, education regarding renal disease and management.  >90% time spent in education and counseling and/or discussion with patient's care team    Miguelina Abel, [facility]  [facility]  [phone]

## 2021-11-16 NOTE — PROGRESS NOTES
[facility] MEDICINE PROGRESS NOTE      Length of stay: Day # 8    Identification/Summary: Lenny Chau is a 76 year old male with a past medical history of coronary artery disease, lung cancer, prostate and oral cancer, CHF, COPD, Ragsdale's esophagus, CKD and alcohol abuse  who was admitted with chief complaint of bilateral lower extremity edema on 11/8/2021.     Admitting impression of bilateral lower extremity edema     Brief history: For 7 days patient has been having increasing edema of the lower extremities with redness.  Has been feeling lightheaded and dizzy.  Was brought to the emergency room.  ER course: LFTs were quite elevated and had mild troponin elevation.  Patient was advised to be admitted at [facility] but patient declined and went home instead.  His symptoms worsened and so on 11/8/2021 went to the emergency room at [facility], lactic acid was elevated, D-dimer elevated, creatinine elevated at 2.26.  Patient does drink 1 L of vodka daily.  VQ scan was negative for PE.  He also has had some increasing distention of the abdomen.     Assessment and Plan:  Acute on chronic systolic heart failure preserved ejection fraction, improved.  Bilateral lower extremity edema, improving (from venous stasis, chronic liver, third spacing, congestive heart failure), improved.  Third spacing, improved.  Coronary artery disease with previous MI in 2019, stable; MI ruled out  BNP is 99, troponin negative x2  Elevated D-dimer, VQ scan low probability for pulmonary embolism.  Chest x-ray showed prior lung resections otherwise negative.  Bilateral lower extremity ultrasound negative for DVT  Received albumin infusion and Lasix  Nephrology advising on fluid and electrolyte management  Echocardiogram unremarkable.  EF of 55 to 60% borderline LVH and mild MR  Currently on Bumex 0.5 mg twice daily, midodrine   Troponin negative x 3 . On aspirin, metoprolol     Bacteremia gram-positive  cocci  Fever, no recurrence  Was placed on Zosyn and vancomycin empirically- discontinued by infectious disease.  Monitor off antibiotics.  There was no clear focus of infection.  Negative CAT scan of the abdomen and pelvis and chest  Second blood cultures so far negative to date.  Echocardiogram without any evidence of endocarditis.  Limited sensitivity with transthoracic echocardiogram.      Lactic acidosis, improved probably volume related  Initially received IV fluids  No evidence for sepsis.  Taken off antibiotics.  Subsequently received albumin infusion and Lasix  Currently hemodynamically stable  Blood cultures negative  CAT scan of the abdomen chest and pelvis was negative for any source of infection     Acute on chronic kidney disease, stage III, gradually improving and stable.  Hyponatremia, resolved   hypokalemia, electrolyte abnormalities, corrected  Hyponatremia  Bilateral lower extremity pain and neuropathy  Nephrology consulting on the case  Received albumin infusion and Lasix  On low-dose Bumex and midodrine.     History of depression  Alcohol abuse, alcoholic hepatitis, improved  Hepatic steatosis   on right upper quadrant abdominal ultrasound with gallbladder sludge   consult.  Needing TCU placement.     Hypoalbuminemia  Moderate protein caloric malnutrition  Nutritional support  Has received albumin infusion and Lasix     COPD, acute on chronic respiratory failure  Left upper lobe nodule 9 x 7 mm  History of lung cancer, status post wedge resection in 2016  Change in supplementation as needed  Concern for possible primary cancer versus metastatic cancer.  Will be following with radiation oncology in late December  Continue inhalers including Trelegy Ellipta inhaler and albuterol  Still needing 2 L of oxygen right now. He quit smoking 20 years ago  Will likely chronically need oxygen on discharge.     Pancreatic cyst  Follow-up CAT scan in 2 years versus endoscopic  ultrasound  Follow-up with primary care or GI  This is not an inpatient issue at this time.     History of prostate cancer, mouth cancer, non-small cell lung cancer  Follows up with oncology  Ragsdale's esophagus   continue PPI     --------------------------------------------------------------------     Diet: No Added Salt 4 Gram Sodium  Snacks/Supplements Adult: Nepro Oral Supplement; With Meals  DVT Prophylaxis: Heparin subcutaneous every 12 and aspirin  Code Status: No CPR- Do NOT Intubate  Living situation: Looking for TCU placement per recommendations from PT and OT evaluation.  Anticipated possible discharge: When TCU available.  Almost medically stable at this time.     ----------------------------------------------------------------------     Cumulative essential/pertinent data reviewed:  Labs:  Creatinine initially 2.2 currently 2.1  CBC overall unremarkable.  Blood cultures so far negative  COVID-19 PCR negative     Transaminases slightly elevated ALT currently 108 AST 21  Imaging:  CT of the abdomen and chest and pelvis shows the following:  IMPRESSION:  1.  No source of sepsis identified.  2.  A left upper lobe nodule is 1 mm larger. Metastatic disease is possible.  3.  Otherwise no significant change from the prior study.  The pancreatic lesion can be followed as detailed below.     EKG: --  Echocardiogram shows the following:The left ventricle is mildly dilated with normal left ventricular wall  thickness.  Left ventricular function is normal.The ejection fraction is 55-60%.  Normal right ventricle size and systolic function.  No significant valve disease identified.  No evidence of endocarditis identified, however (especially with poor acoustic  windows), transthoracic echocardiography does not have high sensitivity for  identifying endocarditis. If suspicion remains for this condition consider  evaluation with a transesophageal  echocardiogram.      ----------------------------------------------------------------------    Interval History/Subjective:  Patient looking stable.  Denies any chest pressure or shortness of breath.  Is still on oxygen 2 L by nasal cannula.  Denies abdominal pain.  No nausea.  Eating fairly well.  Rest of review of systems unremarkable.    Physical Exam/Objective:  Temp:  [97.8  F (36.6  C)-98.9  F (37.2  C)] 97.8  F (36.6  C)  Pulse:  [67-73] 67  Resp:  [16-22] 18  BP: (105-127)/(56-86) 110/56  SpO2:  [89 %-95 %] 93 %    Body mass index is 29.6 kg/m .    GENERAL:   Conversant and pleasant;  appears comfortable, in no acute distress,   HEAD:  Normocephalic, without obvious abnormality, atraumatic   EYES:  Grossly normal; pupils unremarkable   NOSE: Grossly normal   THROAT: Lips and mouth external: grossly normal,    NECK: No mass noted; no stiffness   BACK:      LUNGS:   Auscultation: Negative for wheezing or crackles, symmetric chest rise on inhalation, respirations unlabored   CHEST WALL:  No tenderness or deformity   HEART:  Regular rate and rhythm, significant murmurs: Negative,   ABDOMEN:   Soft, non-tender, bowel sounds normal ; no masses, no peritoneal signs   EXTREMITIES:   No tenderness of the calves.  Leg edema significantly improved.  Some venous stasis changes.   SKIN:  see extremities   NEURO: no signs of acute stroke or change from prior state   PSYCH: Cooperative, behavior is appropriate     Medications:   Personally Reviewed.    aspirin  81 mg Oral At Bedtime     bumetanide  0.5 mg Oral BID     DULoxetine  20 mg Oral Daily     Fluticasone-Umeclidin-Vilanterol  1 puff Inhalation Daily     gabapentin  200 mg Oral BID 09 12     gabapentin  300 mg Oral At Bedtime     heparin ANTICOAGULANT  5,000 Units Subcutaneous Q12H     magnesium oxide  400 mg Oral At Bedtime     metoprolol succinate ER  12.5 mg Oral Daily     midodrine  2.5 mg Oral BID 09 12     multivitamin w/minerals  1 tablet Oral Daily      pantoprazole  40 mg Oral Daily with supper     polyethylene glycol  17 g Oral Daily     sodium chloride (PF)  3 mL Intracatheter Q8H     thiamine  100 mg Oral Daily     Vitamin D3  125 mcg Oral Daily     zinc gluconate  50 mg Oral Daily     Current Facility-Administered Medications   Medication Dose Route Frequency     acetaminophen  325 mg Oral Q4H PRN    Or     acetaminophen  325 mg Rectal Q4H PRN     albuterol  2 puff Inhalation Q6H PRN     calcium carbonate  500 mg Oral TID PRN     flumazenil  0.2 mg Intravenous q1 min prn     lidocaine 4%   Topical Q1H PRN     lidocaine (buffered or not buffered)  0.1-1 mL Other Q1H PRN     LORazepam  0.25 mg Oral Q8H PRN     melatonin  5 mg Oral QPM PRN     nitroGLYcerin  0.4 mg Sublingual Q5 Min PRN     ondansetron  4 mg Oral Q6H PRN    Or     ondansetron  4 mg Intravenous Q6H PRN     polyethylene glycol  17 g Oral Daily PRN     senna-docusate  1 tablet Oral BID PRN    Or     senna-docusate  2 tablet Oral BID PRN     sodium chloride (PF)  3 mL Intracatheter q1 min prn     sodium chloride (PF)  3 mL Intracatheter q1 min prn         Fabrizio Vasquez MD  [facility]  [facility]  Phone: #[phone]

## 2021-11-16 NOTE — PLAN OF CARE
Problem: Adult Inpatient Plan of Care  Goal: Plan of Care Review  Outcome: No Change   VSS. On O2 2L via NC. Tele- NSR with 1st degree and BBB. Denies chest pain. Dyspnea present with exertion. Weak non-productive cough present. Lung sounds are diminished bilaterally. Edema present to BLE. Repositions self. External catheter is patent and intact. On alarms.

## 2021-11-16 NOTE — PLAN OF CARE
Problem: Adult Inpatient Plan of Care  Goal: Absence of Hospital-Acquired Illness or Injury  Intervention: Identify and Manage Fall Risk  Recent Flowsheet Documentation  Taken 11/16/2021 1230 by Yandel Ramos RN  Safety Promotion/Fall Prevention:   room near nurse's station   assistive device/personal items within reach   activity supervised  Taken 11/16/2021 0830 by Yandel Ramos RN  Safety Promotion/Fall Prevention:   room near nurse's station   assistive device/personal items within reach   activity supervised  Intervention: Prevent Skin Injury  Recent Flowsheet Documentation  Taken 11/16/2021 1230 by Yandle Ramos RN  Body Position: position changed independently  Taken 11/16/2021 0830 by Yandel Ramos RN  Body Position: position changed independently     NSR on telemetry. Patient remains on 2 L nasal cannula; denies feeling short of breath. Lungs clear/diminished. +2 edema in BLE. Up to commode; patient having diarrhea after miralax this morning. Male external catheter in place per patient request. Working with PT/OT. Will continue to monitor.

## 2021-11-16 NOTE — PROGRESS NOTES
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 Pt states currently is completing most of these tasks himself without assist from spouse. Will continue to monitor cognition during hospital stay.

## 2021-11-16 NOTE — PLAN OF CARE
Problem: Risk for Delirium  Goal: Improved Sleep  Outcome: Improving     Problem: Heart Failure Comorbidity  Goal: Maintenance of Heart Failure Symptom Control  Outcome: Improving     Problem: Fluid Volume Excess  Goal: Fluid Balance  Outcome: Improving     Pt is A&Ox4. On 2 L NC. Denies pain. Up A1 pivot to BSC. External catheter in place w/ adequate UOP. Last BM 11/15. Redness to groin. PIV SL x2. GONZALES, congested cough. 4g Na diet. Tele: SR w/ 1st degree AV block and BBB. Discharge to TCU pending bed availability.

## 2021-11-16 NOTE — PROGRESS NOTES
[facility]    Infectious Disease Progress Note      Assessment & Plan     Lenny Chau is a 76 year old male who was admitted on 11/8/2021.   1. Coronary artery disease, cancer, prostate overall  2. Congestive heart failure which is worsening lower extremity edema  3. Alcohol use  4. Ragsdale's esophagus  5. Chronic kidney disease  6. Admitted worsening lower extremity edema  7. Blood cultures positive for gram gram-positive cocci: Micrococcus which is likely contaminant. Repeat cultures negative.  8. Fever ongoing wihtout clear focus/source/cause.  CT chest abdomen pelvis did not show any source of infection.  Echocardiogram without any evidence of endocarditis identified even though limited sensitivity with transthoracic echocardiogram  9. Hepatic steatosis gallbladder sludge. Elevated LFTs, now improving    Recommendations:    1. Discontinue vancomycin and pipercillin/tazobactam  2. Monitor off antibiotics    I will sign-off at this time. Please do not hesitate to call if there are any further questions or if there is a change in the patient's clinical status.      Bart Tobar MD  [facility]  Direct messaging: [employer] Paging  On-Call ID provider: [phone], option: 9  ===================================        Interval History   First visit by me. Daughter at bedside. Feeling okay. Poor appetite. Says swelling has gotten better. No abd pain. No nausea.    Physical Exam   Temp: 98.4  F (36.9  C) Temp src: Oral BP: 111/62 Pulse: 74   Resp: 16 SpO2: 95 % O2 Device: Nasal cannula Oxygen Delivery: 1 LPM  Vitals:    11/13/21 0407 11/14/21 0012 11/15/21 0343   Weight: 96.3 kg (212 lb 3.2 oz) 96.7 kg (213 lb 1.6 oz) 94.8 kg (208 lb 14.4 oz)     Vital Signs with Ranges  Temp:  [98  F (36.7  C)-99.1  F (37.3  C)] 98.4  F (36.9  C)  Pulse:  [70-85] 74  Resp:  [16-20] 16  BP: ()/(58-73) 111/62  SpO2:  [91 %-95 %] 95 %    Constitutional: lying in bed, no  distress  Lungs: Basilar crackles  Cardiovascular: S1-S2  Abdomen: Normal bowel sounds, soft, non-distended, non-tender  Skin: Stasis lower extremity, decreased erythema; onychomycosis!  Lower extremity: decreasing edema  Neuro: AO x 3    Medications       aspirin  81 mg Oral At Bedtime     bumetanide  0.5 mg Oral BID     DULoxetine  20 mg Oral Daily     Fluticasone-Umeclidin-Vilanterol  1 puff Inhalation Daily     gabapentin  200 mg Oral BID 09 12     gabapentin  300 mg Oral At Bedtime     heparin ANTICOAGULANT  5,000 Units Subcutaneous Q12H     magnesium oxide  400 mg Oral At Bedtime     metoprolol succinate ER  12.5 mg Oral Daily     midodrine  2.5 mg Oral BID 09 12     multivitamin w/minerals  1 tablet Oral Daily     pantoprazole  40 mg Oral Daily with supper     polyethylene glycol  17 g Oral Daily     sodium chloride (PF)  3 mL Intracatheter Q8H     sodium chloride (PF)  3 mL Intracatheter Q8H     thiamine  100 mg Oral Daily     Vitamin D3  125 mcg Oral Daily     zinc gluconate  50 mg Oral Daily       Data   All microbiology laboratory data reviewed.  Recent Labs   Lab Test 11/15/21  0419 11/14/21  0250 11/12/21  0520 11/11/21  0523 11/10/21  0849   WBC 6.1  --  3.8*  --  3.8*   HGB 11.7*  --  11.0*  --  10.5*   HCT 36.5*  --  33.8*  --  32.9*   *  --  109*  --  110*    177 166   < > 151    < > = values in this interval not displayed.     Recent Labs   Lab Test 11/15/21  0419 11/14/21  0250 11/13/21  0615   CR 2.15* 2.11* 2.13*       Collected Updated Procedure Result Status    11/12/2021 0526 11/12/2021 0529 Blood Culture Hand, Right [[id number]]   Blood from Hand, Right    In process Component Value   No component results              11/12/2021 0520 11/12/2021 0529 Blood Culture Peripheral Blood [[id number]]   Peripheral Blood    In process Component Value   No component results              11/11/2021 1210 11/12/2021 1746 Blood Culture Peripheral Blood [[id number]]   Peripheral Blood     Preliminary result Component Value   Culture No growth after 1 day P              11/11/2021 1210 11/12/2021 1746 Blood Culture Peripheral Blood [[id number]]   Peripheral Blood    Preliminary result Component Value   Culture No growth after 1 day P              11/10/2021 0020 11/12/2021 0946 Blood Culture Hand, Right [[id number]]   Blood from Hand, Right    Preliminary result Component Value   Culture No growth after 2 days P              11/10/2021 0015 11/12/2021 1216 Blood Culture Arm, Right [[id number]]   (Abnormal)   Blood from Arm, Right    Preliminary result Component Value   Culture Positive on the 1st day of incubation Abnormal  P    Micrococcus species Panic  P    1 of 2 bottles              11/10/2021 0015 11/11/2021 1400 Verigene GP Panel [[id number]]    Blood from Arm, Right    Final result Component Value   Staphylococcus species Not Detected   Staphylococcus aureus Not Detected   Staphylococcus epidermidis Not Detected   Staphylococcus lugdunensis Not Detected   Enterococcus faecalis Not Detected   Enterococcus faecium Not Detected   Streptococcus species Not Detected   Streptococcus agalactiae Not Detected   Streptococcus anginosus group Not Detected   Streptococcus pneumoniae Not Detected   Streptococcus pyogenes Not Detected   Listeria species Not Detected        RADIOLOGY:    XR Chest 2 Views    Result Date: 11/8/2021  EXAM: XR CHEST 2 VW LOCATION: [facility] DATE/TIME: 11/8/2021 3:27 PM INDICATION: PE suspected, low/intermediate prob, positive D-dimer COMPARISON: Chest CT 10/18/2021     IMPRESSION: Stable posttreatment changes left upper lobe and postop wedge resections right upper and middle lobes. No new focal consolidation, pneumothorax nor pleural effusion.    NM Lung Scan Perfusion Particulate    Result Date: 11/8/2021  EXAM: NM LUNG SCAN PERFUSION PARTICULATE LOCATION: [facility] DATE/TIME: 11/8/2021 3:09 PM INDICATION: 4 days of  hypotension, lightheadedness hypoxia and elevated d-dimer. Pulmonary embolus suspected, low to intermediate probability. History of left upper lobe lung cancer, status post SBRT and wedge resections of right upper and middle lobe adenocarcinoma. COMPARISON: 2 view chest 2021, 6 chest CT 10/18/2021 TECHNIQUE: 8.0 mCi technetium-99m MAA, IV. Standard lung perfusion imaging. FINDINGS: Matched left upper lobe subsegmental perfusion defect corresponding to radiation fibrosis/posttreatment changes. No additional segmental/subsegmental perfusion defects.     IMPRESSION: 1.  Pulmonary embolus absent, low probability scan.    Echocardiogram Complete    Result Date: 2021  [id number]^LUCRECIA^[place] [address]  Name: CELIA LUTZ MRN: [id number] : 1945 Study Date: 2021 02:15 PM Age: 76 yrs Gender: Male Patient Location: [facility] Reason For Study: Endocarditis Ordering Physician: SAMUEL SINGER Performed By: ACE  BSA: 2.2 m2 Height: 71 in Weight: 216 lb HR: 72 ______________________________________________________________________________ Procedure Complete Echo Adult. Definity (NDC #10463-825) given intravenously. 5mL given. LOT#[id number]. Technically difficult study. Poor acoustic windows. ______________________________________________________________________________ Interpretation Summary  The left ventricle is mildly dilated with normal left ventricular wall thickness. Left ventricular function is normal.The ejection fraction is 55-60%. Normal right ventricle size and systolic function. No significant valve disease identified. No evidence of endocarditis identified, however (especially with poor acoustic windows), transthoracic echocardiography does not have high sensitivity for identifying endocarditis. If suspicion remains for this condition consider evaluation with a transesophageal echocardiogram.  ______________________________________________________________________________ I      WMSI = 1.00     % Normal = 100  X - Cannot   0 -                      (2) - Mildly 2 -          Segments  Size Interpret    Hyperkinetic 1 - Normal  Hypokinetic  Hypokinetic  1-2     small                                                    7 -          3-5    moderate 3 - Akinetic 4 -          5 -         6 - Akinetic Dyskinetic   6-14    large              Dyskinetic   Aneurysmal  w/scar       w/scar       15-16   diffuse  Left Ventricle The left ventricle is mildly dilated. Left ventricular function is normal.The ejection fraction is 55-60%. There is normal left ventricular wall thickness. Left ventricular diastolic function is indeterminate. No regional wall motion abnormalities noted.  Right Ventricle Normal right ventricle size and systolic function.  Atria Normal left atrial size. Right atrial size is normal. There is no color Doppler evidence of an atrial shunt.  Mitral Valve Mitral valve leaflets appear normal. There is no evidence of mitral stenosis or clinically significant mitral regurgitation.  Tricuspid Valve Tricuspid valve leaflets appear normal. There is no evidence of tricuspid stenosis or clinically significant tricuspid regurgitation. Right ventricle systolic pressure estimate normal.  Aortic Valve The aortic valve is trileaflet with aortic valve sclerosis. No aortic regurgitation is present. No aortic stenosis is present.  Pulmonic Valve The pulmonic valve is not well seen, but is grossly normal. This degree of valvular regurgitation is within normal limits. There is trace pulmonic valvular regurgitation.  Vessels The aorta root is normal. Normal size ascending aorta. IVC diameter <2.1 cm collapsing >50% with sniff suggests a normal RA pressure of 3 mmHg.  Pericardium There is no pericardial effusion.  Rhythm Sinus rhythm was noted.  ______________________________________________________________________________  MMode/2D Measurements & Calculations IVSd: 0.65 cm LVIDd: 6.0 cm LVIDs: 4.4 cm LVPWd: 0.96 cm  FS: 26.7 % LV mass(C)d: 187.1 grams LV mass(C)dI: 85.9 grams/m2 LVOT diam: 2.5 cm LVOT area: 5.0 cm2 LA Volume (BP): 63.7 ml LA Volume Index (BP): 29.2 ml/m2  LA Volume Indexed (AL/bp): 31.2 ml/m2 RWT: 0.32  Time Measurements MM HR: 72.0 BPM  Doppler Measurements & Calculations MV E max rafa: 65.1 cm/sec MV A max rafa: 66.5 cm/sec MV E/A: 0.98 MV dec slope: 440.4 cm/sec2 MV dec time: 0.15 sec Ao V2 max: 161.3 cm/sec Ao max PG: 10.0 mmHg Ao V2 mean: 113.8 cm/sec Ao mean P.8 mmHg Ao V2 VTI: 32.8 cm CAMILLE(I,D): 3.2 cm2 CAMILLE(V,D): 3.2 cm2 LV V1 max P.3 mmHg LV V1 max: 103.7 cm/sec LV V1 VTI: 20.8 cm SV(LVOT): 104.9 ml SI(LVOT): 48.2 ml/m2 PA acc time: 0.13 sec AV Rafa Ratio (DI): 0.64  CAMILLE Index (cm2/m2): 1.5 E/E': 6.9 E/E' av.8 Lateral E/e': 6.9 Medial E/e': 16.6 Peak E' Rafa: 9.5 cm/sec  ______________________________________________________________________________ Report approved by: Eliza Mullen 2021 03:31 PM       Echocardiogram Complete    Result Date: 2021  [id number]^BISI^[place] [address]  Name: CELIA LUTZ MRN: [id number] : 1945 Study Date: 2021 09:14 AM Age: 76 yrs Gender: Male Patient Location: [facility] Reason For Study: RBBB & Left Anterior Fasicular Block, Edema Ordering Physician: DHEERAJ MATHEWS Performed By: ANGELO  BSA: 2.1 m2 Height: 71 in Weight: 200 lb ______________________________________________________________________________ Procedure Complete Portable Echo Adult. Definity (NDC #38725-106) given intravenously. ______________________________________________________________________________ Interpretation Summary  There is borderline concentric left ventricular hypertrophy. The visual ejection fraction is 55-60%. There is mild (1+) mitral regurgitation. There is trace to mild tricuspid  regurgitation. ______________________________________________________________________________ Left Ventricle The left ventricle is normal in size. There is borderline concentric left ventricular hypertrophy. Diastolic Doppler findings (E/E' ratio and/or other parameters) suggest left ventricular filling pressures are indeterminate. The visual ejection fraction is 55-60%. No regional wall motion abnormalities noted.  Right Ventricle The right ventricle is normal in size and function.  Atria Normal left atrial size. Right atrial size is normal. Intact atrial septum.  Mitral Valve Mitral valve leaflets appear normal. There is mild (1+) mitral regurgitation.  Tricuspid Valve Tricuspid valve leaflets appear normal. There is trace to mild tricuspid regurgitation.  Aortic Valve Aortic valve leaflets appear normal. There is no evidence of aortic stenosis or clinically significant aortic regurgitation.  Pulmonic Valve The pulmonic valve is not well visualized.  Vessels Borderline aortic root dilation. Normal size ascending aorta. IVC diameter <2.1 cm collapsing >50% with sniff suggests a normal RA pressure of 3 mmHg.  Pericardium There is no pericardial effusion. ______________________________________________________________________________ MMode/2D Measurements & Calculations IVSd: 0.95 cm  LVIDd: 5.4 cm LVIDs: 3.9 cm LVPWd: 1.2 cm FS: 28.4 % LV mass(C)d: 226.5 grams LV mass(C)dI: 107.4 grams/m2 Ao root diam: 3.9 cm LA dimension: 3.9 cm asc Aorta Diam: 3.8 cm LA/Ao: 1.0 LVOT diam: 2.2 cm LVOT area: 3.7 cm2 LA Volume Indexed (AL/bp): 21.9 ml/m2 RWT: 0.44  Time Measurements MM HR: 61.0 BPM  Doppler Measurements & Calculations MV E max raffy: 65.5 cm/sec MV A max raffy: 78.6 cm/sec MV E/A: 0.83 MV dec time: 0.20 sec Ao V2 max: 186.8 cm/sec Ao max P.0 mmHg Ao V2 mean: 122.0 cm/sec Ao mean P.0 mmHg Ao V2 VTI: 34.8 cm CAMILLE(I,D): 1.8 cm2 CAMILLE(V,D): 1.6 cm2 LV V1 max P.6 mmHg LV V1 max: 80.0 cm/sec LV V1 VTI: 16.8 cm  SV(LVOT): 63.1 ml SI(LVOT): 29.9 ml/m2 AV Rafa Ratio (DI): 0.43 CAMILLE Index (cm2/m2): 0.86 E/E' av.9 Lateral E/e': 6.7 Medial E/e': 11.1  ______________________________________________________________________________ Report approved by: Eliza Narvaez 2021 11:16 AM       US Lower Extremity Venous Duplex Bilateral    Result Date: 2021  EXAM: US LOWER EXTREMITY VENOUS DUPLEX BILATERAL LOCATION: [facility] DATE/TIME: 2021 7:36 PM INDICATION: bilateral lower leg swelling and pain COMPARISON: 2021 TECHNIQUE: Venous Duplex ultrasound of bilateral lower extremities with and without compression, augmentation and duplex. Color flow and spectral Doppler with waveform analysis performed. FINDINGS: Exam includes the common femoral, femoral, popliteal veins as well as segmentally visualized deep calf veins and greater saphenous vein. RIGHT: No deep vein thrombosis. No superficial thrombophlebitis. No popliteal cyst. LEFT: No deep vein thrombosis. No superficial thrombophlebitis. No popliteal cyst.     IMPRESSION: 1.  No deep venous thrombosis in the bilateral lower extremities.    Abdomen US, limited (RUQ only)    Result Date: 2021  EXAM: US ABDOMEN LIMITED LOCATION: [facility] DATE/TIME: 2021 7:36 PM INDICATION: elevated bilirubin. Lung cancer. COMPARISON: CT 10/18/2021 TECHNIQUE: Limited abdominal ultrasound. FINDINGS: GALLBLADDER: Filled with sludge. No definite stone seen. Normal wall thickness. BILE DUCTS: No biliary dilatation. The common duct measures 4 mm. LIVER: Increased echogenicity from diffuse fatty infiltration. Heterogeneous echotexture. Deeper portions of the liver are not diagnostically visualized. No focal mass seen. RIGHT KIDNEY: No hydronephrosis.It contains a 2.9 cm cyst. PANCREAS: The visualized portions are normal. No ascites.     IMPRESSION: 1.  Diffuse hepatic steatosis. 2.  Sludge containing gallbladder.     XR Chest  Port 1 View    Result Date: 11/10/2021  EXAM: XR CHEST PORT 1 VIEW LOCATION: [facility] DATE/TIME: 11/10/2021 2:29 AM INDICATION: Fever. COMPARISON: 11/8/2021. FINDINGS: The heart size is normal. The thoracic aorta is calcified. Stable surgical clip and scarring in the left upper lobe. The lungs are otherwise clear. Old right rib fractures. No pneumothorax.     IMPRESSION: No acute abnormality.    CT Chest Abdomen Pelvis w/o Contrast    Result Date: 11/11/2021  EXAM: CT CHEST ABDOMEN PELVIS W/O CONTRAST LOCATION: [facility] DATE/TIME: 11/11/2021 8:43 PM INDICATION: Sepsis COMPARISON: 10/18/2021 and 02/25/2019 TECHNIQUE: CT scan of the chest, abdomen, and pelvis was performed without IV contrast. Multiplanar reformats were obtained. Dose reduction techniques were used. CONTRAST: None. FINDINGS: LUNGS AND PLEURA: Small pleural effusions. A left suprahilar opacity has not changed. There are fiducial markers in this region. A 9 mm x 7 mm left upper lobe nodule was previously 8 mm x 6 mm (series 4 image 80).  There are suture lines in the right lung with right upper lung architectural distortion. Emphysema is present. MEDIASTINUM/AXILLAE: The main pulmonary artery is 35 mm in diameter, which can be seen with pulmonary hypertension.  The ascending aorta is 4.2 cm in diameter, which is unchanged. CORONARY ARTERY CALCIFICATION: Severe. HEPATOBILIARY: Hepatic steatosis. PANCREAS: A pancreatic lesion has not significantly changed in size. SPLEEN: Normal. ADRENAL GLANDS: Normal. KIDNEYS/BLADDER: A low-attenuation right renal lesion is probably a simple cyst and does not require follow-up. Status post left nephrectomy. No new soft tissue in the nephrectomy bed. BOWEL: Normal. LYMPH NODES: Normal. VASCULATURE: Status post aortobiiliac stent graft placement for an infrarenal abdominal aorta aneurysm. PELVIC ORGANS: Normal. MUSCULOSKELETAL: Right hip arthroplasty. Nonacute rib  fractures are again seen.     IMPRESSION: 1.  No source of sepsis identified. 2.  A left upper lobe nodule is 1 mm larger. Metastatic disease is possible. 3.  Otherwise no significant change from the prior study. The pancreatic lesion can be followed as detailed below. REFERENCE: Revisions of international consensus [place] guidelines for the management of IPMN of the pancreas. Pancreatology 2017;17(5):738-753. Largest cyst between 20-30 mm: EUS in 3-6 months and then annual surveillance alternating between MRI and EUS as appropriate.     CT Chest w Contrast    Result Date: 10/18/2021  EXAM: CT CHEST W CONTRAST LOCATION: [facility] DATE/TIME: 10/18/2021 1:19 PM INDICATION: Follow-up left upper lobe lung cancer, post SBRT in 3/2021. Previous history of right upper and middle lobe adenocarcinoma status post wedge resections. COMPARISON: CT 6/16/2021 and 8/31/2020 PET CT TECHNIQUE: CT chest with IV contrast. Multiplanar reformats were obtained. Dose reduction techniques were used. CONTRAST: 75 mL Isovue-370. FINDINGS: LUNGS AND PLEURA: Left upper lobe fiducial marker with previous spiculated left upper lobe mass obscured by new surrounding consolidative and groundglass opacity compatible with posttreatment radiation fibrosis. New 8 mm irregular solid nodular opacity image 113 series 4 suspicious for metastatic disease versus new primary lung cancer. Stable postop changes from right upper and middle lobe wedge resections. Biapical centrilobular and paraseptal emphysema redemonstrated. Central airways are patent. No pleural effusion MEDIASTINUM/AXILLAE: No mass/adenopathy nor pericardial effusion. The thoracic aorta and heart are normal in size. CORONARY ARTERY CALCIFICATION: Severe. UPPER ABDOMEN: Severe hepatic steatosis. Stable 17 mm cyst within the pancreatic tail compatible with IPMN. Previous left nephrectomy. MUSCULOSKELETAL: Old healed rib fracture deformities. No suspicious osseous  lesions.     IMPRESSION: 1.  New consolidative and groundglass opacities around previous left upper lobe nodule compatible with posttreatment radiation fibrosis. 2.  New 8 mm irregular left upper lobe solid nodule which could represent either metastatic lesion or primary lung cancer. 3.  Stable 17 mm pancreatic cystic lesion likely representing IPMN. This could be followed up with CT in 2 years. REFERENCE: Revisions of international consensus [place] guidelines for the management of IPMN of the pancreas. Pancreatology 2017;17(5):738-753. Largest cyst between 10-20 mm: CT or MRI/MRCP every 6 months for 1 year and then yearly for 2 years and then every 2 years if no change.

## 2021-11-17 ENCOUNTER — APPOINTMENT (OUTPATIENT)
Dept: OCCUPATIONAL THERAPY | Facility: CLINIC | Age: 76
DRG: 682 | End: 2021-11-17
Payer: MEDICARE

## 2021-11-17 LAB
ALBUMIN SERPL-MCNC: 2.8 G/DL (ref 3.5–5)
ALP SERPL-CCNC: 115 U/L (ref 45–120)
ALT SERPL W P-5'-P-CCNC: 14 U/L (ref 0–45)
ANION GAP SERPL CALCULATED.3IONS-SCNC: 10 MMOL/L (ref 5–18)
AST SERPL W P-5'-P-CCNC: 45 U/L (ref 0–40)
BACTERIA BLD CULT: NO GROWTH
BACTERIA BLD CULT: NO GROWTH
BILIRUB DIRECT SERPL-MCNC: 0.5 MG/DL
BILIRUB SERPL-MCNC: 1 MG/DL (ref 0–1)
BUN SERPL-MCNC: 16 MG/DL (ref 8–28)
CALCIUM SERPL-MCNC: 10.2 MG/DL (ref 8.5–10.5)
CHLORIDE BLD-SCNC: 94 MMOL/L (ref 98–107)
CO2 SERPL-SCNC: 32 MMOL/L (ref 22–31)
CREAT SERPL-MCNC: 1.91 MG/DL (ref 0.7–1.3)
GFR SERPL CREATININE-BSD FRML MDRD: 33 ML/MIN/1.73M2
GLUCOSE BLD-MCNC: 129 MG/DL (ref 70–125)
PLATELET # BLD AUTO: 232 10E3/UL (ref 150–450)
POTASSIUM BLD-SCNC: 3.5 MMOL/L (ref 3.5–5)
PROT SERPL-MCNC: 5.6 G/DL (ref 6–8)
SODIUM SERPL-SCNC: 136 MMOL/L (ref 136–145)

## 2021-11-17 PROCEDURE — 36415 COLL VENOUS BLD VENIPUNCTURE: CPT | Performed by: FAMILY MEDICINE

## 2021-11-17 PROCEDURE — 210N000002 HC R&B HEART CARE

## 2021-11-17 PROCEDURE — 97110 THERAPEUTIC EXERCISES: CPT | Mod: GO

## 2021-11-17 PROCEDURE — 85049 AUTOMATED PLATELET COUNT: CPT | Performed by: FAMILY MEDICINE

## 2021-11-17 PROCEDURE — 250N000013 HC RX MED GY IP 250 OP 250 PS 637: Performed by: PHYSICIAN ASSISTANT

## 2021-11-17 PROCEDURE — 97535 SELF CARE MNGMENT TRAINING: CPT | Mod: GO

## 2021-11-17 PROCEDURE — 250N000013 HC RX MED GY IP 250 OP 250 PS 637: Performed by: FAMILY MEDICINE

## 2021-11-17 PROCEDURE — 83970 ASSAY OF PARATHORMONE: CPT | Performed by: NURSE PRACTITIONER

## 2021-11-17 PROCEDURE — 99233 SBSQ HOSP IP/OBS HIGH 50: CPT | Performed by: NURSE PRACTITIONER

## 2021-11-17 PROCEDURE — 80053 COMPREHEN METABOLIC PANEL: CPT | Performed by: FAMILY MEDICINE

## 2021-11-17 PROCEDURE — 99231 SBSQ HOSP IP/OBS SF/LOW 25: CPT | Performed by: FAMILY MEDICINE

## 2021-11-17 PROCEDURE — 82306 VITAMIN D 25 HYDROXY: CPT | Performed by: NURSE PRACTITIONER

## 2021-11-17 PROCEDURE — 250N000011 HC RX IP 250 OP 636: Performed by: FAMILY MEDICINE

## 2021-11-17 RX ORDER — BUMETANIDE 0.5 MG/1
0.5 TABLET ORAL DAILY
Status: DISCONTINUED | OUTPATIENT
Start: 2021-11-18 | End: 2021-11-23 | Stop reason: HOSPADM

## 2021-11-17 RX ADMIN — MAGNESIUM OXIDE TAB 400 MG (241.3 MG ELEMENTAL MG) 400 MG: 400 (241.3 MG) TAB at 21:36

## 2021-11-17 RX ADMIN — HEPARIN SODIUM 5000 UNITS: 5000 INJECTION, SOLUTION INTRAVENOUS; SUBCUTANEOUS at 21:36

## 2021-11-17 RX ADMIN — ASPIRIN 81 MG: 81 TABLET, COATED ORAL at 21:36

## 2021-11-17 RX ADMIN — Medication 50 MG: at 08:09

## 2021-11-17 RX ADMIN — BUMETANIDE 0.5 MG: 0.5 TABLET ORAL at 08:09

## 2021-11-17 RX ADMIN — GABAPENTIN 200 MG: 100 CAPSULE ORAL at 12:00

## 2021-11-17 RX ADMIN — GABAPENTIN 200 MG: 100 CAPSULE ORAL at 08:09

## 2021-11-17 RX ADMIN — MIDODRINE HYDROCHLORIDE 2.5 MG: 2.5 TABLET ORAL at 08:09

## 2021-11-17 RX ADMIN — PANTOPRAZOLE SODIUM 40 MG: 20 TABLET, DELAYED RELEASE ORAL at 16:16

## 2021-11-17 RX ADMIN — MIDODRINE HYDROCHLORIDE 2.5 MG: 2.5 TABLET ORAL at 12:00

## 2021-11-17 RX ADMIN — HEPARIN SODIUM 5000 UNITS: 5000 INJECTION, SOLUTION INTRAVENOUS; SUBCUTANEOUS at 08:08

## 2021-11-17 RX ADMIN — Medication 100 MG: at 08:08

## 2021-11-17 RX ADMIN — Medication 125 MCG: at 08:09

## 2021-11-17 RX ADMIN — MULTIPLE VITAMINS W/ MINERALS TAB 1 TABLET: TAB at 08:09

## 2021-11-17 RX ADMIN — DULOXETINE HYDROCHLORIDE 20 MG: 20 CAPSULE, DELAYED RELEASE ORAL at 08:09

## 2021-11-17 RX ADMIN — GABAPENTIN 300 MG: 300 CAPSULE ORAL at 21:36

## 2021-11-17 ASSESSMENT — ACTIVITIES OF DAILY LIVING (ADL)
ADLS_ACUITY_SCORE: 23

## 2021-11-17 ASSESSMENT — MIFFLIN-ST. JEOR: SCORE: 1682.45

## 2021-11-17 NOTE — PLAN OF CARE
Problem: COPD Comorbidity  Goal: Maintenance of COPD Symptom Control  Outcome: No Change    Continues on 2 liters O2 sating in low-mid 90's. No c/o SOB     Problem: Heart Failure Comorbidity  Goal: Maintenance of Heart Failure Symptom Control  Outcome: No Change    +2 BLE. No c/o SOB. On scheduled Bumex.      A&Ox3, disoriented to time. VSS, continues on 2 liters O2 via NC.  BP's WNL on shift. Denied any pain. Male external catheter in place; good UOP overnight. Perineum blanchable redness with some peeling. Did not get oob overnight. Up with A1/GB/W to BSC. Turning by self in bed. Tele: SR with 1st degree AV block and BBB. PIV SL x2. Discharge pending to TCU. Needs O2 @ discharge.

## 2021-11-17 NOTE — PROGRESS NOTES
RENAL PROGRESS NOTE    PLAN:  Pt feeling very dry, net neg 5kg, seems a bit contracted (rising bicarb)  Continue current low dose diuretics with Midodrine  Reduce Bumex to daily dosing, can dose prn in the afternoon if wt gains/edema  TCU plans per [facility]  rec daily wts to monitor volume status outpt     LEILA on CKD IIIb: creat stalled in the 2.1 range  Baseline cr in the 1.5-1.8 range on chart review.    Diuretics responsive.    Very bland urinalysis 11/10/2021, no proteinuria, pyuria, or microscopic hematuria.    PLAN:  continue low dose bumex daily, midodrineBID  for discharge  trend I/Os  rec daily wts after discharge to better gauge volume status.     Volume: pt appears near euvolemic by exam if not a bit dry, wts a bit erratic,   some degree of liver disease, and albumin quite low.  He is likely third spacing some of the fluids secondary to his hypoalbuminemia.  Continue on bumex, low Na diet     Lytes/acid/base: stabilized, hyponatremia improved with diuresis.  Mild hypokalemia likely secondary to loop diuretics, also stable, .     Anemia: Macrocytic.  History of alcohol abuse.  B12 and folic acid within normal limits this admit.  Iron studies okay and Erythropoietin level reasonable.  Also has history of Ragsdale's esophagus and previous GIB.  No obvious signs of GI blood loss.  No need for FRANCISCO JAVIER at this time     Bacteremia:. Now thought to be a contaminant.  ID following, DC'd  Vanco and Zosyn, ECHO without veg, monitoring off antibx     Bilateral lower extremity pain/neuropathy: Did have elevated D-dimer, negative VQ scan and lower extremity US.  Started on gabapentin renally dosed with some relief.  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     Chronic systolic heart failure: 5/2021 echocardiogram showed EF 55%.  Mild hypertrophy otherwise normal.  On low-dose beta-blocker at baseline.  11/9 echocardiogram EF 55 to 60% borderline LVH and mild MR.     CAD: MI in 2019.  PCI x1.    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  Follows with oncology.     Ragsdale's esophagus: Continue home PPI     Alcohol abuse: Drinks a liter of vodka per day.  On protocols.  Social work chemical dependency consultation.    Malnutrition:  On nutritional supp, had Alb infusions earlier in admit    SUBJECTIVE:  Chart reviewed, pt states he feels too dry, dizzy when getting up etc.  Still no word on TCU.  Denies SOB, no CP. Voiding frequently     OBJECTIVE:  Physical Exam   Temp: 98.8  F (37.1  C) Temp src: Axillary BP: 107/59 Pulse: 55   Resp: 18 SpO2: 92 % O2 Device: Nasal cannula Oxygen Delivery: 2 LPM  Vitals:    11/15/21 0343 21 0600 21 0416   Weight: 94.8 kg (208 lb 14.4 oz) 96.3 kg (212 lb 3.2 oz) 93.5 kg (206 lb 3.2 oz)     Vital Signs with Ranges  Temp:  [97.8  F (36.6  C)-98.8  F (37.1  C)] 98.8  F (37.1  C)  Pulse:  [55-70] 55  Resp:  [16-18] 18  BP: (107-120)/(56-66) 107/59  SpO2:  [92 %-93 %] 92 %  I/O last 3 completed shifts:  In: 420 [P.O.:420]  Out: 1350 [Urine:1350]    Temp (24hrs), Av.5  F (36.9  C), Min:97.7  F (36.5  C), Max:99.1  F (37.3  C)      Patient Vitals for the past 72 hrs:   Weight   21 0416 93.5 kg (206 lb 3.2 oz)   21 0600 96.3 kg (212 lb 3.2 oz)   11/15/21 0343 94.8 kg (208 lb 14.4 oz)       Intake/Output Summary (Last 24 hours) at 11/15/2021 0919  Last data filed at 11/15/2021 0600  Gross per 24 hour   Intake 1000 ml   Output 1000 ml   Net 0 ml       PHYSICAL EXAM:  General - supine in bed, pleasant, but a bit tired (I woke him up)   Cardiovascular - BP controlled with Midodrine , though still a bit soft   Respiratory - on 2L 02 per NC chronically   Abd: BS present, no guarding or pain with palpation, no ascites  Extremities - trace lower  extremity edema bilaterally, skin wrinkling from prior edema   Skin: dry, intact, no rash, good turgor, skin changes c/w prior edema improved   :  Good UO , net neg 5kg    LABORATORY STUDIES:     Recent Labs   Lab 11/17/21  0530 11/15/21  0419 11/14/21  0250 11/12/21  0520 11/11/21  0523   WBC  --  6.1  --  3.8*  --    RBC  --  3.33*  --  3.09*  --    HGB  --  11.7*  --  11.0*  --    HCT  --  36.5*  --  33.8*  --     211 177 166 148*       Basic Metabolic Panel:  Recent Labs   Lab 11/17/21  0530 11/16/21  0442 11/15/21  0419 11/14/21  1503 11/14/21  0250 11/13/21  1250 11/13/21  0615 11/12/21  0824 11/12/21  0520    138 136  --  139  --  142  --  142   POTASSIUM 3.5 3.6 3.6 3.7 3.5 3.5 3.0*   < > 3.2*   CHLORIDE 94* 95* 94*  --  96*  --  96*  --  98   CO2 32* 31 29  --  32*  --  32*  --  30   BUN 16 16 13  --  9  --  7*  --  6*   CR 1.91* 2.13* 2.15*  --  2.11*  --  2.13*  --  2.07*   * 125 126*  --  136*  --  126*  --  126*   BRADLEY 10.2 10.4 10.0  --  10.3  --  10.4  --  9.9    < > = values in this interval not displayed.       INR  No lab results found in last 7 days.     Recent Labs   Lab Test 11/17/21  0530 11/15/21  0419 11/14/21  0250 11/12/21  0520 11/10/21  0849 11/09/21  0240 11/08/21  2044   INR  --   --   --   --   --  1.24* 1.22*   WBC  --  6.1  --  3.8*   < > 4.0  --    HGB  --  11.7*  --  11.0*   < > 11.7*  --     211   < > 166   < > 146*  --     < > = values in this interval not displayed.       Personally reviewed current labs     ~ 25 minutes spent in exam, POC, education regarding renal disease and management.  >90% time spent in education and counseling and/or discussion with patient's care team    Miguelina Abel, [facility]  [facility]  [phone]

## 2021-11-17 NOTE — PROGRESS NOTES
Care Management Follow Up    Length of Stay (days): 9    Expected Discharge Date: 11/17/2021     Concerns to be Addressed:       Patient plan of care discussed at interdisciplinary rounds: Yes    Anticipated Discharge Disposition:       Anticipated Discharge Services:    Anticipated Discharge DME:      Patient/family educated on Medicare website which has current facility and service quality ratings:    Education Provided on the Discharge Plan:    Patient/Family in Agreement with the Plan:      Referrals Placed by CM/SW:    Private pay costs discussed: Not applicable    Additional Information:  Follow up with TCU referrals,  [facility] bed available to day and uncertain when a bed will be available.    [facility] [phone] voice message left x2 regarding bed availability.    11:29 AM      Spoke to admissions at [facility] and they will be reviewing pt's information and contacting care manager regarding availability.  2:11 PM      DAVI Dale

## 2021-11-17 NOTE — PLAN OF CARE
Problem: Adult Inpatient Plan of Care  Goal: Optimal Comfort and Wellbeing  Outcome: Improving   Pt able to verbalize needs. Pt rolling himself in bed from side to side.   Problem: Adult Inpatient Plan of Care  Goal: Readiness for Transition of Care  Outcome: Adequate for Discharge     Problem: Hypertension Comorbidity  Goal: Blood Pressure in Desired Range  Outcome: Adequate for Discharge   BP WDL this shift.   NSR on tele with 1st degree AVB and BBB  Problem: Adult Inpatient Plan of Care  Goal: Absence of Hospital-Acquired Illness or Injury  Intervention: Identify and Manage Fall Risk  Recent Flowsheet Documentation  Taken 11/16/2021 2000 by Magda Sweet, RN  Safety Promotion/Fall Prevention:   activity supervised   bed alarm on   lighting adjusted   nonskid shoes/slippers when out of bed   safety round/check completed  Taken 11/16/2021 1600 by Magda Sweet, RN  Safety Promotion/Fall Prevention:   activity supervised   bed alarm on   lighting adjusted   nonskid shoes/slippers when out of bed   safety round/check completed

## 2021-11-17 NOTE — PROGRESS NOTES
CLINICAL NUTRITION SERVICES - REASSESSMENT NOTE      EVALUATION OF THE PROGRESS TOWARD GOALS   Diet: 4g Na  Nutrition Supplement/Support  Nepro with meals   Intake: 100%         ANTHROPOMETRICS  Admission wt: 90kg  Most Recent Weight: 93kg      GI Status  WDL    LABS  Reviewed; Cr-1.91    MEDICATIONS  Reviewed      NUTRITION DIAGNOSIS  None at this time     Goals:  Continue to meet est needs-met    INTERVENTIONS  Implementation  Pt not wanting nepro will discontinue       Monitoring/Evaluation  Progress toward goals will be monitored and evaluated per protocol.

## 2021-11-17 NOTE — PROGRESS NOTES
[facility] MEDICINE PROGRESS NOTE      Length of stay: Day # 8    Identification/Summary: Lenny Chau is a 76 year old male with a past medical history of coronary artery disease, lung cancer, prostate and oral cancer, CHF, COPD, Ragsdale's esophagus, CKD and alcohol abuse  who was admitted with chief complaint of bilateral lower extremity edema on 11/8/2021.     Admitting impression of bilateral lower extremity edema     Brief history: For 7 days patient has been having increasing edema of the lower extremities with redness.  Has been feeling lightheaded and dizzy.  Was brought to the emergency room.  ER course: LFTs were quite elevated and had mild troponin elevation.  Patient was advised to be admitted at [facility] but patient declined and went home instead.  His symptoms worsened and so on 11/8/2021 went to the emergency room at [facility], lactic acid was elevated, D-dimer elevated, creatinine elevated at 2.26.  Patient does drink 1 L of vodka daily.  VQ scan was negative for PE.  He also has had some increasing distention of the abdomen.     Assessment and Plan:  Acute on chronic systolic heart failure preserved ejection fraction, improved.  Bilateral lower extremity edema, improving (from venous stasis, chronic liver, third spacing, congestive heart failure), improved.  Third spacing, improved.  Coronary artery disease with previous MI in 2019, stable; MI ruled out  BNP is 99, troponin negative x2  Elevated D-dimer, VQ scan low probability for pulmonary embolism.  Chest x-ray showed prior lung resections otherwise negative.  Bilateral lower extremity ultrasound negative for DVT  Received albumin infusion and Lasix  Nephrology advising on fluid and electrolyte management  Echocardiogram unremarkable.  EF of 55 to 60% borderline LVH and mild MR  Currently on Bumex 0.5 mg daily, midodrine   Troponin negative x 3 . On aspirin, metoprolol  -continue to track daily weights at  discharge.     Bacteremia gram-positive cocci  Fever, no recurrence  Was placed on Zosyn and vancomycin empirically- discontinued by infectious disease.  Monitor off antibiotics.  There was no clear focus of infection.  Negative CAT scan of the abdomen and pelvis and chest  Second blood cultures so far negative to date.  Echocardiogram without any evidence of endocarditis.  Limited sensitivity with transthoracic echocardiogram.      Lactic acidosis, improved probably volume related  Initially received IV fluids  No evidence for sepsis.  Taken off antibiotics.  Subsequently received albumin infusion and Lasix  Currently hemodynamically stable  Blood cultures negative  CAT scan of the abdomen chest and pelvis was negative for any source of infection     Acute on chronic kidney disease, stage III, gradually improving and stable.  Hyponatremia, resolved   hypokalemia, electrolyte abnormalities, corrected  Hyponatremia  Bilateral lower extremity pain and neuropathy  Nephrology consulting on the case  Received albumin infusion and Lasix  On low-dose Bumex and midodrine.  GFR improving slowly     History of depression  Alcohol abuse, alcoholic hepatitis, improved  Hepatic steatosis   on right upper quadrant abdominal ultrasound with gallbladder sludge   consult.  Needing TCU placement.     Hypoalbuminemia  Moderate protein caloric malnutrition  Nutritional support  Has received albumin infusion and Lasix     COPD, acute on chronic respiratory failure  Left upper lobe nodule 9 x 7 mm  History of lung cancer, status post wedge resection in 2016  Change in supplementation as needed  Concern for possible primary cancer versus metastatic cancer.  Will be following with radiation oncology in late December  Continue inhalers including Trelegy Ellipta inhaler and albuterol  Still needing 2 L of oxygen right now. He quit smoking 20 years ago  Will likely chronically need oxygen on discharge.     Pancreatic  cyst  Follow-up CAT scan in 2 years versus endoscopic ultrasound  Follow-up with primary care or GI  This is not an inpatient issue at this time.     History of prostate cancer, mouth cancer, non-small cell lung cancer  Follows up with oncology  Ragsdale's esophagus   continue PPI     --------------------------------------------------------------------     Diet: No Added Salt 4 Gram Sodium  Snacks/Supplements Adult: Nepro Oral Supplement; With Meals  DVT Prophylaxis: Heparin subcutaneous every 12 and aspirin  Code Status: No CPR- Do NOT Intubate  Living situation: Looking for TCU placement per recommendations from PT and OT evaluation.  Anticipated possible discharge: When TCU available. medically stable at this time.     ----------------------------------------------------------------------     Cumulative essential/pertinent data reviewed:  Labs:  Creatinine initially 2.2 currently 2.1  CBC overall unremarkable.  Blood cultures so far negative  COVID-19 PCR negative     Transaminases slightly elevated ALT currently 108 AST 21  Imaging:  CT of the abdomen and chest and pelvis shows the following:  IMPRESSION:  1.  No source of sepsis identified.  2.  A left upper lobe nodule is 1 mm larger. Metastatic disease is possible.  3.  Otherwise no significant change from the prior study.  The pancreatic lesion can be followed as detailed below.     EKG: --  Echocardiogram shows the following:The left ventricle is mildly dilated with normal left ventricular wall  thickness.  Left ventricular function is normal.The ejection fraction is 55-60%.  Normal right ventricle size and systolic function.  No significant valve disease identified.  No evidence of endocarditis identified, however (especially with poor acoustic  windows), transthoracic echocardiography does not have high sensitivity for  identifying endocarditis. If suspicion remains for this condition consider  evaluation with a transesophageal  echocardiogram.      ----------------------------------------------------------------------    Interval History/Subjective:  Feels well.  Symptoms stable.  Is still on oxygen 2 L by nasal cannula.  Denies abdominal pain.  No nausea.  Eating fairly well.  Rest of review of systems unremarkable.    Physical Exam/Objective:  Temp:  [97.7  F (36.5  C)-98.8  F (37.1  C)] 97.7  F (36.5  C)  Pulse:  [55-70] 60  Resp:  [16-20] 20  BP: (107-119)/(59-67) 116/67  SpO2:  [92 %-97 %] 95 %    Body mass index is 28.76 kg/m .    GENERAL:   Conversant and pleasant;  appears comfortable, in no acute distress,   HEAD:  Normocephalic, without obvious abnormality, atraumatic   EYES:  Grossly normal; pupils unremarkable   NOSE: Grossly normal   THROAT: Lips and mouth external: grossly normal,    NECK: No mass noted; no stiffness   BACK:      LUNGS:   Auscultation: Negative for wheezing or crackles, symmetric chest rise on inhalation, respirations unlabored   CHEST WALL:  No tenderness or deformity   HEART:  Regular rate and rhythm, significant murmurs: Negative,   ABDOMEN:   Soft, non-tender, bowel sounds normal ; no masses, no peritoneal signs   EXTREMITIES:   No tenderness of the calves.  Leg edema significantly improved.  Some venous stasis changes.   SKIN:  see extremities   NEURO: no signs of acute stroke or change from prior state   PSYCH: Cooperative, behavior is appropriate     Medications:   Personally Reviewed.    aspirin  81 mg Oral At Bedtime     [START ON 11/18/2021] bumetanide  0.5 mg Oral Daily     DULoxetine  20 mg Oral Daily     Fluticasone-Umeclidin-Vilanterol  1 puff Inhalation Daily     gabapentin  200 mg Oral BID 09 12     gabapentin  300 mg Oral At Bedtime     heparin ANTICOAGULANT  5,000 Units Subcutaneous Q12H     magnesium oxide  400 mg Oral At Bedtime     metoprolol succinate ER  12.5 mg Oral Daily     midodrine  2.5 mg Oral BID 09 12     multivitamin w/minerals  1 tablet Oral Daily     pantoprazole  40 mg Oral  Daily with supper     polyethylene glycol  17 g Oral Daily     sodium chloride (PF)  3 mL Intracatheter Q8H     thiamine  100 mg Oral Daily     Vitamin D3  125 mcg Oral Daily     zinc gluconate  50 mg Oral Daily     Current Facility-Administered Medications   Medication Dose Route Frequency     acetaminophen  325 mg Oral Q4H PRN    Or     acetaminophen  325 mg Rectal Q4H PRN     albuterol  2 puff Inhalation Q6H PRN     calcium carbonate  500 mg Oral TID PRN     flumazenil  0.2 mg Intravenous q1 min prn     lidocaine 4%   Topical Q1H PRN     lidocaine (buffered or not buffered)  0.1-1 mL Other Q1H PRN     LORazepam  0.25 mg Oral Q8H PRN     melatonin  5 mg Oral QPM PRN     nitroGLYcerin  0.4 mg Sublingual Q5 Min PRN     ondansetron  4 mg Oral Q6H PRN    Or     ondansetron  4 mg Intravenous Q6H PRN     polyethylene glycol  17 g Oral Daily PRN     senna-docusate  1 tablet Oral BID PRN    Or     senna-docusate  2 tablet Oral BID PRN     sodium chloride (PF)  3 mL Intracatheter q1 min prn     sodium chloride (PF)  3 mL Intracatheter q1 min prn

## 2021-11-18 ENCOUNTER — APPOINTMENT (OUTPATIENT)
Dept: PHYSICAL THERAPY | Facility: CLINIC | Age: 76
DRG: 682 | End: 2021-11-18
Payer: MEDICARE

## 2021-11-18 LAB
ALBUMIN SERPL-MCNC: 2.9 G/DL (ref 3.5–5)
ALP SERPL-CCNC: 117 U/L (ref 45–120)
ALT SERPL W P-5'-P-CCNC: 22 U/L (ref 0–45)
ANION GAP SERPL CALCULATED.3IONS-SCNC: 12 MMOL/L (ref 5–18)
AST SERPL W P-5'-P-CCNC: 52 U/L (ref 0–40)
BACTERIA BLD CULT: NO GROWTH
BACTERIA BLD CULT: NO GROWTH
BILIRUB DIRECT SERPL-MCNC: 0.4 MG/DL
BILIRUB SERPL-MCNC: 0.9 MG/DL (ref 0–1)
BUN SERPL-MCNC: 18 MG/DL (ref 8–28)
CALCIUM SERPL-MCNC: 10.7 MG/DL (ref 8.5–10.5)
CHLORIDE BLD-SCNC: 92 MMOL/L (ref 98–107)
CO2 SERPL-SCNC: 34 MMOL/L (ref 22–31)
CREAT SERPL-MCNC: 1.91 MG/DL (ref 0.7–1.3)
GFR SERPL CREATININE-BSD FRML MDRD: 33 ML/MIN/1.73M2
GLUCOSE BLD-MCNC: 128 MG/DL (ref 70–125)
POTASSIUM BLD-SCNC: 3.4 MMOL/L (ref 3.5–5)
POTASSIUM BLD-SCNC: 4.1 MMOL/L (ref 3.5–5)
PROT SERPL-MCNC: 5.8 G/DL (ref 6–8)
SODIUM SERPL-SCNC: 138 MMOL/L (ref 136–145)

## 2021-11-18 PROCEDURE — 99233 SBSQ HOSP IP/OBS HIGH 50: CPT | Performed by: INTERNAL MEDICINE

## 2021-11-18 PROCEDURE — 250N000011 HC RX IP 250 OP 636: Performed by: FAMILY MEDICINE

## 2021-11-18 PROCEDURE — 250N000013 HC RX MED GY IP 250 OP 250 PS 637: Performed by: PHYSICIAN ASSISTANT

## 2021-11-18 PROCEDURE — 250N000013 HC RX MED GY IP 250 OP 250 PS 637: Performed by: FAMILY MEDICINE

## 2021-11-18 PROCEDURE — 120N000001 HC R&B MED SURG/OB

## 2021-11-18 PROCEDURE — 84132 ASSAY OF SERUM POTASSIUM: CPT | Performed by: FAMILY MEDICINE

## 2021-11-18 PROCEDURE — 97110 THERAPEUTIC EXERCISES: CPT | Mod: GP

## 2021-11-18 PROCEDURE — 82310 ASSAY OF CALCIUM: CPT | Performed by: FAMILY MEDICINE

## 2021-11-18 PROCEDURE — 97530 THERAPEUTIC ACTIVITIES: CPT | Mod: GP

## 2021-11-18 PROCEDURE — 82040 ASSAY OF SERUM ALBUMIN: CPT | Performed by: FAMILY MEDICINE

## 2021-11-18 PROCEDURE — 99233 SBSQ HOSP IP/OBS HIGH 50: CPT | Performed by: NURSE PRACTITIONER

## 2021-11-18 PROCEDURE — 36415 COLL VENOUS BLD VENIPUNCTURE: CPT | Performed by: FAMILY MEDICINE

## 2021-11-18 RX ORDER — SODIUM CHLORIDE 9 MG/ML
INJECTION, SOLUTION INTRAVENOUS CONTINUOUS
Status: DISCONTINUED | OUTPATIENT
Start: 2021-11-18 | End: 2021-11-18

## 2021-11-18 RX ORDER — POTASSIUM CHLORIDE 1500 MG/1
20 TABLET, EXTENDED RELEASE ORAL DAILY
Status: DISCONTINUED | OUTPATIENT
Start: 2021-11-19 | End: 2021-11-23 | Stop reason: HOSPADM

## 2021-11-18 RX ORDER — POTASSIUM CHLORIDE 1500 MG/1
20 TABLET, EXTENDED RELEASE ORAL ONCE
Status: COMPLETED | OUTPATIENT
Start: 2021-11-18 | End: 2021-11-18

## 2021-11-18 RX ADMIN — DULOXETINE HYDROCHLORIDE 20 MG: 20 CAPSULE, DELAYED RELEASE ORAL at 08:48

## 2021-11-18 RX ADMIN — ASPIRIN 81 MG: 81 TABLET, COATED ORAL at 22:53

## 2021-11-18 RX ADMIN — POTASSIUM CHLORIDE 20 MEQ: 1500 TABLET, EXTENDED RELEASE ORAL at 06:39

## 2021-11-18 RX ADMIN — POLYETHYLENE GLYCOL 3350 17 G: 17 POWDER, FOR SOLUTION ORAL at 08:49

## 2021-11-18 RX ADMIN — MAGNESIUM OXIDE TAB 400 MG (241.3 MG ELEMENTAL MG) 400 MG: 400 (241.3 MG) TAB at 22:54

## 2021-11-18 RX ADMIN — MULTIPLE VITAMINS W/ MINERALS TAB 1 TABLET: TAB at 08:47

## 2021-11-18 RX ADMIN — PANTOPRAZOLE SODIUM 40 MG: 20 TABLET, DELAYED RELEASE ORAL at 17:48

## 2021-11-18 RX ADMIN — HEPARIN SODIUM 5000 UNITS: 5000 INJECTION, SOLUTION INTRAVENOUS; SUBCUTANEOUS at 08:50

## 2021-11-18 RX ADMIN — HEPARIN SODIUM 5000 UNITS: 5000 INJECTION, SOLUTION INTRAVENOUS; SUBCUTANEOUS at 22:54

## 2021-11-18 RX ADMIN — GABAPENTIN 300 MG: 300 CAPSULE ORAL at 22:53

## 2021-11-18 RX ADMIN — Medication 125 MCG: at 08:50

## 2021-11-18 RX ADMIN — GABAPENTIN 200 MG: 100 CAPSULE ORAL at 13:24

## 2021-11-18 RX ADMIN — MIDODRINE HYDROCHLORIDE 2.5 MG: 2.5 TABLET ORAL at 13:24

## 2021-11-18 RX ADMIN — MIDODRINE HYDROCHLORIDE 2.5 MG: 2.5 TABLET ORAL at 08:48

## 2021-11-18 RX ADMIN — Medication 50 MG: at 08:48

## 2021-11-18 RX ADMIN — GABAPENTIN 200 MG: 100 CAPSULE ORAL at 08:48

## 2021-11-18 RX ADMIN — Medication 100 MG: at 08:48

## 2021-11-18 ASSESSMENT — ACTIVITIES OF DAILY LIVING (ADL)
ADLS_ACUITY_SCORE: 25
ADLS_ACUITY_SCORE: 23
ADLS_ACUITY_SCORE: 21
ADLS_ACUITY_SCORE: 21
ADLS_ACUITY_SCORE: 19
ADLS_ACUITY_SCORE: 21
ADLS_ACUITY_SCORE: 21
ADLS_ACUITY_SCORE: 19
ADLS_ACUITY_SCORE: 23
ADLS_ACUITY_SCORE: 25
ADLS_ACUITY_SCORE: 21
ADLS_ACUITY_SCORE: 25
ADLS_ACUITY_SCORE: 25
ADLS_ACUITY_SCORE: 21
ADLS_ACUITY_SCORE: 23
ADLS_ACUITY_SCORE: 25
ADLS_ACUITY_SCORE: 21
ADLS_ACUITY_SCORE: 21
ADLS_ACUITY_SCORE: 23
ADLS_ACUITY_SCORE: 21
ADLS_ACUITY_SCORE: 21
ADLS_ACUITY_SCORE: 25
ADLS_ACUITY_SCORE: 21
ADLS_ACUITY_SCORE: 21

## 2021-11-18 ASSESSMENT — MIFFLIN-ST. JEOR: SCORE: 1675.64

## 2021-11-18 NOTE — PROGRESS NOTES
[facility]    Medicine Progress Note - Hospitalist Service       Date of Admission:  11/8/2021    Assessment & Plan           Acute on chronic systolic heart failure preserved ejection fraction, improved.  Bilateral lower extremity edema, improving (from venous stasis, chronic liver, third spacing, congestive heart failure), improved.  Third spacing, improved.  Coronary artery disease with previous MI in 2019, stable; MI ruled out  BNP is 99, troponin negative x2  Elevated D-dimer, VQ scan low probability for pulmonary embolism.  Chest x-ray showed prior lung resections otherwise negative.  Bilateral lower extremity ultrasound negative for DVT  Received albumin infusion and Lasix  Nephrology advising on fluid and electrolyte management  Echocardiogram unremarkable.  EF of 55 to 60% borderline LVH and mild MR  Currently on Bumex 0.5 mg daily, midodrine   Troponin negative x 3 . On aspirin, metoprolol  -continue to track daily weights at discharge.     Bacteremia gram-positive cocci  Fever, no recurrence  Was placed on Zosyn and vancomycin empirically- discontinued by infectious disease.  Monitor off antibiotics.  There was no clear focus of infection.  Negative CAT scan of the abdomen and pelvis and chest  Second blood cultures so far negative to date.  Echocardiogram without any evidence of endocarditis.  Limited sensitivity with transthoracic echocardiogram.      Lactic acidosis, improved probably volume related  Initially received IV fluids  No evidence for sepsis.  Taken off antibiotics.  Subsequently received albumin infusion and Lasix  Currently hemodynamically stable  Blood cultures negative  CAT scan of the abdomen chest and pelvis was negative for any source of infection     Acute on chronic kidney disease, stage III, gradually improving and stable.  Hyponatremia, resolved   hypokalemia, electrolyte abnormalities, corrected  Hyponatremia  Bilateral lower extremity pain and  neuropathy  Nephrology consulting on the case  Received albumin infusion and Lasix  On low-dose Bumex and midodrine.  GFR improving slowly     History of depression  Alcohol abuse, alcoholic hepatitis, improved  Hepatic steatosis   on right upper quadrant abdominal ultrasound with gallbladder sludge   consult.  Needing TCU placement.     Hypoalbuminemia  Moderate protein caloric malnutrition  Nutritional support  Has received albumin infusion and Lasix     COPD, acute on chronic respiratory failure  Left upper lobe nodule 9 x 7 mm  History of lung cancer, status post wedge resection in 2016  Change in supplementation as needed  Concern for possible primary cancer versus metastatic cancer.  Will be following with radiation oncology in late December  Continue inhalers including Trelegy Ellipta inhaler and albuterol  Still needing 2 L of oxygen right now. He quit smoking 20 years ago  Will likely chronically need oxygen on discharge.     Pancreatic cyst  Follow-up CAT scan in 2 years versus endoscopic ultrasound  Follow-up with primary care or GI  This is not an inpatient issue at this time.     History of prostate cancer, mouth cancer, non-small cell lung cancer  Follows up with oncology    Ragsdale's esophagus   continue PPI          Diet: No Added Salt 4 Gram Sodium    DVT Prophylaxis: Heparin SQ  Urban Catheter: Not present  Central Lines: None  Code Status: No CPR- Do NOT Intubate      Disposition Plan   Expected discharge: 11/19/2021        The patient's care was discussed with interdisciplinary team.    Maximo Pedroza MD  Hospitalist Service  [facility]  Securely message with the Vocera Web Console (learn more here)  Text page via [employer] Paging/Directory        Clinically Significant Risk Factors Present on Admission                ______________________________________________________________________    Interval History   Did not report chest pain, severe abdominal pain,  palpitations or shortness of breath.      Data reviewed today: I reviewed all medications, new labs and imaging results over the last 24 hours. I personally reviewed.    Physical Exam   Vital Signs: Temp: 98.5  F (36.9  C) Temp src: Oral BP: 116/66 Pulse: 75   Resp: 18 SpO2: 90 % O2 Device: Nasal cannula Oxygen Delivery: 1 LPM  Weight: 204 lbs 11.2 oz  General: Awake, alert, appropriately interactive.  Appeared comfortable.  HEENT: Sclera without redness or jaundice.  External ears appear normal  Cardiac: Heart rate controlled, lower extremities not edematous  Pulmonary: Clear to auscultation in bilateral lung fields  Abdomen: Not distended.  Not tender to palpation.  Musculoskeletal: No joint abnormalities noted  Skin: Normally turgid, no rashes noted  Neurologic: Awake, alert, appropriately interactive  Psychiatric: Calm      Data   Recent Labs   Lab 11/18/21  1210 11/18/21  0449 11/17/21  0530 11/16/21  0442 11/15/21  0419 11/14/21  1503 11/14/21  0250 11/12/21  0824 11/12/21  0520   WBC  --   --   --   --  6.1  --   --   --  3.8*   HGB  --   --   --   --  11.7*  --   --   --  11.0*   MCV  --   --   --   --  110*  --   --   --  109*   PLT  --   --  232  --  211  --  177  --  166   NA  --  138 136 138 136  --  139   < > 142   POTASSIUM 4.1 3.4* 3.5 3.6 3.6   < > 3.5   < > 3.2*   CHLORIDE  --  92* 94* 95* 94*  --  96*   < > 98   CO2  --  34* 32* 31 29  --  32*   < > 30   BUN  --  18 16 16 13  --  9   < > 6*   CR  --  1.91* 1.91* 2.13* 2.15*  --  2.11*   < > 2.07*   ANIONGAP  --  12 10 12 13  --  11   < > 14   BRADLEY  --  10.7* 10.2 10.4 10.0  --  10.3   < > 9.9   GLC  --  128* 129* 125 126*  --  136*   < > 126*   ALBUMIN  --  2.9* 2.8* 2.9* 3.0*  --  3.2*   < > 3.1*   PROTTOTAL  --  5.8* 5.6* 5.8* 5.6*  --  5.6*   < > 5.5*   BILITOTAL  --  0.9 1.0 1.3* 1.7*  --  1.8*   < > 1.6*   ALKPHOS  --  117 115 123* 118  --  108   < > 127*   ALT  --  22 14 20 20  --  21   < > 28   AST  --  52* 45* 55* 57*  --  56*   < > 70*    <  > = values in this interval not displayed.

## 2021-11-18 NOTE — PROGRESS NOTES
Pt denies pain. Pt having fluctuating confusion throughout shift. Pt daughter, Georgette, expressed concerns to this RN about pt increasing confusion since starting Cymbalta. Sticky note made for MD. Pt pulled out IV early in AM, okay to leave out per MD. Alarms on for safety. Tele NSR with 1st degree AVB and BBB this AM. Pt K 4.1. Remains on 1L O2 via NC, sats 90-92%. Pt awaiting transfer to room 224. Report given to KENAN Santana. Pt has all personal belongings.

## 2021-11-18 NOTE — PROGRESS NOTES
RENAL PROGRESS NOTE    PLAN:  seems intravasc contracted (rising bicarb and Ca), low intake, confused  Hold diuretics  Bolus NS 500ml over 4hrs  Cont Midodrine  Start daily KCL20 eq oral   TCU plans per [facility]  Cont daily wts to monitor volume status outpt     LEILA on CKD IIIb: creat stalled in the 2.1 range  Baseline cr in the 1.5-1.8 range on chart review.    Diuretics responsive.    Very bland urinalysis 11/10/2021, no proteinuria, pyuria, or microscopic hematuria.    PLAN:  continue low dose bumex daily, midodrine BID for discharge  trend I/Os  rec daily wts after discharge to better gauge volume status.     Volume: pt appears  dry, some degree of liver disease, and albumin quite low.      Lytes/acid/base:.  Mild hypokalemia likely secondary to loop diuretics, Ca elevated (corrected Ca 11.6)    Anemia: hgb 11's , Macrocytic.  History of alcohol abuse.  B12 and folic acid within normal limits this admit.  Iron studies okay and Erythropoietin level reasonable.  Also has history of Ragsdale's esophagus and previous GIB.  No obvious signs of GI blood loss.       ?Bacteremia: afebrile . Now thought to be a contaminant.  ID following, DC'd  Vanco and Zosyn, ECHO without veg, monitoring off antibx     Bilateral lower extremity pain/neuropathy:on gabapentin renally dosed with some relief.  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     Chronic systolic heart failure: 5/2021 echocardiogram showed EF 55%.  Mild hypertrophy otherwise normal.  On low-dose beta-blocker at baseline.  11/9 echocardiogram EF 55 to 60% borderline LVH and mild MR.     CAD: MI in 2019.  PCI x1.    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  Follows with oncology.     Ragsdale's esophagus: Continue home  PPI     Alcohol abuse: Drinks a liter of vodka per day.  On protocols.  Social work chemical dependency consultation.    Malnutrition:  On nutritional supp, had Alb infusions earlier in admit    SUBJECTIVE:  Pt confused today, pulled out IV and I found him in bed bl;eeding from site.  Thinks he is in [place], disoriented, pulled off tele lead and pulse ox probe. RN came to assist, intake limited d/t above     OBJECTIVE:  Physical Exam   Temp: 98.5  F (36.9  C) Temp src: Oral BP: 92/55 Pulse: 71   Resp: 18 SpO2: 93 % O2 Device: Nasal cannula Oxygen Delivery: 1 LPM  Vitals:    21 0600 21 0416 21 0428   Weight: 96.3 kg (212 lb 3.2 oz) 93.5 kg (206 lb 3.2 oz) 92.9 kg (204 lb 11.2 oz)     Vital Signs with Ranges  Temp:  [97.7  F (36.5  C)-98.7  F (37.1  C)] 98.5  F (36.9  C)  Pulse:  [60-74] 71  Resp:  [18-22] 18  BP: ()/(55-67) 92/55  SpO2:  [90 %-97 %] 93 %  I/O last 3 completed shifts:  In: 780 [P.O.:780]  Out: 875 [Urine:875]    Temp (24hrs), Av.5  F (36.9  C), Min:97.7  F (36.5  C), Max:99.1  F (37.3  C)      Patient Vitals for the past 72 hrs:   Weight   21 0428 92.9 kg (204 lb 11.2 oz)   21 0416 93.5 kg (206 lb 3.2 oz)   21 0600 96.3 kg (212 lb 3.2 oz)       Intake/Output Summary (Last 24 hours) at 11/15/2021 0919  Last data filed at 11/15/2021 0600  Gross per 24 hour   Intake 1000 ml   Output 1000 ml   Net 0 ml       PHYSICAL EXAM:  General - sitting up bedside, bleeding from IV site he pulled out of his L arm   Cardiovascular - BP soft  Respiratory - on 2L 02 per NC chronically   Abd: BS present, no guarding or pain with palpation, no ascites  Extremities - tno lower extremity edema bilaterally, skin wrinkling from prior edema   Skin: dry, intact, no rash, good turgor, skin changes c/w prior edema improved   :  Good UO , net neg 5kg    LABORATORY STUDIES:     Recent Labs   Lab 21  0530 11/15/21  0419 21  0250 21  0520   WBC  --  6.1  --  3.8*   RBC   --  3.33*  --  3.09*   HGB  --  11.7*  --  11.0*   HCT  --  36.5*  --  33.8*    211 177 166       Basic Metabolic Panel:  Recent Labs   Lab 11/18/21  0449 11/17/21  0530 11/16/21  0442 11/15/21  0419 11/14/21  1503 11/14/21  0250 11/13/21  1250 11/13/21  0615    136 138 136  --  139  --  142   POTASSIUM 3.4* 3.5 3.6 3.6 3.7 3.5   < > 3.0*   CHLORIDE 92* 94* 95* 94*  --  96*  --  96*   CO2 34* 32* 31 29  --  32*  --  32*   BUN 18 16 16 13  --  9  --  7*   CR 1.91* 1.91* 2.13* 2.15*  --  2.11*  --  2.13*   * 129* 125 126*  --  136*  --  126*   BRADLEY 10.7* 10.2 10.4 10.0  --  10.3  --  10.4    < > = values in this interval not displayed.       INR  No lab results found in last 7 days.     Recent Labs   Lab Test 11/17/21  0530 11/15/21  0419 11/14/21  0250 11/12/21  0520 11/10/21  0849 11/09/21  0240 11/08/21  2044   INR  --   --   --   --   --  1.24* 1.22*   WBC  --  6.1  --  3.8*   < > 4.0  --    HGB  --  11.7*  --  11.0*   < > 11.7*  --     211   < > 166   < > 146*  --     < > = values in this interval not displayed.       Personally reviewed current labs     ~ 25 minutes spent in exam, POC, education regarding renal disease and management.  >90% time spent in education and counseling and/or discussion with patient's care team    Miguelina Abel, [facility]-BC  [facility]  [phone]

## 2021-11-18 NOTE — PROGRESS NOTES
Care Management Follow Up    Length of Stay (days): 10    Expected Discharge Date: 11/19/2021     Concerns to be Addressed:       Patient plan of care discussed at interdisciplinary rounds: Yes    Anticipated Discharge Disposition:       Anticipated Discharge Services:    Anticipated Discharge DME:      Patient/family educated on Medicare website which has current facility and service quality ratings:    Education Provided on the Discharge Plan:    Patient/Family in Agreement with the Plan:      Referrals Placed by CM/SW:    Private pay costs discussed: Not applicable    Additional Information:  Received call from [facility] and they are unable to accept pt.    Updated pt and daughter, Georgette regarding needing more TCU choices.  They would prefer facility in [place] around the [place] area.  Calls made to following facilities to check on bed availability.    [facility] in [place] [phone] left voice message    [facility] [phone] left message    [facility] [phone] currently not accepting admissions    [facility] [phone] not accepting admissions at this time.    [facility] [phone] left message    [facility] in [place] [phone] left message    [facility] [phone] left message    Previous referral had been made to [facility]-message left with Heena Lanier liaison to check on bed availability.       DAVI Dale

## 2021-11-18 NOTE — PLAN OF CARE
Problem: Adult Inpatient Plan of Care  Goal: Plan of Care Review  Outcome: Improving     Patient denies pain.    Orientation fluctuates, he is forgetful at times.   Male external catheter in place, draining dark karuna urine.  Vitals stable.

## 2021-11-18 NOTE — PROGRESS NOTES
Mitchell did well today. Transferred to and from the chair 1 assist with a walker. Remains on 2L casal cannula. Male external catheter remains in place. NSR on tele. Vitally stable. He is medically ready for discharge and we are currently awaiting a TCU bed.

## 2021-11-19 LAB
ALBUMIN SERPL-MCNC: 2.9 G/DL (ref 3.5–5)
ALP SERPL-CCNC: 109 U/L (ref 45–120)
ALT SERPL W P-5'-P-CCNC: 22 U/L (ref 0–45)
ANION GAP SERPL CALCULATED.3IONS-SCNC: 12 MMOL/L (ref 5–18)
AST SERPL W P-5'-P-CCNC: 56 U/L (ref 0–40)
BILIRUB DIRECT SERPL-MCNC: 0.4 MG/DL
BILIRUB SERPL-MCNC: 0.9 MG/DL (ref 0–1)
BUN SERPL-MCNC: 19 MG/DL (ref 8–28)
CALCIUM SERPL-MCNC: 10.7 MG/DL (ref 8.5–10.5)
CHLORIDE BLD-SCNC: 96 MMOL/L (ref 98–107)
CO2 SERPL-SCNC: 30 MMOL/L (ref 22–31)
CREAT SERPL-MCNC: 2.02 MG/DL (ref 0.7–1.3)
DEPRECATED CALCIDIOL+CALCIFEROL SERPL-MC: 67 UG/L (ref 20–75)
GFR SERPL CREATININE-BSD FRML MDRD: 31 ML/MIN/1.73M2
GLUCOSE BLD-MCNC: 123 MG/DL (ref 70–125)
PLATELET # BLD AUTO: 288 10E3/UL (ref 150–450)
POTASSIUM BLD-SCNC: 4 MMOL/L (ref 3.5–5)
PROT SERPL-MCNC: 5.9 G/DL (ref 6–8)
PTH-INTACT SERPL-MCNC: 8 PG/ML (ref 18–80)
SODIUM SERPL-SCNC: 138 MMOL/L (ref 136–145)

## 2021-11-19 PROCEDURE — 250N000013 HC RX MED GY IP 250 OP 250 PS 637: Performed by: NURSE PRACTITIONER

## 2021-11-19 PROCEDURE — 258N000003 HC RX IP 258 OP 636: Performed by: NURSE PRACTITIONER

## 2021-11-19 PROCEDURE — 250N000013 HC RX MED GY IP 250 OP 250 PS 637: Performed by: PHYSICIAN ASSISTANT

## 2021-11-19 PROCEDURE — 250N000011 HC RX IP 250 OP 636: Performed by: FAMILY MEDICINE

## 2021-11-19 PROCEDURE — 82565 ASSAY OF CREATININE: CPT | Performed by: FAMILY MEDICINE

## 2021-11-19 PROCEDURE — 85049 AUTOMATED PLATELET COUNT: CPT | Performed by: FAMILY MEDICINE

## 2021-11-19 PROCEDURE — 250N000013 HC RX MED GY IP 250 OP 250 PS 637: Performed by: FAMILY MEDICINE

## 2021-11-19 PROCEDURE — 99233 SBSQ HOSP IP/OBS HIGH 50: CPT | Performed by: INTERNAL MEDICINE

## 2021-11-19 PROCEDURE — 36415 COLL VENOUS BLD VENIPUNCTURE: CPT | Performed by: FAMILY MEDICINE

## 2021-11-19 PROCEDURE — 99233 SBSQ HOSP IP/OBS HIGH 50: CPT | Performed by: NURSE PRACTITIONER

## 2021-11-19 PROCEDURE — 120N000001 HC R&B MED SURG/OB

## 2021-11-19 PROCEDURE — 82248 BILIRUBIN DIRECT: CPT | Performed by: FAMILY MEDICINE

## 2021-11-19 RX ORDER — SODIUM CHLORIDE 9 MG/ML
INJECTION, SOLUTION INTRAVENOUS CONTINUOUS
Status: ACTIVE | OUTPATIENT
Start: 2021-11-19 | End: 2021-11-19

## 2021-11-19 RX ADMIN — GABAPENTIN 200 MG: 100 CAPSULE ORAL at 09:52

## 2021-11-19 RX ADMIN — MIDODRINE HYDROCHLORIDE 2.5 MG: 2.5 TABLET ORAL at 09:56

## 2021-11-19 RX ADMIN — MAGNESIUM OXIDE TAB 400 MG (241.3 MG ELEMENTAL MG) 400 MG: 400 (241.3 MG) TAB at 20:16

## 2021-11-19 RX ADMIN — Medication 50 MG: at 09:56

## 2021-11-19 RX ADMIN — DULOXETINE HYDROCHLORIDE 20 MG: 20 CAPSULE, DELAYED RELEASE ORAL at 09:57

## 2021-11-19 RX ADMIN — SODIUM CHLORIDE: 9 INJECTION, SOLUTION INTRAVENOUS at 13:52

## 2021-11-19 RX ADMIN — HEPARIN SODIUM 5000 UNITS: 5000 INJECTION, SOLUTION INTRAVENOUS; SUBCUTANEOUS at 20:16

## 2021-11-19 RX ADMIN — MULTIPLE VITAMINS W/ MINERALS TAB 1 TABLET: TAB at 09:56

## 2021-11-19 RX ADMIN — POTASSIUM CHLORIDE 20 MEQ: 1500 TABLET, EXTENDED RELEASE ORAL at 09:56

## 2021-11-19 RX ADMIN — HEPARIN SODIUM 5000 UNITS: 5000 INJECTION, SOLUTION INTRAVENOUS; SUBCUTANEOUS at 10:00

## 2021-11-19 RX ADMIN — PANTOPRAZOLE SODIUM 40 MG: 20 TABLET, DELAYED RELEASE ORAL at 18:23

## 2021-11-19 RX ADMIN — GABAPENTIN 200 MG: 100 CAPSULE ORAL at 13:13

## 2021-11-19 RX ADMIN — ASPIRIN 81 MG: 81 TABLET, COATED ORAL at 20:15

## 2021-11-19 RX ADMIN — Medication 100 MG: at 09:56

## 2021-11-19 RX ADMIN — MIDODRINE HYDROCHLORIDE 2.5 MG: 2.5 TABLET ORAL at 13:13

## 2021-11-19 RX ADMIN — Medication 125 MCG: at 09:59

## 2021-11-19 RX ADMIN — METOPROLOL SUCCINATE 12.5 MG: 25 TABLET, EXTENDED RELEASE ORAL at 09:56

## 2021-11-19 ASSESSMENT — ACTIVITIES OF DAILY LIVING (ADL)
ADLS_ACUITY_SCORE: 17
ADLS_ACUITY_SCORE: 21
ADLS_ACUITY_SCORE: 17
ADLS_ACUITY_SCORE: 23
ADLS_ACUITY_SCORE: 25
ADLS_ACUITY_SCORE: 17
ADLS_ACUITY_SCORE: 21
ADLS_ACUITY_SCORE: 23
ADLS_ACUITY_SCORE: 25
ADLS_ACUITY_SCORE: 21
ADLS_ACUITY_SCORE: 25
ADLS_ACUITY_SCORE: 21
ADLS_ACUITY_SCORE: 17
ADLS_ACUITY_SCORE: 17
ADLS_ACUITY_SCORE: 21
ADLS_ACUITY_SCORE: 17
ADLS_ACUITY_SCORE: 21

## 2021-11-19 ASSESSMENT — MIFFLIN-ST. JEOR: SCORE: 1661.58

## 2021-11-19 NOTE — PLAN OF CARE
Problem: Risk for Delirium  Goal: Optimal Coping  Outcome: Declining  Goal: Improved Behavioral Control  Outcome: Declining  Goal: Improved Attention and Thought Clarity  Outcome: Declining     Pt transferred down this evening with daughter present. At that time pt was A/O, calm and cooperative. When awoken this evening for meds, pt was very confused and agitated. Pt pulling at cords, demanding everything be removed. External Catheter, Pulse ox, 02 was all pulled off.  PT also Refused meds at that time but was reapproached. Pt continues to refuse to wear oxygen and and pulse ox.   Audible wheezing noted, but refused interventions. Encouraged to cough/deep breathe. Agreed to spot check of O2. Was 87% on Room air, but went up to 93% when taking breaths.   Alarms on for safety.

## 2021-11-19 NOTE — PROGRESS NOTES
[facility]    Medicine Progress Note - Hospitalist Service       Date of Admission:  11/8/2021    Assessment & Plan           76-year-old gentleman with a medical history significant for coronary artery disease, lung cancer, prostate cancer, oral cancer, congestive heart failure, COPD, Ragsdale esophagus, CKD and alcohol abuse undergoing evaluation and treatment for bilateral lower extremity edema and physical deconditioning       Acute metabolic encephalopathy, acute confusion  -  Likely multifactorial.  Patient has underlying alcoholic liver disease.  -  Minimise use of medication with adverse effect on mentation.  Gabapentin discontinued.  -  Ammonia level concentration measurement  -  Monitor electrolytes  -  May need head imaging if confusion persistent    Bilateral lower extremity edema  -  BNP unremarkable.  Echo demonstrated a normal EF.  Acute CHF appears unlikely.  -  Underwent treatment with diuretics and albumin infusion  -  Bumetamide diuresis with holding parameters due to marginal BP    Coronary artery disease  -  Continue metoprolol succinate    Chronic kidney disease  -  Minimize use of nephrotoxic medications  -  Monitoring urine output and renal function markers  -  Clinical monitoring    Micrococcus bacteremia  -  11/10 BCxs grew micrococcus in 1 of 2 blood cultures  -  Subsequent blood cultures did not show growth  -  Echo not show signs of endocarditis  -  Possible contamination    History of alcohol abuse  -  Continue nutritional supplementation    Alcoholic liver disease  - Supportive care     Anxiety and depression  -  Supportive care    COPD  -  Continue fluticasone-umeclidin-vilanterol inh daily  -  Monitoring respiratory state    History of lung cancer  -  S/P wedge resection in 2016  -  To follow up outpatient    History of pancreatic cyst  -  To follow up outpatient     History of prostate cancer   -  To follow up outpatient    History of mouth cancer  -  To  follow up outpatient    Ragsdale's esophagus   -  Continue pantoprazole       Communication.  Plan of care was discussed with daughter at bedside.       Diet: No Added Salt 4 Gram Sodium    DVT Prophylaxis: Heparin SQ  Urban Catheter: Not present  Central Lines: None  Code Status: No CPR- Do NOT Intubate      Disposition Plan  Evaluating acute metabolic encephalopathy.  Possible discharge in 1 - 2 days.       The patient's care was discussed with interdisciplinary team.    Maximo Pedroza MD  Hospitalist Service  [facility]  Securely message with the Vocera Web Console (learn more here)  Text page via [employer] Paging/Directory        Clinically Significant Risk Factors Present on Admission                ______________________________________________________________________          Interval History   Did not report chest pain, severe abdominal pain, palpitations or shortness of breath.      Data reviewed today: I reviewed all medications, new labs and imaging results over the last 24 hours. I personally reviewed.    Physical Exam   Vital Signs: Temp: 98.4  F (36.9  C) Temp src: Oral BP: 115/59 Pulse: 75   Resp: 20 SpO2: 93 % O2 Device: Nasal cannula Oxygen Delivery: 1.5 LPM  Weight: 201 lbs 9.6 oz  General: Awake, alert, appropriately interactive.  Appeared comfortable.  HEENT: Sclera without redness or jaundice.  External ears appear normal  Cardiac: Heart rate controlled, lower extremities not edematous  Pulmonary: Clear to auscultation in bilateral lung fields  Abdomen: Not distended.  Not tender to palpation.  Musculoskeletal: No joint abnormalities noted  Skin: Normally turgid, no rashes noted  Neurologic: Awake, alert, appropriately interactive  Psychiatric: Calm        Data   Recent Labs   Lab 11/19/21  1351 11/19/21  0746 11/18/21  1210 11/18/21  0449 11/17/21  0530 11/16/21  0442 11/15/21  0419   WBC  --   --   --   --   --   --  6.1   HGB  --   --   --   --   --   --  11.7*   MCV  --    --   --   --   --   --  110*     --   --   --  232  --  211   NA  --  138  --  138 136   < > 136   POTASSIUM  --  4.0 4.1 3.4* 3.5   < > 3.6   CHLORIDE  --  96*  --  92* 94*   < > 94*   CO2  --  30  --  34* 32*   < > 29   BUN  --  19  --  18 16   < > 13   CR  --  2.02*  --  1.91* 1.91*   < > 2.15*   ANIONGAP  --  12  --  12 10   < > 13   BRADLEY  --  10.7*  --  10.7* 10.2   < > 10.0   GLC  --  123  --  128* 129*   < > 126*   ALBUMIN  --  2.9*  --  2.9* 2.8*   < > 3.0*   PROTTOTAL  --  5.9*  --  5.8* 5.6*   < > 5.6*   BILITOTAL  --  0.9  --  0.9 1.0   < > 1.7*   ALKPHOS  --  109  --  117 115   < > 118   ALT  --  22  --  22 14   < > 20   AST  --  56*  --  52* 45*   < > 57*    < > = values in this interval not displayed.       Imaging  11/11/21 Echo  Interpretation Summary     The left ventricle is mildly dilated with normal left ventricular wall  thickness.  Left ventricular function is normal.The ejection fraction is 55-60%.  Normal right ventricle size and systolic function.  No significant valve disease identified.  No evidence of endocarditis identified, however (especially with poor acoustic  windows), transthoracic echocardiography does not have high sensitivity for  identifying endocarditis. If suspicion remains for this condition consider  evaluation with a transesophageal echocardiogram.

## 2021-11-19 NOTE — PROGRESS NOTES
Care Management Discharge Note    Discharge Date: 11/20/2021       Discharge Disposition:  TCU pending     Additional Information:  MAC had a call back from [facility], [facility], and [facility] and learned that all three TCUs in [place] are not taking pts at this time.     MAC had a message from Heena with [facility] and learned that they might also have a bed for the Pt. MAC called back and reached [facility]. MAC to continue to work with Heena about possible TCU placement.       3:35 PM  MAC had a call back from Heena and learned that they are able to take the at one of their locations either [facility] pending staffing and beds. Heena stated that she will follow up and reach back shortly if they are able to take over the weekend or Monday AM.     DAVI Alvarado

## 2021-11-19 NOTE — PLAN OF CARE
Problem: Adult Inpatient Plan of Care  Goal: Absence of Hospital-Acquired Illness or Injury  Intervention: Prevent Skin Injury  Recent Flowsheet Documentation  Taken 11/19/2021 0956 by Christina Donaldson RN  Body Position: position changed independently     Problem: Adult Inpatient Plan of Care  Goal: Absence of Hospital-Acquired Illness or Injury  Intervention: Identify and Manage Fall Risk  Recent Flowsheet Documentation  Taken 11/19/2021 0956 by Christina Donaldson RN  Safety Promotion/Fall Prevention: activity supervised  Intervention: Prevent Skin Injury  Recent Flowsheet Documentation  Taken 11/19/2021 0956 by Christina Donaldson RN  Body Position: position changed independently    Pt does have scrotal redness and asked md to order some powder.  Pt refused powder this AM but will attempt next time he needs to use toilet.  Intervention: Prevent and Manage VTE (Venous Thromboembolism) Risk  Recent Flowsheet Documentation  Taken 11/19/2021 0956 by Christina Donaldson RN  VTE Prevention/Management: anticoagulant therapy maintained    Patient accepts the anticoag injections.     Problem: Fluid Volume Excess  Goal: Fluid Balance  Outcome: No Change  Intervention: Monitor and Manage Hypervolemia  Flowsheets (Taken 11/19/2021 1220)  Fluid/Electrolyte Management:   fluids provided   other (see comments)     Patient agreed to IV placement this morning due to elevated creatine and calcium levels.  Nephrology ordered IV saline bolus at 100/hr.   Encouraged pt to drink clear liquids and discussed with his daughter.

## 2021-11-19 NOTE — PROGRESS NOTES
RENAL PROGRESS NOTE    PLAN:  seems intravasc contractedlow intake, confused(meds?)  Hold diuretics  IV NS 100cc/hr x 1 L  Cont Midodrine  Hold oral KCL until ruddy in am   TCU plans per [facility]  Cont daily wts to monitor volume status outpt     LEILA on CKD IIIb: creat stalled in the 2s  Baseline cr in the 1.5-1.8 range on chart review.    Diuretics responsive.    Very bland urinalysis 11/10/2021, no proteinuria, pyuria, or microscopic hematuria.      Volume: pt appears  dry, some degree of liver disease, and albumin quite low.      Lytes/acid/base:.  Mild hypokalemia likely secondary to loop diuretics (on hold), Ca elevated (corrected Ca 11.6, seems contracted, Vit D/PTH levels pending)    Anemia: hgb 11's , Macrocytic.  History of alcohol abuse.  B12 and folic acid within normal limits this admit.  Iron studies okay and Erythropoietin level reasonable.  Also has history of Ragsdale's esophagus and previous GIB.  No obvious signs of GI blood loss.       ?Bacteremia: afebrile . Now thought to be a contaminant.  ID following, DC'd  Vanco and Zosyn, ECHO without veg, monitoring off antibx     Bilateral lower extremity pain/neuropathy: on gabapentin renally dosed, ? alcoholic neuropathy. Cymbalta DC'd d/t AMS     Chronic systolic heart failure: 5/2021 echocardiogram showed EF 55%.  Mild hypertrophy otherwise normal.  On low-dose beta-blocker at baseline.  11/9 echocardiogram EF 55 to 60% borderline LVH and mild MR.     CAD: MI in 2019.  PCI x1.    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  Follows with oncology.     Ragsdale's esophagus: Continue home PPI     Alcohol abuse: Drinks a liter of vodka per day.  On protocols.  Social work chemical dependency consultation  outpt.    Malnutrition:  On nutritional supp, had Alb infusions earlier in admit    AMS: sl worse yesterday, Cymbalta stopped, Gabapentin dose reduced again today, seems a bit better this a.m. but still a bit oppositional to staff.     SUBJECTIVE:  Pt less confused today, hasn't been eating or drinking much per staff, pulling off oxygen, refusing some cares, but later agreed to having IV replaced for fluids.  Denies pain to me. Says he will try to eat/drink more.  Staff concerned about inappropriate coping in the setting of ETOH withdrawal     OBJECTIVE:  Physical Exam   Temp: 98.4  F (36.9  C) Temp src: Oral BP: 115/59 Pulse: 75   Resp: 20 SpO2: 90 % O2 Device: None (Room air) Oxygen Delivery: 1 LPM  Vitals:    21 0700   Weight: 93.5 kg (206 lb 3.2 oz) 92.9 kg (204 lb 11.2 oz) 91.4 kg (201 lb 9.6 oz)     Vital Signs with Ranges  Temp:  [97.6  F (36.4  C)-98.5  F (36.9  C)] 98.4  F (36.9  C)  Pulse:  [74-81] 75  Resp:  [18-20] 20  BP: ()/(56-75) 115/59  SpO2:  [87 %-94 %] 90 %  I/O last 3 completed shifts:  In: 570 [P.O.:570]  Out: 225 [Urine:225]    Temp (24hrs), Av.5  F (36.9  C), Min:97.7  F (36.5  C), Max:99.1  F (37.3  C)      Patient Vitals for the past 72 hrs:   Weight   21 0700 91.4 kg (201 lb 9.6 oz)   21 92.9 kg (204 lb 11.2 oz)   21 93.5 kg (206 lb 3.2 oz)       Intake/Output Summary (Last 24 hours) at 11/15/2021 0919  Last data filed at 11/15/2021 0600  Gross per 24 hour   Intake 1000 ml   Output 1000 ml   Net 0 ml       PHYSICAL EXAM:  General - sleeping, difficult to awaken at first.   Cardiovascular - BP soft at times, on low dose Midodrine  Respiratory - on 2L 02 per NC chronically sats mid 90's when he wears his 02, intermittently pulling off   Abd: BS present, no guarding or pain with palpation, no ascites  Extremities - tno lower extremity edema bilaterally, skin wrinkling from prior edema   Skin: dry, intact, no rash, good  turgor, skin changes c/w prior edema improved   :  Good UO reported , net neg 5kg and stable from yesterday     LABORATORY STUDIES:     Recent Labs   Lab 11/17/21  0530 11/15/21  0419 11/14/21  0250   WBC  --  6.1  --    RBC  --  3.33*  --    HGB  --  11.7*  --    HCT  --  36.5*  --     211 177       Basic Metabolic Panel:  Recent Labs   Lab 11/19/21  0746 11/18/21  1210 11/18/21  0449 11/17/21  0530 11/16/21  0442 11/15/21  0419 11/14/21  1503 11/14/21  0250     --  138 136 138 136  --  139   POTASSIUM 4.0 4.1 3.4* 3.5 3.6 3.6   < > 3.5   CHLORIDE 96*  --  92* 94* 95* 94*  --  96*   CO2 30  --  34* 32* 31 29  --  32*   BUN 19  --  18 16 16 13  --  9   CR 2.02*  --  1.91* 1.91* 2.13* 2.15*  --  2.11*     --  128* 129* 125 126*  --  136*   BRADLEY 10.7*  --  10.7* 10.2 10.4 10.0  --  10.3    < > = values in this interval not displayed.       INR  No lab results found in last 7 days.     Recent Labs   Lab Test 11/17/21  0530 11/15/21  0419 11/14/21  0250 11/12/21  0520 11/10/21  0849 11/09/21  0240 11/08/21  2044   INR  --   --   --   --   --  1.24* 1.22*   WBC  --  6.1  --  3.8*   < > 4.0  --    HGB  --  11.7*  --  11.0*   < > 11.7*  --     211   < > 166   < > 146*  --     < > = values in this interval not displayed.       Personally reviewed current labs     ~ 25 minutes spent in exam, POC, education regarding renal disease and management.  >90% time spent in education and counseling and/or discussion with patient's care team    Miguelina Abel, [facility]  [facility]  [phone]

## 2021-11-19 NOTE — PLAN OF CARE
Problem: Adult Inpatient Plan of Care  Goal: Optimal Comfort and Wellbeing  Outcome: Improving     Problem: Risk for Delirium  Goal: Improved Behavioral Control  Outcome: No Change     Alert during interactions, sleeping in between cares. Orientation and mood widely fluctuate between interactions. Pt intermittently confused/disoriented and agitated and uncooperative with cares. Reoriented pt and ensure needs met. VSS on 1 LPM O2 NC. Pt refusing continuous pulse oximeter and pulling off; O2 spot checked as able with O2 sats remaining above 92% on 1 LPM O2 NC. Pt denied pain. Pt denied SOB at rest. Pt not using call light appropriately, provided reeducation and also bed alarm on for safety. Pt ambulating to bathroom with assist x2 walker, gait belt; pt appears unsteady on feet. Voiding. Pernieum appears reddened with a rash, barrier cream and pericare provided as needed. Call light within reach.

## 2021-11-20 LAB
AMMONIA PLAS-SCNC: 23 UMOL/L (ref 11–35)
ANION GAP SERPL CALCULATED.3IONS-SCNC: 13 MMOL/L (ref 5–18)
BUN SERPL-MCNC: 18 MG/DL (ref 8–28)
CALCIUM SERPL-MCNC: 10.8 MG/DL (ref 8.5–10.5)
CHLORIDE BLD-SCNC: 96 MMOL/L (ref 98–107)
CO2 SERPL-SCNC: 28 MMOL/L (ref 22–31)
CREAT SERPL-MCNC: 1.94 MG/DL (ref 0.7–1.3)
GFR SERPL CREATININE-BSD FRML MDRD: 32 ML/MIN/1.73M2
GLUCOSE BLD-MCNC: 116 MG/DL (ref 70–125)
HOLD SPECIMEN: NORMAL
HOLD SPECIMEN: NORMAL
LACTATE SERPL-SCNC: 1.1 MMOL/L (ref 0.7–2)
POTASSIUM BLD-SCNC: 4.4 MMOL/L (ref 3.5–5)
SODIUM SERPL-SCNC: 137 MMOL/L (ref 136–145)

## 2021-11-20 PROCEDURE — 250N000013 HC RX MED GY IP 250 OP 250 PS 637: Performed by: FAMILY MEDICINE

## 2021-11-20 PROCEDURE — 120N000001 HC R&B MED SURG/OB

## 2021-11-20 PROCEDURE — 80048 BASIC METABOLIC PNL TOTAL CA: CPT | Performed by: HOSPITALIST

## 2021-11-20 PROCEDURE — 82140 ASSAY OF AMMONIA: CPT | Performed by: INTERNAL MEDICINE

## 2021-11-20 PROCEDURE — 99232 SBSQ HOSP IP/OBS MODERATE 35: CPT | Performed by: HOSPITALIST

## 2021-11-20 PROCEDURE — 99497 ADVNCD CARE PLAN 30 MIN: CPT | Performed by: HOSPITALIST

## 2021-11-20 PROCEDURE — 36415 COLL VENOUS BLD VENIPUNCTURE: CPT | Performed by: INTERNAL MEDICINE

## 2021-11-20 PROCEDURE — 250N000011 HC RX IP 250 OP 636: Performed by: FAMILY MEDICINE

## 2021-11-20 PROCEDURE — 250N000013 HC RX MED GY IP 250 OP 250 PS 637: Performed by: PHYSICIAN ASSISTANT

## 2021-11-20 PROCEDURE — 83605 ASSAY OF LACTIC ACID: CPT | Performed by: INTERNAL MEDICINE

## 2021-11-20 PROCEDURE — 250N000013 HC RX MED GY IP 250 OP 250 PS 637: Performed by: INTERNAL MEDICINE

## 2021-11-20 PROCEDURE — 99233 SBSQ HOSP IP/OBS HIGH 50: CPT | Performed by: INTERNAL MEDICINE

## 2021-11-20 RX ADMIN — PANTOPRAZOLE SODIUM 40 MG: 20 TABLET, DELAYED RELEASE ORAL at 16:06

## 2021-11-20 RX ADMIN — MAGNESIUM OXIDE TAB 400 MG (241.3 MG ELEMENTAL MG) 400 MG: 400 (241.3 MG) TAB at 21:00

## 2021-11-20 RX ADMIN — Medication 100 MG: at 08:05

## 2021-11-20 RX ADMIN — GABAPENTIN 200 MG: 100 CAPSULE ORAL at 08:05

## 2021-11-20 RX ADMIN — MULTIPLE VITAMINS W/ MINERALS TAB 1 TABLET: TAB at 08:05

## 2021-11-20 RX ADMIN — ASPIRIN 81 MG: 81 TABLET, COATED ORAL at 21:00

## 2021-11-20 RX ADMIN — MICONAZOLE NITRATE: 20 POWDER TOPICAL at 21:04

## 2021-11-20 RX ADMIN — MIDODRINE HYDROCHLORIDE 2.5 MG: 2.5 TABLET ORAL at 08:05

## 2021-11-20 RX ADMIN — Medication 50 MG: at 08:05

## 2021-11-20 RX ADMIN — MICONAZOLE NITRATE: 20 POWDER TOPICAL at 07:34

## 2021-11-20 RX ADMIN — METOPROLOL SUCCINATE 12.5 MG: 25 TABLET, EXTENDED RELEASE ORAL at 08:10

## 2021-11-20 RX ADMIN — HEPARIN SODIUM 5000 UNITS: 5000 INJECTION, SOLUTION INTRAVENOUS; SUBCUTANEOUS at 08:05

## 2021-11-20 RX ADMIN — MIDODRINE HYDROCHLORIDE 2.5 MG: 2.5 TABLET ORAL at 13:17

## 2021-11-20 RX ADMIN — HEPARIN SODIUM 5000 UNITS: 5000 INJECTION, SOLUTION INTRAVENOUS; SUBCUTANEOUS at 20:59

## 2021-11-20 RX ADMIN — GABAPENTIN 200 MG: 100 CAPSULE ORAL at 13:17

## 2021-11-20 ASSESSMENT — ACTIVITIES OF DAILY LIVING (ADL)
ADLS_ACUITY_SCORE: 11
ADLS_ACUITY_SCORE: 15
ADLS_ACUITY_SCORE: 21
ADLS_ACUITY_SCORE: 15
ADLS_ACUITY_SCORE: 21
ADLS_ACUITY_SCORE: 15
ADLS_ACUITY_SCORE: 15
ADLS_ACUITY_SCORE: 11
ADLS_ACUITY_SCORE: 15
ADLS_ACUITY_SCORE: 11
ADLS_ACUITY_SCORE: 21
ADLS_ACUITY_SCORE: 15
ADLS_ACUITY_SCORE: 11
ADLS_ACUITY_SCORE: 15

## 2021-11-20 ASSESSMENT — MIFFLIN-ST. JEOR: SCORE: 1647.07

## 2021-11-20 NOTE — PLAN OF CARE
Problem: Adult Inpatient Plan of Care  Goal: Plan of Care Review  Outcome: Improving     Problem: Fever  Goal: Body Temperature in Desired Range  Outcome: Improving     Patient is alert and orientated times four with forgetfulness and confusion present at times  Completed infusion of 1 liter of fluids and IV is now currently saline locked  Up to restroom with assist of one,  Has attempted to self transfer as well as use call light for assistance tonight. Bed alarm in place for patient's safety.

## 2021-11-20 NOTE — PLAN OF CARE
Problem: COPD Comorbidity  Goal: Maintenance of COPD Symptom Control  Outcome: No Change     Problem: Heart Failure Comorbidity  Goal: Maintenance of Heart Failure Symptom Control  Outcome: Improving     A/O this evening, but Fluctuates upon waking. On 1L 02. Edema to legs improving. Redness to groin. New order for miconazole powder, but Refused this evening. 1L of fluid infusing. Will be saline locked when completed. Alarms on for safety.

## 2021-11-20 NOTE — PLAN OF CARE
"  Problem: Adult Inpatient Plan of Care  Goal: Absence of Hospital-Acquired Illness or Injury  Intervention: Prevent Skin Injury  Recent Flowsheet Documentation  Taken 11/20/2021 6834 by Christina Donaldson RN  Body Position: position changed independently     Problem: Risk for Delirium  Goal: Improved Behavioral Control  Outcome: Improving  Patient was cooperative and calm this morning.    He had two episodes of strange behavior.  He told staff he wanted to use toilet and he had stool in his hand.  He didn't want to wash his hands when asked by NA.    At about 1525, patient wanted to void in urinal but he stood up to void on floor.    He has had some intermittent dip in oxygen sats when sleeping so he agreed to keep NC oxygen on at 1LPM for most of day.  This afternoon, he took it off.  However, he does have some improvement with O2 sats because when he walked back to bed from the bathroom, his O2 sats were between 90-94% on room air.  But, he did appear fatigued from this effort.    He continues to say he isn't hungry and doesn't want food.  When RN opened the pudding for him, he ate it.  Staff needs to order meals and do meal set up.    He had several episodes of loose stool today and a pvr was done this afternoon.  PVR was 137.      Goal: Improved Attention and Thought Clarity  Outcome: No Change    Patient does still have difficulties with understanding why he is here.  He is unable to explain this and asks staff \"what's going on with this?\"  He has trouble with word finding and RN finally was able to ascertain that patient wanted to know why he is still here and what the plan for discharge is.         "

## 2021-11-20 NOTE — PROGRESS NOTES
Care Management Follow Up    Length of Stay (days): 12    Expected Discharge Date: 11/22/2021     Concerns to be Addressed:       Patient plan of care discussed at interdisciplinary rounds: yes    Additional Information:  CM called TCUs as needed. CM had a missed call from Heena with [facility]. Heena stated that the Bed would be a likely on Monday at [facility] and requests that the CM call back on Monday AM to confirm bed openings.       DAVI Alvarado

## 2021-11-20 NOTE — PROGRESS NOTES
[facility]  [facility]   Follow up    Lenny Chau   MRN: [id number]  : 1945   DOA: 2021   CC: ARF/CKD      Assessment and Plan:  76 year old male  1. ARF/CKD; baseline CR 1.5-1.8; ARF now secondary to hemodynamics (lower bp and dehydration); improved and close to baseline  2. Volume status; appeared dry on admit; s/p IVF (1 L yesterday); diuretics on hold; oral intake is marginal; follow need to supplement with more IVF  3. HoTN; on midodrine  4. Hypokalemia; replacing as needed  5. Anemia; iron and epo levels ok  6. Bacteremia; s/p abx; ?contaminant; now monitoring without abx  7. Pain/neuropathy; on gabapentin at reduced doses  8. CAD and CM: no sxs  9. COPD: on inhalers  10. 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  Follows with oncology.  11. Ragsdale's esophagus: Continue home PPI  12. Alcohol abuse: heavy use PTA; monitoring and treating any WD  13. Malnutrition; encourage intake  14. Enceph; by report waxes and wanes; adjusting meds; will need TCU/placement on discharge        Subjective  Pt new to me; chart and meds reviewed  Pt says there were a lot of crazy people in his room last noc; says he does not see them during the day; has some leg pain; no N/V  Denies CP or SOB    Objective    Vital signs in last 24 hours  Temp:  [97.7  F (36.5  C)-98.4  F (36.9  C)] 97.9  F (36.6  C)  Pulse:  [61-68] 61  Resp:  [16-18] 16  BP: (104-141)/(70-74) 135/70  SpO2:  [93 %-96 %] 94 %      Intake/Output last 3 shifts  I/O last 3 completed shifts:  In: 720 [P.O.:720]  Out: -   Intake/Output this shift:  No intake/output data recorded.    Physical Exam  Alert/oriented x2 awake, NAD; slow to answer but able to answer  CV: RRR without murmur or rub  Lung: clear and  equal; no extra sounds  Ab: soft and NT; not distended; normal bs  Ext: some edema and well perfused  Skin; no rash    Pertinent Labs     Last Renal Panel:  Sodium   Date Value Ref Range Status   11/20/2021 137 136 - 145 mmol/L Final     Potassium   Date Value Ref Range Status   11/20/2021 4.4 3.5 - 5.0 mmol/L Final     Chloride   Date Value Ref Range Status   11/20/2021 96 (L) 98 - 107 mmol/L Final     Carbon Dioxide (CO2)   Date Value Ref Range Status   11/20/2021 28 22 - 31 mmol/L Final     Anion Gap   Date Value Ref Range Status   11/20/2021 13 5 - 18 mmol/L Final     Glucose   Date Value Ref Range Status   11/20/2021 116 70 - 125 mg/dL Final     Urea Nitrogen   Date Value Ref Range Status   11/20/2021 18 8 - 28 mg/dL Final     Creatinine   Date Value Ref Range Status   11/20/2021 1.94 (H) 0.70 - 1.30 mg/dL Final     GFR Estimate   Date Value Ref Range Status   11/20/2021 32 (L) >60 mL/min/1.73m2 Final     Comment:     As of July 11, 2021, eGFR is calculated by the CKD-EPI creatinine equation, without race adjustment. eGFR can be influenced by muscle mass, exercise, and diet. The reported eGFR is an estimation only and is only applicable if the renal function is stable.   06/16/2021 37 (L) >60 mL/min/1.73m2 Final     GFR, ESTIMATED POCT   Date Value Ref Range Status   10/18/2021 41 (L) >60 mL/min/1.73m2 Final     Calcium   Date Value Ref Range Status   11/20/2021 10.8 (H) 8.5 - 10.5 mg/dL Final     Phosphorus   Date Value Ref Range Status   11/13/2021 2.1 (L) 2.5 - 4.5 mg/dL Final     Albumin   Date Value Ref Range Status   11/19/2021 2.9 (L) 3.5 - 5.0 g/dL Final     CBC RESULTS:   Recent Labs   Lab Test 11/19/21  1351 11/17/21  0530 11/15/21  0419   WBC  --   --  6.1   RBC  --   --  3.33*   HGB  --   --  11.7*   HCT  --   --  36.5*   MCV  --   --  110*   MCH  --   --  35.1*   MCHC  --   --  32.1   RDW  --   --  14.0      < > 211    < > = values in this interval not displayed.        UA RESULTS:  Recent  Labs   Lab Test 11/10/21  0014 11/08/21  1738 10/08/20  1759   COLOR Light Yellow Yellow Straw   APPEARANCE Clear Clear Clear   URINEGLC Negative Negative Negative   URINEBILI Negative 0.5 mg/dL* Negative   URINEKETONE Negative 10 * Negative   SG 1.007 1.018 1.005   UBLD Negative Negative Moderate*   URINEPH 5.0 5.5 5.0   PROTEIN Negative 50 * 30 mg/dL*   UROBILINOGEN  --   --  <2.0 E.U./dL   NITRITE Negative Negative Negative   LEUKEST Negative 25 Sarah/uL* Negative   RBCU  --  3* 0-2   WBCU  --  5 0-5            I reviewed all lab results  Christel Waite MD

## 2021-11-20 NOTE — PROGRESS NOTES
Hospitalist Progress Note    Assessment/Plan    Principal Problem:    Bilateral lower extremity edema  Active Problems:    Orthostatic hypotension    Alcohol abuse    Orthostatic lightheadedness    Elevated bilirubin    Acute renal failure superimposed on stage 3 chronic kidney disease, unspecified acute renal failure type, unspecified whether stage 3a or 3b CKD (H)    Depression    Fever    89-year-old gentleman with history of prostate cancer, lung cancer, congestive heart failure, Ragsdale's esophagus, CKD, alcohol abuse, presented to the hospital for evaluation of mental status change physical deconditioning and lower extremity edema    Acute metabolic encephalopathy  Multifactorial, likely there is underlying alcoholic liver disease,  Medications could be contributing to that, will hold on gabapentin for now,    Bilateral lower extremity edema  Echo showed normal ejection fraction, received diuretics and albumin, holding Bumex secondary to marginal low blood pressure    History of COPD  does not appear in exacerbation, resume inhalers  History of alcohol abuse  Malnutrition, on supplement    Bacteremia  1 set of blood culture grew micrococcus on November 11,  Echo was negative for any evidence of vegetations,  Repeat blood cultures were negative likely contamination    Incidental finding of pancreatic cyst  Need to follow-up after discharge    History of prostate cancer,  History of lung cancer  Status post wedge resection in 2016 need to follow-up as an outpatient    History of mouth cancer  Ragsdale's esophagus  On pantoprazole      History of coronary artery disease  On metoprolol succinate, resumed    Acute renal failure with baseline CKD  Likely secondary to low blood pressure and dehydration, improved patient received 1 L of fluid, holding diuretic today,''    Barriers to Discharge: Placement, monitoring mentation    Anticipated discharge date/Disposition: TCU in 1 to 2 days    Subjective  Patient was seen  this morning, he was sleepy and was not in a good mood, he was not answering most of my questions and refusing physical exam,    Objective    Vital signs in last 24 hours  Temp:  [97.7  F (36.5  C)-98.4  F (36.9  C)] 97.9  F (36.6  C)  Pulse:  [61-68] 61  Resp:  [16-18] 16  BP: (104-141)/(70-74) 135/70  SpO2:  [94 %-96 %] 94 % [unfilled] O2 Device: Nasal cannula    Weight:   [unfilled] Weight change:     Intake/Output last 3 shifts  I/O last 3 completed shifts:  In: 720 [P.O.:720]  Out: -   Body mass index is 27.67 kg/m .    Physical Exam:  General Appearance: Alert, oriented x1, does not appear in distress.  HEENT: Normocephalic. No scleral icterus, . Mucous membranes moist.  Heart: :Regular rate and rhythm, normal S1 ,S2, No murmurs, no JVD, 1+pedal edema   Lungs: Clear to auscultation bilaterally. No wheezing or crackles  Abdomen: Soft, non tender, no rebound or rigidity, non distended, bowel sounds present.  Extremity: No deformity. No joint swelling.      Pertinent Labs   Lab Results: personally reviewed.   Recent Labs   Lab 11/20/21  0736 11/19/21  0746 11/18/21  0449 11/17/21  0530    138 138 136   CO2 28 30 34* 32*   BUN 18 19 18 16   ALBUMIN  --  2.9* 2.9* 2.8*   ALKPHOS  --  109 117 115   ALT  --  22 22 14   AST  --  56* 52* 45*     Recent Labs   Lab 11/19/21  1351 11/17/21  0530 11/15/21  0419   WBC  --   --  6.1   HGB  --   --  11.7*   HCT  --   --  36.5*    232 211     No results for input(s): CKTOTAL, TROPONINI in the last 168 hours.    Invalid input(s): TROPONINT, CKMBINDEX  Invalid input(s): POCGLUFGR    Medications  Drug and lactation database from the [place] National Library of Medicine:  http://toxnet.nlm.nih.gov/cgi-bin/sis/htmlgen?LACT      Advanced Care Planning:    Total time with this patient is 25 min with 50% of time spent in examining the patient, reviewing records, discussing plan of care and counseling, 50% of time spent in coordination of care.  Care discussed and  coordinated with RN , care management.      Janeth Mora MD  Internal Medicine Hospitalist  11/20/2021

## 2021-11-21 ENCOUNTER — APPOINTMENT (OUTPATIENT)
Dept: OCCUPATIONAL THERAPY | Facility: CLINIC | Age: 76
DRG: 682 | End: 2021-11-21
Payer: MEDICARE

## 2021-11-21 LAB
ANION GAP SERPL CALCULATED.3IONS-SCNC: 13 MMOL/L (ref 5–18)
BUN SERPL-MCNC: 15 MG/DL (ref 8–28)
CALCIUM SERPL-MCNC: 10.7 MG/DL (ref 8.5–10.5)
CHLORIDE BLD-SCNC: 99 MMOL/L (ref 98–107)
CO2 SERPL-SCNC: 24 MMOL/L (ref 22–31)
CREAT SERPL-MCNC: 1.78 MG/DL (ref 0.7–1.3)
GFR SERPL CREATININE-BSD FRML MDRD: 36 ML/MIN/1.73M2
GLUCOSE BLD-MCNC: 108 MG/DL (ref 70–125)
POTASSIUM BLD-SCNC: 4.2 MMOL/L (ref 3.5–5)
SODIUM SERPL-SCNC: 136 MMOL/L (ref 136–145)

## 2021-11-21 PROCEDURE — 120N000001 HC R&B MED SURG/OB

## 2021-11-21 PROCEDURE — 250N000011 HC RX IP 250 OP 636: Performed by: FAMILY MEDICINE

## 2021-11-21 PROCEDURE — 80048 BASIC METABOLIC PNL TOTAL CA: CPT | Performed by: HOSPITALIST

## 2021-11-21 PROCEDURE — 97535 SELF CARE MNGMENT TRAINING: CPT | Mod: GO

## 2021-11-21 PROCEDURE — 36415 COLL VENOUS BLD VENIPUNCTURE: CPT | Performed by: HOSPITALIST

## 2021-11-21 PROCEDURE — 250N000013 HC RX MED GY IP 250 OP 250 PS 637: Performed by: PHYSICIAN ASSISTANT

## 2021-11-21 PROCEDURE — 99232 SBSQ HOSP IP/OBS MODERATE 35: CPT | Performed by: INTERNAL MEDICINE

## 2021-11-21 PROCEDURE — 99232 SBSQ HOSP IP/OBS MODERATE 35: CPT | Performed by: HOSPITALIST

## 2021-11-21 PROCEDURE — 99207 PR CDG-MDM COMPONENT: MEETS MODERATE - DOWN CODED: CPT | Performed by: HOSPITALIST

## 2021-11-21 PROCEDURE — 250N000013 HC RX MED GY IP 250 OP 250 PS 637: Performed by: FAMILY MEDICINE

## 2021-11-21 PROCEDURE — 99497 ADVNCD CARE PLAN 30 MIN: CPT | Performed by: HOSPITALIST

## 2021-11-21 PROCEDURE — 250N000013 HC RX MED GY IP 250 OP 250 PS 637: Performed by: INTERNAL MEDICINE

## 2021-11-21 RX ADMIN — Medication 100 MG: at 08:59

## 2021-11-21 RX ADMIN — MULTIPLE VITAMINS W/ MINERALS TAB 1 TABLET: TAB at 08:59

## 2021-11-21 RX ADMIN — Medication 5 MG: at 21:59

## 2021-11-21 RX ADMIN — LORAZEPAM 0.25 MG: 0.5 TABLET ORAL at 08:59

## 2021-11-21 RX ADMIN — METOPROLOL SUCCINATE 12.5 MG: 25 TABLET, EXTENDED RELEASE ORAL at 08:59

## 2021-11-21 RX ADMIN — MICONAZOLE NITRATE: 20 POWDER TOPICAL at 09:26

## 2021-11-21 RX ADMIN — MIDODRINE HYDROCHLORIDE 2.5 MG: 2.5 TABLET ORAL at 08:59

## 2021-11-21 RX ADMIN — MIDODRINE HYDROCHLORIDE 2.5 MG: 2.5 TABLET ORAL at 12:02

## 2021-11-21 RX ADMIN — Medication 50 MG: at 08:59

## 2021-11-21 RX ADMIN — ASPIRIN 81 MG: 81 TABLET, COATED ORAL at 22:00

## 2021-11-21 RX ADMIN — LORAZEPAM 0.25 MG: 0.5 TABLET ORAL at 16:31

## 2021-11-21 RX ADMIN — MICONAZOLE NITRATE: 20 POWDER TOPICAL at 22:02

## 2021-11-21 RX ADMIN — HEPARIN SODIUM 5000 UNITS: 5000 INJECTION, SOLUTION INTRAVENOUS; SUBCUTANEOUS at 22:01

## 2021-11-21 RX ADMIN — MAGNESIUM OXIDE TAB 400 MG (241.3 MG ELEMENTAL MG) 200 MG: 400 (241.3 MG) TAB at 22:00

## 2021-11-21 RX ADMIN — PANTOPRAZOLE SODIUM 40 MG: 20 TABLET, DELAYED RELEASE ORAL at 16:30

## 2021-11-21 RX ADMIN — HEPARIN SODIUM 5000 UNITS: 5000 INJECTION, SOLUTION INTRAVENOUS; SUBCUTANEOUS at 09:02

## 2021-11-21 ASSESSMENT — ACTIVITIES OF DAILY LIVING (ADL)
ADLS_ACUITY_SCORE: 11
ADLS_ACUITY_SCORE: 11
ADLS_ACUITY_SCORE: 17
ADLS_ACUITY_SCORE: 17
ADLS_ACUITY_SCORE: 11
ADLS_ACUITY_SCORE: 15
ADLS_ACUITY_SCORE: 11
ADLS_ACUITY_SCORE: 11
ADLS_ACUITY_SCORE: 17
ADLS_ACUITY_SCORE: 11
ADLS_ACUITY_SCORE: 17
ADLS_ACUITY_SCORE: 11
ADLS_ACUITY_SCORE: 17
ADLS_ACUITY_SCORE: 11
ADLS_ACUITY_SCORE: 11
ADLS_ACUITY_SCORE: 17
ADLS_ACUITY_SCORE: 15
ADLS_ACUITY_SCORE: 11
ADLS_ACUITY_SCORE: 15
ADLS_ACUITY_SCORE: 11

## 2021-11-21 ASSESSMENT — MIFFLIN-ST. JEOR: SCORE: 1610.33

## 2021-11-21 NOTE — PROGRESS NOTES
Morgan HUTCHINSON saw Pt approx 9 AM, nurse in room. After repeated attempts, MD was able to get Pt to cooperate and take morning medications. MD emphasized with Pt team is only trying to help Pt get better.

## 2021-11-21 NOTE — PLAN OF CARE
Problem: Adult Inpatient Plan of Care  Goal: Plan of Care Review  Outcome: No Change  Flowsheets (Taken 11/20/2021 2152)  Plan of Care Reviewed With: patient  Outcome Summary: Pt A/o at beginning of shift, progressively confused throughout. Setting off bed alarm.    Problem: Risk for Delirium  Goal: Improved Behavioral Control  Outcome: No Change     Pt A/o at beginning of shift, progressively confused throughout. Setting off bed alarm. Making illogical statements, and stating only his sister could perform the cares nurse was asking to do. Cymbalta and Gabapentin previously d/c'd d/t new concerns about these fluctuating behaviors/ confusion. Several episodes of incontinence. Alarms on for safety.

## 2021-11-21 NOTE — PROVIDER NOTIFICATION
Notified Morgan HUTCHINSON at 1:05 PM regarding consult.        Comments:     Pt family requesting update, voiced concern about behavior. I did give them updates but still requesting you as well. If you could at some point today, thank you! Daughter's # [phone]     I called the daughter and updated her

## 2021-11-21 NOTE — PLAN OF CARE
VSS. No pain reported. Little intake of food but adequate fluid intake. Needs encouragement. Continue to monitor. Slums score 18. A&O fluctuates. Continue to monitor.    Pt is a little more cooperative now. Refer to previous notes about behavior/anger/impulse control.      Problem: Oral Intake Inadequate  Goal: Improved Oral Intake  Outcome: No Change     Problem: Violence Risk or Actual  Goal: Anger and Impulse Control  Outcome: Improving

## 2021-11-21 NOTE — PROGRESS NOTES
Pt set off alarms again. Nurse helped to bathroom and was able to do quick assessment of skin and listen to lungs, heart, and belly. Pt still refusing meds and refusing to eat. Again, used foul language with nurse. Will continue to monitor.

## 2021-11-21 NOTE — PROGRESS NOTES
Hospitalist Progress Note    Assessment/Plan    Principal Problem:    Bilateral lower extremity edema  Active Problems:    Orthostatic hypotension    Alcohol abuse    Orthostatic lightheadedness    Elevated bilirubin    Acute renal failure superimposed on stage 3 chronic kidney disease, unspecified acute renal failure type, unspecified whether stage 3a or 3b CKD (H)    Depression    Fever  76-year-old patient with history of coronary artery disease, lung cancer, prostate, colon cancer, COPD, Ragsdale's esophagus, alcohol use, CKD, who presented to the hospital on November 8 for bilateral lower extremity edema,    Metabolic encephalopathy   Patient has behavioral behavior possible related to polypharmacy, ammonia level was checked, gabapentin is on hold, Cymbalta was discontinued due to altered mental status  Will ask psychiatry to evaluate the patient tomorrow  Addendum I called the daughter to give her an update and she is concerned about his  Hallucinations started after 2 weeks from last alcohol intake, and this is not normal for him, and they are concerned this could affect his discharge     Bilateral lower extremity edema,  Improving, and likely venous stasis versus liver disease,  Was diuresed and diuretic was held secondary to kidney injury  Lower extremity ultrasound negative for DVT,    History of coronary artery disease  Denies any chest pain,    Chronic diastolic heart failure  Does appear euvolemic at this time, EF 55 to 60%, with mild MR and borderline LVH,  Patient on Bumex on hold currently due to kidney dysfunction    Acute kidney failure with CKD stage III  Likely secondary to dehydration and low blood pressure, nephrology consulted, patient was started on midodrine    COPD  History of lung cancer status post wedge resection in 2016  CT showed left upper lobe nodule 9 x 7 mm, concerning for primary versus metastatic cancer will need to follow-up with the radiation oncology this year,      Chronic  respiratory failure with hypoxia  On 1 L wean down to room air this morning    History of alcohol abuse  Alcoholic hepatitis on admission, ultrasound showed gallbladder sludge,    History of prostate cancer, mouth cancer, non-small cell lung cancer  Follows up with oncology    Moderate protein calorie malnutrition  Nutrition support, but patient is refusing to eat this morning    Ragsdale's esophagus continue PPI    Pancreatic cyst  Found on CT I  - needs follow-up either with a CAT scan or endoscopic ultrasound    Bacteremia with gram-positive cocci with lactic acidosis   Was on Zosyn and vancomycin,  Cultures were negative so antibiotic were stopped  CT abdomen chest pelvis without any source of infection    Barriers to Discharge: Placement    Anticipated discharge date/Disposition: TCU in 1 to 2 days    Subjective  Patient was seen this morning initially refused to take his medications before he talks to the doctor so I went in there and I listen to him saying that he does not like to stay in the hospital and explained to way to him to be cooperative so he can be discharged hopefully in the next few days after that he was cooperative and he took his medications I encouraged him also to have his breakfast    Objective    Vital signs in last 24 hours  Temp:  [97.4  F (36.3  C)-98.7  F (37.1  C)] 97.4  F (36.3  C)  Pulse:  [60-81] 78  Resp:  [14-20] 16  BP: (108-131)/(58-71) 130/71  SpO2:  [93 %-96 %] 94 % [unfilled] O2 Device: None (Room air)    Weight:   [unfilled] Weight change: -5.126 kg (-11 lb 4.8 oz)    Intake/Output last 3 shifts  I/O last 3 completed shifts:  In: 600 [P.O.:600]  Out: -   Body mass index is 26.54 kg/m .    Physical Exam:  General Appearance: Alert, oriented x2, does not appear in distress.  HEENT: Normocephalic. No scleral icterus, . Mucous membranes moist.  Heart: :Regular rate and rhythm, normal S1 ,S2, No murmurs,  Lungs: Clear to auscultation bilaterally. No wheezing or  crackles  Abdomen: Soft, non tender, no rebound or rigidity, non distended, bowel sounds present.  Extremity: 1+ pedal edema     Lab Results: personally reviewed.   Recent Labs   Lab 11/20/21  0736 11/19/21  0746 11/18/21  0449 11/17/21  0530    138 138 136   CO2 28 30 34* 32*   BUN 18 19 18 16   ALBUMIN  --  2.9* 2.9* 2.8*   ALKPHOS  --  109 117 115   ALT  --  22 22 14   AST  --  56* 52* 45*     Recent Labs   Lab 11/19/21  1351 11/17/21  0530 11/15/21  0419   WBC  --   --  6.1   HGB  --   --  11.7*   HCT  --   --  36.5*    232 211     No results for input(s): CKTOTAL, TROPONINI in the last 168 hours.    Invalid input(s): TROPONINT, CKMBINDEX  Invalid input(s): POCGLUFGR    Medications  Drug and lactation database from the [place] National Library of Medicine:  http://toxnet.nlm.nih.gov/cgi-bin/sis/htmlgen?LACT        Advanced Care Planning:  Discharge Planning discussed with patient and his daughter on the phone  Total time with this patient is 35 min with 50% of time spent in examining the patient, reviewing records, discussing plan of care and counseling, 50% of time spent in coordination of care.  Care discussed and coordinated with RN, care management .      Janeth Mora MD  Internal Medicine Hospitalist  11/21/2021

## 2021-11-21 NOTE — PROGRESS NOTES
Pt set off alarms, restless, wanting to see doctor. Pt refused medications and would not allow nurse to assess/touch them. Nurse will try again in approx 20-30 mins.

## 2021-11-21 NOTE — PROGRESS NOTES
Care Management Follow Up    Length of Stay (days): 13    Expected Discharge Date: 11/22/2021 vs 11/23/2021     Concerns to be Addressed:       Patient plan of care discussed at interdisciplinary rounds: Yes    Anticipated Discharge Disposition: TCU pending        Additional Information:  MAC had requested psych consult in Am with provider after review. MAC left message for Heena the intake liaison and with Natalia at [facility]. CM to connect in the AM with Heena and Natalia for admit for TCU placement as Pt was medically accepted pending bed openings.      DAVI Alvarado

## 2021-11-21 NOTE — PROGRESS NOTES
[facility]  [facility]   Follow up    Lenny Chau   MRN: [id number]  : 1945   DOA: 2021   CC: ARF/CKD      Assessment and Plan:  76 year old male  1. ARF/CKD; baseline CR 1.5-1.8; ARF now secondary to hemodynamics (lower bp and dehydration); improved and now at baseline  2. Volume status; appeared dry on admit; s/p IVF and diuretics on hold; oral intake is improving some; follow need to have more IVF  3. HoTN; on midodrine  4. Hypokalemia; replacing as needed  5. Anemia; iron and epo levels ok  6. Bacteremia; s/p abx; ?contaminant; now monitoring without abx  7. Pain/neuropathy; on gabapentin at reduced doses  8. CAD and CM: no sxs  9. COPD: on inhalers  10. 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 Ragsdale's esophagus: Continue home PPI  12. Alcohol abuse: heavy use PTA; monitoring and treating any WD  13. Malnutrition; encourage intake  14. Enceph; by report waxes and wanes; adjusting meds; will need TCU/placement on discharge; alert and more conversant today        Subjective  Watching football game and happy that Packers are ahead; more alert and brighter today; transferring to sit in chair; denies dizziness or SOB    Objective    Vital signs in last 24 hours  Temp:  [97.4  F (36.3  C)-98.7  F (37.1  C)] 97.4  F (36.3  C)  Pulse:  [60-81] 78  Resp:  [14-20] 16  BP: (108-131)/(58-71) 130/71  SpO2:  [93 %-96 %] 94 %      Intake/Output last 3 shifts  I/O last 3 completed shifts:  In: 600 [P.O.:600]  Out: -   Intake/Output this shift:  I/O this shift:  In: 120 [P.O.:120]  Out: -     Physical Exam  Alert/oriented x2 awake, NAD; more alert today  CV: RRR without murmur or rub  Lung: clear and equal; no extra sounds  Ab: soft and  NT; not distended; normal bs  Ext: some edema and well perfused  Skin; no rash    Pertinent Labs     Last Renal Panel:  Sodium   Date Value Ref Range Status   11/20/2021 137 136 - 145 mmol/L Final     Potassium   Date Value Ref Range Status   11/20/2021 4.4 3.5 - 5.0 mmol/L Final     Chloride   Date Value Ref Range Status   11/20/2021 96 (L) 98 - 107 mmol/L Final     Carbon Dioxide (CO2)   Date Value Ref Range Status   11/20/2021 28 22 - 31 mmol/L Final     Anion Gap   Date Value Ref Range Status   11/20/2021 13 5 - 18 mmol/L Final     Glucose   Date Value Ref Range Status   11/20/2021 116 70 - 125 mg/dL Final     Urea Nitrogen   Date Value Ref Range Status   11/20/2021 18 8 - 28 mg/dL Final     Creatinine   Date Value Ref Range Status   11/20/2021 1.94 (H) 0.70 - 1.30 mg/dL Final     GFR Estimate   Date Value Ref Range Status   11/20/2021 32 (L) >60 mL/min/1.73m2 Final     Comment:     As of July 11, 2021, eGFR is calculated by the CKD-EPI creatinine equation, without race adjustment. eGFR can be influenced by muscle mass, exercise, and diet. The reported eGFR is an estimation only and is only applicable if the renal function is stable.   06/16/2021 37 (L) >60 mL/min/1.73m2 Final     GFR, ESTIMATED POCT   Date Value Ref Range Status   10/18/2021 41 (L) >60 mL/min/1.73m2 Final     Calcium   Date Value Ref Range Status   11/20/2021 10.8 (H) 8.5 - 10.5 mg/dL Final     Phosphorus   Date Value Ref Range Status   11/13/2021 2.1 (L) 2.5 - 4.5 mg/dL Final     Albumin   Date Value Ref Range Status   11/19/2021 2.9 (L) 3.5 - 5.0 g/dL Final     CBC RESULTS:   Recent Labs   Lab Test 11/19/21  1351 11/17/21  0530 11/15/21  0419   WBC  --   --  6.1   RBC  --   --  3.33*   HGB  --   --  11.7*   HCT  --   --  36.5*   MCV  --   --  110*   MCH  --   --  35.1*   MCHC  --   --  32.1   RDW  --   --  14.0      < > 211    < > = values in this interval not displayed.        UA RESULTS:  Recent Labs   Lab Test 11/10/21  0014  11/08/21  1738 10/08/20  1759   COLOR Light Yellow Yellow Straw   APPEARANCE Clear Clear Clear   URINEGLC Negative Negative Negative   URINEBILI Negative 0.5 mg/dL* Negative   URINEKETONE Negative 10 * Negative   SG 1.007 1.018 1.005   UBLD Negative Negative Moderate*   URINEPH 5.0 5.5 5.0   PROTEIN Negative 50 * 30 mg/dL*   UROBILINOGEN  --   --  <2.0 E.U./dL   NITRITE Negative Negative Negative   LEUKEST Negative 25 Sarah/uL* Negative   RBCU  --  3* 0-2   WBCU  --  5 0-5            I reviewed all lab results  Christel Waite MD

## 2021-11-21 NOTE — PROGRESS NOTES
Nursing notified by nursing assistant that Pt threatened to hit NA with call light. Will continue to monitor and give PRN if another occurrence or Pt becomes more agitated.

## 2021-11-21 NOTE — PROVIDER NOTIFICATION
Notified Morgan HUTCHINSON at 8:34 AM regarding consult.        Comments:    Pt refusing all cares until they see the doctor, using foul language. Tried twice, no luck. Would you be able to come see them?

## 2021-11-21 NOTE — PROVIDER NOTIFICATION
Notified Morgan HUTCHINSON at 10:39 AM regarding consult and discharge planning.      Spoke with:    Orders were obtained    Comments:    Psych consult for Pt? CM concern and wondering d/t recent behavior.

## 2021-11-21 NOTE — PLAN OF CARE
Problem: Adult Inpatient Plan of Care  Goal: Optimal Comfort and Wellbeing  Outcome: No Change   A&O fluctuates. Denies pain. Pt making illogical statements, impulsive. On alarms for safety. Incontinent and perineum very red, miconazole powder put on. On 1L via NC. As-1 with walker and belt. Possible placement to TCU on Monday.

## 2021-11-22 ENCOUNTER — APPOINTMENT (OUTPATIENT)
Dept: PHYSICAL THERAPY | Facility: CLINIC | Age: 76
DRG: 682 | End: 2021-11-22
Payer: MEDICARE

## 2021-11-22 PROCEDURE — 97116 GAIT TRAINING THERAPY: CPT | Mod: GP

## 2021-11-22 PROCEDURE — 99232 SBSQ HOSP IP/OBS MODERATE 35: CPT | Performed by: PHYSICIAN ASSISTANT

## 2021-11-22 PROCEDURE — 99232 SBSQ HOSP IP/OBS MODERATE 35: CPT | Performed by: INTERNAL MEDICINE

## 2021-11-22 PROCEDURE — 250N000011 HC RX IP 250 OP 636: Performed by: FAMILY MEDICINE

## 2021-11-22 PROCEDURE — 99232 SBSQ HOSP IP/OBS MODERATE 35: CPT | Performed by: NURSE PRACTITIONER

## 2021-11-22 PROCEDURE — 250N000013 HC RX MED GY IP 250 OP 250 PS 637: Performed by: FAMILY MEDICINE

## 2021-11-22 PROCEDURE — 97110 THERAPEUTIC EXERCISES: CPT | Mod: GP

## 2021-11-22 PROCEDURE — 120N000001 HC R&B MED SURG/OB

## 2021-11-22 PROCEDURE — 250N000013 HC RX MED GY IP 250 OP 250 PS 637: Performed by: INTERNAL MEDICINE

## 2021-11-22 PROCEDURE — 250N000013 HC RX MED GY IP 250 OP 250 PS 637: Performed by: NURSE PRACTITIONER

## 2021-11-22 PROCEDURE — 250N000013 HC RX MED GY IP 250 OP 250 PS 637: Performed by: PHYSICIAN ASSISTANT

## 2021-11-22 RX ORDER — MIRTAZAPINE 7.5 MG/1
7.5 TABLET, FILM COATED ORAL AT BEDTIME
Status: DISCONTINUED | OUTPATIENT
Start: 2021-11-22 | End: 2021-11-23 | Stop reason: HOSPADM

## 2021-11-22 RX ORDER — LANOLIN ALCOHOL/MO/W.PET/CERES
3 CREAM (GRAM) TOPICAL
Status: DISCONTINUED | OUTPATIENT
Start: 2021-11-22 | End: 2021-11-23 | Stop reason: HOSPADM

## 2021-11-22 RX ORDER — OLANZAPINE 10 MG/2ML
5 INJECTION, POWDER, FOR SOLUTION INTRAMUSCULAR 3 TIMES DAILY PRN
Status: DISCONTINUED | OUTPATIENT
Start: 2021-11-22 | End: 2021-11-23 | Stop reason: HOSPADM

## 2021-11-22 RX ADMIN — QUETIAPINE FUMARATE 6.25 MG: 25 TABLET ORAL at 17:14

## 2021-11-22 RX ADMIN — Medication 100 MG: at 09:51

## 2021-11-22 RX ADMIN — MULTIPLE VITAMINS W/ MINERALS TAB 1 TABLET: TAB at 09:51

## 2021-11-22 RX ADMIN — Medication 50 MG: at 09:50

## 2021-11-22 RX ADMIN — MIDODRINE HYDROCHLORIDE 2.5 MG: 2.5 TABLET ORAL at 09:51

## 2021-11-22 RX ADMIN — MIDODRINE HYDROCHLORIDE 2.5 MG: 2.5 TABLET ORAL at 13:28

## 2021-11-22 RX ADMIN — PANTOPRAZOLE SODIUM 40 MG: 20 TABLET, DELAYED RELEASE ORAL at 17:14

## 2021-11-22 RX ADMIN — LORAZEPAM 0.25 MG: 0.5 TABLET ORAL at 01:12

## 2021-11-22 RX ADMIN — METOPROLOL SUCCINATE 12.5 MG: 25 TABLET, EXTENDED RELEASE ORAL at 09:51

## 2021-11-22 RX ADMIN — Medication 3 MG: at 17:14

## 2021-11-22 RX ADMIN — POLYETHYLENE GLYCOL 3350 17 G: 17 POWDER, FOR SOLUTION ORAL at 09:52

## 2021-11-22 RX ADMIN — HEPARIN SODIUM 5000 UNITS: 5000 INJECTION, SOLUTION INTRAVENOUS; SUBCUTANEOUS at 09:52

## 2021-11-22 RX ADMIN — ONDANSETRON 4 MG: 4 TABLET, ORALLY DISINTEGRATING ORAL at 10:56

## 2021-11-22 RX ADMIN — LORAZEPAM 0.25 MG: 0.5 TABLET ORAL at 10:56

## 2021-11-22 ASSESSMENT — ACTIVITIES OF DAILY LIVING (ADL)
ADLS_ACUITY_SCORE: 13
ADLS_ACUITY_SCORE: 11
ADLS_ACUITY_SCORE: 13
ADLS_ACUITY_SCORE: 11
ADLS_ACUITY_SCORE: 13
ADLS_ACUITY_SCORE: 11
ADLS_ACUITY_SCORE: 13
ADLS_ACUITY_SCORE: 13
ADLS_ACUITY_SCORE: 11
ADLS_ACUITY_SCORE: 13
ADLS_ACUITY_SCORE: 11
ADLS_ACUITY_SCORE: 13
ADLS_ACUITY_SCORE: 11
ADLS_ACUITY_SCORE: 11
ADLS_ACUITY_SCORE: 13

## 2021-11-22 ASSESSMENT — MIFFLIN-ST. JEOR: SCORE: 1628.02

## 2021-11-22 NOTE — PROGRESS NOTES
Care Management Follow Up    Length of Stay (days): 14    Expected Discharge Date: 11/23/2021     Concerns to be Addressed: discharge planning     Patient plan of care discussed at interdisciplinary rounds: Yes    Anticipated Discharge Disposition: Transitional Care     Anticipated Discharge Services:  TCU   Anticipated Discharge DME:  None     Patient/family educated on Medicare website which has current facility and service quality ratings:  yes  Education Provided on the Discharge Plan:  yes  Patient/Family in Agreement with the Plan:  yes    Referrals Placed by CM/SW:  TCU   Private pay costs discussed: Not applicable    Additional Information:  POOJA left  for [facility] Liaison about possible bed anticipated at [facility].     Psych consult pending .     UPDATE: Spoke with Natalia in admissions at [facility] and pt accepted to TCU for 11/23 with 1pm ride request.     POOJA spoke with pt caro Palomino and confirmed that [facility] will be destination. Caro Palomino will transport at 1:00pm.    IRWIN Alvarez

## 2021-11-22 NOTE — CONSULTS
INITIAL PSYCHIATRIC CONSULTATION  This note was created with the help of Dragon dictation system. Grammatical and typing errors are not intentional.                REASON FOR REQUEST: mental status change with behavioral issues      ASSESSMENT/RECOMMENDATIONS/PLAN :   Mild intermittent metabolic encephalopathy exacerbated in the context of prolonged hospitalization, alcoholism, medical issues.  Cognitive and behavioral changes with impulsivity, agitation and restlessness due to above.  Sundowning due to above.   Adjustment disorder with depression and anxiety exacerbated the context of hospitalization, stressors.  Sleep difficulties.    Recommendations:  Remeron 7.5 mg at bedtime.  Seroquel 6.25 mg daily with supper  Melatonin 3 mg at suppertime.  Efforts at sleep regulation.  Reassure, redirect and reorient gently.  I ordered nutritional supplement which he said that he liked (Magic cup chocolate flavor).  Olanzapine 5 mg IM 3 times a day as needed for agitation.  Patient is amenable to having Georgette assist with decision making in regards to disposition planning, and during hospital stay as he feels overwhelmed with hospital stay and not being able to retain much of the information.     MENTAL STATUS EXAMINATION:   Appearance: Stated age, fluctuating levels of consciousness, calm.  Speech: Slow, clear   Thought Process: Increased latency, acute his daughter.  Strength  Thought content: Does not appear to respond to internally generated stimuli, no acute visual or auditory hallucination;  No manic symptoms, no paranoia no delusional thoughts.  Thought Formation: No loosening of association or flight of ideas.  Judgment: Fair, understand the need for hospitalization  Insight : Depressed  Attention : Distracted, fluctuating levels of consciousness  Memory: Depressed  Fund Of Knowledge: Depressed  Affect: Flat  Mood: Congruent  Alert and Oriented: Awake, orientation x2-3  Comprehension: Depressed   Generative  "thought content: Slow  Language: Intact  Gait and Ambulation: With assist of one.  Stable and independent with assist    BP (!) 141/75 (BP Location: Right arm)   Pulse 74   Temp 98.3  F (36.8  C) (Oral)   Resp 16   Ht 1.803 m (5' 11\")   Wt 88.1 kg (194 lb 3.2 oz)   SpO2 91%   BMI 27.09 kg/m      HISTORY OF PRESENT ILLNESS:   Presenting history to include: Per [facility]:     Lenny Chau is a 76 year old male with PMH of lung cancer, CHF, COPD, CAD, prostate cancer, AAA repair and Ragsdale's esophagus.  For 7 days has had increasing lower extremity edema and redness.  For the last 4 days has had lightheadedness and dizziness.  Was at the [place] ED little over a week ago with similar complaints.  LFTs were quite elevated and had mild troponin elevation.  They recommended admission to [facility] but patient declined.  Came home.  Symptoms worsened and so 11/8 presented to the [facility] emergency room.  Lactic acid was 2.6.  D-dimer 1.79.  Creatinine was elevated up to 2.26 normal baseline is 1.6.  Patient admits to drinking 1 L of vodka per day for an extended period of time.  Was given 2 L of normal saline bolus and lactic acid did somewhat improved.  VQ scan was negative for PE..  Has been vaccinated against Covid.  Has noted some increased swelling to his belly as well.  Denies personal history of stroke, diabetes, DVT, PE, peptic ulcer disease or sleep apnea.  Daughter is present and supportive..  Questions answered to verbalize satisfaction.    Per nursing:    Nursing notified by nursing assistant that Pt threatened to hit NA with call light. Will continue to monitor and give PRN if another occurrence or Pt becomes more agitated    Morgan HUTCHINSON saw Pt approx 9 AM, nurse in room. After repeated attempts, MD was able to get Pt to cooperate and take morning medications. MD emphasized with Pt team is only trying to help Pt get better.    Pt set off alarms again. Nurse helped to bathroom and was " "able to do quick assessment of skin and listen to lungs, heart, and belly. Pt still refusing meds and refusing to eat. Again, used foul language with nurse. Will continue to monitor.    Per OT eval:     11/16/21 1305   Cognitive Assessments   Cognitive Assessments Completed [facility] Mental Status Exam (UMS):  Total Score out of /30 23  (of 30)   Norms 21-26 equals mild neurocognitive disorder   Domains assessed: orientation, memory, attention, executive functions      Recommend assist with finances, medications, cooking and driving at this time. Pt states currently is completing most of these tasks himself without assist from spouse. Will continue to monitor cognition during hospital stay.           Upon assessment, patient was noted to be resting comfortably in bed.  He was seen with his daughter in attendance.  Patient gave me permission to speak with his daughter Georgette at bedside.  He remained and cooperative throughout this assessment.  He had no recollection of events from last few days, did not know that he had been in hospital for 14 days.  He could not recall ever taking any psychotropics nor remembered having any premorbid psychiatric illness.  He acknowledged consumption of alcohol, quantifying that as half bottle of vodka for many years.  It would appear that patient consumes about half a liter of vodka on regular basis.  His daughter also confirmed that.  He denied any hallucinations, delusions or paranoia.  He did not have any appetite more like the hospital food therefore had been refusing his meals.  He gets frustrated and easily irritated when he does not remember answers.  He knows that he is in hospital but does not remember much of the events from last few days.  Vaguely recalling his cognitive assessment by occupational therapist \"I do not know how I did\".  I reviewed Occupational Therapy assessment, he scored 23 out of 30 on slums indicating mild neurocognitive disorder.  " "Possibility of hospital induced delirium in the setting of alcohol consumption, alcohol abuse affecting his cognitive functionality.  He has no recollection of getting agitated or angry at staff nor remembered threatening anyone.  He is quite amenable to having sleep medications on board as well as going to a transitional care unit \"I do not know what transitional care unit is\".  He uses alcohol primarily to fall asleep and to control his anxiety during daytime.  He denies any anxiety at this time.    Nursing notes reviewed.  It would appear that patient had agitation, Aggression, Threatening to Hit, intermittent cooperativeness.  Patient had been experiencing lower extremity redness and edema, went to [place] ED with complaints where LFTs were noted to be elevated and had mild troponin elevation, admission to [facility] was recommended but he had declined it.  He went home however when symptoms worsened he presented to [facility] ER.  His lactic acid was 2.6, D-dimer 1.79.    Review of Systems:As per HPI. Remainders of 12 point review of systems negative.  Psychiatric ROS:  Change of Interest/Anhedonia:  No  Appetite/Weight Changes: No  Concentration Changes:No  Impaired Energy:No  Impaired Sleep:No  Anxiety/Panic:No  Tearfulness:No  Depression:No  Psychosis: No  Irritability:No  SI/VI/HI: No,No,No  Chio: No            PFSH reviewed  and not pertinent to chief complaint/reason for visit  BP (!) 141/75 (BP Location: Right arm)   Pulse 74   Temp 98.3  F (36.8  C) (Oral)   Resp 16   Ht 1.803 m (5' 11\")   Wt 88.1 kg (194 lb 3.2 oz)   SpO2 91%   BMI 27.09 kg/m    No results found for: AMPHET, PCP, BARBIT, OXYCODONE, THC, ETOH  @24HOURRESULTS@  Recent Results (from the past 72 hour(s))   Platelet count    Collection Time: 11/19/21  1:51 PM   Result Value Ref Range    Platelet Count 288 150 - 450 10e3/uL   Lactic acid whole blood    Collection Time: 11/20/21  7:36 AM   Result Value Ref Range    Lactic Acid " 1.1 0.7 - 2.0 mmol/L   Ammonia    Collection Time: 11/20/21  7:36 AM   Result Value Ref Range    Ammonia 23 11 - 35 umol/L   Basic metabolic panel    Collection Time: 11/20/21  7:36 AM   Result Value Ref Range    Sodium 137 136 - 145 mmol/L    Potassium 4.4 3.5 - 5.0 mmol/L    Chloride 96 (L) 98 - 107 mmol/L    Carbon Dioxide (CO2) 28 22 - 31 mmol/L    Anion Gap 13 5 - 18 mmol/L    Urea Nitrogen 18 8 - 28 mg/dL    Creatinine 1.94 (H) 0.70 - 1.30 mg/dL    Calcium 10.8 (H) 8.5 - 10.5 mg/dL    Glucose 116 70 - 125 mg/dL    GFR Estimate 32 (L) >60 mL/min/1.73m2   Extra Red Top Tube    Collection Time: 11/20/21  7:36 AM   Result Value Ref Range    Hold Specimen JIC    Extra Purple Top Tube    Collection Time: 11/20/21  7:36 AM   Result Value Ref Range    Hold Specimen JIC    Basic metabolic panel    Collection Time: 11/21/21 11:52 AM   Result Value Ref Range    Sodium 136 136 - 145 mmol/L    Potassium 4.2 3.5 - 5.0 mmol/L    Chloride 99 98 - 107 mmol/L    Carbon Dioxide (CO2) 24 22 - 31 mmol/L    Anion Gap 13 5 - 18 mmol/L    Urea Nitrogen 15 8 - 28 mg/dL    Creatinine 1.78 (H) 0.70 - 1.30 mg/dL    Calcium 10.7 (H) 8.5 - 10.5 mg/dL    Glucose 108 70 - 125 mg/dL    GFR Estimate 36 (L) >60 mL/min/1.73m2       PMH:   Past Medical History:   Diagnosis Date     AAA (abdominal aortic aneurysm) (H)      Anemia      Ragsdale esophagus      Cancer (H)     lung     CHF (congestive heart failure) (H)      Chronic kidney disease      COPD, group B, by GOLD 2017 classification (H)      Coronary artery disease      Coronary artery disease of native heart with stable angina pectoris, unspecified vessel or lesion type (H)      Degenerative joint disease      Head and neck cancer (H)      History of transfusion      Hyperlipidemia      Hypertension      Lung cancer (H)     s/p RUL RML wedge resection in 2016 by Dr. Carter Velazquez     Lung mass     MELODY FDG-avid 2.5cm     Myocardial infarction (H)     November 2019     Oral cancer (H)       Prostate cancer (H)      Shortness of breath     with exertion           Current Medications:Scheduled Meds:    aspirin  81 mg Oral At Bedtime     [Held by provider] bumetanide  0.5 mg Oral Daily     Fluticasone-Umeclidin-Vilanterol  1 puff Inhalation Daily     [Held by provider] gabapentin  200 mg Oral BID      heparin ANTICOAGULANT  5,000 Units Subcutaneous Q12H     magnesium oxide  400 mg Oral At Bedtime     metoprolol succinate ER  12.5 mg Oral Daily     miconazole   Topical BID     midodrine  2.5 mg Oral BID      multivitamin w/minerals  1 tablet Oral Daily     pantoprazole  40 mg Oral Daily with supper     polyethylene glycol  17 g Oral Daily     [Held by provider] potassium chloride  20 mEq Oral Daily     sodium chloride (PF)  3 mL Intracatheter Q8H     thiamine  100 mg Oral Daily     [Held by provider] Vitamin D3  125 mcg Oral Daily     zinc gluconate  50 mg Oral Daily     Continuous Infusions:  PRN Meds:.acetaminophen **OR** acetaminophen, albuterol, calcium carbonate, flumazenil, lidocaine 4%, lidocaine (buffered or not buffered), LORazepam, melatonin, nitroGLYcerin, ondansetron **OR** ondansetron, polyethylene glycol, senna-docusate **OR** senna-docusate, sodium chloride (PF), sodium chloride (PF)                Family History: PERSONALLY REVIEWED.  Family History   Problem Relation Age of Onset     Cancer Sister      Kidney Disease Maternal Aunt      Pertinent Family hx not pertinent to Chief Complaint or reason for visit.     Social History:  PERSONALLY REVIEWED.  Social History     Socioeconomic History     Marital status:      Spouse name: Not on file     Number of children: Not on file     Years of education: Not on file     Highest education level: Not on file   Occupational History     Not on file   Tobacco Use     Smoking status: Former Smoker     Packs/day: 2.00     Years: 50.00     Pack years: 100.00     Quit date: 2009     Years since quittin.9     Smokeless tobacco:  Never Used   Substance and Sexual Activity     Alcohol use: Yes     Alcohol/week: 14.0 standard drinks     Comment: 1 liter of vodka per day     Drug use: Never     Sexual activity: Not on file   Other Topics Concern     Not on file   Social History Narrative     Not on file     Social Determinants of Health     Financial Resource Strain: Not on file   Food Insecurity: Not on file   Transportation Needs: Not on file   Physical Activity: Not on file   Stress: Not on file   Social Connections: Not on file   Intimate Partner Violence: Not on file   Housing Stability: Not on file    not pertinent to Chief Complaint or reason for visit.             Allergies as of 06/01/2014 Reviewed     Review of Systems:As per HPI. Remainders of 12 point review of systems negative.    Review of Pertinent Laboratory:      PERSONALLY REVIEWED.    Physical Exam: Temp:  [97.5  F (36.4  C)-98.3  F (36.8  C)] 98.3  F (36.8  C)  Pulse:  [72-83] 74  Resp:  [16-18] 16  BP: (119-141)/(68-75) 141/75  SpO2:  [91 %-96 %] 91 %   Vitals: reviewed in chart     Physical exam as per medical team: reviewed in chart      diagnoses, risk and benefits of medications discussed with staff. Care coordination with care management team.   Thank you for this consultation.       Erika Butcher; NP  [facility]        This note was created with the help of Dragon dictation system. Grammatical and typing errors are not intentional.

## 2021-11-22 NOTE — PLAN OF CARE
"  Problem: COPD Comorbidity  Goal: Maintenance of COPD Symptom Control  Outcome: Improving     Problem: Adult Inpatient Plan of Care  Goal: Absence of Hospital-Acquired Illness or Injury  Intervention: Identify and Manage Fall Risk  Recent Flowsheet Documentation  Taken 11/22/2021 1007 by Zack Arroyo RN  Safety Promotion/Fall Prevention:    activity supervised    bed alarm on    fall prevention program maintained    clutter free environment maintained    nonskid shoes/slippers when out of bed    Pt initially refused morning meds and skin assessment. Re-approached at a later time and was compliant, however he continues to exhibit irritation and verbal agitation. PRN ativan given for behaviors, slightly effective relief. Pt up and ambulating to and from bathroom, voiding appropriately. Bowel sounds audible. Poor nutritional intake d/t \"disliking hospital food.\" New orders placed for magic cup supplement. Bed alarm on for safety. Plan to discharge to TCU facility via transport @ 1pm.      "

## 2021-11-22 NOTE — PROGRESS NOTES
Hospitalist Progress Note    Assessment/Plan    Principal Problem:    Bilateral lower extremity edema  Active Problems:    Orthostatic hypotension    Alcohol abuse    Orthostatic lightheadedness    Elevated bilirubin    Acute renal failure superimposed on stage 3 chronic kidney disease, unspecified acute renal failure type, unspecified whether stage 3a or 3b CKD (H)    Depression    Fever  76-year-old patient with history of coronary artery disease, lung cancer, prostate, colon cancer, COPD, Ragsdale's esophagus, alcohol use, CKD, who presented to the hospital on November 8 for bilateral lower extremity edema,      11/22 :    Some off and on confusion with agitation but otherwise medically stable  Psychiatry consulted and recommend remeron, seroquel, melatonin    Awaiting placement        A/p       Metabolic encephalopathy   Patient has behavioral behavior possible related to polypharmacy, ammonia level was checked, gabapentin is on hold, Cymbalta was discontinued due to altered mental status  Will ask psychiatry to evaluate the patient tomorrow  Addendum I called the daughter to give her an update and she is concerned about his  Hallucinations started after 2 weeks from last alcohol intake, and this is not normal for him, and they are concerned this could affect his discharge     Bilateral lower extremity edema,  Improving, and likely venous stasis versus liver disease,  Was diuresed and diuretic was held secondary to kidney injury  Lower extremity ultrasound negative for DVT,    History of coronary artery disease  Denies any chest pain,    Chronic diastolic heart failure  Does appear euvolemic at this time, EF 55 to 60%, with mild MR and borderline LVH,  Patient on Bumex on hold currently due to kidney dysfunction    Acute kidney failure with CKD stage III  Likely secondary to dehydration and low blood pressure, nephrology consulted, patient was started on midodrine    COPD  History of lung cancer status post wedge  resection in 2016  CT showed left upper lobe nodule 9 x 7 mm, concerning for primary versus metastatic cancer will need to follow-up with the radiation oncology this year,      Chronic respiratory failure with hypoxia  On 1 L wean down to room air this morning    History of alcohol abuse  Alcoholic hepatitis on admission, ultrasound showed gallbladder sludge,    History of prostate cancer, mouth cancer, non-small cell lung cancer  Follows up with oncology    Moderate protein calorie malnutrition  Nutrition support, but patient is refusing to eat this morning    Ragsdale's esophagus continue PPI    Pancreatic cyst  Found on CT I  - needs follow-up either with a CAT scan or endoscopic ultrasound    Bacteremia with gram-positive cocci with lactic acidosis   Was on Zosyn and vancomycin,  Cultures were negative so antibiotic were stopped  CT abdomen chest pelvis without any source of infection    Moderate Protein-Calorie Malnutrition :  ( considering insufficient oral intake, weight loss, loss of muscle mass, loss of subcutaneous fat, )        Barriers to Discharge: Placement    Anticipated discharge date/Disposition: TCU in 1 to 2 days          Objective    Vital signs in last 24 hours  Temp:  [97.5  F (36.4  C)-98.3  F (36.8  C)] 98.3  F (36.8  C)  Pulse:  [72-83] 74  Resp:  [16-18] 16  BP: (119-141)/(68-75) 141/75  SpO2:  [91 %-96 %] 91 % [unfilled] O2 Device: None (Room air)    Weight:   [unfilled] Weight change: 1.769 kg (3 lb 14.4 oz)    Intake/Output last 3 shifts  I/O last 3 completed shifts:  In: 360 [P.O.:360]  Out: -   Body mass index is 27.09 kg/m .    Physical Exam:  General Appearance: Alert, oriented x2, does not appear in distress.  HEENT: Normocephalic. No scleral icterus, . Mucous membranes moist.  Heart: :Regular rate and rhythm, normal S1 ,S2, No murmurs,  Lungs: Clear to auscultation bilaterally. No wheezing or crackles  Abdomen: Soft, non tender, no rebound or rigidity, non distended, bowel sounds  present.  Extremity: 1+ pedal edema     Lab Results: personally reviewed.   Recent Labs   Lab 11/21/21  1152 11/20/21  0736 11/19/21  0746 11/18/21  0449 11/17/21  0530    137 138 138 136   CO2 24 28 30 34* 32*   BUN 15 18 19 18 16   ALBUMIN  --   --  2.9* 2.9* 2.8*   ALKPHOS  --   --  109 117 115   ALT  --   --  22 22 14   AST  --   --  56* 52* 45*     Recent Labs   Lab 11/19/21  1351 11/17/21  0530    232     No results for input(s): CKTOTAL, TROPONINI in the last 168 hours.    Invalid input(s): TROPONINT, CKMBINDEX  Invalid input(s): POCGLUFGR    Medications  Drug and lactation database from the [place] [employer] Library of Medicine:  http://toxnet.nlm.nih.gov/cgi-bin/sis/htmlgen?LACT        Advanced Care Planning:  Discharge Planning discussed with patient and his daughter on the phone  Total time with this patient is 35 min with 50% of time spent in examining the patient, reviewing records, discussing plan of care and counseling, 50% of time spent in coordination of care.  Care discussed and coordinated with RN, care management .

## 2021-11-22 NOTE — PLAN OF CARE
Problem: Risk for Delirium  Goal: Improved Behavioral Control  Outcome: No Change     Vss. Pt still refusing to eat. Will take fluids with prompting. Initially refused meds and took them after re approaching. Mood fluctuates from cooperative to verbally aggressive. Has been redirectable and has not been physical but has threatened. Psych consult is scheduled for AM. Discharge pending r/t TCU opening up. Will continue to monitor.

## 2021-11-22 NOTE — PROGRESS NOTES
RENAL PROGRESS NOTE    CHIEF COMPLAINT: LEILA, encephalopathy    ASSESSMENT & PLAN:   Acute kidney injury, chronic kidney disease - With baseline creatinine of 1.5-1.8 range. Now with hemodynamic LEILA (soft blood pressure, dehydration). Improved and creatinine in baseline    Volume - Dry on admission; s/p IVF and diuretics on hold. Some improvement in oral intake    Hypotension - On midoddrine    Hypokalemia - Replacing as needed    Anemia - With reasonable iron and epo levels    Bacteremia - S/p antibiotics; ?contaminant. 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 Monitoring and treating any withdrawal symptoms    Encephalopathy - Needs TCU on discharge    Malnutrition - Encouraging oral intake        SUBJECTIVE:  Irritable, feels no one will show him how to shut off alarms. Complaining of nausea this morning. No pain    OBJECTIVE:  Physical Exam   Temp: 98.3  F (36.8  C) Temp src: Oral BP: (!) 141/75 Pulse: 74   Resp: 16 SpO2: 91 % O2 Device: None (Room air)    Vitals:    11/20/21 0143 11/21/21 0445 11/22/21 0438   Weight: 90 kg (198 lb 6.4 oz) 86.3 kg (190 lb 4.8 oz) 88.1 kg (194 lb 3.2 oz)     Vital Signs with Ranges  Temp:  [97.5  F (36.4  C)-98.3  F (36.8  C)] 98.3  F (36.8  C)  Pulse:  [72-83] 74  Resp:  [16-18] 16  BP: (119-141)/(68-75) 141/75  SpO2:  [91 %-96 %] 91 %  I/O last 3 completed shifts:  In: 360 [P.O.:360]  Out: -       Patient Vitals for the past 72 hrs:   Weight   11/22/21 0438 88.1 kg (194 lb 3.2 oz)   11/21/21 0445 86.3 kg (190 lb 4.8 oz)   11/20/21 0143 90 kg (198  lb 6.4 oz)       Intake/Output Summary (Last 24 hours) at 11/22/2021 0915  Last data filed at 11/21/2021 2100  Gross per 24 hour   Intake 360 ml   Output --   Net 360 ml       PHYSICAL EXAM:  General - Alert, resting in bed in no acute distress  Cardiovascular - Normal S1S2, no rub  Respiratory - Clear to auscultation bilaterally, no crackles or wheezes  Abdomen - Soft, non-tender, bowel sounds present.    Extremities - No lower extremity edema bilaterally  Integumentary - Warm, dry, no rash  Neurologic - Grossly intact, no focal deficits      LABORATORY STUDIES:     Recent Labs   Lab 11/19/21  1351 11/17/21  0530    232       Basic Metabolic Panel:  Recent Labs   Lab 11/21/21  1152 11/20/21  0736 11/19/21  0746 11/18/21  1210 11/18/21  0449 11/17/21  0530 11/16/21  0442    137 138  --  138 136 138   POTASSIUM 4.2 4.4 4.0 4.1 3.4* 3.5 3.6   CHLORIDE 99 96* 96*  --  92* 94* 95*   CO2 24 28 30  --  34* 32* 31   BUN 15 18 19  --  18 16 16   CR 1.78* 1.94* 2.02*  --  1.91* 1.91* 2.13*    116 123  --  128* 129* 125   BRADLEY 10.7* 10.8* 10.7*  --  10.7* 10.2 10.4       INRNo lab results found in last 7 days.     Recent Labs   Lab Test 11/19/21  1351 11/17/21  0530 11/15/21  0419 11/14/21  0250 11/12/21  0520 11/10/21  0849 11/09/21  0240 11/08/21  2044   INR  --   --   --   --   --   --  1.24* 1.22*   WBC  --   --  6.1  --  3.8*   < > 4.0  --    HGB  --   --  11.7*  --  11.0*   < > 11.7*  --     232 211   < > 166   < > 146*  --     < > = values in this interval not displayed.         Personally reviewed current labs      Miguelina Landin PA-C  [facility]  [phone]

## 2021-11-23 VITALS
BODY MASS INDEX: 27.19 KG/M2 | DIASTOLIC BLOOD PRESSURE: 77 MMHG | OXYGEN SATURATION: 92 % | RESPIRATION RATE: 16 BRPM | TEMPERATURE: 97.9 F | HEART RATE: 80 BPM | HEIGHT: 71 IN | SYSTOLIC BLOOD PRESSURE: 118 MMHG | WEIGHT: 194.2 LBS

## 2021-11-23 LAB
ANION GAP SERPL CALCULATED.3IONS-SCNC: 13 MMOL/L (ref 5–18)
BUN SERPL-MCNC: 16 MG/DL (ref 8–28)
CALCIUM SERPL-MCNC: 10.6 MG/DL (ref 8.5–10.5)
CHLORIDE BLD-SCNC: 100 MMOL/L (ref 98–107)
CO2 SERPL-SCNC: 24 MMOL/L (ref 22–31)
CREAT SERPL-MCNC: 2 MG/DL (ref 0.7–1.3)
GFR SERPL CREATININE-BSD FRML MDRD: 31 ML/MIN/1.73M2
GLUCOSE BLD-MCNC: 101 MG/DL (ref 70–125)
MAGNESIUM SERPL-MCNC: 2.1 MG/DL (ref 1.8–2.6)
PLATELET # BLD AUTO: 282 10E3/UL (ref 150–450)
POTASSIUM BLD-SCNC: 4.4 MMOL/L (ref 3.5–5)
SODIUM SERPL-SCNC: 137 MMOL/L (ref 136–145)

## 2021-11-23 PROCEDURE — 250N000013 HC RX MED GY IP 250 OP 250 PS 637: Performed by: PHYSICIAN ASSISTANT

## 2021-11-23 PROCEDURE — 99239 HOSP IP/OBS DSCHRG MGMT >30: CPT | Mod: GC | Performed by: INTERNAL MEDICINE

## 2021-11-23 PROCEDURE — 250N000013 HC RX MED GY IP 250 OP 250 PS 637: Performed by: FAMILY MEDICINE

## 2021-11-23 PROCEDURE — 250N000011 HC RX IP 250 OP 636: Performed by: FAMILY MEDICINE

## 2021-11-23 PROCEDURE — 250N000013 HC RX MED GY IP 250 OP 250 PS 637: Performed by: INTERNAL MEDICINE

## 2021-11-23 PROCEDURE — 80048 BASIC METABOLIC PNL TOTAL CA: CPT | Performed by: INTERNAL MEDICINE

## 2021-11-23 PROCEDURE — 83735 ASSAY OF MAGNESIUM: CPT | Performed by: INTERNAL MEDICINE

## 2021-11-23 PROCEDURE — 99231 SBSQ HOSP IP/OBS SF/LOW 25: CPT | Performed by: NURSE PRACTITIONER

## 2021-11-23 PROCEDURE — 36415 COLL VENOUS BLD VENIPUNCTURE: CPT | Performed by: INTERNAL MEDICINE

## 2021-11-23 PROCEDURE — 99231 SBSQ HOSP IP/OBS SF/LOW 25: CPT | Performed by: PHYSICIAN ASSISTANT

## 2021-11-23 PROCEDURE — 85049 AUTOMATED PLATELET COUNT: CPT | Performed by: FAMILY MEDICINE

## 2021-11-23 RX ORDER — MIRTAZAPINE 7.5 MG/1
7.5 TABLET, FILM COATED ORAL AT BEDTIME
Qty: 5 TABLET | Refills: 0 | Status: SHIPPED | OUTPATIENT
Start: 2021-11-23 | End: 2022-03-03

## 2021-11-23 RX ORDER — AMOXICILLIN 250 MG
1 CAPSULE ORAL 2 TIMES DAILY PRN
Qty: 4 TABLET | Refills: 0 | DISCHARGE
Start: 2021-11-23 | End: 2021-12-22

## 2021-11-23 RX ORDER — MIDODRINE HYDROCHLORIDE 2.5 MG/1
2.5 TABLET ORAL 2 TIMES DAILY
Qty: 60 TABLET | Refills: 0 | Status: SHIPPED | OUTPATIENT
Start: 2021-11-23 | End: 2022-03-03

## 2021-11-23 RX ORDER — QUETIAPINE FUMARATE 25 MG/1
6.25 TABLET, FILM COATED ORAL
Qty: 5 TABLET | Refills: 0 | Status: SHIPPED | OUTPATIENT
Start: 2021-11-23 | End: 2022-03-03

## 2021-11-23 RX ORDER — LORAZEPAM 0.5 MG/1
0.25 TABLET ORAL EVERY 8 HOURS PRN
Qty: 5 TABLET | Refills: 0 | Status: SHIPPED | OUTPATIENT
Start: 2021-11-23 | End: 2021-12-22

## 2021-11-23 RX ADMIN — POLYETHYLENE GLYCOL 3350 17 G: 17 POWDER, FOR SOLUTION ORAL at 09:40

## 2021-11-23 RX ADMIN — METOPROLOL SUCCINATE 12.5 MG: 25 TABLET, EXTENDED RELEASE ORAL at 09:39

## 2021-11-23 RX ADMIN — MIDODRINE HYDROCHLORIDE 2.5 MG: 2.5 TABLET ORAL at 09:38

## 2021-11-23 RX ADMIN — MICONAZOLE NITRATE: 20 POWDER TOPICAL at 09:41

## 2021-11-23 RX ADMIN — HEPARIN SODIUM 5000 UNITS: 5000 INJECTION, SOLUTION INTRAVENOUS; SUBCUTANEOUS at 09:38

## 2021-11-23 RX ADMIN — Medication 50 MG: at 09:38

## 2021-11-23 RX ADMIN — Medication 100 MG: at 09:39

## 2021-11-23 RX ADMIN — MULTIPLE VITAMINS W/ MINERALS TAB 1 TABLET: TAB at 09:39

## 2021-11-23 RX ADMIN — LORAZEPAM 0.25 MG: 0.5 TABLET ORAL at 01:53

## 2021-11-23 ASSESSMENT — ACTIVITIES OF DAILY LIVING (ADL)
ADLS_ACUITY_SCORE: 13
ADLS_ACUITY_SCORE: 15
ADLS_ACUITY_SCORE: 13
ADLS_ACUITY_SCORE: 13
ADLS_ACUITY_SCORE: 15
ADLS_ACUITY_SCORE: 15
ADLS_ACUITY_SCORE: 13
ADLS_ACUITY_SCORE: 15
ADLS_ACUITY_SCORE: 13

## 2021-11-23 NOTE — PLAN OF CARE
Physical Therapy Discharge Summary    Reason for therapy discharge:    Discharged to transitional care facility.    Progress towards therapy goal(s). See goals on Care Plan in [facility] electronic health record for goal details.  Goals not met.  Barriers to achieving goals:   discharge from facility and fatigue/weakness.    Therapy recommendation(s):    Continued therapy is recommended.  Rationale/Recommendations:  TCU.

## 2021-11-23 NOTE — PROGRESS NOTES
Care Management Discharge Note    Discharge Date: 11/23/2021       Discharge Disposition: Transitional Care    Discharge Services: TCU    Discharge DME: None    Discharge Transportation: family or friend will provide      PAS Confirmation Code:  ([id number])    Patient/family educated on Medicare website which has current facility and service quality ratings: yes    Education Provided on the Discharge Plan: yes   Persons Notified of Discharge Plans: yes  Patient/Family in Agreement with the Plan: yes    Handoff Referral Completed: yes    Additional Information: Discharge plan confirmed with Georgette and Yannick liaison-Alexandra. Patient to discharge to [facility] TCU. PAS completed. Georgette to transport at 1300.           Meera Rhodes RN

## 2021-11-23 NOTE — PROGRESS NOTES
Psychiatric Progress Note  Mild intermittent metabolic encephalopathy exacerbated in the context of prolonged hospitalization, alcoholism, medical issues.  Cognitive and behavioral changes with impulsivity, agitation and restlessness due to above.  Sundowning due to above.   Adjustment disorder with depression and anxiety exacerbated the context of hospitalization, stressors.  Sleep difficulties.     Recommendations:  Remeron 7.5 mg at bedtime.  Seroquel 6.25 mg daily with supper  Melatonin 3 mg at suppertime.  Efforts at sleep regulation.  Reassure, redirect and reorient gently.  I ordered nutritional supplement which he said that he liked (Magic cup chocolate flavor).  Olanzapine 5 mg IM 3 times a day as needed for agitation.  Patient is amenable to having Georgette assist with decision making in regards to disposition planning, and during hospital stay as he feels overwhelmed with hospital stay and not being able to retain much of the information.          SUBJECTIVE:  Doing well. Slept a little better last night. NO agitation. NO aggression.      MENTAL STATUS EXAMINATION:   Appearance: Pleasant, NAD  Speech: Clear   Thought Process: Linear  Thought content: Does not appear to respond to internally generated stimuli, no acute visual or auditory hallucination;  No manic symptoms, no paranoia no delusional thoughts.  Thought Formation: No loosening of association or flight of ideas.  Judgment: Fair, understand the need for hospitalization  Insight : Depressed  Attention : Distracted, fluctuating levels of consciousness  Memory: Depressed  Fund Of Knowledge: Depressed  Affect: Flat  Mood: Congruent  Alert and Oriented: Awake, orientation x 3  Comprehension: Depressed   Generative thought content: Slow  Language: Intact  Gait and Ambulation: With assist of one.  Stable and independent with assist  Vital signs in last 24 hours  Temp:  [97.5  F (36.4  C)-98  F (36.7  C)] 97.9  F (36.6  C)  Pulse:  [64-80] 80  Resp:   [16-18] 16  BP: ()/(56-77) 118/77  SpO2:  [89 %-92 %] 92 %

## 2021-11-23 NOTE — PLAN OF CARE
Problem: Risk for Delirium  Goal: Improved Behavioral Control  Outcome: No Change   Sleepy most of this shift. Compliant with supper time medications, refused all others.

## 2021-11-23 NOTE — PLAN OF CARE
Problem: Risk for Delirium  Goal: Improved Behavioral Control  Outcome: No Change     Problem: Adult Inpatient Plan of Care  Goal: Optimal Comfort and Wellbeing  Outcome: Improving     Problem: COPD Comorbidity  Goal: Maintenance of COPD Symptom Control  Outcome: Improving     Problem: Adult Inpatient Plan of Care  Goal: Absence of Hospital-Acquired Illness or Injury  Intervention: Prevent Skin Injury  Recent Flowsheet Documentation  Taken 11/23/2021 1000 by Zack Arroyo RN  Body Position: position changed independently     Pt alert and oriented. Had verbal agitation during med administration d/t lack of sleep. Bed alarms on for safety. Plan to discharge to TCU facility @ 1pm.

## 2021-11-23 NOTE — PLAN OF CARE
Problem: Adult Inpatient Plan of Care  Goal: Optimal Comfort and Wellbeing  Outcome: Improving     Alert during interactions. Orientation fluctuates. Moods labile. VSS on RA. Denies pain. Up to bathroom with assist x1 and walker. Pt not using call light appropriately; reeducated regarding call light and bed alarm on for safety.

## 2021-11-23 NOTE — PROGRESS NOTES
RENAL PROGRESS NOTE    CHIEF COMPLAINT: LEILA, encephalopathy    ASSESSMENT & PLAN:   Acute kidney injury, chronic kidney disease - With baseline creatinine of 1.5-1.8 range. Now with hemodynamic LEILA (soft blood pressure, dehydration). Improved and creatinine near baseline    Volume - Dry on admission; s/p IVF and diuretics on hold. Some improvement in oral intake. Continue to encourage    Hypotension - On midodrine    Hypokalemia - Replacing as needed    Anemia - With reasonable iron and epo levels    Bacteremia - S/p antibiotics; ?contaminant. 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 Monitoring and treating any withdrawal symptoms    Encephalopathy - Discharging to TCU    Malnutrition - Encouraging oral intake    Disposition - No objection to discharge from renal perspective        SUBJECTIVE: Sleeping, did not awaken    OBJECTIVE:  Physical Exam   Temp: 97.9  F (36.6  C) Temp src: Oral BP: 118/77 Pulse: 80   Resp: 16 SpO2: 92 % O2 Device: None (Room air)    Vitals:    11/20/21 0143 11/21/21 0445 11/22/21 0438   Weight: 90 kg (198 lb 6.4 oz) 86.3 kg (190 lb 4.8 oz) 88.1 kg (194 lb 3.2 oz)     Vital Signs with Ranges  Temp:  [97.5  F (36.4  C)-98  F (36.7  C)] 97.9  F (36.6  C)  Pulse:  [64-80] 80  Resp:  [16-18] 16  BP: ()/(56-77) 118/77  SpO2:  [89 %-92 %] 92 %  I/O last 3 completed shifts:  In: 300 [P.O.:300]  Out: 0       Patient Vitals for the past 72 hrs:   Weight   11/22/21 0438 88.1 kg (194 lb 3.2 oz)   11/21/21 0445 86.3 kg (190 lb 4.8 oz)       Intake/Output Summary  (Last 24 hours) at 11/22/2021 0915  Last data filed at 11/21/2021 2100  Gross per 24 hour   Intake 360 ml   Output --   Net 360 ml       PHYSICAL EXAM:  General - Sleeping, did not awaken  Deferred physical exam      LABORATORY STUDIES:     Recent Labs   Lab 11/23/21  0850 11/19/21  1351 11/17/21  0530    288 232       Basic Metabolic Panel:  Recent Labs   Lab 11/23/21  0850 11/21/21  1152 11/20/21  0736 11/19/21  0746 11/18/21  1210 11/18/21  0449 11/17/21  0530    136 137 138  --  138 136   POTASSIUM 4.4 4.2 4.4 4.0 4.1 3.4* 3.5   CHLORIDE 100 99 96* 96*  --  92* 94*   CO2 24 24 28 30  --  34* 32*   BUN 16 15 18 19  --  18 16   CR 2.00* 1.78* 1.94* 2.02*  --  1.91* 1.91*    108 116 123  --  128* 129*   BRADLEY 10.6* 10.7* 10.8* 10.7*  --  10.7* 10.2       INRNo lab results found in last 7 days.     Recent Labs   Lab Test 11/23/21  0850 11/19/21  1351 11/17/21  0530 11/15/21  0419 11/14/21  0250 11/12/21  0520 11/10/21  0849 11/09/21  0240 11/08/21  2044   INR  --   --   --   --   --   --   --  1.24* 1.22*   WBC  --   --   --  6.1  --  3.8*   < > 4.0  --    HGB  --   --   --  11.7*  --  11.0*   < > 11.7*  --     288   < > 211   < > 166   < > 146*  --     < > = values in this interval not displayed.         Personally reviewed current labs      Miguelina Landin PA-C  [facility]  [phone]

## 2021-11-23 NOTE — PLAN OF CARE
Occupational Therapy Discharge Summary    Reason for therapy discharge:    Discharged to transitional care facility.    Progress towards therapy goal(s). See goals on Care Plan in [facility] electronic health record for goal details.  Goals not met.  Barriers to achieving goals:   limited tolerance for therapy.    Therapy recommendation(s):    Continued therapy is recommended.  Rationale/Recommendations:  Pt not at baseline.

## 2021-11-23 NOTE — PLAN OF CARE
Patient ready for discharge.  Discharge instructions discussed and questions answered.  All belongings sent with patient.

## 2021-12-12 NOTE — DISCHARGE SUMMARY
[facility]  Hospitalist Discharge Summary      Date of Admission:  11/8/2021  Date of Discharge:  11/23/2021  1:33 PM  Discharging Provider: Kishore Barnard MD      Discharge Diagnoses           Principal Problem:    Bilateral lower extremity edema  Active Problems:    Orthostatic hypotension    Alcohol abuse    Orthostatic lightheadedness    Elevated bilirubin    Acute renal failure superimposed on stage 3 chronic kidney disease, unspecified acute renal failure type, unspecified whether stage 3a or 3b CKD (H)    Depression    Fever       A/p           Acute Metabolic encephalopathy       Patient also had off and on confusion. Patient has behavioral behavior possible related to polypharmacy, ammonia level was normal, held gabapentin, discontinued cymbalta. Consulted psychiatry. Patient has h/o alcohol use and Hallucinations started after 2 weeks from last alcohol intake, and this is not normal for him. Psychiatry team recommended Remeron 7.5 mg at bedtime, Seroquel 6.25 mg daily with supper, Melatonin 3 mg at suppertime, Efforts at sleep regulation, Olanzapine 5 mg IM 3 times a day as needed for agitation. Patient was amenable to having Georgette assist with decision making in regards to disposition planning, and during hospital stay as he feels overwhelmed with hospital stay and not being able to retain much of the information.        Bilateral lower extremity edema,    He was diuresed and diuretic was held secondary to kidney injury. the leg swelling is Improving, and likely venous stasis versus liver disease. Lower extremity ultrasound negative for DVT. He has chronic diastolic CHF but seems stable and euvolemic.  History of coronary artery disease         Chronic diastolic heart failure  Does appear euvolemic at this time, EF 55 to 60%, with mild MR and borderline LVH,  Patient on Bumex on hold currently due to kidney dysfunction  stable       Acute kidney failure with CKD stage  III      Nephrology consult was taken for acute on CKD. Baseline creatinine at 1.5-1.8 reange. S/p iv hydration, diuretics were held and creatinine elevated and stable. Encouraged oral intake. Patient was started on Midodrine for low BP issues.         COPD  History of lung cancer status post wedge resection in 2016  CT showed left upper lobe nodule 9 x 7 mm, concerning for primary versus metastatic cancer will need to follow-up with the radiation oncology this year.  stable          Chronic respiratory failure with hypoxia  On 1 L wean down to room air this morning       History of alcohol abuse  Alcoholic hepatitis on admission, ultrasound showed gallbladder sludge,       History of prostate cancer, mouth cancer, non-small cell lung cancer  Follows up with oncology       Moderate protein calorie malnutrition  Nutrition support, but patient is refusing to eat this morning       Ragsdale's esophagus continue PPI     Pancreatic cyst  Found on CT I  - needs follow-up either with a CAT scan or endoscopic ultrasound       Bacteremia with gram-positive cocci with lactic acidosis   Was on Zosyn and vancomycin,  Cultures were negative so antibiotic were stopped  CT abdomen chest pelvis without any source of infection       Moderate Protein-Calorie Malnutrition :  ( considering insufficient oral intake, weight loss, loss of muscle mass, loss of subcutaneous fat, )       Follow-ups Needed After Discharge   Follow-up Appointments     Follow Up      1. Pancreatic cyst follow-up CT scan versus endoscopic ultrasound in 2   years.  2. Left upper lobe pulmonary nodule. Follow-up with pulmonary/oncologist.           Follow Up and recommended labs and tests      Follow up with longterm physician.  The following labs/tests are   recommended: BMP.         {Additional follow-up instructions/to-do's for PCP    :bmp    Unresulted Labs Ordered in the Past 30 Days of this Admission     No orders found from 10/9/2021 to 11/9/2021.       These results will be followed up by pcp    Discharge Disposition   Discharged to nursing home  Condition at discharge: Stable        Hospital Course       76-year-old patient with history of coronary artery disease, lung cancer, prostate, colon cancer, COPD, Ragsdale's esophagus, alcohol use, CKD, who presented to the hospital on November 8 for bilateral lower extremity edema. He was diuresed and diuretic was held secondary to kidney injury. the leg swelling is Improving, and likely venous stasis versus liver disease. Lower extremity ultrasound negative for DVT. He has chronic diastolic CHF but seems stable and euvolemic.    Patient also had off and on confusion. Patient has behavioral behavior possible related to polypharmacy, ammonia level was normal, held gabapentin, discontinued cymbalta. Consulted psychiatry. Patient has h/o alcohol use and Hallucinations started after 2 weeks from last alcohol intake, and this is not normal for him. Psychiatry team recommended Remeron 7.5 mg at bedtime, Seroquel 6.25 mg daily with supper, Melatonin 3 mg at suppertime, Efforts at sleep regulation, Olanzapine 5 mg IM 3 times a day as needed for agitation. Patient was amenable to having Georgette assist with decision making in regards to disposition planning, and during hospital stay as he feels overwhelmed with hospital stay and not being able to retain much of the information.     Nephrology consult was taken for acute on CKD. Baseline creatinine at 1.5-1.8 reange. S/p iv hydration, diuretics were held and creatinine elevated and stable. Encouraged oral intake. Patient was started on Midodrine for low BP issues.       Patient had Bacteremia with gram-positive cocci with lactic acidosis. He was started on empiric Zosyn and vancomycin. Cultures were negative so antibiotic were stopped. CT abdomen chest pelvis without any source of infection. ID team was consulted.     Patient seems clinically stable at this time and will  discharge to TCU and will f/u with PCP.                Consultations This Hospital Stay   SOCIAL WORK IP CONSULT  SOCIAL WORK IP CONSULT  NUTRITION SERVICES ADULT IP CONSULT  PHYSICAL THERAPY ADULT IP CONSULT  OCCUPATIONAL THERAPY ADULT IP CONSULT  ADVANCE DIRECTIVE IP CONSULT  PHARMACY TO DOSE VANCO  INFECTIOUS DISEASES IP CONSULT  NEPHROLOGY IP CONSULT  PSYCHIATRY IP CONSULT  NUTRITION SERVICES ADULT IP CONSULT  PHYSICAL THERAPY ADULT IP CONSULT  OCCUPATIONAL THERAPY ADULT IP CONSULT    Code Status   No CPR- Do NOT Intubate    Time Spent on this Encounter   I, Kishore Barnard MD, personally saw the patient today and spent greater than 30 minutes discharging this patient.       Kishore Barnard MD  [address] [address]  Phone: [phone]  Fax: [phone]  ______________________________________________________________________    Physical Exam   Vital Signs:                    Weight: 194 lbs 3.2 oz       General Appearance: Alert, oriented x2, does not appear in distress.  HEENT: Normocephalic. No scleral icterus, . Mucous membranes moist.  Heart: :Regular rate and rhythm, normal S1 ,S2, No murmurs,  Lungs: Clear to auscultation bilaterally. No wheezing or crackles  Abdomen: Soft, non tender, no rebound or rigidity, non distended, bowel sounds present.  Extremity: 1+ pedal edema          Primary Care Physician   Shade Boyd    Discharge Orders      Basic metabolic panel     Follow Up    1. Pancreatic cyst follow-up CT scan versus endoscopic ultrasound in 2 years.  2. Left upper lobe pulmonary nodule. Follow-up with pulmonary/oncologist.     General info for SNF    Length of Stay Estimate: Short Term Care: Estimated # of Days <30  Condition at Discharge: Stable  Level of care:skilled   Rehabilitation Potential: Fair  Admission H&P remains valid and up-to-date: Yes  Recent Chemotherapy: N/A  Use Nursing Home Standing Orders: Yes      Mantoux instructions    Give two-step Mantoux (PPD) Per Facility Policy Yes     Follow Up and recommended labs and tests    Follow up with [facility] physician.  The following labs/tests are recommended: BMP.     Reason for your hospital stay    Leg swelling     Daily weights    Call Provider for weight gain of more than 2 pounds per day or 5 pounds per week.     Intake and output    Every shift     Activity - Up with nursing assistance     No CPR- Do NOT Intubate     Physical Therapy Adult Consult    Evaluate and treat as clinically indicated.    Reason:  deconditioning     Occupational Therapy Adult Consult    Evaluate and treat as clinically indicated.    Reason:  deconditioning     Diet    Follow this diet upon discharge: Orders Placed This Encounter      Snacks/Supplements Adult: Magic Cup; With Meals      No Added Salt 4 Gram Sodium       Significant Results and Procedures   Most Recent 3 CBC's:Recent Labs   Lab Test 11/23/21  0850 11/19/21  1351 11/17/21  0530 11/15/21  0419 11/14/21  0250 11/12/21  0520 11/11/21  0523 11/10/21  0849   WBC  --   --   --  6.1  --  3.8*  --  3.8*   HGB  --   --   --  11.7*  --  11.0*  --  10.5*   MCV  --   --   --  110*  --  109*  --  110*    288 232 211   < > 166   < > 151    < > = values in this interval not displayed.     Most Recent 3 BMP's:Recent Labs   Lab Test 11/23/21  0850 11/21/21  1152 11/20/21  0736    136 137   POTASSIUM 4.4 4.2 4.4   CHLORIDE 100 99 96*   CO2 24 24 28   BUN 16 15 18   CR 2.00* 1.78* 1.94*   ANIONGAP 13 13 13   BRADLEY 10.6* 10.7* 10.8*    108 116       Discharge Medications   Discharge Medication List as of 11/23/2021  1:09 PM      START taking these medications    Details   LORazepam (ATIVAN) 0.5 MG tablet Take 0.5 tablets (0.25 mg) by mouth every 8 hours as needed for anxiety, Disp-5 tablet, R-0, Local Print      midodrine (PROAMATINE) 2.5 MG tablet Take 1 tablet (2.5 mg) by mouth 2 times daily, Disp-60 tablet, R-0,  E-Prescribe      mirtazapine (REMERON) 7.5 MG tablet Take 1 tablet (7.5 mg) by mouth At Bedtime, Disp-5 tablet, R-0, Local Print      QUEtiapine (SEROQUEL) 25 MG tablet Take 0.25 tablets (6.25 mg) by mouth daily (with dinner), Disp-5 tablet, R-0, Local Print      senna-docusate (SENOKOT-S/PERICOLACE) 8.6-50 MG tablet Take 1 tablet by mouth 2 times daily as needed for constipation, Disp-4 tablet, R-0, Transitional         CONTINUE these medications which have NOT CHANGED    Details   acetaminophen (TYLENOL) 500 MG tablet Take 500-1,000 mg by mouth every 6 hours as needed for mild pain Not to exceed 2000 mg/day, Historical      albuterol (PROAIR HFA/PROVENTIL HFA/VENTOLIN HFA) 108 (90 Base) MCG/ACT inhaler Inhale 2 puffs into the lungs every 6 hours as needed, HistoricalPharmacy may dispense brand covered by insurance (Proair, or proventil or ventolin or generic albuterol inhaler)      aspirin (ASA) 81 MG EC tablet Take 81 mg by mouth At Bedtime , Historical      ferrous sulfate (FEROSUL) 325 (65 Fe) MG tablet Take 325 mg by mouth every other day With breakfast, Historical      Fluticasone-Umeclidin-Vilant (TRELEGY ELLIPTA) 200-62.5-25 MCG/INH AEPB Inhale 1 puff into the lungs daily , Historical      Magnesium Oxide 250 MG TABS Take 500 mg by mouth At Bedtime, Historical      metoprolol succinate ER (TOPROL-XL) 25 MG 24 hr tablet Take 12.5 mg by mouth daily , Historical      multivitamin w/minerals (THERA-VIT-M) tablet Take 1 tablet by mouth daily, Historical      nitroGLYcerin (NITROSTAT) 0.4 MG sublingual tablet Place 0.4 mg under the tongue Place 1 tablet under the tongue at onset of chest pain.  May repeat x 3 doses q 5 min.  Call 911 after first dose if pain persists., Historical      omeprazole (PRILOSEC) 20 MG DR capsule Take 20 mg by mouth daily (with dinner) Before a meal, Historical      polyethylene glycol (MIRALAX) 17 GM/Dose powder Take 17 g by mouth daily As needed, Historical      thiamine (B-1) 100 MG  tablet Take 100 mg by mouth daily, Historical      Vitamin D3 (CHOLECALCIFEROL) 125 MCG (5000 UT) tablet Take 1 tablet by mouth daily , Historical      zinc gluconate 50 MG tablet Take 1 tablet by mouth daily, Historical           Allergies   No Known Allergies

## 2021-12-20 ENCOUNTER — COMMUNICATION - RIVER FALLS (OUTPATIENT)
Dept: FAMILY MEDICINE | Facility: CLINIC | Age: 76
End: 2021-12-20
Payer: COMMERCIAL

## 2021-12-20 ENCOUNTER — HOSPITAL ENCOUNTER (OUTPATIENT)
Dept: CT IMAGING | Facility: CLINIC | Age: 76
Discharge: HOME | End: 2021-12-20
Attending: RADIOLOGY | Admitting: RADIOLOGY
Payer: MEDICARE

## 2021-12-20 DIAGNOSIS — C34.12 MALIGNANT NEOPLASM OF UPPER LOBE OF LEFT LUNG (H): ICD-10-CM

## 2021-12-20 PROCEDURE — 71250 CT THORAX DX C-: CPT

## 2021-12-21 ENCOUNTER — AMBULATORY - RIVER FALLS (OUTPATIENT)
Dept: FAMILY MEDICINE | Facility: CLINIC | Age: 76
End: 2021-12-21
Payer: COMMERCIAL

## 2021-12-22 ENCOUNTER — OFFICE VISIT (OUTPATIENT)
Dept: RADIATION ONCOLOGY | Facility: CLINIC | Age: 76
End: 2021-12-22
Attending: RADIOLOGY
Payer: COMMERCIAL

## 2021-12-22 ENCOUNTER — LAB (OUTPATIENT)
Dept: INFUSION THERAPY | Facility: CLINIC | Age: 76
End: 2021-12-22
Payer: MEDICARE

## 2021-12-22 VITALS
SYSTOLIC BLOOD PRESSURE: 122 MMHG | RESPIRATION RATE: 16 BRPM | TEMPERATURE: 98.2 F | WEIGHT: 191.4 LBS | OXYGEN SATURATION: 96 % | BODY MASS INDEX: 26.69 KG/M2 | HEART RATE: 78 BPM | DIASTOLIC BLOOD PRESSURE: 73 MMHG

## 2021-12-22 DIAGNOSIS — C34.12 MALIGNANT NEOPLASM OF UPPER LOBE OF LEFT LUNG (H): ICD-10-CM

## 2021-12-22 DIAGNOSIS — C61 MALIGNANT NEOPLASM OF PROSTATE (H): ICD-10-CM

## 2021-12-22 DIAGNOSIS — C34.12 MALIGNANT NEOPLASM OF UPPER LOBE OF LEFT LUNG (H): Primary | ICD-10-CM

## 2021-12-22 LAB
BUN SERPL-MCNC: 26 MG/DL (ref 7–25)
BUN/CREAT RATIO - HISTORICAL: 15 (ref 6–22)
CALCIUM SERPL-MCNC: 10.2 MG/DL (ref 8.6–10.3)
CALCIUM SERPL-MCNC: 10.2 MG/DL (ref 8.6–10.3)
CHLORIDE BLD-SCNC: 105 MMOL/L (ref 98–110)
CHOLEST SERPL-MCNC: 192 MG/DL
CHOLEST/HDLC SERPL: 5.5 {RATIO}
CO2 SERPL-SCNC: 28 MMOL/L (ref 20–32)
CREAT SERPL-MCNC: 1.7 MG/DL (ref 0.7–1.18)
CREAT UR-MCNC: 85 MG/DL (ref 20–320)
EGFRCR SERPLBLD CKD-EPI 2021: 38 ML/MIN/1.73M2
GLUCOSE BLD-MCNC: 124 MG/DL (ref 65–99)
HDLC SERPL-MCNC: 35 MG/DL
HGB BLD-MCNC: 12.9 GM/DL (ref 13.2–17.1)
LDLC SERPL CALC-MCNC: 132 MG/DL
MICROALBUMIN UR-MCNC: 5.3 MG/DL
MICROALBUMIN/CREAT UR: 62 MG/G{CREAT}
NONHDLC SERPL-MCNC: 157 MG/DL
PHOSPHATE SERPL-MCNC: 4.5 MG/DL (ref 2.1–4.3)
POTASSIUM BLD-SCNC: 4.5 MMOL/L (ref 3.5–5.3)
PSA SERPL-MCNC: 12.47 UG/L (ref 0–6.5)
PTH-INTACT SERPL-MCNC: 15 PG/ML (ref 14–64)
SODIUM SERPL-SCNC: 142 MMOL/L (ref 135–146)
TRIGL SERPL-MCNC: 141 MG/DL

## 2021-12-22 PROCEDURE — 99214 OFFICE O/P EST MOD 30 MIN: CPT | Performed by: RADIOLOGY

## 2021-12-22 PROCEDURE — G0463 HOSPITAL OUTPT CLINIC VISIT: HCPCS

## 2021-12-22 PROCEDURE — 84153 ASSAY OF PSA TOTAL: CPT

## 2021-12-22 PROCEDURE — 36415 COLL VENOUS BLD VENIPUNCTURE: CPT

## 2021-12-22 ASSESSMENT — PAIN SCALES - GENERAL: PAINLEVEL: WORST PAIN (10)

## 2021-12-22 NOTE — PROGRESS NOTES
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  The biopsy confirmed moderately differentiated adenocarcinoma.        TYPE OF RADIATION THERAPY ADMINISTERED: SBRT to the left upper lobe with a total dose of 5000 cGy in 5 treatments given from 3/2/2021-3/10/202.     INTERVAL SINCE COMPLETION OF RADIATION THERAPY: 9 months.      SUBJECTIVE:  Mr. Chau is a 76 y.o. male who is a chronic smoker and quit smoking since .  The patient had a complicated history of multiple cancer in the past as detailed as followin.  Low risk prostate cancer, clinical stage T1c N0 M0, recent grade 3+3 =6 and the last PSA was 10.4 in 2006.  The patient is currently on clinical observation with no therapy.  I do not have the most recent PSA at the time of evaluation.     2.  Floor of mouth cancer, status post surgical resection in 1994 with no adjuvant therapy.     3.  Left kidney hemangioma, status post left nephrectomy on 3/26/2009.     4.  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  Patient had local recurrence and is status post surgical resection on 2009.     5.  Non-small cell lung cancer involving right lung, stage I, status post right thoracotomy and excision of right upper lobe and right middle  lobe lung tumor 11/5/2015 with no evidence of lymph node metastasis and no adjuvant therapy.  Patient lost to follow-up since 2016.     6.  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     Patient had two prior early stage right lung cancers that were apparently resected in their entirety and may have been synchronous primary lung cancers. It does not appear he had the appropriate recommended follow up at [facility]. The MELODY lesion was known to be present in 2016 and appears to be slightly larger now with significant FDG-avidity on the recent PET/CT. It is likely another primary lung cancer, although metastasis from previous lung cancers is possible.  The patient had a restaging PET CT scan on 8/31/2020.  The scan showed enlarging FDG avid left upper lobe mass measuring 2.1 x 2.5 cm with central solid component consistent with primary lung cancer without metastasis.  There was also a FDG avid lesion in the left buccal region consistent with squamous cell carcinoma without metastasis.  The patient had a surgical resection for his left buccal/lateral tongue squamous cell carcinoma 11/6/2020 by KERRY Renee with pathology showed squamous cell carcinoma with negative margin.  He was determined not a good candidate for lung surgery given his complicated medical history and health status.  He therefore received definitive radiation therapy using SBRT with a total dose of 5000 cGy in 5 treatments given from 3/2/2021-3/10/2021. The patient tolerated radiation therapy very well with minimal side effect.     The patient has been doing well since completion of radiation therapy.  He denies any pain or discomfort related to the therapy at the time of evaluation.  The patient is here for routine post therapy office follow-up.     Medications were reviewed and are up to date on EPIC.    The following  portions of the patient's history were reviewed and updated as appropriate: allergies, current medications, past family history, past medical history, past social history, past surgical history and problem list.    Review of Systems:      General  Constitutional  Constitutional (WDL): All constitutional elements are within defined limits  EENT  Eye Disorders  Eye Disorder (WDL): All eye disorder elements are within defined limits  Ear Disorders  Ear Disorder (WDL): All ear disorder elements are within defined limits  Respiratory  Respiratory  Respiratory (WDL): Exceptions to WDL  Cough: Mild symptoms OR nonprescription intervention indicated  Dyspnea: Shortness of breath with moderate exertion  Cardiovascular  Cardiovascular  Cardiovascular (WDL): All cardiovascular elements are within defined limits  Gastrointestinal  Gastrointestinal  Gastrointestinal (WDL): All gastrointestinal elements are within defined limits  Musculoskeletal  Musculoskeletal and Connective Tissue Disorders  Musculoskeletal & Connective (WDL): Exceptions to WDL  Arthralgia: Moderate pain OR limiting instrumental ADL (hip)  Bone Pain: Mild pain  Integumentary  Integumentary  Integumentary (WDL): All integumentary elements are within defined limits  Neurological  Neurosensory  Neurosensory (WDL): Exceptions to WDL  Ataxia: Moderate symptoms OR limiting instrumental ADL (used wheeled walker)  Genitourinary/Reproductive  Genitourinary  Genitourinary (WDL): All genitourinary elements are within defined limits  Lymphatic  Lymph System Disorders  Lymph (WDL): All lymph elements are within defined limits  Pain  Pain Score: Worst Pain (10)  AUA Assessment                                                              Accompanied by  Accompanied By: family    Objective:      PHYSICAL EXAMINATION:    /73 (BP Location: Right arm, Patient Position: Sitting)   Pulse 78   Temp 98.2  F (36.8  C)   Resp 16   Wt 86.8 kg (191 lb 6.4 oz)   SpO2 96%    BMI 26.69 kg/m      Gen: Alert, in NAD  Neck: Supple, full ROM, no LAD  Pulm: No wheezing, stridor or respiratory distress  CV: Well-perfused, no cyanosis, no pedal edema  Abdominal: BS+, soft, nontender, nondistended, no hepatomegaly  Back: No step-offs or pain to palpation along the thoracolumbar spine  Rectal: Deferred  : Deferred  Musculoskeletal: Normal muscle bulk and tone  Skin: Normal color and turgor  Neurologic: A/Ox3, CN II-XII intact, normal gait and station  Psychiatric: Appropriate mood and affect     Imaging: Imaging results 30 days: CT Chest w/o Contrast    Result Date: 12/20/2021  EXAM: CT CHEST W/O CONTRAST LOCATION: [facility] DATE/TIME: 12/20/2021 1:11 PM INDICATION: Non-small cell lung cancer, metastatic, assess treatment response COMPARISON: 11/11/2021 and 10/18/2021 TECHNIQUE: CT chest without IV contrast. Multiplanar reformats were obtained. Dose reduction techniques were used. CONTRAST: None. FINDINGS: LUNGS AND PLEURA: There is mild enlargement of the anteriorly positioned nodule right upper lobe series 5 image 71, now measuring 9 x 9 mm, approximately 8 x 7 mm on the study from 10/18/2021. Progressive soft tissue density in the central portion of treated lung with and of soft tissue surrounding the fiducial marker consistent with evolving radiation fibrosis. No new lesion. Stable postoperative changes of right lung. Stable emphysema. MEDIASTINUM/AXILLAE: No lymphadenopathy. No thoracic aortic aneurysm. CORONARY ARTERY CALCIFICATION: Severe. UPPER ABDOMEN: No significant finding. MUSCULOSKELETAL: Old right-sided rib fractures unchanged. There is a 1 cm sclerotic bony focus within right side at T6 vertebral body concerning for metastatic deposit.     IMPRESSION: 1.  Slight enlargement of previously identified left upper lobe nodule now measuring 9 mm located directly anterior of the previously treated left mid lung lesion. 2.  There is also a 1 cm large sclerotic  focus within the T6 vertebral body concerning for bony metastasis.     Prostate Specific Antigen Screen   Date Value Ref Range Status   05/08/2021 34.0 (H) 0.0 - 6.5 ng/mL Final      Impression     The patient is a 76-year-old gentleman with multiple history of cancer in the past and a new finding of left upper lobe lung cancer.  The patient received stereotactic radiosurgery 9 months ago.    Assessment & Plan:     1.  I have personally reviewed his restaging CT scan and compared to the previous CT scan and radiation therapy field.  The findings of treatment area is consistent with post radiation therapy changes.  There is a new 9 mm small nodule in the left upper lobe right outside a treatment area could represent either metastatic lesion or primary lung cancer.  This has been increased in size over the past few months.  There is also a 1 cm sclerotic focus within the T6 vertebral body suspicious for metastasis.  I will schedule PET/CT scan for restaging.  The patient is scheduled to return to radiation oncology 1 week after PET scan for office follow-up.    2.  The patient also had a prior history of prostate cancer and his last PSA was elevated to 34 in 5/2021.  We will repeat his PSA today.    2.  Continue follow-up with Dr. Alon Nunez, ENT for his head neck cancer as planned.     3.  Continue follow-up with Dr. Shade Boyd, PCP for other medical needs.         Face to face time  30 minutes with > 80% spent on consultation, education and coordination of care.    Mariana Batista MD, PhD  Department of Radiation Oncology   [facility]  Tel: [phone]  Page: [phone]    [facility]  [address] [place] [address]     [address] [place] [address]    CC:  Patient Care Team:  Shade Boyd MD as PCP - General (Internal Medicine)  Mariana Batista MD as MD (Hematology & Oncology)  Faviola Cottrell, [facility] as Pharmacist (Pharmacist)  Alon Nunez MD as Assigned  Surgical Provider  Mariana Batista MD as Assigned Cancer Care Provider  Matt Magaña MD as Assigned Heart and Vascular Provider

## 2021-12-22 NOTE — PROGRESS NOTES
Pt with wheeled walker to radiation clinic for follow up, accompanied by daughter. CT done 12/20/2021. Further recommendations and orders per provider.

## 2021-12-22 NOTE — LETTER
2021         RE: Lenny Chau  [address]  [place] [address]        Dear Colleague,    Thank you for referring your patient, Lenny Chau, to the [facility] RADIATION ONCOLOGY [place]. Please see a copy of my visit note below.    Pt with armando walker to radiation clinic for follow up, accompanied by daughter. CT done 2021. Further recommendations and orders per provider.    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  The biopsy confirmed moderately differentiated adenocarcinoma.        TYPE OF RADIATION THERAPY ADMINISTERED: SBRT to the left upper lobe with a total dose of 5000 cGy in 5 treatments given from 3/2/2021-3/10/202.     INTERVAL SINCE COMPLETION OF RADIATION THERAPY: 9 months.      SUBJECTIVE:  Mr. Chau is a 76 y.o. male who is a chronic smoker and quit smoking since .  The patient had a complicated history of multiple cancer in the past as detailed as followin.  Low risk prostate cancer, clinical stage T1c N0 M0, recent grade 3+3 =6 and the last PSA was 10.4 in 2006.  The patient is currently on clinical observation with no therapy.  I do not have the most recent PSA at the time of evaluation.     2.  Floor of mouth cancer, status post surgical resection in 1994 with no adjuvant therapy.     3.  Left kidney hemangioma, status post left nephrectomy on 3/26/2009.     4.   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  Patient had local recurrence and is status post surgical resection on 9/11/2009.     5.  Non-small cell lung cancer involving right lung, stage I, status post right thoracotomy and excision of right upper lobe and right middle lobe lung tumor 11/5/2015 with no evidence of lymph node metastasis and no adjuvant therapy.  Patient lost to follow-up since 2016.     6.  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     Patient had two prior early stage right lung cancers that were apparently resected in their entirety and may have been synchronous primary lung cancers. It does not appear he had the appropriate recommended follow up at [facility]. The MELODY lesion was known to be present in 2016 and appears to be slightly larger now with significant FDG-avidity on the recent PET/CT. It is likely another primary lung cancer, although metastasis from previous lung cancers is possible.  The patient had a restaging PET CT scan on 8/31/2020.  The scan showed enlarging FDG avid left upper lobe mass measuring 2.1 x 2.5 cm with central solid component consistent with primary lung cancer without metastasis.  There was also a FDG avid lesion in the left buccal region consistent with squamous cell carcinoma without metastasis.  The patient had a surgical resection for his left buccal/lateral tongue squamous cell carcinoma 11/6/2020 by KERRY Renee with pathology showed squamous cell carcinoma with negative margin.  He was determined not a good candidate for lung surgery given his complicated medical history and health status.  He therefore received definitive radiation therapy using SBRT with a total dose of 5000 cGy in 5  treatments given from 3/2/2021-3/10/2021. The patient tolerated radiation therapy very well with minimal side effect.     The patient has been doing well since completion of radiation therapy.  He denies any pain or discomfort related to the therapy at the time of evaluation.  The patient is here for routine post therapy office follow-up.     Medications were reviewed and are up to date on EPIC.    The following portions of the patient's history were reviewed and updated as appropriate: allergies, current medications, past family history, past medical history, past social history, past surgical history and problem list.    Review of Systems:      General  Constitutional  Constitutional (WDL): All constitutional elements are within defined limits  EENT  Eye Disorders  Eye Disorder (WDL): All eye disorder elements are within defined limits  Ear Disorders  Ear Disorder (WDL): All ear disorder elements are within defined limits  Respiratory  Respiratory  Respiratory (WDL): Exceptions to WDL  Cough: Mild symptoms OR nonprescription intervention indicated  Dyspnea: Shortness of breath with moderate exertion  Cardiovascular  Cardiovascular  Cardiovascular (WDL): All cardiovascular elements are within defined limits  Gastrointestinal  Gastrointestinal  Gastrointestinal (WDL): All gastrointestinal elements are within defined limits  Musculoskeletal  Musculoskeletal and Connective Tissue Disorders  Musculoskeletal & Connective (WDL): Exceptions to WDL  Arthralgia: Moderate pain OR limiting instrumental ADL (hip)  Bone Pain: Mild pain  Integumentary  Integumentary  Integumentary (WDL): All integumentary elements are within defined limits  Neurological  Neurosensory  Neurosensory (WDL): Exceptions to WDL  Ataxia: Moderate symptoms OR limiting instrumental ADL (used wheeled walker)  Genitourinary/Reproductive  Genitourinary  Genitourinary (WDL): All genitourinary elements are within defined limits  Lymphatic  Lymph System  Disorders  Lymph (WDL): All lymph elements are within defined limits  Pain  Pain Score: Worst Pain (10)  AUA Assessment                                                              Accompanied by  Accompanied By: family    Objective:      PHYSICAL EXAMINATION:    /73 (BP Location: Right arm, Patient Position: Sitting)   Pulse 78   Temp 98.2  F (36.8  C)   Resp 16   Wt 86.8 kg (191 lb 6.4 oz)   SpO2 96%   BMI 26.69 kg/m      Gen: Alert, in NAD  Neck: Supple, full ROM, no LAD  Pulm: No wheezing, stridor or respiratory distress  CV: Well-perfused, no cyanosis, no pedal edema  Abdominal: BS+, soft, nontender, nondistended, no hepatomegaly  Back: No step-offs or pain to palpation along the thoracolumbar spine  Rectal: Deferred  : Deferred  Musculoskeletal: Normal muscle bulk and tone  Skin: Normal color and turgor  Neurologic: A/Ox3, CN II-XII intact, normal gait and station  Psychiatric: Appropriate mood and affect     Imaging: Imaging results 30 days: CT Chest w/o Contrast    Result Date: 12/20/2021  EXAM: CT CHEST W/O CONTRAST LOCATION: [facility] DATE/TIME: 12/20/2021 1:11 PM INDICATION: Non-small cell lung cancer, metastatic, assess treatment response COMPARISON: 11/11/2021 and 10/18/2021 TECHNIQUE: CT chest without IV contrast. Multiplanar reformats were obtained. Dose reduction techniques were used. CONTRAST: None. FINDINGS: LUNGS AND PLEURA: There is mild enlargement of the anteriorly positioned nodule right upper lobe series 5 image 71, now measuring 9 x 9 mm, approximately 8 x 7 mm on the study from 10/18/2021. Progressive soft tissue density in the central portion of treated lung with and of soft tissue surrounding the fiducial marker consistent with evolving radiation fibrosis. No new lesion. Stable postoperative changes of right lung. Stable emphysema. MEDIASTINUM/AXILLAE: No lymphadenopathy. No thoracic aortic aneurysm. CORONARY ARTERY CALCIFICATION: Severe. UPPER  ABDOMEN: No significant finding. MUSCULOSKELETAL: Old right-sided rib fractures unchanged. There is a 1 cm sclerotic bony focus within right side at T6 vertebral body concerning for metastatic deposit.     IMPRESSION: 1.  Slight enlargement of previously identified left upper lobe nodule now measuring 9 mm located directly anterior of the previously treated left mid lung lesion. 2.  There is also a 1 cm large sclerotic focus within the T6 vertebral body concerning for bony metastasis.     Prostate Specific Antigen Screen   Date Value Ref Range Status   05/08/2021 34.0 (H) 0.0 - 6.5 ng/mL Final      Impression     The patient is a 76-year-old gentleman with multiple history of cancer in the past and a new finding of left upper lobe lung cancer.  The patient received stereotactic radiosurgery 9 months ago.    Assessment & Plan:     1.  I have personally reviewed his restaging CT scan and compared to the previous CT scan and radiation therapy field.  The findings of treatment area is consistent with post radiation therapy changes.  There is a new 9 mm small nodule in the left upper lobe right outside a treatment area could represent either metastatic lesion or primary lung cancer.  This has been increased in size over the past few months.  There is also a 1 cm sclerotic focus within the T6 vertebral body suspicious for metastasis.  I will schedule PET/CT scan for restaging.  The patient is scheduled to return to radiation oncology 1 week after PET scan for office follow-up.    2.  The patient also had a prior history of prostate cancer and his last PSA was elevated to 34 in 5/2021.  We will repeat his PSA today.    2.  Continue follow-up with Dr. Alon Nunez, ENT for his head neck cancer as planned.     3.  Continue follow-up with Dr. Shade Boyd, PCP for other medical needs.         Face to face time  30 minutes with > 80% spent on consultation, education and coordination of care.    Mariana Batista MD, PhD  Department  of Radiation Oncology   [facility]  Tel: [phone]  Page: [phone]    [facility]  [address]  [place] [address]     [facility]   [address] [place] [address]    CC:  Patient Care Team:  Shade Boyd MD as PCP - General (Internal Medicine)  Mariana Batista MD as MD (Hematology & Oncology)  Fvaiola Cottrell [facility] as Pharmacist (Pharmacist)  Alon Nunez MD as Assigned Surgical Provider  Mariana Batista MD as Assigned Cancer Care Provider  Matt Magaña MD as Assigned Heart and Vascular Provider        Again, thank you for allowing me to participate in the care of your patient.        Sincerely,        Mariana Batista MD

## 2021-12-24 ENCOUNTER — COMMUNICATION - RIVER FALLS (OUTPATIENT)
Dept: FAMILY MEDICINE | Facility: CLINIC | Age: 76
End: 2021-12-24
Payer: COMMERCIAL

## 2021-12-28 ENCOUNTER — OFFICE VISIT - RIVER FALLS (OUTPATIENT)
Dept: FAMILY MEDICINE | Facility: CLINIC | Age: 76
End: 2021-12-28
Payer: COMMERCIAL

## 2021-12-28 ASSESSMENT — MIFFLIN-ST. JEOR: SCORE: 1620.76

## 2022-01-07 ENCOUNTER — HOSPITAL ENCOUNTER (OUTPATIENT)
Dept: PET IMAGING | Facility: HOSPITAL | Age: 77
Discharge: HOME | End: 2022-01-07
Attending: RADIOLOGY | Admitting: RADIOLOGY
Payer: MEDICARE

## 2022-01-07 DIAGNOSIS — C34.12 MALIGNANT NEOPLASM OF UPPER LOBE OF LEFT LUNG (H): ICD-10-CM

## 2022-01-07 DIAGNOSIS — C61 MALIGNANT NEOPLASM OF PROSTATE (H): ICD-10-CM

## 2022-01-07 LAB — GLUCOSE BLDC GLUCOMTR-MCNC: 118 MG/DL (ref 70–99)

## 2022-01-07 PROCEDURE — A9552 F18 FDG: HCPCS | Performed by: RADIOLOGY

## 2022-01-07 PROCEDURE — 82962 GLUCOSE BLOOD TEST: CPT | Mod: GZ

## 2022-01-07 PROCEDURE — 78816 PET IMAGE W/CT FULL BODY: CPT | Mod: PS,KX

## 2022-01-07 PROCEDURE — 343N000001 HC RX 343: Performed by: RADIOLOGY

## 2022-01-07 PROCEDURE — 78816 PET IMAGE W/CT FULL BODY: CPT | Mod: 26 | Performed by: RADIOLOGY

## 2022-01-07 RX ADMIN — FLUDEOXYGLUCOSE F-18 11.41 MCI.: 500 INJECTION, SOLUTION INTRAVENOUS at 12:20

## 2022-01-12 ENCOUNTER — OFFICE VISIT (OUTPATIENT)
Dept: RADIATION ONCOLOGY | Facility: CLINIC | Age: 77
End: 2022-01-12
Attending: RADIOLOGY
Payer: MEDICARE

## 2022-01-12 VITALS
DIASTOLIC BLOOD PRESSURE: 62 MMHG | WEIGHT: 187.9 LBS | SYSTOLIC BLOOD PRESSURE: 129 MMHG | OXYGEN SATURATION: 97 % | HEART RATE: 70 BPM | RESPIRATION RATE: 16 BRPM | BODY MASS INDEX: 26.21 KG/M2 | TEMPERATURE: 98.4 F

## 2022-01-12 DIAGNOSIS — C34.12 MALIGNANT NEOPLASM OF UPPER LOBE OF LEFT LUNG (H): Primary | ICD-10-CM

## 2022-01-12 DIAGNOSIS — C61 MALIGNANT NEOPLASM OF PROSTATE (H): ICD-10-CM

## 2022-01-12 PROCEDURE — G0463 HOSPITAL OUTPT CLINIC VISIT: HCPCS

## 2022-01-12 PROCEDURE — 99214 OFFICE O/P EST MOD 30 MIN: CPT | Performed by: RADIOLOGY

## 2022-01-12 RX ORDER — OXYCODONE HYDROCHLORIDE 5 MG/1
1 TABLET ORAL EVERY 6 HOURS PRN
COMMUNITY
Start: 2021-12-28 | End: 2022-06-21

## 2022-01-12 NOTE — PROGRESS NOTES
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  The biopsy confirmed moderately differentiated adenocarcinoma.        TYPE OF RADIATION THERAPY ADMINISTERED: SBRT to the left upper lobe with a total dose of 5000 cGy in 5 treatments given from 3/2/2021-3/10/2021.     INTERVAL SINCE COMPLETION OF RADIATION THERAPY: 10 months.      SUBJECTIVE:  Mr. Chau is a 76 y.o. male who is a chronic smoker and quit smoking since .  The patient had a complicated history of multiple cancer in the past as detailed as followin.  Low risk prostate cancer, clinical stage T1c N0 M0, recent grade 3+3 =6 and the last PSA was 10.4 in 2006.  The patient is currently on clinical observation with no therapy.  PSA: 12.47 on 2021.     2.  Floor of mouth cancer, status post surgical resection in 1994 with no adjuvant therapy.     3.  Left kidney hemangioma, status post left nephrectomy on 3/26/2009.     4.  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  Patient had local recurrence and is status post surgical resection on 2009.     5.  Non-small cell lung cancer involving right lung, stage I, status post right thoracotomy and excision of right upper lobe and right middle lobe lung tumor 2015 with no  evidence of lymph node metastasis and no adjuvant therapy.  Patient lost to follow-up since 2016.     6.  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    7.  Left upper lobe lung  cancer, stage T1N0 M0.  The biopsy confirmed moderately differentiated adenocarcinoma on 1/21/2021.  The patient received SBRT with a total dose of 5000 cGy in 5 treatments given from 3/2/2021-3/10/2021.     Patient had two prior early stage right lung cancers that were apparently resected in their entirety and may have been synchronous primary lung cancers. It does not appear he had the appropriate recommended follow up at [facility]. The MELODY lesion was known to be present in 2016 and appears to be slightly larger now with significant FDG-avidity on the recent PET/CT. It is likely another primary lung cancer, although metastasis from previous lung cancers is possible.  The patient had a restaging PET CT scan on 8/31/2020.  The scan showed enlarging FDG avid left upper lobe mass measuring 2.1 x 2.5 cm with central solid component consistent with primary lung cancer without metastasis.  There was also a FDG avid lesion in the left buccal region consistent with squamous cell carcinoma without metastasis.  The patient had a surgical resection for his left buccal/lateral tongue squamous cell carcinoma 11/6/2020 by KERRY Renee with pathology showed squamous cell carcinoma with negative margin.  He was determined not a good candidate for lung surgery given his complicated medical history and health status.  He therefore received definitive radiation therapy using SBRT with a total dose of 5000 cGy in 5 treatments given from 3/2/2021-3/10/2021. The patient tolerated radiation therapy very well with minimal side effect.     The patient has been doing well since completion of radiation therapy.  He denies any pain or  discomfort related to the therapy at the time of evaluation.  The patient was found to a new 9 mm small nodule in the left upper lobe outside of patient therapy area from restaging CT scan on 12/20/2021.  He was recommended to have staging PET CT scan and was done 1/7/2022.  The patient is here for office follow-up and management recommendation.      Medications were reviewed and are up to date on EPIC.    The following portions of the patient's history were reviewed and updated as appropriate: allergies, current medications, past family history, past medical history, past social history, past surgical history and problem list.    Review of Systems:      General  Constitutional  Constitutional (WDL): All constitutional elements are within defined limits  EENT  Eye Disorders  Eye Disorder (WDL): All eye disorder elements are within defined limits  Ear Disorders  Ear Disorder (WDL): All ear disorder elements are within defined limits  Respiratory  Respiratory  Respiratory (WDL): Exceptions to WDL  Cough: Mild symptoms OR nonprescription intervention indicated  Dyspnea: Shortness of breath with moderate exertion  Cardiovascular  Cardiovascular  Cardiovascular (WDL): All cardiovascular elements are within defined limits  Gastrointestinal  Gastrointestinal  Gastrointestinal (WDL): All gastrointestinal elements are within defined limits  Musculoskeletal  Musculoskeletal and Connective Tissue Disorders  Musculoskeletal & Connective (WDL): Exceptions to WDL  Arthralgia: Moderate pain OR limiting instrumental ADL (Rt hip)  Bone Pain: Mild pain  Integumentary  Integumentary  Integumentary (WDL): All integumentary elements are within defined limits  Neurological  Neurosensory  Neurosensory (WDL): Exceptions to WDL  Ataxia: Moderate symptoms OR limiting instrumental ADL (uses wheeled walker)  Genitourinary/Reproductive  Genitourinary  Genitourinary (WDL): All genitourinary elements are within defined limits  Lymphatic  Lymph  System Disorders  Lymph (WDL): All lymph elements are within defined limits  Pain  Pain Score: Moderate Pain (4)  AUA Assessment                                                              Accompanied by  Accompanied By: family    Objective:     PHYSICAL EXAMINATION:    /62 (BP Location: Right arm, Patient Position: Sitting, Cuff Size: Adult Regular)   Pulse 70   Temp 98.4  F (36.9  C) (Oral)   Resp 16   Wt 85.2 kg (187 lb 14.4 oz)   SpO2 97%   BMI 26.21 kg/m      Gen: Alert, in NAD  Eyes: PERRL, EOMI, sclera anicteric  Neck: Supple, full ROM, no LAD  Pulm: No wheezing, stridor or respiratory distress  CV: Well-perfused, no cyanosis, no pedal edema  Back: No step-offs or pain to palpation along the thoracolumbar spine  Rectal: Deferred  : Deferred  Musculoskeletal: Normal muscle bulk and tone  Skin: Normal color and turgor  Neurologic: A/Ox3, CN II-XII intact, normal gait and station  Psychiatric: Appropriate mood and affect     Imaging: Imaging results 30 days: PET Oncology Whole Body    Result Date: 1/8/2022  Combined Report of:    PET and CT on  1/7/2022 1:46 PM : 1. PET of the neck, chest, abdomen, and pelvis. 2. PET CT Fusion for Attenuation Correction and Anatomical Localization:  3. CT of the chest, abdomen and pelvis obtained for anatomical localization 4. 3D MIP and PET-CT fused images were processed on an independent workstation and archived to PACS and reviewed by a radiologist. Technique: 1. PET: The patient received 11.4 mCi of F-18-FDG; the serum glucose was 118 prior to administration, body weight was 87 kg. Images were evaluated in the axial, sagittal, and coronal planes as well as the rotational whole body MIP. Images were acquired from the Vertex to the Feet. UPTAKE WAS MEASURED AT 60 MINUTES. BACKGROUND:  Liver SUV max= 4.5,   Aorta Blood SUV Max: 3.7. 2. CT: Volumetric acquisition for clinical interpretation of the chest, abdomen, and pelvis acquired at 3 mm sections . The chest,  abdomen, and pelvis were evaluated at 5 mm sections in bone, soft tissue, and lung windows.  3. 3D MIP and PET-CT fused images were processed on an independent workstation and archived to PACS and reviewed by a radiologist. INDICATION: Non-small cell lung cancer, monitor; Malignant neoplasm of upper lobe of left lung (H); Malignant neoplasm of prostate (H) ADDITIONAL INFORMATION OBTAINED FROM EMR: Right lung wedge resection NSCLC. Left nephrectomy for AML. History of prostate cancer, with rising PSA. 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 Radiation therapy to left upper lobe nodule completed 3/10/2021. Recent CTs with increased size of nodule anterior to treatment field. COMPARISON: CT chest 12/20/2021. CT chest abdomen and pelvis 11/11/2021, PET/CT 1/4/2021 FINDINGS: HEAD/NECK: Surgical changes of left buccal/lateral tongue resection with soft tissue thickening and diffuse asymmetric FDG uptake. Sequela of radiation therapy to the neck with thickening of the platysma. No cervical lymphadenopathy. Heavily calcified carotid bulbs, right greater than left. Fatty atrophy of the right parotid gland. Paranasal sinuses and mastoid air cells are clear. Orbits are unremarkable. Thyroid gland is unremarkable. CHEST: Hypermetabolic 1.1 x 0.9 cm pulmonary nodule in the left upper lobe (series 2, image 202) with SUV max 8.1. This nodule is been increasing in size since recent prior exams, and is new since 1/4/2021. Hypermetabolic scarring and architectural distortion more posteriorly in the left upper lobe with fiducial marker indicating the site of prior nodule treated with radiation therapy. No adenopathy in the chest. Severe coronary artery calcifications with coronary stent present. Atherosclerotic calcifications of the aorta and origins of the great vessels. Esophagus is normal.  Debris in the lower trachea extending on the left mainstem bronchus. Apical predominant pulmonary emphysema. Surgical changes of right upper lobe wedge resection. ABDOMEN AND PELVIS: No suspicious FDG uptake in the abdomen or pelvis. Focal uptake adjacent to the cecum, which could be due to a diverticulum, without significant inflammatory changes on CT.  No focal liver lesion. Unenhanced gallbladder, bile ducts, spleen, and adrenal glands are unremarkable. Unchanged 2.2 cm pancreatic tail cyst. Left nephrectomy. Unchanged cyst in the inferior pole the right kidney. Ureter and urinary bladder are unremarkable. Evaluation of the pelvis is obscured by streak artifact from right hip arthroplasty. No dilated bowel. Appendix is unremarkable. No free fluid or free air. No abdominal pelvic lymphadenopathy. EVAR with unchanged infrarenal abdominal aneurysm sac measuring up to 6.3 cm. LOWER EXTREMITIES: No abnormal masses or hypermetabolic lesions. BONES: 13 rib-bearing thoracic type vertebra and 4 lumbar type vertebra. Sclerotic hypermetabolic round metastasis within T7 vertebral body measures 1.2 cm, with SUV max 5.5. Multiple chronic bilateral rib fracture deformities.     IMPRESSION: In this patient with history of multiple primary cancers including left upper lobe non-small cell lung cancer treated with radiation therapy: 1. Enlarging hypermetabolic 1.1 cm nodule in the anterior left upper lobe, outside of the treatment field of previously treated nodule. This is malignancy until proved otherwise. 2. Radiation therapy changes to the left upper lobe with scarring and architectural distortion. 3. Sclerotic hypermetabolic metastasis within T7 vertebral body. 4. Surgical changes to the left oral cavity with soft tissue thickening and FDG uptake. Recommend direct visualization. 5. Stable 6.3 cm infrarenal abdominal aortic aneurysm status post EVAR. I have personally reviewed the examination and initial interpretation and I  agree with the findings. LASHAE CONNOLLY MD   SYSTEM ID:  [id number]    CT Chest w/o Contrast    Result Date: 12/20/2021  EXAM: CT CHEST W/O CONTRAST LOCATION: [facility] DATE/TIME: 12/20/2021 1:11 PM INDICATION: Non-small cell lung cancer, metastatic, assess treatment response COMPARISON: 11/11/2021 and 10/18/2021 TECHNIQUE: CT chest without IV contrast. Multiplanar reformats were obtained. Dose reduction techniques were used. CONTRAST: None. FINDINGS: LUNGS AND PLEURA: There is mild enlargement of the anteriorly positioned nodule right upper lobe series 5 image 71, now measuring 9 x 9 mm, approximately 8 x 7 mm on the study from 10/18/2021. Progressive soft tissue density in the central portion of treated lung with and of soft tissue surrounding the fiducial marker consistent with evolving radiation fibrosis. No new lesion. Stable postoperative changes of right lung. Stable emphysema. MEDIASTINUM/AXILLAE: No lymphadenopathy. No thoracic aortic aneurysm. CORONARY ARTERY CALCIFICATION: Severe. UPPER ABDOMEN: No significant finding. MUSCULOSKELETAL: Old right-sided rib fractures unchanged. There is a 1 cm sclerotic bony focus within right side at T6 vertebral body concerning for metastatic deposit.     IMPRESSION: 1.  Slight enlargement of previously identified left upper lobe nodule now measuring 9 mm located directly anterior of the previously treated left mid lung lesion. 2.  There is also a 1 cm large sclerotic focus within the T6 vertebral body concerning for bony metastasis.        Impression     The patient is a 76-year-old gentleman with multiple history of cancer in the past and a new finding of left upper lobe lung cancer.  The patient received stereotactic radiosurgery 10 months ago.  The restaging PET CT scan showed new 1.1 cm FDG avid left upper lobe nodule consistent with malignant lung tumor and a sclerotic hypermetabolic metastasis within the T7 vertebral  body.    Assessment & Plan:     1.  I have personally reviewed his restaging PET CT scan and compared to the previous PET CT scan and radiation therapy field.  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  There is no evidence of other systemic metastasis.  The possible treatment options including surgery, systemic therapy, elevation therapy has been discussed with patient in detail and at the greatest.  The possible risks and side effects of radiation therapy has also been explained to the patient.  Questions are answered to patient's satisfaction.  The patient has oligo recurrence involving left upper lobe and T7 vertebral body.  This can be treated by stereotactic radiosurgery given his reasonably good health status.  I will discuss his case at next thoracic tumor conference to get a consensus recommendation.  Patient is tentatively scheduled return to radiation oncology in 1 week after conference for another follow-up and discussion of final treatment recommendation.  I am also getting a MRI brain to complete his staging work-up.     2.  The patient also had a prior history of prostate cancer and his last PSA was elevated to 34 in 5/2021. His repeat PSA was 12.47 on 12/22/2021.  We will continue to monitor his PSA for his prostate cancer.    2.  Continue follow-up with Dr. Alon Nunez, ENT for his head neck cancer as planned.     3.  Continue follow-up with Dr. Shade Boyd, PCP for other medical needs.      Face to face time  30 minutes with > 80% spent on consultation, education and coordination of care.        Mariana Batista MD, PhD  Department of Radiation Oncology   [facility]  Tel: [phone]  Page: [phone]    [facility]  [address]  [place] [address]     [facility]   [address] [place] [address]    CC:  Patient Care Team:  Oliver  MD Shade as PCP - General (Internal Medicine)  Mariana Batista MD as MD (Hematology & Oncology)  Faviola Cottrell [facility] as Pharmacist (Pharmacist)  Alon Nunez MD as Assigned Surgical Provider  Mariana Batista MD as Assigned Cancer Care Provider  Matt Magaña MD as Assigned Heart and Vascular Provider       Addendum:    The patient's case has been discussed at thoracic tumor conference 1/13/2022.  Patient is not good candidate for surgery and poor candidate for systemic therapy given his current medical status.  The consensus recommendation is to consider SBRT for his oligo disease in the left lung and T7 spine if the patient wishes not to consider systemic therapy at this time.

## 2022-01-12 NOTE — LETTER
2022         RE: Lenny Chau  [address]  [place] [address]        Dear Colleague,    Thank you for referring your patient, Lenny Chau, to the [facility] RADIATION ONCOLOGY [place]. Please see a copy of my visit note below.    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  The biopsy confirmed moderately differentiated adenocarcinoma.        TYPE OF RADIATION THERAPY ADMINISTERED: SBRT to the left upper lobe with a total dose of 5000 cGy in 5 treatments given from 3/2/2021-3/10/2021.     INTERVAL SINCE COMPLETION OF RADIATION THERAPY: 10 months.      SUBJECTIVE:  Mr. Chau is a 76 y.o. male who is a chronic smoker and quit smoking since .  The patient had a complicated history of multiple cancer in the past as detailed as followin.  Low risk prostate cancer, clinical stage T1c N0 M0, recent grade 3+3 =6 and the last PSA was 10.4 in 2006.  The patient is currently on clinical observation with no therapy.  PSA: 12.47 on 2021.     2.  Floor of mouth cancer, status post surgical resection in 1994 with no adjuvant therapy.     3.  Left kidney hemangioma, status post left nephrectomy on 3/26/2009.     4.  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   Patient had local recurrence and is status post surgical resection on 9/11/2009.     5.  Non-small cell lung cancer involving right lung, stage I, status post right thoracotomy and excision of right upper lobe and right middle lobe lung tumor 11/5/2015 with no evidence of lymph node metastasis and no adjuvant therapy.  Patient lost to follow-up since 2016.     6.  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    7.  Left upper lobe lung  cancer, stage T1N0 M0.  The biopsy confirmed moderately differentiated adenocarcinoma on 1/21/2021.  The patient received SBRT with a total dose of 5000 cGy in 5 treatments given from 3/2/2021-3/10/2021.     Patient had two prior early stage right lung cancers that were apparently resected in their entirety and may have been synchronous primary lung cancers. It does not appear he had the appropriate recommended follow up at [facility]. The MELODY lesion was known to be present in 2016 and appears to be slightly larger now with significant FDG-avidity on the recent PET/CT. It is likely another primary lung cancer, although metastasis from previous lung cancers is possible.  The patient had a restaging PET CT scan on 8/31/2020.  The scan showed enlarging FDG avid left upper lobe mass measuring 2.1 x 2.5 cm with central solid component consistent with primary lung cancer without metastasis.  There was also a FDG avid lesion in the left buccal region consistent with squamous cell carcinoma without metastasis.  The patient had a surgical resection for his left buccal/lateral tongue squamous cell carcinoma 11/6/2020 by KERRY Renee with pathology showed squamous cell carcinoma with negative margin.  He was determined not a good candidate for lung surgery given his complicated medical history and health status.  He therefore received definitive radiation therapy using  SBRT with a total dose of 5000 cGy in 5 treatments given from 3/2/2021-3/10/2021. The patient tolerated radiation therapy very well with minimal side effect.     The patient has been doing well since completion of radiation therapy.  He denies any pain or discomfort related to the therapy at the time of evaluation.  The patient was found to a new 9 mm small nodule in the left upper lobe outside of patient therapy area from restaging CT scan on 12/20/2021.  He was recommended to have staging PET CT scan and was done 1/7/2022.  The patient is here for office follow-up and management recommendation.      Medications were reviewed and are up to date on EPIC.    The following portions of the patient's history were reviewed and updated as appropriate: allergies, current medications, past family history, past medical history, past social history, past surgical history and problem list.    Review of Systems:      General  Constitutional  Constitutional (WDL): All constitutional elements are within defined limits  EENT  Eye Disorders  Eye Disorder (WDL): All eye disorder elements are within defined limits  Ear Disorders  Ear Disorder (WDL): All ear disorder elements are within defined limits  Respiratory  Respiratory  Respiratory (WDL): Exceptions to WDL  Cough: Mild symptoms OR nonprescription intervention indicated  Dyspnea: Shortness of breath with moderate exertion  Cardiovascular  Cardiovascular  Cardiovascular (WDL): All cardiovascular elements are within defined limits  Gastrointestinal  Gastrointestinal  Gastrointestinal (WDL): All gastrointestinal elements are within defined limits  Musculoskeletal  Musculoskeletal and Connective Tissue Disorders  Musculoskeletal & Connective (WDL): Exceptions to WDL  Arthralgia: Moderate pain OR limiting instrumental ADL (Rt hip)  Bone Pain: Mild pain  Integumentary  Integumentary  Integumentary (WDL): All integumentary elements are within defined  limits  Neurological  Neurosensory  Neurosensory (WDL): Exceptions to WDL  Ataxia: Moderate symptoms OR limiting instrumental ADL (uses wheeled walker)  Genitourinary/Reproductive  Genitourinary  Genitourinary (WDL): All genitourinary elements are within defined limits  Lymphatic  Lymph System Disorders  Lymph (WDL): All lymph elements are within defined limits  Pain  Pain Score: Moderate Pain (4)  AUA Assessment                                                              Accompanied by  Accompanied By: family    Objective:     PHYSICAL EXAMINATION:    /62 (BP Location: Right arm, Patient Position: Sitting, Cuff Size: Adult Regular)   Pulse 70   Temp 98.4  F (36.9  C) (Oral)   Resp 16   Wt 85.2 kg (187 lb 14.4 oz)   SpO2 97%   BMI 26.21 kg/m      Gen: Alert, in NAD  Eyes: PERRL, EOMI, sclera anicteric  Neck: Supple, full ROM, no LAD  Pulm: No wheezing, stridor or respiratory distress  CV: Well-perfused, no cyanosis, no pedal edema  Back: No step-offs or pain to palpation along the thoracolumbar spine  Rectal: Deferred  : Deferred  Musculoskeletal: Normal muscle bulk and tone  Skin: Normal color and turgor  Neurologic: A/Ox3, CN II-XII intact, normal gait and station  Psychiatric: Appropriate mood and affect     Imaging: Imaging results 30 days: PET Oncology Whole Body    Result Date: 1/8/2022  Combined Report of:    PET and CT on  1/7/2022 1:46 PM : 1. PET of the neck, chest, abdomen, and pelvis. 2. PET CT Fusion for Attenuation Correction and Anatomical Localization:  3. CT of the chest, abdomen and pelvis obtained for anatomical localization 4. 3D MIP and PET-CT fused images were processed on an independent workstation and archived to PACS and reviewed by a radiologist. Technique: 1. PET: The patient received 11.4 mCi of F-18-FDG; the serum glucose was 118 prior to administration, body weight was 87 kg. Images were evaluated in the axial, sagittal, and coronal planes as well as the rotational whole  body MIP. Images were acquired from the Vertex to the Feet. UPTAKE WAS MEASURED AT 60 MINUTES. BACKGROUND:  Liver SUV max= 4.5,   Aorta Blood SUV Max: 3.7. 2. CT: Volumetric acquisition for clinical interpretation of the chest, abdomen, and pelvis acquired at 3 mm sections . The chest, abdomen, and pelvis were evaluated at 5 mm sections in bone, soft tissue, and lung windows.  3. 3D MIP and PET-CT fused images were processed on an independent workstation and archived to PACS and reviewed by a radiologist. INDICATION: Non-small cell lung cancer, monitor; Malignant neoplasm of upper lobe of left lung (H); Malignant neoplasm of prostate (H) ADDITIONAL INFORMATION OBTAINED FROM EMR: Right lung wedge resection NSCLC. Left nephrectomy for AML. History of prostate cancer, with rising PSA. 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 Radiation therapy to left upper lobe nodule completed 3/10/2021. Recent CTs with increased size of nodule anterior to treatment field. COMPARISON: CT chest 12/20/2021. CT chest abdomen and pelvis 11/11/2021, PET/CT 1/4/2021 FINDINGS: HEAD/NECK: Surgical changes of left buccal/lateral tongue resection with soft tissue thickening and diffuse asymmetric FDG uptake. Sequela of radiation therapy to the neck with thickening of the platysma. No cervical lymphadenopathy. Heavily calcified carotid bulbs, right greater than left. Fatty atrophy of the right parotid gland. Paranasal sinuses and mastoid air cells are clear. Orbits are unremarkable. Thyroid gland is unremarkable. CHEST: Hypermetabolic 1.1 x 0.9 cm pulmonary nodule in the left upper lobe (series 2, image 202) with SUV max 8.1. This nodule is been increasing in size since recent prior exams, and is new since 1/4/2021. Hypermetabolic scarring and architectural distortion more posteriorly in the left upper lobe  with fiducial marker indicating the site of prior nodule treated with radiation therapy. No adenopathy in the chest. Severe coronary artery calcifications with coronary stent present. Atherosclerotic calcifications of the aorta and origins of the great vessels. Esophagus is normal. Debris in the lower trachea extending on the left mainstem bronchus. Apical predominant pulmonary emphysema. Surgical changes of right upper lobe wedge resection. ABDOMEN AND PELVIS: No suspicious FDG uptake in the abdomen or pelvis. Focal uptake adjacent to the cecum, which could be due to a diverticulum, without significant inflammatory changes on CT.  No focal liver lesion. Unenhanced gallbladder, bile ducts, spleen, and adrenal glands are unremarkable. Unchanged 2.2 cm pancreatic tail cyst. Left nephrectomy. Unchanged cyst in the inferior pole the right kidney. Ureter and urinary bladder are unremarkable. Evaluation of the pelvis is obscured by streak artifact from right hip arthroplasty. No dilated bowel. Appendix is unremarkable. No free fluid or free air. No abdominal pelvic lymphadenopathy. EVAR with unchanged infrarenal abdominal aneurysm sac measuring up to 6.3 cm. LOWER EXTREMITIES: No abnormal masses or hypermetabolic lesions. BONES: 13 rib-bearing thoracic type vertebra and 4 lumbar type vertebra. Sclerotic hypermetabolic round metastasis within T7 vertebral body measures 1.2 cm, with SUV max 5.5. Multiple chronic bilateral rib fracture deformities.     IMPRESSION: In this patient with history of multiple primary cancers including left upper lobe non-small cell lung cancer treated with radiation therapy: 1. Enlarging hypermetabolic 1.1 cm nodule in the anterior left upper lobe, outside of the treatment field of previously treated nodule. This is malignancy until proved otherwise. 2. Radiation therapy changes to the left upper lobe with scarring and architectural distortion. 3. Sclerotic hypermetabolic metastasis within T7  vertebral body. 4. Surgical changes to the left oral cavity with soft tissue thickening and FDG uptake. Recommend direct visualization. 5. Stable 6.3 cm infrarenal abdominal aortic aneurysm status post EVAR. I have personally reviewed the examination and initial interpretation and I agree with the findings. LASHAE CONNOLLY MD   SYSTEM ID:  [id number]    CT Chest w/o Contrast    Result Date: 12/20/2021  EXAM: CT CHEST W/O CONTRAST LOCATION: [facility] DATE/TIME: 12/20/2021 1:11 PM INDICATION: Non-small cell lung cancer, metastatic, assess treatment response COMPARISON: 11/11/2021 and 10/18/2021 TECHNIQUE: CT chest without IV contrast. Multiplanar reformats were obtained. Dose reduction techniques were used. CONTRAST: None. FINDINGS: LUNGS AND PLEURA: There is mild enlargement of the anteriorly positioned nodule right upper lobe series 5 image 71, now measuring 9 x 9 mm, approximately 8 x 7 mm on the study from 10/18/2021. Progressive soft tissue density in the central portion of treated lung with and of soft tissue surrounding the fiducial marker consistent with evolving radiation fibrosis. No new lesion. Stable postoperative changes of right lung. Stable emphysema. MEDIASTINUM/AXILLAE: No lymphadenopathy. No thoracic aortic aneurysm. CORONARY ARTERY CALCIFICATION: Severe. UPPER ABDOMEN: No significant finding. MUSCULOSKELETAL: Old right-sided rib fractures unchanged. There is a 1 cm sclerotic bony focus within right side at T6 vertebral body concerning for metastatic deposit.     IMPRESSION: 1.  Slight enlargement of previously identified left upper lobe nodule now measuring 9 mm located directly anterior of the previously treated left mid lung lesion. 2.  There is also a 1 cm large sclerotic focus within the T6 vertebral body concerning for bony metastasis.        Impression     The patient is a 76-year-old gentleman with multiple history of cancer in the past and a new finding of left  upper lobe lung cancer.  The patient received stereotactic radiosurgery 10 months ago.  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    Assessment & Plan:     1.  I have personally reviewed his restaging PET CT scan and compared to the previous PET CT scan and radiation therapy field.  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  There is no evidence of other systemic metastasis.  The possible treatment options including surgery, systemic therapy, elevation therapy has been discussed with patient in detail and at the greatest.  The possible risks and side effects of radiation therapy has also been explained to the patient.  Questions are answered to patient's satisfaction.  The patient has oligo recurrence involving left upper lobe and T7 vertebral body.  This can be treated by stereotactic radiosurgery given his reasonably good health status.  I will discuss his case at next thoracic tumor conference to get a consensus recommendation.  Patient is tentatively scheduled return to radiation oncology in 1 week after conference for another follow-up and discussion of final treatment recommendation.  I am also getting a MRI brain to complete his staging work-up.     2.  The patient also had a prior history of prostate cancer and his last PSA was elevated to 34 in 5/2021. His repeat PSA was 12.47 on 12/22/2021.  We will continue to monitor his PSA for his prostate cancer.    2.  Continue follow-up with Dr. Alon Nunez, ENT for his head neck cancer as planned.     3.  Continue follow-up with Dr. Shade Boyd, PCP for other medical needs.      Face to face time  30 minutes with > 80% spent on consultation, education and coordination of care.        Mariana Batista MD,  "PhD  Department of Radiation Oncology   [facility]  Tel: [phone]  Page: [phone]    [facility]  [address]  [place] [address]     [facility]   [address] [place] [address]    CC:  Patient Care Team:  Shade Boyd MD as PCP - General (Internal Medicine)  Mariana Batista MD as MD (Hematology & Oncology)  Faviola Cottrell [facility] as Pharmacist (Pharmacist)  Alon Nunez MD as Assigned Surgical Provider  Mariana Batista MD as Assigned Cancer Care Provider  Matt Magaña MD as Assigned Heart and Vascular Provider      Patient here ambulatory with wheeled walker accompanied by daughter for follow up s/p radiation for his lung cancer.  Patient had recent PET scan and is here today for results.  Seen by Dr. Batista.  Plan return to clinic for follow up as directed by physician.    Oncology Rooming Note    January 12, 2022 2:48 PM   Lenny Chau is a 76 year old male who presents for:    Chief Complaint   Patient presents with     Oncology Clinic Visit     Follow up with Dr. Batista     Initial Vitals: /62 (BP Location: Right arm, Patient Position: Sitting, Cuff Size: Adult Regular)   Pulse 70   Temp 98.4  F (36.9  C) (Oral)   Resp 16   Wt 85.2 kg (187 lb 14.4 oz)   SpO2 97%   BMI 26.21 kg/m   Estimated body mass index is 26.21 kg/m  as calculated from the following:    Height as of 11/9/21: 1.803 m (5' 11\").    Weight as of this encounter: 85.2 kg (187 lb 14.4 oz). Body surface area is 2.07 meters squared.  Moderate Pain (4) Comment: Data Unavailable   No LMP for male patient.  Allergies reviewed: Yes  Medications reviewed: Yes    Medications: Medication refills not needed today.  Pharmacy name entered into [employer]: [id number] [place] [facility] [address] [place] [address]    Clinical concerns: PET results Dr. Batista was notified.      Anne-Marie Durbin RN                Again, thank you for allowing me to participate in the care of your patient.  "       Sincerely,        Mariana Batista MD

## 2022-01-12 NOTE — PROGRESS NOTES
"Patient here ambulatory with wheeled walker accompanied by daughter for follow up s/p radiation for his lung cancer.  Patient had recent PET scan and is here today for results.  Seen by Dr. Batista.  Plan return to clinic for follow up as directed by physician.    Oncology Rooming Note    January 12, 2022 2:48 PM   Lenny Chau is a 76 year old male who presents for:    Chief Complaint   Patient presents with     Oncology Clinic Visit     Follow up with Dr. Batista     Initial Vitals: /62 (BP Location: Right arm, Patient Position: Sitting, Cuff Size: Adult Regular)   Pulse 70   Temp 98.4  F (36.9  C) (Oral)   Resp 16   Wt 85.2 kg (187 lb 14.4 oz)   SpO2 97%   BMI 26.21 kg/m   Estimated body mass index is 26.21 kg/m  as calculated from the following:    Height as of 11/9/21: 1.803 m (5' 11\").    Weight as of this encounter: 85.2 kg (187 lb 14.4 oz). Body surface area is 2.07 meters squared.  Moderate Pain (4) Comment: Data Unavailable   No LMP for male patient.  Allergies reviewed: Yes  Medications reviewed: Yes    Medications: Medication refills not needed today.  Pharmacy name entered into [employer]: [id number] [place] [facility] [address] [place] [address]    Clinical concerns: PET results Dr. Batista was notified.      Anne-Marie Durbin RN            "

## 2022-01-14 ENCOUNTER — TELEPHONE (OUTPATIENT)
Dept: RADIATION ONCOLOGY | Facility: HOSPITAL | Age: 77
End: 2022-01-14
Payer: COMMERCIAL

## 2022-01-14 DIAGNOSIS — R94.02 ABNORMAL PET SCAN OF HEAD: Primary | ICD-10-CM

## 2022-01-14 NOTE — TELEPHONE ENCOUNTER
Called the patient today and informed the recommendation from tumor conference.  There is also a area of concern in the left oral cavity from most recent PET scan.  We will refer patient to ENT for evaluation.

## 2022-01-22 ENCOUNTER — HOSPITAL ENCOUNTER (OUTPATIENT)
Dept: MRI IMAGING | Facility: CLINIC | Age: 77
Discharge: HOME | End: 2022-01-22
Attending: RADIOLOGY | Admitting: RADIOLOGY
Payer: MEDICARE

## 2022-01-22 DIAGNOSIS — C34.12 MALIGNANT NEOPLASM OF UPPER LOBE OF LEFT LUNG (H): ICD-10-CM

## 2022-01-22 PROCEDURE — 255N000002 HC RX 255 OP 636: Performed by: RADIOLOGY

## 2022-01-22 PROCEDURE — 70553 MRI BRAIN STEM W/O & W/DYE: CPT

## 2022-01-22 PROCEDURE — A9585 GADOBUTROL INJECTION: HCPCS | Performed by: RADIOLOGY

## 2022-01-22 RX ORDER — GADOBUTROL 604.72 MG/ML
8.5 INJECTION INTRAVENOUS ONCE
Status: COMPLETED | OUTPATIENT
Start: 2022-01-22 | End: 2022-01-22

## 2022-01-22 RX ADMIN — GADOBUTROL 8.5 ML: 604.72 INJECTION INTRAVENOUS at 11:58

## 2022-01-26 ENCOUNTER — OFFICE VISIT (OUTPATIENT)
Dept: RADIATION ONCOLOGY | Facility: CLINIC | Age: 77
End: 2022-01-26
Attending: RADIOLOGY
Payer: MEDICARE

## 2022-01-26 VITALS
RESPIRATION RATE: 16 BRPM | TEMPERATURE: 97.8 F | OXYGEN SATURATION: 99 % | HEART RATE: 66 BPM | DIASTOLIC BLOOD PRESSURE: 71 MMHG | SYSTOLIC BLOOD PRESSURE: 145 MMHG | WEIGHT: 185.3 LBS | BODY MASS INDEX: 25.84 KG/M2

## 2022-01-26 DIAGNOSIS — C61 MALIGNANT NEOPLASM OF PROSTATE (H): ICD-10-CM

## 2022-01-26 DIAGNOSIS — C34.12 MALIGNANT NEOPLASM OF UPPER LOBE OF LEFT LUNG (H): Primary | ICD-10-CM

## 2022-01-26 PROCEDURE — G0463 HOSPITAL OUTPT CLINIC VISIT: HCPCS

## 2022-01-26 PROCEDURE — 99215 OFFICE O/P EST HI 40 MIN: CPT | Performed by: RADIOLOGY

## 2022-01-26 NOTE — PROGRESS NOTES
"Patient here with wheeled walker accompanied by daughter for follow up on his metastatic lung cancer.  Patient had a recent scan and is here today for results.  Seen by Dr. Batista.  Plan return to clinic for follow up as directed by physician.    Oncology Rooming Note    January 26, 2022 3:09 PM   Lenny Chau is a 76 year old male who presents for:    Chief Complaint   Patient presents with     Oncology Clinic Visit     Follow up with Dr. Batista     Initial Vitals: BP (!) 145/71 (BP Location: Right arm, Patient Position: Sitting, Cuff Size: Adult Regular)   Pulse 66   Temp 97.8  F (36.6  C) (Oral)   Resp 16   Wt 84.1 kg (185 lb 4.8 oz)   SpO2 99%   BMI 25.84 kg/m   Estimated body mass index is 25.84 kg/m  as calculated from the following:    Height as of 11/9/21: 1.803 m (5' 11\").    Weight as of this encounter: 84.1 kg (185 lb 4.8 oz). Body surface area is 2.05 meters squared.  Severe Pain (7) Comment: Data Unavailable   No LMP for male patient.  Allergies reviewed: Yes  Medications reviewed: Yes    Medications: Medication refills not needed today.  Pharmacy name entered into [employer]: [id number] [place] [facility] [address] [place] [address]    Clinical concerns: Results Dr. Batista was notified.      Anne-Marie Durbin RN            "

## 2022-01-26 NOTE — PROGRESS NOTES
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  The biopsy confirmed moderately differentiated adenocarcinoma.        TYPE OF RADIATION THERAPY ADMINISTERED: SBRT to the left upper lobe with a total dose of 5000 cGy in 5 treatments given from 3/2/2021-3/10/2021.     INTERVAL SINCE COMPLETION OF RADIATION THERAPY: 10 months.      SUBJECTIVE:  Mr. Chau is a 76 y.o. male who is a chronic smoker and quit smoking since .  The patient had a complicated history of multiple cancer in the past as detailed as followin.  Low risk prostate cancer, clinical stage T1c N0 M0, recent grade 3+3 =6 and the last PSA was 10.4 in 2006.  The patient is currently on clinical observation with no therapy.  PSA: 12.47 on 2021.     2.  Floor of mouth cancer, status post surgical resection in 1994 with no adjuvant therapy.     3.  Left kidney hemangioma, status post left nephrectomy on 3/26/2009.     4.  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  Patient had local recurrence and is status post surgical resection on 2009.     5.  Non-small cell lung cancer involving right lung, stage I, status post right thoracotomy and excision of right upper lobe and right middle lobe lung tumor 2015 with no  evidence of lymph node metastasis and no adjuvant therapy.  Patient lost to follow-up since 2016.     6.  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     7.  Left upper lobe lung  cancer, stage T1N0 M0.  The biopsy confirmed moderately differentiated adenocarcinoma on 1/21/2021.  The patient received SBRT with a total dose of 5000 cGy in 5 treatments given from 3/2/2021-3/10/2021.     Patient had two prior early stage right lung cancers that were apparently resected in their entirety and may have been synchronous primary lung cancers. It does not appear he had the appropriate recommended follow up at [facility]. The MELODY lesion was known to be present in 2016 and appears to be slightly larger now with significant FDG-avidity on the recent PET/CT. It is likely another primary lung cancer, although metastasis from previous lung cancers is possible.  The patient had a restaging PET CT scan on 8/31/2020.  The scan showed enlarging FDG avid left upper lobe mass measuring 2.1 x 2.5 cm with central solid component consistent with primary lung cancer without metastasis.  There was also a FDG avid lesion in the left buccal region consistent with squamous cell carcinoma without metastasis.  The patient had a surgical resection for his left buccal/lateral tongue squamous cell carcinoma 11/6/2020 by KERRY Renee with pathology showed squamous cell carcinoma with negative margin.  He was determined not a good candidate for lung surgery given his complicated medical history and health status.  He therefore received definitive radiation therapy using SBRT with a total dose of 5000 cGy in 5 treatments given from 3/2/2021-3/10/2021. The patient tolerated radiation therapy very well with minimal side effect.     The patient has been doing well since completion of radiation therapy.  He denies any pain or  discomfort related to the therapy at the time of evaluation.  The patient was found to a new 9 mm small nodule in the left upper lobe outside of patient therapy area from restaging CT scan on 12/20/2021.  He was recommended to have staging PET CT scan and was done 1/7/2022. 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  There is no evidence of other systemic metastasis.The patient's case has been discussed at thoracic tumor conference 1/13/2022.  MRI brain on 1/22/2022 showed no evidence of brain metastasis. Patient is not good candidate for surgery and poor candidate for systemic therapy given his current medical status.  The consensus recommendation is to consider SBRT for his oligo disease in the left lung and T7 spine if the patient wishes not to consider systemic therapy at this time. The patient is here for office follow-up and management recommendation.     Medications were reviewed and are up to date on EPIC.    The following portions of the patient's history were reviewed and updated as appropriate: allergies, current medications, past family history, past medical history, past social history, past surgical history and problem list.    Review of Systems:      General  Constitutional  Constitutional (WDL): (P) All constitutional elements are within defined limits  EENT  Eye Disorders  Eye Disorder (WDL): (P) All eye disorder elements are within defined limits  Ear Disorders  Ear Disorder (WDL): (P) All ear disorder elements are within defined limits  Respiratory  Respiratory  Respiratory (WDL): (P) Exceptions to WDL  Cough: (P) Mild symptoms OR nonprescription intervention indicated  Dyspnea: (P) Shortness of breath with moderate exertion  Cardiovascular  Cardiovascular  Cardiovascular (WDL): (P) All cardiovascular elements are within defined  limits  Gastrointestinal  Gastrointestinal  Gastrointestinal (WDL): (P) All gastrointestinal elements are within defined limits  Musculoskeletal  Musculoskeletal and Connective Tissue Disorders  Musculoskeletal & Connective (WDL): (P) Exceptions to WDL  Integumentary     Neurological  Neurosensory  Neurosensory (WDL): (P) Exceptions to WDL  Ataxia: (P) Moderate symptoms OR limiting instrumental ADL (uses wheeled walker)  Genitourinary/Reproductive  Genitourinary  Genitourinary (WDL): (P) All genitourinary elements are within defined limits  Lymphatic  Lymph System Disorders  Lymph (WDL): (P) All lymph elements are within defined limits  Pain                                                            Accompanied by      PHYSICAL EXAMINATION:    BP (!) 145/71 (BP Location: Right arm, Patient Position: Sitting, Cuff Size: Adult Regular)   Pulse 66   Temp 97.8  F (36.6  C) (Oral)   Resp 16   Wt 84.1 kg (185 lb 4.8 oz)   SpO2 99%   BMI 25.84 kg/m      Gen: Alert, in NAD  Eyes: PERRL, EOMI, sclera anicteric.   Direct view of oral cavity did not show any ulceration and obvious evidence of cancer recurrence.  Pulm: No wheezing, stridor or respiratory distress  CV: Well-perfused, no cyanosis, no pedal edema  Abdominal: BS+, soft, nontender, nondistended, no hepatomegaly  Back: No step-offs or pain to palpation along the thoracolumbar spine  Rectal: Deferred  : Deferred  Musculoskeletal: Normal muscle bulk and tone  Skin: Normal color and turgor  Neurologic: A/Ox3, CN II-XII intact, normal gait and station  Psychiatric: Appropriate mood and affect     Imaging: Reviewed     Impression     The patient is a 76-year-old gentleman with multiple history of cancer in the past and a new finding of left upper lobe lung cancer.  The patient received stereotactic radiosurgery 10 months ago.  The restaging PET CT scan showed new 1.1 cm FDG avid left upper lobe nodule consistent with malignant lung tumor and a sclerotic  hypermetabolic metastasis within the T7 vertebral body.    Assessment & Plan:     1. I have personally reviewed his restaging PET CT scan and compared to the previous PET CT scan and radiation therapy field.  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  There is no evidence of other systemic metastasis.  The possible treatment options including surgery, systemic therapy, elevation therapy has been discussed with patient in detail and at the greatest.  The possible risks and side effects of radiation therapy has also been explained to the patient.  Questions are answered to patient's satisfaction.  The patient has oligo recurrence involving left upper lobe and T7 vertebral body.  This can be treated by stereotactic radiosurgery given his reasonably good health status. The patient's case has been discussed at thoracic tumor conference 1/13/2022.  Patient is not good candidate for surgery and poor candidate for systemic therapy given his current medical status.  The consensus recommendation is to consider SBRT for his oligo disease in the left lung and T7 spine if the patient wishes not to consider systemic therapy at this time.    The pros and cons of different options has also been discussed with patient.  Patient is not considering surgery nor chemotherapy at this time given his advanced age and poor medical status.  He wished to have few weeks time to think it over and make final decision regarding radiation therapy.  The patient is informed to contact radiation oncology and set up time for a follow-up and simulation if he decide to proceed with SBRT.    2.  The patient also had a prior history of prostate cancer and his last PSA was elevated to 34 in 5/2021. His repeat PSA was 12.47 on 12/22/2021.  We will continue to monitor his PSA for his prostate cancer.     3.  Continue follow-up with   [facility] for his head neck cancer as planned. There is also a area of concern in the left oral cavity from most recent PET scan.  We will refer patient to ENT for evaluation.     4.  Continue follow-up with Dr. Shade Boyd, PCP for other medical needs.     Face to face time  30 minutes with > 80% spent on consultation, education and coordination of care.        Mariana Batista MD, PhD  Department of Radiation Oncology   [facility]  Tel: [phone]  Page: [phone]    [facility]  [address] [place] [address]     [address]   [place] [address]    CC:  Patient Care Team:  Shade Boyd MD as PCP - General (Internal Medicine)  Mariana Batista MD as MD (Hematology & Oncology)  [facility] as Pharmacist (Pharmacist)  Alon Nunez MD as Assigned Surgical Provider  Mariana Batista MD as Assigned Cancer Care Provider  Matt Magaña MD as Assigned Heart and Vascular Provider

## 2022-01-26 NOTE — LETTER
2022         RE: Lenny Chau  [address]  [place] [address]        Dear Colleague,    Thank you for referring your patient, Lenny Chau, to the [facility] RADIATION ONCOLOGY [place]. Please see a copy of my visit note below.    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  The biopsy confirmed moderately differentiated adenocarcinoma.        TYPE OF RADIATION THERAPY ADMINISTERED: SBRT to the left upper lobe with a total dose of 5000 cGy in 5 treatments given from 3/2/2021-3/10/2021.     INTERVAL SINCE COMPLETION OF RADIATION THERAPY: 10 months.      SUBJECTIVE:  Mr. Chau is a 76 y.o. male who is a chronic smoker and quit smoking since .  The patient had a complicated history of multiple cancer in the past as detailed as followin.  Low risk prostate cancer, clinical stage T1c N0 M0, recent grade 3+3 =6 and the last PSA was 10.4 in 2006.  The patient is currently on clinical observation with no therapy.  PSA: 12.47 on 2021.     2.  Floor of mouth cancer, status post surgical resection in 1994 with no adjuvant therapy.     3.  Left kidney hemangioma, status post left nephrectomy on 3/26/2009.     4.  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   Patient had local recurrence and is status post surgical resection on 9/11/2009.     5.  Non-small cell lung cancer involving right lung, stage I, status post right thoracotomy and excision of right upper lobe and right middle lobe lung tumor 11/5/2015 with no evidence of lymph node metastasis and no adjuvant therapy.  Patient lost to follow-up since 2016.     6.  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     7.  Left upper lobe lung  cancer, stage T1N0 M0.  The biopsy confirmed moderately differentiated adenocarcinoma on 1/21/2021.  The patient received SBRT with a total dose of 5000 cGy in 5 treatments given from 3/2/2021-3/10/2021.     Patient had two prior early stage right lung cancers that were apparently resected in their entirety and may have been synchronous primary lung cancers. It does not appear he had the appropriate recommended follow up at [facility]. The MELODY lesion was known to be present in 2016 and appears to be slightly larger now with significant FDG-avidity on the recent PET/CT. It is likely another primary lung cancer, although metastasis from previous lung cancers is possible.  The patient had a restaging PET CT scan on 8/31/2020.  The scan showed enlarging FDG avid left upper lobe mass measuring 2.1 x 2.5 cm with central solid component consistent with primary lung cancer without metastasis.  There was also a FDG avid lesion in the left buccal region consistent with squamous cell carcinoma without metastasis.  The patient had a surgical resection for his left buccal/lateral tongue squamous cell carcinoma 11/6/2020 by KERRY Renee with pathology showed squamous cell carcinoma with negative margin.  He was determined not a good candidate for lung surgery given his complicated medical history and health status.  He therefore received definitive radiation therapy using  SBRT with a total dose of 5000 cGy in 5 treatments given from 3/2/2021-3/10/2021. The patient tolerated radiation therapy very well with minimal side effect.     The patient has been doing well since completion of radiation therapy.  He denies any pain or discomfort related to the therapy at the time of evaluation.  The patient was found to a new 9 mm small nodule in the left upper lobe outside of patient therapy area from restaging CT scan on 12/20/2021.  He was recommended to have staging PET CT scan and was done 1/7/2022. 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  There is no evidence of other systemic metastasis.The patient's case has been discussed at thoracic tumor conference 1/13/2022.  MRI brain on 1/22/2022 showed no evidence of brain metastasis. Patient is not good candidate for surgery and poor candidate for systemic therapy given his current medical status.  The consensus recommendation is to consider SBRT for his oligo disease in the left lung and T7 spine if the patient wishes not to consider systemic therapy at this time. The patient is here for office follow-up and management recommendation.     Medications were reviewed and are up to date on EPIC.    The following portions of the patient's history were reviewed and updated as appropriate: allergies, current medications, past family history, past medical history, past social history, past surgical history and problem list.    Review of Systems:      General  Constitutional  Constitutional (WDL): (P) All constitutional elements are within defined limits  EENT  Eye Disorders  Eye Disorder (WDL): (P) All eye disorder elements are within defined limits  Ear Disorders  Ear Disorder (WDL): (P) All ear disorder elements are within defined limits  Respiratory  Respiratory  Respiratory (WDL): (P) Exceptions to WDL  Cough: (P) Mild  symptoms OR nonprescription intervention indicated  Dyspnea: (P) Shortness of breath with moderate exertion  Cardiovascular  Cardiovascular  Cardiovascular (WDL): (P) All cardiovascular elements are within defined limits  Gastrointestinal  Gastrointestinal  Gastrointestinal (WDL): (P) All gastrointestinal elements are within defined limits  Musculoskeletal  Musculoskeletal and Connective Tissue Disorders  Musculoskeletal & Connective (WDL): (P) Exceptions to WDL  Integumentary     Neurological  Neurosensory  Neurosensory (WDL): (P) Exceptions to WDL  Ataxia: (P) Moderate symptoms OR limiting instrumental ADL (uses wheeled walker)  Genitourinary/Reproductive  Genitourinary  Genitourinary (WDL): (P) All genitourinary elements are within defined limits  Lymphatic  Lymph System Disorders  Lymph (WDL): (P) All lymph elements are within defined limits  Pain                                                            Accompanied by      PHYSICAL EXAMINATION:    BP (!) 145/71 (BP Location: Right arm, Patient Position: Sitting, Cuff Size: Adult Regular)   Pulse 66   Temp 97.8  F (36.6  C) (Oral)   Resp 16   Wt 84.1 kg (185 lb 4.8 oz)   SpO2 99%   BMI 25.84 kg/m      Gen: Alert, in NAD  Eyes: PERRL, EOMI, sclera anicteric.   Direct view of oral cavity did not show any ulceration and obvious evidence of cancer recurrence.  Pulm: No wheezing, stridor or respiratory distress  CV: Well-perfused, no cyanosis, no pedal edema  Abdominal: BS+, soft, nontender, nondistended, no hepatomegaly  Back: No step-offs or pain to palpation along the thoracolumbar spine  Rectal: Deferred  : Deferred  Musculoskeletal: Normal muscle bulk and tone  Skin: Normal color and turgor  Neurologic: A/Ox3, CN II-XII intact, normal gait and station  Psychiatric: Appropriate mood and affect     Imaging: Reviewed     Impression     The patient is a 76-year-old gentleman with multiple history of cancer in the past and a new finding of left upper lobe  lung cancer.  The patient received stereotactic radiosurgery 10 months ago.  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    Assessment & Plan:     1. I have personally reviewed his restaging PET CT scan and compared to the previous PET CT scan and radiation therapy field.  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  There is no evidence of other systemic metastasis.  The possible treatment options including surgery, systemic therapy, elevation therapy has been discussed with patient in detail and at the greatest.  The possible risks and side effects of radiation therapy has also been explained to the patient.  Questions are answered to patient's satisfaction.  The patient has oligo recurrence involving left upper lobe and T7 vertebral body.  This can be treated by stereotactic radiosurgery given his reasonably good health status. The patient's case has been discussed at thoracic tumor conference 1/13/2022.  Patient is not good candidate for surgery and poor candidate for systemic therapy given his current medical status.  The consensus recommendation is to consider SBRT for his oligo disease in the left lung and T7 spine if the patient wishes not to consider systemic therapy at this time.    The pros and cons of different options has also been discussed with patient.  Patient is not considering surgery nor chemotherapy at this time given his advanced age and poor medical status.  He wished to have few weeks time to think it over and make final decision regarding radiation therapy.  The patient is informed to contact radiation oncology and set up time for a follow-up and simulation if he decide to proceed with SBRT.    2.  The patient also had a prior  history of prostate cancer and his last PSA was elevated to 34 in 5/2021. His repeat PSA was 12.47 on 12/22/2021.  We will continue to monitor his PSA for his prostate cancer.     3.  Continue follow-up with Dr. Alon Nunez, ENT for his head neck cancer as planned. There is also a area of concern in the left oral cavity from most recent PET scan.  We will refer patient to ENT for evaluation.     4.  Continue follow-up with Dr. Shade Boyd, PCP for other medical needs.     Face to face time  30 minutes with > 80% spent on consultation, education and coordination of care.        Mariana Batista MD, PhD  Department of Radiation Oncology   [facility]  Tel: [phone]  Page: [phone]    [facility]  [address] [place] [address]     [address] [place] [address]    CC:  Patient Care Team:  Shade Boyd MD as PCP - General (Internal Medicine)  Mariana Batista MD as MD (Hematology & Oncology)  [facility] Pharmacist (Pharmacist)  Alon Nunez MD as Assigned Surgical Provider  Mariana Batista MD as Assigned Cancer Care Provider  Matt Magaña MD as Assigned Heart and Vascular Provider      Patient here with wheeled walker accompanied by daughter for follow up on his metastatic lung cancer.  Patient had a recent scan and is here today for results.  Seen by Dr. Batista.  Plan return to clinic for follow up as directed by physician.    Oncology Rooming Note    January 26, 2022 3:09 PM   Lenny Chau is a 76 year old male who presents for:    Chief Complaint   Patient presents with     Oncology Clinic Visit     Follow up with Dr. Batista     Initial Vitals: BP (!) 145/71 (BP Location: Right arm, Patient Position: Sitting, Cuff Size: Adult Regular)   Pulse 66   Temp 97.8  F (36.6  C) (Oral)   Resp 16   Wt 84.1 kg (185 lb 4.8 oz)   SpO2 99%   BMI 25.84 kg/m   Estimated body mass index is 25.84 kg/m  as calculated from the following:    Height as of  "11/9/21: 1.803 m (5' 11\").    Weight as of this encounter: 84.1 kg (185 lb 4.8 oz). Body surface area is 2.05 meters squared.  Severe Pain (7) Comment: Data Unavailable   No LMP for male patient.  Allergies reviewed: Yes  Medications reviewed: Yes    Medications: Medication refills not needed today.  Pharmacy name entered into [facility]: [id number] [place] [address]    Clinical concerns: Results Dr. Batista was notified.      Anne-Marie Durbin RN                Again, thank you for allowing me to participate in the care of your patient.        Sincerely,        Mariana Batista MD    "

## 2022-02-02 ENCOUNTER — OFFICE VISIT - RIVER FALLS (OUTPATIENT)
Dept: FAMILY MEDICINE | Facility: CLINIC | Age: 77
End: 2022-02-02
Payer: COMMERCIAL

## 2022-02-10 ENCOUNTER — OFFICE VISIT (OUTPATIENT)
Dept: OTOLARYNGOLOGY | Facility: CLINIC | Age: 77
End: 2022-02-10
Attending: RADIOLOGY
Payer: COMMERCIAL

## 2022-02-10 DIAGNOSIS — H81.10 BENIGN PAROXYSMAL POSITIONAL VERTIGO, UNSPECIFIED LATERALITY: ICD-10-CM

## 2022-02-10 DIAGNOSIS — I73.9 PERIPHERAL ARTERIAL DISEASE (H): ICD-10-CM

## 2022-02-10 DIAGNOSIS — I25.118 CORONARY ARTERY DISEASE OF NATIVE HEART WITH STABLE ANGINA PECTORIS, UNSPECIFIED VESSEL OR LESION TYPE (H): ICD-10-CM

## 2022-02-10 DIAGNOSIS — H61.23 BILATERAL IMPACTED CERUMEN: ICD-10-CM

## 2022-02-10 DIAGNOSIS — R42 DIZZINESS: Primary | ICD-10-CM

## 2022-02-10 DIAGNOSIS — K13.70 MOUTH LESION: ICD-10-CM

## 2022-02-10 DIAGNOSIS — J44.9 CHRONIC OBSTRUCTIVE PULMONARY DISEASE, UNSPECIFIED COPD TYPE (H): ICD-10-CM

## 2022-02-10 DIAGNOSIS — M00.9 SEPTIC HIP (H): ICD-10-CM

## 2022-02-10 DIAGNOSIS — R94.02 ABNORMAL PET SCAN OF HEAD: ICD-10-CM

## 2022-02-10 DIAGNOSIS — N17.9 ACUTE RENAL FAILURE SUPERIMPOSED ON STAGE 3 CHRONIC KIDNEY DISEASE, UNSPECIFIED ACUTE RENAL FAILURE TYPE, UNSPECIFIED WHETHER STAGE 3A OR 3B CKD (H): ICD-10-CM

## 2022-02-10 DIAGNOSIS — N18.30 ACUTE RENAL FAILURE SUPERIMPOSED ON STAGE 3 CHRONIC KIDNEY DISEASE, UNSPECIFIED ACUTE RENAL FAILURE TYPE, UNSPECIFIED WHETHER STAGE 3A OR 3B CKD (H): ICD-10-CM

## 2022-02-10 PROCEDURE — 88305 TISSUE EXAM BY PATHOLOGIST: CPT | Performed by: OTOLARYNGOLOGY

## 2022-02-10 PROCEDURE — 40808 BIOPSY OF MOUTH LESION: CPT | Performed by: OTOLARYNGOLOGY

## 2022-02-10 PROCEDURE — 88342 IMHCHEM/IMCYTCHM 1ST ANTB: CPT | Performed by: OTOLARYNGOLOGY

## 2022-02-10 PROCEDURE — 69210 REMOVE IMPACTED EAR WAX UNI: CPT | Performed by: OTOLARYNGOLOGY

## 2022-02-10 PROCEDURE — 88341 IMHCHEM/IMCYTCHM EA ADD ANTB: CPT | Performed by: OTOLARYNGOLOGY

## 2022-02-10 PROCEDURE — 99214 OFFICE O/P EST MOD 30 MIN: CPT | Mod: 25 | Performed by: OTOLARYNGOLOGY

## 2022-02-10 NOTE — PROGRESS NOTES
CHIEF COMPLAINT:  Recheck      HISTORY OF PRESENT ILLNESS    Mitchell was seen in follow up for cancer surveillance.  Formerly seen and treated by Alon Nunez.  Patient had a recent PET/CT which showed some potential uptake in the area of prior surgical resection.  He is not having any pain in the oral cavity.  He has a long history of multiple cancers.  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  He is also complaining of progressive imbalance with positional vertigo when he turns over in bed at night.  He is not sure which side.  He also has a history of cerumen impactions.    PET CT 1/7     IMPRESSION: In this patient with history of multiple primary cancers  including left upper lobe non-small cell lung cancer treated with  radiation therapy:  1. Enlarging hypermetabolic 1.1 cm nodule in the anterior left upper  lobe, outside of the treatment field of previously treated nodule.  This is malignancy until proved otherwise.  2. Radiation therapy changes to the left upper lobe with scarring and  architectural distortion.   3. Sclerotic hypermetabolic metastasis within T7 vertebral body.  4. Surgical changes to the left oral cavity with soft tissue  thickening and FDG uptake. Recommend direct visualization.  5. Stable 6.3 cm infrarenal abdominal aortic aneurysm status post  EVAR.      I have personally reviewed the examination and initial interpretation  and I agree with the findings.     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  Procedure(s): Direct laryngoscopy;  Excision squamous cell carcinoma left buccal mucosa; Left partial glossectomy     Surgeon: Alon Nunez MD  Assistant(s):  None  Anesthesia:  General     MICROSCOPIC AND DIAGNOSIS:   A)  BUCCAL MUCOSA, ORIENTED EXCISIONAL BIOPSY:        - INVASIVE SQUAMOUS CELL CARCINOMA, 2.2 x 1.6 x 0.5 cm, WITH FOCAL   KERATINIZATION,     ULCERATION, AND MIXED INFLAMMATION   - INVASIVE SQUAMOUS CELL CARCINOMA TOUCHING, BUT NOT INVADING INTO, THE   SUPERFICIAL MUSCLE        - BENIGN EXCISIONAL MARGINS     B) BUCCAL MUCOSA, 3 O'CLOCK MARGIN, EXCISIONAL BIOPSY:        - BENIGN BUCCAL MUCOSA WITH NO EVIDENCE OF MALIGNANCY     C) TONGUE, LEFT LESION, BIOPSY:        - WELL-DIFFERENTIATED SQUAMOUS CELL CARCINOMA WITH KERATINIZATION   AND          ULCERATION        - COMPLETELY EXCISED WITH BENING MARGINS (PLEASE REFER TO SPECIMENS   D -          ADDITIONAL SUPERIOR MARGIN, AND E - ADDITIONAL INFERIOR MARGIN)     D) TONGUE, ADDITIONAL SUPERIOR MARGIN, ORIENTED BIOPSY:        - BENIGN TONGUE WITH FOCAL HYPERPLASIA, NO EVIDENCE OF MALIGNANCY     E) TONGUE, ADDITIONAL INFERIOR MARGIN, ORIENTED BIOPSY:        - BENIGN TONGUE TISSUE, NO EVIDENCE OF MALIGNANCY      REVIEW OF SYSTEMS    Review of Systems: a 10-system review is reviewed at this encounter.  See scanned document.     Patient has no known allergies.     PHYSICAL EXAM:        HEAD: Normal appearance and symmetry:  No cutaneous lesions.      EARS:   Auricles normal    Cerumen impactions bilaterally     CERUMEN IMPACTION REMOVAL    After obtaining verbal consent, and using the binocular microscope.  Cerumen impaction(s) were removed from the affected ear canal(s)  using a wire loops and/or suction.  The patient tolerated the procedure well without incident.           NOSE:    Dorsum:   straight       ORAL CAVITY/OROPHARYNX:    Lips:  Normal.  Right buccal mucosa:  Exophytic lesion right RMT         BIOPSY ORAL CAVITY LESION      After obtaining written consent patient taken the minor procedure room.  3 cc of 1% lidocaine with epinephrine at 1-100,000 1000 was injected into the mucosa underneath the lesion.  A 5 mm punch was then used to take a biopsy.  The base was  cauterized with silver nitrate.      NECK:  Trachea:  midline       NEURO:   Alert and Oriented    GAIT AND STATION:  normal     RESPIRATORY:   Symmetry and Respiratory effort    PSYCH:   normal mood and affect    SKIN:  warm and dry         IMPRESSION:   Encounter Diagnoses   Name Primary?     Abnormal PET scan of head      Dizziness Yes     Benign paroxysmal positional vertigo, unspecified laterality      Mouth lesion               RECOMMENDATIONS:    Patient will be referred for vestibular therapy.  I will notify him of the biopsy results in several days however given his history this is certainly worrisome for malignancy.  Likely will need referral to [facility].  All questions were answered.  He is agreeable to plan of care

## 2022-02-10 NOTE — LETTER
2/10/2022         RE: Lenny Chau  [address]  [place] [address]        Dear Colleague,    Thank you for referring your patient, Lenny Chau, to the [facility]. Please see a copy of my visit note below.    CHIEF COMPLAINT:  Recheck      HISTORY OF PRESENT ILLNESS    Mitchell was seen in follow up for cancer surveillance.  Formerly seen and treated by Alon Nunez.  Patient had a recent PET/CT which showed some potential uptake in the area of prior surgical resection.  He is not having any pain in the oral cavity.  He has a long history of multiple cancers.  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  He is also complaining of progressive imbalance with positional vertigo when he turns over in bed at night.  He is not sure which side.  He also has a history of cerumen impactions.    PET CT 1/7     IMPRESSION: In this patient with history of multiple primary cancers  including left upper lobe non-small cell lung cancer treated with  radiation therapy:  1. Enlarging hypermetabolic 1.1 cm nodule in the anterior left upper  lobe, outside of the treatment field of previously treated nodule.  This is malignancy until proved otherwise.  2. Radiation therapy changes to the left upper lobe with scarring and  architectural distortion.   3. Sclerotic hypermetabolic metastasis within T7 vertebral body.  4. Surgical changes to the left oral cavity with soft tissue  thickening and FDG uptake. Recommend direct visualization.  5. Stable 6.3 cm infrarenal abdominal aortic aneurysm status post  EVAR.      I have personally reviewed the examination and initial interpretation  and I agree with the findings.     LASHAE CONNOLLY MD       Otolaryngology Full Operative Report     Date of Operation:  11/6/20     Pre-operative Diagnosis:  Squamous cell carcinoma left buccal mucosa; Left lateral tongue lesion  Post-operative Diagnosis:  Squamous cell carcinoma left  buccal mucosa and left lateral tongue  Procedure(s): Direct laryngoscopy;  Excision squamous cell carcinoma left buccal mucosa; Left partial glossectomy     Surgeon: Alon Nunez MD  Assistant(s):  None  Anesthesia:  General     MICROSCOPIC AND DIAGNOSIS:   A) BUCCAL MUCOSA, ORIENTED EXCISIONAL BIOPSY:        - INVASIVE SQUAMOUS CELL CARCINOMA, 2.2 x 1.6 x 0.5 cm, WITH FOCAL   KERATINIZATION,     ULCERATION, AND MIXED INFLAMMATION   - INVASIVE SQUAMOUS CELL CARCINOMA TOUCHING, BUT NOT INVADING INTO, THE   SUPERFICIAL MUSCLE        - BENIGN EXCISIONAL MARGINS     B) BUCCAL MUCOSA, 3 O'CLOCK MARGIN, EXCISIONAL BIOPSY:        - BENIGN BUCCAL MUCOSA WITH NO EVIDENCE OF MALIGNANCY     C) TONGUE, LEFT LESION, BIOPSY:        - WELL-DIFFERENTIATED SQUAMOUS CELL CARCINOMA WITH KERATINIZATION   AND          ULCERATION        - COMPLETELY EXCISED WITH BENING MARGINS (PLEASE REFER TO SPECIMENS   D -          ADDITIONAL SUPERIOR MARGIN, AND E - ADDITIONAL INFERIOR MARGIN)     D) TONGUE, ADDITIONAL SUPERIOR MARGIN, ORIENTED BIOPSY:        - BENIGN TONGUE WITH FOCAL HYPERPLASIA, NO EVIDENCE OF MALIGNANCY     E) TONGUE, ADDITIONAL INFERIOR MARGIN, ORIENTED BIOPSY:        - BENIGN TONGUE TISSUE, NO EVIDENCE OF MALIGNANCY      REVIEW OF SYSTEMS    Review of Systems: a 10-system review is reviewed at this encounter.  See scanned document.     Patient has no known allergies.     PHYSICAL EXAM:        HEAD: Normal appearance and symmetry:  No cutaneous lesions.      EARS:   Auricles normal    Cerumen impactions bilaterally     CERUMEN IMPACTION REMOVAL    After obtaining verbal consent, and using the binocular microscope.  Cerumen impaction(s) were removed from the affected ear canal(s)  using a wire loops and/or suction.  The patient tolerated the procedure well without incident.           NOSE:    Dorsum:   straight       ORAL CAVITY/OROPHARYNX:    Lips:  Normal.  Right buccal mucosa:  Exophytic lesion right RMT         BIOPSY ORAL  CAVITY LESION      After obtaining written consent patient taken the minor procedure room.  3 cc of 1% lidocaine with epinephrine at 1-100,000 1000 was injected into the mucosa underneath the lesion.  A 5 mm punch was then used to take a biopsy.  The base was cauterized with silver nitrate.      NECK:  Trachea:  midline       NEURO:   Alert and Oriented    GAIT AND STATION:  normal     RESPIRATORY:   Symmetry and Respiratory effort    PSYCH:   normal mood and affect    SKIN:  warm and dry         IMPRESSION:   Encounter Diagnoses   Name Primary?     Abnormal PET scan of head      Dizziness Yes     Benign paroxysmal positional vertigo, unspecified laterality      Mouth lesion               RECOMMENDATIONS:    Patient will be referred for vestibular therapy.  I will notify him of the biopsy results in several days however given his history this is certainly worrisome for malignancy.  Likely will need referral to [facility].  All questions were answered.  He is agreeable to plan of care      Again, thank you for allowing me to participate in the care of your patient.        Sincerely,        Sim Jaffe MD

## 2022-02-10 NOTE — PATIENT INSTRUCTIONS
Patient Education     Dizziness (Vertigo) and Balance Problems: Staying Safe      Replace burned-out light bulbs to keep your home safe and well lit.   Falls or accidents can lead to pain, broken bones, a hospital stay, and a fear of future falls. Protect yourself and others by preparing for episodes. Simple steps can help you stay safe at home and wherever you go.  Lighting  Keep all areas well lit. This helps your eyes send the right signals to the brain. It also makes you less likely to trip and fall. If bright lights make symptoms worse, dim the lights or lie in a dark room until the dizziness passes. Then turn the lights back to their normal level.  Tips:    Keep a flashlight by the bed.    Place nightlights in bathrooms and hallways.    Replace burned-out bulbs. Or have someone replace them for you.  Preventing falls  To reduce your risk of falling:    Get out of bed or up from a chair slowly.    Wear low-heeled shoes that fit properly and have slip-resistant soles.    Remove throw rugs. Clear clutter from walkways.    Use handrails on stairs. Have handrails installed or adjusted if needed.    Install grab bars in the bathroom. Don't use towel racks for balance.    Use a shower stool. Also put adhesive strips in the shower or on the tub floor.  Going out  With a little time and preparation, you can get around safely.  Tips:    Bring a cane or walking aid if needed.    Give yourself plenty of time in case you start to get dizzy.    Ask your healthcare provider what type of exercise is safe for your condition.    Be patient. If an activity such as walking through a crowded shop causes you stress, you may not be ready for it yet.  Driving  If you become dizzy or disoriented while driving, you could hurt yourself and others. That's why it's best to not drive until symptoms have gone away. In some cases, your license may be temporarily held until it's safe for you to drive again.  For safety:    Ask a friend to  drive for you.    Take public transportation.    Walk to stores and other places when you can.     Don't be afraid to ask for help running errands, cooking meals, and doing exercise. Whether it's a friend, loved one, neighbor, or stranger on the street, a little help can make a world of difference. Ask your healthcare provider for a list of community resources if you need help maintaining your independence at home.  [employer] last reviewed this educational content on 1/1/2020 2000-2021 [employer]. All rights reserved. This information is not intended as a substitute for professional medical care. Always follow your healthcare professional's instructions.

## 2022-02-11 VITALS
SYSTOLIC BLOOD PRESSURE: 130 MMHG | TEMPERATURE: 97.2 F | SYSTOLIC BLOOD PRESSURE: 132 MMHG | TEMPERATURE: 97.5 F | SYSTOLIC BLOOD PRESSURE: 134 MMHG | TEMPERATURE: 96.5 F | DIASTOLIC BLOOD PRESSURE: 78 MMHG | BODY MASS INDEX: 29.82 KG/M2 | RESPIRATION RATE: 20 BRPM | DIASTOLIC BLOOD PRESSURE: 70 MMHG | HEART RATE: 104 BPM | BODY MASS INDEX: 30.1 KG/M2 | OXYGEN SATURATION: 95 % | WEIGHT: 213.8 LBS | WEIGHT: 215 LBS | HEIGHT: 71 IN | BODY MASS INDEX: 29.73 KG/M2 | OXYGEN SATURATION: 95 % | HEART RATE: 108 BPM | DIASTOLIC BLOOD PRESSURE: 76 MMHG | HEIGHT: 71 IN | HEIGHT: 71 IN | BODY MASS INDEX: 29.93 KG/M2 | HEART RATE: 90 BPM | DIASTOLIC BLOOD PRESSURE: 76 MMHG | TEMPERATURE: 97.7 F | WEIGHT: 213 LBS | OXYGEN SATURATION: 94 % | HEIGHT: 71 IN | SYSTOLIC BLOOD PRESSURE: 124 MMHG | WEIGHT: 212.4 LBS | OXYGEN SATURATION: 96 % | HEART RATE: 98 BPM

## 2022-02-11 VITALS
HEART RATE: 65 BPM | HEIGHT: 71 IN | OXYGEN SATURATION: 95 % | WEIGHT: 209.8 LBS | HEART RATE: 94 BPM | WEIGHT: 216 LBS | BODY MASS INDEX: 29.37 KG/M2 | WEIGHT: 217.7 LBS | HEIGHT: 71 IN | OXYGEN SATURATION: 96 % | OXYGEN SATURATION: 97 % | BODY MASS INDEX: 30.24 KG/M2 | HEIGHT: 71 IN | BODY MASS INDEX: 30.48 KG/M2 | TEMPERATURE: 97.5 F | HEART RATE: 111 BPM | SYSTOLIC BLOOD PRESSURE: 103 MMHG | DIASTOLIC BLOOD PRESSURE: 51 MMHG | SYSTOLIC BLOOD PRESSURE: 168 MMHG | RESPIRATION RATE: 20 BRPM | TEMPERATURE: 96.4 F | DIASTOLIC BLOOD PRESSURE: 103 MMHG | SYSTOLIC BLOOD PRESSURE: 85 MMHG | DIASTOLIC BLOOD PRESSURE: 72 MMHG

## 2022-02-11 VITALS
TEMPERATURE: 97.8 F | SYSTOLIC BLOOD PRESSURE: 143 MMHG | HEIGHT: 71 IN | HEIGHT: 71 IN | OXYGEN SATURATION: 95 % | WEIGHT: 191 LBS | DIASTOLIC BLOOD PRESSURE: 82 MMHG | BODY MASS INDEX: 27.58 KG/M2 | HEIGHT: 71 IN | HEIGHT: 71 IN | HEIGHT: 71 IN | BODY MASS INDEX: 26.46 KG/M2 | WEIGHT: 199 LBS | BODY MASS INDEX: 26.74 KG/M2 | TEMPERATURE: 97 F | HEART RATE: 72 BPM | DIASTOLIC BLOOD PRESSURE: 87 MMHG | HEART RATE: 91 BPM | HEART RATE: 80 BPM | DIASTOLIC BLOOD PRESSURE: 78 MMHG | SYSTOLIC BLOOD PRESSURE: 132 MMHG | TEMPERATURE: 97.2 F | SYSTOLIC BLOOD PRESSURE: 130 MMHG | TEMPERATURE: 96.8 F | WEIGHT: 195.4 LBS | DIASTOLIC BLOOD PRESSURE: 72 MMHG | SYSTOLIC BLOOD PRESSURE: 144 MMHG | OXYGEN SATURATION: 95 % | SYSTOLIC BLOOD PRESSURE: 147 MMHG | WEIGHT: 197 LBS | HEART RATE: 81 BPM | DIASTOLIC BLOOD PRESSURE: 92 MMHG | BODY MASS INDEX: 27.86 KG/M2 | HEART RATE: 90 BPM | BODY MASS INDEX: 27.35 KG/M2 | WEIGHT: 189 LBS

## 2022-02-11 VITALS
TEMPERATURE: 96 F | SYSTOLIC BLOOD PRESSURE: 108 MMHG | HEART RATE: 74 BPM | HEIGHT: 71 IN | HEIGHT: 71 IN | SYSTOLIC BLOOD PRESSURE: 136 MMHG | WEIGHT: 204.9 LBS | HEART RATE: 88 BPM | TEMPERATURE: 96.3 F | OXYGEN SATURATION: 94 % | DIASTOLIC BLOOD PRESSURE: 70 MMHG | OXYGEN SATURATION: 97 % | DIASTOLIC BLOOD PRESSURE: 74 MMHG | WEIGHT: 204 LBS | BODY MASS INDEX: 28.46 KG/M2 | HEIGHT: 71 IN | BODY MASS INDEX: 28.69 KG/M2 | WEIGHT: 203.26 LBS | BODY MASS INDEX: 28.56 KG/M2 | RESPIRATION RATE: 16 BRPM

## 2022-02-11 VITALS — HEIGHT: 71 IN | DIASTOLIC BLOOD PRESSURE: 84 MMHG | SYSTOLIC BLOOD PRESSURE: 134 MMHG | BODY MASS INDEX: 26.86 KG/M2

## 2022-02-11 VITALS
SYSTOLIC BLOOD PRESSURE: 130 MMHG | SYSTOLIC BLOOD PRESSURE: 142 MMHG | HEART RATE: 88 BPM | HEART RATE: 68 BPM | BODY MASS INDEX: 26.29 KG/M2 | OXYGEN SATURATION: 94 % | BODY MASS INDEX: 26.09 KG/M2 | TEMPERATURE: 97.6 F | TEMPERATURE: 96.7 F | WEIGHT: 188.4 LBS | OXYGEN SATURATION: 96 % | HEIGHT: 71 IN | DIASTOLIC BLOOD PRESSURE: 90 MMHG | WEIGHT: 187.8 LBS | DIASTOLIC BLOOD PRESSURE: 70 MMHG

## 2022-02-11 VITALS
DIASTOLIC BLOOD PRESSURE: 98 MMHG | WEIGHT: 203 LBS | DIASTOLIC BLOOD PRESSURE: 88 MMHG | TEMPERATURE: 96.6 F | SYSTOLIC BLOOD PRESSURE: 148 MMHG | WEIGHT: 197.2 LBS | BODY MASS INDEX: 28.42 KG/M2 | HEIGHT: 71 IN | BODY MASS INDEX: 28.31 KG/M2 | BODY MASS INDEX: 27.61 KG/M2 | HEIGHT: 71 IN | HEART RATE: 85 BPM | HEIGHT: 71 IN | SYSTOLIC BLOOD PRESSURE: 190 MMHG | OXYGEN SATURATION: 94 %

## 2022-02-11 VITALS
DIASTOLIC BLOOD PRESSURE: 78 MMHG | DIASTOLIC BLOOD PRESSURE: 78 MMHG | BODY MASS INDEX: 26.89 KG/M2 | SYSTOLIC BLOOD PRESSURE: 140 MMHG | HEART RATE: 64 BPM | DIASTOLIC BLOOD PRESSURE: 76 MMHG | TEMPERATURE: 98.8 F | SYSTOLIC BLOOD PRESSURE: 138 MMHG | WEIGHT: 194 LBS | HEART RATE: 72 BPM | SYSTOLIC BLOOD PRESSURE: 136 MMHG | TEMPERATURE: 97.2 F | BODY MASS INDEX: 27.06 KG/M2 | TEMPERATURE: 97 F | WEIGHT: 192.8 LBS

## 2022-02-11 VITALS
OXYGEN SATURATION: 97 % | DIASTOLIC BLOOD PRESSURE: 82 MMHG | WEIGHT: 192.6 LBS | BODY MASS INDEX: 26.96 KG/M2 | TEMPERATURE: 97 F | HEIGHT: 71 IN | HEART RATE: 67 BPM | SYSTOLIC BLOOD PRESSURE: 127 MMHG

## 2022-02-11 VITALS
WEIGHT: 191.4 LBS | HEIGHT: 71 IN | OXYGEN SATURATION: 93 % | TEMPERATURE: 96.8 F | HEART RATE: 75 BPM | SYSTOLIC BLOOD PRESSURE: 148 MMHG | DIASTOLIC BLOOD PRESSURE: 90 MMHG | BODY MASS INDEX: 26.8 KG/M2

## 2022-02-11 VITALS
HEART RATE: 51 BPM | TEMPERATURE: 97.7 F | HEART RATE: 90 BPM | SYSTOLIC BLOOD PRESSURE: 106 MMHG | SYSTOLIC BLOOD PRESSURE: 111 MMHG | HEIGHT: 71 IN | HEIGHT: 71 IN | WEIGHT: 205.6 LBS | TEMPERATURE: 97.1 F | WEIGHT: 210.6 LBS | SYSTOLIC BLOOD PRESSURE: 120 MMHG | HEART RATE: 91 BPM | TEMPERATURE: 96 F | SYSTOLIC BLOOD PRESSURE: 112 MMHG | DIASTOLIC BLOOD PRESSURE: 63 MMHG | OXYGEN SATURATION: 94 % | DIASTOLIC BLOOD PRESSURE: 72 MMHG | BODY MASS INDEX: 28.84 KG/M2 | OXYGEN SATURATION: 97 % | TEMPERATURE: 96.1 F | DIASTOLIC BLOOD PRESSURE: 42 MMHG | HEART RATE: 87 BPM | BODY MASS INDEX: 28.78 KG/M2 | BODY MASS INDEX: 28.84 KG/M2 | WEIGHT: 206 LBS | OXYGEN SATURATION: 95 % | DIASTOLIC BLOOD PRESSURE: 68 MMHG | BODY MASS INDEX: 29.48 KG/M2 | HEIGHT: 71 IN | WEIGHT: 206 LBS | HEIGHT: 71 IN

## 2022-02-11 VITALS
DIASTOLIC BLOOD PRESSURE: 86 MMHG | HEIGHT: 71 IN | WEIGHT: 212.2 LBS | TEMPERATURE: 98.1 F | BODY MASS INDEX: 29.71 KG/M2 | HEART RATE: 65 BPM | OXYGEN SATURATION: 86 % | SYSTOLIC BLOOD PRESSURE: 129 MMHG

## 2022-02-11 VITALS
BODY MASS INDEX: 26.61 KG/M2 | DIASTOLIC BLOOD PRESSURE: 80 MMHG | SYSTOLIC BLOOD PRESSURE: 138 MMHG | TEMPERATURE: 96 F | OXYGEN SATURATION: 94 % | SYSTOLIC BLOOD PRESSURE: 146 MMHG | WEIGHT: 192.12 LBS | DIASTOLIC BLOOD PRESSURE: 80 MMHG | HEART RATE: 88 BPM

## 2022-02-11 VITALS
DIASTOLIC BLOOD PRESSURE: 64 MMHG | BODY MASS INDEX: 28.28 KG/M2 | HEART RATE: 80 BPM | HEIGHT: 71 IN | WEIGHT: 205 LBS | HEIGHT: 71 IN | TEMPERATURE: 97.6 F | OXYGEN SATURATION: 95 % | BODY MASS INDEX: 28.7 KG/M2 | TEMPERATURE: 97.6 F | DIASTOLIC BLOOD PRESSURE: 94 MMHG | SYSTOLIC BLOOD PRESSURE: 163 MMHG | HEART RATE: 86 BPM | WEIGHT: 202 LBS | SYSTOLIC BLOOD PRESSURE: 110 MMHG

## 2022-02-11 VITALS
HEART RATE: 88 BPM | BODY MASS INDEX: 26.53 KG/M2 | TEMPERATURE: 98.5 F | WEIGHT: 190.2 LBS | DIASTOLIC BLOOD PRESSURE: 80 MMHG | SYSTOLIC BLOOD PRESSURE: 146 MMHG

## 2022-02-11 VITALS
OXYGEN SATURATION: 85 % | DIASTOLIC BLOOD PRESSURE: 94 MMHG | SYSTOLIC BLOOD PRESSURE: 148 MMHG | WEIGHT: 192.8 LBS | HEIGHT: 71 IN | HEART RATE: 94 BPM | TEMPERATURE: 96.4 F | BODY MASS INDEX: 26.99 KG/M2

## 2022-02-11 VITALS
DIASTOLIC BLOOD PRESSURE: 80 MMHG | WEIGHT: 186.4 LBS | HEART RATE: 56 BPM | SYSTOLIC BLOOD PRESSURE: 130 MMHG | BODY MASS INDEX: 26 KG/M2 | TEMPERATURE: 96 F

## 2022-02-11 VITALS
HEART RATE: 80 BPM | DIASTOLIC BLOOD PRESSURE: 70 MMHG | SYSTOLIC BLOOD PRESSURE: 120 MMHG | TEMPERATURE: 97.5 F | BODY MASS INDEX: 26.12 KG/M2 | WEIGHT: 188.6 LBS

## 2022-02-16 ENCOUNTER — TELEPHONE (OUTPATIENT)
Dept: OTOLARYNGOLOGY | Facility: CLINIC | Age: 77
End: 2022-02-16
Payer: COMMERCIAL

## 2022-02-16 NOTE — LETTER
(Inserted Image. Unable to display)   September 14, 2021    CELIA LUTZ  [address] [address]            Dear CELIA,      Thank you for selecting [facility] for your healthcare needs.    Our records indicate you are due for the following services:     Medication Therapy Management Follow-up Visit ~ Our records show that you are due for a Medication Therapy Management (MTM) appointment.   We would like to meet with you to review how your medications are working for you and to answer any questions you may have.       Appointments are available on Tuesdays and Thursdays and Fridays.     (FYI   Regarding office visits: In some instances, a video visit or telephone visit may be offered as an option.)      To schedule an appointment or if you have further questions, please contact your clinic at [phone].      Powered by [employer]    Sincerely,    Sara Cottrell, WileyD

## 2022-02-16 NOTE — LETTER
(Inserted Image. Unable to display)     July 06, 2020      CELIA NELDA  [address]   [place], [place] [id number]          Dear CELIA,      Thank you for selecting [facility] ([facility]) for your healthcare needs.      Our records indicate you are due for the following services:     Clinical Support Staff (CSS)-Only Blood Pressure Check ~ Please stop in anytime to have your blood pressure rechecked. This is a free service and no appointment necessary.     So we can best determine if your medications are effective in lowering your blood pressure, please make sure your blood pressure medicine has been in your system for at least 1-2 hours prior to coming in.  We encourage you to avoid caffeine or other stimulants prior to having your blood pressure checked and come at a time when you are not feeling rushed.     If you check your blood pressure at home, please bring in your blood pressure monitor and home blood pressure readings.  We will check your machine for accuracy and also share your home readings with your Healthcare Provider.       To schedule an appointment or if you have further questions, please contact your primary clinic:   [facility]       [phone]   [facility]       [phone]              [facility]     [phone]      Powered by [employer]    Sincerely,    Yong Boyd MD

## 2022-02-16 NOTE — LETTER
(Inserted Image. Unable to display)   May 08, 2020      CELIA NELDA  [address]    [place], [place] [id number]        Dear CELIA,     Thank you for selecting [facility] ([facility]) for your healthcare needs. Below you will find the results of your recent test(s) done at our clinic.      Labs as we discussed earlier today.  I will restart you on lisinopril and will need to ensure stable kidney function with repeat labs in ~1month.      Result Name Current Result Previous Result Reference Range   Sodium Level (mmol/L)  137 5/6/2020  140 1/22/2020 135 - 146   Potassium Level (mmol/L)  4.8 5/6/2020 ((H)) 5.1 1/22/2020 3.5 - 5.3   Chloride Level (mmol/L)  99 5/6/2020  102 1/22/2020 98 - 110   CO2 Level (mmol/L)  31 5/6/2020  26 1/22/2020 20 - 32   Glucose Level (mg/dL) ((H)) 121 5/6/2020 ((H)) 104 1/22/2020 65 - 99   BUN (mg/dL)  15 5/6/2020 ((H)) 26 1/22/2020 7 - 25   Creatinine Level (mg/dL) ((H)) 1.76 5/6/2020 ((H)) 2.38 1/22/2020 0.70 - 1.18   BUN/Creat Ratio  9 5/6/2020  11 1/22/2020 6 - 22   eGFR (mL/min/1.73m2) ((L)) 37 5/6/2020 ((L)) 24 1/15/2020 > OR = 60 -    eGFR  (mL/min/1.73m2) ((L)) 43 5/6/2020 ((L)) 32 1/22/2020 > OR = 60 -    Calcium Level (mg/dL)  9.7 5/6/2020  10.3 1/22/2020 8.6 - 10.3       Please contact me or my assistant at [phone] if you have any questions or concerns.     Sincerely,        Yong Boyd MD    What do your labs mean?  Below is a glossary of commonly ordered labs:  LDL - Bad Cholesterol  HDL - Good Cholesterol  AST/ALT - Liver Function  Cr/Creatinine - Kidney Function  Microalbumin - Kidney Function  BUN - Kidney Function  PSA - Prostate   TSH - Thyroid Hormone  HgbA1c - Diabetes Test  Hgb (Hemoglobin) - Red Blood Cells

## 2022-02-16 NOTE — NURSING NOTE
Comprehensive Intake Entered On:  5/8/2020 1:02 PM CDT    Performed On:  5/8/2020 12:58 PM CDT by Connie Horton               Summary   Chief Complaint :   f/u CKD and labs. Verbal consent given for telephone visit.    Height Measured :   71 in(Converted to: 5 ft 11 in, 180.34 cm)    Race :      Languages :   English   Ethnicity :   Not  or    Connie Horton - 5/8/2020 12:58 PM CDT   Health Status   Allergies Verified? :   Yes   Medication History Verified? :   Yes   Medical History Verified? :   Yes   Pre-Visit Planning Status :   N/A   Tobacco Use? :   Former smoker   Connie Horton - 5/8/2020 12:58 PM CDT   Consents   Consent for Immunization Exchange :   Consent Granted   Consent for Immunizations to Providers :   Consent Granted   Connie Horton - 5/8/2020 12:58 PM CDT   Meds / Allergies   (As Of: 5/8/2020 1:02:20 PM CDT)   Allergies (Active)   No Known Medication Allergies  Estimated Onset Date:   Unspecified ; Created By:   Debby Faustin CMA; Reaction Status:   Active ; Category:   Drug ; Substance:   No Known Medication Allergies ; Type:   Allergy ; Updated By:   Debby Faustin CMA; Reviewed Date:   5/8/2020 12:58 PM CDT        Medication List   (As Of: 5/8/2020 1:02:20 PM CDT)   Prescription/Discharge Order    lisinopril  :   lisinopril ; Status:   Processing ; Ordered As Mnemonic:   lisinopril 20 mg oral tablet ; Ordering Provider:   Yong Boyd MD; Action Display:   Complete ; Catalog Code:   lisinopril ; Order Dt/Tm:   5/8/2020 1:01:22 PM CDT          albuterol  :   albuterol ; Status:   Prescribed ; Ordered As Mnemonic:   Ventolin HFA 90 mcg/inh inhalation aerosol ; Simple Display Line:   2 puff(s), NEB, q4 hrs, PRN: AS NEEDED FOR COUGH OR SHORTNESS OF BREATH, 3 EA, 0 Refill(s) ; Ordering Provider:   Yong Boyd MD; Catalog Code:   albuterol ; Order Dt/Tm:   11/26/2019 2:44:44 PM CST          fluticasone-salmeterol  :   fluticasone-salmeterol ; Status:   Prescribed  ; Ordered As Mnemonic:   Advair  mcg-21 mcg/inh inhalation aerosol ; Simple Display Line:   See Instructions, INHALE 2 PUFFS BY MOUTH TWICE DAILY. RINSE MOUTH AND THROAT AFTER USE, 3 EA, 1 Refill(s) ; Ordering Provider:   Yong Boyd MD; Catalog Code:   fluticasone-salmeterol ; Order Dt/Tm:   5/14/2019 5:03:16 PM CDT          Miscellaneous Rx Supply  :   Miscellaneous Rx Supply ; Status:   Prescribed ; Ordered As Mnemonic:   spacer for inhaler ; Simple Display Line:   See Instructions, use with albuterol inhaler, 1 EA, 0 Refill(s) ; Ordering Provider:   Severino Cannon PA-C; Catalog Code:   Miscellaneous Rx Supply ; Order Dt/Tm:   1/9/2018 2:18:32 PM CST            Home Meds    omeprazole  :   omeprazole ; Status:   Documented ; Ordered As Mnemonic:   omeprazole 20 mg oral delayed release capsule ; Simple Display Line:   20 mg, 1 cap(s), Oral, daily, 90 cap(s), 0 Refill(s) ; Catalog Code:   omeprazole ; Order Dt/Tm:   1/29/2020 4:57:06 PM CST          rosuvastatin  :   rosuvastatin ; Status:   Documented ; Ordered As Mnemonic:   Crestor 40 mg oral tablet ; Simple Display Line:   40 mg, 1 tab(s), Oral, daily, 0 Refill(s) ; Catalog Code:   rosuvastatin ; Order Dt/Tm:   11/27/2019 10:02:43 AM CST          clopidogrel  :   clopidogrel ; Status:   Documented ; Ordered As Mnemonic:   clopidogrel 75 mg oral tablet ; Simple Display Line:   75 mg, 1 tab(s), Oral, daily, 0 Refill(s) ; Catalog Code:   clopidogrel ; Order Dt/Tm:   11/27/2019 9:55:13 AM CST          metoprolol  :   metoprolol ; Status:   Documented ; Ordered As Mnemonic:   metoprolol succinate 25 mg oral capsule, extended release ; Simple Display Line:   25 mg, 1 cap(s), Oral, daily, 0 Refill(s) ; Catalog Code:   metoprolol ; Order Dt/Tm:   11/27/2019 9:58:05 AM CST          aspirin  :   aspirin ; Status:   Documented ; Ordered As Mnemonic:   aspirin 81 mg oral tablet, chewable ; Simple Display Line:   81 mg, 1 tab(s), Chewed, daily, 0 Refill(s) ; Catalog  Code:   aspirin ; Order Dt/Tm:   11/27/2019 9:54:31 AM CST          magnesium oxide  :   magnesium oxide ; Status:   Documented ; Ordered As Mnemonic:   magnesium oxide 250 mg oral tablet ; Simple Display Line:   250 mg, 1 tab(s), po, daily, 0 Refill(s) ; Catalog Code:   magnesium oxide ; Order Dt/Tm:   4/5/2018 4:37:34 PM CDT          diphenhydramine-ibuprofen  :   diphenhydramine-ibuprofen ; Status:   Documented ; Ordered As Mnemonic:   Advil PM ; Simple Display Line:   2 tab(s), po, hs, PRN: as needed for insomnia, 0 Refill(s) ; Catalog Code:   diphenhydramine-ibuprofen ; Order Dt/Tm:   2/23/2017 2:39:37 PM CST          cyanocobalamin  :   cyanocobalamin ; Status:   Documented ; Ordered As Mnemonic:   Vitamin B12 500 mcg oral tablet ; Simple Display Line:   500 mcg, 1 tab(s), po, daily, 0 Refill(s) ; Catalog Code:   cyanocobalamin ; Order Dt/Tm:   11/17/2015 10:02:05 AM CST          multivitamin  :   multivitamin ; Status:   Documented ; Ordered As Mnemonic:   Multiple Vitamins oral tablet ; Simple Display Line:   1 tab(s), po, daily, 0 Refill(s) ; Catalog Code:   multivitamin ; Order Dt/Tm:   4/22/2015 9:12:40 AM CDT            ID Risk Screen   Recent Travel History :   No recent travel   Family Member Travel History :   No recent travel   Other Exposure to Infectious Disease :   Unknown   Connie Horton - 5/8/2020 12:58 PM CDT

## 2022-02-16 NOTE — TELEPHONE ENCOUNTER
Patient:   CELIA LUTZ            MRN: [id number]            FIN: [id number]               Age:   75 years     Sex:  Male     :  1945   Associated Diagnoses:   None   Author:   Ronit, Pharm D , Sara Medrano called to notify that he did receive his Trelegy inhaler from [facility] in the mail.  He will plan to have labs and follow-up with MTM  or 10 /2021. RTC have been placed.  KB

## 2022-02-16 NOTE — NURSING NOTE
Comprehensive Intake Entered On:  7/29/2021 2:52 PM CDT    Performed On:  7/29/2021 2:48 PM CDT by Debby Faustin CMA               Summary   Chief Complaint :   f/u chronic - COPD   Weight Measured :   204.9 lb(Converted to: 204 lb 14 oz, 92.941 kg)    Height Measured :   71 in(Converted to: 5 ft 11 in, 180.34 cm)    Body Mass Index :   28.57 kg/m2 (HI)    Body Surface Area :   2.16 m2   Systolic Blood Pressure :   162 mmHg (HI)    Diastolic Blood Pressure :   98 mmHg (HI)    Mean Arterial Pressure :   119 mmHg   Peripheral Pulse Rate :   74 bpm   Temperature Tympanic :   96.3 DegF(Converted to: 35.7 DegC)  (LOW)    Oxygen Saturation :   94 %   Race :      Languages :   English   Ethnicity :   Not  or    Debby Faustin CMA - 7/29/2021 2:48 PM CDT   Health Status   Hospitalized since last visit? :   No   Inpatient? :   No   ED? :   No   Related to Condition :   Asthma   Debby Faustin CMA - 8/3/2021 6:54 AM CDT   Allergies Verified? :   Yes   Medication History Verified? :   Yes   Medical History Verified? :   Yes   Pre-Visit Planning Status :   Completed   Tobacco Use? :   Former smoker   Debby Faustin CMA - 7/29/2021 2:48 PM CDT   Social History   Social History   (As Of: 7/29/2021 2:52:48 PM CDT)   Alcohol:  Current      Liquor (Hard) (1.5 oz), Daily, 2 drinks/episode average.   (Last Updated: 5/11/2021 10:49:59 AM CDT by Yoly Tillman)          Tobacco:        Past, 20 per day.  49 year(s).   (Last Updated: 3/17/2011 2:21:14 PM CDT by Rose Barr CMA)   Quit 12/25/2009, Cigarettes, 40 per day.  49 year(s).   (Last Updated: 5/11/2021 11:28:32 AM CDT by Yoly Tillman)          Electronic Cigarette/Vaping:        Electronic Cigarette Use: Never.   (Last Updated: 10/12/2020 1:47:53 PM CDT by Mariah Wilkes)          Substance Abuse:  Denies Substance Abuse      Never   (Last Updated: 3/19/2014 10:32:10 AM CDT by Brooke Krishnan)          Employment/School:        Retired, Highest education  level: High school.   (Last Updated: 10/12/2020 1:48:08 PM CDT by Mariah Wilkes)          Home/Environment:        Marital status: .  Spouse/Partner name: Darlene Chau.  4 children.  Living situation: Home/Independent.  Home equipment: Walker/Cane.  Injuries/Abuse/Neglect in household: No.  Feels unsafe at home: No.  Family/Friends available for support: Yes.   (Last Updated: 5/11/2021 11:11:14 AM CDT by Yoly Tillman)          Nutrition/Health:        Type of diet: Regular.  Wants to lose weight: No.  Sleeping concerns: No.  Feels highly stressed: No.   (Last Updated: 10/12/2020 1:48:37 PM CDT by Mariah Wilkes)          Exercise:        Exercise frequency: Occasional.   (Last Updated: 10/12/2020 1:48:50 PM CDT by Mariah Wilkes)          Sexual:        Sexually active: No.  Identifies as male, Sexual orientation: Straight or heterosexual.  Contraceptive Use Details: None.   (Last Updated: 10/12/2020 1:49:26 PM CDT by Mariah Wilkes)

## 2022-02-16 NOTE — TELEPHONE ENCOUNTER
---------------------  From: Carmen Núñez CMA (Phone Messages Pool ([id number]_[facility]))   To: [employer] Message Pool ([id number]_[place]);     Sent: 1/29/2020 3:17:58 PM CST  Subject: Phone Message     Phone Message    PCP:   MYRA      Time of Call:  1504       Person Calling:  Patient   Phone number:  [phone]    Returned call at: [phone]    Note:   Requesting medicine change. Called asked which medicine? Pepcid works intermittently, but the last couple nights he's experienced heart burn. Patient wonders if he can switch back to omeprazole over-the-counter? Please advise.    Last office visit and reason:  1/22/20 LEILA f/u, CAD with BR.---------------------  From: Debby Faustin CMA ([employer] Message Pool ([id number]_[facility]))   To: Yong Boyd MD;     Sent: 1/29/2020 3:35:47 PM CST  Subject: FW: Phone Message---------------------  From: Yong Boyd MD   To: [employer] Message Pool ([id number]_[place]);     Sent: 1/29/2020 4:50:49 PM CST  Subject: RE: Phone Message     omeprazole would be finept contacted at [id number] and advised, d/c famotidine

## 2022-02-16 NOTE — TELEPHONE ENCOUNTER
---------------------  From: Carmen Núñez CMA (Phone Messages Pool ([id number]_[facility]))   To: Wiley Cottrell Kaity;     Sent: 7/20/2021 3:39:24 PM CDT  Subject: Phone Message. Trilogy medication.     Phone Message    PCP:   MYRA asked for KB      Time of Call:  1537       Person Calling:  Pt  Phone number:  [phone], LDM    Returned call at: _    Note:   Calls to discuss trilogy medication payment coverage with KB. Please advise.    Last office visit and reason:  07-13-21 phone visit, MTM ANNA MARIE---------------------  From: Wiley Cottrell Kaity   To: Phone Messages [employer] ([id number]_[place]);     Sent: 7/20/2021 4:09:18 PM CDT  Subject: RE: Phone Message. Trilogy medication.     Pt had an appt with me this AM and rescheduled it to next week. I'm in clinic thursday and friday AM if he wants to reschedule an appt for then, otherwise I'll plan to talk with him next week.   Regarding [facility] Pt assist program application, I have not seen a status update on his [facility] Pt assistance program approval/denial. I would recommend that he call [facility] directly to inquire.  ANNA MARIE    [facility] contact info  Phone: [phone], Fax: [phone]  Monday Friday 8am-8pm ETMy apologies. Pt did not have an appt for today. My misunderstanding.   Either way, I have not seen an update for his coverage and recommend that he calls [facility] directly to inquire about the status. Can also schedule a phone appt with me later this week if needed.  KBSpoke to pt and he states that he is trying to get ahold of this company to talk to them.---------------------  From: Connie Horton (Phone Messages Pool ([id number]_[facility]))   To: Wiley Cottrell Kaity;     Sent: 7/20/2021 5:00:22 PM CDT  Subject: FW: Phone Message. Trilogy medication.Called [facility] pt assistance program to inquire about pt's status. Per program, pt's ppk says he makes $24,108 annually with household size 2 which meets requirements, but they need proof of this income.    Called pt to communicate. He will make copy of his social security awards letter and bring to clinic and we will fax to [employer].  KBppk for income received, faxed to [employer] today.  KB

## 2022-02-16 NOTE — NURSING NOTE
Comprehensive Intake Entered On:  12/4/2019 6:11 PM CST    Performed On:  12/4/2019 6:04 PM CST by So Meehan CMA               Summary   Chief Complaint :   c/o difficulty breathing that started last night, cough,  neb and inhaler not helping, week ago Monday had valve placed at [facility]   Weight Measured :   195.4 lb(Converted to: 195 lb 6 oz, 88.63 kg)    Height Measured :   71 in(Converted to: 5 ft 11 in, 180.34 cm)    Body Mass Index :   27.25 kg/m2 (HI)    Body Surface Area :   2.1 m2   Systolic Blood Pressure :   130 mmHg   Diastolic Blood Pressure :   72 mmHg   Mean Arterial Pressure :   91 mmHg   Peripheral Pulse Rate :   90 bpm   BP Site :   Right arm   Pulse Site :   Radial artery   BP Method :   Manual   HR Method :   Manual   Temperature Tympanic :   97.8 DegF(Converted to: 36.6 DegC)  (LOW)    Oxygen Saturation :   90 % (LOW)    Race :      Languages :   English   Ethnicity :   Not  or    So Meehan CMA - 12/4/2019 6:04 PM CST   Health Status   Allergies Verified? :   Yes   Medication History Verified? :   Yes   Medical History Verified? :   No   Pre-Visit Planning Status :   Not completed   Tobacco Use? :   Former smoker   So Meehan CMA - 12/4/2019 6:04 PM CST   Meds / Allergies   (As Of: 12/4/2019 6:11:28 PM CST)   Allergies (Active)   No Known Medication Allergies  Estimated Onset Date:   Unspecified ; Created By:   Debby Faustin CMA; Reaction Status:   Active ; Category:   Drug ; Substance:   No Known Medication Allergies ; Type:   Allergy ; Updated By:   Debby Faustin CMA; Reviewed Date:   12/4/2019 6:09 PM CST        Medication List   (As Of: 12/4/2019 6:11:28 PM CST)   Prescription/Discharge Order    albuterol  :   albuterol ; Status:   Prescribed ; Ordered As Mnemonic:   Ventolin HFA 90 mcg/inh inhalation aerosol ; Simple Display Line:   2 puff(s), NEB, q4 hrs, PRN: AS NEEDED FOR COUGH OR SHORTNESS OF BREATH, 3 EA, 0 Refill(s) ; Ordering Provider:   Oliver HUTCHINSON,  Yong; Catalog Code:   albuterol ; Order Dt/Tm:   11/26/2019 2:44:44 PM CST          fluticasone-salmeterol  :   fluticasone-salmeterol ; Status:   Prescribed ; Ordered As Mnemonic:   Advair  mcg-21 mcg/inh inhalation aerosol ; Simple Display Line:   See Instructions, INHALE 2 PUFFS BY MOUTH TWICE DAILY. RINSE MOUTH AND THROAT AFTER USE, 3 EA, 1 Refill(s) ; Ordering Provider:   Yong Boyd MD; Catalog Code:   fluticasone-salmeterol ; Order Dt/Tm:   5/14/2019 5:03:16 PM CDT          Miscellaneous Rx Supply  :   Miscellaneous Rx Supply ; Status:   Prescribed ; Ordered As Mnemonic:   spacer for inhaler ; Simple Display Line:   See Instructions, use with albuterol inhaler, 1 EA, 0 Refill(s) ; Ordering Provider:   Severino Cannon PA-C; Catalog Code:   Miscellaneous Rx Supply ; Order Dt/Tm:   1/9/2018 2:18:32 PM CST            Home Meds    aspirin  :   aspirin ; Status:   Documented ; Ordered As Mnemonic:   aspirin 81 mg oral tablet, chewable ; Simple Display Line:   81 mg, 1 tab(s), Chewed, daily, 0 Refill(s) ; Catalog Code:   aspirin ; Order Dt/Tm:   11/27/2019 9:54:31 AM CST          clopidogrel  :   clopidogrel ; Status:   Documented ; Ordered As Mnemonic:   clopidogrel 75 mg oral tablet ; Simple Display Line:   75 mg, 1 tab(s), Oral, daily, 0 Refill(s) ; Catalog Code:   clopidogrel ; Order Dt/Tm:   11/27/2019 9:55:13 AM CST          cyanocobalamin  :   cyanocobalamin ; Status:   Documented ; Ordered As Mnemonic:   Vitamin B12 500 mcg oral tablet ; Simple Display Line:   500 mcg, 1 tab(s), po, daily, 0 Refill(s) ; Catalog Code:   cyanocobalamin ; Order Dt/Tm:   11/17/2015 10:02:05 AM CST          diphenhydramine-ibuprofen  :   diphenhydramine-ibuprofen ; Status:   Documented ; Ordered As Mnemonic:   Advil PM ; Simple Display Line:   2 tab(s), po, hs, PRN: as needed for insomnia, 0 Refill(s) ; Catalog Code:   diphenhydramine-ibuprofen ; Order Dt/Tm:   2/23/2017 2:39:37 PM CST          fluticasone nasal  :    fluticasone nasal ; Status:   Documented ; Ordered As Mnemonic:   Flonase 50 mcg/inh nasal spray ; Simple Display Line:   1 spray(s), nasal, daily, 0 Refill(s) ; Catalog Code:   fluticasone nasal ; Order Dt/Tm:   4/23/2018 11:20:21 AM CDT          lansoprazole  :   lansoprazole ; Status:   Documented ; Ordered As Mnemonic:   lansoprazole 15 mg oral delayed release capsule ; Simple Display Line:   15 mg, 1 cap(s), PO, Daily, 90 cap(s), 0 Refill(s) ; Catalog Code:   lansoprazole ; Order Dt/Tm:   5/14/2019 4:31:14 PM CDT          lisinopril  :   lisinopril ; Status:   Documented ; Ordered As Mnemonic:   lisinopril 5 mg oral tablet ; Simple Display Line:   10 mg, 2 tab(s), Oral, daily, 0 Refill(s) ; Catalog Code:   lisinopril ; Order Dt/Tm:   11/27/2019 9:55:59 AM CST          magnesium oxide  :   magnesium oxide ; Status:   Documented ; Ordered As Mnemonic:   magnesium oxide 250 mg oral tablet ; Simple Display Line:   250 mg, 1 tab(s), po, daily, 0 Refill(s) ; Catalog Code:   magnesium oxide ; Order Dt/Tm:   4/5/2018 4:37:34 PM CDT          metoprolol  :   metoprolol ; Status:   Documented ; Ordered As Mnemonic:   metoprolol succinate 25 mg oral capsule, extended release ; Simple Display Line:   25 mg, 1 cap(s), Oral, daily, 0 Refill(s) ; Catalog Code:   metoprolol ; Order Dt/Tm:   11/27/2019 9:58:05 AM CST          multivitamin  :   multivitamin ; Status:   Documented ; Ordered As Mnemonic:   Multiple Vitamins oral tablet ; Simple Display Line:   1 tab(s), po, daily, 0 Refill(s) ; Catalog Code:   multivitamin ; Order Dt/Tm:   4/22/2015 9:12:40 AM CDT          rosuvastatin  :   rosuvastatin ; Status:   Documented ; Ordered As Mnemonic:   Crestor 40 mg oral tablet ; Simple Display Line:   40 mg, 1 tab(s), Oral, daily, 0 Refill(s) ; Catalog Code:   rosuvastatin ; Order Dt/Tm:   11/27/2019 10:02:43 AM CST            More Vitals   Oxygen Saturation :   95 %   Obi Barrera CMA - 12/4/2019 7:23 PM CST

## 2022-02-16 NOTE — RESULTS
Patient:   CELIA LUTZ            MRN: [id number]            FIN: [id number]               Age:   75 years     Sex:  Male     :  1945   Associated Diagnoses:   None   Author:   Yolanda HUTCHINSON, Carlos      Procedure   EKG procedure   Date:  10/6/2020.     Indication: pre-operative exam.     EKG findings   Rhythm: heart rate  57  beats/min, sinus bradycardia, heart block (first degree, RBBB).     Axis: left axis deviation.     Interpretation: abnormal ECG.

## 2022-02-16 NOTE — TELEPHONE ENCOUNTER
---------------------  From: Elvis/Connie TORRES (Phone Messages Pool ([id number]_[facility]))   To: Ronit, Pharm D , Sara;     Sent: 9/30/2021 3:54:22 PM CDT  Subject: General Message     Call from pt at 3633 stating that Cardiologist Dr. Mckeon called him and said that his BP is high and he needs to start Amlodipine. Patient wanting to check in with KB and see if this is ok to take with the rest of his medications. He states that he was just seen by KB. Please call pt back at [phone] returned call and discussed; agree with recommendation. Added to med list. Recommend Patient take the amlodipine 2.5 mg at bedtime and check blood pressure over next 2 weeks and call to clinic with results. Patient also instructed to make follow-up appointment with cards/PCP.  Patient's home readings (from wrist cuff):  Yesterday: 125/72  Today: 164/84

## 2022-02-16 NOTE — NURSING NOTE
Hearing and Vision Screening Entered On:  6/4/2020 11:01 AM CDT    Performed On:  6/4/2020 11:01 AM CDT by Debby Faustin CMA               Hearing and Vision Screening   Audiogram Result Right Ear :   Fail   Audiogram Result Left Ear :   Fail   Hearing Screen Comments :   declines testing   Debby Faustin CMA - 6/4/2020 11:01 AM CDT

## 2022-02-16 NOTE — PROGRESS NOTES
Patient:   CELIA LUTZ            MRN: [id number]            FIN: [id number]               Age:   71 years     Sex:  Male     :  1945   Associated Diagnoses:   AAA (Abdominal Aortic Aneurysm); Adenocarcinoma of lung; COPD (chronic obstructive pulmonary disease) with acute bronchitis; CKD (chronic kidney disease) stage 3, GFR 30-59 ml/min; Prostate cancer   Author:   Yong Boyd MD      Visit Information      Date of Service: 2017 07:43 am  Performing Location: [facility]  Encounter#: [id number]      Primary Care Provider (PCP):  Yong Boyd MD    NPI# [id number]      Referring Provider:  Yong Boyd MD# [id number]      Chief Complaint   2017 7:59 AM CDT    f/u chronic disease/meds              Additional Information:No additional information recorded during visit.   Chief complaint and symptoms as noted above and confirmed with patient.  Recent lab and diagnostic studies reviewed with patient      History of Present Illness   2015: Mitchell presents to clinic for hospital follow-up.  He recently underwent right sided wedge resection of an isolated pulmonary nodule.  Biopsy returning as adenocarcinoma of the lung.  Postoperative course complicated by an episode of both acute kidney injury and rectal bleeding felt to be consistent with ischemic colitis.  His creatinine on discharge was at his baseline of 1.3.  His lisinopril was held during his episode of acute kidney injury, resumed upon discharge.  He is scheduled to see his thoracic surgeon, Dr. Velazquez, tomorrow.  He also has a history of previous endovascular repair of a abdominal aneurysm.  He follows with a vascular surgeon through [facility] for this.    2016: Mitchell returns to clinic for general follow-up.  He has been seen by both urology and radiation oncology regarding recently diagnosed prostate cancer.  He recently underwent prostate biopsy demonstrating Ken 6 prostate cancer.  He is weighing his  treatment options.  In general not as inclined toward invasive surgery.  Is considering less invasive radiation options.  Breathing overall has been good.  Does not have home blood pressure cuff.    2/23/2017: Presents for regular follow-up.  He is preferring to follow his low-grade prostate cancer via surveillance.  Planning on every 6 month PSA testing.  No new localized symptoms.  No complaints of back pain.  Breathing is been doing well.  Blood pressure well controlled since introduction of thiazide diuretic.    5/10/2017: Presents with 2 month history of bilateral hip pains and right-sided knee pain present now for approximately 2 months.  He says symptoms bother him the most when seated in his car and commuting into the [place] [employer]  He says he frequently will have to stop in between to get out of the car, stretch out his legs and then resume driving.  Now pains present with walking and activity as well.  He underwent a right-sided hip repair due to hip fracture in June 2016 with Dr. Smith.  No change in activities.  No upper arm or torso arthralgias.  Pravastatin dose increased this past year.    9/19/2017: Mitchell presents for regular follow-up.  He has been seeing an orthopedist related to his chronic polyarticular complaints.  He has seen some degree of symptom improvement with intra-articular injections in both shoulders and his knee.  Currently not exploring any future surgical options.  Still undergoing active surveillance related to his history of prostate cancer         Review of Systems   Constitutional:  No fever, No chills.    Eye:  Negative except as documented in history of present illness.    Ear/Nose/Mouth/Throat:  Negative except as documented in history of present illness.    Respiratory   Cardiovascular:  No chest pain, No palpitations, No peripheral edema, No syncope.    Gastrointestinal:  No nausea, No vomiting, No heartburn, No abdominal pain.    Genitourinary:  No dysuria, No hematuria.     Hematology/Lymphatics:  Negative except as documented in history of present illness.    Endocrine:  No excessive thirst, No polyuria.    Immunologic:  No recurrent fevers.    Musculoskeletal:  Joint pain, Decreased range of motion, No muscle pain, No trauma.    Neurologic:  Alert and oriented X4, No numbness, No tingling, No headache.       Health Status   Allergies:    Allergic Reactions (Selected)  No known allergies   Medications:  (Selected)   Prescriptions  Prescribed  Advair  mcg-21 mcg/inh inhalation aerosol: 2 puff(s), inh, bid, Instructions: rinse mouth and throat after use, # 1 EA, 11 Refill(s), Type: Maintenance, Pharmacy: [id number]  Anti-static valved spacer chamber: Anti-static valved spacer chamber, See Instructions, Instructions: use as directed with inhaler, Supply, # 1 EA, 0 Refill(s), Type: Maintenance, Pharmacy: [id number], use as directed with inhaler  Metoprolol Tartrate 50 mg oral tablet: 1 tab(s) ( 50 mg ), po, bid, # 180 tab(s), 2 Refill(s), Type: Maintenance, [facility]: [facility], 1 tab(s) po bid  ProAir HFA 90 mcg/inh inhalation aerosol: 2 puff(s), inh, qid, Instructions: use with spacer chamber, PRN: as needed for wheezing, # 1 EA, 11 Refill(s), Type: Maintenance, [facility]acy: [facility], keep on file and pt will notify when needed, 2 puff(s) inh qid,PRN:as needed for wheezing,...  hydrochlorothiazide-lisinopril 12.5 mg-20 mg oral tablet: 1 tab(s), PO, Daily, # 90 tab(s), 3 Refill(s), Type: Maintenance, [facility]: [facility], 1 tab(s) po daily  pravastatin 80 mg oral tablet: 1 tab(s) ( 80 mg ), po, daily, # 90 tab(s), 2 Refill(s), Type: Maintenance, [facility]: [facility], 1 tab(s) po daily  Documented Medications  Documented  Advil PM: 2 tab(s), po, hs, PRN: as needed for insomnia, 0 Refill(s), Type: Maintenance  Multiple Vitamins oral tablet: 1 tab(s), po, daily, 0 Refill(s), Type: Maintenance  Vitamin B12 500 mcg oral tablet: 1  tab(s) ( 500 mcg ), po, daily, 0 Refill(s), Type: Maintenance  aspirin 81 mg oral tablet: 1 tab(s) ( 81 mg ), po, daily, tab(s), 0 Refill(s), Type: Maintenance  omeprazole 20 mg oral delayed release tablet: 1 tab(s) ( 20 mg ), po, daily, 0 Refill(s), Type: Maintenance  potassium gluconate: Instructions: 4x wkly, 0 Refill(s), Type: Maintenance  zinc (as acetate) 50 mg oral capsule: 1 cap(s) ( 50 mg ), po, daily, 0 Refill(s), Type: Maintenance   Problem list:    All Problems  AAA (Abdominal Aortic Aneurysm) / ICD-9-.4 / Confirmed  Adenocarcinoma of lung / SNOMED CT 478903328 / Confirmed  [place] Esophagus / SNOMED CT 5442436839 / Confirmed  CKD (chronic kidney disease) stage 3, GFR 30-59 ml/min / SNOMED CT 4414318509 / Confirmed  COPD (chronic obstructive pulmonary disease) with acute bronchitis / SNOMED CT 25938019 / Confirmed  Closed hip fracture / SNOMED CT 330455866 / Confirmed  Closed fracture of trochanter of femur / SNOMED CT 6987834428 / Confirmed  Cardiac arrhythmia / SNOMED CT 68135822 / Confirmed  Coronary artery disease due to lipid rich plaque / SNOMED CT 7318264129 / Confirmed  Lipids abnormal / SNOMED CT 727766470 / Confirmed  HTN (Hypertension) / SNOMED CT 43224209 / Confirmed  Former Smoker / SNOMED CT [id number] / Confirmed  GERD (gastroesophageal reflux disease) / SNOMED CT 6976431225 / Confirmed  Anticoagulated / SNOMED CT 358710765 / Confirmed  Oropharyngeal cancer / SNOMED CT 981704766 / Confirmed  DJD (Degenerative Joint Disease) / SNOMED CT 8615673048 / Confirmed  Prostate cancer / SNOMED CT 848120091 / Confirmed  H/O unilateral nephrectomy / SNOMED CT 927797381 / Confirmed  Resolved: *Hospitalized@[facility] - Atypical chest pain  Resolved: *Hospitalized@[facility] - Hip fracture  Resolved: *Hospitalized@[facility] Septic hip vs hematoma  Resolved: History of sepsis / SNOMED CT 7769740877  Canceled: Solitary kidney / SNOMED CT 407224749  Canceled: Solitary kidney / SNOMED CT  998536537      Histories   Past Medical History:    Active  Oropharyngeal cancer (372986511): Onset on 1/1/1994 at 48 years.  Coronary artery disease due to lipid rich plaque (1644985639)  GERD (gastroesophageal reflux disease) (9333292830)  Cardiac arrhythmia (64352707)  CKD (chronic kidney disease) stage 3, GFR 30-59 ml/min (8818193629)  Resolved  *Hospitalized@[facility] Septic hip vs hematoma: Onset on 7/3/2016 at 70 years.  Resolved on 7/7/2016 at 70 years.  History of sepsis (9500632405): Onset on 7/3/2016 at 70 years.  Resolved.  Comments:  7/25/2016 CDT 2:14 PM CDT - Alma Barfield  Severe; due to septic hip.    7/25/2016 CDT 3:37 PM CDT - Alma Barfield  Sepsis organ failure: Acute renal failure.  *Hospitalized@[facility] Hip fracture: Onset on 6/21/2016 at 70 years.  Resolved on 6/24/2016 at 70 years.  *Hospitalized@[facility] - Atypical chest pain: Onset on 5/16/2015 at 69 years.  Resolved on 5/16/2015 at 69 years.   Family History:    Lupus  Sister     Procedure history:    Colonoscopy (436669397) on 3/21/2017 at 71 Years.  Comments:  3/22/2017 2:33 PM - Filippo Barbour MD  Indication: Adenomatous Polyps  Sedation: MAC  Findings: Adenomatous polyp cecum, hemorrohids  Rec: Repeat in 5 years  Right Hip Bipoloar Hemiarthroplasty on 6/22/2016 at 70 Years.  Comments:  6/28/2016 7:26 AM - Debby Faustin CMA  Right displasced femoral neck fracture  right thoracotomy on 11/15/2015 at 70 Years.  Comments:  11/17/2015 9:07 AM - Debby Faustin CMA  wedge resection right upper lung nodule, wedge resection right middle lobe lung nodule, Mediastinal lymph node dissection  Colonoscopy (881964120) on 5/12/2014 at 68 Years.  Comments:  5/15/2014 10:56 AM - Livier Stallings RN  Sedation: midazolam, fentanyl  Indication: screen  5-6mm tubular adenom x3, 8mm tubular adenoma x1, 12mm tubular adenoma with advance adenoma due to size  Repeat in 3 years.  nepherectomy in the month of 3/2009 at 63 Years.  Lumbar discectomy in 2006 at 61  Years.  Left salivary gland removal in 1995 at 50 Years.  Oral surgery in 1994 at 49 Years.  Aortic aneurysm, abdominal ([id number]).   Social History:        Alcohol Assessment            Current, 1-2 times per week      Tobacco Assessment            Past, 20 per day.  49 year(s).      Substance Abuse Assessment            Never      Employment and Education Assessment            Employed, Work/School description: [employer] .      Home and Environment Assessment            Marital status: .  Spouse/Partner name: Darlene Chau.      Nutrition and Health Assessment            Type of diet: Regular.      Exercise and Physical Activity Assessment            Exercise frequency: Never.      Sexual Assessment            Sexually active: Yes.        Physical Examination   vital signs stable, as noted above   Vital Signs   9/19/2017 7:59 AM CDT Temperature Tympanic 96 DegF  LOW    Peripheral Pulse Rate 56 bpm  LOW    Systolic Blood Pressure 130 mmHg    Diastolic Blood Pressure 80 mmHg    Mean Arterial Pressure 97 mmHg    BP Site Right arm      Measurements from flowsheet : Measurements   9/19/2017 7:59 AM CDT    Weight Measured - Standard                186.4 lb     General:  Alert and oriented, No acute distress.    Eye:  Extraocular movements are intact.    HENT:  Normocephalic, Oral mucosa is moist, No pharyngeal erythema.    Neck:  Supple, Previous right sided neck dissection with flap.    Respiratory:  Lungs are clear to auscultation, Respirations are non-labored, posterior thoracotomy scar.    Cardiovascular:  Normal rate, Regular rhythm, No murmur, No edema.    Gastrointestinal:  Soft.    Musculoskeletal:  Pains with interal and external rotation of both hips.  Pain to palpation along joint space line of right knee; no effusion.    Neurologic:  Alert, Oriented, Normal motor function, No focal deficits.    Cognition and Speech:  Oriented, Speech clear and coherent.    Psychiatric:   Appropriate mood & affect.       Review / Management   Results review:  Lab results   9/11/2017 8:27 AM CDT Sodium Level 135 mmol/L    Potassium Level 5.0 mmol/L    Chloride Level 99 mmol/L    CO2 Level 27 mmol/L    Glucose Level 105 mg/dL  HI    BUN 28 mg/dL  HI    Creatinine 1.31 mg/dL  HI    BUN/Creat Ratio 21    eGFR 54 mL/min/1.73m2  LOW    eGFR African American 63 mL/min/1.73m2    Calcium Level 10.2 mg/dL    Cholesterol 267 mg/dL  HI    Non-  HI    HDL 78 mg/dL    Chol/HDL Ratio 3.4      HI    Triglyceride 191 mg/dL  HI    PSA 11.9 ng/mL  HI   .       Impression and Plan   Diagnosis     AAA (Abdominal Aortic Aneurysm) (RRC10-HF I71.4).     Adenocarcinoma of lung (FVG93-EU C34.90).     COPD (chronic obstructive pulmonary disease) with acute bronchitis (KKI90-IF J44.1).     CKD (chronic kidney disease) stage 3, GFR 30-59 ml/min (DLR59-QT N18.3).     Prostate cancer (NLG79-ZY C61).       .) polyarthritis pains  - lmited benefit with NSAIDs and APAP  s/p right MIRNA after hip fracture from fall (6/2016)   - normal DEXA (7/2015)  - doubt inflammatory arthritis; doubt polymyalgia rheumatica given no upper extremity involvement  - working with Ortho - lasting benefits from intra-articular injections    .) prostate Ca   - original prostate biopsy in '11, repeat biopsy in '16; Harrison 6   - watchful surveillance; q6 month PSA    .) s/p right wedge resection of limited stage adenocarcinoma of lung (11/2015)    .) COPD   - on Advair 115/21mcg BID with noticeable improvement in breathing   - albuterol on rare occasions   - has not participated in pulmonary rehab    .) post-operative LEILA/ CKD (11/2015), baseline SCr 1.3 (previous left nephrectomy for benign hemangioma)     .) hypertension; controlled  current antihypertensive regimen: lisinopril/hctz 20/12.5mg daily, metoprolol 50mg BID  regimen changes: none  intolerance:  future titration/work-up plan:    - SBP <140 goal    .) HLPD   - encouraged him to take  statin as prescribed    .) AAA; previous endovascular repair   - following with [facility] vascular surgery; Dr. Lazo    .) health maintenance   - CRC due in '22   - q6 month PSA   - Prevacid 30mg BID   - enrolled in [facility]    RTC in 6 months

## 2022-02-16 NOTE — CARE COORDINATION
ACTION PLAN   Goal(s):     Today s Date:      5/15/17                                                                             Resources provided:      Potential Challenges:       HCP Plan to manage CC Follow-Up Notes Goal completion date   polyarthritis pains  - lmited benefit with NSAIDs and APAP  s/p right MIRNA after hip fracture from fall (6/2016)   - normal DEXA (7/2015)  - doubt inflammatory arthritis; doubt polymyalgia rheumatica given no upper extremity involvement  - advised to trial off pravastatin for ~ 2 week trial; if substantial improvement in symptoms, should contact clinic for further instructions.  If no change in symptoms, I want to resume statin  - referral to Ortho for further assessment   (05/15/17) CC LMTCB at 1204pm      HCP Plan to manage CC Follow-Up Notes Goal completion date   rostate Ca   - original prostate biopsy in '11, repeat earlier this fall; Ken 6   - watchful surveillance; q6 month PSA         HCP Plan to manage CC Follow-Up Notes Goal completion date   s/p right wedge resection of limited stage adenocarcinoma of lung (11/2015)         HCP Plan to manage CC Follow-Up Notes Goal completion date   COPD   - on Advair 115/21mcg BID with noticeable improvement in breathing   - albuterol on rare occasions   - has not participated in pulmonary rehab             HCP Plan to manage CC Follow-Up Notes Goal completion date   post-operative LEILA/ CKD (11/2015), baseline SCr 1.3 (previous left nephrectomy for benign hemangioma)           HCP Plan to manage CC Follow-Up Notes Goal completion date   hypertension; controlled  current antihypertensive regimen: lisinopril/hctz 20/12.5mg daily, metoprolol 50mg BID  regimen changes: none  intolerance:  future titration/work-up plan:    - SBP <140 goal   - interval rise in SCr; recheck today           HCP Plan to manage CC Follow-Up Notes Goal completion date   AAA; previous endovascular repair   - following with [facility] vascular surgery; Dr. Lazo            HCP Plan to manage CC Follow-Up Notes Goal completion date          Health Maintenance   - CRC due in '17; arrange now; no anticipated future screening after this coming endoscopy   - q6 month PSA   - Prevacid 30mg BID   - enrolled in [facility]    Medications          *denotes recorded medication          Anti-static valved spacer chamber: See Instructions, use as directed with inhaler, 1 EA, 0 Refill(s).          ProAir HFA 90 mcg/inh inhalation aerosol: 2 puff(s), inh, qid, use with spacer chamber, PRN: as needed for wheezing, 1 EA, 11 Refill(s).          *aspirin 81 mg oral tablet: 81 mg, 1 tab(s), po, daily, tab(s).          *Vitamin B12 500 mcg oral tablet: 500 mcg, 1 tab(s), po, daily, 0 Refill(s).          *Advil PM: 2 tab(s), po, hs, PRN: as needed for insomnia, 0 Refill(s).          Advair  mcg-21 mcg/inh inhalation aerosol: 2 puff(s), inh, bid, for 30 day(s), rinse mouth and throat after use, 1 EA, 11 Refill(s).          hydrochlorothiazide-lisinopril 12.5 mg-20 mg oral tablet: 1 tab(s), PO, Daily, 90 tab(s), 3 Refill(s).          lansoprazole 30 mg oral delayed release capsule: 30 mg, 1 cap(s), PO, bid, 180 cap(s), 3 Refill(s).          Metoprolol Tartrate 50 mg oral tablet: 50 mg, 1 tab(s), po, bid, 180 tab(s), 2 Refill(s).          *Multiple Vitamins oral tablet: 1 tab(s), po, daily, 0 Refill(s).          pravastatin 80 mg oral tablet: 80 mg, 1 tab(s), po, daily, 90 tab(s), 2 Refill(s).          *zinc (as acetate) 50 mg oral capsule: 50 mg, 1 cap(s), po, daily, 0 Refill(s).    Allergies          No known allergies    Problems          Oropharyngeal cancer          AAA (Abdominal Aortic Aneurysm)          Lipids abnormal          HTN (Hypertension)          Prostate cancer          DJD (Degenerative Joint Disease)          Former Smoker          Solitary kidney          Solitary kidney          H/O unilateral nephrectomy          [place] Esophagus          COPD (chronic obstructive pulmonary  disease) with acute bronchitis          Adenocarcinoma of lung          Anticoagulated          Closed hip fracture          Coronary artery disease due to lipid rich plaque          GERD (gastroesophageal reflux disease)          Cardiac arrhythmia          CKD (chronic kidney disease) stage 3, GFR 30-59 ml/min          Closed fracture of trochanter of femur

## 2022-02-16 NOTE — TELEPHONE ENCOUNTER
---------------------  From: Trang Pozo LPN (Phone Messages Pool ([id number]_[facility]))   Sent: 12/9/2020 2:40:17 PM CST  Subject: Advair     Phone Message    PCP:   MYRA      Time of Call:  1:36pm       Person Calling:  pt  Phone number:  [phone]    Returned call at: 2:34pm    Note:   Pt LM asking for call back. Returned call and pt says he had mentioned needing his Advair refilled when he had an appt with MYRA last week. pt says he went to [facility] today and they did not have Rx. Told pt I would call [facility] and if they did not receive Rx I would resend it.    Called [facility] and they did have Rx for him and are not sure why he was told they did not.     Last office visit and reason:  12/8/20 bilateral calf pain, peripheral edema with DWG

## 2022-02-16 NOTE — NURSING NOTE
Vital Signs Entered On:  7/29/2021 6:33 PM CDT    Performed On:  7/29/2021 6:33 PM CDT by Debby Faustin CMA               Vital Signs   Systolic Blood Pressure :   136 mmHg (HI)    Diastolic Blood Pressure :   70 mmHg   Mean Arterial Pressure :   92 mmHg   Debby Faustin CMA - 7/29/2021 6:33 PM CDT

## 2022-02-16 NOTE — PROGRESS NOTES
Patient:   CELIA LUTZ            MRN: [id number]            FIN: [id number]               Age:   73 years     Sex:  Male     :  1945   Associated Diagnoses:   AAA (Abdominal Aortic Aneurysm); Adenocarcinoma of lung; COPD (chronic obstructive pulmonary disease) with acute bronchitis; Chronic kidney disease (CKD), stage III (moderate); Prostate cancer   Author:   Yong Boyd MD      Visit Information      Date of Service: 10/25/2018 04:44 pm  Performing Location: [facility]  Encounter#: [id number]      Primary Care Provider (PCP):  Yong Boyd MD    NPI# [id number]      Referring Provider:  Carlos Guerrero MD    NPI# [id number]      Chief Complaint   10/25/2018 4:59 PM CDT   6 mo check              Additional Information:No additional information recorded during visit.   Chief complaint and symptoms as noted above and confirmed with patient.  Recent lab and diagnostic studies reviewed with patient      History of Present Illness   2015: Mitchell presents to clinic for hospital follow-up.  He recently underwent right sided wedge resection of an isolated pulmonary nodule.  Biopsy returning as adenocarcinoma of the lung.  Postoperative course complicated by an episode of both acute kidney injury and rectal bleeding felt to be consistent with ischemic colitis.  His creatinine on discharge was at his baseline of 1.3.  His lisinopril was held during his episode of acute kidney injury, resumed upon discharge.  He is scheduled to see his thoracic surgeon, Dr. Velazquez, tomorrow.  He also has a history of previous endovascular repair of a abdominal aneurysm.  He follows with a vascular surgeon through [facility] for this.    2018: Mitchell presenting for regular follow-up.  Brings up concerns related to worsening varicosities affecting right foot and ankle.  At times are painful.  More swollen than he has ever remembered.  Has tried to wear an ace wrap to help with degree of swelling.  Last  urology follow-up in late 2016.  We discussed his continued rise in PSA levels.  No back pain complaints.  No change in lower urinary tract symptoms.  Breathing is relatively stable.  He shares that his youngest sister recently passed away related to advanced stage breast cancer.    10/25/2018: Mitchell returns for general follow-up.  Overall feeling well.  His wife recently underwent rotator cuff surgery and has been undergoing rehab.  No interval changes in health since her last visit.  He has yet to schedule with interventional vein specialist.  Did have a MRI of prostate after last visit.  Not currently following with urology.         Review of Systems   Constitutional:  No fever, No chills.    Eye:  Negative except as documented in history of present illness.    Ear/Nose/Mouth/Throat:  Negative except as documented in history of present illness.    Respiratory   Cardiovascular:  No chest pain, No palpitations, No peripheral edema, No syncope.    Gastrointestinal:  No nausea, No vomiting, No heartburn, No abdominal pain.    Genitourinary:  No dysuria, No hematuria.    Hematology/Lymphatics:  Negative except as documented in history of present illness.    Endocrine:  No excessive thirst, No polyuria.    Immunologic:  No recurrent fevers.    Musculoskeletal:  Joint pain, Decreased range of motion, No muscle pain, No trauma.    Neurologic:  Alert and oriented X4, No numbness, No tingling, No headache.       Health Status   Allergies:    Allergic Reactions (Selected)  No known allergies   Medications:  (Selected)   Prescriptions  Prescribed  Advair  mcg-21 mcg/inh inhalation aerosol: See Instructions, Instructions: INHALE 2 PUFFS BY MOUTH TWICE DAILY. RINSE MOUTH AND THROAT AFTER USE, # 12 inhalers, 6 Refill(s), Type: Soft Stop, Pharmacy[facility]  Metoprolol Tartrate 50 mg oral tablet: 1 tab(s) ( 50 mg ), po, bid, # 180 tab(s), 1 Refill(s), Type: Maintenance, Pharmacy: [facility], keep on  file and pt will notify when needed, 1 tab(s) po bid  Proventil HFA 90 mcg/inh inhalation aerosol: 2 puff(s), INH, q4hr (int), Instructions: prn SOB/cough  use with spacer chamber, # 1 EA, 3 Refill(s), Type: Maintenance, [id number], 2 puff(s) inh q4 hrs,Instr:prn SOB/cough; use with spacer chamber  hydrochlorothiazide-lisinopril 12.5 mg-20 mg oral tablet: 1 tab(s), PO, Daily, # 90 tab(s), 1 Refill(s), Type: Maintenance, Pharmacy: [facility] [id number] [facility], keep on file and pt will notify when needed, 1 tab(s) po daily  pravastatin 80 mg oral tablet: 1 tab(s) ( 80 mg ), po, daily, # 90 tab(s), 1 Refill(s), Type: Maintenance, Pharmacy: [facility] [id number] [facility], keep on file and pt will notify when needed, 1 tab(s) po daily  spacer for inhaler: spacer for inhaler, See Instructions, Instructions: use with albuterol inhaler, Supply, # 1 EA, 0 Refill(s), Type: Maintenance, [id number], use with albuterol inhaler  Documented Medications  Documented  Advil PM: 2 tab(s), po, hs, PRN: as needed for insomnia, 0 Refill(s), Type: Maintenance  Flonase 50 mcg/inh nasal spray: 1 spray(s), nasal, daily, 0 Refill(s), Type: Maintenance  Multiple Vitamins oral tablet: 1 tab(s), po, daily, 0 Refill(s), Type: Maintenance  Vitamin B12 500 mcg oral tablet: 1 tab(s) ( 500 mcg ), po, daily, 0 Refill(s), Type: Maintenance  aspirin 81 mg oral tablet: 1 tab(s) ( 81 mg ), po, daily, tab(s), 0 Refill(s), Type: Maintenance  magnesium oxide 250 mg oral tablet: 1 tab(s) ( 250 mg ), po, daily, 0 Refill(s), Type: Maintenance  omeprazole 20 mg oral delayed release tablet: 1 tab(s) ( 20 mg ), po, daily, 0 Refill(s), Type: Maintenance  potassium gluconate: ( 99 mg ), daily, 0 Refill(s), Type: Maintenance   Problem list:    All Problems  AAA (abdominal aortic aneurysm) / SNOMED CT 269893874 / Confirmed  Adenocarcinoma of lung / SNOMED CT 806171710 / Confirmed  [place] Esophagus / SNOMED CT 6106154225 / Confirmed  CKD (chronic  kidney disease) stage 3, GFR 30-59 ml/min / SNOMED CT 9820960074 / Confirmed  COPD (chronic obstructive pulmonary disease) with acute bronchitis / SNOMED CT 79713905 / Confirmed  Closed hip fracture / SNOMED CT 060906248 / Confirmed  Closed fracture of trochanter of femur / SNOMED CT 4972626668 / Confirmed  Cardiac arrhythmia / SNOMED CT 88558240 / Confirmed  Coronary artery disease due to lipid rich plaque / SNOMED CT 8416373057 / Confirmed  Lipids abnormal / SNOMED CT 093737571 / Confirmed  HTN (Hypertension) / SNOMED CT 96129762 / Confirmed  Former Smoker / SNOMED CT [id number] / Confirmed  GERD (gastroesophageal reflux disease) / SNOMED CT 5749241955 / Confirmed  Anticoagulated / SNOMED CT 843545800 / Confirmed  History of oropharyngeal cancer / SNOMED CT 6694330839 / Confirmed  DJD (Degenerative Joint Disease) / SNOMED CT 3581164037 / Confirmed  Prostate cancer / SNOMED CT 057905035 / Confirmed  H/O unilateral nephrectomy / SNOMED CT 903086889 / Confirmed  Resolved: *Hospitalized@[facility] - Atypical chest pain  Resolved: *Hospitalized@[facility] Hip fracture  Resolved: *Hospitalized@[facility] Septic hip vs hematoma  Resolved: History of sepsis / SNOMED CT 5013228197  Canceled: Oropharyngeal cancer / SNOMED CT 183969017  Canceled: Solitary kidney / SNOMED CT 806409045  Canceled: Solitary kidney / SNOMED CT 910902677      Histories   Past Medical History:    Active  AAA (abdominal aortic aneurysm) (609100089)  Coronary artery disease due to lipid rich plaque (3740938657)  GERD (gastroesophageal reflux disease) (6300251423)  Cardiac arrhythmia (09686664)  CKD (chronic kidney disease) stage 3, GFR 30-59 ml/min (7916074650)  Resolved  *Hospitalized@[facility] Septic hip vs hematoma: Onset on 7/3/2016 at 70 years.  Resolved on 7/7/2016 at 70 years.  History of sepsis (9102099552): Onset on 7/3/2016 at 70 years.  Resolved.  Comments:  7/25/2016 CDT 2:14 PM CDT - Simon , Lorri Severe; due to septic  hip.    2016 CDT 3:37 PM CDT - Alma Barfield  Sepsis organ failure: Acute renal failure.  *Hospitalized@[facility] - Hip fracture: Onset on 2016 at 70 years.  Resolved on 2016 at 70 years.  *Hospitalized@[facility] - Atypical chest pain: Onset on 2015 at 69 years.  Resolved on 2015 at 69 years.   Family History:    CA - Breast cancer  Sister (Alexa, )  Lupus  Sister (Rebecca)  Alcohol abuse  Sister (Alexa, )  Mother ()  SMA type III  Grandfather (M)  Obese  Sister (Sujey, )     Procedure history:    Colonoscopy (185520397) on 3/21/2017 at 71 Years.  Comments:  3/22/2017 2:33 PM - Filippo Barbour MD  Indication: Adenomatous Polyps  Sedation: MAC  Findings: Adenomatous polyp cecum, hemorrohids  Rec: Repeat in 5 years  Right Hip Bipoloar Hemiarthroplasty on 2016 at 70 Years.  Comments:  2016 7:26 AM - Debby Faustin CMA  Right displasced femoral neck fracture  right thoracotomy on 11/15/2015 at 70 Years.  Comments:  2015 9:07 AM - Debby Faustin CMA  wedge resection right upper lung nodule, wedge resection right middle lobe lung nodule, Mediastinal lymph node dissection  Colonoscopy (162248800) on 2014 at 68 Years.  Comments:  5/15/2014 10:56 AM - Livier Stallings RN  Sedation: midazolam, fentanyl  Indication: screen  5-6mm tubular adenom x3, 8mm tubular adenoma x1, 12mm tubular adenoma with advance adenoma due to size  Repeat in 3 years.  nepherectomy in the month of 3/2009 at 63 Years.  Lumbar discectomy in  at 61 Years.  Left salivary gland removal in  at 50 Years.  Oral surgery in  at 49 Years.  Aortic aneurysm, abdominal ([id number]).   Social History:        Alcohol Assessment            Current, 1-2 times per week      Tobacco Assessment            Past, 20 per day.  49 year(s).      Substance Abuse Assessment            Never      Employment and Education Assessment            Employed, Work/School description:  Print .      Home and Environment Assessment            Marital status: .  Spouse/Partner name: Darlene Chau.      Nutrition and Health Assessment            Type of diet: Regular.      Exercise and Physical Activity Assessment            Exercise frequency: Never.      Sexual Assessment            Sexually active: Yes.        Physical Examination   vital signs stable, as noted above   Vital Signs   10/25/2018 4:59 PM CDT Temperature Tympanic 97 DegF  LOW    Peripheral Pulse Rate 72 bpm    Systolic Blood Pressure 138 mmHg  HI    Diastolic Blood Pressure 78 mmHg    Mean Arterial Pressure 98 mmHg    BP Site Right arm      Measurements from flowsheet : Measurements   10/25/2018 4:59 PM CDT   Weight Measured - Standard                192.8 lb     General:  Alert and oriented, No acute distress.    Eye:  Extraocular movements are intact.    HENT:  Normocephalic, Oral mucosa is moist, No pharyngeal erythema, dentures.    Neck:  Supple, Previous right sided neck dissection with flap.    Respiratory:  Lungs are clear to auscultation, Respirations are non-labored, posterior thoracotomy scar.    Cardiovascular:  Normal rate, Regular rhythm, No murmur, No edema.    Gastrointestinal:  Soft.    Lymphatics:  right ankle/pretibial varicosities; soft to touch.    Neurologic:  Alert, Oriented, Normal motor function, No focal deficits.    Cognition and Speech:  Oriented, Speech clear and coherent.    Psychiatric:  Appropriate mood & affect.       Review / Management   Results review:  Lab results   10/15/2018 5:14 PM CDT Sodium Level 137 mmol/L    Potassium Level 5.3 mmol/L    Chloride Level 100 mmol/L    CO2 Level 31 mmol/L    Glucose Level 96 mg/dL    BUN 23 mg/dL    Creatinine 1.41 mg/dL  HI    BUN/Creat Ratio 16    eGFR 49 mL/min/1.73m2  LOW    eGFR African American 56 mL/min/1.73m2  LOW    Calcium Level 9.8 mg/dL    Calcium Level 9.8 mg/dL    Phosphorus Level 3.3 mg/dL    PTH Intact 26 pg/mL    Alk Phos 94 unit/L     PSA 10.8 ng/mL  HI    U Protein 16 mg/dL    U Protein/Creatinine Ratio 457  HI    Ur Creatinine 35 mg/dL    WBC 6.4    RBC 3.84  LOW    Hgb 11.4 gm/dL  LOW    Hct 33.9 %  LOW    MCV 88.3 fL    MCH 29.7 pg    MCHC 33.6 gm/dL    RDW 14.6 %    Platelet 186    MPV 9.6 fL   .       Impression and Plan   Diagnosis     AAA (Abdominal Aortic Aneurysm) (BIN23-FT I71.4).     Adenocarcinoma of lung (RDK02-FY C34.90).     COPD (chronic obstructive pulmonary disease) with acute bronchitis (CRK41-IE J44.1).     Chronic kidney disease (CKD), stage III (moderate) (JLP56-HC N18.3).     Prostate cancer (ZWC77-CV C61).       .) polyarthritis pains  - lmited benefit with NSAIDs and APAP  s/p right MIRNA after hip fracture from fall (6/2016)   - normal DEXA (7/2015)  - doubt inflammatory arthritis; doubt polymyalgia rheumatica given no upper extremity involvement  - working with Ortho - lasting benefits from intra-articular injections    .) prostate Ca   - original prostate biopsy in '11, repeat biopsy in '16; [place] [facility]   - watchful surveillance; q6 month PSA; last PSA 10.8   - last seen by Urology in Fall, 2016   - MRI of prostate (4/2018): focal coarse calcifications in prostate; no evidence of extra-prostate extension    .) 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    .) s/p right wedge resection of limited stage adenocarcinoma of lung (11/2015)    .) COPD   - on Advair 115/21mcg BID with noticeable improvement in breathing   - albuterol on rare occasions   - has not participated in pulmonary rehab   - consider repeat PFTs at next visit    .) lower extremity vein disease with varicosities   - venous duplex study (4/2018): no DVT   - referral to interventional vein specialist    .) post-operative LEILA/ CKD (11/2015), baseline SCr 1.3-1.5 (previous left nephrectomy for benign hemangioma)     .) hypertension; controlled  current antihypertensive regimen: lisinopril/hctz 20/12.5mg daily, metoprolol 50mg BID  regimen changes:  none  intolerance:  future titration/work-up plan:    - SBP <140 goal    .) HLPD   - pravastatin 80mg qhs    .) AAA; previous endovascular repair   - following with [facility] vascular surgery; Dr. Lazo    .) health maintenance   - CRC due in '22   - q6 month PSA   - Prevacid 30mg BID   - enrolled in [facility]    RTC in 6 months

## 2022-02-16 NOTE — TELEPHONE ENCOUNTER
---------------------  From: Carmen Núñez CMA (Phone Messages Pool ([id number]_[facility]))   Sent: 10/19/2020 1:51:05 PM CDT  Subject: Phone Message     Phone Message    PCP:   MYRA      Time of Call:  1242       Person Calling:  Pt  Phone number:  [phone]    Returned call at: 1347    Note:   Called left a message that he wants a call back. Called back call was picked up then hung up right away tried two time same thing happened.    Last office visit and reason:  10/12/20 Anemia KAH

## 2022-02-16 NOTE — LETTER
(Inserted Image. Unable to display)       August 27, 2019      CELIA NELDA  [address]  [place], [place] [id number]          Dear CELIA,      Thank you for selecting [facility] for your healthcare needs.     Our records indicate you are due for the following services:     Clinical Support Staff (CSS) Only   Blood Pressure Check ~ Please stop in anytime to have your blood pressure rechecked. This is a free service and no appointment is necessary.    So we can best determine if your medications are effective in lowering your blood pressure, please make sure your blood pressure medicine has been in your system for at least 1-2 hours prior to coming in.  We encourage you to avoid caffeine or other stimulants prior to having your blood pressure checked and come at a time when you are not feeling rushed.     If you check your blood pressure at home, please bring in your blood pressure monitor and home blood pressure readings.  We will check your machine for accuracy and also share your home readings with your Healthcare Provider.    To schedule an appointment or if you have further questions, please contact your primary clinic:   [facility]       [phone]   [facility]       [phone]              [facility]     [phone]    Powered by [email or web address]    Sincerely,    Yong Boyd MD

## 2022-02-16 NOTE — LETTER
(Inserted Image. Unable to display)   October 02, 2021    CELIA LUTZ  [address]  [place] [address]            Dear CELIA,      Thank you for selecting [facility] for your healthcare needs.    Our records indicate you are due for the following services:     Hypertension check ~ please remember to bring your at-home blood pressure readings with you to your appointment.     (FYI   Regarding office visits: In some instances, a video visit or telephone visit may be offered as an option.)      To schedule an appointment or if you have further questions, please contact your clinic at [phone].      Powered by [employer]    Sincerely,    Yong Boyd MD

## 2022-02-16 NOTE — PROGRESS NOTES
Patient:   CELIA LUTZ            MRN: [id number]            FIN: [id number]               Age:   73 years     Sex:  Male     :  1945   Associated Diagnoses:   Block, trifascicular   Author:   Yong Boyd MD      Procedure   EKG procedure   Indication: pre-op.     Position: supine.     EKG findings   Interpretation: by primary care provider.     Rhythm: heart rate  81  beats/min, sinus normal.     Axis: left axis deviation.     Within normal limits.     Intervals: WA, QT normal, 1st degree AV block, RBBB, LAFB.     Normal EKG.     P waves: normal.     QRS complex: normal, no Q waves present.     ST-T-U complex: normal.     Interpretation: no ST-T wave abnormalities.     Discussed: with patient.        Impression and Plan   Diagnosis     Block, trifascicular (NXE93-BQ I45.3).     - asymptomatic      Orders

## 2022-02-16 NOTE — TELEPHONE ENCOUNTER
---------------------  From: Altagracia Blank MA (Phone Messages Pool ([id number]_[facility]))   To: MYRA Message Pool (32224_[place] [facility] [place]);     Sent: 10/15/2020 4:16:45 PM CDT  Subject: Return call     Phone Message    PCP:   MYRA      Time of Call:  1601       Person Calling:  pt  Phone number:  [phone]    Reason for call:  pt is wanting to speak w/ Debby re: earlier message  Returned call at: _    Note:   _    Last office visit and reason:  _    Transferred to: _done

## 2022-02-16 NOTE — TELEPHONE ENCOUNTER
---------------------  From: Be GRAYSON, Hannah BHAT (Phone Messages Pool ([id number]_[facility]))   To: Wiley Cottrell , Sara;     Sent: 2/11/2021 2:50:43 PM CST  Subject: General Message     Phone message    PCP:   MYRA      Time of Call:  1432       Person Calling:  Pt  Phone number:  [phone]    Returned call at: _    Note:   Pt LVM stating he needs to speak with PharmD because he needs clarification with differences in what he has been told and what he received in the mail.    Please advise.    Last office visit and reason:  _Called to pt. He wanted to inform that he has both medicare and medica insurance. okay - yes, already noted in his chart.  Inquired about rosuvastatin dose; he used to be on 40 mg and Rx was sent for 10 mg. He wants to know if should take 4 tabs.  No, only take 1 tab daily. Dosing at 10 mg/day d/t current renal function.  Pt questions all answered.  KB

## 2022-02-16 NOTE — NURSING NOTE
Pt appears on [facility] chronic disease list out of parameters for elevated bp- placed RTC today for CSS bp check in 2 weeks.

## 2022-02-16 NOTE — TELEPHONE ENCOUNTER
Entered by Archana Levy CMA on November 17, 2020 7:45:33 AM CST  ---------------------  From: Archana Levy CMA   To: [facility] [id number] [facility]    Sent: 11/17/2020 7:45:33 AM CST  Subject: Medication Management     ** Submitted: **  Order:fluticasone-salmeterol (Advair Diskus 500 mcg-50 mcg inhalation powder)  1 puff  Inhale  bid  Qty:  1 EA        Refills:  0          Substitutions Allowed     [id number]    Signed by Archana Levy CMA  11/17/2020 1:44:00 PM [facility]    ** Submitted: **  Complete:fluticasone-salmeterol (Advair Diskus 500 mcg-50 mcg inhalation powder)   Signed by Archana Levy CMA  11/17/2020 1:45:00 PM [facility]    ** Not Approved:  **  fluticasone-salmeterol (ADVAIR 500-50 DISKUS)  INHALE 1 PUFF TWICE A DAY  Qty:  180 unknown unit        Days Supply:  90        Refills:  0          Substitutions Allowed     [id number]   Signed by Archana Levy CMA            ------------------------------------------  From: [facility] [address] [facility]  To: Yong Boyd MD  Sent: November 15, 2020 3:30:45 PM CST  Subject: Medication Management  Due: November 7, 2020 12:21:51 AM CST     ** On Hold Pending Signature **     Dispensed Drug: fluticasone-salmeterol (Advair Diskus 500 mcg-50 mcg inhalation powder), INHALE 1 PUFF TWICE A DAY  Quantity: 180 unknown unit  Days Supply: 90  Refills: 0  Substitutions Allowed  Notes from Pharmacy:  ------------------------------------------Reminder letter sent yesterday. Due for med check

## 2022-02-16 NOTE — TELEPHONE ENCOUNTER
Entered by Chelsea Olmstead CMA on August 11, 2020 8:27:16 AM CDT  PCP:   MYRA    Medication:   Advair  Last Filled:  5/8/2020    Quantity: 60 EA  Refills:  2    Date of last office visit and reason:   7/17/2020 mouth sore  Date of last labs pertaining to condition:  n/a    Note:  Please advise on RTC and refill    Return to Clinic order placed?  lab only 09/2020    Resource:   eRx pool  Phone:   [phone]      ------------------------------------------  From: [id number]  To: Yong Boyd MD  Sent: August 11, 2020 12:37:36 AM CDT  Subject: Medication Management  Due: July 28, 2020 10:28:08 PM CDT     ** On Hold Pending Signature **     Dispensed Drug: fluticasone-salmeterol (Advair Diskus 500 mcg-50 mcg inhalation powder), INHALE 1 PUFF TWICE A DAY  Quantity: 180 unknown unit  Days Supply: 90  Refills: 0  Substitutions Allowed  Notes from Pharmacy:  ---------------------------------------------------------------  From: Chelsea Olmstead CMA (eRx Pool (32224_[facility]))   To: MYRA Message Pool (32224_[place] [facility] [place]);     Sent: 8/11/2020 8:27:28 AM CDT  Subject: FW: Medication Management   Due Date/Time: 8/12/2020 12:37:00 AM CDT---------------------  From: Debby Faustin CMA   To: [id number]    Sent: 8/11/2020 2:49:27 PM CDT  Subject: FW: Medication Management     ** Submitted: **  Order:fluticasone-salmeterol (Advair Diskus 500 mcg-50 mcg inhalation powder)  See Instructions  INHALE 1 PUFF TWICE A DAY  Qty:  180 unknown unit        Days Supply:  90        Refills:  0          Substitutions Allowed     [id number]    Signed by Debby Faustin CMA  8/11/2020 7:49:00 PM [facility]    ** Submitted: **  Complete:fluticasone-salmeterol (Advair Diskus 500 mcg-50 mcg inhalation powder)   Signed by Debby Faustin CMA  8/11/2020 7:49:00 PM [facility]    ** Not Approved:  **  fluticasone-salmeterol (ADVAIR 500-50 DISKUS)  INHALE 1 PUFF TWICE A DAY  Qty:  180 unknown unit        Days  Supply:  90        Refills:  0          Substitutions Allowed     [facility]   Signed by Ryder Faustin CMA placed for Sept

## 2022-02-16 NOTE — NURSING NOTE
COPD assessment test (CAT):  1. I never cough  0 - 1 - 2 - 3 - 4 - 5  I cough all the time = 2  2. I have no phlegm (mucus) in my chest  0 - 1 - 2 - 3 - 4 - 5  My chest is completely full of phlegm (mucus) = 3  3. My chest does not feel tight at all   0 - 1 - 2 - 3 - 4 - 5  my chest feels very tight = 0  4.  When I walk up a hill or one flight of stairs I am not breathless   0 - 1 - 2 - 3 - 4 - 5  when I walk up a hill or one flight of stairs I am very breathless = 3  5.  I am not limited in doing any activities at home  0 - 1 - 2 - 3 - 4 - 5  I am very limited in doing activities at home = 3  6.  I am confident leaving my home despite my lung condition  0 - 1 - 2 - 3 - 4 - 5  I am not at all confident leaving my home because of medical condition = 0  7.  I sleep soundly  0 - 1 - 2 - 3 - 4 - 5  I do not sleep soundly because of medical condition = 3  8.  I have lots of energy  0 - 1 - 2 - 3 - 4 - 5  I have no energy at all = 3.  COPD assessment test (CAT) history:  Date: 1/19/2021 (TODAY): 17

## 2022-02-16 NOTE — LETTER
(Inserted Image. Unable to display)   December 12, 2020      CELIA LUTZ      [address]   [place], [place] [id number]        Dear CELIA,    Thank you for selecting [facility] for your healthcare needs.  Below you will find the results of your recent tests done at our clinic.     BNP (heart test) is normal      Result Name Current Result Reference Range   B-Natriuretic Peptide (BNP) (pg/mL)  58 12/11/2020  - <100       Please contact me or my assistant at [phone] if you have any questions about your results.     Sincerely,        Carlos Celis MD        What do your labs mean?  Below is a glossary of commonly ordered labs:  LDL   Bad Cholesterol   HDL   Good Cholesterol  AST/ALT   Liver Function   Cr/Creatinine   Kidney Function  Microalbumin   Kidney Function  BUN   Kidney Function  PSA   Prostate    TSH   Thyroid Hormone  HgbA1c   Diabetes Test   Hgb (Hemoglobin)   Red Blood Cells

## 2022-02-16 NOTE — TELEPHONE ENCOUNTER
"---------------------  From: Anahi Hickman   Sent: 7/20/2020 9:48:58 AM CDT  Subject: Ventolin brand only requested     FAx received from [facility] pharmacy that Pt requests brand name only from ventolin inhaler- it looks like it was sent that way, but I resent with \"AD\".  "

## 2022-02-16 NOTE — LETTER
(Inserted Image. Unable to display)   July 12, 2019      CELIA LUTZ  [address]   [place], [place] [id number]        Dear CELIA,     Thank you for selecting [facility] ([facility] for your healthcare needs. Below you will find the results of your recent test(s) done at our clinic.      Pre-op labs look stable.      Result Name Current Result Previous Result Reference Range   Sodium Level (mmol/L)  137 7/11/2019  137 5/6/2019 135 - 146   Potassium Level (mmol/L)  5.2 7/11/2019  5.2 5/6/2019 3.5 - 5.3   Chloride Level (mmol/L)  98 7/11/2019  99 5/6/2019 98 - 110   CO2 Level (mmol/L)  31 7/11/2019  32 5/6/2019 20 - 32   Glucose Level (mg/dL)  96 7/11/2019  96 5/6/2019 65 - 99   BUN (mg/dL) ((H)) 36 7/11/2019  24 5/6/2019 7 - 25   Creatinine Level (mg/dL) ((H)) 1.72 7/11/2019 ((H)) 1.32 5/6/2019 0.70 - 1.18   BUN/Creat Ratio  21 7/11/2019  18 5/6/2019 6 - 22   eGFR (mL/min/1.73m2) ((L)) 38 7/11/2019 ((L)) 53 5/6/2019 > OR = 60 -    eGFR  (mL/min/1.73m2) ((L)) 44 7/11/2019  61 5/6/2019 > OR = 60 -    Calcium Level (mg/dL)  10.1 7/11/2019  9.9 5/6/2019 8.6 - 10.3   WBC  5.3 7/11/2019  4.3 5/6/2019 3.8 - 10.8   RBC ((L)) 3.95 7/11/2019 ((L)) 4.08 5/6/2019 4.20 - 5.80   Hgb (gm/dL) ((L)) 12.4 7/11/2019 ((L)) 12.7 5/6/2019 13.2 - 17.1   Hct (%) ((L)) 36.3 7/11/2019 ((L)) 37.0 5/6/2019 38.5 - 50.0   MCV (fL)  91.9 7/11/2019  90.7 5/6/2019 80.0 - 100.0   MCH (pg)  31.4 7/11/2019  31.1 5/6/2019 27.0 - 33.0   MCHC (gm/dL)  34.2 7/11/2019  34.3 5/6/2019 32.0 - 36.0   RDW (%)  13.2 7/11/2019  14.0 5/6/2019 11.0 - 15.0   Platelet  152 7/11/2019  177 5/6/2019 140 - 400   MPV (fL)  9.8 7/11/2019  9.3 5/6/2019 7.5 - 12.5       Please contact me or my assistant at [phone] if you have any questions or concerns.     Sincerely,        Yong Boyd MD    What do your labs mean?  Below is a glossary of commonly ordered labs:  LDL - Bad Cholesterol  HDL - Good  Cholesterol  AST/ALT - Liver Function  Cr/Creatinine - Kidney Function  Microalbumin - Kidney Function  BUN - Kidney Function  PSA - Prostate   TSH - Thyroid Hormone  HgbA1c - Diabetes Test  Hgb (Hemoglobin) - Red Blood Cells

## 2022-02-16 NOTE — PROGRESS NOTES
Patient:   CELIA LUTZ            MRN: [id number]            FIN: [id number]               Age:   71 years     Sex:  Male     :  1945   Associated Diagnoses:   Bifascicular block   Author:   Yong Boyd MD      Procedure   EKG procedure   Indication: pre-procedure.     Position: supine.     EKG findings   Interpretation: by primary care provider.     Rhythm: sinus normal.     Axis: normal axis, normal configuration.     Within normal limits.     Intervals: MS normal, QRS normal, QT normal.     Normal EKG.     P waves: normal.     QRS complex: RBBB, LAFB, no Q waves present.     ST-T-U complex: normal.     Interpretation: no ST-T wave abnormalities.     Discussed: with patient.        Impression and Plan   Diagnosis     Bifascicular block (GIH17-ZM I45.2).     Orders

## 2022-02-16 NOTE — TELEPHONE ENCOUNTER
---------------------  From: Kamila Farooq CMA   Sent: 12/10/2019 12:30:32 PM CST  Subject: CC f/u     Spoke with Mitchell, he is doing better he takes his last pill of the Levoquin today.  He asked if we could send in a refill of his lisinopril.  BRM increased it from 5mg to 10, so he took two of his Rx from [place] and will be out on Friday.  Per protocol, filled for 90 days as he will be due for f/u in 3 months.    Kamila Farooq CMA.

## 2022-02-16 NOTE — TELEPHONE ENCOUNTER
---------------------  From: Trang Pozo LPN (Phone Messages Pool ([id number]_[facility]))   To: [facility] Message Pool ([id number]);     Sent: 10/21/2020 10:13:42 AM CDT  Subject: General Message     Phone Message    PCP:   MYRA      Time of Call:  10:03am       Person Calling:  pt  Phone number:  [phone]    Note:   Pt LM asking to speak with only Debby regarding his endoscopy and cancer surgery.    Last office visit and reason:  10/20/20 pre-[facility] for a return call at 1215 for pt---------------------  From: Debby Faustin CMA ([facility] Message Pool ([id number]_[facility]))   To: Yong Boyd MD;     Sent: 10/21/2020 3:55:15 PM CDT  Subject: FW: General Message     pt wasn't able to get in for COVID testing, states both places he called were booked  called [facility] and they have cancelled his endoscopy and rescheduled for 11/13    Casey wants to know if you want him to try to get his biopsy done with [facility] in the mean time---------------------  From: Yong Boyd MD   To: [facility] Message Pool ([id number]_[place]);     Sent: 10/21/2020 5:04:03 PM CDT  Subject: RE: General Message     yes, I think he should, especially given his associated oral pains.pt contacted at 1712 and advised  asked him to reach out to [facility]'s office to reschedule (has is office #) and if difficulty to let me know or when he has something scheduled to let us know so we can fax H&PPatient calls back has had difficulty scheduling with Dr Nunez. Given his office number to try again and to let us know if he still can't get a hold of anyone.

## 2022-02-16 NOTE — NURSING NOTE
Comprehensive Intake Entered On:  12/28/2021 1:55 PM CST    Performed On:  12/28/2021 1:52 PM CST by Debby Faustin CMA               Summary   Chief Complaint :   f/u labs    Weight Measured :   192.6 lb(Converted to: 192 lb 10 oz, 87.362 kg)    Height Measured :   71 in(Converted to: 5 ft 11 in, 180.34 cm)    Body Mass Index :   26.86 kg/m2 (HI)    Body Surface Area :   2.09 m2   Systolic Blood Pressure :   127 mmHg   Diastolic Blood Pressure :   82 mmHg (HI)    Mean Arterial Pressure :   97 mmHg   Peripheral Pulse Rate :   67 bpm   Temperature Tympanic :   97 DegF(Converted to: 36.1 DegC)  (LOW)    Oxygen Saturation :   97 %   Race :      Languages :   English   Ethnicity :   Not  or    Debby Faustin CMA - 12/28/2021 1:52 PM CST   Health Status   Allergies Verified? :   Yes   Medication History Verified? :   Yes   Medical History Verified? :   Yes   Pre-Visit Planning Status :   Completed   Tobacco Use? :   Former smoker   Debby Faustin CMA - 12/28/2021 1:52 PM CST   Consents   Consent for Immunization Exchange :   Consent Granted   Consent for Immunizations to Providers :   Consent Granted   Debby Faustin CMA - 12/28/2021 1:52 PM CST   Social History   Social History   (As Of: 12/28/2021 1:55:37 PM CST)   Alcohol:  Current      Liquor (Hard) (1.5 oz), Daily, 2 drinks/episode average.   (Last Updated: 5/11/2021 10:49:59 AM CDT by Yoly Tillman)          Tobacco:        Past, 20 per day.  49 year(s).   (Last Updated: 3/17/2011 2:21:14 PM CDT by Rose Barr CMA)   Quit 12/25/2009, Cigarettes, 40 per day.  49 year(s).   (Last Updated: 5/11/2021 11:28:32 AM CDT by Yoly Tillman)          Electronic Cigarette/Vaping:        Electronic Cigarette Use: Never.   (Last Updated: 10/12/2020 1:47:53 PM CDT by Mariah Wilkes)   Electronic Cigarette Use: Never.   (Last Updated: 10/26/2021 1:49:43 PM CDT by Debby Faustin CMA)          Substance Abuse:  Denies Substance Abuse      Never   (Last Updated:  3/19/2014 10:32:10 AM CDT by Brooke Krishnan)          Employment/School:        Retired, Highest education level: High school.   (Last Updated: 10/12/2020 1:48:08 PM CDT by Mariah Wilkes)          Home/Environment:        Marital status: .  Spouse/Partner name: Darlene Chau.  4 children.  Living situation: Home/Independent.  Home equipment: Walker/Cane.  Injuries/Abuse/Neglect in household: No.  Feels unsafe at home: No.  Family/Friends available for support: Yes.   (Last Updated: 5/11/2021 11:11:14 AM CDT by Yoly Tillman)          Nutrition/Health:        Type of diet: Regular.  Wants to lose weight: No.  Sleeping concerns: No.  Feels highly stressed: No.   (Last Updated: 10/12/2020 1:48:37 PM CDT by Mariah Wilkes)          Exercise:        Exercise frequency: Occasional.   (Last Updated: 10/12/2020 1:48:50 PM CDT by Mariah Wilkes)          Sexual:        Sexually active: No.  Identifies as male, Sexual orientation: Straight or heterosexual.  Contraceptive Use Details: None.   (Last Updated: 10/12/2020 1:49:26 PM CDT by Mariah Wilkes)

## 2022-02-16 NOTE — LETTER
(Inserted Image. Unable to display)   March 03, 2021        CELIA LUTZ  [address]  [place], [place] [id number]        Dear CELIA,      Thank you for selecting [facility] for your healthcare needs.    Our records indicate you are due for the following services:    Non-Fasting Labs    If you had your labs done at another facility or with Direct Access Lab Testing at [facility], please bring in a copy of the results to your next visit, mail a copy, or drop off a copy of your results to your Healthcare Provider.  Follow-up office visit     You are due for lab work and an office visit; please schedule the lab appointment 1 week before the office visit.  This will assure all results are available to discuss with your Healthcare Provider during your visit.    (FYI   Regarding office visits: In some instances, a video visit or telephone visit may be offered as an option.)      **It is very helpful if you bring your medication bottles to your appointment.  This assures we have all of your current medications, including strength and dosing information, documented accurately in your medical record.    To schedule an appointment or if you have further questions, please contact your clinic at [phone].         Powered by [employer]    Sincerely,    Yong Boyd M.D.

## 2022-02-16 NOTE — PROGRESS NOTES
Patient:   CELIA LUTZ            MRN: [id number]            FIN: [id number]               Age:   75 years     Sex:  Male     :  1945   Associated Diagnoses:   Adenocarcinoma of lung; COPD (chronic obstructive pulmonary disease) with acute bronchitis; Prostate cancer; Acute renal failure superimposed on stage 3 chronic kidney disease; Coronary artery disease due to lipid rich plaque   Author:   Yong Boyd MD      Visit Information      Date of Service: 2021 02:31 pm  Performing Location: [facility]  Encounter#: [id number]      Primary Care Provider (PCP):  Yong Boyd MD    NPI# [id number]      Referring Provider:  Yong Boyd MD    NPI# [id number]      Chief Complaint            Additional Information:No additional information recorded during visit.   Chief complaint and symptoms as noted above and confirmed with patient.  Recent lab and diagnostic studies reviewed with patient      History of Present Illness   10/14/2020: Returns to clinic for hospital f/u.  Recent admission to [facility] for LEILA with SCr rise to 7.5 and anemia, Hgb 6.7.  Renal function steadily improved with IVFs and cessation of lisinopril.  Transfused 1 unit PRBCs and seen by GI.  Decisions for outpatient endoscopy and EUS (h/o pancreatic cyst).  Scheduled for excisional biopsy of left sided oral lesions and planned laryngeal biopsy (recent avidity on PET scan) with JVT on 10/16 - procedure since postponed.  Feeling better since hospitalization.      2020: Mitchell returns to clinic for follow-up.  Recently been seen by Dr. Celis in my clinical absence earlier this month.  Complaints of worsening lower extremity swelling.  Did trial on furosemide without any noticeable perceived effect.  Did have a normal BNP.  He did have a repeat echocardiogram which demonstrated stable overall cardiac function.  States that edema typically worsens through the day and improves overnight.  Has used compression stockings  before with some neck success.  Main complaint today is worsening claudication.  Describes walking a progressively shorter distance due to leg pain especially on the right side.  Has been established with vascular surgery in the past related to aortic aneurysm repair though has not seen in several years.  Is planning on seeing Dr. Valentine next month related to his cardiac cares.  He has remained off of dual antiplatelet therapy and rosuvastatin since his episode of acute kidney injury earlier in the fall    3/16/21:  Mitchell returns for follow-up.  Seen by vascular surgery - stable features, normal appearing arterial studies (MADELINE, toe pressures).  Persistent lower extremity edema complaints.  Completed XRT for known lung cancer.  Planning to see urology in future to discuss rising PSA - wants to avoid surgery.    5/21/2021: Mitchell follows up after recent discharge from [facility] to [facility] related to episode of rhabdomyolysis and weakness.  Unclear etiologies of his rhabdo; potentially related to fall at home and immobility versus statin.  Concerns raised about excess alcohol intake.  At [facility] soft progressive improvement in overall strength.  Now back home and feeling at baseline.  Questions about exophytic growth on his scalp.  Due to follow-up with urology about known history of prostate cancer.  7/29/2021: Mitchell returns for regular follow-up.  Overall has been doing pretty okay.  Has been working with physical therapist and Dr. Zavaleta related to recurrence of right hip pain and feels like that overall has been doing better.  Breathing has been stable otherwise.  Not monitoring blood pressures at home and substantially elevated blood pressures today here in clinic which is above his historical means.  Reviewed recent labs showing stable overall findings.         Review of Systems   Constitutional:  No fever, No chills.    Eye:  Negative except as documented in history of present illness.    Ear/Nose/Mouth/Throat:   Negative except as documented in history of present illness.    Respiratory:  Shortness of breath, No wheezing.    Cardiovascular:  No chest pain, No palpitations, No peripheral edema, No syncope.    Gastrointestinal:  No nausea, No vomiting, No heartburn, No abdominal pain.    Genitourinary:  Negative, No dysuria, No hematuria.    Hematology/Lymphatics:  No bruising tendency.    Endocrine:  No excessive thirst, No polyuria.    Immunologic:  No recurrent fevers.    Musculoskeletal:  Joint pain, Claudication, No muscle pain, No decreased range of motion, No trauma.    Integumentary:  Skin lesion.    Neurologic:  Alert and oriented X4, No numbness, No tingling, No headache.       Health Status   Allergies:    Allergic Reactions (Selected)  No Known Medication Allergies   Medications:  (Selected)   Prescriptions  Prescribed  Efudex 5% topical cream: 1 eva, Topical, bid, # 25 gm, 3 Refill(s), Type: Maintenance, Pharmacy: [facility]. [facility], 1 eva Topical bid, 71, in, 05/21/21 13:28:00 CDT, Height Measured, 204, lb, 05/21/21 13:28:00 CDT, Weight Measured  Trelegy Ellipta 200 mcg-62.5 mcg-25 mcg/inh inhalation powder: 1 puff(s), Inhale, daily, Instructions: at the same time every day. Rinse mouth after use., # 180 blister, 3 Refill(s), Type: Maintenance, please dispense 3 inhalers  Ventolin HFA 90 mcg/inh inhalation aerosol: 2 puff(s), Inhale, q6 hrs, # 3 EA, 1 Refill(s), AD, Type: Maintenance, Pharmacy: Dr. Tariff [facility], keep on file, 2 puff(s) Inhale q6 hrs, 71, in, 07/29/21 14:48:00 CDT, Height Measured, 204.9, lb, 07/29/21 14:48:00 CDT, Weight Measured  omeprazole 20 mg oral delayed release capsule: = 1 cap(s) ( 20 mg ), Oral, daily, # 90 cap(s), 3 Refill(s), Type: Maintenance, Pharmacy: [id number], 1 cap(s) Oral daily, 71, in, 12/29/20 7:45:00 CST, Height Measured, 212.4, lb, 12/29/20 7:45:00 CST, Weight Measured  spacer for inhaler: spacer for inhaler, See Instructions, Instructions: use with albuterol  inhaler, Supply, # 1 EA, 0 Refill(s), Type: Maintenance, Pharmacy: [id number] [place], use with albuterol inhaler  Documented Medications  Documented  Aspirin Enteric Coated 81 mg oral delayed release tablet: = 1 tab(s) ( 81 mg ), Oral, daily, 0 Refill(s), Type: Maintenance  Iron 100 Plus oral tablet: 1 tab(s), Oral, daily, 0 Refill(s), Type: Maintenance  Metoprolol Succinate ER 25 mg oral tablet, extended release: 0 Refill(s), Type: Soft Stop  Multiple Vitamins oral tablet: 1 tab(s), po, daily, 0 Refill(s), Type: Maintenance  acetaminophen 325 mg oral tablet: 1-2 tab(s), Oral, q6 hrs, Instructions: not to exceed 2000 mg/day, PRN: as needed for pain, 0 Refill(s), Type: Maintenance  cholecalciferol 1000 intl units oral tablet: ( 25 mcg ), Oral, daily, 0 Refill(s), Type: Maintenance  magnesium oxide 400 mg (240 mg elemental magnesium) oral tablet: 1 tab(s) ( 400 mg ), Oral, daily, 0 Refill(s), Type: Maintenance  nitroglycerin 0.4 mg sublingual tablet: = 1 tab(s) ( 0.4 mg ), SL, q5 min, PRN: for chest pain, # 100 tab(s), 0 Refill(s), Type: Maintenance  polyethylene glycol 3350: ( 17 gm ), Oral, daily, 0 Refill(s), Type: Maintenance  thiamine 100 mg oral tablet: = 1 tab(s) ( 100 mg ), Oral, daily, 0 Refill(s), Type: Maintenance  zinc (as gluconate) 50 mg oral tablet: Instructions: 1 tab by mouth daily (Pt taking OTC), 0 Refill(s), Type: Maintenance,    Medications          *denotes recorded medication          spacer for inhaler: See Instructions, use with albuterol inhaler, 1 EA, 0 Refill(s).          *acetaminophen 325 mg oral tablet: 1-2 tab(s), Oral, q6 hrs, not to exceed 2000 mg/day, PRN: as needed for pain, 0 Refill(s).          Ventolin HFA 90 mcg/inh inhalation aerosol: 2 puff(s), Inhale, q6 hrs, 3 EA, 1 Refill(s).          *Aspirin Enteric Coated 81 mg oral delayed release tablet: 81 mg, 1 tab(s), Oral, daily, 0 Refill(s).          *cholecalciferol 1000 intl units oral tablet: 25 mcg, Oral, daily, 0  Refill(s).          Efudex 5% topical cream: 1 eva, Topical, bid, 25 gm, 3 Refill(s).          Trelegy Ellipta 200 mcg-62.5 mcg-25 mcg/inh inhalation powder: 1 puff(s), Inhale, daily, at the same time every day. Rinse mouth after use., 180 blister, 3 Refill(s).          *magnesium oxide 400 mg (240 mg elemental magnesium) oral tablet: 400 mg, 1 tab(s), Oral, daily, 0 Refill(s).          *Metoprolol Succinate ER 25 mg oral tablet, extended release: 0 Refill(s).          *Multiple Vitamins oral tablet: 1 tab(s), po, daily, 0 Refill(s).          *Iron 100 Plus oral tablet: 1 tab(s), Oral, daily, 0 Refill(s).          *nitroglycerin 0.4 mg sublingual tablet: 0.4 mg, 1 tab(s), SL, q5 min, PRN: for chest pain, 100 tab(s), 0 Refill(s).          omeprazole 20 mg oral delayed release capsule: 20 mg, 1 cap(s), Oral, daily, 90 cap(s), 3 Refill(s).          *polyethylene glycol 3350: 17 gm, Oral, daily, 0 Refill(s).          *thiamine 100 mg oral tablet: 100 mg, 1 tab(s), Oral, daily, 0 Refill(s).          *zinc (as gluconate) 50 mg oral tablet: 1 tab by mouth daily (Pt taking OTC), 0 Refill(s).       Problem list:    All Problems  AAA (abdominal aortic aneurysm) / SNOMED CT 208732443 / Confirmed  Adenocarcinoma of lung / SNOMED CT 579158684 / Confirmed  Ragsdale Esophagus / SNOMED CT 2923068304 / Confirmed  CKD (chronic kidney disease) stage 3, GFR 30-59 ml/min / SNOMED CT 7168737751 / Confirmed  COPD (chronic obstructive pulmonary disease) with acute bronchitis / SNOMED CT 49017886 / Confirmed  Closed hip fracture / SNOMED CT 290464470 / Confirmed  Closed fracture of trochanter of femur / SNOMED CT 1575962109 / Confirmed  Cardiac arrhythmia / SNOMED CT 51186333 / Confirmed  Coronary artery disease due to lipid rich plaque / SNOMED CT 2521380762 / Confirmed  Lipids abnormal / SNOMED CT 810822039 / Confirmed  HTN (Hypertension) / SNOMED CT 33259063 / Confirmed  Former Smoker / SNOMED CT [id number] /  Confirmed  GERD (gastroesophageal reflux disease) / SNOMED CT 1830508085 / Confirmed  Anticoagulated / SNOMED CT 792817893 / Confirmed  History of oropharyngeal cancer / SNOMED CT 0075568103 / Confirmed  H/O prostate cancer / SNOMED CT 5337036032 / Confirmed  Hypomagnesemia / SNOMED CT 875425412 / Confirmed  Lung cancer / SNOMED CT 855664623 / Confirmed  DJD (Degenerative Joint Disease) / SNOMED CT 5417993516 / Confirmed  Rhabdomyolysis / SNOMED CT 938204720 / Confirmed  H/O unilateral nephrectomy / SNOMED CT 977508454 / Confirmed  Resolved: *Hospitalized@[facility] Atypical chest pain  Resolved: *Hospitalized@[facility] Hip fracture  Resolved: *Hospitalized@[facility] Septic hip vs hematoma  Resolved: History of sepsis / SNOMED CT 0190451132  Resolved: Inpatient stay / SNOMED CT 641847425  Resolved: Inpatient stay / SNOMED CT 182112598  Resolved: Inpatient stay / SNOMED CT 813861671  Resolved: Inpatient stay / SNOMED CT 299432243  Resolved: Prostate cancer / SNOMED CT 095255950  Canceled: Oropharyngeal cancer / SNOMED CT 150801904  Canceled: Solitary kidney / SNOMED CT 704884920  Canceled: Solitary kidney / SNOMED CT 607294500      Histories   Past Medical History:    Active  AAA (abdominal aortic aneurysm) (672457042)  Lipids abnormal (015258511)  HTN (Hypertension) (32558051)  DJD (Degenerative Joint Disease) (4738129819)  H/O unilateral nephrectomy (021547804)  Ragsdale Esophagus (4005746866)  COPD (chronic obstructive pulmonary disease) with acute bronchitis (88390431)  Adenocarcinoma of lung (833227583)  Closed hip fracture (503276510)  Coronary artery disease due to lipid rich plaque (7333141637)  GERD (gastroesophageal reflux disease) (2736479148)  Cardiac arrhythmia (90406844)  CKD (chronic kidney disease) stage 3, GFR 30-59 ml/min (8439534977)  Closed fracture of trochanter of femur (7500884794)  Lung cancer (246832821)  Resolved  Inpatient stay (735908069): Onset on 5/7/2021 at 75 years.  Resolved on 5/10/2021 at 75  years.  Comments:  2021 CDT 6:41 AM CDT - Yoly Tillman  [facility], [place] - Weakness, frequent falls, and dizziness.  Inpatient stay (811358105): Onset on 10/8/2020 at 75 years.  Resolved on 10/11/2020 at 75 years.  Comments:  10/12/2020 CDT 3:36 PM CDT - LeanderDayan jasmine  @ [facility] due to acute renal failure.  Inpatient stay (510752897): Onset on 1/3/2020 at 74 years.  Resolved on 2020 at 74 years.  Comments:  2020 CST 1:41 PM CST - [facility], [place] - Chest pain, acute renal failure.  Inpatient stay (606617483): Onset on 2019 at 74 years.  Resolved on 2019 at 74 years.  Comments:  12/10/2019 CST 1:46 PM CST - Jessi Eid  @[facility] - NSTEMI.  *Hospitalized@[facility] Septic hip vs hematoma: Onset on 7/3/2016 at 70 years.  Resolved on 2016 at 70 years.  History of sepsis (6331231300): Onset on 7/3/2016 at 70 years.  Resolved.  Comments:  2016 CDT 2:14 PM CDT - Alma Barfield  Severe; due to septic hip.    2016 CDT 3:37 PM CDT - Alma Barfield  Sepsis organ failure: Acute renal failure.  *Hospitalized@[facility] - Hip fracture: Onset on 2016 at 70 years.  Resolved on 2016 at 70 years.  *Hospitalized@[facility] - Atypical chest pain: Onset on 2015 at 69 years.  Resolved on 2015 at 69 years.  Prostate cancer (170080785):  Resolved.   Family History:    CA - Breast cancer  Sister (Alexa, )  Lupus  Sister (Rebecca)  Alcohol abuse  Sister (Alexa, )  Mother ()  Kidney disease  Aunt (M)  SMA type III  Grandfather (M)  Obese  Sister (Sujey, )     Procedure history:    Biopsy of lung (759010208) on 2021 at 75 Years.  Esophagogastroduodenoscopy (121259740) on 2020 at 75 Years.  Excision of squamous cell carcinoma (2606019576) on 2020 at 75 Years.  Comments:  2020 10:15 AM MINGO - Yoly Tillman  Left buccal mucosa and left lateral tongue.  Coronary angiography (29318223) on  11/25/2019 at 74 Years.  Comments:  12/10/2019 1:47 PM CST - Jessi Eid  and PCI of the right coronary artery.  Esophagogastroduodenoscopy (181206816) on 7/26/2019 at 73 Years.  Comments:  10/12/2020 3:44 PM CDT - Dayan Musa  Endoscopic US, Fine Needle Aspiration; Gastric Biopsies of polyps.  Colonoscopy (346640113) on 3/21/2017 at 71 Years.  Comments:  3/22/2017 2:33 PM CDT - Filippo Barbour MD  Indication: Adenomatous Polyps  Sedation: MAC  Findings: Adenomatous polyp cecum, hemorrohids  Rec: Repeat in 5 years  Thoracoscopic wedge resection of lung (6035057855) in 2016 at 71 Years.  Comments:  10/12/2020 3:40 PM CDT - Dayan Musa  RUL, RML  Right Hip Bipoloar Hemiarthroplasty on 6/22/2016 at 70 Years.  Comments:  6/28/2016 7:26 AM CDT - Debby Faustin CMA  Right displasced femoral neck fracture  right thoracotomy on 11/15/2015 at 70 Years.  Comments:  11/17/2015 9:07 AM CST - Debby Faustin CMA  wedge resection right upper lung nodule, wedge resection right middle lobe lung nodule, Mediastinal lymph node dissection  Colonoscopy ([id number]) on 5/12/2014 at 68 Years.  Comments:  5/15/2014 10:56 AM CDT - Livier Stallings RN  Sedation: midazolam, fentanyl  Indication: screen  5-6mm tubular adenom x3, 8mm tubular adenoma x1, 12mm tubular adenoma with advance adenoma due to size  Repeat in 3 years.  nepherectomy in the month of 3/2009 at 63 Years.  Comments:  5/11/2021 11:18 AM CDT - Yoly Tillman  Left  Lumbar discectomy in 2006 at 61 Years.  Left salivary gland removal in 1995 at 50 Years.  Oral surgery in 1994 at 49 Years.  Aortic aneurysm, abdominal ([id number]).   Social History:        Electronic Cigarette/Vaping Assessment            Electronic Cigarette Use: Never.      Alcohol Assessment: Current            Liquor (Hard) (1.5 oz), Daily, 2 drinks/episode average.      Tobacco Assessment            Past, 20 per day.  49 year(s).            Quit 12/25/2009, Cigarettes, 40  per day.  49 year(s).      Substance Abuse Assessment: Denies Substance Abuse            Never      Employment and Education Assessment            Retired, Highest education level: High school.      Home and Environment Assessment            Marital status: .  Spouse/Partner name: Darlene Chau.  4 children.  Living situation: Home/Independent.               Home equipment: Walker/Cane.  Injuries/Abuse/Neglect in household: No.  Feels unsafe at home: No.               Family/Friends available for support: Yes.      Nutrition and Health Assessment            Type of diet: Regular.  Wants to lose weight: No.  Sleeping concerns: No.  Feels highly stressed: No.      Exercise and Physical Activity Assessment            Exercise frequency: Occasional.      Sexual Assessment            Sexually active: No.  Identifies as male, Sexual orientation: Straight or heterosexual.  Contraceptive Use               Details: None.        Physical Examination   vital signs stable, as noted above   VS/Measurements   General:  Alert and oriented, No acute distress.    Eye:  Extraocular movements are intact.    HENT:  Normocephalic, Oral mucosa is moist, No pharyngeal erythema, dentures.    Neck:  Supple, Previous right sided neck dissection with flap.    Respiratory:  Lungs are clear to auscultation, Respirations are non-labored, posterior thoracotomy scar.    Cardiovascular:  Normal rate, Regular rhythm, No murmur, 1+ pitting edema bilaterally.    Gastrointestinal:  Soft, Non-tender, Non-distended.    Integumentary:  exophytic skin lesion on scalp.    Neurologic:  Alert, Oriented, Normal motor function, No focal deficits.    Cognition and Speech:  Oriented, Speech clear and coherent.    Psychiatric:  Appropriate mood & affect.       Review / Management   Results review:  Lab results   7/23/2021 1:40 PM CDT Sodium Level 139 mmol/L    Potassium Level 5.0 mmol/L    Chloride Level 101 mmol/L    CO2 Level 30 mmol/L    Glucose Level 121  mg/dL  HI    BUN 29 mg/dL  HI    Creatinine 1.59 mg/dL  HI    BUN/Creat Ratio 18    eGFR 42 mL/min/1.73m2  LOW    eGFR African American 48 mL/min/1.73m2  LOW    Calcium Level 10.0 mg/dL    Calcium Level 10.0 mg/dL    Phosphorus Level 3.5 mg/dL    PTH Intact 29 pg/mL    U Creatinine 86 mg/dL    U Microalbumin 58.7 mg/dL    Ur Microalb/Creat Ratio 683  HI    Hgb 14.6 gm/dL   5/10/2021 3:32 PM CDT Sodium Level  mEq/L    Potassium Level TR 5 mEq/L    Chloride  mEq/L    BUN TR 16 mg/dL    Creatinine TR 1.66 mg/dL    Calcium TR 7.7 mEq/dL    Bili Total TR 1.1 mg/dL    Alk Phos  unit/L    AST TR 57 unit/L    ALT TR 33 unit/L    Protein Total TR 4.6 gm/dL    WBC TR 4.9 x10^3/uL    Hgb TR 11.1 g/dL    Platelet TR 34 x10^3/uL   .       Impression and Plan   Diagnosis     Adenocarcinoma of lung (ZOG20-HR C34.90).     COPD (chronic obstructive pulmonary disease) with acute bronchitis (XYX59-KF J44.1).     Prostate cancer (XIE02-RF C61).     Acute renal failure superimposed on stage 3 chronic kidney disease (VPM75-TS N17.9).     Coronary artery disease due to lipid rich plaque (IKH23-UG I25.10).         .) CKD; baseline SCr 1.5-1.9   - multiple LEILA episodes; h/o contrast induced nephropathy (VITOR)  previous multiple LEILA (prior h/o of LEILA in 1/2020, 10/2020); no previous dialysis needs  - lisinopril indefinitely on hold  - heightened risk for future contrast induced nephropathy    .) 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; now with recurrence  - surgical resection by JVT (11/2020)    .) COPD   - on Trelegy daily    .) s/p right wedge resection of limited stage adenocarcinoma of lung (11/2015)  - 1-2/2021 XRT to MELODY malignancy (PET avid)    .) hypertension; controlled  current antihypertensive regimen: Toprol XL 25mg daily  regimen changes: none  intolerance:  future titration/work-up plan:     .) NSTEMI with PCI to RCA with multi-vessel disease (non-obstructive LAD lesion); following with Dr. Valentine  - HILLARY stopped  (10/2020): profound anemia, concerns for GIB  - Toprol XL 25mg daily  - statin and ezetimibe stopped due to rhabdo (5/2021)   - consider future rechallenge on statin    .) prostate Ca; watchful waiting   - original prostate biopsy in '11, repeat biopsy in '16; Ken 6   - watchful surveillance; q6 month PSA; last PSA 35 (rising)   - MRI of prostate (2019): focal coarse calcifications in prostate; no evidence of extra-prostate extension   - following with Dr. Bustos - advised he follow up     .) pancreatic cyst; stable on last EUS/EGD on 10/23/2020; Dr. Edwards  - recommending repeat imaging in  2 yrs (noted stenotic CBD) - fall, 2022  - on omeprazole 20mg BID    .) h/o AAA, endovascular aortic repair, follows with Dr. David    .) polyarthritis pains  - limited benefit with NSAIDs and APAP  s/p right MIRNA after hip fracture from fall (6/2016)   - normal DEXA (7/2015)  - working with Ortho - lasting benefits from intra-articular injections    .) health maintenance   - CRC due in '22 - question utility of further screening   - q6 month PSA   - Prevacid 30mg BID   - Efudex for scalp lesion   - completed COVID vaccination    RTC in 4 months

## 2022-02-16 NOTE — LETTER
(Inserted Image. Unable to display)     April 26, 2019      CELIA NELDA  [address]  [place], [place] [id number]          Dear CELIA,      Thank you for selecting [facility] ([facility]viously [facility] for your healthcare needs.    Our records indicate you are due for the following services:    Kidney Disease Follow Up.    Non-Fasting Labs.    If you had your labs done at another facility or with Direct Access Lab Testing at [facility], please bring in a copy of the results to your next visit, mail a copy, or drop off a copy of your results to your Healthcare Provider.    You are due for lab work and an office visit; please schedule the lab appointment 1 week before the office visit.  This will assure all results are available to discuss with your provider during your visit.    **It is very helpful if you bring your medication bottles to your appointment.  This assures we have all of your current medications, including strength and dosing information, documented accurately in your medical record.    To schedule an appointment or if you have further questions, please contact your primary clinic:   [facility]       [phone]   [facility]       [phone]              [facility]     [phone]      Powered by [email or web address]    Sincerely,    Yong Boyd MD

## 2022-02-16 NOTE — TELEPHONE ENCOUNTER
Patient:   CELIA LUTZ            MRN: [id number]            FIN: [id number]               Age:   76 years     Sex:  Male     :  1945   Associated Diagnoses:   None   Author:   Ronit Pharm D , Sara Swanson Outbound Call:    Reason for call:   Patient left letter in MTM box with home blood pressure readings and note about [employer] denial, stating he thinks he has to pay $147/month for Trelegy.    Home blood pressure readings are:  : 7pm 125/72  : 4:30 164/84  10/4: 5:40 133/89  10/5: 7:15 140/94  10/6: 6:30 132/90  10/7: 11:15 134/82  10/8: 6:00 123/75  10/10: 8:00 11/94  10/12: 7:20 136/81  10/14: 5:45 132/79  10/16: 7:30 133/83  10/20: 2:33 118/85  *note that since this time, Patient has had appointment with PCP and amlodipine was stopped d/t orthostatic hypotension.    I called [employer] Patient assistance program inquiring about Trelegy [employer] Patient Assistance Program access and [employer] confirmed that Patient is approved for Trelegy through 2021. However, the denial letter was for ventolin - as [employer] is removing this from their Patient Assistance Program.    Then I called and communicated with Patient informing him to request a Trelegy refill from [employer] before the end of the year.  Planning for MTM follow-up 2021 - RTC in place.    Sara Cottrell PharmD  Medication Therapy Management (MTM) Pharmacist  [place] [place] [facility] ()  Clinic Phone: [phone]  Pager: [phone]

## 2022-02-16 NOTE — NURSING NOTE
Quick Intake Entered On:  5/6/2020 2:20 PM CDT    Performed On:  5/6/2020 2:18 PM CDT by Hannah Lr RN               Summary   Chief Complaint :   Recheck BP   Height Measured :   71 in(Converted to: 5 ft 11 in, 180.34 cm)    Systolic Blood Pressure :   148 mmHg (HI)    Diastolic Blood Pressure :   88 mmHg (HI)    Mean Arterial Pressure :   108 mmHg   BP Site :   Right arm   BP Method :   Manual   Race :      Languages :   English   Ethnicity :   Not  or    Hannah Lr RN - 5/6/2020 2:20 PM CDT

## 2022-02-16 NOTE — TELEPHONE ENCOUNTER
---------------------  From: Elizabeth Leal CMA (Phone Messages Pool ([id number]_[facility]))   To: [facility] Message Pool ([id number]_[place] [facility] [place]);     Sent: 1/27/2020 1:51:15 PM CST  Subject: Insurance papers       Phone Message    PCP:   [facility]      Time of Call:  12:44       Person Calling:  pt  Phone number:  [phone]    Time returned call:  1:49    Note:  Patient left some insurance forms last week for [facility] to sign.  Patient would like the forms to be returned to him instead of the insurance company as his employer has to complete some areas of the form first.      Transferred to: [facility]

## 2022-02-16 NOTE — PROGRESS NOTES
Patient:   CELIA LUTZ            MRN: [id number]            FIN: [id number]               Age:   76 years     Sex:  Male     :  1945   Associated Diagnoses:   Adenocarcinoma of lung; COPD (chronic obstructive pulmonary disease) with acute bronchitis; Prostate cancer; Acute renal failure superimposed on stage 3 chronic kidney disease; Coronary artery disease due to lipid rich plaque   Author:   Yong Boyd MD      Visit Information      Date of Service: 10/26/2021 01:30 pm  Performing Location: [facility]  Encounter#: [id number]      Primary Care Provider (PCP):  Yong Boyd MD    NPI# [id number]      Referring Provider:  Yong Boyd MD    NPI# [id number]      Chief Complaint   10/26/2021 1:48 PM CDT   f/u - increased weight - increased swelling in legs.  Worsening balance, decreased appetite              Additional Information:No additional information recorded during visit.   Chief complaint and symptoms as noted above and confirmed with patient.  Recent lab and diagnostic studies reviewed with patient      History of Present Illness   10/14/2020: Returns to clinic for hospital f/u.  Recent admission to [facility] for LEILA with SCr rise to 7.5 and anemia, Hgb 6.7.  Renal function steadily improved with IVFs and cessation of lisinopril.  Transfused 1 unit PRBCs and seen by GI.  Decisions for outpatient endoscopy and EUS (h/o pancreatic cyst).  Scheduled for excisional biopsy of left sided oral lesions and planned laryngeal biopsy (recent avidity on PET scan) with JVT on 10/16 - procedure since postponed.  Feeling better since hospitalization.      2020: Mitchell returns to clinic for follow-up.  Recently been seen by Dr. Celis in my clinical absence earlier this month.  Complaints of worsening lower extremity swelling.  Did trial on furosemide without any noticeable perceived effect.  Did have a normal BNP.  He did have a repeat echocardiogram which demonstrated stable overall cardiac  function.  States that edema typically worsens through the day and improves overnight.  Has used compression stockings before with some neck success.  Main complaint today is worsening claudication.  Describes walking a progressively shorter distance due to leg pain especially on the right side.  Has been established with vascular surgery in the past related to aortic aneurysm repair though has not seen in several years.  Is planning on seeing Dr. Valentine next month related to his cardiac cares.  He has remained off of dual antiplatelet therapy and rosuvastatin since his episode of acute kidney injury earlier in the fall    3/16/21:  Mitchell returns for follow-up.  Seen by vascular surgery - stable features, normal appearing arterial studies (MADELINE, toe pressures).  Persistent lower extremity edema complaints.  Completed XRT for known lung cancer.  Planning to see urology in future to discuss rising PSA - wants to avoid surgery.    5/21/2021: Mitchell follows up after recent discharge from [facility] to [facility] related to episode of rhabdomyolysis and weakness.  Unclear etiologies of his rhabdo; potentially related to fall at home and immobility versus statin.  Concerns raised about excess alcohol intake.  At [facility] soft progressive improvement in overall strength.  Now back home and feeling at baseline.  Questions about exophytic growth on his scalp.  Due to follow-up with urology about known history of prostate cancer.  7/29/2021: Mitchell returns for regular follow-up.  Overall has been doing pretty okay.  Has been working with physical therapist and Dr. Zavaleta related to recurrence of right hip pain and feels like that overall has been doing better.  Breathing has been stable otherwise.  Not monitoring blood pressures at home and substantially elevated blood pressures today here in clinic which is above his historical means.  Reviewed recent labs showing stable overall findings.  10/26/2021: Mitchell returns to clinic for  follow-up.  He is accompanied by Georgette, his daughter whom I am meeting for the first time.  She shares with me that he generally has not been doing well.  Concerns by the family that he has been drinking more heavily.  He has been falling and having more imbalance at home.  Had been pursued some looking into potential assisted living placement.  Mitchell shares that he has had increased stress related to his wife s failing health especially with her mentation.  He has had very limited appetite.  Generally does not feel well today.  He accepts that his drinking has been more of an issue of late.  Has been having quite a bit of difficulties with right-sided hip pains.  Has been working with orthopedics and has had multiple intra-articular injections with limited success.           Review of Systems   Constitutional:  Weakness, Fatigue, Decreased activity, No fever, No chills.    Eye:  Negative except as documented in history of present illness.    Ear/Nose/Mouth/Throat:  Negative except as documented in history of present illness.    Respiratory:  Shortness of breath, No wheezing.    Cardiovascular:  No chest pain, No palpitations, No peripheral edema, No syncope.    Gastrointestinal:  No nausea, No vomiting, No heartburn, No abdominal pain.    Genitourinary:  Negative, No dysuria, No hematuria.    Hematology/Lymphatics:  No bruising tendency.    Endocrine:  No excessive thirst, No polyuria.    Immunologic:  No recurrent fevers.    Musculoskeletal:  Joint pain, Claudication, No muscle pain, No decreased range of motion, No trauma.    Integumentary:  No skin lesion.    Neurologic:  Alert and oriented X4, Abnormal balance, Confusion, No numbness, No tingling, No headache.       Health Status   Allergies:    Allergic Reactions (Selected)  No Known Medication Allergies   Medications:  (Selected)   Prescriptions  Prescribed  Efudex 5% topical cream: 1 eva, Topical, bid, # 25 gm, 3 Refill(s), Type: Maintenance, Pharmacy: [facility]  [id number] [facility], 1 eva Topical bid, 71, in, 05/21/21 13:28:00 CDT, Height Measured, 204, lb, 05/21/21 13:28:00 CDT, Weight Measured  Trelegy Ellipta 200 mcg-62.5 mcg-25 mcg/inh inhalation powder: 1 puff(s), Inhale, daily, Instructions: at the same time every day. Rinse mouth after use., # 180 blister, 3 Refill(s), Type: Maintenance, please dispense 3 inhalers  Ventolin HFA 90 mcg/inh inhalation aerosol: See Instructions, Instructions: 2 puff(s) Inhale q4 hours as needed for shortness of breath, cough or wheezing., # 18 gm, 3 Refill(s), AD, Type: Maintenance, Printing to fax to Pt assistance program  omeprazole 20 mg oral delayed release capsule: = 1 cap(s) ( 20 mg ), Oral, daily, # 90 cap(s), 3 Refill(s), Type: Maintenance, Pharmacy: [id number], 1 cap(s) Oral daily, 71, in, 12/29/20 7:45:00 CST, Height Measured, 212.4, lb, 12/29/20 7:45:00 CST, Weight Measured  spacer for inhaler: spacer for inhaler, See Instructions, Instructions: use with albuterol inhaler, Supply, # 1 EA, 0 Refill(s), Type: Maintenance, Pharmacy: [place] [id number] [facility] [employer], use with albuterol inhaler  Documented Medications  Documented  Aspirin Enteric Coated 81 mg oral delayed release tablet: = 1 tab(s) ( 81 mg ), Oral, daily, 0 Refill(s), Type: Maintenance  Iron 100 Plus oral tablet: 1 tab(s), Oral, daily, 0 Refill(s), Type: Maintenance  Metoprolol Succinate ER 25 mg oral tablet, extended release: 0 Refill(s), Type: Soft Stop  Multiple Vitamins oral tablet: 1 tab(s), po, daily, 0 Refill(s), Type: Maintenance  Voltaren 1% topical gel: See Instructions, Instructions: apply 2 gram(s) topically to upper extremities (max of 8 grams/joint/day) or 4 gram(s) topically to lower extremities (max of 16 grams/joint per day) up to 4 times daily as needed for pain. Max of 32 grams/day total., 0...  acetaminophen 325 mg oral tablet: 1-2 tab(s), Oral, q6 hrs, Instructions: not to exceed 2000 mg/day, PRN: as needed for pain, 0 Refill(s), Type:  Maintenance  amLODIPine 2.5 mg oral tablet: = 1 tab(s) ( 2.5 mg ), Oral, daily, Instructions: in the evening, 0 Refill(s), Type: Maintenance  cholecalciferol 1000 intl units oral tablet: ( 25 mcg ), Oral, daily, 0 Refill(s), Type: Maintenance  magnesium oxide 400 mg (240 mg elemental magnesium) oral tablet: 1 tab(s) ( 400 mg ), Oral, daily, 0 Refill(s), Type: Maintenance  nitroglycerin 0.4 mg sublingual tablet: = 1 tab(s) ( 0.4 mg ), SL, q5 min, PRN: for chest pain, # 100 tab(s), 0 Refill(s), Type: Maintenance  polyethylene glycol 3350: ( 17 gm ), Oral, daily, 0 Refill(s), Type: Maintenance  thiamine 100 mg oral tablet: = 1 tab(s) ( 100 mg ), Oral, daily, 0 Refill(s), Type: Maintenance  zinc (as gluconate) 50 mg oral tablet: Instructions: 1 tab by mouth daily (Pt taking OTC), 0 Refill(s), Type: Maintenance,    Medications          *denotes recorded medication          spacer for inhaler: See Instructions, use with albuterol inhaler, 1 EA, 0 Refill(s).          *acetaminophen 325 mg oral tablet: 1-2 tab(s), Oral, q6 hrs, not to exceed 2000 mg/day, PRN: as needed for pain, 0 Refill(s).          Ventolin HFA 90 mcg/inh inhalation aerosol: See Instructions, 2 puff(s) Inhale q4 hours as needed for shortness of breath, cough or wheezing., 18 gm, 3 Refill(s).          *amLODIPine 2.5 mg oral tablet: 2.5 mg, 1 tab(s), Oral, daily, in the evening, 0 Refill(s).          *Aspirin Enteric Coated 81 mg oral delayed release tablet: 81 mg, 1 tab(s), Oral, daily, 0 Refill(s).          *cholecalciferol 1000 intl units oral tablet: 25 mcg, Oral, daily, 0 Refill(s).          *Voltaren 1% topical gel: See Instructions, apply 2 gram(s) topically to upper extremities (max of 8 grams/joint/day) or 4 gram(s) topically to lower extremities (max of 16 grams/joint per day) up to 4 times daily as needed for pain. Max of 32 grams/day total., 0 Refill(s).          Efudex 5% topical cream: 1 eva, Topical, bid, 25 gm, 3 Refill(s).          Trelegy  Ellipta 200 mcg-62.5 mcg-25 mcg/inh inhalation powder: 1 puff(s), Inhale, daily, at the same time every day. Rinse mouth after use., 180 blister, 3 Refill(s).          *magnesium oxide 400 mg (240 mg elemental magnesium) oral tablet: 400 mg, 1 tab(s), Oral, daily, 0 Refill(s).          *Metoprolol Succinate ER 25 mg oral tablet, extended release: 0 Refill(s).          *Multiple Vitamins oral tablet: 1 tab(s), po, daily, 0 Refill(s).          *Iron 100 Plus oral tablet: 1 tab(s), Oral, daily, 0 Refill(s).          *nitroglycerin 0.4 mg sublingual tablet: 0.4 mg, 1 tab(s), SL, q5 min, PRN: for chest pain, 100 tab(s), 0 Refill(s).          omeprazole 20 mg oral delayed release capsule: 20 mg, 1 cap(s), Oral, daily, 90 cap(s), 3 Refill(s).          *polyethylene glycol 3350: 17 gm, Oral, daily, 0 Refill(s).          *thiamine 100 mg oral tablet: 100 mg, 1 tab(s), Oral, daily, 0 Refill(s).          *zinc (as gluconate) 50 mg oral tablet: 1 tab by mouth daily (Pt taking OTC), 0 Refill(s).       Problem list:    All Problems  AAA (abdominal aortic aneurysm) / SNOMED CT 245727564 / Confirmed  Adenocarcinoma of lung / SNOMED CT 476991305 / Confirmed  Ragsdale Esophagus / SNOMED CT 3194006076 / Confirmed  CKD (chronic kidney disease) stage 3, GFR 30-59 ml/min / SNOMED CT 9275446933 / Confirmed  COPD (chronic obstructive pulmonary disease) with acute bronchitis / SNOMED CT 11892661 / Confirmed  Closed hip fracture / SNOMED CT 765889496 / Confirmed  Closed fracture of trochanter of femur / SNOMED CT 7570257398 / Confirmed  Cardiac arrhythmia / SNOMED CT 69494899 / Confirmed  Coronary artery disease due to lipid rich plaque / SNOMED CT 9906364595 / Confirmed  Lipids abnormal / SNOMED CT 133835214 / Confirmed  HTN (Hypertension) / SNOMED CT 40411987 / Confirmed  Former Smoker / SNOMED CT [id number] / Confirmed  GERD (gastroesophageal reflux disease) / SNOMED CT 7911325968 / Confirmed  Anticoagulated /  SNOMED CT 960424874 / Confirmed  History of oropharyngeal cancer / SNOMED CT 3321639733 / Confirmed  H/O prostate cancer / SNOMED CT 4036891083 / Confirmed  Hypomagnesemia / SNOMED CT 674853311 / Confirmed  Lung cancer / SNOMED CT 903260166 / Confirmed  Obesity / SNOMED CT 4687032331 / Probable  DJD (Degenerative Joint Disease) / SNOMED CT 6920162534 / Confirmed  Rhabdomyolysis / SNOMED CT 055398741 / Confirmed  H/O unilateral nephrectomy / SNOMED CT 179583822 / Confirmed  Resolved: *Hospitalized@[facility] - Atypical chest pain  Resolved: *Hospitalized@[facility] Hip fracture  Resolved: *Hospitalized@[facility] Septic hip vs hematoma  Resolved: History of sepsis / SNOMED CT 6037889024  Resolved: Inpatient stay / SNOMED CT 275681250  Resolved: Inpatient stay / SNOMED CT 019844149  Resolved: Inpatient stay / SNOMED CT 620148732  Resolved: Inpatient stay / SNOMED CT 979356215  Resolved: Prostate cancer / SNOMED CT 889648424  Canceled: Oropharyngeal cancer / SNOMED CT 538051279  Canceled: Solitary kidney / SNOMED CT 463323619  Canceled: Solitary kidney / SNOMED CT 964494272      Histories   Past Medical History:    Active  AAA (abdominal aortic aneurysm) (431743165)  Lipids abnormal (185413300)  HTN (Hypertension) (53779205)  DJD (Degenerative Joint Disease) (2186449387)  H/O unilateral nephrectomy (056657816)  Ragsdale Esophagus (8598615563)  COPD (chronic obstructive pulmonary disease) with acute bronchitis (81720282)  Adenocarcinoma of lung (223519375)  Closed hip fracture (872798345)  Coronary artery disease due to lipid rich plaque (9422445271)  GERD (gastroesophageal reflux disease) (3915155108)  Cardiac arrhythmia (30757147)  CKD (chronic kidney disease) stage 3, GFR 30-59 ml/min (0315835669)  Closed fracture of trochanter of femur (8312740410)  Lung cancer (255444153)  Resolved  Inpatient stay (424957076): Onset on 5/7/2021 at 75 years.  Resolved on 5/10/2021 at 75 years.  Comments:  5/11/2021 CDT 6:41 AM CDT [facility] [place] [facility], frequent falls, and dizziness.  Inpatient stay (253037500): Onset on 10/8/2020 at 75 years.  Resolved on 10/11/2020 at 75 years.  Comments:  10/12/2020 CDT 3:36 PM CDT - Lencho Dayan  @ [facility] due to acute renal failure.  Inpatient stay (734650859): Onset on 1/3/2020 at 74 years.  Resolved on 2020 at 74 years.  Comments:  2020 CST 1:41 PM CST - Primo [facility], [place] - Chest pain, acute renal failure.  Inpatient stay (633594459): Onset on 2019 at 74 years.  Resolved on 2019 at 74 years.  Comments:  12/10/2019 CST 1:46 PM CST - Jessi Eid  @[facility] - NSTEMI.  *Hospitalized@[facility] Septic hip vs hematoma: Onset on 7/3/2016 at 70 years.  Resolved on 2016 at 70 years.  History of sepsis (6054145477): Onset on 7/3/2016 at 70 years.  Resolved.  Comments:  2016 CDT 2:14 PM CDT - Alma Barfield  Severe; due to septic hip.    2016 CDT 3:37 PM CDT - Alma Barfield  Sepsis organ failure: Acute renal failure.  *Hospitalized@[facility] - Hip fracture: Onset on 2016 at 70 years.  Resolved on 2016 at 70 years.  *Hospitalized@[facility] - Atypical chest pain: Onset on 2015 at 69 years.  Resolved on 2015 at 69 years.  Prostate cancer (122364391):  Resolved.   Family History:    CA - Breast cancer  Sister (Alexa, )  Lupus  Sister (Rebecca)  Alcohol abuse  Sister (Alexa, )  Mother ()  Kidney disease  Aunt (M)  SMA type III  Grandfather (M)  Obese  Sister (Sujey, )     Procedure history:    Biopsy of lung (463910069) on 2021 at 75 Years.  Esophagogastroduodenoscopy (618060428) on 2020 at 75 Years.  Excision of squamous cell carcinoma (5522019961) on 2020 at 75 Years.  Comments:  2020 10:15 AM CST - Yoly Tillman  Left buccal mucosa and left lateral tongue.  Coronary angiography (19345402) on 2019 at 74 Years.  Comments:  12/10/2019 1:47 PM CST  Jessi Montenegro  and PCI of the right coronary artery.  Esophagogastroduodenoscopy (232521382) on 7/26/2019 at 73 Years.  Comments:  10/12/2020 3:44 PM CDT - Dayan Musa  Endoscopic US, Fine Needle Aspiration; Gastric Biopsies of polyps.  Colonoscopy (473619506) on 3/21/2017 at 71 Years.  Comments:  3/22/2017 2:33 PM CDT - Filippo Barbour MD  Indication: Adenomatous Polyps  Sedation: MAC  Findings: Adenomatous polyp cecum, hemorrohids  Rec: Repeat in 5 years  Thoracoscopic wedge resection of lung (4594513192) in 2016 at 71 Years.  Comments:  10/12/2020 3:40 PM CDT - Dayan Musa  RUL, RML  Right Hip Bipoloar Hemiarthroplasty on 6/22/2016 at 70 Years.  Comments:  6/28/2016 7:26 AM CDT - Debby Faustin CMA  Right displasced femoral neck fracture  right thoracotomy on 11/15/2015 at 70 Years.  Comments:  11/17/2015 9:07 AM CST - Debby Faustin CMA  wedge resection right upper lung nodule, wedge resection right middle lobe lung nodule, Mediastinal lymph node dissection  Colonoscopy (938511936) on 5/12/2014 at 68 Years.  Comments:  5/15/2014 10:56 AM CDT - Livier Stallings RN  Sedation: midazolam, fentanyl  Indication: screen  5-6mm tubular adenom x3, 8mm tubular adenoma x1, 12mm tubular adenoma with advance adenoma due to size  Repeat in 3 years.  nepherectomy in the month of 3/2009 at 63 Years.  Comments:  5/11/2021 11:18 AM CDT - Yoly Tillman  Left  Lumbar discectomy in 2006 at 61 Years.  Left salivary gland removal in 1995 at 50 Years.  Oral surgery in 1994 at 49 Years.  Aortic aneurysm, abdominal ([id number]).   Social History:        Electronic Cigarette/Vaping Assessment            Electronic Cigarette Use: Never.            Electronic Cigarette Use: Never.      Alcohol Assessment: Current            Liquor (Hard) (1.5 oz), Daily, 2 drinks/episode average.      Tobacco Assessment            Past, 20 per day.  50 year(s).            Quit 12/25/2009, Cigarettes, 40 per day.  49  year(s).      Substance Abuse Assessment: Denies Substance Abuse            Never      Employment and Education Assessment            Retired, Highest education level: High school.      Home and Environment Assessment            Marital status: .  Spouse/Partner name: Darlene Chau.  4 children.  Living situation: Home/Independent.               Home equipment: Walker/Cane.  Injuries/Abuse/Neglect in household: No.  Feels unsafe at home: No.               Family/Friends available for support: Yes.      Nutrition and Health Assessment            Type of diet: Regular.  Wants to lose weight: No.  Sleeping concerns: No.  Feels highly stressed: No.      Exercise and Physical Activity Assessment            Exercise frequency: Occasional.      Sexual Assessment            Sexually active: No.  Identifies as male, Sexual orientation: Straight or heterosexual.  Contraceptive Use               Details: None.        Physical Examination   vital signs stable, as noted above   Vital Signs   10/26/2021 1:48 PM CDT Temperature Tympanic 96.4 DegF  LOW    Peripheral Pulse Rate 111 bpm  HI    Systolic Blood Pressure 103 mmHg    Diastolic Blood Pressure 72 mmHg    Mean Arterial Pressure 82 mmHg    Oxygen Saturation 95 %      Measurements from flowsheet : Measurements   10/26/2021 1:48 PM CDT Height Measured - Standard 71 in    Weight Measured - Standard 217.7 lb    BSA 2.22 m2    Body Mass Index 30.36 kg/m2  HI      General:  Alert and oriented, Appearing in mild distress.    Eye:  Extraocular movements are intact.    HENT:  Normocephalic, Oral mucosa is moist, No pharyngeal erythema, dentures.    Neck:  Supple, Previous right sided neck dissection with flap.    Respiratory:  Lungs are clear to auscultation, Respirations are non-labored, posterior thoracotomy scar.    Cardiovascular:  Regular rhythm, No murmur, 1+ pitting edema bilaterally, tachycardic.    Gastrointestinal:  Soft, Non-tender, Non-distended.    Integumentary:   exophytic skin lesion on scalp.    Neurologic:  Alert, Oriented, Normal motor function, No focal deficits.    Cognition and Speech:  Oriented, Speech clear and coherent.    Psychiatric:  Appropriate mood & affect.       Review / Management   Results review:  Lab results   9/21/2021 2:03 PM CDT Vitamin B12 Level 561 pg/mL    Hgb 13.7 gm/dL    Hct 38.0 %  LOW   7/23/2021 1:40 PM CDT Sodium Level 139 mmol/L    Potassium Level 5.0 mmol/L    Chloride Level 101 mmol/L    CO2 Level 30 mmol/L    Glucose Level 121 mg/dL  HI    BUN 29 mg/dL  HI    Creatinine 1.59 mg/dL  HI    BUN/Creat Ratio 18    eGFR 42 mL/min/1.73m2  LOW    eGFR African American 48 mL/min/1.73m2  LOW    Calcium Level 10.0 mg/dL    Calcium Level 10.0 mg/dL    Phosphorus Level 3.5 mg/dL    PTH Intact 29 pg/mL    U Creatinine 86 mg/dL    U Microalbumin 58.7 mg/dL    Ur Microalb/Creat Ratio 683  HI    Hgb 14.6 gm/dL   .       Impression and Plan   Diagnosis     Adenocarcinoma of lung (IGS47-XL C34.90).     COPD (chronic obstructive pulmonary disease) with acute bronchitis (AXV09-SJ J44.1).     Prostate cancer (CQL62-PI C61).     Acute renal failure superimposed on stage 3 chronic kidney disease (WAA72-OH N17.9).     Coronary artery disease due to lipid rich plaque (TNQ71-QP I25.10).         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; now with recurrence  - surgical resection by JVT (11/2020)    .) COPD   - on Trelegy daily    .) s/p right wedge resection of limited stage adenocarcinoma of lung (11/2015)  - 1-3/2021 XRT to MELODY malignancy (PET avid)  - following with [facility] radiation oncology    .) hypertension; uncontrolled (hypotension)  current antihypertensive regimen:  Toprol XL 25mg daily, amlodipine 2.5mg daily  regimen changes: stopping amlodipine (hypovolemia concerns)  intolerance:  future titration/work-up plan:     .) NSTEMI with PCI to RCA with multi-vessel disease (non-obstructive LAD lesion); following with Dr. Valentine  - DAPT stopped (10/2020): profound anemia, concerns for GIB  - Toprol XL 25mg daily  - statin and ezetimibe stopped due to rhabdo (5/2021)   - consider future rechallenge on statin    .) prostate Ca; watchful waiting   - original prostate biopsy in '11, repeat biopsy in '16; Ken 6   - watchful surveillance; q6 month PSA; last PSA 35 (rising)   - MRI of prostate (2019): focal coarse calcifications in prostate; no evidence of extra-prostate extension   - following with Dr. Bustos     .) pancreatic cyst; stable on last EUS/EGD on 10/23/2020; Dr. Edwards  - recommending repeat imaging in  2 yrs (noted stenotic CBD) - fall, 2022  - on omeprazole 20mg BID    .) h/o AAA, endovascular aortic repair, follows with Dr. David    .) polyarthritis pains  - limited benefit with NSAIDs and APAP  s/p right MIRNA after hip fracture from fall (6/2016)   - normal DEXA (7/2015)  - working with Ortho, Dr. Sainz - previous attempts at intra-articular right hip injection - no lasting benefit  - we discussed potential role of oral opiates - I don't think his current clinical state is stable enough for longer term opiate use (alcohol excess, COPD, distracting pains)      Goals of Care - reviewed Advance Directive paperwork.  Georgette identified as decision-maker.  Agree that his wife's current memory issues precludes her as appropriate decision-maker.  Discussed role of hospice - will place home referral inquiry.  I do think enrollment would be appropriate though Mitchell would need to decide if he's wanting to still pursue cancer specific therapies (XRT) into the future.    .) health maintenance   - q6 month PSA   - Prevacid 30mg BID   - Efudex for scalp lesion   - completed COVID  vaccination      Directing toward emergency room, accompanied by his daughter.   Neither feel comfortable with him returning home with current weakness, hypotension, tachycardia. ED provider notified of his clinic departure and directing to ED by private car

## 2022-02-16 NOTE — LETTER
(Inserted Image. Unable to display)   April 26, 2021    CELIA LUTZ  [address] [address]            Dear CELIA,      Thank you for selecting [facility] for your healthcare needs.    Our records indicate you are due for the following services:     Medication Therapy Management Follow-up Visit ~ Our records show that you are due for a Medication Therapy Management (MTM) appointment.   We would like to meet with you to review how your medications are working for you and to answer any questions you may have.       Appointments are available on Tuesdays and Thursdays and Fridays.     (FYI   Regarding office visits: In some instances, a video visit or telephone visit may be offered as an option.)      To schedule an appointment or if you have further questions, please contact your clinic at [phone].      Powered by [employer]lo[employer]    Sincerely,    Sara Cottrell, WileyD

## 2022-02-16 NOTE — NURSING NOTE
Quick Intake Entered On:  9/26/2019 5:01 PM CDT    Performed On:  9/26/2019 5:00 PM CDT by Hazel Toribio RN               Summary   Chief Complaint :   declined recheck   Height Measured :   71 in(Converted to: 5 ft 11 in, 180.34 cm)    Systolic Blood Pressure :   134 mmHg (HI)    Diastolic Blood Pressure :   84 mmHg (HI)    Mean Arterial Pressure :   101 mmHg   Race :      Languages :   English   Ethnicity :   Not  or    Hazel Toribio RN - 9/26/2019 5:00 PM CDT

## 2022-02-16 NOTE — TELEPHONE ENCOUNTER
Entered by Archana Levy CMA on November 26, 2019 2:45:07 PM CST  ---------------------  From: Archana Levy CMA   To: [place] [id number] [facility]    Sent: 11/26/2019 2:45:07 PM CST  Subject: Medication Management     ** Submitted: **  Order:albuterol (Ventolin HFA 90 mcg/inh inhalation aerosol)  2 puff(s)  NEB  q4 hrs  Qty:  3 EA        Refills:  0          Substitutions Allowed     PRN  AS NEEDED FOR COUGH OR SHORTNESS OF BREATH      Route To Pharmacy - [place] [address] [facility]    Signed by Archana Levy CMA  11/26/2019 2:44:00 PM    ** Submitted: **  Complete:albuterol (Proventil HFA 90 mcg/inh inhalation aerosol)   Signed by Archana Levy CMA  11/26/2019 2:45:00 PM    ** Not Approved:  **  albuterol (VENTOLIN HFA 90 MCG INHALER)  TAKE 2 PUFFS EVERY 4 HOURS AS NEEDED FOR COUGH OR SHORTNESS OF BREATH  Qty:  18 inhalers        Days Supply:  25        Refills:  3          Substitutions Allowed     Route To Pharmacy - [place] [address] [facility]   Note from Pharmacy:  DX Code Needed  NEED REFILLS.  Signed by Archana Levy CMA            ------------------------------------------  From: [place] [address] [facility]  To: Yong Boyd MD  Sent: November 26, 2019 2:26:52 PM CST  Subject: Medication Management  Due: November 27, 2019 2:26:52 PM CST    ** On Hold Pending Signature **  Drug: albuterol (Ventolin HFA 90 mcg/inh inhalation aerosol)  TAKE 2 PUFFS EVERY 4 HOURS AS NEEDED FOR COUGH OR SHORTNESS OF BREATH  Quantity: 18 inhalers  Days Supply: 25  Refills: 0  Substitutions Allowed  Notes from Pharmacy: DX Code Needed  NEED REFILLS.    Dispensed Drug: albuterol (Ventolin HFA 90 mcg/inh inhalation aerosol)  TAKE 2 PUFFS EVERY 4 HOURS AS NEEDED FOR COUGH OR SHORTNESS OF BREATH  Quantity: 18 inhalers  Days Supply: 25  Refills: 3  Substitutions Allowed  Notes from Pharmacy: DX Code Needed  NEED REFILLS.  ------------------------------------------Med Refill      Date of last office visit and reason:  7/11/19; preop      Date of last Med Check / Px:    5/14/19  Date of last labs pertaining to med:  7/11/19    RTC order in chart:  yes; due    For Protocol refill, has patient been contacted:  n/a - appt scheduled 11/29/19

## 2022-02-16 NOTE — TELEPHONE ENCOUNTER
Entered by Debby Faustin CMA on October 15, 2020 10:42:17 AM CDT  pt contacted at 1041 and advised to d/c Sodium bicarb  labs scheduled for Tues  referral placed 10/6 for - will f/u on that referral     pt advised RTC in 1month      ---------------------  From: Yong Boyd MD   To: BRM Message Pool ([id number]_[place]);     Sent: 10/14/2020 7:52:22 PM CDT  Subject: General Message     Mitchell can stop NaHCo3  Needs repeat BMP and hemoglobin (waiting) on Tuesday, 10/20  Will need to clarify with JVT rescheduling of excisional biopsy - I can plan to talk to him on Friday  Okay to schedule EGD/endoscopic US (EUS) with [facility] whenever feasible    Have Mitchell plan to see me back in clinic in ~ 1 month with BMP, CBC---------------------  From: Debby Faustin CMA (BR Message Pool ([id number]_[facility]))   To: Referral Coordinators Pool ([address]);     Sent: 10/15/2020 10:45:32 AM CDT  Subject: FW: General Message     looks like this referral was placed 10/6, I believe by GTG  can we re-fax order  I have placed order for EGD/Endoscopic u/s - pt still hasn't heart from [facility]---------------------  From: Shelley Bah (Referral Coordinators Pool ([id number]_[facility]))   To: BRM Message Pool ([id number]);     Sent: 10/15/2020 10:57:33 AM CDT  Subject: RE: General Message     This is what is noted in the [facility] portal      (Inserted Image. Unable to display)---------------------  From: Shelley Bah (Referral Coordinators Pool ([id number]_[facility]))   To: BRM Message Pool ([id number]_[place] [facility] [place]);     Sent: 10/15/2020 10:58:59 AM CDT  Subject: RE: General Message     It's not clear if they updated patient that not able to have before his surgery so I will carlo and update him as well.advised JVT's surgery cancelled at this time, will update [facility] scheduled for EGD and EUS on 10/23/20 at [place].

## 2022-02-16 NOTE — NURSING NOTE
CAGE Assessment Entered On:  1/22/2020 2:52 PM CST    Performed On:  1/22/2020 2:52 PM CST by Debby Faustin CMA               Assessment   Have you ever felt you should cut down on your drinking :   No   Have people annoyed you by criticizing your drinking :   No   Have you ever felt bad or guilty about your drinking :   No   Have you ever taken a drink first thing in the morning to steady your nerves or get rid of a hangover (Eye-opener) :   No   CAGE Score :   0    Debby Faustin CMA - 1/22/2020 2:52 PM CST

## 2022-02-16 NOTE — TELEPHONE ENCOUNTER
---------------------  From: Miguelina Tenorio LPN (Phone Messages Pool ([id number]_[facility]))   To: MYRA Message Pool ([id number]_[place]);     Sent: 2/4/2020 9:50:06 AM CST  Subject: Insurance PaperWork       Phone Message    PCP: MYRA    Time of call: 9:33am message left    Person calling: Lenny  Contact # : [phone]    MESSAGE: Pt calls wanting to speak with MANOLO's assistant about insurance papers.    Last visit/reason: 1/22/20 - f/u LEILA    9:42am Called and spoke with pt. He says that he dropped off paperwork for short-term disability over a week ago. He has not heard back yet and is wondering if everything is completed. He would like a call back from Debby to discuss.spoke with pt and advised faxed to [employer] on 1/22, he's asking for a copy to be left at , which has been done

## 2022-02-16 NOTE — NURSING NOTE
Received colonoscopy order from [facility] dated 2/23/17.  Called/LMTCB re: hospital of choice--[facility] or [facility].  Will fax order and all other relevant info once pt returns call.

## 2022-02-16 NOTE — PROGRESS NOTES
Chief Complaint    c/o left ear plugged, would like ear lavage.  History of Present Illness      Here with bilateral plugged sensation in both ears.  He feels a bit off balance.  Has history or cerumen impaction.  Review of Systems          ROS reviewed an negative except for symptoms noted in HPI.            Physical Exam   Vitals & Measurements    T: 96.6   F (Tympanic)  HR: 85(Peripheral)  BP: 190/98  SpO2: 94%     HT: 71 in  WT: 197.2 lb  BMI: 27.5           General:  Alert and oriented, No acute distress.            Eye:  Normal conjunctiva.            HENT:  Normocephalic, Oral mucosa is moist, No pharyngeal erythema, No sinus tenderness.                 Ear: Right ear, Canal occluded with cerumen               Ear: Left ear, Canal occluded with cerumen          Neck:  Supple, Non-tender, No lymphadenopathy.            Respiratory:  Respirations are non-labored.            Cardiovascular:  Normal rate               Psychiatric:  Cooperative, Appropriate mood & affect.    Assessment/Plan       1. Impacted cerumen of both ears (H61.23)         cerumen was removed with combination of curet and lavage revealing normal TM         Follow up if balance issues do not resolve over the next few days.           Patient Information     Name:CELIA LUTZ      Address:      [address] [place] [id number]     Sex:Male     YOB: 1945     [phone]     Emergency Contact:MICHAEL LUTZ     MRN:[id number]     FIN:[id number]     Location:[facility]     Date of Service:03/31/2020      Primary Care Physician:       Yong Boyd MD, [phone]      Attending Physician:       Carlos Guerrero MD, [phone]  Problem List/Past Medical History    Ongoing     AAA (abdominal aortic aneurysm)     Adenocarcinoma of lung     Anticoagulated     Ragsdale Esophagus     Cardiac arrhythmia     CKD (chronic kidney disease) stage 3, GFR 30-59 ml/min     Closed fracture of trochanter  of femur     Closed hip fracture     COPD (chronic obstructive pulmonary disease) with acute bronchitis     Coronary artery disease due to lipid rich plaque     DJD (Degenerative Joint Disease)     Former Smoker     GERD (gastroesophageal reflux disease)     H/O prostate cancer     H/O unilateral nephrectomy     History of oropharyngeal cancer     HTN (Hypertension)     Lipids abnormal    Historical     *Hospitalized@[facility] - Atypical chest pain     *Hospitalized@[facility] - Hip fracture     *Hospitalized@[facility] Septic hip vs hematoma     History of sepsis       Comments: Sepsis organ failure: Acute renal failure. Severe; due to septic hip.     Inpatient stay       Comments: @[facility] - NSTEMI.     Inpatient stay       Comments: [facility], [place] - Chest pain, acute renal failure.     Prostate cancer  Procedure/Surgical History     Coronary angiography (11/25/2019)      Comments: and PCI of the right coronary artery..     Colonoscopy (03/21/2017)      Comments: Indication: Adenomatous Polyps      Sedation: MAC      Findings: Adenomatous polyp cecum, hemorrohids      Rec: Repeat in 5 years.     Right Hip Bipoloar Hemiarthroplasty (06/22/2016)      Comments: Right displasced femoral neck fracture.     right thoracotomy (11/15/2015)      Comments: wedge resection right upper lung nodule, wedge resection right middle lobe lung nodule, Mediastinal lymph node dissection.     Colonoscopy (05/12/2014)      Comments: Sedation: midazolam, fentanyl      Indication: screen      5-6mm tubular adenom x3, 8mm tubular adenoma x1, 12mm tubular adenoma with advance adenoma due to size      Repeat in 3 years..     nepherectomy (03.2009)     Lumbar discectomy (2006)     Left salivary gland removal (1995)     Oral surgery (1994)     Aortic aneurysm, abdominal  Medications    Advair  mcg-21 mcg/inh inhalation aerosol, See Instructions, 1 refills    Advil PM, 2 tab(s), Oral, hs, PRN    aspirin 81 mg oral tablet, chewable, 81  mg= 1 tab(s), Chewed, daily    clopidogrel 75 mg oral tablet, 75 mg= 1 tab(s), Oral, daily    Crestor 40 mg oral tablet, 40 mg= 1 tab(s), Oral, daily    magnesium oxide 250 mg oral tablet, 250 mg= 1 tab(s), Oral, daily    metoprolol succinate 25 mg oral capsule, extended release, 25 mg= 1 cap(s), Oral, daily    Multiple Vitamins oral tablet, 1 tab(s), Oral, daily    omeprazole 20 mg oral delayed release capsule, 20 mg= 1 cap(s), Oral, daily    spacer for inhaler, See Instructions    Ventolin HFA 90 mcg/inh inhalation aerosol, 2 puff(s), NEB, q4 hrs, PRN    Vitamin B12 500 mcg oral tablet, 500 mcg= 1 tab(s), Oral, daily  Allergies    No Known Medication Allergies  Social History    Smoking Status - 03/31/2020     Former smoker     Alcohol      Current, 2 times per week, 3 drinks/episode average., 01/22/2020     Employment/School      Employed, 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 Spouse/Partner name: Darlene Chau., 03/19/2014     Nutrition/Health      Type of diet: Regular., 03/19/2014     Sexual      Sexually active: Yes., 03/06/2012     Substance Abuse      Never, 03/19/2014     Tobacco      Past, 20 per day. 50 year(s)., 03/17/2011  Family History    Alcohol abuse: Mother and Sister.    CA - Breast cancer: Sister.    Lupus: Sister.    Obese: Sister.    SMA type III: Grandfather (M).  Immunizations      Vaccine Date Status          influenza virus vaccine, inactivated 11/05/2019 Recorded          influenza virus vaccine, inactivated 09/23/2018 Recorded          influenza virus vaccine, inactivated 09/19/2017 Given          influenza virus vaccine, inactivated 11/01/2016 Recorded          Td 08/18/2016 Recorded              Comments : [8/23/2016] [place] [facility]          influenza virus vaccine, inactivated 10/28/2015 Given          pneumococcal (PCV13) 04/22/2015 Given           pneumococcal (PPSV23) 10/16/2014 Recorded          influenza virus vaccine, inactivated 10/07/2014 Given          influenza virus vaccine, inactivated 09/16/2014 Recorded          influenza virus vaccine, inactivated 10/08/2013 Given          ZOS, shingles 03/23/2013 Recorded          influenza virus vaccine, inactivated 09/24/2012 Given          pneumococcal (PPSV23) 03/06/2012 Given          influenza virus vaccine, inactivated 09/08/2011 Given          influenza virus vaccine, inactivated 11/18/2010 Given          pneumococcal (PPSV23) 02/16/2007 Recorded          pneumococcal (PPSV23) 01/01/2007 Recorded          influenza virus vaccine, inactivated 11/07/2006 Recorded          Td 02/03/2005 Recorded          Td 01/01/2005 Recorded  Lab Results       Lab Results (Last 4 results within 90 days)        Sodium Level: 140 [135 mmol/L - 145 mmol/L] (01/22/20 11:03:00)       Sodium Level: 138 mmol/L [135 mmol/L - 146 mmol/L] (01/15/20 11:00:00)       Sodium Level: 141 [135 mmol/L - 145 mmol/L] (01/08/20 09:55:00)       Sodium Level TR: 141 mEq/L (01/04/20 15:18:00)       Potassium Level: 5.1 High [3.5 mmol/L - 5 mmol/L] (01/22/20 11:03:00)       Potassium Level: 4.4 mmol/L [3.5 mmol/L - 5.3 mmol/L] (01/15/20 11:00:00)       Potassium Level: 5.1 High [3.5 mmol/L - 5 mmol/L] (01/08/20 09:55:00)       Potassium Level TR: 4.5 mEq/L (01/04/20 15:18:00)       Chloride Level: 102 [98 mmol/L - 110 mmol/L] (01/22/20 11:03:00)       Chloride Level: 101 mmol/L [98 mmol/L - 110 mmol/L] (01/15/20 11:00:00)       Chloride Level: 106 [98 mmol/L - 110 mmol/L] (01/08/20 09:55:00)       Chloride TR: 106 mEq/L (01/04/20 15:18:00)       CO2 Level: 26 [21 mmol/L - 31 mmol/L] (01/22/20 11:03:00)       CO2 Level: 29 mmol/L [20 mmol/L - 32 mmol/L] (01/15/20 11:00:00)       CO2 Level: 26 [21 mmol/L - 31 mmol/L] (01/08/20 09:55:00)       CO2 TR: 22 mEq/L (01/04/20 15:18:00)       AGAP: 12 [5  - 18] (01/22/20 11:03:00)       AGAP: 9 [5  -  18] (01/08/20 09:55:00)       Anion Gap TR: 13 mEq/L (01/04/20 15:18:00)       Glucose Level: 104 High [65 mg/dL - 100 mg/dL] (01/22/20 11:03:00)       Glucose Level: 98 mg/dL [65 mg/dL - 139 mg/dL] (01/15/20 11:00:00)       Glucose Level: 105 High [65 mg/dL - 100 mg/dL] (01/08/20 09:55:00)       Glucose Level TR: 103 mg/dL (01/04/20 15:18:00)       BUN: 26 High [8 mg/dL - 25 mg/dL] (01/22/20 11:03:00)       BUN: 30 mg/dL High [7 mg/dL - 25 mg/dL] (01/15/20 11:00:00)       BUN: 46 High [8 mg/dL - 25 mg/dL] (01/08/20 09:55:00)       BUN TR: 60 mg/dL (01/04/20 15:18:00)       Creatinine Level: 2.38 High [0.72 mg/dL - 1.25 mg/dL] (01/22/20 11:03:00)       Creatinine Level: 2.5 mg/dL High [0.7 mg/dL - 1.18 mg/dL] (01/15/20 11:00:00)       Creatinine Level: 4.12 High [0.72 mg/dL - 1.25 mg/dL] (01/08/20 09:55:00)       Creatinine TR: 5.98 mg/dL (01/04/20 15:18:00)       BUN/Creat Ratio: 11 [10  - 20] (01/22/20 11:03:00)       BUN/Creat Ratio: 12 [6  - 22] (01/15/20 11:00:00)       BUN/Creat Ratio: 11 [10  - 20] (01/08/20 09:55:00)       BUN/Creatinine Ratio TR: 10 (01/04/20 15:18:00)       eGFR: 24 mL/min/1.73m2 Low (01/15/20 11:00:00)       eGFR TR: 9 mL/min (01/04/20 15:18:00)       eGFR : 32 Low (01/22/20 11:03:00)       eGFR : 28 mL/min/1.73m2 Low (01/15/20 11:00:00)       eGFR : 17 Low (01/08/20 09:55:00)       eGFR Non-: 27 Low (01/22/20 11:03:00)       eGFR Non-: 14 Low (01/08/20 09:55:00)       Calcium Level: 10.3 [8.5 mg/dL - 10.5 mg/dL] (01/22/20 11:03:00)       Calcium Level: 9.9 mg/dL [8.6 mg/dL - 10.3 mg/dL] (01/15/20 11:00:00)       Calcium Level: 10.2 [8.5 mg/dL - 10.5 mg/dL] (01/08/20 09:55:00)       Calcium TR: 9.5 mEq/dL (01/04/20 15:18:00)       Phosphorus Level TR: 4.3 mg/dL (01/04/20 15:18:00)       Bilirubin Total TR: 0.5 mg/dL (01/03/20 15:20:00)       Direct Bilirubin TR: 0.2 mg/dL [0 mg/dL - 0.3 mg/dL] (01/03/20  15:20:00)       Alkaline Phosphatase TR: 75 unit/L (01/03/20 15:20:00)       AST TR: 27 unit/L (01/03/20 15:20:00)       ALT TR: 15 unit/L (01/03/20 15:20:00)       Protein Total TR: 7 gm/dL (01/03/20 15:20:00)       Albumin Level TR: 3.9 g/dL (01/04/20 15:18:00)       Albumin Level TR: 3.9 g/dL (01/03/20 15:20:00)       Globulin TR: 3.1 g/dL (01/03/20 15:20:00)       A/G Ratio TR: 1.3 mg/dL (01/03/20 15:20:00)       Cholesterol: 162 mg/dL (02/25/20 08:02:00)       Cholesterol: 180 mg/dL (01/29/20 11:02:00)       Non-HDL Cholesterol: 98 (02/25/20 08:02:00)       Non-HDL Cholesterol: 113 (01/29/20 11:02:00)       HDL: 64 mg/dL (02/25/20 08:02:00)       HDL: 67 mg/dL (01/29/20 11:02:00)       Cholesterol/HDL Ratio: 2.5 (02/25/20 08:02:00)       Cholesterol/HDL Ratio: 2.7 (01/29/20 11:02:00)       LDL: 66 (02/25/20 08:02:00)       LDL: 86 (01/29/20 11:02:00)       Triglyceride: 308 mg/dL High (02/25/20 08:02:00)       Triglyceride: 167 mg/dL High (01/29/20 11:02:00)       PT TR: 13.8 (01/03/20 15:20:00)       INR TR: 1.1 (01/03/20 15:20:00)

## 2022-02-16 NOTE — TELEPHONE ENCOUNTER
---------------------  From: Esperanza Fernandez CMA (Phone Messages Pool ([id number]_[facility]))   To: Ronit Pharm D , Sara;     Sent: 7/13/2021 10:58:57 AM CDT  Subject: phone note- MTM     Time: 10:56am  Note: Patient called asking for call from Sara Cottrell in regards to Trelegy and upcoming appointment. Please call patient.[employer] Patient Assistance Program paperwork received from Patient.  Will fax paperwork to  today.Called to Patient to communicate.  Planning for MTM follow-up in 2 months, sooner if needed. RTC entered.  KB

## 2022-02-16 NOTE — CARE COORDINATION
ACTION PLAN   Goal(s):     Today s Date:      06/05/17                                                                             Resources provided:      Potential Challenges:       HCP Plan to manage CC Follow-Up Notes Goal completion date   polyarthritis pains  - lmited benefit with NSAIDs and APAP  s/p right MIRNA after hip fracture from fall (6/2016)   - normal DEXA (7/2015)  - doubt inflammatory arthritis; doubt polymyalgia rheumatica given no upper extremity involvement  - advised to trial off pravastatin for ~ 2 week trial; if substantial improvement in symptoms, should contact clinic for further instructions.  If no change in symptoms, I want to resume statin  - referral to Ortho for further assessment   (09/12/17) LMTCB at 0922am  (08/31/17) LMTCB at 1141am    (07/17/17) CC called Mitchell at 526pm.  He is doing well.  He is working with Ortho and going to PT and may see another specialist Dr. Miranda for more injections.  He has no furher questions or concenrs at this time. He would like a return call next month, however will call if anything changes in the meantime.    (07/13/17) LMTCB at 126pm    (06/05/17) CC LMTCB at 137pm,  Mitchell called back at 147pm.  He is doing much better, he saw Dr. Smith a week or so after seeing BRKATERINE.  He did multiple x-rays of shoulders, hips, and knees.  He Has arthritis in the lower spine and hip, he did recieve an injection in his right shoulder and Knee.  He is agiain feeling much better. He has no further questions or concerns.    (05/15/17) CC LMTCB at 1204pm      HCP Plan to manage CC Follow-Up Notes Goal completion date   rostate Ca   - original prostate biopsy in '11, repeat earlier this fall; Ken 6   - watchful surveillance; q6 month PSA         HCP Plan to manage CC Follow-Up Notes Goal completion date   s/p right wedge resection of limited stage adenocarcinoma of lung (11/2015)         HCP Plan to manage CC Follow-Up Notes Goal completion date   COPD   - on  Advair 115/21mcg BID with noticeable improvement in breathing   - albuterol on rare occasions   - has not participated in pulmonary rehab             HCP Plan to manage CC Follow-Up Notes Goal completion date   post-operative LEILA/ CKD (11/2015), baseline SCr 1.3 (previous left nephrectomy for benign hemangioma)           HCP Plan to manage CC Follow-Up Notes Goal completion date   hypertension; controlled  current antihypertensive regimen: lisinopril/hctz 20/12.5mg daily, metoprolol 50mg BID  regimen changes: none  intolerance:  future titration/work-up plan:    - SBP <140 goal   - interval rise in SCr; recheck today           HCP Plan to manage CC Follow-Up Notes Goal completion date   AAA; previous endovascular repair   - following with [facility] vascular surgery; Dr. Lazo           HCP Plan to manage CC Follow-Up Notes Goal completion date          Health Maintenance   - CRC due in '17; arrange now; no anticipated future screening after this coming endoscopy   - q6 month PSA   - Prevacid 30mg BID   - enrolled in [facility]    Medications          *denotes recorded medication          Anti-static valved spacer chamber: See Instructions, use as directed with inhaler, 1 EA, 0 Refill(s).          ProAir HFA 90 mcg/inh inhalation aerosol: 2 puff(s), inh, qid, use with spacer chamber, PRN: as needed for wheezing, 1 EA, 11 Refill(s).          *aspirin 81 mg oral tablet: 81 mg, 1 tab(s), po, daily, tab(s).          *Vitamin B12 500 mcg oral tablet: 500 mcg, 1 tab(s), po, daily, 0 Refill(s).          *Advil PM: 2 tab(s), po, hs, PRN: as needed for insomnia, 0 Refill(s).          Advair  mcg-21 mcg/inh inhalation aerosol: 2 puff(s), inh, bid, for 30 day(s), rinse mouth and throat after use, 1 EA, 11 Refill(s).          hydrochlorothiazide-lisinopril 12.5 mg-20 mg oral tablet: 1 tab(s), PO, Daily, 90 tab(s), 3 Refill(s).          lansoprazole 30 mg oral delayed release capsule: 30 mg, 1 cap(s), PO, bid, 180 cap(s), 3  Refill(s).          Metoprolol Tartrate 50 mg oral tablet: 50 mg, 1 tab(s), po, bid, 180 tab(s), 2 Refill(s).          *Multiple Vitamins oral tablet: 1 tab(s), po, daily, 0 Refill(s).          pravastatin 80 mg oral tablet: 80 mg, 1 tab(s), po, daily, 90 tab(s), 2 Refill(s).          *zinc (as acetate) 50 mg oral capsule: 50 mg, 1 cap(s), po, daily, 0 Refill(s).    Allergies          No known allergies    Problems          Oropharyngeal cancer          AAA (Abdominal Aortic Aneurysm)          Lipids abnormal          HTN (Hypertension)          Prostate cancer          DJD (Degenerative Joint Disease)          Former Smoker          Solitary kidney          Solitary kidney          H/O unilateral nephrectomy          Ragsdale Esophagus          COPD (chronic obstructive pulmonary disease) with acute bronchitis          Adenocarcinoma of lung          Anticoagulated          Closed hip fracture          Coronary artery disease due to lipid rich plaque          GERD (gastroesophageal reflux disease)          Cardiac arrhythmia          CKD (chronic kidney disease) stage 3, GFR 30-59 ml/min          Closed fracture of trochanter of femur

## 2022-02-16 NOTE — PROGRESS NOTES
Patient:   CELIA LUTZ            MRN: [id number]            FIN: [id number]               Age:   75 years     Sex:  Male     :  1945   Associated Diagnoses:   Adenocarcinoma of lung; COPD (chronic obstructive pulmonary disease) with acute bronchitis; Prostate cancer; Acute renal failure superimposed on stage 3 chronic kidney disease; Coronary artery disease due to lipid rich plaque   Author:   Yong Boyd MD      Visit Information      Date of Service: 2021 12:59 pm  Performing Location: [facility]  Encounter#: [id number]      Primary Care Provider (PCP):  Yong Boyd MD    NPI# [id number]      Referring Provider:  Yong Boyd MD    NPI# [id number]      Chief Complaint   3/16/2021 1:06 PM CDT    f/u HTN - review results              Additional Information:No additional information recorded during visit.   Chief complaint and symptoms as noted above and confirmed with patient.  Recent lab and diagnostic studies reviewed with patient      History of Present Illness   10/14/2020: Returns to clinic for hospital f/u.  Recent admission to [facility] for LEILA with SCr rise to 7.5 and anemia, Hgb 6.7.  Renal function steadily improved with IVFs and cessation of lisinopril.  Transfused 1 unit PRBCs and seen by GI.  Decisions for outpatient endoscopy and EUS (h/o pancreatic cyst).  Scheduled for excisional biopsy of left sided oral lesions and planned laryngeal biopsy (recent avidity on PET scan) with JVT on 10/16 - procedure since postponed.  Feeling better since hospitalization.      2020: Mitchell returns to clinic for follow-up.  Recently been seen by Dr. Celis in my clinical absence earlier this month.  Complaints of worsening lower extremity swelling.  Did trial on furosemide without any noticeable perceived effect.  Did have a normal BNP.  He did have a repeat echocardiogram which demonstrated stable overall cardiac function.  States that edema typically worsens through the day and  improves overnight.  Has used compression stockings before with some neck success.  Main complaint today is worsening claudication.  Describes walking a progressively shorter distance due to leg pain especially on the right side.  Has been established with vascular surgery in the past related to aortic aneurysm repair though has not seen in several years.  Is planning on seeing Dr. Valentine next month related to his cardiac cares.  He has remained off of dual antiplatelet therapy and rosuvastatin since his episode of acute kidney injury earlier in the fall    3/16/21:  Mitchell returns for follow-up.  Seen by vascular surgery - stable features, normal appearing arterial studies (MADELINE, toe pressures).  Persistent lower extremity edema complaints.  Completed XRT for known lung cancer.  Planning to see urology in future to discuss rising PSA - wants to avoid surgery.           Review of Systems   Constitutional:  No fever, No chills.    Eye:  Negative except as documented in history of present illness.    Ear/Nose/Mouth/Throat:  Negative except as documented in history of present illness.    Respiratory:  Shortness of breath, No wheezing.    Cardiovascular:  Peripheral edema, No chest pain, No palpitations, No syncope.    Gastrointestinal:  No nausea, No vomiting, No heartburn, No abdominal pain.    Genitourinary:  Negative, No dysuria, No hematuria.    Hematology/Lymphatics:  No bruising tendency.    Endocrine:  No excessive thirst, No polyuria.    Immunologic:  No recurrent fevers.    Musculoskeletal:  Joint pain, Claudication, No muscle pain, No decreased range of motion, No trauma.    Neurologic:  Alert and oriented X4, No numbness, No tingling, No headache.       Health Status   Allergies:    Allergic Reactions (Selected)  No Known Medication Allergies   Medications:  (Selected)   Prescriptions  Prescribed  Advair Diskus 500 mcg-50 mcg inhalation powder: 1 puff, Inhale, bid, # 1 EA, 3 Refill(s), Type: Maintenance,  [id number], Needs appt for further refills, 1 puff Inhale bid, 71, in, 12/02/20 16:03:00 CST, Height Measured, 213, lb, 12/02/20 16:03:00 CST, Weight Measured  Trelegy Ellipta 200 mcg-62.5 mcg-25 mcg/inh inhalation powder: 1 puff(s), Inhale, daily, Instructions: Rinse mouth after use. Stop Advair and spiriva when you start this., # 180 blister, 1 Refill(s), Type: Maintenance, Pharmacy: [id number] [place] [facility], please file for pt, 1 puff(s) Inhale daily,Instr:Rinse mouth aft...  Ventolin HFA 90 mcg/inh inhalation aerosol: 2 puff(s), Inhale, q6 hrs, # 1 EA, 3 Refill(s), AD, Type: Maintenance, [facility]: [id number] [place] [facility], keep on file, 2 puff(s) Inhale q6 hrs, 71, in, 12/02/20 16:03:00 CST, Height Measured, 213, lb, 12/02/20 16:03:00 CST, Weight Measured  omeprazole 20 mg oral delayed release capsule: = 1 cap(s) ( 20 mg ), Oral, daily, # 90 cap(s), 3 Refill(s), Type: Maintenance, [id number], 1 cap(s) Oral daily, 71, in, 12/29/20 7:45:00 CST, Height Measured, 212.4, lb, 12/29/20 7:45:00 CST, Weight Measured  rosuvastatin 10 mg oral tablet: = 1 tab(s) ( 10 mg ), Oral, daily, # 90 tab(s), 3 Refill(s), Type: Maintenance, [id number] [place], 1 tab(s) Oral daily, 71, in, 12/29/20 7:45:00 CST, Height Measured, 212.4, lb, 12/29/20 7:45:00 CST, Weight Measured  spacer for inhaler: spacer for inhaler, See Instructions, Instructions: use with albuterol inhaler, Supply, # 1 EA, 0 Refill(s), Type: Maintenance, [id number] [place], use with albuterol inhaler  tiotropium 18 mcg inhalation capsule: = 1 cap(s) ( 18 mcg ), Inhale, daily, # 90 cap(s), 3 Refill(s), Type: Maintenance, [id number] [place], 1 cap(s) Inhale daily, 71, in, 06/04/20 11:00:00 CDT, Height Measured, 202, lb, 06/04/20 10:56:00 CDT, Weight Measured  Documented Medications  Documented  Multiple Vitamins oral tablet: 1 tab(s), po, daily, 0 Refill(s), Type: Maintenance  Tylenol Extra Strength PM: 2  tab(s), Oral, hs, 0 Refill(s), Type: Maintenance  Vitamin B12 500 mcg oral tablet: 1 tab(s) ( 500 mcg ), po, daily, 0 Refill(s), Type: Maintenance  Vitamin D3 1000 intl units oral tablet: ( 25 mcg ), Oral, daily, 0 Refill(s), Type: Maintenance  Zetia 10 mg oral tablet: = 1 tab(s) ( 10 mg ), Oral, daily, # 30 tab(s), 0 Refill(s), Type: Maintenance  magnesium oxide 250 mg oral tablet: 1 tab(s) ( 250 mg ), po, daily, 0 Refill(s), Type: Maintenance  metoprolol succinate 25 mg oral capsule, extended release: = 1 cap(s) ( 25 mg ), Oral, daily, 0 Refill(s), Type: Maintenance  polyethylene glycol 3350: ( 17 gm ), Oral, daily, 0 Refill(s), Type: Maintenance  zinc (as gluconate) 50 mg oral tablet: Instructions: 1 tab by mouth daily (Pt taking OTC), 0 Refill(s), Type: Maintenance,    Medications          *denotes recorded medication          spacer for inhaler: See Instructions, use with albuterol inhaler, 1 EA, 0 Refill(s).          *Tylenol Extra Strength PM: 2 tab(s), Oral, hs, 0 Refill(s).          Ventolin HFA 90 mcg/inh inhalation aerosol: 2 puff(s), Inhale, q6 hrs, 1 EA, 3 Refill(s).          *Vitamin D3 1000 intl units oral tablet: 25 mcg, Oral, daily, 0 Refill(s).          *Vitamin B12 500 mcg oral tablet: 500 mcg, 1 tab(s), po, daily, 0 Refill(s).          *Zetia 10 mg oral tablet: 10 mg, 1 tab(s), Oral, daily, 30 tab(s), 0 Refill(s).          Advair Diskus 500 mcg-50 mcg inhalation powder: 1 puff, Inhale, bid, 1 EA, 3 Refill(s).          Trelegy Ellipta 200 mcg-62.5 mcg-25 mcg/inh inhalation powder: 1 puff(s), Inhale, daily, Rinse mouth after use. Stop Advair and spiriva when you start this., 180 blister, 1 Refill(s).          *magnesium oxide 250 mg oral tablet: 250 mg, 1 tab(s), po, daily, 0 Refill(s).          *metoprolol succinate 25 mg oral capsule, extended release: 25 mg, 1 cap(s), Oral, daily, 0 Refill(s).          *Multiple Vitamins oral tablet: 1 tab(s), po, daily, 0 Refill(s).          omeprazole 20 mg  oral delayed release capsule: 20 mg, 1 cap(s), Oral, daily, 90 cap(s), 3 Refill(s).          *polyethylene glycol 3350: 17 gm, Oral, daily, 0 Refill(s).          rosuvastatin 10 mg oral tablet: 10 mg, 1 tab(s), Oral, daily, 90 tab(s), 3 Refill(s).          tiotropium 18 mcg inhalation capsule: 18 mcg, 1 cap(s), Inhale, daily, 90 cap(s), 3 Refill(s).          *zinc (as gluconate) 50 mg oral tablet: 1 tab by mouth daily (Pt taking OTC), 0 Refill(s).       Problem list:    All Problems  AAA (abdominal aortic aneurysm) / SNOMED CT 193669764 / Confirmed  Adenocarcinoma of lung / SNOMED CT 934764129 / Confirmed  Ragsdael Esophagus / SNOMED CT 0216873893 / Confirmed  CKD (chronic kidney disease) stage 3, GFR 30-59 ml/min / SNOMED CT 0186525521 / Confirmed  COPD (chronic obstructive pulmonary disease) with acute bronchitis / SNOMED CT 27391333 / Confirmed  Closed hip fracture / SNOMED CT 196597489 / Confirmed  Closed fracture of trochanter of femur / SNOMED CT 9614247694 / Confirmed  Cardiac arrhythmia / SNOMED CT 09825692 / Confirmed  Coronary artery disease due to lipid rich plaque / SNOMED CT 4070450352 / Confirmed  Lipids abnormal / SNOMED CT 433814822 / Confirmed  HTN (Hypertension) / SNOMED CT 94624622 / Confirmed  Former Smoker / SNOMED CT [id number] / Confirmed  GERD (gastroesophageal reflux disease) / SNOMED CT 8584549206 / Confirmed  Anticoagulated / SNOMED CT 849763574 / Confirmed  History of oropharyngeal cancer / SNOMED CT 4995150983 / Confirmed  H/O prostate cancer / SNOMED CT 6504038968 / Confirmed  Lung cancer / SNOMED CT 080079174 / Confirmed  DJD (Degenerative Joint Disease) / SNOMED CT 1437177343 / Confirmed  H/O unilateral nephrectomy / SNOMED CT 430308253 / Confirmed  Resolved: *Hospitalized@[facility] - Atypical chest pain  Resolved: *Hospitalized@[facility] - Hip fracture  Resolved: *Hospitalized@[facility] Septic hip vs hematoma  Resolved: History of sepsis / SNOMED CT  9389598851  Resolved: Inpatient stay / SNOMED CT 615071967  Resolved: Inpatient stay / SNOMED CT 943161794  Resolved: Inpatient stay / SNOMED CT 677922172  Resolved: Prostate cancer / SNOMED CT 083355550  Canceled: Oropharyngeal cancer / SNOMED CT 474933253  Canceled: Solitary kidney / SNOMED CT 314000706  Canceled: Solitary kidney / SNOMED CT 401959335      Histories   Past Medical History:    Active  AAA (abdominal aortic aneurysm) (759958198)  Coronary artery disease due to lipid rich plaque (3058504690)  GERD (gastroesophageal reflux disease) (5235577404)  Cardiac arrhythmia (27635117)  CKD (chronic kidney disease) stage 3, GFR 30-59 ml/min (5718597108)  Lung cancer (045364834)  Resolved  Inpatient stay (612025601): Onset on 10/8/2020 at 75 years.  Resolved on 10/11/2020 at 75 years.  Comments:  10/12/2020 CDT 3:36 PM CDT - Dayan Musa  @ [facility] due to acute renal failure.  Inpatient stay (519328360): Onset on 1/3/2020 at 74 years.  Resolved on 1/4/2020 at 74 years.  Comments:  1/17/2020 CST 1:41 PM CST - Primo Yoly  [facility], [place] - Chest pain, acute renal failure.  Inpatient stay (534069559): Onset on 11/24/2019 at 74 years.  Resolved on 11/26/2019 at 74 years.  Comments:  12/10/2019 CST 1:46 PM CST - Jessi Eid  @[facility] - NSTEMI.  *Hospitalized@[facility] - Septic hip vs hematoma: Onset on 7/3/2016 at 70 years.  Resolved on 7/7/2016 at 70 years.  History of sepsis (0816538955): Onset on 7/3/2016 at 70 years.  Resolved.  Comments:  7/25/2016 CDT 2:14 PM CDT - Alma Barfield  Severe; due to septic hip.    7/25/2016 CDT 3:37 PM CDT - Alma Barfield  Sepsis organ failure: Acute renal failure.  *Hospitalized@[facility] - Hip fracture: Onset on 6/21/2016 at 70 years.  Resolved on 6/24/2016 at 70 years.  *Hospitalized@[facility] - Atypical chest pain: Onset on 5/16/2015 at 69 years.  Resolved on 5/16/2015 at 69 years.  Prostate cancer (960819119):  Resolved.   Family History:    [place] - Breast  cancer  Sister (Alexa, )  Lupus  Sister (Rebecca)  Alcohol abuse  Sister (Alexa, )  Mother ()  SMA type III  Grandfather (M)  Obese  Sister (Sujey, )     Procedure history:    Excision of squamous cell carcinoma (1530758644) on 2020 at 75 Years.  Comments:  2020 10:15 AM CST - Yoly Tillman  Left buccal mucosa and left lateral tongue.  Coronary angiography (63597960) on 2019 at 74 Years.  Comments:  12/10/2019 1:47 PM CST - Jessi Eid  and PCI of the right coronary artery.  Esophagogastroduodenoscopy (884885763) on 2019 at 73 Years.  Comments:  10/12/2020 3:44 PM CDT - Dayan Musa  Endoscopic US, Fine Needle Aspiration; Gastric Biopsies of polyps.  Colonoscopy ([id number]) on 3/21/2017 at 71 Years.  Comments:  3/22/2017 2:33 PM CDT - Filippo Barbour MD  Indication: Adenomatous Polyps  Sedation: MAC  Findings: Adenomatous polyp cecum, hemorrohids  Rec: Repeat in 5 years  Thoracoscopic wedge resection of lung (5355704711) in 2016 at 71 Years.  Comments:  10/12/2020 3:40 PM NINOT - Dayan Musa  RUL, L  Right Hip Bipoloar Hemiarthroplasty on 2016 at 70 Years.  Comments:  2016 7:26 AM NINOT - Debby Faustin CMA  Right displasced femoral neck fracture  right thoracotomy on 11/15/2015 at 70 Years.  Comments:  2015 9:07 AM MINGO - Debby Faustin CMA  wedge resection right upper lung nodule, wedge resection right middle lobe lung nodule, Mediastinal lymph node dissection  Colonoscopy ([id number]) on 2014 at 68 Years.  Comments:  5/15/2014 10:56 AM CDT - Livier Stallings RN  Sedation: midazolam, fentanyl  Indication: screen  5-6mm tubular adenom x3, 8mm tubular adenoma x1, 12mm tubular adenoma with advance adenoma due to size  Repeat in 3 years.  nepherectomy in the month of 3/2009 at 63 Years.  Lumbar discectomy in  at 61 Years.  Left salivary gland removal in  at 50 Years.  Oral surgery in  at 49 Years.  Aortic aneurysm, abdominal  ([id number]).   Social History:        Electronic Cigarette/Vaping Assessment            Electronic Cigarette Use: Never.      Alcohol Assessment            Current, Liquor (Hard) (1.5 oz), 1-2 times per month, 2 drinks/episode average.      Tobacco Assessment            Past, 20 per day.  49 year(s).            Former smoker, quit more than 30 days ago      Substance Abuse Assessment            Never      Employment and Education Assessment            Retired, Highest education level: High school.      Home and Environment Assessment            Marital status: .  Spouse/Partner name: Darlene Chau.  4 children.  Living situation: Home/Independent.               Injuries/Abuse/Neglect in household: No.  Feels unsafe at home: No.  Family/Friends available for support:               Yes.      Nutrition and Health Assessment            Type of diet: Regular.  Wants to lose weight: No.  Sleeping concerns: No.  Feels highly stressed: No.      Exercise and Physical Activity Assessment            Exercise frequency: Occasional.      Sexual Assessment            Sexually active: No.  Identifies as male, Sexual orientation: Straight or heterosexual.  Contraceptive Use               Details: None.        Physical Examination   vital signs stable, as noted above   Vital Signs   3/16/2021 1:06 PM CDT Temperature Tympanic 98.1 DegF    Peripheral Pulse Rate 65 bpm    Systolic Blood Pressure 129 mmHg    Diastolic Blood Pressure 86 mmHg  HI    Mean Arterial Pressure 100 mmHg    Oxygen Saturation 86 %  LOW      Measurements from flowsheet : Measurements   3/16/2021 1:06 PM CDT Height Measured - Standard 71 in    Weight Measured - Standard 212.2 lb    BSA 2.19 m2    Body Mass Index 29.59 kg/m2  HI      General:  Alert and oriented, No acute distress.    Eye:  Extraocular movements are intact.    HENT:  Normocephalic, Oral mucosa is moist, No pharyngeal erythema, dentures.    Neck:  Supple, Previous right  sided neck dissection with flap.    Respiratory:  Lungs are clear to auscultation, Respirations are non-labored, posterior thoracotomy scar.    Cardiovascular:  Normal rate, Regular rhythm, No murmur, 1+ pitting edema bilaterally.    Gastrointestinal:  Soft, Non-tender, Non-distended.    Neurologic:  Alert, Oriented, Normal motor function, No focal deficits.    Cognition and Speech:  Oriented, Speech clear and coherent.    Psychiatric:  Appropriate mood & affect.       Review / Management   Results review:  Lab results   3/9/2021 12:45 PM CST Sodium Level 138 mmol/L    Potassium Level 4.9 mmol/L    Chloride Level 99 mmol/L    CO2 Level 30 mmol/L    Glucose Level 129 mg/dL  HI    BUN 16 mg/dL    Creatinine 1.71 mg/dL  HI    BUN/Creat Ratio 9    eGFR 38 mL/min/1.73m2  LOW    eGFR African American 44 mL/min/1.73m2  LOW    Calcium Level 10.3 mg/dL    Magnesium Level 1.9 mg/dL    Bili Total 1.0 mg/dL    Bili Direct 0.2 mg/dL    Bili Indirect 0.8    Alk Phos 121 unit/L    AST/SGOT 35 unit/L    ALT/SGPT 24 unit/L    Protein Total 6.5 gm/dL    Albumin Level 3.8 gm/dL    Globulin 2.7    A/G Ratio 1.4    Vitamin B12 Level 810 pg/mL    PSA 23.5 ng/mL  HI    WBC 4.6    RBC 4.13  LOW    Hgb 13.7 gm/dL    Hct 39.8 %    MCV 96.4 fL    MCH 33.2 pg  HI    MCHC 34.4 gm/dL    RDW 14.3 %    Platelet 131  LOW    MPV 9.5 fL   12/14/2020 11:04 AM CST Sodium Level 135 mmol/L    Potassium Level 4.1 mmol/L    Chloride Level 95 mmol/L    CO2 Level 29 mmol/L    AGAP 11    Glucose Level 149 mg/dL    BUN 15 mg/dL    Creatinine 2.38 mg/dL    BUN/Creat Ratio 6    eGFR  32    eGFR Non-African American 27    Calcium Level 9.4 mg/dL   12/11/2020 2:35 PM CST B-Natriuretic Peptide 58 pg/mL   .       Impression and Plan   Diagnosis     Adenocarcinoma of lung (LRV43-VK C34.90).     COPD (chronic obstructive pulmonary disease) with acute bronchitis (MVR46-HL J44.1).     Prostate cancer (JAP83-EI C61).     Acute renal failure superimposed  on stage 3 chronic kidney disease (ZIN87-IH N17.9).     Coronary artery disease due to lipid rich plaque (HUH83-UJ I25.10).         .) chronic lower extremity edema  - lower extremity venous duplex (12/2020): no DVT  - TTE: preserved LVEF, no further regional WMAs  - BNP 50s  - will start on furosemide 40mg daily and increase to BID on prn basis based on degree of edema    .) CKD; baseline SCr 1.5-1.9   - multiple LEILA episodes; h/o contrast induced nephropathy (VITOR)  previous multiple LEILA (prior h/o of LEILA in 1/2020, 10/2020); no previous dialysis needs  - lisinopril indefinitely on hold  - heightened risk for future contrast induced nephropathy    .) 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; now with recurrence  - surgical resection by JVT (11/2020)    .) COPD   - on Advair 500/50mcg BID + Spiriva   - looking to change to Trelegy in near future    .) s/p right wedge resection of limited stage adenocarcinoma of lung (11/2015)  - 1-2/2021 XRT to MELODY malignancy (PET avid)    .) hypertension; controlled  current antihypertensive regimen: Toprol XL 25mg daily, furosemide 40mg daily  regimen changes: none  intolerance:  future titration/work-up plan:     .) NSTEMI with PCI to RCA with multi-vessel disease (non-obstructive LAD lesion); following with Dr. Valentine  - DAPT stopped (10/2020): profound anemia, concerns for GIB  - Toprol XL 25mg daily  - rosuvastatin 10mg daily    .) prostate Ca; watchful waiting   - original prostate biopsy in '11, repeat biopsy in '16; [facility] 6   - watchful surveillance; q6 month PSA; last PSA 23 (rising)   - MRI of prostate (2019): focal coarse calcifications in prostate; no evidence of extra-prostate extension   - following with Dr. Bustos - advised he follow up in near future     .) pancreatic cyst; stable on last EUS/EGD on 10/23/2020; Dr. Edwards  - recommending repeat imaging in  2 yrs (noted stenotic CBD)  - on omeprazole 20mg BID    .) h/o AAA, endovascular aortic repair, follows with   [facility]    .) polyarthritis pains  - limited benefit with NSAIDs and APAP  s/p right MIRNA after hip fracture from fall (6/2016)   - normal DEXA (7/2015)  - working with Ortho - lasting benefits from intra-articular injections    .) health maintenance   - CRC due in '22   - q6 month PSA   - Prevacid 30mg BID   - enrolled in [facility]    RTC in 4 months

## 2022-02-16 NOTE — NURSING NOTE
Quick Intake Entered On:  6/4/2020 11:00 AM CDT    Performed On:  6/4/2020 11:00 AM CDT by Debby Faustin CMA               Summary   Height Measured :   71 in(Converted to: 5 ft 11 in, 180.34 cm)    Systolic Blood Pressure :   163 mmHg (HI)    Diastolic Blood Pressure :   94 mmHg (HI)    Mean Arterial Pressure :   117 mmHg   Race :      Languages :   English   Ethnicity :   Not  or    Debby Faustin CMA - 6/4/2020 11:00 AM CDT

## 2022-02-16 NOTE — LETTER
(Inserted Image. Unable to display)   [address] [place] [address]  July 28, 2021      CELIA LUTZ  [address] [place] [id number]        Dear CELIA,     Thank you for selecting [facility] for your healthcare needs. Below you will find the results of your recent test(s) done at our clinic.      Stable appearing labs overall.        Result Name Current Result Previous Result Reference Range   Sodium Level (mmol/L)  139 7/23/2021  138 3/9/2021 135 - 146   Potassium Level (mmol/L)  5.0 7/23/2021  4.9 3/9/2021 3.5 - 5.3   Chloride Level (mmol/L)  101 7/23/2021  99 3/9/2021 98 - 110   CO2 Level (mmol/L)  30 7/23/2021  30 3/9/2021 20 - 32   Glucose Level (mg/dL) ((H)) 121 7/23/2021 ((H)) 129 3/9/2021 65 - 99   BUN (mg/dL) ((H)) 29 7/23/2021  16 3/9/2021 7 - 25   Creatinine Level (mg/dL) ((H)) 1.59 7/23/2021 ((H)) 1.71 3/9/2021 0.70 - 1.18   BUN/Creat Ratio  18 7/23/2021  9 3/9/2021 6 - 22   eGFR (mL/min/1.73m2) ((L)) 42 7/23/2021 ((L)) 38 3/9/2021 > OR = 60 -    eGFR  (mL/min/1.73m2) ((L)) 48 7/23/2021 ((L)) 44 3/9/2021 > OR = 60 -    Calcium Level (mg/dL)  10.0 7/23/2021  10.0 7/23/2021 8.6 - 10.3   Calcium Level (mg/dL)  10.0 7/23/2021  10.0 7/23/2021 8.6 - 10.3   Phosphorus Level (mg/dL)  3.5 7/23/2021  2.1 - 4.3   PTH Intact (pg/mL)  29 7/23/2021  14 - 64   U Creatinine (mg/dL)  86 7/23/2021  20 - 320   U Microalbumin (mg/dL)  58.7 7/23/2021  See Note: -    Ur Microalbumin/Creatinine Ratio ((H)) 683 7/23/2021   - <30   Hgb (gm/dL)  14.6 7/23/2021  13.7 3/9/2021 13.2 - 17.1       Please contact me or my assistant at [phone] if you have any questions or concerns.     Sincerely,        Yong Boyd MD    What do your labs mean?  Below is a glossary of commonly ordered labs:  LDL - Bad Cholesterol  HDL - Good Cholesterol  AST/ALT - Liver Function  Cr/Creatinine - Kidney Function  Microalbumin - Kidney Function   BUN - Kidney Function  PSA - Prostate   TSH - Thyroid Hormone  HgbA1c - Diabetes Test  Hgb (Hemoglobin) - Red Blood Cells

## 2022-02-16 NOTE — CARE COORDINATION
Patient:   CELIA LUTZ            MRN: [id number]            FIN: [id number]               Age:   74 years     Sex:  Male     :  1945   Associated Diagnoses:   None   Author:   Kamila Farooq CMA      Care Coordination Hospital Discharge Note    Sources of Information:  [ X  ] Patient, family member, or caregiver (Please list): 1007am on 19 Pt was sleeping, wife took down phone number and will have pt call.  They have questions about his medications, CC offered to go through them with her, she declined and said they will just bring them to his appointment.   [   ] Hospital discharge summary  [   ] Hospital fax  [   ] List of recent hospitalizations or ED visits  [   ] Other:   Last Attending: Liz Carvalho MD    Discharged From:  [facility]  Admission Date: 19  Discharge Date:19  Discharge Plan: [facility] to home  Diagnosis/Problem:  NSTEMI     Medication Changes: [ X  ] Yes [   ] No   Medication List Updated: [  X ] Yes [   ] No: Hospital Med List at Discharge Reconciled with Current Med List   Medications          *denotes recorded medication          spacer for inhaler: See Instructions, use with albuterol inhaler, 1 EA, 0 Refill(s).          Ventolin HFA 90 mcg/inh inhalation aerosol: 2 puff(s), NEB, q4 hrs, PRN: AS NEEDED FOR COUGH OR SHORTNESS OF BREATH, 3 EA, 0 Refill(s).          *aspirin 81 mg oral tablet, chewable: 81 mg, 1 tab(s), Chewed, daily, 0 Refill(s).          *clopidogrel 75 mg oral tablet: 75 mg, 1 tab(s), Oral, daily, 0 Refill(s).          *Vitamin B12 500 mcg oral tablet: 500 mcg, 1 tab(s), po, daily, 0 Refill(s).          *Advil PM: 2 tab(s), po, hs, PRN: as needed for insomnia, 0 Refill(s).          *Flonase 50 mcg/inh nasal spray: 1 spray(s), nasal, daily, 0 Refill(s).          Advair  mcg-21 mcg/inh inhalation aerosol: See Instructions, INHALE 2 PUFFS BY MOUTH TWICE DAILY. RINSE MOUTH AND THROAT AFTER USE, 3 EA, 1 Refill(s).           *lansoprazole 15 mg oral delayed release capsule: 15 mg, 1 cap(s), PO, Daily, 90 cap(s), 0 Refill(s).          *lisinopril 5 mg oral tablet: 5 mg, 1 tab(s), Oral, daily, 0 Refill(s).          *magnesium oxide 250 mg oral tablet: 250 mg, 1 tab(s), po, daily, 0 Refill(s).          *metoprolol succinate 25 mg oral capsule, extended release: 25 mg, 1 cap(s), Oral, daily, 0 Refill(s).          *Multiple Vitamins oral tablet: 1 tab(s), po, daily, 0 Refill(s).          *potassium gluconate: 99 mg, daily, 0 Refill(s).          *Crestor 40 mg oral tablet: 40 mg, 1 tab(s), Oral, daily, 0 Refill(s).    Needs Referral or Lab: [ X  ] Yes [   ] No: Obstructive Sleep Apnea Referral, BMP at next clinic visit; repeat lipid panel in 4-6 weeks; new goal LDL is <70  Outpatient Provider Recommendations:  Monitor for recurrence of angina, encourage smoking cessation. He is having brief sinus pauses at night, suspect undiagnosed obstructive sleep apnea. Medication regimen changes: hctz was stopped, Lisinopril 5mg daily was added, toprolol 25mg daily replaces metoprolol 50mg twice daily, plavix 75mg daily is new, crestor 40mg daily replaces simvastatin.  BMP at next clinic visit; repeat lipid panel in 4-6 weeks; new goal LDL is <70  Needs Follow-up Appointment:  [ X  ] Within 1-5 days of discharge (highly complex visit)--Cardiology appt  [   ] Within 14 days of discharge (moderately complex visit)    Appointment Made With:  MYRA   Date:  11/29/19    Additional Information Needed and Requested:  [   ] Yes:  Cardiac Diet  [   ] Noappt completed

## 2022-02-16 NOTE — LETTER
(Inserted Image. Unable to display)                       2020  Re:  CELIA LUTZ  :  1945      David David MD  [address] [place] [address]      Dear  Dr. David,    The following patient has been referred to your office/practice:  CELIA LUTZ     Appointment is pending. Please call patient to schedule.        Please refer to the attached clinical documentation for a summary of CELIA's care.  Please do not hesitate to contact our office if any additional clinical questions arise. All relevant records and transition of care documents should be mailed or faxed.     Your assistance in providing continuity of care is appreciated.         Sincerely,   [facility]   [address]  P) [phone] (F) [phone]

## 2022-02-16 NOTE — NURSING NOTE
Comprehensive Intake Entered On:  12/11/2020 1:44 PM CST    Performed On:  12/11/2020 1:39 PM CST by So Meehan CMA               Summary   Chief Complaint :   Follow up bilateral leg edema/pain, edema worse at night    Weight Measured :   213.8 lb(Converted to: 213 lb 13 oz, 96.978 kg)    Height Measured :   71 in(Converted to: 5 ft 11 in, 180.34 cm)    Body Mass Index :   29.82 kg/m2 (HI)    Body Surface Area :   2.2 m2   Systolic Blood Pressure :   130 mmHg   Diastolic Blood Pressure :   70 mmHg   Mean Arterial Pressure :   90 mmHg   Peripheral Pulse Rate :   108 bpm (HI)    BP Site :   Right arm   Pulse Site :   Radial artery   BP Method :   Manual   HR Method :   Electronic   Temperature Tympanic :   97.5 DegF(Converted to: 36.4 DegC)  (LOW)    Oxygen Saturation :   95 %   Race :      Languages :   English   Ethnicity :   Not  or    So Meehan CMA - 12/11/2020 1:39 PM CST   Health Status   Allergies Verified? :   Yes   Medication History Verified? :   Yes   Medical History Verified? :   No   Pre-Visit Planning Status :   Completed   Tobacco Use? :   Former smoker   So Meehan CMA - 12/11/2020 1:39 PM CST   Consents   Consent for Immunization Exchange :   Consent Granted   Consent for Immunizations to Providers :   Consent Granted   So Meehan CMA - 12/11/2020 1:39 PM CST   Meds / Allergies   (As Of: 12/11/2020 1:44:29 PM CST)   Allergies (Active)   No Known Medication Allergies  Estimated Onset Date:   Unspecified ; Created By:   Debby Faustin CMA; Reaction Status:   Active ; Category:   Drug ; Substance:   No Known Medication Allergies ; Type:   Allergy ; Updated By:   Debby Faustin CMA; Reviewed Date:   12/11/2020 1:42 PM CST        Medication List   (As Of: 12/11/2020 1:44:29 PM CST)   Prescription/Discharge Order    albuterol  :   albuterol ; Status:   Prescribed ; Ordered As Mnemonic:   Ventolin HFA 90 mcg/inh inhalation aerosol ; Simple Display Line:   2 puff(s),  Inhale, q6 hrs, 1 EA, 3 Refill(s) ; Ordering Provider:   Yong Boyd MD; Catalog Code:   albuterol ; Order Dt/Tm:   12/2/2020 5:00:50 PM CST          fluticasone-salmeterol  :   fluticasone-salmeterol ; Status:   Prescribed ; Ordered As Mnemonic:   Advair Diskus 500 mcg-50 mcg inhalation powder ; Simple Display Line:   1 puff, Inhale, bid, 1 EA, 3 Refill(s) ; Ordering Provider:   Yong Boyd MD; Catalog Code:   fluticasone-salmeterol ; Order Dt/Tm:   12/2/2020 4:31:18 PM CST          furosemide  :   furosemide ; Status:   Prescribed ; Ordered As Mnemonic:   furosemide 20 mg oral tablet ; Simple Display Line:   20 mg, 1 tab(s), Oral, daily, for 7 day(s), 30 EA, 0 Refill(s) ; Ordering Provider:   Severino Cannon PA-C; Catalog Code:   furosemide ; Order Dt/Tm:   12/8/2020 4:53:47 PM CST          Miscellaneous Rx Supply  :   Miscellaneous Rx Supply ; Status:   Prescribed ; Ordered As Mnemonic:   spacer for inhaler ; Simple Display Line:   See Instructions, use with albuterol inhaler, 1 EA, 0 Refill(s) ; Ordering Provider:   Severino Cannon PA-C; Catalog Code:   Miscellaneous Rx Supply ; Order Dt/Tm:   1/9/2018 2:18:32 PM CST          tiotropium  :   tiotropium ; Status:   Prescribed ; Ordered As Mnemonic:   tiotropium 18 mcg inhalation capsule ; Simple Display Line:   18 mcg, 1 cap(s), Inhale, daily, 90 cap(s), 3 Refill(s) ; Ordering Provider:   Yong Boyd MD; Catalog Code:   tiotropium ; Order Dt/Tm:   6/4/2020 11:17:16 AM CDT            Home Meds    acetaminophen-diphenhydramine  :   acetaminophen-diphenhydramine ; Status:   Documented ; Ordered As Mnemonic:   Tylenol Extra Strength PM ; Simple Display Line:   2 tab(s), Oral, hs, 0 Refill(s) ; Catalog Code:   acetaminophen-diphenhydramine ; Order Dt/Tm:   6/4/2020 10:56:08 AM CDT          albuterol  :   albuterol ; Status:   Documented ; Ordered As Mnemonic:   albuterol 2.5 mg/3 mL (0.083%) inhalation solution ; Simple Display Line:   2.5 mg, 3 mL, Inhale, q6 hrs,  PRN: for wheezing, 25 EA, 0 Refill(s) ; Catalog Code:   albuterol ; Order Dt/Tm:   10/14/2020 1:32:54 PM CDT          cyanocobalamin  :   cyanocobalamin ; Status:   Documented ; Ordered As Mnemonic:   Vitamin B12 500 mcg oral tablet ; Simple Display Line:   500 mcg, 1 tab(s), po, daily, 0 Refill(s) ; Catalog Code:   cyanocobalamin ; Order Dt/Tm:   11/17/2015 10:02:05 AM CST          ezetimibe  :   ezetimibe ; Status:   Documented ; Ordered As Mnemonic:   Zetia 10 mg oral tablet ; Simple Display Line:   10 mg, 1 tab(s), Oral, daily, 30 tab(s), 0 Refill(s) ; Catalog Code:   ezetimibe ; Order Dt/Tm:   10/12/2020 5:39:23 PM CDT          magnesium oxide  :   magnesium oxide ; Status:   Documented ; Ordered As Mnemonic:   magnesium oxide 250 mg oral tablet ; Simple Display Line:   250 mg, 1 tab(s), po, daily, 0 Refill(s) ; Catalog Code:   magnesium oxide ; Order Dt/Tm:   4/5/2018 4:37:34 PM CDT          metoprolol  :   metoprolol ; Status:   Documented ; Ordered As Mnemonic:   metoprolol succinate 25 mg oral capsule, extended release ; Simple Display Line:   25 mg, 1 cap(s), Oral, daily, 0 Refill(s) ; Catalog Code:   metoprolol ; Order Dt/Tm:   11/27/2019 9:58:05 AM CST          multivitamin  :   multivitamin ; Status:   Documented ; Ordered As Mnemonic:   Multiple Vitamins oral tablet ; Simple Display Line:   1 tab(s), po, daily, 0 Refill(s) ; Catalog Code:   multivitamin ; Order Dt/Tm:   4/22/2015 9:12:40 AM CDT          omeprazole  :   omeprazole ; Status:   Documented ; Ordered As Mnemonic:   omeprazole 20 mg oral delayed release capsule ; Simple Display Line:   20 mg, 1 cap(s), Oral, daily, 90 cap(s), 0 Refill(s) ; Catalog Code:   omeprazole ; Order Dt/Tm:   1/29/2020 4:57:06 PM CST          polyethylene glycol 3350  :   polyethylene glycol 3350 ; Status:   Documented ; Ordered As Mnemonic:   polyethylene glycol 3350 ; Simple Display Line:   17 gm, Oral, daily, 0 Refill(s) ; Catalog Code:   polyethylene glycol 3350 ;  Order Dt/Tm:   10/12/2020 5:40:00 PM CDT            ID Risk Screen   Recent Travel History :   No recent travel   Family Member Travel History :   No recent travel   Other Exposure to Infectious Disease :   Unknown   So Meehan CMA - 12/11/2020 1:39 PM CST   Social History   Social History   (As Of: 12/11/2020 1:44:29 PM CST)   Alcohol:        Current, Liquor (Hard) (1.5 oz), 1-2 times per month, 2 drinks/episode average.   (Last Updated: 10/12/2020 1:47:42 PM CDT by Mariah Wilkes)          Tobacco:        Past, 20 per day.  50 year(s).   (Last Updated: 3/17/2011 2:21:14 PM CDT by Rose Barr CMA)   Former smoker, quit more than 30 days ago   (Last Updated: 12/2/2020 4:04:23 PM CST by Debby Faustin CMA)          Electronic Cigarette/Vaping:        Electronic Cigarette Use: Never.   (Last Updated: 10/12/2020 1:47:53 PM CDT by Mariah Wilkes)          Substance Abuse:        Never   (Last Updated: 3/19/2014 10:32:10 AM CDT by Brooke Krishnan)          Employment/School:        Retired, Highest education level: High school.   (Last Updated: 10/12/2020 1:48:08 PM CDT by Mariah Wilkes)          Home/Environment:        Marital status: .  Spouse/Partner name: Darlene Chau.  4 children.  Living situation: Home/Independent.  Injuries/Abuse/Neglect in household: No.  Feels unsafe at home: No.  Family/Friends available for support: Yes.   (Last Updated: 10/12/2020 1:48:26 PM CDT by Mariah Wilkes)          Nutrition/Health:        Type of diet: Regular.  Wants to lose weight: No.  Sleeping concerns: No.  Feels highly stressed: No.   (Last Updated: 10/12/2020 1:48:37 PM CDT by Mariah Wilkes)          Exercise:        Exercise frequency: Occasional.   (Last Updated: 10/12/2020 1:48:50 PM CDT by Mariah Wilkes)          Sexual:        Sexually active: No.  Identifies as male, Sexual orientation: Straight or heterosexual.  Contraceptive Use Details: None.   (Last Updated: 10/12/2020 1:49:26 PM CDT by Mariah Wilkes)

## 2022-02-16 NOTE — PROGRESS NOTES
Chief Complaint    Follow up bilateral leg edema/pain, edema worse at night  History of Present Illness       Both legs below the knee are swelling by the evening. Swelling began last Saturday. Swelling decreased in the morning. Sleeping well. Denies nocturnal dyspnea.  Started furosemide 20 mg daily without significant benefit.       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  Also had serum creatinine 7.5 that improved with intravenous fluids and stopping lisinopril.       Recent hgb10.9 and creatinine1.92       Had heart attack a year ago. Had negative leg ultrasounds three days ago.  Review of Systems       No fevers       No new shortness of breath as has COPD       No chest pain       Echocardiogram October with EF 45-50%       Reviewed Cardiology note [facility] 2020  Physical Exam   Vitals & Measurements    T: 97.5  F (Tympanic)  HR: 108 (Peripheral)  BP: 130/70  SpO2: 95%     HT: 71 in  WT: 213.8 lb  BMI: 29.82        General: No acute distress       Neck: No lymphadenopathy       Lungs: Clear       Heart: Regular rate and rhythm       Abdomen: Soft, nontender and nondistended       Extremities: Pitting edema bilaterally  Assessment/Plan       Peripheral edema: Will schedule echocardiogram and increase Lasix to 20 mg twice a day for 1 week and then follow-up.       Addendum 12/29 Echocardiogram:        Final Conclusion                                                                                                               Previous Study:  11-25-19             1.  Mild inferior hypokinesis             2.  Estimated left ventricular ejection fraction 55%             3.  Normal right ventricular systolic function.             4.  No significant valvular heart disease.             5.  Moderate enlargement, aortic root  (4.8 cm) and ascending aorta (4.6  cm)                     Compared to prior echocardiogram (25 November 2019), the inferior wall function has improved and left ventricular ejection fraction has increased.       Talked with patient and legs are still swollen but a little better in the day and increased at night          Patient Information     Name:CELIA LUTZ      Address:      [address] [place] [phone]     Sex:Male     YOB: 1945     [phone]     Emergency Contact:MICHAEL LUTZ     MRN:[id number]     FIN:[id number]     [facility]     Date of Service:12/11/2020      Primary Care Physician:       Yong Boyd MD, [phone]      Attending Physician:       Carlos Celis MD, [phone]  Problem List/Past Medical History    Ongoing     AAA (abdominal aortic aneurysm)     Adenocarcinoma of lung     Anticoagulated     [place] Esophagus     Cardiac arrhythmia     CKD (chronic kidney disease) stage 3, GFR 30-59 ml/min     Closed fracture of trochanter of femur     Closed hip fracture     COPD (chronic obstructive pulmonary disease) with acute bronchitis     Coronary artery disease due to lipid rich plaque     DJD (Degenerative Joint Disease)     Former Smoker     GERD (gastroesophageal reflux disease)     H/O prostate cancer     H/O unilateral nephrectomy     History of oropharyngeal cancer     HTN (Hypertension)     Lipids abnormal     Lung cancer    Historical     *Hospitalized@[facility] - Atypical chest pain     *Hospitalized@[facility] - Hip fracture     *Hospitalized@[facility] Septic hip vs hematoma     History of sepsis       Comments: Sepsis organ failure: Acute renal failure. Severe; due to septic hip.     Inpatient stay       Comments: @[facility] - NSTEMI.     Inpatient stay       Comments: [place], [place] - Chest pain, acute renal failure.     Inpatient stay       Comments: @ [facility] due to acute renal failure.     Prostate cancer  Medications    Advair  Diskus 500 mcg-50 mcg inhalation powder, 1 puff, Inhale, bid, 3 refills    albuterol 2.5 mg/3 mL (0.083%) inhalation solution, 2.5 mg= 3 mL, Inhale, q6 hrs, PRN    furosemide 20 mg oral tablet, 20 mg= 1 tab(s), Oral, daily    magnesium oxide 250 mg oral tablet, 250 mg= 1 tab(s), Oral, daily    metoprolol succinate 25 mg oral capsule, extended release, 25 mg= 1 cap(s), Oral, daily    Multiple Vitamins oral tablet, 1 tab(s), Oral, daily    omeprazole 20 mg oral delayed release capsule, 20 mg= 1 cap(s), Oral, daily    polyethylene glycol 3350, 17 gm, Oral, daily    spacer for inhaler, See Instructions    tiotropium 18 mcg inhalation capsule, 18 mcg= 1 cap(s), Inhale, daily, 3 refills    Tylenol Extra Strength PM, 2 tab(s), Oral, hs    Ventolin HFA 90 mcg/inh inhalation aerosol, 2 puff(s), Inhale, q6 hrs, 3 refills    Vitamin B12 500 mcg oral tablet, 500 mcg= 1 tab(s), Oral, daily    Zetia 10 mg oral tablet, 10 mg= 1 tab(s), Oral, daily  Allergies    No Known Medication Allergies

## 2022-02-16 NOTE — CARE COORDINATION
ACTION PLAN   Goal(s):     Today s Date:      06/05/17                                                                             Resources provided:      Potential Challenges:       HCP Plan to manage CC Follow-Up Notes Goal completion date   polyarthritis pains  - lmited benefit with NSAIDs and APAP  s/p right MIRNA after hip fracture from fall (6/2016)   - normal DEXA (7/2015)  - doubt inflammatory arthritis; doubt polymyalgia rheumatica given no upper extremity involvement  - advised to trial off pravastatin for ~ 2 week trial; if substantial improvement in symptoms, should contact clinic for further instructions.  If no change in symptoms, I want to resume statin  - referral to Ortho for further assessment     (06/05/17) CC LMTCB at 137pm,  Mitchell called back at 147pm.  He is doing much better, he saw Dr. Smith a week or so after seeing [facility].  He did multiple x-rays of shoulders, hips, and knees.  He Has arthritis in the lower spine and hip, he did recieve an injection in his right shoulder and Knee.  He is agiain feeling much better. He has no further questions or concerns.    (05/15/17) CC LMTCB at 1204pm      HCP Plan to manage CC Follow-Up Notes Goal completion date   rostate Ca   - original prostate biopsy in '11, repeat earlier this fall; [facility]   - watchful surveillance; q6 month PSA         HCP Plan to manage CC Follow-Up Notes Goal completion date   s/p right wedge resection of limited stage adenocarcinoma of lung (11/2015)         HCP Plan to manage CC Follow-Up Notes Goal completion date   COPD   - on Advair 115/21mcg BID with noticeable improvement in breathing   - albuterol on rare occasions   - has not participated in pulmonary rehab             HCP Plan to manage CC Follow-Up Notes Goal completion date   post-operative LEILA/ CKD (11/2015), baseline SCr 1.3 (previous left nephrectomy for benign hemangioma)           HCP Plan to manage CC Follow-Up Notes Goal completion date   hypertension;  controlled  current antihypertensive regimen: lisinopril/hctz 20/12.5mg daily, metoprolol 50mg BID  regimen changes: none  intolerance:  future titration/work-up plan:    - SBP <140 goal   - interval rise in SCr; recheck today           HCP Plan to manage CC Follow-Up Notes Goal completion date   AAA; previous endovascular repair   - following with [facility] vascular surgery; Dr. Lazo           HCP Plan to manage CC Follow-Up Notes Goal completion date          Health Maintenance   - CRC due in '17; arrange now; no anticipated future screening after this coming endoscopy   - q6 month PSA   - Prevacid 30mg BID   - enrolled in [facility]    Medications          *denotes recorded medication          Anti-static valved spacer chamber: See Instructions, use as directed with inhaler, 1 EA, 0 Refill(s).          ProAir HFA 90 mcg/inh inhalation aerosol: 2 puff(s), inh, qid, use with spacer chamber, PRN: as needed for wheezing, 1 EA, 11 Refill(s).          *aspirin 81 mg oral tablet: 81 mg, 1 tab(s), po, daily, tab(s).          *Vitamin B12 500 mcg oral tablet: 500 mcg, 1 tab(s), po, daily, 0 Refill(s).          *Advil PM: 2 tab(s), po, hs, PRN: as needed for insomnia, 0 Refill(s).          Advair  mcg-21 mcg/inh inhalation aerosol: 2 puff(s), inh, bid, for 30 day(s), rinse mouth and throat after use, 1 EA, 11 Refill(s).          hydrochlorothiazide-lisinopril 12.5 mg-20 mg oral tablet: 1 tab(s), PO, Daily, 90 tab(s), 3 Refill(s).          lansoprazole 30 mg oral delayed release capsule: 30 mg, 1 cap(s), PO, bid, 180 cap(s), 3 Refill(s).          Metoprolol Tartrate 50 mg oral tablet: 50 mg, 1 tab(s), po, bid, 180 tab(s), 2 Refill(s).          *Multiple Vitamins oral tablet: 1 tab(s), po, daily, 0 Refill(s).          pravastatin 80 mg oral tablet: 80 mg, 1 tab(s), po, daily, 90 tab(s), 2 Refill(s).          *zinc (as acetate) 50 mg oral capsule: 50 mg, 1 cap(s), po, daily, 0 Refill(s).    Allergies          No known  allergies    Problems          Oropharyngeal cancer          AAA (Abdominal Aortic Aneurysm)          Lipids abnormal          HTN (Hypertension)          Prostate cancer          DJD (Degenerative Joint Disease)          Former Smoker          Solitary kidney          Solitary kidney          H/O unilateral nephrectomy          [place] Esophagus          COPD (chronic obstructive pulmonary disease) with acute bronchitis          Adenocarcinoma of lung          Anticoagulated          Closed hip fracture          Coronary artery disease due to lipid rich plaque          GERD (gastroesophageal reflux disease)          Cardiac arrhythmia          CKD (chronic kidney disease) stage 3, GFR 30-59 ml/min          Closed fracture of trochanter of femur

## 2022-02-16 NOTE — PROGRESS NOTES
Patient:   CELIA LUTZ            MRN: [id number]            FIN: [id number]               Age:   75 years     Sex:  Male     :  1945   Associated Diagnoses:   Adenocarcinoma of lung; COPD (chronic obstructive pulmonary disease) with acute bronchitis; Prostate cancer; Acute renal failure superimposed on stage 3 chronic kidney disease; Coronary artery disease due to lipid rich plaque   Author:   Yong Boyd MD      Visit Information      Date of Service: 2020 07:36 am  Performing Location: [facility]  Encounter#: [id number]      Primary Care Provider (PCP):  Yong Boyd MD    NPI# [id number]      Referring Provider:  Yong Boyd MD    NPI# [id number]      Chief Complaint   2020 7:45 AM CST   f/u  leg swelling, pain - inner lower leg pain with walking              Additional Information:No additional information recorded during visit.   Chief complaint and symptoms as noted above and confirmed with patient.  Recent lab and diagnostic studies reviewed with patient      History of Present Illness   10/14/2020: Returns to clinic for hospital f/u.  Recent admission to [facility] for LEILA with SCr rise to 7.5 and anemia, Hgb 6.7.  Renal function steadily improved with IVFs and cessation of lisinopril.  Transfused 1 unit PRBCs and seen by GI.  Decisions for outpatient endoscopy and EUS (h/o pancreatic cyst).  Scheduled for excisional biopsy of left sided oral lesions and planned laryngeal biopsy (recent avidity on PET scan) with JVT on 10/16 - procedure since postponed.  Feeling better since hospitalization.      2020: Mitchell returns to clinic for follow-up.  Recently been seen by Dr. Celis in my clinical absence earlier this month.  Complaints of worsening lower extremity swelling.  Did trial on furosemide without any noticeable perceived effect.  Did have a normal BNP.  He did have a repeat echocardiogram which demonstrated stable overall cardiac function.  States that edema typically  worsens through the day and improves overnight.  Has used compression stockings before with some neck success.  Main complaint today is worsening claudication.  Describes walking a progressively shorter distance due to leg pain especially on the right side.  Has been established with vascular surgery in the past related to aortic aneurysm repair though has not seen in several years.  Is planning on seeing Dr. Valentine next month related to his cardiac cares.  He has remained off of dual antiplatelet therapy and rosuvastatin since his episode of acute kidney injury earlier in the fall         Review of Systems   Constitutional:  No fever, No chills.    Eye:  Negative except as documented in history of present illness.    Ear/Nose/Mouth/Throat:  Negative except as documented in history of present illness.    Respiratory:  Shortness of breath, No wheezing.    Cardiovascular:  Peripheral edema, No chest pain, No palpitations, No syncope.    Gastrointestinal:  No nausea, No vomiting, No heartburn, No abdominal pain.    Genitourinary:  No dysuria, No hematuria.    Hematology/Lymphatics:  No bruising tendency.    Endocrine:  No excessive thirst, No polyuria.    Immunologic:  No recurrent fevers.    Musculoskeletal:  Joint pain, Claudication, No muscle pain, No decreased range of motion, No trauma.    Neurologic:  Alert and oriented X4, No numbness, No tingling, No headache.       Health Status   Allergies:    Allergic Reactions (Selected)  No Known Medication Allergies   Medications:  (Selected)   Prescriptions  Prescribed  Advair Diskus 500 mcg-50 mcg inhalation powder: 1 puff, Inhale, bid, # 1 EA, 3 Refill(s), Type: Maintenance, Pharmacy: [place] [facility] [id number] [facility], Needs appt for further refills, 1 puff Inhale bid, 71, in, 12/02/20 16:03:00 CST, Height Measured, 213, lb, 12/02/20 16:03:00 CST, Weight Measured  Ventolin HFA 90 mcg/inh inhalation aerosol: 2 puff(s), Inhale, q6 hrs, # 1 EA, 3 Refill(s), AD, Type: Maintenance,  Pharmacy: [id number], keep on file, 2 puff(s) Inhale q6 hrs, 71, in, 12/02/20 16:03:00 CST, Height Measured, 213, lb, 12/02/20 16:03:00 CST, Weight Measured  rosuvastatin 10 mg oral tablet: = 1 tab(s) ( 10 mg ), Oral, daily, # 90 tab(s), 3 Refill(s), Type: Maintenance, [id number], 1 tab(s) Oral daily, 71, in, 12/29/20 7:45:00 CST, Height Measured, 212.4, lb, 12/29/20 7:45:00 CST, Weight Measured  spacer for inhaler: spacer for inhaler, See Instructions, Instructions: use with albuterol inhaler, Supply, # 1 EA, 0 Refill(s), Type: Maintenance, [id number], use with albuterol inhaler  tiotropium 18 mcg inhalation capsule: = 1 cap(s) ( 18 mcg ), Inhale, daily, # 90 cap(s), 3 Refill(s), Type: Maintenance, [id number], 1 cap(s) Inhale daily, 71, in, 06/04/20 11:00:00 CDT, Height Measured, 202, lb, 06/04/20 10:56:00 CDT, Weight Measured  Documented Medications  Documented  Multiple Vitamins oral tablet: 1 tab(s), po, daily, 0 Refill(s), Type: Maintenance  Tylenol Extra Strength PM: 2 tab(s), Oral, hs, 0 Refill(s), Type: Maintenance  Vitamin B12 500 mcg oral tablet: 1 tab(s) ( 500 mcg ), po, daily, 0 Refill(s), Type: Maintenance  Zetia 10 mg oral tablet: = 1 tab(s) ( 10 mg ), Oral, daily, # 30 tab(s), 0 Refill(s), Type: Maintenance  albuterol 2.5 mg/3 mL (0.083%) inhalation solution: = 3 mL ( 2.5 mg ), Inhale, q6 hrs, PRN: for wheezing, # 25 EA, 0 Refill(s), Type: Maintenance  magnesium oxide 250 mg oral tablet: 1 tab(s) ( 250 mg ), po, daily, 0 Refill(s), Type: Maintenance  metoprolol succinate 25 mg oral capsule, extended release: = 1 cap(s) ( 25 mg ), Oral, daily, 0 Refill(s), Type: Maintenance  omeprazole 20 mg oral delayed release capsule: = 1 cap(s) ( 20 mg ), Oral, daily, # 90 cap(s), 0 Refill(s), Type: Maintenance  polyethylene glycol 3350: ( 17 gm ), Oral, daily, 0 Refill(s), Type: Maintenance,    Medications          *denotes recorded medication           spacer for inhaler: See Instructions, use with albuterol inhaler, 1 EA, 0 Refill(s).          *Tylenol Extra Strength PM: 2 tab(s), Oral, hs, 0 Refill(s).          *albuterol 2.5 mg/3 mL (0.083%) inhalation solution: 2.5 mg, 3 mL, Inhale, q6 hrs, PRN: for wheezing, 25 EA, 0 Refill(s).          Ventolin HFA 90 mcg/inh inhalation aerosol: 2 puff(s), Inhale, q6 hrs, 1 EA, 3 Refill(s).          *Vitamin B12 500 mcg oral tablet: 500 mcg, 1 tab(s), po, daily, 0 Refill(s).          *Zetia 10 mg oral tablet: 10 mg, 1 tab(s), Oral, daily, 30 tab(s), 0 Refill(s).          Advair Diskus 500 mcg-50 mcg inhalation powder: 1 puff, Inhale, bid, 1 EA, 3 Refill(s).          *magnesium oxide 250 mg oral tablet: 250 mg, 1 tab(s), po, daily, 0 Refill(s).          *metoprolol succinate 25 mg oral capsule, extended release: 25 mg, 1 cap(s), Oral, daily, 0 Refill(s).          *Multiple Vitamins oral tablet: 1 tab(s), po, daily, 0 Refill(s).          *omeprazole 20 mg oral delayed release capsule: 20 mg, 1 cap(s), Oral, daily, 90 cap(s), 0 Refill(s).          *polyethylene glycol 3350: 17 gm, Oral, daily, 0 Refill(s).          rosuvastatin 10 mg oral tablet: 10 mg, 1 tab(s), Oral, daily, 90 tab(s), 3 Refill(s).          tiotropium 18 mcg inhalation capsule: 18 mcg, 1 cap(s), Inhale, daily, 90 cap(s), 3 Refill(s).       Problem list:    All Problems  AAA (abdominal aortic aneurysm) / SNOMED CT 210547491 / Confirmed  Adenocarcinoma of lung / SNOMED CT 185894947 / Confirmed  [place] Esophagus / SNOMED CT 9332096281 / Confirmed  CKD (chronic kidney disease) stage 3, GFR 30-59 ml/min / SNOMED CT 2178478718 / Confirmed  COPD (chronic obstructive pulmonary disease) with acute bronchitis / SNOMED CT 42099419 / Confirmed  Closed hip fracture / SNOMED CT 622268846 / Confirmed  Closed fracture of trochanter of femur / SNOMED CT 0963955098 / Confirmed  Cardiac arrhythmia / SNOMED CT 94425276 / Confirmed  Coronary artery disease due to lipid rich  plaque / SNOMED CT 0711702549 / Confirmed  Lipids abnormal / SNOMED CT 935964131 / Confirmed  HTN (Hypertension) / SNOMED CT 82209942 / Confirmed  Former Smoker / SNOMED CT [id number] / Confirmed  GERD (gastroesophageal reflux disease) / SNOMED CT 1563051104 / Confirmed  Anticoagulated / SNOMED CT 245297458 / Confirmed  History of oropharyngeal cancer / SNOMED CT 7587642779 / Confirmed  H/O prostate cancer / SNOMED CT 7250208460 / Confirmed  Lung cancer / SNOMED CT 127263534 / Confirmed  DJD (Degenerative Joint Disease) / SNOMED CT 1856482568 / Confirmed  H/O unilateral nephrectomy / SNOMED CT 800023259 / Confirmed  Resolved: *Hospitalized@[facility] - Atypical chest pain  Resolved: *Hospitalized@[facility] - Hip fracture  Resolved: *Hospitalized@[facility] Septic hip vs hematoma  Resolved: History of sepsis / SNOMED CT 8682621081  Resolved: Inpatient stay / SNOMED CT 838796723  Resolved: Inpatient stay / SNOMED CT 567948915  Resolved: Inpatient stay / SNOMED CT 275800927  Resolved: Prostate cancer / SNOMED CT 084775798  Canceled: Oropharyngeal cancer / SNOMED CT 344399559  Canceled: Solitary kidney / SNOMED CT 230126046  Canceled: Solitary kidney / SNOMED CT 415692434      Histories   Past Medical History:    Active  AAA (abdominal aortic aneurysm) (280662157)  Coronary artery disease due to lipid rich plaque (7095659043)  GERD (gastroesophageal reflux disease) (2359705389)  Cardiac arrhythmia (96546767)  CKD (chronic kidney disease) stage 3, GFR 30-59 ml/min (7623230403)  Lung cancer (824115289)  Resolved  Inpatient stay (349417233): Onset on 10/8/2020 at 75 years.  Resolved on 10/11/2020 at 75 years.  Comments:  10/12/2020 CDT 3:36 PM CDT - Dayan Musa  @ [facility] due to acute renal failure.  Inpatient stay (992715082): Onset on 1/3/2020 at 74 years.  Resolved on 1/4/2020 at 74 years.  Comments:  1/17/2020 CST 1:41 PM CST - Primo , [facility] [place] - Chest pain, acute renal  failure.  Inpatient stay (364136470): Onset on 2019 at 74 years.  Resolved on 2019 at 74 years.  Comments:  12/10/2019 CST 1:46 PM Jessi Fisher  @[facility] - NSTEMI.  *Hospitalized@[facility] Septic hip vs hematoma: Onset on 7/3/2016 at 70 years.  Resolved on 2016 at 70 years.  History of sepsis (3014798694): Onset on 7/3/2016 at 70 years.  Resolved.  Comments:  2016 CDT 2:14 PM CDT - Alma Barfield  Severe; due to septic hip.    2016 CDT 3:37 PM CDT - Alma Barfield  Sepsis organ failure: Acute renal failure.  *Hospitalized@[facility] [place] - Hip fracture: Onset on 2016 at 70 years.  Resolved on 2016 at 70 years.  *Hospitalized@[facility] [place] - Atypical chest pain: Onset on 2015 at 69 years.  Resolved on 2015 at 69 years.  Prostate cancer (724935999):  Resolved.   Family History:    CA - Breast cancer  Sister (Alexa, )  Lupus  Sister (Rebecca)  Alcohol abuse  Sister (Alexa, )  Mother ()  SMA type III  Grandfather (M)  Obese  Sister (Sujey, )     Procedure history:    Excision of squamous cell carcinoma (8166612725) on 2020 at 75 Years.  Comments:  2020 10:15 AM CST - Yoly Tillman  Left buccal mucosa and left lateral tongue.  Coronary angiography (60640142) on 2019 at 74 Years.  Comments:  12/10/2019 1:47 PM CST - Jessi Eid  and PCI of the right coronary artery.  Esophagogastroduodenoscopy (118078692) on 2019 at 73 Years.  Comments:  10/12/2020 3:44 PM CDT - Dayan Musa  Endoscopic US, Fine Needle Aspiration; Gastric Biopsies of polyps.  Colonoscopy (420740690) on 3/21/2017 at 71 Years.  Comments:  3/22/2017 2:33 PM CDT - Filippo Barbour MD  Indication: Adenomatous Polyps  Sedation: MAC  Findings: Adenomatous polyp cecum, hemorrohids  Rec: Repeat in 5 years  Thoracoscopic wedge resection of lung (5377520161) in 2016 at 71 Years.  Comments:  10/12/2020 3:40 PM NINOT - Dayan Musa, AMBERL  Right Hip Bipoloar  Hemiarthroplasty on 6/22/2016 at 70 Years.  Comments:  6/28/2016 7:26 AM CDT - Ramin Debby HOWARD  Right displasced femoral neck fracture  right thoracotomy on 11/15/2015 at 70 Years.  Comments:  11/17/2015 9:07 AM MINGO - Ramin Debby HOWARD  wedge resection right upper lung nodule, wedge resection right middle lobe lung nodule, Mediastinal lymph node dissection  Colonoscopy ([id number]) on 5/12/2014 at 68 Years.  Comments:  5/15/2014 10:56 AM NINOT - Chandrakant GRAYSON, [place]  Sedation: midazolam, fentanyl  Indication: screen  5-6mm tubular adenom x3, 8mm tubular adenoma x1, 12mm tubular adenoma with advance adenoma due to size  Repeat in 3 years.  nepherectomy in the month of 3/2009 at 63 Years.  Lumbar discectomy in 2006 at 61 Years.  Left salivary gland removal in 1995 at 50 Years.  Oral surgery in 1994 at 49 Years.  Aortic aneurysm, abdominal ([id number]).   Social History:        Electronic Cigarette/Vaping Assessment            Electronic Cigarette Use: Never.      Alcohol Assessment            Current, Liquor (Hard) (1.5 oz), 1-2 times per month, 2 drinks/episode average.      Tobacco Assessment            Past, 20 per day.  49 year(s).            Former smoker, quit more than 30 days ago      Substance Abuse Assessment            Never      Employment and Education Assessment            Retired, Highest education level: High school.      Home and Environment Assessment            Marital status: .  Spouse/Partner name: Darlene Chau.  4 children.  Living situation: Home/Independent.               Injuries/Abuse/Neglect in household: No.  Feels unsafe at home: No.  Family/Friends available for support:               Yes.      Nutrition and Health Assessment            Type of diet: Regular.  Wants to lose weight: No.  Sleeping concerns: No.  Feels highly stressed: No.      Exercise and Physical Activity Assessment            Exercise frequency: Occasional.      Sexual Assessment             Sexually active: No.  Identifies as male, Sexual orientation: Straight or heterosexual.  Contraceptive Use               Details: None.        Physical Examination   vital signs stable, as noted above   Vital Signs   12/29/2020 7:45 AM CST Temperature Tympanic 97.7 DegF  LOW    Peripheral Pulse Rate 90 bpm    Systolic Blood Pressure 132 mmHg  HI    Diastolic Blood Pressure 78 mmHg    Mean Arterial Pressure 96 mmHg    Oxygen Saturation 96 %      Measurements from flowsheet : Measurements   12/29/2020 7:45 AM CST Height Measured - Standard 71 in    Weight Measured - Standard 212.4 lb    BSA 2.19 m2    Body Mass Index 29.62 kg/m2  HI      General:  Alert and oriented, No acute distress.    Eye:  Extraocular movements are intact.    HENT:  Normocephalic, Oral mucosa is moist, No pharyngeal erythema, dentures.    Neck:  Supple, Previous right sided neck dissection with flap.    Respiratory:  Lungs are clear to auscultation, Respirations are non-labored, posterior thoracotomy scar.    Cardiovascular:  Normal rate, Regular rhythm, No murmur, No edema, poor capillary refill in feet; faint pulses palpated in both dorsal pedal pulses; no bruits.    Gastrointestinal:  Soft, Non-tender, Non-distended.    Neurologic:  Alert, Oriented, Normal motor function, No focal deficits.    Cognition and Speech:  Oriented, Speech clear and coherent.    Psychiatric:  Appropriate mood & affect.       Review / Management   Results review:  Lab results   12/14/2020 11:04 AM CST Sodium Level 135 mmol/L    Potassium Level 4.1 mmol/L    Chloride Level 95 mmol/L    CO2 Level 29 mmol/L    AGAP 11    Glucose Level 149 mg/dL    BUN 15 mg/dL    Creatinine 2.38 mg/dL    BUN/Creat Ratio 6    eGFR  32    eGFR Non-African American 27    Calcium Level 9.4 mg/dL   12/11/2020 2:35 PM CST B-Natriuretic Peptide 58 pg/mL   11/23/2020 1:58 PM CST Sodium Level 137 mmol/L    Potassium Level 4.7 mmol/L    Chloride Level 100 mmol/L    CO2 Level 27  mmol/L    Glucose Level 246 mg/dL  HI    BUN 18 mg/dL    Creatinine 1.92 mg/dL  HI    BUN/Creat Ratio 9    eGFR 33 mL/min/1.73m2  LOW    eGFR African American 38 mL/min/1.73m2  LOW    Calcium Level 9.5 mg/dL    WBC 6.2    RBC 3.37  LOW    Hgb 10.9 gm/dL  LOW    Hct 32.2 %  LOW    MCV 95.5 fL    MCH 32.3 pg    MCHC 33.9 gm/dL    RDW 12.6 %    Platelet 221    MPV 9.5 fL    Lymphs 16.6 %    Abs Lymphs 1,029    Neutrophils 67.7 %    Abs Neutrophils 4,197    Monocytes 9.4 %    Abs Monocytes 583    Eosinophils 5.5 %    Abs Eosinophils 341    Basophils 0.8 %    Abs Basophils 50   10/20/2020 1:14 PM CDT Sodium Level 141 mmol/L    Potassium Level 4.7 mmol/L    Chloride Level 105 mmol/L    CO2 Level 26 mmol/L    AGAP 10    Glucose Level 133 mg/dL    BUN 20 mg/dL    Creatinine 2.59 mg/dL    BUN/Creat Ratio 8    eGFR  29    eGFR Non-African American 24    Calcium Level 10.1 mg/dL    WBC 7.1    RBC 3.00    Hgb 9.7 g/dL    Hct 30.9 %     fL    MCH 32.3 pg    MCHC 31.4 g/dL    RDW 14.8 %    Platelet 247    MPV 9.2 fL   10/14/2020 1:35 PM CDT Sodium Level 139 mmol/L    Potassium Level 3.9 mmol/L    Chloride Level 105 mmol/L    CO2 Level 25 mmol/L    AGAP 9    Glucose Level 166 mg/dL    BUN 27 mg/dL    Creatinine 3.53 mg/dL    BUN/Creat Ratio 8    eGFR  21    eGFR Non-African American 17    Calcium Level 8.8 mg/dL   10/12/2020 6:06 PM CDT Sodium Level 139 mmol/L    Potassium Level 4.1 mmol/L    Chloride Level 104 mmol/L    CO2 Level 24 mmol/L    Glucose Level 89 mg/dL    BUN 33 mg/dL  HI    Creatinine 3.93 mg/dL  HI    BUN/Creat Ratio 8    eGFR 14 mL/min/1.73m2  LOW    eGFR African American 16 mL/min/1.73m2  LOW    Calcium Level 9.1 mg/dL    Magnesium Level 1.4 mg/dL  LOW   10/12/2020 6:03 PM CDT WBC 7.6    RBC 2.65    Hgb 8.7 g/dL    Hct 26.9 %     fL    MCH 32.8 pg    MCHC 32.3 g/dL    RDW 15.4 %    Platelet 127    MPV 8.2 fL   10/6/2020 2:29 PM CDT Sodium Level 135 mmol/L    Potassium  Level 5.0 mmol/L    Chloride Level 103 mmol/L    CO2 Level 19 mmol/L  LOW    Glucose Level 92 mg/dL    BUN 33 mg/dL  HI    Creatinine 7.18 mg/dL  HI    BUN/Creat Ratio 5  LOW    eGFR 7 mL/min/1.73m2  LOW    eGFR African American 8 mL/min/1.73m2  LOW    Calcium Level 9.3 mg/dL   .       Impression and Plan   Diagnosis     Adenocarcinoma of lung (NNZ31-NK C34.90).     COPD (chronic obstructive pulmonary disease) with acute bronchitis (PDW29-UI J44.1).     Prostate cancer (JXX05-GB C61).     Acute renal failure superimposed on stage 3 chronic kidney disease (KNC10-EZ N17.9).     Coronary artery disease due to lipid rich plaque (UEC12-XE I25.10).         .) sub-acute lower extremity edema  - lower extremity venous duplex (12/2020): no DVT  - TTE: preserved LVEF, no further regional WMAs  - BNP 50s  - seems to be more related to vein disease - general reassurance and trial of compression stockings    .) claudication - worsening symptoms  - arrange for arterial US, ABIs  - vascular surgery referral  - seeing cardiology in near future - advised to discuss DAPT (stopped in setting of LEILA, profound anemia - concerns for GI bleeding)  - heightened risk for future contrast induced nephropathy    .) CKD; baseline SCr 1.5-1.9   - multiple LEILA episodes; h/o contrast induced nephropathy (VITOR)  previous multiple LEILA (prior h/o of LEILA in 1/2020, 10/2020); no previous dialysis needs  - BMP: SCr improving, down from 1.9  - lisinopril indefinitely on hold  - heightened risk for future contrast induced nephropathy    .) 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; now with recurrence  - surgical resection by JVT (11/2020)    .) COPD   - on Advair 500/50mcg BID + Spiriva   - consider repeat PFTs in the future  .) s/p right wedge resection of limited stage adenocarcinoma of lung (11/2015)   - new MELODY lung nodule; PET avid    - following with pulmonology/oncology - planned XRT     plan as previously outlined and not discussed at today's visit      .) hypertension; controlled  current antihypertensive regimen: Toprol XL 25mg daily  regimen changes: none  intolerance:  future titration/work-up plan:     .) NSTEMI with PCI to RCA with multi-vessel disease (non-obstructive LAD lesion); following with Dr. Valentine  - DAPT stopped (10/2020): profound anemia, concerns for GIB  - Toprol XL 25mg daily  - rosuvastatin 40mg  held with recent LEILA; will resume at 10mg daily    .) prostate Ca; watchful waiting   - original prostate biopsy in '11, repeat biopsy in '16; Ken 6   - watchful surveillance; q6 month PSA; last PSA 15   - MRI of prostate (2019): focal coarse calcifications in prostate; no evidence of extra-prostate extension   - following with Dr. Bustos - prefers not to pursue XRT at this point    .) pancreatic cyst; stable on last EUS/EGD on 10/23/2020; Dr. Edwards  - recommending repeat imaging in  2 yrs (noted stenotic CBD)  - on omeprazole 20mg BID    .) h/o AAA, endovascular aortic repair, follows with Dr. David    .) polyarthritis pains  - limited benefit with NSAIDs and APAP  s/p right IMRNA after hip fracture from fall (6/2016)   - normal DEXA (7/2015)  - working with Ortho - lasting benefits from intra-articular injections    .) health maintenance   - CRC due in '22   - q6 month PSA   - Prevacid 30mg BID   - enrolled in [facility]    RTC as previously scheduled

## 2022-02-16 NOTE — TELEPHONE ENCOUNTER
Entered by Obi Barrera CMA on December 31, 2020 1:44:33 PM CST  PCP:   [facility]    Medication:   furosemide  Last Filled:  12/11/20    Quantity:  28  Refills:  0    Date of last office visit and reason:   12/29/20 claudication/edema    Date of last Med Check / Px:     Date of last labs pertaining to condition:  BMP 12/14/20    Note:  Please advise on refills of this medication.  Obi Barrera CMA    Return to Clinic order placed?  yes RTC in 3 months.    Resource:   _  Phone:   _  Fax:  _          ------------------------------------------  From: [facility] [id number] [facility]  To: Severino Cannon PA-C  Sent: December 31, 2020 11:57:55 AM CST  Subject: Medication Management  Due: December 24, 2020 1:58:33 PM CST     ** On Hold Pending Signature **     Dispensed Drug: furosemide (furosemide 20 mg oral tablet), TAKE 1 TABLET BY MOUTH EVERY DAY FOR 7 DAYS  Quantity: 30 tab(s)  Days Supply: 30  Refills: 0  Substitutions Allowed  Notes from Pharmacy:  ---------------------------------------------------------------  From: Obi Barrera CMA (eRx Pool ([id number] [facility] [place]))   To: [facility] Message Pool ([id number] [facility] [place]);     Sent: 12/31/2020 1:44:41 PM CST  Subject: FW: Medication Management   Due Date/Time: 1/1/2021 11:57:00 AM CST---------------------  From: Debby Faustin CMA   To: [facility] [id number] [facility]    Sent: 12/31/2020 4:50:03 PM CST  Subject: FW: Medication Management     ** Not Approved: Refill not appropriate, pt not taking **  furosemide (FUROSEMIDE 20 MG TABLET)  TAKE 1 TABLET BY MOUTH EVERY DAY FOR 7 DAYS  Qty:  30 tab(s)        Days Supply:  30        Refills:  0          Substitutions Allowed     Route To [id number]   Signed by Debby Faustin CMA

## 2022-02-16 NOTE — LETTER
(Inserted Image. Unable to display)   December 14, 2020      CELIA LUTZ      [address]   [place], [place] [id number]        Dear CELIA,    Thank you for selecting [facility] for your healthcare needs.  Below you will find the results of your recent tests done at our clinic.     Creatinine is mildly increased from 3 weeks ago (doubt the increase would cause the swelling)      Result Name Current Result Previous Result Reference Range   Sodium Level (mmol/L)  135 12/14/2020  135 - 145   Potassium Level (mmol/L)  4.1 12/14/2020  3.5 - 5.0   Glucose Level (mg/dL) ((H)) 149 12/14/2020 ((H)) 246 11/23/2020  ((H)) 133 10/20/2020 65 - 100   Creatinine Level (mg/dL) ((H)) 2.38 12/14/2020 ((H)) 1.92 11/23/2020  ((H)) 2.59 10/20/2020 0.72 - 1.25       Please contact me or my assistant at [phone] if you have any questions about your results.     Sincerely,        Carlos Celis MD        What do your labs mean?  Below is a glossary of commonly ordered labs:  LDL   Bad Cholesterol   HDL   Good Cholesterol  AST/ALT   Liver Function   Cr/Creatinine   Kidney Function  Microalbumin   Kidney Function  BUN   Kidney Function  PSA   Prostate    TSH   Thyroid Hormone  HgbA1c   Diabetes Test   Hgb (Hemoglobin)   Red Blood Cells

## 2022-02-16 NOTE — TELEPHONE ENCOUNTER
---------------------  From: Be GRAYSON, Hannah BHAT   To: [address] Pool ([id number]) ;     Cc: Phone Messages Pool ([address] [place] [address]);      Sent: 5/3/2019 9:38:18 AM CDT  Subject: Fasting labs due     Pt has appt for labs on 5/6/19 at 1730.    Per order in chart pt is due for FASTING labs.    I LVM on pt personal phone at 0937 to call back.    Please advise pt he needs to be FASTING.Pt informed. Pt says the letter he got said non fasting labs. Told pt the order states fasting lipid panel. Pt will come in right at 8am Monday morning for fasting lab.Fasting orders printed---------------------  From: Trang Pozo LPN (Phone Messages Pool ([address] [place] [address]))   To: [employer] [facility] Message Pool ([address] [place] [address]);     Sent: 5/3/2019 1:22:59 PM CDT  Subject: FW: Fasting labs due     Pt LM at 1:19 stating he got a letter that he is due for non fasting labs but got a call that he is suppose to have fasting labs. Pt says he needs a call from MANOLO's assistant to know what he is suppose to have.    I explained to pt in previous phone call that the letter that stated non fasting was incorrect and the order MYRA placed was for fasting. Can you please call him and tell him it is fasting labs.LM for return call at [phone]  Labs should be fasting if pt can, if not would order just Direct LDL.  Fasting would be bestPt called back and informed him to be FASTING and he agreed with plan.

## 2022-02-16 NOTE — PROGRESS NOTES
Patient:   CELIA LUTZ            MRN: [id number]            FIN: [id number]               Age:   76 years     Sex:  Male     :  1945   Associated Diagnoses:   Adenocarcinoma of lung; COPD (chronic obstructive pulmonary disease) with acute bronchitis; Prostate cancer; Acute renal failure superimposed on stage 3 chronic kidney disease; Coronary artery disease due to lipid rich plaque   Author:   Yong Boyd MD      Visit Information      Date of Service: 2021 01:36 pm  Performing Location: [facility]  Encounter#: [id number]      Primary Care Provider (PCP):  Yong Boyd MD    NPI# [id number]      Referring Provider:  Yong Boyd MD    NPI# [id number]      Chief Complaint   2021 1:52 PM CST   f/u labs              Additional Information:No additional information recorded during visit.   Chief complaint and symptoms as noted above and confirmed with patient.  Recent lab and diagnostic studies reviewed with patient      History of Present Illness     2020: Mitchell returns to clinic for follow-up.  Recently been seen by Dr. Celis in my clinical absence earlier this month.  Complaints of worsening lower extremity swelling.  Did trial on furosemide without any noticeable perceived effect.  Did have a normal BNP.  He did have a repeat echocardiogram which demonstrated stable overall cardiac function.  States that edema typically worsens through the day and improves overnight.  Has used compression stockings before with some neck success.  Main complaint today is worsening claudication.  Describes walking a progressively shorter distance due to leg pain especially on the right side.  Has been established with vascular surgery in the past related to aortic aneurysm repair though has not seen in several years.  Is planning on seeing Dr. Valentine next month related to his cardiac cares.  He has remained off of dual antiplatelet therapy and rosuvastatin since his episode of acute kidney  injury earlier in the fall    3/16/21:  Mitchell returns for follow-up.  Seen by vascular surgery - stable features, normal appearing arterial studies (MADELINE, toe pressures).  Persistent lower extremity edema complaints.  Completed XRT for known lung cancer.  Planning to see urology in future to discuss rising PSA - wants to avoid surgery.    5/21/2021: Mitchell follows up after recent discharge from [facility] to [facility] related to episode of rhabdomyolysis and weakness.  Unclear etiologies of his rhabdo; potentially related to fall at home and immobility versus statin.  Concerns raised about excess alcohol intake.  At [facility] soft progressive improvement in overall strength.  Now back home and feeling at baseline.  Questions about exophytic growth on his scalp.  Due to follow-up with urology about known history of prostate cancer.  7/29/2021: Mitchell returns for regular follow-up.  Overall has been doing pretty okay.  Has been working with physical therapist and Dr. Zavaleta related to recurrence of right hip pain and feels like that overall has been doing better.  Breathing has been stable otherwise.  Not monitoring blood pressures at home and substantially elevated blood pressures today here in clinic which is above his historical means.  Reviewed recent labs showing stable overall findings.  10/26/2021: Mitchell returns to clinic for follow-up.  He is accompanied by Georgette, his daughter whom I am meeting for the first time.  She shares with me that he generally has not been doing well.  Concerns by the family that he has been drinking more heavily.  He has been falling and having more imbalance at home.  Had been pursued some looking into potential assisted living placement.  Mitchell shares that he has had increased stress related to his wife s failing health especially with her mentation.  He has had very limited appetite.  Generally does not feel well today.  He accepts that his drinking has been more of an issue of late.  Has been  having quite a bit of difficulties with right-sided hip pains.  Has been working with orthopedics and has had multiple intra-articular injections with limited success.  12/28/2021: Mitchell returns to clinic for follow-up. After last visit ultimately required placement in transitional care facility in [place] where he stayed for approximately a month. He has now been back home for approximately 1 week. Generally doing much better by both he and his daughters assessments. He is abstaining from any alcohol intake. Dealing with significant right posterior hip/gluteal pains. Has worked with orthopedics and has had multiple hip injections without any meaningful change with pain. Would like to consider other oral pain medication options. Scheduled for PET scan in near future with oncology follow-up         Review of Systems   Constitutional:  Weakness, Fatigue, Decreased activity, No fever, No chills.    Eye:  Negative except as documented in history of present illness.    Ear/Nose/Mouth/Throat:  Negative except as documented in history of present illness.    Respiratory:  Shortness of breath, No wheezing.    Cardiovascular:  No chest pain, No palpitations, No peripheral edema, No syncope.    Gastrointestinal:  No nausea, No vomiting, No heartburn, No abdominal pain.    Genitourinary:  Negative, No dysuria, No hematuria.    Hematology/Lymphatics:  No bruising tendency.    Endocrine:  No excessive thirst, No polyuria.    Immunologic:  No recurrent fevers.    Musculoskeletal:  Joint pain, Claudication, No muscle pain, No decreased range of motion, No trauma.    Integumentary:  No skin lesion.    Neurologic:  Alert and oriented X4, Abnormal balance, Confusion, No numbness, No tingling, No headache.       Health Status   Allergies:    Allergic Reactions (Selected)  No Known Medication Allergies   Medications:  (Selected)   Prescriptions  Prescribed  Efudex 5% topical cream: 1 eva, Topical, bid, # 25 gm, 3 Refill(s), Type:  Maintenance, Pharmacy: [place] [facility] [id number] [facility], 1 eva Topical bid, 71, in, 05/21/21 13:28:00 CDT, Height Measured, 204, lb, 05/21/21 13:28:00 CDT, Weight Measured  Trelegy Ellipta 200 mcg-62.5 mcg-25 mcg/inh inhalation powder: 1 puff(s), Inhale, daily, Instructions: at the same time every day. Rinse mouth after use., # 180 blister, 3 Refill(s), Type: Maintenance, please dispense 3 inhalers  Ventolin HFA 90 mcg/inh inhalation aerosol: See Instructions, Instructions: 2 puff(s) Inhale q4 hours as needed for shortness of breath, cough or wheezing., # 18 gm, 3 Refill(s), AD, Type: Maintenance, Printing to fax to Pt assistance program  omeprazole 20 mg oral delayed release capsule: = 1 cap(s) ( 20 mg ), Oral, daily, # 90 cap(s), 3 Refill(s), Type: Maintenance, Pharmacy: [id number] [facility], 1 cap(s) Oral daily, 71, in, 12/29/20 7:45:00 CST, Height Measured, 212.4, lb, 12/29/20 7:45:00 CST, Weight Measured  oxyCODONE 5 mg oral tablet: = 1 tab(s) ( 5 mg ), Oral, q6 hrs, # 50 tab(s), 0 Refill(s), Type: Maintenance, Pharmacy: [place] [id number] [facility], 1 tab(s) Oral q6 hrs, 71, in, 12/28/21 13:52:00 CST, Height Measured, 192.6, lb, 12/28/21 13:52:00 CST, Weight Measured  Documented Medications  Documented  Aspirin Enteric Coated 81 mg oral delayed release tablet: = 1 tab(s) ( 81 mg ), Oral, daily, 0 Refill(s), Type: Maintenance  Metoprolol Succinate ER 25 mg oral tablet, extended release: = 1 tab(s) ( 25 mg ), Oral, daily, 0 Refill(s), Type: Soft Stop  Multiple Vitamins oral tablet: 1 tab(s), po, daily, 0 Refill(s), Type: Maintenance  Voltaren 1% topical gel: See Instructions, Instructions: apply 2 gram(s) topically to upper extremities (max of 8 grams/joint/day) or 4 gram(s) topically to lower extremities (max of 16 grams/joint per day) up to 4 times daily as needed for pain. Max of 32 grams/day total., 0...  acetaminophen 500 mg oral tablet: 1-2 tab(s), Oral, q6 hrs, 0 Refill(s), Type: Maintenance  ferrous sulfate 325 mg (65 mg  elemental iron) oral delayed release tablet: See Instructions, Instructions: 1 tab(s) Oral QOD, 0 Refill(s), Type: Maintenance  magnesium oxide 250 mg oral tablet: = 2 tab(s) ( 500 mg ), Oral, daily, 0 Refill(s), Type: Maintenance  nitroglycerin 0.4 mg sublingual tablet: = 1 tab(s) ( 0.4 mg ), SL, q5 min, PRN: for chest pain, # 100 tab(s), 0 Refill(s), Type: Maintenance  polyethylene glycol 3350: ( 17 gm ), Oral, daily, 0 Refill(s), Type: Maintenance  thiamine 100 mg oral tablet: = 1 tab(s) ( 100 mg ), Oral, daily, 0 Refill(s), Type: Maintenance  zinc (as gluconate) 50 mg oral tablet: Instructions: 1 tab by mouth daily (Pt taking OTC), 0 Refill(s), Type: Maintenance,    Medications          *denotes recorded medication          *acetaminophen 500 mg oral tablet: 1-2 tab(s), Oral, q6 hrs, 0 Refill(s).          Ventolin HFA 90 mcg/inh inhalation aerosol: See Instructions, 2 puff(s) Inhale q4 hours as needed for shortness of breath, cough or wheezing., 18 gm, 3 Refill(s).          *Aspirin Enteric Coated 81 mg oral delayed release tablet: 81 mg, 1 tab(s), Oral, daily, 0 Refill(s).          *Voltaren 1% topical gel: See Instructions, apply 2 gram(s) topically to upper extremities (max of 8 grams/joint/day) or 4 gram(s) topically to lower extremities (max of 16 grams/joint per day) up to 4 times daily as needed for pain. Max of 32 grams/day total., 0 Refill(s).          *ferrous sulfate 325 mg (65 mg elemental iron) oral delayed release tablet: See Instructions, 1 tab(s) Oral QOD, 0 Refill(s).          Efudex 5% topical cream: 1 eva, Topical, bid, 25 gm, 3 Refill(s).          Trelegy Ellipta 200 mcg-62.5 mcg-25 mcg/inh inhalation powder: 1 puff(s), Inhale, daily, at the same time every day. Rinse mouth after use., 180 blister, 3 Refill(s).          *magnesium oxide 250 mg oral tablet: 500 mg, 2 tab(s), Oral, daily, 0 Refill(s).          *Metoprolol Succinate ER 25 mg oral tablet, extended release: 25 mg, 1 tab(s), Oral,  daily, 0 Refill(s).          *Multiple Vitamins oral tablet: 1 tab(s), po, daily, 0 Refill(s).          *nitroglycerin 0.4 mg sublingual tablet: 0.4 mg, 1 tab(s), SL, q5 min, PRN: for chest pain, 100 tab(s), 0 Refill(s).          omeprazole 20 mg oral delayed release capsule: 20 mg, 1 cap(s), Oral, daily, 90 cap(s), 3 Refill(s).          oxyCODONE 5 mg oral tablet: 5 mg, 1 tab(s), Oral, q6 hrs, 50 tab(s), 0 Refill(s).          *polyethylene glycol 3350: 17 gm, Oral, daily, 0 Refill(s).          *thiamine 100 mg oral tablet: 100 mg, 1 tab(s), Oral, daily, 0 Refill(s).          *zinc (as gluconate) 50 mg oral tablet: 1 tab by mouth daily (Pt taking OTC), 0 Refill(s).       Problem list:    All Problems  AAA (abdominal aortic aneurysm) / SNOMED CT 334315541 / Confirmed  Adenocarcinoma of lung / SNOMED CT 543979027 / Confirmed  Ragsdale Esophagus / SNOMED CT 0676462675 / Confirmed  CKD (chronic kidney disease) stage 3, GFR 30-59 ml/min / SNOMED CT 6725844128 / Confirmed  COPD (chronic obstructive pulmonary disease) with acute bronchitis / SNOMED CT 45101582 / Confirmed  Closed hip fracture / SNOMED CT 119253165 / Confirmed  Closed fracture of trochanter of femur / SNOMED CT 8795041041 / Confirmed  Cardiac arrhythmia / SNOMED CT 99103791 / Confirmed  Coronary artery disease due to lipid rich plaque / SNOMED CT 6822784447 / Confirmed  Lipids abnormal / SNOMED CT 657662638 / Confirmed  HTN (Hypertension) / SNOMED CT 99503238 / Confirmed  Former Smoker / SNOMED CT [id number] / Confirmed  GERD (gastroesophageal reflux disease) / SNOMED CT 9605188986 / Confirmed  Anticoagulated / SNOMED CT 071122623 / Confirmed  History of oropharyngeal cancer / SNOMED CT 8485235966 / Confirmed  H/O prostate cancer / SNOMED CT 0380868955 / Confirmed  Hypomagnesemia / SNOMED CT 163113424 / Confirmed  Lung cancer / SNOMED CT 235720682 / Confirmed  Obesity / SNOMED CT 1305149861 / Probable  DJD (Degenerative Joint  Disease) / SNOMED CT 1493946808 / Confirmed  Rhabdomyolysis / SNOMED CT 866441726 / Confirmed  H/O unilateral nephrectomy / SNOMED CT 230068940 / Confirmed  Resolved: *Hospitalized@[facility] - Atypical chest pain  Resolved: *Hospitalized@[facility] - Hip fracture  Resolved: *Hospitalized@[facility] Septic hip vs hematoma  Resolved: History of sepsis / SNOMED CT 6390555446  Resolved: Inpatient stay / SNOMED CT 887073615  Resolved: Inpatient stay / SNOMED CT 751476679  Resolved: Inpatient stay / SNOMED CT 703915677  Resolved: Inpatient stay / SNOMED CT 002842093  Resolved: Prostate cancer / SNOMED CT 068773083  Canceled: Oropharyngeal cancer / SNOMED CT 627674077  Canceled: Solitary kidney / SNOMED CT 310558303  Canceled: Solitary kidney / SNOMED CT 355755057      Histories   Past Medical History:    Active  AAA (abdominal aortic aneurysm) (080785788)  Lipids abnormal (252944770)  HTN (Hypertension) (42194177)  DJD (Degenerative Joint Disease) (5690798203)  H/O unilateral nephrectomy (368731372)  Ragsdale Esophagus (8085780771)  COPD (chronic obstructive pulmonary disease) with acute bronchitis (64469416)  Adenocarcinoma of lung (375742010)  Closed hip fracture (896173551)  Coronary artery disease due to lipid rich plaque (7683254570)  GERD (gastroesophageal reflux disease) (1175873002)  Cardiac arrhythmia (64363642)  CKD (chronic kidney disease) stage 3, GFR 30-59 ml/min (3199154594)  Closed fracture of trochanter of femur (4191680813)  Lung cancer (854256224)  Resolved  Inpatient stay (882822133): Onset on 5/7/2021 at 75 years.  Resolved on 5/10/2021 at 75 years.  Comments:  5/11/2021 CDT 6:41 AM CDT - Yoly Tillman  [facility], [place] - Weakness, frequent falls, and dizziness.  Inpatient stay (515299969): Onset on 10/8/2020 at 75 years.  Resolved on 10/11/2020 at 75 years.  Comments:  10/12/2020 CDT 3:36 PM CDT - Dayan Musa  @ [facility] due to acute renal failure.  Inpatient stay (106325279):  Onset on 1/3/2020 at 74 years.  Resolved on 2020 at 74 years.  Comments:  2020 CST 1:41 PM Yoly Davis  [facility], [place] - Chest pain, acute renal failure.  Inpatient stay (189004598): Onset on 2019 at 74 years.  Resolved on 2019 at 74 years.  Comments:  12/10/2019 CST 1:46 PM MINGO - Jessi Eid  @[facility] - NSTEMI.  *Hospitalized@[facility] Septic hip vs hematoma: Onset on 7/3/2016 at 70 years.  Resolved on 2016 at 70 years.  History of sepsis (9623988276): Onset on 7/3/2016 at 70 years.  Resolved.  Comments:  2016 CDT 2:14 PM CDT - Alma Barfield  Severe; due to septic hip.    2016 CDT 3:37 PM CDT - Alma Barfield  Sepsis organ failure: Acute renal failure.  *Hospitalized@[facility] - Hip fracture: Onset on 2016 at 70 years.  Resolved on 2016 at 70 years.  *Hospitalized@[facility] - Atypical chest pain: Onset on 2015 at 69 years.  Resolved on 2015 at 69 years.  Prostate cancer (187094096):  Resolved.   Family History:    CA - Breast cancer  Sister (Alexa, )  Lupus  Sister (Rebceca)  Alcohol abuse  Sister (Alexa, )  Mother ()  Kidney disease  Aunt (M)  SMA type III  Grandfather (M)  Obese  Sister (Sujey, )     Procedure history:    Biopsy of lung (701298032) on 2021 at 75 Years.  Esophagogastroduodenoscopy (766696639) on 2020 at 75 Years.  Excision of squamous cell carcinoma (5323831953) on 2020 at 75 Years.  Comments:  2020 10:15 AM Yoly Davis  Left buccal mucosa and left lateral tongue.  Coronary angiography (88096290) on 2019 at 74 Years.  Comments:  12/10/2019 1:47 PM Jessi Fisher  and PCI of the right coronary artery.  Esophagogastroduodenoscopy (842770207) on 2019 at 73 Years.  Comments:  10/12/2020 3:44 PM CDT - Dayan Musa  Endoscopic US, Fine Needle Aspiration; Gastric Biopsies of polyps.  Colonoscopy (221446622) on 3/21/2017 at 71 Years.  Comments:  3/22/2017 2:33  PM CDT - Filippo Barbour MD  Indication: Adenomatous Polyps  Sedation: MAC  Findings: Adenomatous polyp cecum, hemorrohids  Rec: Repeat in 5 years  Thoracoscopic wedge resection of lung (3359467028) in 2016 at 71 Years.  Comments:  10/12/2020 3:40 PM CDT - Dayan Musa, RML  Right Hip Bipoloar Hemiarthroplasty on 6/22/2016 at 70 Years.  Comments:  6/28/2016 7:26 AM CDT - Debby Faustin CMA  Right displasced femoral neck fracture  right thoracotomy on 11/15/2015 at 70 Years.  Comments:  11/17/2015 9:07 AM CST - Ramin HOWARD Debby  wedge resection right upper lung nodule, wedge resection right middle lobe lung nodule, Mediastinal lymph node dissection  Colonoscopy (242948337) on 5/12/2014 at 68 Years.  Comments:  5/15/2014 10:56 AM CDT - Livier Stallings RN  Sedation: midazolam, fentanyl  Indication: screen  5-6mm tubular adenom x3, 8mm tubular adenoma x1, 12mm tubular adenoma with advance adenoma due to size  Repeat in 3 years.  nepherectomy in the month of 3/2009 at 63 Years.  Comments:  5/11/2021 11:18 AM CDT - Yoly Tillman  Left  Lumbar discectomy in 2006 at 61 Years.  Left salivary gland removal in 1995 at 50 Years.  Oral surgery in 1994 at 49 Years.  Aortic aneurysm, abdominal ([id number]).   Social History:        Electronic Cigarette/Vaping Assessment            Electronic Cigarette Use: Never.            Electronic Cigarette Use: Never.      Alcohol Assessment: Current            Liquor (Hard) (1.5 oz), Daily, 2 drinks/episode average.      Tobacco Assessment            Past, 20 per day.  50 year(s).            Quit 12/25/2009, Cigarettes, 40 per day.  49 year(s).      Substance Abuse Assessment: Denies Substance Abuse            Never      Employment and Education Assessment            Retired, Highest education level: High school.      Home and Environment Assessment            Marital status: .  Spouse/Partner name: Darlene Chau.  4 children.  Living situation:  Home/Independent.               Home equipment: Walker/Cane.  Injuries/Abuse/Neglect in household: No.  Feels unsafe at home: No.               Family/Friends available for support: Yes.      Nutrition and Health Assessment            Type of diet: Regular.  Wants to lose weight: No.  Sleeping concerns: No.  Feels highly stressed: No.      Exercise and Physical Activity Assessment            Exercise frequency: Occasional.      Sexual Assessment            Sexually active: No.  Identifies as male, Sexual orientation: Straight or heterosexual.  Contraceptive Use               Details: None.        Physical Examination   vital signs stable, as noted above   Vital Signs   12/28/2021 1:52 PM CST Temperature Tympanic 97 DegF  LOW    Peripheral Pulse Rate 67 bpm    Systolic Blood Pressure 127 mmHg    Diastolic Blood Pressure 82 mmHg  HI    Mean Arterial Pressure 97 mmHg    Oxygen Saturation 97 %      Measurements from flowsheet : Measurements   12/28/2021 1:52 PM CST Height Measured - Standard 71 in    Weight Measured - Standard 192.6 lb    BSA 2.09 m2    Body Mass Index 26.86 kg/m2  HI      General:  Alert and oriented.    Eye:  Extraocular movements are intact.    HENT:  Normocephalic, Oral mucosa is moist, No pharyngeal erythema, dentures.    Neck:  Supple, Previous right sided neck dissection with flap.    Respiratory:  Lungs are clear to auscultation, Respirations are non-labored, posterior thoracotomy scar.    Cardiovascular:  Regular rhythm, No murmur, 1+ pitting edema bilaterally.    Gastrointestinal:  Soft, Non-tender, Non-distended.    Integumentary:  exophytic skin lesion on scalp.    Neurologic:  Alert, Oriented, Normal motor function, No focal deficits.    Cognition and Speech:  Oriented, Speech clear and coherent.    Psychiatric:  Appropriate mood & affect.       Review / Management   Results review:  Lab results   12/21/2021 9:12 AM CST Sodium Level 142 mmol/L    Potassium Level 4.5 mmol/L    Chloride Level  105 mmol/L    CO2 Level 28 mmol/L    Glucose Level 124 mg/dL  HI    BUN 26 mg/dL  HI    Creatinine 1.70 mg/dL  HI    BUN/Creat Ratio 15    eGFR 38 mL/min/1.73m2  LOW    eGFR African American 44 mL/min/1.73m2  LOW    Calcium Level 10.2 mg/dL    Calcium Level 10.2 mg/dL    Phosphorus Level 4.5 mg/dL  HI    PTH Intact 15 pg/mL    Cholesterol 192 mg/dL    Non-  HI    HDL 35 mg/dL  LOW    Chol/HDL Ratio 5.5  HI      HI    Triglyceride 141 mg/dL    U Creatinine 85 mg/dL    U Microalbumin 5.3 mg/dL    Ur Microalb/Creat Ratio 62  HI    Hgb 12.9 gm/dL  LOW   11/1/2021 3:01 PM CDT Sodium Level 133 mmol/L  LOW    Potassium Level 4.6 mmol/L    Chloride Level 91 mmol/L  LOW    CO2 Level 29 mmol/L    Glucose Level 166 mg/dL  HI    BUN 10 mg/dL    Creatinine 2.49 mg/dL  HI    BUN/Creat Ratio 4  LOW    eGFR 24 mL/min/1.73m2  LOW    eGFR African American 28 mL/min/1.73m2  LOW    Calcium Level 10.3 mg/dL   9/21/2021 2:03 PM CDT Vitamin B12 Level 561 pg/mL    Hgb 13.7 gm/dL    Hct 38.0 %  LOW   .       Impression and Plan   Diagnosis     Adenocarcinoma of lung (HTD99-XK C34.90).     COPD (chronic obstructive pulmonary disease) with acute bronchitis (PHD28-OH J44.1).     Prostate cancer (KWR60-KL C61).     Acute renal failure superimposed on stage 3 chronic kidney disease (BKM07-XC N17.9).     Coronary artery disease due to lipid rich plaque (KGV82-IL I25.10).         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 radiation oncology    .) hypertension; controlled  current antihypertensive regimen: Toprol XL 25mg daily  regimen changes: none  intolerance:  future titration/work-up plan:     .) NSTEMI with PCI to RCA with multi-vessel disease (non-obstructive LAD lesion); following with Dr. Valentine  - DAPT stopped (10/2020): profound anemia, concerns for GIB  - Toprol XL 25mg daily  - statin and ezetimibe stopped due to rhabdo (5/2021)   - consider future rechallenge on statin    .) prostate Ca; watchful waiting   - original prostate biopsy in '11, repeat biopsy in '16; Ken 6   - watchful surveillance; q6 month PSA; last PSA 35 (rising)   - MRI of prostate (2019): focal coarse calcifications in prostate; no evidence of extra-prostate extension   - following with Dr. Bustos     .) pancreatic cyst; stable on last EUS/EGD on 10/23/2020; Dr. Edwards  - recommending repeat imaging in  2 yrs (noted stenotic CBD) - fall, 2022  - on omeprazole 20mg BID    .) h/o AAA, endovascular aortic repair, follows with Dr. David    .) polyarthritis pains  - limited benefit with NSAIDs and APAP  s/p right MIRNA after hip fracture from fall (6/2016)   - normal DEXA (7/2015)  - working with Ortho, Dr. Sainz - previous attempts at intra-articular right hip injection - no lasting benefit  - we discussed potential role of oral opiates - will trial short term use of oxycodone; 50 tablets prescribed today  - encouraged him to re-evaluate with ortho (question if hip articular source vs. lumbar spine origin?)    .) health maintenance   - q6 month PSA   - Prevacid 30mg BID   - Efudex for scalp lesion   - completed COVID vaccination + booster

## 2022-02-16 NOTE — NURSING NOTE
Comprehensive Intake Entered On:  1/22/2020 11:27 AM CST    Performed On:  1/22/2020 11:25 AM CST by Debby Faustin CMA               Summary   Chief Complaint :   f/u LEILA   Weight Measured :   189 lb(Converted to: 189 lb 0 oz, 85.73 kg)    Height Measured :   71 in(Converted to: 5 ft 11 in, 180.34 cm)    Body Mass Index :   26.36 kg/m2 (HI)    Body Surface Area :   2.07 m2   Systolic Blood Pressure :   132 mmHg (HI)    Diastolic Blood Pressure :   78 mmHg   Mean Arterial Pressure :   96 mmHg   Peripheral Pulse Rate :   80 bpm   BP Site :   Right arm   Temperature Tympanic :   97 DegF(Converted to: 36.1 DegC)  (LOW)    Race :      Languages :   English   Ethnicity :   Not  or    Debby Faustin CMA - 1/22/2020 11:25 AM CST   Health Status   Allergies Verified? :   Yes   Medication History Verified? :   Yes   Medical History Verified? :   Yes   Pre-Visit Planning Status :   Completed   Tobacco Use? :   Former smoker   Debby Faustin CMA - 1/22/2020 11:25 AM CST   Social History   Social History   (As Of: 1/22/2020 11:27:20 AM CST)   Alcohol:        Current, 1-2 times per week, 2 drinks/episode average.  3 drinks/episode maximum.   (Last Updated: 11/6/2018 12:49:02 PM CST by Anitha Batista)          Tobacco:        Past, 20 per day.  49 year(s).   (Last Updated: 3/17/2011 2:21:14 PM CDT by Rose Barr CMA)          Substance Abuse:        Never   (Last Updated: 3/19/2014 10:32:10 AM CDT by Brooke Krishnan)          Employment/School:        Employed, Work/School description: [employer] .   (Last Updated: 3/17/2011 2:20:42 PM CDT by Rose Barr CMA)          Home/Environment:        Marital status: .  Spouse/Partner name: Darlene Chau.   (Last Updated: 3/19/2014 10:33:12 AM CDT by Brooke Krishnan)          Nutrition/Health:        Type of diet: Regular.   (Last Updated: 3/19/2014 10:32:20 AM CDT by Brooke Krishnan)          Exercise:        Exercise frequency: Never.   (Last  Updated: 4/22/2015 10:11:22 AM CDT by Brooke Krishnan)          Sexual:        Sexually active: Yes.   (Last Updated: 3/6/2012 12:53:53 PM CST by Rose Barr CMA)

## 2022-02-16 NOTE — NURSING NOTE
Comprehensive Intake Entered On:  3/16/2021 1:11 PM CDT    Performed On:  3/16/2021 1:06 PM CDT by Debby Faustin CMA               Summary   Chief Complaint :   f/u HTN - review results   Weight Measured :   212.2 lb(Converted to: 212 lb 3 oz, 96.252 kg)    Height Measured :   71 in(Converted to: 5 ft 11 in, 180.34 cm)    Body Mass Index :   29.59 kg/m2 (HI)    Body Surface Area :   2.19 m2   Systolic Blood Pressure :   129 mmHg   Diastolic Blood Pressure :   86 mmHg (HI)    Mean Arterial Pressure :   100 mmHg   Peripheral Pulse Rate :   65 bpm   Temperature Tympanic :   98.1 DegF(Converted to: 36.7 DegC)    Oxygen Saturation :   86 % (LOW)    Race :      Languages :   English   Ethnicity :   Not  or    Debby Faustin CMA - 3/16/2021 1:06 PM CDT   Health Status   Allergies Verified? :   Yes   Medication History Verified? :   Yes   Medical History Verified? :   Yes   Pre-Visit Planning Status :   Completed   Tobacco Use? :   Former smoker   Debby Faustin CMA - 3/16/2021 1:06 PM CDT   ID Risk Screen   Recent Travel History :   No recent travel   Family Member Travel History :   No recent travel   Other Exposure to Infectious Disease :   Unknown   COVID-19 Testing Status :   No positive COVID-19 test   Debby Faustin CMA - 3/16/2021 1:06 PM CDT

## 2022-02-16 NOTE — TELEPHONE ENCOUNTER
---------------------  From: Ramin HOWARD Debby   Sent: 3/31/2020 11:30:06 AM CDT  Subject: General Message-left ear plugged     Phone Message    PCP:   MYRA      Time of Call:  1053       Person Calling:  self  Phone number:  [phone]    Returned call at: 1125    Note:   pt c/o left ear plugged x 1wk, issues with balance.  Would like to have ear lavage done.  Transferred to scheduling.  Denies any fever, SOB, no known exposure to COVID

## 2022-02-16 NOTE — PROGRESS NOTES
Patient:   CELIA LUTZ            MRN: [id number]            FIN: [id number]               Age:   71 years     Sex:  Male     :  1945   Associated Diagnoses:   AAA (Abdominal Aortic Aneurysm); Adenocarcinoma of lung; COPD (chronic obstructive pulmonary disease) with acute bronchitis; CKD (chronic kidney disease) stage 3, GFR 30-59 ml/min; Prostate cancer   Author:   Yong Boyd MD      Visit Information      Date of Service: 2017 02:29 pm  Performing Location: [facility]  Encounter#: [id number]      Primary Care Provider (PCP):  Yong Boyd MD    NPI# [id number]      Referring Provider:  No referring provider recorded for selected visit.      Chief Complaint   2017 2:39 PM CST    Review recent labs              Additional Information:No additional information recorded during visit.   Chief complaint and symptoms as noted above and confirmed with patient.  Recent lab and diagnostic studies reviewed with patient      History of Present Illness   2015: Mitchell presents to clinic for hospital follow-up.  He recently underwent right sided wedge resection of an isolated pulmonary nodule.  Biopsy returning as adenocarcinoma of the lung.  Postoperative course complicated by an episode of both acute kidney injury and rectal bleeding felt to be consistent with ischemic colitis.  His creatinine on discharge was at his baseline of 1.3.  His lisinopril was held during his episode of acute kidney injury, resumed upon discharge.  He is scheduled to see his thoracic surgeon, Dr. Velazquez, tomorrow.  He also has a history of previous endovascular repair of a abdominal aneurysm.  He follows with a vascular surgeon through [facility] for this.    2016: Mitchell returns to clinic for follow-up.  He is scheduled for surveillance CT of his chest and abdomen the follow-up postsurgical changes as well as a new discovered abdominal aortic aneurysm.  He is felt well since wedge resection.  Stable  shortness of breath.  He has been using Advair on a scheduled basis.  He does have a remote history of prostate cancer biopsied in 2011.  He underwent MRI of pelvis in 2014 showing multiple areas of suspicious prostate lesions.  PSA has been steadily rising since that time.  Also has been taking lansoprazole 30 mg twice daily related to previous history of esophagitis with early Ragsdale s-like morphology    6/28/2016: Presents for follow-up after recent hospitalization due to right-sided hip fracture.  He underwent right hemiarthroplasty with an uncomplicated perioperative stay.  He is currently on warfarin for a planned 4 weeks.  No previous bone fractures.  He is not currently participatin in physical therapy.  Ambulating with minimal assistance.    8/18/2016: Mitchell presents to clinic with concerns of right-sided dorsal foot and ankle pain that has developed ever since returning to work.  When he was out of work for the 6 weeks around his right hip replacement generally did not have these pains.  No leg swelling.  Primary concern was that remotely he had a similar presentation including pain up his right shin.  He had seen a spine specialist who felt that the pains were likely a radicular source of pain.  Symptoms improved with multiple cortisone injections in the back.  He also recently underwent an MRI of his prostate for follow-up of rising PSA.  He been contacted by the urology clinic and was advised to pursue follow-up prostate biopsy.    12/1/2016: Mitchell returns to clinic for general follow-up.  He has been seen by both urology and radiation oncology regarding recently diagnosed prostate cancer.  He recently underwent prostate biopsy demonstrating [place] 6 prostate cancer.  He is weighing his treatment options.  In general not as inclined toward invasive surgery.  Is considering less invasive radiation options.  Breathing overall has been good.  Does not have home blood pressure cuff.    2/23/2017: Presents  for regular follow-up.  He is preferring to follow his low-grade prostate cancer via surveillance.  Planning on every 6 month PSA testing.  No new localized symptoms.  No complaints of back pain.  Breathing is been doing well.  Blood pressure well controlled since introduction of thiazide diuretic.         Review of Systems   Constitutional:  No fever, No chills.    Eye:  Negative except as documented in history of present illness.    Ear/Nose/Mouth/Throat:  Negative except as documented in history of present illness.    Respiratory   Cardiovascular:  No chest pain, No palpitations, No peripheral edema, No syncope.    Gastrointestinal:  No nausea, No vomiting, No heartburn, No abdominal pain.    Genitourinary:  No dysuria, No hematuria.    Hematology/Lymphatics:  Negative except as documented in history of present illness.    Endocrine:  No excessive thirst, No polyuria.    Immunologic:  No recurrent fevers.    Musculoskeletal:  Joint pain, No muscle pain, No trauma.    Neurologic:  Alert and oriented X4, No numbness, No tingling, No headache.       Health Status   Allergies:    Allergic Reactions (Selected)  No known allergies   Medications:  (Selected)   Prescriptions  Prescribed  Advair  mcg-21 mcg/inh inhalation aerosol: 2 puff(s), inh, bid, Instructions: rinse mouth and throat after use, # 1 EA, 11 Refill(s), Type: Maintenance, Pharmacy: [email or web address]  Anti-static valved spacer chamber: Anti-static valved spacer chamber, See Instructions, Instructions: use as directed with inhaler, Supply, # 1 EA, 0 Refill(s), Type: Maintenance, Pharmacy: [id number], use as directed with inhaler  Metoprolol Tartrate 50 mg oral tablet: 1 tab(s) ( 50 mg ), po, bid, # 60 tab(s), 0 Refill(s), Type: Maintenance, Pharmacy: [facility]  ProAir HFA 90 mcg/inh inhalation aerosol: 2 puff(s), inh, qid, Instructions: use with spacer chamber, PRN: as needed for wheezing, # 1 EA, 11 Refill(s), Type:  Maintenance, Pharmacy: [id number], 2 puff(s) inh qid,PRN:as needed for wheezing,Instr:use with spacer chamber  hydrochlorothiazide-lisinopril 12.5 mg-20 mg oral tablet: 1 tab(s), PO, Daily, # 90 tab(s), 3 Refill(s), Type: Maintenance, Pharmacy: [facility] [id number] [facility], 1 tab(s) po daily  lansoprazole 30 mg oral delayed release capsule: 1 cap(s) ( 30 mg ), PO, bid, # 180 cap(s), 3 Refill(s), Type: Maintenance, Pharmacy: [id number], 1 cap(s) po bid  pravastatin 80 mg oral tablet: See Instructions, Instructions: TAKE 1 TABLET EVERY DAY, # 30 tab(s), 5 Refill(s), Type: Maintenance, Pharmacy: [place] [id number] [facility]  Documented Medications  Documented  Advil PM: 2 tab(s), po, hs, PRN: as needed for insomnia, 0 Refill(s), Type: Maintenance  Multiple Vitamins oral tablet: 1 tab(s), po, daily, 0 Refill(s), Type: Maintenance  Vitamin B12 500 mcg oral tablet: 1 tab(s) ( 500 mcg ), po, daily, 0 Refill(s), Type: Maintenance  aspirin 81 mg oral tablet: 1 tab(s) ( 81 mg ), po, daily, tab(s), 0 Refill(s), Type: Maintenance   Problem list:    All Problems  AAA (Abdominal Aortic Aneurysm) / ICD-9-.4 / Confirmed  Adenocarcinoma of lung / SNOMED CT 453744191 / Confirmed  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 / Confirmed  CKD (chronic kidney disease) stage 3, GFR 30-59 ml/min / SNOMED CT 1939602223 / Confirmed  COPD (chronic obstructive pulmonary disease) with acute bronchitis / SNOMED CT 64162278 / Confirmed  Closed hip fracture / SNOMED CT 996549184 / Confirmed  Closed fracture of trochanter of femur / SNOMED CT 2386378262 / Confirmed  Cardiac arrhythmia / SNOMED CT 43548078 / Confirmed  Coronary artery disease due to lipid rich plaque / SNOMED CT 0435773850 / Confirmed  Lipids abnormal / SNOMED CT 420081828 / Confirmed  HTN (Hypertension) / SNOMED CT 88675757 / Confirmed  Former Smoker / SNOMED CT [id number] / Confirmed  GERD (gastroesophageal reflux disease) / SNOMED CT 9505209414 /  Confirmed  Anticoagulated / SNOMED CT 135643699 / Confirmed  Oropharyngeal cancer / SNOMED CT 116806492 / Confirmed  DJD (Degenerative Joint Disease) / SNOMED CT 2468666672 / Confirmed  Prostate cancer / SNOMED CT 801833552 / Confirmed  H/O unilateral nephrectomy / SNOMED CT 252401530 / Confirmed  Resolved: *Hospitalized@[facility] - Atypical chest pain  Resolved: *Hospitalized@[facility] - Hip fracture  Resolved: *Hospitalized@[place] - Septic hip vs hematoma  Resolved: History of sepsis / SNOMED CT 5877412496  Canceled: Solitary kidney / SNOMED CT 089332120  Canceled: Solitary kidney / SNOMED CT 455723174      Histories   Past Medical History:    Active  Oropharyngeal cancer (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  Coronary artery disease due to lipid rich plaque (3026894811)  GERD (gastroesophageal reflux disease) (2527839567)  Cardiac arrhythmia (78768350)  CKD (chronic kidney disease) stage 3, GFR 30-59 ml/min (6989060260)  Resolved  *Hospitalized@[place] - Septic hip vs hematoma: Onset on 7/3/2016 at 70 years.  Resolved on 7/7/2016 at 70 years.  History of sepsis (9258223715): Onset on 7/3/2016 at 70 years.  Resolved.  Comments:  7/25/2016 CDT 2:14 PM CDT - Alma Barfield  Severe; due to septic hip.    7/25/2016 CDT 3:37 PM CDT - Alma Barfield  Sepsis organ failure: Acute renal failure.  *Hospitalized@[facility] - Hip fracture: Onset on 6/21/2016 at 70 years.  Resolved on 6/24/2016 at 70 years.  *Hospitalized@[facility] - Atypical chest pain: Onset on 5/16/2015 at 69 years.  Resolved on 5/16/2015 at 69 years.   Family History:    Lupus  Sister     Procedure history:    Right Hip Bipoloar Hemiarthroplasty on 6/22/2016 at 70 Years.  Comments:  6/28/2016 7:26 AM - Debby Faustin CMA  Right displasced femoral neck fracture  right thoracotomy on 11/15/2015 at 70 Years.  Comments:  11/17/2015 9:07 AM - Debby Faustin CMA  wedge resection right upper lung nodule, wedge resection right middle lobe lung nodule, Mediastinal lymph node  dissection  Colonoscopy (785960812) on 5/12/2014 at 68 Years.  Comments:  5/15/2014 10:56 AM - Livier Stallings RN  Sedation: midazolam, fentanyl  Indication: screen  5-6mm tubular adenom x3, 8mm tubular adenoma x1, 12mm tubular adenoma with advance adenoma due to size  Repeat in 3 years.  nepherectomy in the month of 3/2009 at 63 Years.  Lumbar discectomy in 2006 at 61 Years.  Left salivary gland removal in 1995 at 50 Years.  Oral surgery in 1994 at 49 Years.  Aortic aneurysm, abdominal ([id number]).   Social History:        Alcohol Assessment            Current, 1-2 times per week      Tobacco Assessment            Past, 20 per day.  49 year(s).      Substance Abuse Assessment            Never      Employment and Education Assessment            Employed, Work/School description: [employer] .      Home and Environment Assessment            Marital status: .  Spouse/Partner name: Darlene Chau.      Nutrition and Health Assessment            Type of diet: Regular.      Exercise and Physical Activity Assessment            Exercise frequency: Never.      Sexual Assessment            Sexually active: Yes.        Physical Examination   vital signs stable, as noted above   Vital Signs   2/23/2017 2:39 PM CST Temperature Tympanic 97.5 DegF  LOW    Peripheral Pulse Rate 80 bpm    Systolic Blood Pressure 120 mmHg    Diastolic Blood Pressure 70 mmHg    Mean Arterial Pressure 87 mmHg    BP Site Right arm      Measurements from flowsheet : Measurements   2/23/2017 2:39 PM CST    Weight Measured - Standard                188.6 lb     General:  Alert and oriented, No acute distress.    Eye:  Extraocular movements are intact.    HENT:  Normocephalic, Oral mucosa is moist, No pharyngeal erythema.    Neck:  Supple, Previous right sided neck dissection with flap.    Respiratory:  Lungs are clear to auscultation, Respirations are non-labored, posterior thoracotomy scar.    Cardiovascular:  Normal  rate, Regular rhythm, No murmur, No edema.    Gastrointestinal:  Soft.    Musculoskeletal:  Normal range of motion, Normal strength.    Neurologic:  Alert, Oriented, Normal motor function, No focal deficits.    Cognition and Speech:  Oriented, Speech clear and coherent.    Psychiatric:  Appropriate mood & affect.       Review / Management   Results review:  Lab results   1/26/2017 4:51 PM CST Sodium Level 136 mmol/L    Potassium Level 5.3 mmol/L    Chloride Level 98 mmol/L    CO2 Level 29 mmol/L    Glucose Level 100 mg/dL  HI    BUN 35 mg/dL  HI    Creatinine 1.81 mg/dL  HI    BUN/Creat Ratio 19    eGFR 37 mL/min/1.73m2  LOW    eGFR African American 42 mL/min/1.73m2  LOW    Calcium Level 10.1 mg/dL   .       Impression and Plan   Diagnosis     AAA (Abdominal Aortic Aneurysm) (YMU36-WL I71.4).     Adenocarcinoma of lung (UAZ59-PL C34.90).     COPD (chronic obstructive pulmonary disease) with acute bronchitis (HMZ16-VA J44.1).     CKD (chronic kidney disease) stage 3, GFR 30-59 ml/min (SMA71-NG N18.3).     Prostate cancer (QVK93-YG C61).       .) s/p right MIRNA after hip fracture from fall (6/2016)   - normal DEXA (7/2015)    .) prostate Ca   - original prostate biopsy in '11, repeat earlier this fall; [place] 6   - watchful surveillance; q6 month PSA    .) s/p right wedge resection of limited stage adenocarcinoma of lung (11/2015)    .) COPD   - on Advair 115/21mcg BID with noticeable improvement in breathing   - albuterol on rare occasions   - has not participated in pulmonary rehab    .) post-operative LEILA/ CKD (11/2015), baseline SCr 1.3 (previous left nephrectomy for benign hemangioma)     .) hypertension; controlled  current antihypertensive regimen: lisinopril/hctz 20/12.5mg daily, metoprolol 50mg BID  regimen changes: none  intolerance:  future titration/work-up plan:    - SBP <140 goal   - interval rise in SCr; recheck today    .) AAA; previous endovascular repair   - following with [facility] vascular surgery;   [place]    .) health maintenance   - CRC due in '17; arrange now; no anticipated future screening after this coming endoscopy   - q6 month PSA   - Prevacid 30mg BID   - enrolled in [facility]    RTC q6 months

## 2022-02-16 NOTE — TELEPHONE ENCOUNTER
---------------------  From: Corazon GRAYSON, Rosie (Phone Messages Pool ([id number]_[facility]))   To: Ronit, Pharm D , Sara;     Sent: 12/17/2021 4:31:22 PM CST  Subject: Requesting call     Pt is requesting a phone call from you to discuss the trilogy and [employer].  I am unsure of what this means but he said you would know.  Pt knows that you are out for the day and  he would like to talk on Tuesday.  Thank you!called pt; he's asking how to refill his inhaler. He was in the hospital and then discharged, now down to his last [employer] inhaler and cannot remember how to get more.   I recommended that he call [facility] to refill his inhaler before the end of the year (as he is only eligible through 12/31/2021).  Additionally with his care transitions, I recommended that he set up an MTM appt. He will call back to make an appt.  KB

## 2022-02-16 NOTE — NURSING NOTE
Comprehensive Intake Entered On:  11/29/2019 11:57 AM CST    Performed On:  11/29/2019 11:55 AM CST by Brooke Krishnan               Summary   Chief Complaint :   Hospital fup   Weight Measured :   197.0 lb(Converted to: 197 lb 0 oz, 89.36 kg)    Height Measured :   71 in(Converted to: 5 ft 11 in, 180.34 cm)    Body Mass Index :   27.47 kg/m2 (HI)    Body Surface Area :   2.11 m2   Systolic Blood Pressure :   147 mmHg (HI)    Diastolic Blood Pressure :   87 mmHg (HI)    Mean Arterial Pressure :   107 mmHg   Peripheral Pulse Rate :   91 bpm   BP Method :   Electronic   HR Method :   Electronic   Temperature Tympanic :   97.2 DegF(Converted to: 36.2 DegC)  (LOW)    Race :      Languages :   English   Ethnicity :   Not  or    Brooke Krishnan - 11/29/2019 11:55 AM CST   Health Status   Allergies Verified? :   Yes   Medication History Verified? :   Yes   Tobacco Use? :   Former smoker   Brooke Krishnan - 11/29/2019 11:55 AM CST   Meds / Allergies   (As Of: 11/29/2019 11:57:30 AM CST)   Allergies (Active)   No Known Medication Allergies  Estimated Onset Date:   Unspecified ; Created By:   Debby Faustin CMA; Reaction Status:   Active ; Category:   Drug ; Substance:   No Known Medication Allergies ; Type:   Allergy ; Updated By:   Debby Faustin CMA; Reviewed Date:   7/11/2019 4:55 PM CDT        Medication List   (As Of: 11/29/2019 11:57:30 AM CST)   Prescription/Discharge Order    albuterol  :   albuterol ; Status:   Prescribed ; Ordered As Mnemonic:   Ventolin HFA 90 mcg/inh inhalation aerosol ; Simple Display Line:   2 puff(s), NEB, q4 hrs, PRN: AS NEEDED FOR COUGH OR SHORTNESS OF BREATH, 3 EA, 0 Refill(s) ; Ordering Provider:   Yong Boyd MD; Catalog Code:   albuterol ; Order Dt/Tm:   11/26/2019 2:44:44 PM CST          fluticasone-salmeterol  :   fluticasone-salmeterol ; Status:   Prescribed ; Ordered As Mnemonic:   Advair  mcg-21 mcg/inh inhalation aerosol ; Simple Display Line:    See Instructions, INHALE 2 PUFFS BY MOUTH TWICE DAILY. RINSE MOUTH AND THROAT AFTER USE, 3 EA, 1 Refill(s) ; Ordering Provider:   Yong Boyd MD; Catalog Code:   fluticasone-salmeterol ; Order Dt/Tm:   5/14/2019 5:03:16 PM CDT          Miscellaneous Rx Supply  :   Miscellaneous Rx Supply ; Status:   Prescribed ; Ordered As Mnemonic:   spacer for inhaler ; Simple Display Line:   See Instructions, use with albuterol inhaler, 1 EA, 0 Refill(s) ; Ordering Provider:   Severino Cannon PA-C; Catalog Code:   Miscellaneous Rx Supply ; Order Dt/Tm:   1/9/2018 2:18:32 PM CST            Home Meds    rosuvastatin  :   rosuvastatin ; Status:   Documented ; Ordered As Mnemonic:   Crestor 40 mg oral tablet ; Simple Display Line:   40 mg, 1 tab(s), Oral, daily, 0 Refill(s) ; Catalog Code:   rosuvastatin ; Order Dt/Tm:   11/27/2019 10:02:43 AM CST          clopidogrel  :   clopidogrel ; Status:   Documented ; Ordered As Mnemonic:   clopidogrel 75 mg oral tablet ; Simple Display Line:   75 mg, 1 tab(s), Oral, daily, 0 Refill(s) ; Catalog Code:   clopidogrel ; Order Dt/Tm:   11/27/2019 9:55:13 AM CST          lisinopril  :   lisinopril ; Status:   Documented ; Ordered As Mnemonic:   lisinopril 5 mg oral tablet ; Simple Display Line:   5 mg, 1 tab(s), Oral, daily, 0 Refill(s) ; Catalog Code:   lisinopril ; Order Dt/Tm:   11/27/2019 9:55:59 AM CST          metoprolol  :   metoprolol ; Status:   Documented ; Ordered As Mnemonic:   metoprolol succinate 25 mg oral capsule, extended release ; Simple Display Line:   25 mg, 1 cap(s), Oral, daily, 0 Refill(s) ; Catalog Code:   metoprolol ; Order Dt/Tm:   11/27/2019 9:58:05 AM CST          aspirin  :   aspirin ; Status:   Documented ; Ordered As Mnemonic:   aspirin 81 mg oral tablet, chewable ; Simple Display Line:   81 mg, 1 tab(s), Chewed, daily, 0 Refill(s) ; Catalog Code:   aspirin ; Order Dt/Tm:   11/27/2019 9:54:31 AM CST          lansoprazole  :   lansoprazole ; Status:   Documented ;  Ordered As Mnemonic:   lansoprazole 15 mg oral delayed release capsule ; Simple Display Line:   15 mg, 1 cap(s), PO, Daily, 90 cap(s), 0 Refill(s) ; Catalog Code:   lansoprazole ; Order Dt/Tm:   5/14/2019 4:31:14 PM CDT          potassium gluconate  :   potassium gluconate ; Status:   Completed ; Ordered As Mnemonic:   potassium gluconate ; Simple Display Line:   99 mg, daily, 0 Refill(s) ; Catalog Code:   potassium gluconate ; Order Dt/Tm:   10/25/2018 4:58:07 PM CDT          fluticasone nasal  :   fluticasone nasal ; Status:   Documented ; Ordered As Mnemonic:   Flonase 50 mcg/inh nasal spray ; Simple Display Line:   1 spray(s), nasal, daily, 0 Refill(s) ; Catalog Code:   fluticasone nasal ; Order Dt/Tm:   4/23/2018 11:20:21 AM CDT          magnesium oxide  :   magnesium oxide ; Status:   Documented ; Ordered As Mnemonic:   magnesium oxide 250 mg oral tablet ; Simple Display Line:   250 mg, 1 tab(s), po, daily, 0 Refill(s) ; Catalog Code:   magnesium oxide ; Order Dt/Tm:   4/5/2018 4:37:34 PM CDT          diphenhydramine-ibuprofen  :   diphenhydramine-ibuprofen ; Status:   Documented ; Ordered As Mnemonic:   Advil PM ; Simple Display Line:   2 tab(s), po, hs, PRN: as needed for insomnia, 0 Refill(s) ; Catalog Code:   diphenhydramine-ibuprofen ; Order Dt/Tm:   2/23/2017 2:39:37 PM CST          cyanocobalamin  :   cyanocobalamin ; Status:   Documented ; Ordered As Mnemonic:   Vitamin B12 500 mcg oral tablet ; Simple Display Line:   500 mcg, 1 tab(s), po, daily, 0 Refill(s) ; Catalog Code:   cyanocobalamin ; Order Dt/Tm:   11/17/2015 10:02:05 AM CST          multivitamin  :   multivitamin ; Status:   Documented ; Ordered As Mnemonic:   Multiple Vitamins oral tablet ; Simple Display Line:   1 tab(s), po, daily, 0 Refill(s) ; Catalog Code:   multivitamin ; Order Dt/Tm:   4/22/2015 9:12:40 AM CDT

## 2022-02-16 NOTE — PROGRESS NOTES
Patient:   CELIA LUTZ            MRN: [id number]            FIN: [id number]               Age:   71 years     Sex:  Male     :  1945   Associated Diagnoses:   AAA (Abdominal Aortic Aneurysm); Adenocarcinoma of lung; COPD (chronic obstructive pulmonary disease) with acute bronchitis; CKD (chronic kidney disease) stage 3, GFR 30-59 ml/min; Prostate cancer   Author:   Yong Boyd MD      Visit Information      Date of Service: 05/10/2017 04:18 pm  Performing Location: [facility]  Encounter#: [id number]      Primary Care Provider (PCP):  Yong Boyd MD    NPI# [id number]      Referring Provider:  No referring provider recorded for selected visit.      Chief Complaint   5/10/2017 4:25 PM CDT    right knee pain x March - having difficulty driving driving 35min to work.  Also, having lower back pains, BL hip paiins - difficulty walking              Additional Information:No additional information recorded during visit.   Chief complaint and symptoms as noted above and confirmed with patient.  Recent lab and diagnostic studies reviewed with patient      History of Present Illness   2015: Mitchell presents to clinic for hospital follow-up.  He recently underwent right sided wedge resection of an isolated pulmonary nodule.  Biopsy returning as adenocarcinoma of the lung.  Postoperative course complicated by an episode of both acute kidney injury and rectal bleeding felt to be consistent with ischemic colitis.  His creatinine on discharge was at his baseline of 1.3.  His lisinopril was held during his episode of acute kidney injury, resumed upon discharge.  He is scheduled to see his thoracic surgeon, Dr. Velazquez, tomorrow.  He also has a history of previous endovascular repair of a abdominal aneurysm.  He follows with a vascular surgeon through [facility] for this.    2016: Presents for follow-up after recent hospitalization due to right-sided hip fracture.  He underwent right  hemiarthroplasty with an uncomplicated perioperative stay.  He is currently on warfarin for a planned 4 weeks.  No previous bone fractures.  He is not currently participatin in physical therapy.  Ambulating with minimal assistance.    8/18/2016: Mitchell presents to clinic with concerns of right-sided dorsal foot and ankle pain that has developed ever since returning to work.  When he was out of work for the 6 weeks around his right hip replacement generally did not have these pains.  No leg swelling.  Primary concern was that remotely he had a similar presentation including pain up his right shin.  He had seen a spine specialist who felt that the pains were likely a radicular source of pain.  Symptoms improved with multiple cortisone injections in the back.  He also recently underwent an MRI of his prostate for follow-up of rising PSA.  He been contacted by the urology clinic and was advised to pursue follow-up prostate biopsy.    12/1/2016: Mitchell returns to clinic for general follow-up.  He has been seen by both urology and radiation oncology regarding recently diagnosed prostate cancer.  He recently underwent prostate biopsy demonstrating Ken 6 prostate cancer.  He is weighing his treatment options.  In general not as inclined toward invasive surgery.  Is considering less invasive radiation options.  Breathing overall has been good.  Does not have home blood pressure cuff.    2/23/2017: Presents for regular follow-up.  He is preferring to follow his low-grade prostate cancer via surveillance.  Planning on every 6 month PSA testing.  No new localized symptoms.  No complaints of back pain.  Breathing is been doing well.  Blood pressure well controlled since introduction of thiazide diuretic.    5/10/2017: Presents with 2 month history of bilateral hip pains and right-sided knee pain present now for approximately 2 months.  He says symptoms bother him the most when seated in his car and commuting into the [facility].  He says he frequently will have to stop in between to get out of the car, stretch out his legs and then resume driving.  Now pains present with walking and activity as well.  He underwent a right-sided hip repair due to hip fracture in June 2016 with Dr. Smith.  No change in activities.  No upper arm or torso arthralgias.  Pravastatin dose increased this past year.         Review of Systems   Constitutional:  No fever, No chills.    Eye:  Negative except as documented in history of present illness.    Ear/Nose/Mouth/Throat:  Negative except as documented in history of present illness.    Respiratory   Cardiovascular:  No chest pain, No palpitations, No peripheral edema, No syncope.    Gastrointestinal:  No nausea, No vomiting, No heartburn, No abdominal pain.    Genitourinary:  No dysuria, No hematuria.    Hematology/Lymphatics:  Negative except as documented in history of present illness.    Endocrine:  No excessive thirst, No polyuria.    Immunologic:  No recurrent fevers.    Musculoskeletal:  Joint pain, Decreased range of motion, No muscle pain, No trauma.    Neurologic:  Alert and oriented X4, No numbness, No tingling, No headache.       Health Status   Allergies:    Allergic Reactions (Selected)  No known allergies   Medications:  (Selected)   Prescriptions  Prescribed  Advair  mcg-21 mcg/inh inhalation aerosol: 2 puff(s), inh, bid, Instructions: rinse mouth and throat after use, # 1 EA, 11 Refill(s), Type: Maintenance, Pharmacy: [facility] [id number] [facility]  Anti-static valved spacer chamber: Anti-static valved spacer chamber, See Instructions, Instructions: use as directed with inhaler, Supply, # 1 EA, 0 Refill(s), Type: Maintenance, Pharmacy: [id number], use as directed with inhaler  Metoprolol Tartrate 50 mg oral tablet: 1 tab(s) ( 50 mg ), po, bid, # 180 tab(s), 2 Refill(s), Type: Maintenance, Pharmacy: [facility] [place], 1 tab(s) po bid  ProAir HFA 90 mcg/inh inhalation aerosol: 2  puff(s), inh, qid, Instructions: use with spacer chamber, PRN: as needed for wheezing, # 1 EA, 11 Refill(s), Type: Maintenance, Pharmacy: [place] [id number] [facility], keep on file and pt will notify when needed, 2 puff(s) inh qid,PRN:as needed for wheezing,...  hydrochlorothiazide-lisinopril 12.5 mg-20 mg oral tablet: 1 tab(s), PO, Daily, # 90 tab(s), 3 Refill(s), Type: Maintenance, Pharmacy: [place] [id number] [facility], 1 tab(s) po daily  lansoprazole 30 mg oral delayed release capsule: 1 cap(s) ( 30 mg ), PO, bid, # 180 cap(s), 3 Refill(s), Type: Maintenance, [id number] [place], 1 cap(s) po bid  pravastatin 80 mg oral tablet: 1 tab(s) ( 80 mg ), po, daily, # 90 tab(s), 2 Refill(s), Type: Maintenance, Pharmacy: [place] [id number] [facility], 1 tab(s) po daily  Documented Medications  Documented  Advil PM: 2 tab(s), po, hs, PRN: as needed for insomnia, 0 Refill(s), Type: Maintenance  Multiple Vitamins oral tablet: 1 tab(s), po, daily, 0 Refill(s), Type: Maintenance  Vitamin B12 500 mcg oral tablet: 1 tab(s) ( 500 mcg ), po, daily, 0 Refill(s), Type: Maintenance  aspirin 81 mg oral tablet: 1 tab(s) ( 81 mg ), po, daily, tab(s), 0 Refill(s), Type: Maintenance  zinc (as acetate) 50 mg oral capsule: 1 cap(s) ( 50 mg ), po, daily, 0 Refill(s), Type: Maintenance   Problem list:    All Problems  AAA (Abdominal Aortic Aneurysm) / ICD-9-.4 / Confirmed  Adenocarcinoma of lung / SNOMED CT 480136813 / Confirmed  [place] Esophagus / SNOMED CT 1709067683 / Confirmed  CKD (chronic kidney disease) stage 3, GFR 30-59 ml/min / SNOMED CT 8618813642 / Confirmed  COPD (chronic obstructive pulmonary disease) with acute bronchitis / SNOMED CT 89911649 / Confirmed  Closed hip fracture / SNOMED CT 569514243 / Confirmed  Closed fracture of trochanter of femur / SNOMED CT 9977413054 / Confirmed  Cardiac arrhythmia / SNOMED CT 06358449 / Confirmed  Coronary artery disease due to lipid rich plaque / SNOMED CT 8185355549 / Confirmed  Lipids abnormal /  SNOMED CT 469707081 / Confirmed  HTN (Hypertension) / SNOMED CT 70821627 / Confirmed  Former Smoker / SNOMED CT [id number] / Confirmed  GERD (gastroesophageal reflux disease) / SNOMED CT 4688449564 / Confirmed  Anticoagulated / SNOMED CT 403905726 / Confirmed  Oropharyngeal cancer / SNOMED CT 304702872 / Confirmed  DJD (Degenerative Joint Disease) / SNOMED CT 6889754924 / Confirmed  Prostate cancer / SNOMED CT 761521497 / Confirmed  H/O unilateral nephrectomy / SNOMED CT 382472254 / Confirmed  Resolved: *Hospitalized@[facility] - Atypical chest pain  Resolved: *Hospitalized@[facility] - Hip fracture  Resolved: *Hospitalized@[facility] - Septic hip vs hematoma  Resolved: History of sepsis / SNOMED CT 5288125393  Canceled: Solitary kidney / SNOMED CT 463585145  Canceled: Solitary kidney / SNOMED CT 968088548      Histories   Past Medical History:    Active  Oropharyngeal cancer (292180148): Onset on 1/1/1994 at 48 years.  Coronary artery disease due to lipid rich plaque (7648442500)  GERD (gastroesophageal reflux disease) (7015294678)  Cardiac arrhythmia (43891289)  CKD (chronic kidney disease) stage 3, GFR 30-59 ml/min (6328032924)  Resolved  *Hospitalized@[facility] - Septic hip vs hematoma: Onset on 7/3/2016 at 70 years.  Resolved on 7/7/2016 at 70 years.  History of sepsis (9570263336): Onset on 7/3/2016 at 70 years.  Resolved.  Comments:  7/25/2016 CDT 2:14 PM CDT - Alma Barfield  Severe; due to septic hip.    7/25/2016 CDT 3:37 PM CDT - Alma Barfield  Sepsis organ failure: Acute renal failure.  *Hospitalized@[facility] - Hip fracture: Onset on 6/21/2016 at 70 years.  Resolved on 6/24/2016 at 70 years.  *Hospitalized@[facility] - Atypical chest pain: Onset on 5/16/2015 at 69 years.  Resolved on 5/16/2015 at 69 years.   Family History:    Lupus  Sister     Procedure history:    Colonoscopy (756166280) on 3/21/2017 at 71 Years.  Comments:  3/22/2017 2:33 PM - Filippo Barbour MD  Indication: Adenomatous Polyps  Sedation:  MAC  Findings: Adenomatous polyp cecum, hemorrohids  Rec: Repeat in 5 years  Right Hip Bipoloar Hemiarthroplasty on 6/22/2016 at 70 Years.  Comments:  6/28/2016 7:26 AM - Debby Faustin CMA  Right displasced femoral neck fracture  right thoracotomy on 11/15/2015 at 70 Years.  Comments:  11/17/2015 9:07 AM - Debby Faustin CMA  wedge resection right upper lung nodule, wedge resection right middle lobe lung nodule, Mediastinal lymph node dissection  Colonoscopy ([id number]) on 5/12/2014 at 68 Years.  Comments:  5/15/2014 10:56 AM - Chandrakant GRAYSON, [place]  Sedation: midazolam, fentanyl  Indication: screen  5-6mm tubular adenom x3, 8mm tubular adenoma x1, 12mm tubular adenoma with advance adenoma due to size  Repeat in 3 years.  nepherectomy in the month of 3/2009 at 63 Years.  Lumbar discectomy in 2006 at 61 Years.  Left salivary gland removal in 1995 at 50 Years.  Oral surgery in 1994 at 49 Years.  Aortic aneurysm, abdominal ([id number]).   Social History:        Alcohol Assessment            Current, 1-2 times per week      Tobacco Assessment            Past, 20 per day.  49 year(s).      Substance Abuse Assessment            Never      Employment and Education Assessment            Employed, Work/School description: [employer] .      Home and Environment Assessment            Marital status: .  Spouse/Partner name: Darlene Chau.      Nutrition and Health Assessment            Type of diet: Regular.      Exercise and Physical Activity Assessment            Exercise frequency: Never.      Sexual Assessment            Sexually active: Yes.        Physical Examination   vital signs stable, as noted above   Vital Signs   5/10/2017 4:25 PM CDT Temperature Tympanic 98.5 DegF    Peripheral Pulse Rate 88 bpm    Systolic Blood Pressure 146 mmHg  HI    Diastolic Blood Pressure 80 mmHg    Mean Arterial Pressure 102 mmHg    BP Site Right arm      Measurements from flowsheet : Measurements    5/10/2017 4:25 PM CDT    Weight Measured - Standard                190.2 lb     General:  Alert and oriented, No acute distress.    Eye:  Extraocular movements are intact.    HENT:  Normocephalic, Oral mucosa is moist, No pharyngeal erythema.    Neck:  Supple, Previous right sided neck dissection with flap.    Respiratory:  Lungs are clear to auscultation, Respirations are non-labored, posterior thoracotomy scar.    Cardiovascular:  Normal rate, Regular rhythm, No murmur, No edema.    Gastrointestinal:  Soft.    Musculoskeletal:  Pains with interal and external rotation of both hips.  Pain to palpation along joint space line of right knee; no effusion.    Neurologic:  Alert, Oriented, Normal motor function, No focal deficits.    Cognition and Speech:  Oriented, Speech clear and coherent.    Psychiatric:  Appropriate mood & affect.       Review / Management   Results review      Impression and Plan   Diagnosis     AAA (Abdominal Aortic Aneurysm) (OYF60-YM I71.4).     Adenocarcinoma of lung (XUA06-JM C34.90).     COPD (chronic obstructive pulmonary disease) with acute bronchitis (DMP66-FS J44.1).     CKD (chronic kidney disease) stage 3, GFR 30-59 ml/min (OWB95-AH N18.3).     Prostate cancer (NMP34-YV C61).       .) polyarthritis pains  - lmited benefit with NSAIDs and APAP  s/p right MIRNA after hip fracture from fall (6/2016)   - normal DEXA (7/2015)  - doubt inflammatory arthritis; doubt polymyalgia rheumatica given no upper extremity involvement  - advised to trial off pravastatin for ~ 2 week trial; if substantial improvement in symptoms, should contact clinic for further instructions.  If no change in symptoms, I want to resume statin  - referral to Ortho for further assessment    plan as previously outlined:     .) prostate Ca   - original prostate biopsy in '11, repeat earlier this fall; Ken 6   - watchful surveillance; q6 month PSA    .) s/p right wedge resection of limited stage adenocarcinoma of lung  (11/2015)    .) COPD   - on Advair 115/21mcg BID with noticeable improvement in breathing   - albuterol on rare occasions   - has not participated in pulmonary rehab    .) post-operative LEILA/ CKD (11/2015), baseline SCr 1.3 (previous left nephrectomy for benign hemangioma)     .) hypertension; controlled  current antihypertensive regimen: lisinopril/hctz 20/12.5mg daily, metoprolol 50mg BID  regimen changes: none  intolerance:  future titration/work-up plan:    - SBP <140 goal   - interval rise in SCr; recheck today    .) AAA; previous endovascular repair   - following with [facility] vascular surgery; Dr. Lazo    .) health maintenance   - CRC due in '17; arrange now; no anticipated future screening after this coming endoscopy   - q6 month PSA   - Prevacid 30mg BID   - enrolled in [facility]    RTC as previously scheduled

## 2022-02-16 NOTE — TELEPHONE ENCOUNTER
---------------------  From: Trang Pozo LPN (Phone Messages Pool ([id number]_[facility]))   Sent: 1/26/2021 10:44:12 AM CST  Subject: vaccine question     Phone Message    PCP:   MYRA      Time of Call:  10:18am       Person Calling:  pt  Phone number:  [phone]    Returned call at: 10:38am    Note:   Pt LM looking for information on covid vaccine.    Returned call and informed pt we do not currently have a date when vaccine will be available. Told pt he can call [phone] or check the [employer] website for information as it becomes available.    Also told pt he could check the [facility] website as vaccine can be requested there.    Last office visit and reason:  12/29/20 claudication, edema

## 2022-02-16 NOTE — TELEPHONE ENCOUNTER
---------------------  From: Nory Samuels   To: Ronit, Pharm D , Sara;     Sent: 6/22/2021 10:52:31 AM CDT  Subject: Pt weanting to LM with MTM     Pt called and wanted to speak to you regarding his pain meds and him having trouble with them - told him I would pass the message along since he did not want to schedule at this time.     [phone] best number to reach him - said it was ok to LVM at this number as well    -Avisracalled pt and reviewed questions. He was inquiring about his pt assistance program for [place]. See [facility] note for details.  KB

## 2022-02-16 NOTE — TELEPHONE ENCOUNTER
---------------------From: Hazel Toribio RN To: [facility] Message Pool ([id number]_[facility]);   Sent: 1/16/2020 11:17:02 AM CSTSubject: creatinine Time of Call:  1046Return call at:1100   Person Calling:  patientPhone number:  [phone]:   Patient requests creatinine result from 1/15/2020. Patient is advised his cr. is down to 2.5 on 1/15/2020 from 4.12 on 1/8/2020. He plans to see BRM next week with a BMP prior. Last office visit and reason:  1/8/2020 BRFYI---------------------From: Monik Zhang CMA ([facility] Message Pool ([id number]_[facility])) To: Yong Boyd MD;   Sent: 1/16/2020 3:10:46 PM CSTSubject: FW: creatinine

## 2022-02-16 NOTE — LETTER
(Inserted Image. Unable to display)   [address] [place] [address]  September 22, 2021      CELIA LUTZ  [address] [place] [address]        Dear CELIA,     Thank you for selecting [facility] ([facility]) for your healthcare needs. Below you will find the results of your recent test(s) done at our clinic.       Labs for your review.  We can discuss in more detail at future clinic visit.   Labs are all normal      Result Name Current Result Previous Result Reference Range   Hct (%) ((L)) 38.0 9/21/2021  39.8 3/9/2021 38.5 - 50.0   Hgb (gm/dL)  13.7 9/21/2021  14.6 7/23/2021 13.2 - 17.1   Vitamin B12 Level (pg/mL)  561 9/21/2021  810 3/9/2021 200 - 1,100       Please contact me or my assistant at [phone] if you have any questions or concerns.     Sincerely,        Yong Boyd MD    What do your labs mean?  Below is a glossary of commonly ordered labs:  LDL - Bad Cholesterol  HDL - Good Cholesterol  AST/ALT - Liver Function  Cr/Creatinine - Kidney Function  Microalbumin - Kidney Function  BUN - Kidney Function  PSA - Prostate   TSH - Thyroid Hormone  HgbA1c - Diabetes Test  Hgb (Hemoglobin) - Red Blood Cells

## 2022-02-16 NOTE — TELEPHONE ENCOUNTER
---------------------  From: Gloria Zavaleta CMA   Sent: 12/4/2019 5:18:04 PM CST  Subject: trouble breathing     Pt calling at 1710 stating his albuterol inhaler is not helping and would like a nebulizer. He wasn't sure if needed to come in or if can be sent to pharmacy - says he had one last week which helped. I advised appt and he agreed. Denies chest pain. Is s/p NSTEMI. States wife has cold too.

## 2022-02-16 NOTE — LETTER
(Inserted Image. Unable to display)     November 15, 2019      CELIA LUTZ  [address]  [place], [place] [id number]          Dear CELIA,      Thank you for selecting [facility] (previously [facility]) for your healthcare needs.      Our records indicate you are due for the following services:     Kidney Disease Follow Up.   Non-Fasting Labs.    If you had your labs done at another facility or with Direct Access Lab Testing at [facility], please bring in a copy of the results to your next visit, mail a copy, or drop off a copy of your results to your Healthcare Provider.      To schedule an appointment or if you have further questions, please contact your primary clinic:   [facility]       [phone]   [facility]       [phone]              [facility]     [phone]      Powered by [employer]    Sincerely,    Yong Boyd MD

## 2022-02-16 NOTE — NURSING NOTE
Vital Signs Entered On:  7/29/2021 2:53 PM CDT    Performed On:  7/29/2021 2:52 PM CDT by Debby Faustin CMA               Vital Signs   Systolic Blood Pressure :   181 mmHg (HI)    Diastolic Blood Pressure :   80 mmHg   Mean Arterial Pressure :   114 mmHg   BP Site :   Left arm   Debby Faustin CMA - 7/29/2021 2:52 PM CDT

## 2022-02-16 NOTE — PROGRESS NOTES
"   Patient:   CELIA LUTZ            MRN: [id number]            FIN: [id number]               Age:   74 years     Sex:  Male     :  1945   Associated Diagnoses:   AAA (Abdominal Aortic Aneurysm); Adenocarcinoma of lung; COPD (chronic obstructive pulmonary disease) with acute bronchitis; Chronic kidney disease (CKD), stage III (moderate); Prostate cancer   Author:   Yong Boyd MD      Visit Information      Date of Service: 2020 09:13 am  Performing Location: [facility]  Encounter#: [id number]      Primary Care Provider (PCP):  Yong Boyd MD    NPI# [id number]      Referring Provider:  Yong Boyd MD    NPI# [id number]      Chief Complaint   2020 9:19 AM CST     Pt is here for f/u hospital stay for renal failure-doing \"fair\"              Additional Information:No additional information recorded during visit.   Chief complaint and symptoms as noted above and confirmed with patient.  Recent lab and diagnostic studies reviewed with patient      History of Present Illness   2015: Mitchell presents to clinic for hospital follow-up.  He recently underwent right sided wedge resection of an isolated pulmonary nodule.  Biopsy returning as adenocarcinoma of the lung.  Postoperative course complicated by an episode of both acute kidney injury and rectal bleeding felt to be consistent with ischemic colitis.  His creatinine on discharge was at his baseline of 1.3.  His lisinopril was held during his episode of acute kidney injury, resumed upon discharge.  He is scheduled to see his thoracic surgeon, Dr. Velazquez, tomorrow.  He also has a history of previous endovascular repair of a abdominal aneurysm.  He follows with a vascular surgeon through [facility] for this.    2019: Mitchell returns for general follow-up.  Feeling well.  Has planned right-sided vascular procedure with Dr. David scheduled in the near future; unclear on specific procedural information.  He had had a recent CT " angiogram done earlier this year which did demonstrate a 2.2 cm pancreatic cyst.  Also reviewed labs showing a general upward trend in PSA.  Has not seen urology since 2016.  Did undergo a prostate MRI in April 2018 which did not demonstrate any extracapsular extension.    7/11/2019: Mitchell presents for preoperative assessment for planned endoscopic ultrasound and possible biopsy of pancreatic cyst.  Pancreatic cyst found incidentally as part of a CT angiogram.  Did meet with urology related to history of prostate cancer.  Did have prostate MRI which showed no significant extension of his cancer.  In general he would like to hold off on any invasive therapies    11/29/2019: Presents for hospital follow-up after recent admission to [facility] this past week in the setting of acute coronary syndrome.  Underwent left heart catheterization demonstrating multivessel disease.  Did undergo drug-eluting stent deployment and started on dual antiplatelet therapy.  Some complications with post catheter bleeding from femoral artery.  Subsequently resolved with hemostasis.  Does have significant ecchymoses amongst that groin and the no swelling or pain.  Feels well.  Plans to start on cardiac rehab through [facility] in the near future.  Also has cardiology follow-up in mid December 1/8/2020: Mitchell returns for hospital follow-up.  He was readmitted at [facility] with concerns for acute kidney injury with a creatinine rise up to 6.  Evaluated by nephrology as an inpatient.  Work-up most concerning for acute tubular necrosis versus possible cholesterol emboli.  Biopsies not pursued due to concerns of his baseline unit unit nephric state.  He endorses no uremic features.  He was advised to follow-up with me with serial lab monitoring.  No troubles since discharge.         Review of Systems   Constitutional:  No fever, No chills.    Eye:  Negative except as documented in history of present illness.    Ear/Nose/Mouth/Throat:  Negative except as  documented in history of present illness.    Respiratory   Cardiovascular:  No chest pain, No palpitations, No peripheral edema, No syncope.    Gastrointestinal:  No nausea, No vomiting, No heartburn, No abdominal pain.    Genitourinary:  No dysuria, No hematuria.    Hematology/Lymphatics:  No bruising tendency.    Endocrine:  No excessive thirst, No polyuria.    Immunologic:  No recurrent fevers.    Musculoskeletal:  Joint pain, No muscle pain, No decreased range of motion, No trauma.    Neurologic:  Alert and oriented X4, No numbness, No tingling, No headache.       Health Status   Allergies:    Allergic Reactions (Selected)  No Known Medication Allergies   Medications:  (Selected)   Prescriptions  Prescribed  Advair  mcg-21 mcg/inh inhalation aerosol: See Instructions, Instructions: INHALE 2 PUFFS BY MOUTH TWICE DAILY. RINSE MOUTH AND THROAT AFTER USE, # 3 EA, 1 Refill(s), Type: Soft Stop, Pharmacy: [facility], keep on file and pt will notify when needed, INHALE 2 PUFFS BY MOUTH TWICE ALFREDITO...  Ventolin HFA 90 mcg/inh inhalation aerosol: 2 puff(s), NEB, q4 hrs, PRN: AS NEEDED FOR COUGH OR SHORTNESS OF BREATH, # 3 EA, 0 Refill(s), Type: Maintenance, Pharmacy: [facility]  spacer for inhaler: spacer for inhaler, See Instructions, Instructions: use with albuterol inhaler, Supply, # 1 EA, 0 Refill(s), Type: Maintenance, Pharmacy: [facility], use with albuterol inhaler  Documented Medications  Documented  Advil PM: 2 tab(s), po, hs, PRN: as needed for insomnia, 0 Refill(s), Type: Maintenance  Crestor 40 mg oral tablet: = 1 tab(s) ( 40 mg ), Oral, daily, 0 Refill(s), Type: Maintenance  Multiple Vitamins oral tablet: 1 tab(s), po, daily, 0 Refill(s), Type: Maintenance  Pepcid 20 mg oral tablet: = 1 tab(s) ( 20 mg ), Oral, bid, 0 Refill(s), Type: Maintenance  Vitamin B12 500 mcg oral tablet: 1 tab(s) ( 500 mcg ), po, daily, 0 Refill(s), Type: Maintenance  aspirin 81 mg oral tablet,  chewable: = 1 tab(s) ( 81 mg ), Chewed, daily, 0 Refill(s), Type: Maintenance  clopidogrel 75 mg oral tablet: = 1 tab(s) ( 75 mg ), Oral, daily, 0 Refill(s), Type: Maintenance  magnesium oxide 250 mg oral tablet: 1 tab(s) ( 250 mg ), po, daily, 0 Refill(s), Type: Maintenance  metoprolol succinate 25 mg oral capsule, extended release: = 1 cap(s) ( 25 mg ), Oral, daily, 0 Refill(s), Type: Maintenance,    Medications          *denotes recorded medication          spacer for inhaler: See Instructions, use with albuterol inhaler, 1 EA, 0 Refill(s).          Ventolin HFA 90 mcg/inh inhalation aerosol: 2 puff(s), NEB, q4 hrs, PRN: AS NEEDED FOR COUGH OR SHORTNESS OF BREATH, 3 EA, 0 Refill(s).          *aspirin 81 mg oral tablet, chewable: 81 mg, 1 tab(s), Chewed, daily, 0 Refill(s).          *clopidogrel 75 mg oral tablet: 75 mg, 1 tab(s), Oral, daily, 0 Refill(s).          *Vitamin B12 500 mcg oral tablet: 500 mcg, 1 tab(s), po, daily, 0 Refill(s).          *Advil PM: 2 tab(s), po, hs, PRN: as needed for insomnia, 0 Refill(s).          *Pepcid 20 mg oral tablet: 20 mg, 1 tab(s), Oral, bid, 0 Refill(s).          Advair  mcg-21 mcg/inh inhalation aerosol: See Instructions, INHALE 2 PUFFS BY MOUTH TWICE DAILY. RINSE MOUTH AND THROAT AFTER USE, 3 EA, 1 Refill(s).          *magnesium oxide 250 mg oral tablet: 250 mg, 1 tab(s), po, daily, 0 Refill(s).          *metoprolol succinate 25 mg oral capsule, extended release: 25 mg, 1 cap(s), Oral, daily, 0 Refill(s).          *Multiple Vitamins oral tablet: 1 tab(s), po, daily, 0 Refill(s).          *Crestor 40 mg oral tablet: 40 mg, 1 tab(s), Oral, daily, 0 Refill(s).       Problem list:    All Problems  AAA (abdominal aortic aneurysm) / SNOMED CT 558580851 / Confirmed  Adenocarcinoma of lung / SNOMED CT 301783690 / Confirmed  [place] Esophagus / SNOMED CT 4714375525 / Confirmed  CKD (chronic kidney disease) stage 3, GFR 30-59 ml/min / SNOMED CT 6121759322 / Confirmed  COPD  (chronic obstructive pulmonary disease) with acute bronchitis / SNOMED CT 66297136 / Confirmed  Closed hip fracture / SNOMED CT 642709661 / Confirmed  Closed fracture of trochanter of femur / SNOMED CT 2315090570 / Confirmed  Cardiac arrhythmia / SNOMED CT 51551499 / Confirmed  Coronary artery disease due to lipid rich plaque / SNOMED CT 3160736777 / Confirmed  Lipids abnormal / SNOMED CT 691589214 / Confirmed  HTN (Hypertension) / SNOMED CT 43032756 / Confirmed  Former Smoker / SNOMED CT [id number] / Confirmed  GERD (gastroesophageal reflux disease) / SNOMED CT 3320691698 / Confirmed  Anticoagulated / SNOMED CT 583195987 / Confirmed  History of oropharyngeal cancer / SNOMED CT 0156364418 / Confirmed  H/O prostate cancer / SNOMED CT 3136625787 / Confirmed  DJD (Degenerative Joint Disease) / SNOMED CT 4351286533 / Confirmed  H/O unilateral nephrectomy / SNOMED CT 321585808 / Confirmed  Resolved: *Hospitalized@[facility] Atypical chest pain  Resolved: *Hospitalized@[facility] - Hip fracture  Resolved: *Hospitalized@[facility] Septic hip vs hematoma  Resolved: History of sepsis / SNOMED CT 4648701925  Resolved: Inpatient stay / SNOMED CT 921254501  Resolved: Prostate cancer / SNOMED CT 304918564  Canceled: Oropharyngeal cancer / SNOMED CT 839349776  Canceled: Solitary kidney / SNOMED CT 305884195  Canceled: Solitary kidney / SNOMED CT 163874321      Histories   Past Medical History:    Active  AAA (abdominal aortic aneurysm) (499996686)  Coronary artery disease due to lipid rich plaque (1265787725)  GERD (gastroesophageal reflux disease) (7392297528)  Cardiac arrhythmia (97592879)  CKD (chronic kidney disease) stage 3, GFR 30-59 ml/min (2882704872)  Resolved  Inpatient stay (631120483): Onset on 11/24/2019 at 74 years.  Resolved on 11/26/2019 at 74 years.  Comments:  12/10/2019 CST 1:46 PM CST - Jessi Eid  @[facility] - NSTEMI.  *Hospitalized@[facility] Septic hip vs hematoma: Onset on 7/3/2016 at  70 years.  Resolved on 2016 at 70 years.  History of sepsis (1256288341): Onset on 7/3/2016 at 70 years.  Resolved.  Comments:  2016 CDT 2:14 PM CDT - Alma Barfield  Severe; due to septic hip.    2016 CDT 3:37 PM CDT - Lico Patienceri  Sepsis organ failure: Acute renal failure.  *Hospitalized@[facility] - Hip fracture: Onset on 2016 at 70 years.  Resolved on 2016 at 70 years.  *Hospitalized@[facility] - Atypical chest pain: Onset on 2015 at 69 years.  Resolved on 2015 at 69 years.  Prostate cancer (000043685):  Resolved.   Family History:    CA - Breast cancer  Sister (Alexa, )  Lupus  Sister (Rebecca)  Alcohol abuse  Sister (Alexa, )  Mother ()  SMA type III  Grandfather (M)  Obese  Sister (Sujey, )     Procedure history:    Coronary angiography (41789971) on 2019 at 74 Years.  Comments:  12/10/2019 1:47 PM CST - Jessi Eid  and PCI of the right coronary artery.  Colonoscopy (679766302) on 3/21/2017 at 71 Years.  Comments:  3/22/2017 2:33 PM CDT - Filippo Barbour MD  Indication: Adenomatous Polyps  Sedation: MAC  Findings: Adenomatous polyp cecum, hemorrohids  Rec: Repeat in 5 years  Right Hip Bipoloar Hemiarthroplasty on 2016 at 70 Years.  Comments:  2016 7:26 AM CDT - Debby Faustin CMA  Right displasced femoral neck fracture  right thoracotomy on 11/15/2015 at 70 Years.  Comments:  2015 9:07 AM CST - Debby Faustin CMA  wedge resection right upper lung nodule, wedge resection right middle lobe lung nodule, Mediastinal lymph node dissection  Colonoscopy (714073585) on 2014 at 68 Years.  Comments:  5/15/2014 10:56 AM CDT - Livier Stallings RN  Sedation: midazolam, fentanyl  Indication: screen  5-6mm tubular adenom x3, 8mm tubular adenoma x1, 12mm tubular adenoma with advance adenoma due to size  Repeat in 3 years.  nepherectomy in the month of 3/2009 at 63 Years.  Lumbar discectomy in  at 61 Years.  Left salivary gland removal in  1995 at 50 Years.  Oral surgery in 1994 at 49 Years.  Aortic aneurysm, abdominal ([id number]).   Social History:        Alcohol Assessment            Current, 1-2 times per week, 2 drinks/episode average.  3 drinks/episode maximum.      Tobacco Assessment            Past, 20 per day.  49 year(s).      Substance Abuse Assessment            Never      Employment and Education Assessment            Employed, Work/School description: [employer] .      Home and Environment Assessment            Marital status: .  Spouse/Partner name: Darlene Chau.      Nutrition and Health Assessment            Type of diet: Regular.      Exercise and Physical Activity Assessment            Exercise frequency: Never.      Sexual Assessment            Sexually active: Yes.        Physical Examination   vital signs stable, as noted above   Vital Signs   1/8/2020 9:19 AM CST Temperature Tympanic 96.8 DegF  LOW    Peripheral Pulse Rate 81 bpm    Systolic Blood Pressure 143 mmHg  HI    Diastolic Blood Pressure 92 mmHg  HI    Mean Arterial Pressure 109 mmHg    BP Site Right arm    BP Method Electronic    Oxygen Saturation 95 %      Measurements from flowsheet : Measurements   1/8/2020 9:19 AM CST Height Measured - Standard 71 in    Weight Measured - Standard 191 lb    BSA 2.08 m2    Body Mass Index 26.64 kg/m2  HI      General:  Alert and oriented, No acute distress.    Eye:  Extraocular movements are intact.    HENT:  Normocephalic, Oral mucosa is moist, No pharyngeal erythema, dentures.    Neck:  Supple, Previous right sided neck dissection with flap.    Respiratory:  Lungs are clear to auscultation, Respirations are non-labored, posterior thoracotomy scar.    Cardiovascular:  Normal rate, Regular rhythm, No murmur, No edema.    Gastrointestinal:  Soft.    Neurologic:  Alert, Oriented, Normal motor function, No focal deficits.    Cognition and Speech:  Oriented, Speech clear and coherent.    Psychiatric:   Appropriate mood & affect.       Review / Management   Results review:  Lab results   1/8/2020 9:55 AM CST Sodium Level 141 mmol/L    Potassium Level 5.1 mmol/L    Chloride Level 106 mmol/L    CO2 Level 26 mmol/L    AGAP 9    Glucose Level 105 mg/dL    BUN 46 mg/dL    Creatinine 4.12 mg/dL    BUN/Creat Ratio 11    eGFR  17    eGFR Non-African American 14    Calcium Level 10.2 mg/dL   1/4/2020 3:18 PM CST Sodium Level  mEq/L    Potassium Level TR 4.5 mEq/L    Chloride  mEq/L    CO2 TR 22 mEq/L    Anion Gap TR 13 mEq/L    Glucose Level  mg/dL    BUN TR 60 mg/dL    Creatinine TR 5.98 mg/dL    BUN/Creatinine Ratio TR 10    eGFR TR 9 mL/min    Calcium TR 9.5 mEq/dL    Phosphorus Level TR 4.3 mg/dL    Albumin Level TR 3.9 g/dL   1/3/2020 3:20 PM CST Bili Total TR 0.5 mg/dL    Direct Bilirubin TR 0.2 mg/dL    Alk Phos TR 75 unit/L    AST TR 27 unit/L    ALT TR 15 unit/L    Protein Total TR 7.0 gm/dL    Albumin Level TR 3.9 g/dL    Globulin TR 3.1 g/dL    A/G Ratio TR 1.3 mg/dL    PT TR 13.8    INR TR 1.1   12/4/2019 6:46 PM CST Sodium Level 135 mmol/L    Potassium Level 5.7 mmol/L    Chloride Level 97 mmol/L    CO2 Level 28 mmol/L    AGAP 10    Glucose Level 128 mg/dL    BUN 23 mg/dL    Creatinine 1.92 mg/dL    BUN/Creat Ratio 12    eGFR  42    eGFR Non-African American 34    Calcium Level 10.6 mg/dL    WBC 15.4    RBC 3.90    Hgb 12.2 g/dL    Hct 37.1 %    MCV 95 fL    MCH 31.3 pg    MCHC 32.9 g/dL    RDW 13.4 %    Platelet 186    MPV 9.0 fL    Lymphs 6.6 %    Abs Lymphs 1.0    Neutrophils 83.0 %    Abs Neutrophils 12.8    Monocytes 9.4 %    Abs Monocytes 1.4    Eosinophils 0.7 %    Abs Eosinophils 0.1    Basophils 0.3 %    Abs Basophils 0.0   12/4/2019 6:39 PM CST Influenza A NEGATIVE    Influenza B NEGATIVE   .       Impression and Plan   Diagnosis     AAA (Abdominal Aortic Aneurysm) (UNZ21-TJ I71.4).     Adenocarcinoma of lung (OUO96-PI C34.90).     COPD (chronic  obstructive pulmonary disease) with acute bronchitis (QTA39-DO J44.1).     Chronic kidney disease (CKD), stage III (moderate) (RRH41-GG N18.3).     Prostate cancer (HHG66-VT C61).           .) hospital f/u, LEILA on CKD; baseline SCr 1.5-1.9; interval rise to 6.0 since [facility] in late 11/2019; asymptomatic  - seen by nephrology as inpatient - concerns for ATN vs. cholesterol emboli  - interval improvement in SCr to 4.0 today - advised recheck in 1 week   - I'm optimistic given noted improvement in GFR since admission.  He did discuss potential role of renal replacement therapy options, depending on his clinical course.  I think he would be open to the possibilities of RRT if significant renal dysfunction were to persist  - lisinopril stopped during hospitalization    .) NSTEMI with PCI to RCA with multi-vessel disease (non-obstructive LAD lesion)  - on DAPT   - Toprol XL 25mg daily  - high dose pravastatin changed to rosuvastatin 40mg qhs  - advised him to increase his lisinopril from 5mg to 10mg daily   - planned cardiac rehab in near future    plan as previously outlined:     .) EUS, 7/2019, of  2.2cm pancreatic cyst; no malignancy on biopsy   - recommendations for annual f/u through GI    .) prostate Ca; watchful waiting   - original prostate biopsy in '11, repeat biopsy in '16; Amery 6   - watchful surveillance; q6 month PSA; last PSA 15   - MRI of prostate (2019): focal coarse calcifications in prostate; no evidence of extra-prostate extension   - following with Dr. Bustos - prefers not to pursue XRT at this point    .) h/o AAA, endovascular aortic repair, follows with Dr. David     .) polyarthritis pains  - limited benefit with NSAIDs and APAP  s/p right MIRNA after hip fracture from fall (6/2016)   - normal DEXA (7/2015)  - doubt inflammatory arthritis; doubt polymyalgia rheumatica given no upper extremity involvement  - working with Ortho - lasting benefits from intra-articular injections    .) oropharyngeal  cancer with flap '94    .) s/p right wedge resection of limited stage adenocarcinoma of lung (11/2015)    .) COPD   - on Advair 115/21mcg BID with noticeable improvement in breathing   - albuterol on rare occasions   - has not participated in pulmonary rehab   - consider repeat PFTs in the future    .) hypertension; controlled  current antihypertensive regimen: Toprol XL 25mg daily  regimen changes: none  intolerance:  future titration/work-up plan:    - SBP <140 goal   - previously on lisinopril/hctz 20/12.5mg daily - since stopped    .) health maintenance   - CRC due in '22   - q6 month PSA   - Prevacid 30mg BID   - enrolled in [facility]    BMP in 1 week; should see me back in 2 weeks (waiting BMP)

## 2022-02-16 NOTE — TELEPHONE ENCOUNTER
---------------------  From: Carlos Guerrero MD   Sent: 10/7/2020 3:43:34 PM CDT  Subject: Patient Message - Results Notification        Left message about elevated creatinine.  Asked that he call us back.  He will need to see nephrology.  Surgery will need to be postponed until renal function improves.

## 2022-02-16 NOTE — LETTER
(Inserted Image. Unable to display)   November 24, 2020      CELIA LUTZ  [address]    [place], [place] [id number]        Dear CELIA,     Thank you for selecting [facility] for your healthcare needs. Below you will find the results of your recent test(s) done at our clinic.       Labs for your review.  We can discuss in more detail at future clinic visit.       Result Name Current Result Previous Result Reference Range   Sodium Level (mmol/L)  137 11/23/2020  141 10/20/2020 135 - 146   Potassium Level (mmol/L)  4.7 11/23/2020  4.7 10/20/2020 3.5 - 5.3   Chloride Level (mmol/L)  100 11/23/2020  105 10/20/2020 98 - 110   CO2 Level (mmol/L)  27 11/23/2020  26 10/20/2020 20 - 32   Glucose Level (mg/dL) ((H)) 246 11/23/2020 ((H)) 133 10/20/2020 65 - 99   BUN (mg/dL)  18 11/23/2020  20 10/20/2020 7 - 25   Creatinine Level (mg/dL) ((H)) 1.92 11/23/2020 ((H)) 2.59 10/20/2020 0.70 - 1.18   BUN/Creat Ratio  9 11/23/2020 ((L)) 8 10/20/2020 6 - 22   eGFR (mL/min/1.73m2) ((L)) 33 11/23/2020 ((L)) 14 10/12/2020 > OR = 60 -    eGFR  (mL/min/1.73m2) ((L)) 38 11/23/2020 ((L)) 29 10/20/2020 > OR = 60 -    Calcium Level (mg/dL)  9.5 11/23/2020  10.1 10/20/2020 8.6 - 10.3   WBC  6.2 11/23/2020  7.1 10/20/2020 3.8 - 10.8   RBC ((L)) 3.37 11/23/2020 ((L)) 3.00 10/20/2020 4.20 - 5.80   Hgb (gm/dL) ((L)) 10.9 11/23/2020 ((L)) 9.7 10/20/2020 13.2 - 17.1   Hct (%) ((L)) 32.2 11/23/2020 ((L)) 30.9 10/20/2020 38.5 - 50.0   MCV (fL)  95.5 11/23/2020 ((H)) 103 10/20/2020 80.0 - 100.0   MCH (pg)  32.3 11/23/2020  32.3 10/20/2020 27.0 - 33.0   MCHC (gm/dL)  33.9 11/23/2020 ((L)) 31.4 10/20/2020 32.0 - 36.0   RDW (%)  12.6 11/23/2020  14.8 10/20/2020 11.0 - 15.0   Platelet  221 11/23/2020  247 10/20/2020 140 - 400   MPV (fL)  9.5 11/23/2020  9.2 10/20/2020 7.5 - 12.5   Lymphocytes (%)  16.6 11/23/2020  6.6 12/4/2019    Abs Lymphocytes  1,029 11/23/2020  1.0 12/4/2019  850 - 3,900   Neutrophils (%)  67.7 11/23/2020  83.0 12/4/2019    Abs Neutrophils  4,197 11/23/2020 ((H)) 12.8 12/4/2019 1,500 - 7,800   Monocytes (%)  9.4 11/23/2020  9.4 12/4/2019    Abs Monocytes  583 11/23/2020 ((H)) 1.4 12/4/2019 200 - 950   Eosinophils (%)  5.5 11/23/2020  0.7 12/4/2019    Abs Eosinophils  341 11/23/2020  0.1 12/4/2019 15 - 500   Basophils (%)  0.8 11/23/2020  0.3 12/4/2019    Abs Basophils  50 11/23/2020  0.0 12/4/2019 0 - 200       Please contact me or my assistant at [phone] if you have any questions or concerns.     Sincerely,        Yong Boyd MD    What do your labs mean?  Below is a glossary of commonly ordered labs:  LDL - Bad Cholesterol  HDL - Good Cholesterol  AST/ALT - Liver Function  Cr/Creatinine - Kidney Function  Microalbumin - Kidney Function  BUN - Kidney Function  PSA - Prostate   TSH - Thyroid Hormone  HgbA1c - Diabetes Test  Hgb (Hemoglobin) - Red Blood Cells

## 2022-02-16 NOTE — NURSING NOTE
Comprehensive Intake Entered On:  10/26/2021 1:52 PM CDT    Performed On:  10/26/2021 1:48 PM CDT by Debby Faustin CMA               Summary   Chief Complaint :   f/u - increased weight - increased swelling in legs.  Worsening balance, decreased appetite    Weight Measured :   217.7 lb(Converted to: 217 lb 11 oz, 98.747 kg)    Height Measured :   71 in(Converted to: 5 ft 11 in, 180.34 cm)    Body Mass Index :   30.36 kg/m2 (HI)    Body Surface Area :   2.22 m2   Systolic Blood Pressure :   103 mmHg   Diastolic Blood Pressure :   72 mmHg   Mean Arterial Pressure :   82 mmHg   Peripheral Pulse Rate :   111 bpm (HI)    Temperature Tympanic :   96.4 DegF(Converted to: 35.8 DegC)  (LOW)    Oxygen Saturation :   95 %   Race :      Languages :   English   Ethnicity :   Not  or    Debby Faustin CMA - 10/26/2021 1:48 PM CDT   Health Status   Allergies Verified? :   Yes   Medication History Verified? :   Yes   Medical History Verified? :   Yes   Pre-Visit Planning Status :   Completed   Tobacco Use? :   Former smoker   Debby Faustin CMA - 10/26/2021 1:48 PM CDT   Consents   Consent for Immunization Exchange :   Consent Granted   Consent for Immunizations to Providers :   Consent Granted   Debby Faustin CMA - 10/26/2021 1:48 PM CDT   Family History   Family History   (As Of: 10/26/2021 1:52:06 PM CDT)   Aunt (M):   Relation:   Aunt (M) ;           Nomenclature:   Kidney disease ; Value:   Positive          Mother:   Relation:   Mother ; Gender:   Female ;  ; Age at Death:    38 Years ; Cause of Death:   Suicide            Nomenclature:   Alcohol abuse ; Value:   Positive          Grandfather (M):   Relation:   Grandfather (M) ;           Nomenclature:   SMA type III ; Value:   Positive          Sister: Alexa  Full Name:   Alexa ; Relation:   Sister ; Gender:   Female ;  ; Age at Death:    59 Years ; Cause of Death:   stroke, breast cancer            Nomenclature:   CA - Breast cancer ;  Value:   Positive            Nomenclature:   Alcohol abuse ; Value:   Positive          Sister: Sujey  Full Name:   Sujey ; Relation:   Sister ; Gender:   Female ;  ; Age at Death:    50 Years ; Cause of Death:   MVA            Nomenclature:   Obese ; Value:   Positive          Sister: Rebecca  Full Name:   Rebecca ; Relation:   Sister ; Gender:   Female ;           Nomenclature:   Lupus ; Value:   Positive            Social History   Social History   (As Of: 10/26/2021 1:52:06 PM CDT)   Alcohol:  Current      Liquor (Hard) (1.5 oz), Daily, 2 drinks/episode average.   (Last Updated: 2021 10:49:59 AM CDT by Yoly Tillman)          Tobacco:        Past, 20 per day.  49 year(s).   (Last Updated: 3/17/2011 2:21:14 PM CDT by Rose Barr CMA)   Quit 2009, Cigarettes, 40 per day.  49 year(s).   (Last Updated: 2021 11:28:32 AM CDT by Yoly Tillman)          Electronic Cigarette/Vaping:        Electronic Cigarette Use: Never.   (Last Updated: 10/12/2020 1:47:53 PM CDT by Mariah Wilkes)   Electronic Cigarette Use: Never.   (Last Updated: 10/26/2021 1:49:43 PM CDT by Debby Faustin CMA)          Substance Abuse:  Denies Substance Abuse      Never   (Last Updated: 3/19/2014 10:32:10 AM CDT by Brooke Krishnan)          Employment/School:        Retired, Highest education level: High school.   (Last Updated: 10/12/2020 1:48:08 PM CDT by Mariah Wilkes)          Home/Environment:        Marital status: .  Spouse/Partner name: Darlene Chau.  4 children.  Living situation: Home/Independent.  Home equipment: Walker/Cane.  Injuries/Abuse/Neglect in household: No.  Feels unsafe at home: No.  Family/Friends available for support: Yes.   (Last Updated: 2021 11:11:14 AM CDT by Yoly Tillman)          Nutrition/Health:        Type of diet: Regular.  Wants to lose weight: No.  Sleeping concerns: No.  Feels highly stressed: No.   (Last Updated: 10/12/2020 1:48:37 PM CDT by Mariah Wilkes)          Exercise:         Exercise frequency: Occasional.   (Last Updated: 10/12/2020 1:48:50 PM CDT by Mariah Wilkes)          Sexual:        Sexually active: No.  Identifies as male, Sexual orientation: Straight or heterosexual.  Contraceptive Use Details: None.   (Last Updated: 10/12/2020 1:49:26 PM CDT by Mariah Wilkes)

## 2022-02-16 NOTE — NURSING NOTE
Comprehensive Intake Entered On:  6/4/2020 11:00 AM CDT    Performed On:  6/4/2020 10:56 AM CDT by Debby Faustin CMA               Summary   Chief Complaint :   renal f/u - noting increased SOB   Weight Measured :   202 lb(Converted to: 202 lb 0 oz, 91.63 kg)    Height Measured :   71 in(Converted to: 5 ft 11 in, 180.34 cm)    Body Mass Index :   28.17 kg/m2 (HI)    Body Surface Area :   2.14 m2   Systolic Blood Pressure :   157 mmHg (HI)    Diastolic Blood Pressure :   93 mmHg (HI)    Mean Arterial Pressure :   114 mmHg   Peripheral Pulse Rate :   80 bpm   Temperature Tympanic :   97.6 DegF(Converted to: 36.4 DegC)  (LOW)    Oxygen Saturation :   95 %   Race :      Languages :   English   Ethnicity :   Not  or    Debby Faustin CMA - 6/4/2020 10:56 AM CDT   Health Status   Allergies Verified? :   Yes   Medication History Verified? :   Yes   Medical History Verified? :   Yes   Pre-Visit Planning Status :   Completed   Debby Faustin CMA - 6/4/2020 10:56 AM CDT   Meds / Allergies   (As Of: 6/4/2020 11:00:16 AM CDT)   Allergies (Active)   No Known Medication Allergies  Estimated Onset Date:   Unspecified ; Created By:   Debby Faustin CMA; Reaction Status:   Active ; Category:   Drug ; Substance:   No Known Medication Allergies ; Type:   Allergy ; Updated By:   Debby Faustin CMA; Reviewed Date:   5/8/2020 12:58 PM CDT        Medication List   (As Of: 6/4/2020 11:00:16 AM CDT)   Prescription/Discharge Order    albuterol  :   albuterol ; Status:   Prescribed ; Ordered As Mnemonic:   ProAir HFA 90 mcg/inh inhalation aerosol ; Simple Display Line:   See Instructions, Please specify directions, refills and quantity, 3 EA, 1 Refill(s) ; Ordering Provider:   Yong Boyd MD; Catalog Code:   albuterol ; Order Dt/Tm:   6/2/2020 12:00:25 PM CDT          fluticasone-salmeterol  :   fluticasone-salmeterol ; Status:   Prescribed ; Ordered As Mnemonic:   Advair Diskus 500 mcg-50 mcg inhalation powder ; Simple  Display Line:   1 puff(s), Inhale, bid, for 30 day(s), 60 EA, 2 Refill(s) ; Ordering Provider:   Yong Boyd MD; Catalog Code:   fluticasone-salmeterol ; Order Dt/Tm:   5/8/2020 1:14:31 PM CDT          lisinopril  :   lisinopril ; Status:   Prescribed ; Ordered As Mnemonic:   lisinopril 20 mg oral tablet ; Simple Display Line:   20 mg, 1 tab(s), Oral, daily, 30 tab(s), 0 Refill(s) ; Ordering Provider:   Yong Boyd MD; Catalog Code:   lisinopril ; Order Dt/Tm:   5/12/2020 2:15:48 PM CDT          Miscellaneous Rx Supply  :   Miscellaneous Rx Supply ; Status:   Prescribed ; Ordered As Mnemonic:   spacer for inhaler ; Simple Display Line:   See Instructions, use with albuterol inhaler, 1 EA, 0 Refill(s) ; Ordering Provider:   Severino Cannon PA-C; Catalog Code:   Miscellaneous Rx Supply ; Order Dt/Tm:   1/9/2018 2:18:32 PM CST            Home Meds    acetaminophen-diphenhydramine  :   acetaminophen-diphenhydramine ; Status:   Documented ; Ordered As Mnemonic:   Tylenol Extra Strength PM ; Simple Display Line:   2 tab(s), Oral, hs, 0 Refill(s) ; Catalog Code:   acetaminophen-diphenhydramine ; Order Dt/Tm:   6/4/2020 10:56:08 AM CDT          aspirin  :   aspirin ; Status:   Documented ; Ordered As Mnemonic:   aspirin 81 mg oral tablet, chewable ; Simple Display Line:   81 mg, 1 tab(s), Chewed, daily, 0 Refill(s) ; Catalog Code:   aspirin ; Order Dt/Tm:   11/27/2019 9:54:31 AM CST          clopidogrel  :   clopidogrel ; Status:   Documented ; Ordered As Mnemonic:   clopidogrel 75 mg oral tablet ; Simple Display Line:   75 mg, 1 tab(s), Oral, daily, 0 Refill(s) ; Catalog Code:   clopidogrel ; Order Dt/Tm:   11/27/2019 9:55:13 AM CST          cyanocobalamin  :   cyanocobalamin ; Status:   Documented ; Ordered As Mnemonic:   Vitamin B12 500 mcg oral tablet ; Simple Display Line:   500 mcg, 1 tab(s), po, daily, 0 Refill(s) ; Catalog Code:   cyanocobalamin ; Order Dt/Tm:   11/17/2015 10:02:05 AM CST           diphenhydramine-ibuprofen  :   diphenhydramine-ibuprofen ; Status:   Completed ; Ordered As Mnemonic:   Advil PM ; Simple Display Line:   2 tab(s), po, hs, PRN: as needed for insomnia, 0 Refill(s) ; Catalog Code:   diphenhydramine-ibuprofen ; Order Dt/Tm:   2/23/2017 2:39:37 PM CST          magnesium oxide  :   magnesium oxide ; Status:   Documented ; Ordered As Mnemonic:   magnesium oxide 250 mg oral tablet ; Simple Display Line:   250 mg, 1 tab(s), po, daily, 0 Refill(s) ; Catalog Code:   magnesium oxide ; Order Dt/Tm:   4/5/2018 4:37:34 PM CDT          metoprolol  :   metoprolol ; Status:   Documented ; Ordered As Mnemonic:   metoprolol succinate 25 mg oral capsule, extended release ; Simple Display Line:   25 mg, 1 cap(s), Oral, daily, 0 Refill(s) ; Catalog Code:   metoprolol ; Order Dt/Tm:   11/27/2019 9:58:05 AM CST          multivitamin  :   multivitamin ; Status:   Documented ; Ordered As Mnemonic:   Multiple Vitamins oral tablet ; Simple Display Line:   1 tab(s), po, daily, 0 Refill(s) ; Catalog Code:   multivitamin ; Order Dt/Tm:   4/22/2015 9:12:40 AM CDT          omeprazole  :   omeprazole ; Status:   Documented ; Ordered As Mnemonic:   omeprazole 20 mg oral delayed release capsule ; Simple Display Line:   20 mg, 1 cap(s), Oral, daily, 90 cap(s), 0 Refill(s) ; Catalog Code:   omeprazole ; Order Dt/Tm:   1/29/2020 4:57:06 PM CST          rosuvastatin  :   rosuvastatin ; Status:   Documented ; Ordered As Mnemonic:   Crestor 40 mg oral tablet ; Simple Display Line:   40 mg, 1 tab(s), Oral, daily, 0 Refill(s) ; Catalog Code:   rosuvastatin ; Order Dt/Tm:   11/27/2019 10:02:43 AM CST            ID Risk Screen   Recent Travel History :   No recent travel   Family Member Travel History :   No recent travel   Other Exposure to Infectious Disease :   Unknown   Debby Faustin CMA - 6/4/2020 10:56 AM CDT

## 2022-02-16 NOTE — NURSING NOTE
Quick Intake Entered On:  9/28/2021 4:43 PM CDT    Performed On:  9/28/2021 11:49 AM CDT by Wiley Cottrell Kaity               Summary   Height Measured :   71 in(Converted to: 5 ft 11 in, 180.34 cm)    Systolic Blood Pressure :   168 mmHg (HI)    Diastolic Blood Pressure :   103 mmHg (HI)    Mean Arterial Pressure :   125 mmHg   Peripheral Pulse Rate :   65 bpm   Race :      Languages :   English   Ethnicity :   Not  or    Wiley Cottrell Kaity - 9/28/2021 4:43 PM CDT

## 2022-02-16 NOTE — PROGRESS NOTES
Patient:   CELIA LUTZ            MRN: [id number]            FIN: [id number]               Age:   75 years     Sex:  Male     :  1945   Associated Diagnoses:   Adenocarcinoma of lung; COPD (chronic obstructive pulmonary disease) with acute bronchitis; Prostate cancer; Acute renal failure superimposed on stage 3 chronic kidney disease; Coronary artery disease due to lipid rich plaque   Author:   Yong Boyd MD      Visit Information      Date of Service: 2020 03:45 pm  Performing Location: [facility]  Encounter#: [id number]      Primary Care Provider (PCP):  Yong Boyd MD    NPI# [id number]      Referring Provider:  Yong Boyd MD    NPI# [id number]      Chief Complaint   2020 4:03 PM CST    fu chronic              Additional Information:No additional information recorded during visit.   Chief complaint and symptoms as noted above and confirmed with patient.  Recent lab and diagnostic studies reviewed with patient      History of Present Illness   10/14/2020: Returns to clinic for hospital f/u.  Recent admission to [facility] for LEILA with SCr rise to 7.5 and anemia, Hgb 6.7.  Renal function steadily improved with IVFs and cessation of lisinopril.  Transfused 1 unit PRBCs and seen by GI.  Decisions for outpatient endoscopy and EUS (h/o pancreatic cyst).  Scheduled for excisional biopsy of left sided oral lesions and planned laryngeal biopsy (recent avidity on PET scan) with JVT on 10/16 - procedure since postponed.  Feeling better since hospitalization.      10/20/2020:  Returns for pre-op before planned EUS/EGD scheduled for Friday, 10/23 at [facility] with Dr. Edwards.  EUS for f/u on pancreatic cyst.  EGD for acute anemia related to recent hospitalization.  He has remained on PPI BID.  Feeling well.  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    2020: Returns for pre-op before planned EUS/EGD scheduled for Friday, 10/23 at [facility]  with Dr. Edwards.  EUS for f/u on pancreatic cyst.  EGD for acute anemia related to recent hospitalization.  He has remained on PPI BID.  Feeling well.  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         Review of Systems   Constitutional:  No fever, No chills.    Eye:  Negative except as documented in history of present illness.    Ear/Nose/Mouth/Throat:  Negative except as documented in history of present illness, mouth sore.    Respiratory:  Shortness of breath, No wheezing.    Cardiovascular:  No chest pain, No palpitations, No peripheral edema, No syncope.    Gastrointestinal:  No nausea, No vomiting, No heartburn, No abdominal pain.    Genitourinary:  No dysuria, No hematuria.    Hematology/Lymphatics:  No bruising tendency.    Endocrine:  No excessive thirst, No polyuria.    Immunologic:  No recurrent fevers.    Musculoskeletal:  Joint pain, No muscle pain, No decreased range of motion, No trauma.    Neurologic:  Alert and oriented X4, No numbness, No tingling, No headache.       Health Status   Allergies:    Allergic Reactions (Selected)  No Known Medication Allergies   Medications:  (Selected)   Prescriptions  Prescribed  Advair Diskus 500 mcg-50 mcg inhalation powder: 1 puff, Inhale, bid, # 1 EA, 3 Refill(s), Type: Maintenance, Pharmacy: [facility], Needs appt for further refills, 1 puff Inhale bid, 71, in, 12/02/20 16:03:00 CST, Height Measured, 213, lb, 12/02/20 16:03:00 CST, Weight Measured  Ventolin HFA 90 mcg/inh inhalation aerosol: 2 puff(s), Inhale, q6 hrs, # 1 EA, 3 Refill(s), AD, Type: Maintenance, Pharmacy: [facility], 2 puff(s) Inhale q6 hrs, 71, in, 07/17/20 15:40:00 CDT, Height Measured, 205, lb, 07/17/20 15:40:00 CDT, Weight Measured  spacer for inhaler: spacer for inhaler, See Instructions, Instructions: use with albuterol inhaler, Supply, # 1 EA, 0 Refill(s), Type: Maintenance, Pharmacy: [facility], use with albuterol  inhaler  tiotropium 18 mcg inhalation capsule: = 1 cap(s) ( 18 mcg ), Inhale, daily, # 90 cap(s), 3 Refill(s), Type: Maintenance, Pharmacy: [id number], 1 cap(s) Inhale daily, 71, in, 06/04/20 11:00:00 CDT, Height Measured, 202, lb, 06/04/20 10:56:00 CDT, Weight Measured  Documented Medications  Documented  Multiple Vitamins oral tablet: 1 tab(s), po, daily, 0 Refill(s), Type: Maintenance  Tylenol Extra Strength PM: 2 tab(s), Oral, hs, 0 Refill(s), Type: Maintenance  Vitamin B12 500 mcg oral tablet: 1 tab(s) ( 500 mcg ), po, daily, 0 Refill(s), Type: Maintenance  Zetia 10 mg oral tablet: = 1 tab(s) ( 10 mg ), Oral, daily, # 30 tab(s), 0 Refill(s), Type: Maintenance  albuterol 2.5 mg/3 mL (0.083%) inhalation solution: = 3 mL ( 2.5 mg ), Inhale, q6 hrs, PRN: for wheezing, # 25 EA, 0 Refill(s), Type: Maintenance  magnesium oxide 250 mg oral tablet: 1 tab(s) ( 250 mg ), po, daily, 0 Refill(s), Type: Maintenance  metoprolol succinate 25 mg oral capsule, extended release: = 1 cap(s) ( 25 mg ), Oral, daily, 0 Refill(s), Type: Maintenance  omeprazole 20 mg oral delayed release capsule: = 1 cap(s) ( 20 mg ), Oral, daily, # 90 cap(s), 0 Refill(s), Type: Maintenance  polyethylene glycol 3350: ( 17 gm ), Oral, daily, 0 Refill(s), Type: Maintenance,    Medications          *denotes recorded medication          spacer for inhaler: See Instructions, use with albuterol inhaler, 1 EA, 0 Refill(s).          *Tylenol Extra Strength PM: 2 tab(s), Oral, hs, 0 Refill(s).          Ventolin HFA 90 mcg/inh inhalation aerosol: 2 puff(s), Inhale, q6 hrs, 1 EA, 3 Refill(s).          *albuterol 2.5 mg/3 mL (0.083%) inhalation solution: 2.5 mg, 3 mL, Inhale, q6 hrs, PRN: for wheezing, 25 EA, 0 Refill(s).          *Vitamin B12 500 mcg oral tablet: 500 mcg, 1 tab(s), po, daily, 0 Refill(s).          *Zetia 10 mg oral tablet: 10 mg, 1 tab(s), Oral, daily, 30 tab(s), 0 Refill(s).          Advair Diskus 500 mcg-50 mcg inhalation powder: 1  puff, Inhale, bid, 1 EA, 3 Refill(s).          *magnesium oxide 250 mg oral tablet: 250 mg, 1 tab(s), po, daily, 0 Refill(s).          *metoprolol succinate 25 mg oral capsule, extended release: 25 mg, 1 cap(s), Oral, daily, 0 Refill(s).          *Multiple Vitamins oral tablet: 1 tab(s), po, daily, 0 Refill(s).          *omeprazole 20 mg oral delayed release capsule: 20 mg, 1 cap(s), Oral, daily, 90 cap(s), 0 Refill(s).          *polyethylene glycol 3350: 17 gm, Oral, daily, 0 Refill(s).          tiotropium 18 mcg inhalation capsule: 18 mcg, 1 cap(s), Inhale, daily, 90 cap(s), 3 Refill(s).       Problem list:    All Problems  AAA (abdominal aortic aneurysm) / SNOMED CT 630026172 / Confirmed  Adenocarcinoma of lung / SNOMED CT 194518686 / Confirmed  [place] Esophagus / SNOMED CT 8576452984 / Confirmed  CKD (chronic kidney disease) stage 3, GFR 30-59 ml/min / SNOMED CT 7120202727 / Confirmed  COPD (chronic obstructive pulmonary disease) with acute bronchitis / SNOMED CT 03134299 / Confirmed  Closed hip fracture / SNOMED CT 742555720 / Confirmed  Closed fracture of trochanter of femur / SNOMED CT 8835487252 / Confirmed  Cardiac arrhythmia / SNOMED CT 55500121 / Confirmed  Coronary artery disease due to lipid rich plaque / SNOMED CT 8611737330 / Confirmed  Lipids abnormal / SNOMED CT 238091024 / Confirmed  HTN (Hypertension) / SNOMED CT 06183263 / Confirmed  Former Smoker / SNOMED CT [id number] / Confirmed  GERD (gastroesophageal reflux disease) / SNOMED CT 7128839990 / Confirmed  Anticoagulated / SNOMED CT 409132433 / Confirmed  History of oropharyngeal cancer / SNOMED CT 1437114765 / Confirmed  H/O prostate cancer / SNOMED CT 0723602014 / Confirmed  Lung cancer / SNOMED CT 042453308 / Confirmed  DJD (Degenerative Joint Disease) / SNOMED CT 1339920221 / Confirmed  H/O unilateral nephrectomy / SNOMED CT 899308288 / Confirmed  Resolved: *Hospitalized@[facility] - Atypical chest pain  Resolved:  *Hospitalized@[facility] - Hip fracture  Resolved: *Hospitalized@[facility] - Septic hip vs hematoma  Resolved: History of sepsis / SNOMED CT 2310192000  Resolved: Inpatient stay / SNOMED CT 563175208  Resolved: Inpatient stay / SNOMED CT 974345777  Resolved: Inpatient stay / SNOMED CT 732034581  Resolved: Prostate cancer / SNOMED CT 308446867  Canceled: Oropharyngeal cancer / SNOMED CT 784634779  Canceled: Solitary kidney / SNOMED CT 224637963  Canceled: Solitary kidney / SNOMED CT 734303764      Histories   Past Medical History:    Active  AAA (abdominal aortic aneurysm) (395843386)  Coronary artery disease due to lipid rich plaque (9719067662)  GERD (gastroesophageal reflux disease) (9972195288)  Cardiac arrhythmia (69857430)  CKD (chronic kidney disease) stage 3, GFR 30-59 ml/min (0946412058)  Lung cancer (182632686)  Resolved  Inpatient stay (023225703): Onset on 10/8/2020 at 75 years.  Resolved on 10/11/2020 at 75 years.  Comments:  10/12/2020 CDT 3:36 PM CDT - Dayan Musa  @ [facility] due to acute renal failure.  Inpatient stay (602931762): Onset on 1/3/2020 at 74 years.  Resolved on 1/4/2020 at 74 years.  Comments:  1/17/2020 CST 1:41 PM CST - Yoly Tillman  [facility] [place] - Chest pain, acute renal failure.  Inpatient stay (859792403): Onset on 11/24/2019 at 74 years.  Resolved on 11/26/2019 at 74 years.  Comments:  12/10/2019 CST 1:46 PM CST - Jessi Eid  @[facility] - NSTEMI.  *Hospitalized@[facility] - Septic hip vs hematoma: Onset on 7/3/2016 at 70 years.  Resolved on 7/7/2016 at 70 years.  History of sepsis (0177771506): Onset on 7/3/2016 at 70 years.  Resolved.  Comments:  7/25/2016 CDT 2:14 PM CDT - Alma Barfield  Severe; due to septic hip.    7/25/2016 CDT 3:37 PM CDT - Alma Barfield  Sepsis organ failure: Acute renal failure.  *Hospitalized@[facility] - Hip fracture: Onset on 6/21/2016 at 70 years.  Resolved on 6/24/2016 at 70 years.  *Hospitalized@[facility] - Atypical chest pain: Onset on  2015 at 69 years.  Resolved on 2015 at 69 years.  Prostate cancer (051124320):  Resolved.   Family History:    CA - Breast cancer  Sister (Alexa, )  Lupus  Sister (Rebecca)  Alcohol abuse  Sister (Alexa, )  Mother ()  SMA type III  Grandfather (M)  Obese  Sister (Sujey, )     Procedure history:    Excision of squamous cell carcinoma (1200474148) on 2020 at 75 Years.  Comments:  2020 10:15 AM CST - Yoly Tillman  Left buccal mucosa and left lateral tongue.  Coronary angiography ([id number]) on 2019 at 74 Years.  Comments:  12/10/2019 1:47 PM CST - Jessi Eid  and PCI of the right coronary artery.  Esophagogastroduodenoscopy (855944233) on 2019 at 73 Years.  Comments:  10/12/2020 3:44 PM CDT - Dayan Musa  Endoscopic US, Fine Needle Aspiration; Gastric Biopsies of polyps.  Colonoscopy ([id number]) on 3/21/2017 at 71 Years.  Comments:  3/22/2017 2:33 PM CDT - Filippo Barbour MD  Indication: Adenomatous Polyps  Sedation: MAC  Findings: Adenomatous polyp cecum, hemorrohids  Rec: Repeat in 5 years  Thoracoscopic wedge resection of lung (6051803553) in 2016 at 71 Years.  Comments:  10/12/2020 3:40 PM CDT - Dayan Musa  RUL, RML  Right Hip Bipoloar Hemiarthroplasty on 2016 at 70 Years.  Comments:  2016 7:26 AM CDT - Debby Faustin CMA  Right displasced femoral neck fracture  right thoracotomy on 11/15/2015 at 70 Years.  Comments:  2015 9:07 AM CST - Debby Faustin CMA  wedge resection right upper lung nodule, wedge resection right middle lobe lung nodule, Mediastinal lymph node dissection  Colonoscopy (356468000) on 2014 at 68 Years.  Comments:  5/15/2014 10:56 AM CDT - Chandrakant GRAYSON, [place]  Sedation: midazolam, fentanyl  Indication: screen  5-6mm tubular adenom x3, 8mm tubular adenoma x1, 12mm tubular adenoma with advance adenoma due to size  Repeat in 3 years.  nepherectomy in the month of 3/2009 at 63 Years.  Lumbar discectomy in  2006 at 61 Years.  Left salivary gland removal in 1995 at 50 Years.  Oral surgery in 1994 at 49 Years.  Aortic aneurysm, abdominal ([id number]).   Social History:        Electronic Cigarette/Vaping Assessment            Electronic Cigarette Use: Never.      Alcohol Assessment            Current, Liquor (Hard) (1.5 oz), 1-2 times per month, 2 drinks/episode average.      Tobacco Assessment            Past, 20 per day.  50 year(s).            Former smoker, quit more than 30 days ago      Substance Abuse Assessment            Never      Employment and Education Assessment            Retired, Highest education level: High school.      Home and Environment Assessment            Marital status: .  Spouse/Partner name: Darlene Chau.  4 children.  Living situation: Home/Independent.               Injuries/Abuse/Neglect in household: No.  Feels unsafe at home: No.  Family/Friends available for support:               Yes.      Nutrition and Health Assessment            Type of diet: Regular.  Wants to lose weight: No.  Sleeping concerns: No.  Feels highly stressed: No.      Exercise and Physical Activity Assessment            Exercise frequency: Occasional.      Sexual Assessment            Sexually active: No.  Identifies as male, Sexual orientation: Straight or heterosexual.  Contraceptive Use               Details: None.        Physical Examination   vital signs stable, as noted above   Vital Signs   12/2/2020 4:03 PM CST Temperature Tympanic 97.2 DegF  LOW    Peripheral Pulse Rate 104 bpm  HI    Systolic Blood Pressure 134 mmHg  HI    Diastolic Blood Pressure 76 mmHg    Mean Arterial Pressure 95 mmHg    Oxygen Saturation 95 %      Measurements from flowsheet : Measurements   12/2/2020 4:03 PM CST Height Measured - Standard 71 in    Weight Measured - Standard 213 lb    BSA 2.2 m2    Body Mass Index 29.7 kg/m2  HI      General:  Alert and oriented, No acute distress.    Eye:  Extraocular  movements are intact.    HENT:  Normocephalic, Oral mucosa is moist, No pharyngeal erythema, dentures, excisional resection involving left cheek and buccal mucosa - healing.    Neck:  Supple, Previous right sided neck dissection with flap.    Respiratory:  Lungs are clear to auscultation, Respirations are non-labored, posterior thoracotomy scar.    Cardiovascular:  Normal rate, Regular rhythm, No murmur, No edema.    Gastrointestinal:  Soft, Non-tender, Non-distended.    Neurologic:  Alert, Oriented, Normal motor function, No focal deficits.    Cognition and Speech:  Oriented, Speech clear and coherent.    Psychiatric:  Appropriate mood & affect.       Review / Management   Results review:  Lab results   11/23/2020 1:58 PM CST Sodium Level 137 mmol/L    Potassium Level 4.7 mmol/L    Chloride Level 100 mmol/L    CO2 Level 27 mmol/L    Glucose Level 246 mg/dL  HI    BUN 18 mg/dL    Creatinine 1.92 mg/dL  HI    BUN/Creat Ratio 9    eGFR 33 mL/min/1.73m2  LOW    eGFR African American 38 mL/min/1.73m2  LOW    Calcium Level 9.5 mg/dL    WBC 6.2    RBC 3.37  LOW    Hgb 10.9 gm/dL  LOW    Hct 32.2 %  LOW    MCV 95.5 fL    MCH 32.3 pg    MCHC 33.9 gm/dL    RDW 12.6 %    Platelet 221    MPV 9.5 fL    Lymphs 16.6 %    Abs Lymphs 1,029    Neutrophils 67.7 %    Abs Neutrophils 4,197    Monocytes 9.4 %    Abs Monocytes 583    Eosinophils 5.5 %    Abs Eosinophils 341    Basophils 0.8 %    Abs Basophils 50   10/20/2020 1:14 PM CDT Sodium Level 141 mmol/L    Potassium Level 4.7 mmol/L    Chloride Level 105 mmol/L    CO2 Level 26 mmol/L    AGAP 10    Glucose Level 133 mg/dL    BUN 20 mg/dL    Creatinine 2.59 mg/dL    BUN/Creat Ratio 8    eGFR  29    eGFR Non-African American 24    Calcium Level 10.1 mg/dL    WBC 7.1    RBC 3.00    Hgb 9.7 g/dL    Hct 30.9 %     fL    MCH 32.3 pg    MCHC 31.4 g/dL    RDW 14.8 %    Platelet 247    MPV 9.2 fL   10/14/2020 1:35 PM CDT Sodium Level 139 mmol/L    Potassium Level 3.9  mmol/L    Chloride Level 105 mmol/L    CO2 Level 25 mmol/L    AGAP 9    Glucose Level 166 mg/dL    BUN 27 mg/dL    Creatinine 3.53 mg/dL    BUN/Creat Ratio 8    eGFR  21    eGFR Non-African American 17    Calcium Level 8.8 mg/dL   10/12/2020 6:06 PM CDT Sodium Level 139 mmol/L    Potassium Level 4.1 mmol/L    Chloride Level 104 mmol/L    CO2 Level 24 mmol/L    Glucose Level 89 mg/dL    BUN 33 mg/dL  HI    Creatinine 3.93 mg/dL  HI    BUN/Creat Ratio 8    eGFR 14 mL/min/1.73m2  LOW    eGFR African American 16 mL/min/1.73m2  LOW    Calcium Level 9.1 mg/dL    Magnesium Level 1.4 mg/dL  LOW   10/12/2020 6:03 PM CDT WBC 7.6    RBC 2.65    Hgb 8.7 g/dL    Hct 26.9 %     fL    MCH 32.8 pg    MCHC 32.3 g/dL    RDW 15.4 %    Platelet 127    MPV 8.2 fL   10/6/2020 2:29 PM CDT Sodium Level 135 mmol/L    Potassium Level 5.0 mmol/L    Chloride Level 103 mmol/L    CO2 Level 19 mmol/L  LOW    Glucose Level 92 mg/dL    BUN 33 mg/dL  HI    Creatinine 7.18 mg/dL  HI    BUN/Creat Ratio 5  LOW    eGFR 7 mL/min/1.73m2  LOW    eGFR African American 8 mL/min/1.73m2  LOW    Calcium Level 9.3 mg/dL   .       Impression and Plan   Diagnosis     Adenocarcinoma of lung (ITM76-XP C34.90).     COPD (chronic obstructive pulmonary disease) with acute bronchitis (JCG96-IZ J44.1).     Prostate cancer (ZXJ90-NZ C61).     Acute renal failure superimposed on stage 3 chronic kidney disease (HIC02-UQ N17.9).     Coronary artery disease due to lipid rich plaque (PRX75-UZ I25.10).         .) pancreatic cyst; stable on last EUS/EGD on 10/23/2020; Dr. 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; now with recurrence  - surgical resection by JVT (11/2020)    .) COPD   - on Advair  500/50mcg BID + Spiriva   - consider repeat PFTs in the future  .) s/p right wedge resection of limited stage adenocarcinoma of lung (11/2015)   - new MELODY lung nodule; PET avid    - following with pulmonology    .) CKD; baseline SCr 1.5-1.9   - multiple LEILA episodes; h/o contrast induced nephropathy (VITOR)  - heightened risk for future contrast induced nephropathy    .) hypertension; controlled  current antihypertensive regimen: Toprol XL 25mg daily  regimen changes: none  intolerance:  future titration/work-up plan:     .) NSTEMI with PCI to RCA with multi-vessel disease (non-obstructive LAD lesion); following with Dr. Valentine  - DAPT stopped (10/2020): profound anemia, concerns for GIB  - Toprol XL 25mg daily  - rosuvastatin 40mg  held with recent LEILA    .) prostate Ca; watchful waiting   - original prostate biopsy in '11, repeat biopsy in '16; Ken 6   - watchful surveillance; q6 month PSA; last PSA 15   - MRI of prostate (2019): focal coarse calcifications in prostate; no evidence of extra-prostate extension   - following with Dr. Bustos - prefers not to pursue XRT at this point    .) h/o AAA, endovascular aortic repair, follows with Dr. David    .) polyarthritis pains  - limited benefit with NSAIDs and APAP  s/p right MIRNA after hip fracture from fall (6/2016)   - normal DEXA (7/2015)  - working with Ortho - lasting benefits from intra-articular injections    .) health maintenance   - CRC due in '22   - q6 month PSA   - Prevacid 30mg BID   - enrolled in [facility]    RTC in 3 months

## 2022-02-16 NOTE — PROGRESS NOTES
Patient:   CELIA LUTZ            MRN: [id number]            FIN: [id number]               Age:   73 years     Sex:  Male     :  1945   Associated Diagnoses:   AAA (Abdominal Aortic Aneurysm); Adenocarcinoma of lung; COPD (chronic obstructive pulmonary disease) with acute bronchitis; Chronic kidney disease (CKD), stage III (moderate); Prostate cancer   Author:   Yong Boyd MD      Visit Information      Date of Service: 2018 02:35 pm  Performing Location: [facility]  Encounter#: [id number]      Primary Care Provider (PCP):  Yong Boyd MD    NPI# [id number]      Referring Provider:  Yong Boyd MD    NPTEJAL# [id number]      Chief Complaint   2018 3:00 PM CST   renal consult - pt was seen by Dr David CTA abd/pelvis post EVAR protocol and kidney function was decreased              Additional Information:No additional information recorded during visit.   Chief complaint and symptoms as noted above and confirmed with patient.  Recent lab and diagnostic studies reviewed with patient      History of Present Illness   2015: Mitchell presents to clinic for hospital follow-up.  He recently underwent right sided wedge resection of an isolated pulmonary nodule.  Biopsy returning as adenocarcinoma of the lung.  Postoperative course complicated by an episode of both acute kidney injury and rectal bleeding felt to be consistent with ischemic colitis.  His creatinine on discharge was at his baseline of 1.3.  His lisinopril was held during his episode of acute kidney injury, resumed upon discharge.  He is scheduled to see his thoracic surgeon, Dr. Velazquez, tomorrow.  He also has a history of previous endovascular repair of a abdominal aneurysm.  He follows with a vascular surgeon through [facility] for this.    2018: Mitchell presenting for regular follow-up.  Brings up concerns related to worsening varicosities affecting right foot and ankle.  At times are painful.  More swollen than he  has ever remembered.  Has tried to wear an ace wrap to help with degree of swelling.  Last urology follow-up in late 2016.  We discussed his continued rise in PSA levels.  No back pain complaints.  No change in lower urinary tract symptoms.  Breathing is relatively stable.  He shares that his youngest sister recently passed away related to advanced stage breast cancer.    10/25/2018: Mitchell returns for general follow-up.  Overall feeling well.  His wife recently underwent rotator cuff surgery and has been undergoing rehab.  No interval changes in health since her last visit.  He has yet to schedule with interventional vein specialist.  Did have a MRI of prostate after last visit.  Not currently following with urology.    11/14/2018: Mitchell presents to clinic at the request of his vascular surgeon, Dr. David for further evaluation of his kidney function.  He was scheduled to undergo a CT angiogram of his aorta for surveillance protocol of his previous endovascular aortic repair remotely.  Unfortunately his creatinine returned at 1.7 which was somewhat higher than his baseline of approximately 1.4-1.5.  This was below  eGFR threshold of 40 mL/min.  He feels fine.  No recent hypovolemic history or insult.  No recent medication changes.  He remains on lisinopril hydrochlorothiazide combination for history of hypertension         Review of Systems   Constitutional:  No fever, No chills.    Eye:  Negative except as documented in history of present illness.    Ear/Nose/Mouth/Throat:  Negative except as documented in history of present illness.    Respiratory   Cardiovascular:  Peripheral edema, No chest pain, No palpitations, No syncope.    Gastrointestinal:  No nausea, No vomiting, No heartburn, No abdominal pain.    Genitourinary:  No dysuria, No hematuria.    Hematology/Lymphatics:  Negative except as documented in history of present illness.    Endocrine:  No excessive thirst, No polyuria.    Immunologic:  No recurrent  fevers.    Musculoskeletal:  Joint pain, Decreased range of motion, No muscle pain, No trauma.    Neurologic:  Alert and oriented X4, No numbness, No tingling, No headache.       Health Status   Allergies:    Allergic Reactions (Selected)  No known allergies   Medications:  (Selected)   Prescriptions  Prescribed  Advair  mcg-21 mcg/inh inhalation aerosol: See Instructions, Instructions: INHALE 2 PUFFS BY MOUTH TWICE DAILY. RINSE MOUTH AND THROAT AFTER USE, # 3 EA, 1 Refill(s), Type: Soft Stop, Pharm[facility], INHALE 2 PUFFS BY MOUTH TWICE DAILY. RINSE MOUTH AND THROAT AFTER USE  Metoprolol Tartrate 50 mg oral tablet: = 1 tab(s) ( 50 mg ), po, bid, # 180 tab(s), 1 Refill(s), Type: Maintenance, [facility], 1 tab(s) Oral bid  Proventil HFA 90 mcg/inh inhalation aerosol: 2 puff(s), INH, q4hr (int), Instructions: prn SOB/cough  use with spacer chamber, # 1 EA, 3 Refill(s), Type: Maintenance, Pharm[facility], 2 puff(s) Inhale q4 hrs,Instr:prn SOB/cough; use with spacer chamber  pravastatin 80 mg oral tablet: = 1 tab(s) ( 80 mg ), po, daily, # 90 tab(s), 1 Refill(s), Type: Maintenance, Pharm[facility], 1 tab(s) Oral daily  spacer for inhaler: spacer for inhaler, See Instructions, Instructions: use with albuterol inhaler, Supply, # 1 EA, 0 Refill(s), Type: Maintenance, [facility], use with albuterol inhaler  Suspended  hydrochlorothiazide-lisinopril 12.5 mg-20 mg oral tablet: 1 tab(s), PO, Daily, # 90 tab(s), 1 Refill(s), Type: Maintenance, [facility], 1 tab(s) Oral daily  Documented Medications  Documented  Advil PM: 2 tab(s), po, hs, PRN: as needed for insomnia, 0 Refill(s), Type: Maintenance  Flonase 50 mcg/inh nasal spray: 1 spray(s), nasal, daily, 0 Refill(s), Type: Maintenance  Multiple Vitamins oral tablet: 1 tab(s), po, daily, 0 Refill(s), Type: Maintenance  Vitamin B12 500 mcg oral tablet: 1 tab(s) ( 500 mcg  ), po, daily, 0 Refill(s), Type: Maintenance  aspirin 81 mg oral tablet: 1 tab(s) ( 81 mg ), po, daily, tab(s), 0 Refill(s), Type: Maintenance  magnesium oxide 250 mg oral tablet: 1 tab(s) ( 250 mg ), po, daily, 0 Refill(s), Type: Maintenance  omeprazole 20 mg oral delayed release tablet: 1 tab(s) ( 20 mg ), po, daily, 0 Refill(s), Type: Maintenance  potassium gluconate: ( 99 mg ), daily, 0 Refill(s), Type: Maintenance   Problem list:    All Problems  AAA (abdominal aortic aneurysm) / SNOMED CT 383248743 / Confirmed  Adenocarcinoma of lung / SNOMED CT 394235706 / Confirmed  [place] Esophagus / SNOMED CT 2484776244 / Confirmed  CKD (chronic kidney disease) stage 3, GFR 30-59 ml/min / SNOMED CT 9287468047 / Confirmed  COPD (chronic obstructive pulmonary disease) with acute bronchitis / SNOMED CT 54598368 / Confirmed  Closed hip fracture / SNOMED CT 018731181 / Confirmed  Closed fracture of trochanter of femur / SNOMED CT 1164499015 / Confirmed  Cardiac arrhythmia / SNOMED CT 58372648 / Confirmed  Coronary artery disease due to lipid rich plaque / SNOMED CT 7521274084 / Confirmed  Lipids abnormal / SNOMED CT 346900299 / Confirmed  HTN (Hypertension) / SNOMED CT 37122586 / Confirmed  Former Smoker / SNOMED CT [id number] / Confirmed  GERD (gastroesophageal reflux disease) / SNOMED CT 7092204163 / Confirmed  Anticoagulated / SNOMED CT 241012406 / Confirmed  History of oropharyngeal cancer / SNOMED CT 6646501052 / Confirmed  H/O prostate cancer / SNOMED CT 4877394506 / Confirmed  DJD (Degenerative Joint Disease) / SNOMED CT 7542129793 / Confirmed  H/O unilateral nephrectomy / SNOMED CT 079757304 / Confirmed  Resolved: *Hospitalized@[facility] - Atypical chest pain  Resolved: *Hospitalized@[facility] - Hip fracture  Resolved: *Hospitalized@[facility] Septic hip vs hematoma  Resolved: History of sepsis / SNOMED CT 9591909759  Resolved: Prostate cancer / SNOMED CT 784722467  Canceled: Oropharyngeal cancer / SNOMED  CT 844063592  Canceled: Solitary kidney / SNOMED CT 646522246  Canceled: Solitary kidney / SNOMED CT 066875362      Histories   Past Medical History:    Active  AAA (abdominal aortic aneurysm) (950560267)  Coronary artery disease due to lipid rich plaque (2529312096)  GERD (gastroesophageal reflux disease) (8977921083)  Cardiac arrhythmia (85610239)  CKD (chronic kidney disease) stage 3, GFR 30-59 ml/min (3508575458)  Resolved  *Hospitalized@[facility] Septic hip vs hematoma: Onset on 7/3/2016 at 70 years.  Resolved on 2016 at 70 years.  History of sepsis (4212837819): Onset on 7/3/2016 at 70 years.  Resolved.  Comments:  2016 CDT 2:14 PM CDT - Alma Barfield  Severe; due to septic hip.    2016 CDT 3:37 PM CDT - Alma Barfield  Sepsis organ failure: Acute renal failure.  *Hospitalized@[facility] - Hip fracture: Onset on 2016 at 70 years.  Resolved on 2016 at 70 years.  *Hospitalized@[facility] - Atypical chest pain: Onset on 2015 at 69 years.  Resolved on 2015 at 69 years.  Prostate cancer (451731015):  Resolved.   Family History:    CA - Breast cancer  Sister (Alexa, )  Lupus  Sister (Rebecca)  Alcohol abuse  Sister (Alexa, )  Mother ()  SMA type III  Grandfather (M)  Obese  Sister (Sujey, )     Procedure history:    Colonoscopy (118804389) on 3/21/2017 at 71 Years.  Comments:  3/22/2017 2:33 PM - Filippo Barbour MD  Indication: Adenomatous Polyps  Sedation: MAC  Findings: Adenomatous polyp cecum, hemorrohids  Rec: Repeat in 5 years  Right Hip Bipoloar Hemiarthroplasty on 2016 at 70 Years.  Comments:  2016 7:26 AM - Debby Faustin CMA  Right displasced femoral neck fracture  right thoracotomy on 11/15/2015 at 70 Years.  Comments:  2015 9:07 AM - Fidead Debby HOWARD  wedge resection right upper lung nodule, wedge resection right middle lobe lung nodule, Mediastinal lymph node dissection  Colonoscopy (191730292) on 2014 at 68  Years.  Comments:  5/15/2014 10:56 AM - Chandrakant GRAYSON, [place]  Sedation: midazolam, fentanyl  Indication: screen  5-6mm tubular adenom x3, 8mm tubular adenoma x1, 12mm tubular adenoma with advance adenoma due to size  Repeat in 3 years.  nepherectomy in the month of 3/2009 at 63 Years.  Lumbar discectomy in 2006 at 61 Years.  Left salivary gland removal in 1995 at 50 Years.  Oral surgery in 1994 at 49 Years.  Aortic aneurysm, abdominal ([id number]).   Social History:        Alcohol Assessment            Current, 1-2 times per week, 2 drinks/episode average.  3 drinks/episode maximum.      Tobacco Assessment            Past, 20 per day.  49 year(s).      Substance Abuse Assessment            Never      Employment and Education Assessment            Employed, Work/School description: [employer] .      Home and Environment Assessment            Marital status: .  Spouse/Partner name: Darlene Chau.      Nutrition and Health Assessment            Type of diet: Regular.      Exercise and Physical Activity Assessment            Exercise frequency: Never.      Sexual Assessment            Sexually active: Yes.        Physical Examination   vital signs stable, as noted above   Vital Signs   11/14/2018 3:00 PM CST Temperature Tympanic 98.8 DegF    Peripheral Pulse Rate 64 bpm    Systolic Blood Pressure 142 mmHg  HI    Diastolic Blood Pressure 78 mmHg    Mean Arterial Pressure 99 mmHg    Systolic Blood Pressure Supine 136 mmHg    Diastolic Blood Pressure Supine 78 mmHg      Measurements from flowsheet : Measurements   11/14/2018 3:00 PM CST   Weight Measured - Standard                194 lb     General:  Alert and oriented, No acute distress.    Eye:  Extraocular movements are intact.    HENT:  Normocephalic, Oral mucosa is moist, No pharyngeal erythema, dentures.    Neck:  Supple, Previous right sided neck dissection with flap.    Respiratory:  Lungs are clear to auscultation, Respirations  are non-labored, posterior thoracotomy scar.    Cardiovascular:  Normal rate, Regular rhythm, No murmur, No edema.    Gastrointestinal:  Soft.    Lymphatics:  right ankle/pretibial varicosities; soft to touch.    Neurologic:  Alert, Oriented, Normal motor function, No focal deficits.    Cognition and Speech:  Oriented, Speech clear and coherent.    Psychiatric:  Appropriate mood & affect.       Review / Management   Results review:  Lab results   10/15/2018 5:14 PM CDT Sodium Level 137 mmol/L    Potassium Level 5.3 mmol/L    Chloride Level 100 mmol/L    CO2 Level 31 mmol/L    Glucose Level 96 mg/dL    BUN 23 mg/dL    Creatinine 1.41 mg/dL  HI    BUN/Creat Ratio 16    eGFR 49 mL/min/1.73m2  LOW    eGFR African American 56 mL/min/1.73m2  LOW    Calcium Level 9.8 mg/dL    Calcium Level 9.8 mg/dL    Phosphorus Level 3.3 mg/dL    PTH Intact 26 pg/mL    Alk Phos 94 unit/L    PSA 10.8 ng/mL  HI    U Protein 16 mg/dL    U Protein/Creatinine Ratio 457  HI    Ur Creatinine 35 mg/dL    WBC 6.4    RBC 3.84  LOW    Hgb 11.4 gm/dL  LOW    Hct 33.9 %  LOW    MCV 88.3 fL    MCH 29.7 pg    MCHC 33.6 gm/dL    RDW 14.6 %    Platelet 186    MPV 9.6 fL   .       Impression and Plan   Diagnosis     AAA (Abdominal Aortic Aneurysm) (OQB10-XQ I71.4).     Adenocarcinoma of lung (OZZ59-QV C34.90).     COPD (chronic obstructive pulmonary disease) with acute bronchitis (QCD55-VW J44.1).     Chronic kidney disease (CKD), stage III (moderate) (GIK60-TZ N18.3).     Prostate cancer (JRQ53-FU C61).         .) post-operative LEILA/ CKD (11/2015), baseline SCr 1.3-1.5 (previous left nephrectomy for benign hemangioma)   -recent interval rise in SCr to 1.7, eGFR 40mL/min (11/7/2018)   - needing CTA of abdomen as part of  EVAR protocol surveillance   - I will have him temporarily hold his lisinopril/htcz for ~ 2 weeks to see if this can alleviate his reduced GFR    - assuming stable GFR, okay with contrast use from a renal perpective     - he will need  recheck of his SCR within 48 hrs after CTA    - okay to then resume lisinopril/hctz     plan as previously outlined:     .) polyarthritis pains  - lmited benefit with NSAIDs and APAP  s/p right MIRNA after hip fracture from fall (6/2016)   - normal DEXA (7/2015)  - doubt inflammatory arthritis; doubt polymyalgia rheumatica given no upper extremity involvement  - working with Ortho - lasting benefits from intra-articular injections    .) prostate Ca   - original prostate biopsy in '11, repeat biopsy in '16; [place] [facility]   - watchful surveillance; q6 month PSA; last PSA 10.8   - last seen by Urology in Fall, 2016   - MRI of prostate (4/2018): focal coarse calcifications in prostate; no evidence of extra-prostate extension    .) 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    .) s/p right wedge resection of limited stage adenocarcinoma of lung (11/2015)    .) COPD   - on Advair 115/21mcg BID with noticeable improvement in breathing   - albuterol on rare occasions   - has not participated in pulmonary rehab   - consider repeat PFTs at next visit    .) lower extremity vein disease with varicosities   - venous duplex study (4/2018): no DVT   - referral to interventional vein specialist    .) hypertension; controlled  current antihypertensive regimen: lisinopril/hctz 20/12.5mg daily, metoprolol 50mg BID  regimen changes: none  intolerance:  future titration/work-up plan:    - SBP <140 goal    .) HLPD   - pravastatin 80mg qhs    .) AAA; previous endovascular repair   - following with [facility] vascular surgery; Dr. Lazo    .) health maintenance   - CRC due in '22   - q6 month PSA   - Prevacid 30mg BID   - enrolled in [facility]    RTC as previously scheduled

## 2022-02-16 NOTE — CARE COORDINATION
Pt appears on [facility] chronic disease panel as out of parameters for BP.  [facility] pt, RTC 10/2018 chronic disease/labs, placed RTC CSS BP 1 wk

## 2022-02-16 NOTE — TELEPHONE ENCOUNTER
Entered by Monik Zhang CMA on March 09, 2021 10:54:40 AM CST  ---------------------  From: Monik Zhang CMA   To: [facility] [id number] [facility]    Sent: 3/9/2021 10:54:40 AM CST  Subject: Medication Management     ** Not Approved: Refill not appropriate **  fluticasone-salmeterol (ADVAIR 500-50 DISKUS)  INHALE 1 PUFF TWICE A DAY  Qty:  180 unknown unit        Days Supply:  90        Refills:  1          Substitutions Allowed     Route To Pharmacy - [place] [id number] [facility]   Signed by Monik Zhang CMA            ------------------------------------------  From: [facility] [id number] [facility]  To: Yong Boyd MD  Sent: March 7, 2021 9:40:39 AM CST  Subject: Medication Management  Due: March 3, 2021 5:24:05 PM CST     ** On Hold Pending Signature **     Dispensed Drug: fluticasone-salmeterol (Advair Diskus 500 mcg-50 mcg inhalation powder), INHALE 1 PUFF TWICE A DAY  Quantity: 180 unknown unit  Days Supply: 90  Refills: 1  Substitutions Allowed  Notes from Pharmacy:  ------------------------------------------I called pt in re: to Advair script-pt states that he is working on getting trelegy and will then d/c his advair and spiriva. He states he does NOT need the advair scrip refilled at this time. We discussed he is due for labs and a visit with [facility] and he agreed to get scheduled so  I transferred him. I did not refill his advair at this time.

## 2022-02-16 NOTE — NURSING NOTE
Depression Screening Entered On:  12/3/2020 2:45 PM CST    Performed On:  12/2/2020 2:45 PM CST by Debby Faustin CMA               Depression Screening   Little Interest - Pleasure in Activities :   Not at all   Feeling Down, Depressed, Hopeless :   Not at all   Initial Depression Screen Score :   0 Score   Poor Appetite or Overeating :   Not at all   Trouble Falling or Staying Asleep :   Several days   Feeling Tired or Little Energy :   Not at all   Feeling Bad About Yourself :   Not at all   Trouble Concentrating :   Not at all   Moving or Speaking Slowly :   Not at all   Thoughts Better Off Dead or Hurting Self :   Not at all   EUN Difficulty with Work, Home, Others :   Not difficult at all   Detailed Depression Screen Score :   1    Total Depression Screen Score :   1    Debby Faustin CMA - 12/3/2020 2:45 PM CST

## 2022-02-16 NOTE — NURSING NOTE
Comprehensive Intake Entered On:  12/2/2020 4:07 PM CST    Performed On:  12/2/2020 4:03 PM CST by Debby Faustin CMA               Summary   Chief Complaint :   fu chronic    Weight Measured :   213 lb(Converted to: 213 lb 0 oz, 96.615 kg)    Height Measured :   71 in(Converted to: 5 ft 11 in, 180.34 cm)    Body Mass Index :   29.7 kg/m2 (HI)    Body Surface Area :   2.2 m2   Systolic Blood Pressure :   134 mmHg (HI)    Diastolic Blood Pressure :   76 mmHg   Mean Arterial Pressure :   95 mmHg   Peripheral Pulse Rate :   104 bpm (HI)    Temperature Tympanic :   97.2 DegF(Converted to: 36.2 DegC)  (LOW)    Oxygen Saturation :   95 %   Race :      Languages :   English   Ethnicity :   Not  or    Debby Faustin CMA - 12/2/2020 4:03 PM CST   Health Status   Allergies Verified? :   Yes   Medication History Verified? :   Yes   Medical History Verified? :   Yes   Pre-Visit Planning Status :   Completed   Tobacco Use? :   Former smoker   Debby Faustin CMA - 12/2/2020 4:03 PM CST   ID Risk Screen   Recent Travel History :   No recent travel   Family Member Travel History :   No recent travel   Other Exposure to Infectious Disease :   Unknown   Debby Faustin CMA - 12/2/2020 4:03 PM CST   Social History   Social History   (As Of: 12/2/2020 4:07:43 PM CST)   Alcohol:        Current, Liquor (Hard) (1.5 oz), 1-2 times per month, 2 drinks/episode average.   (Last Updated: 10/12/2020 1:47:42 PM CDT by Mariah Wilkes)          Tobacco:        Past, 20 per day.  49 year(s).   (Last Updated: 3/17/2011 2:21:14 PM CDT by Rose Barr CMA)   Former smoker, quit more than 30 days ago   (Last Updated: 12/2/2020 4:04:23 PM CST by Debby Faustin CMA)          Electronic Cigarette/Vaping:        Electronic Cigarette Use: Never.   (Last Updated: 10/12/2020 1:47:53 PM CDT by Mariah Wilkes)          Substance Abuse:        Never   (Last Updated: 3/19/2014 10:32:10 AM CDT by Brooke Krishnan)          Employment/School:         Retired, Highest education level: High school.   (Last Updated: 10/12/2020 1:48:08 PM CDT by Mariah Wilkes)          Home/Environment:        Marital status: .  Spouse/Partner name: Darlene Chau.  4 children.  Living situation: Home/Independent.  Injuries/Abuse/Neglect in household: No.  Feels unsafe at home: No.  Family/Friends available for support: Yes.   (Last Updated: 10/12/2020 1:48:26 PM CDT by Mariah Wilkes)          Nutrition/Health:        Type of diet: Regular.  Wants to lose weight: No.  Sleeping concerns: No.  Feels highly stressed: No.   (Last Updated: 10/12/2020 1:48:37 PM CDT by Mariah Wilkes)          Exercise:        Exercise frequency: Occasional.   (Last Updated: 10/12/2020 1:48:50 PM CDT by Mariah Wilkes)          Sexual:        Sexually active: No.  Identifies as male, Sexual orientation: Straight or heterosexual.  Contraceptive Use Details: None.   (Last Updated: 10/12/2020 1:49:26 PM CDT by Mariah Wilkes)

## 2022-02-16 NOTE — NURSING NOTE
Depression Screening Entered On:  1/22/2020 2:52 PM CST    Performed On:  1/22/2020 2:52 PM CST by Debby Faustin CMA               Depression Screening   Little Interest - Pleasure in Activities :   Not at all   Feeling Down, Depressed, Hopeless :   Not at all   Initial Depression Screen Score :   0    Trouble Falling or Staying Asleep :   Not at all   Feeling Tired or Little Energy :   Not at all   Poor Appetite or Overeating :   Not at all   Feeling Bad About Yourself :   Not at all   Trouble Concentrating :   Not at all   Moving or Speaking Slowly :   Not at all   Thoughts Better Off Dead or Hurting Self :   Not at all   Detailed Depression Screen Score :   0    Total Depression Screen Score :   0    EUN Difficulty with Work, Home, Others :   Not difficult at all   Debby Faustin CMA - 1/22/2020 2:52 PM CST

## 2022-02-16 NOTE — NURSING NOTE
Quick Intake Entered On:  6/20/2021 10:47 PM CDT    Performed On:  6/15/2021 2:13 PM CDT by Wiley Cottrell Kaity               Summary   Height Measured :   71 in(Converted to: 5 ft 11 in, 180.34 cm)    Weight Measured - Metric :   92.2 kg(Converted to: 203 lb 4 oz, 203.266 lb)    Race :      Languages :   English   Ethnicity :   Not  or    Wiley Cottrell , Sara - 6/20/2021 10:47 PM CDT

## 2022-02-16 NOTE — PROGRESS NOTES
Patient:   CELIA LUTZ            MRN: [id number]            FIN: [id number]               Age:   72 years     Sex:  Male     :  1945   Associated Diagnoses:   None   Author:   Alon Nunez MD      Visit Information      Date of Service: 2018 02:37 pm  Performing Location: [facility]  Encounter#: [id number]      Primary Care Provider (PCP):  Oliver HUTCHINSON, Yong    NPI# [id number]      Referring Provider:  Andrew Dela Cruz MD    NPI# [id number]      Chief Complaint   2018 2:51 PM CDT     pt here for right side lesion in mouth, has had oropharyngeal cancer, had surgery 9 years ago, reffered by [facility]        History of Present Illness   Patient reports that he had history of SCC right throat. He had surgery followed by radiation in . In 2016 he had surgery on the lung to remove cancer by Dr. Velazquez. He reports that he noticed a lesion on the inside of the right jaw.       Review of Systems   Constitutional:  Negative.    Ear/Nose/Mouth/Throat:  Negative except as documented in history of present illness.    Respiratory:  Negative.       Health Status   Allergies:    Allergic Reactions (Selected)  No known allergies   Medications:  (Selected)   Prescriptions  Prescribed  Advair  mcg-21 mcg/inh inhalation aerosol: See Instructions, Instructions: INHALE 2 PUFFS BY MOUTH TWICE DAILY. RINSE MOUTH AND THROAT AFTER USE, # 3 EA, 1 Refill(s), Type: Maintenance, Pharmacy: [facility], INHALE 2 PUFFS BY MOUTH TWICE DAILY. RINSE MOUTH AND THROAT AFTER USE  Anti-static valved spacer chamber: Anti-static valved spacer chamber, See Instructions, Instructions: use as directed with inhaler, Supply, # 1 EA, 0 Refill(s), Type: Maintenance, Pharmacy: [id number], use as directed with inhaler  Metoprolol Tartrate 50 mg oral tablet: 1 tab(s) ( 50 mg ), po, bid, # 180 tab(s), 1 Refill(s), Type: Maintenance, Pharmacy: [facility], keep on file and pt will notify when  needed, 1 tab(s) po bid  Proventil HFA 90 mcg/inh inhalation aerosol: 2 puff(s), INH, q4hr (int), Instructions: prn SOB/cough  use with spacer chamber, # 1 EA, 3 Refill(s), Type: Maintenance, Pharmacy: [facility] [id number] [facility], 2 puff(s) inh q4 hrs,Instr:prn SOB/cough; use with spacer chamber  doxycycline hyclate 100 mg oral tablet: 1 tab(s) ( 100 mg ), PO, bid, Instructions: fill if not improving in the next 7 days, # 14 tab(s), 0 Refill(s), Type: Maintenance, Pharmacy: [facility] [address] [facility], 1 tab(s) po bid,x7 day(s),Instr:fill if not improving in the next 7 days  hydrochlorothiazide-lisinopril 12.5 mg-20 mg oral tablet: 1 tab(s), PO, Daily, # 90 tab(s), 1 Refill(s), Type: Maintenance, Pharmacy: [facility] [address] [facility], keep on file and pt will notify when needed, 1 tab(s) po daily  pravastatin 80 mg oral tablet: 1 tab(s) ( 80 mg ), po, daily, # 90 tab(s), 1 Refill(s), Type: Maintenance, Pharmacy: [facility] [address] [facility], keep on file and pt will notify when needed, 1 tab(s) po daily  predniSONE 50 mg oral tablet: 1 tab(s) ( 50 mg ), PO, Daily, # 7 tab(s), 0 Refill(s), Type: Maintenance, Pharmacy: [facility] [address] [facility], 1 tab(s) po daily,x7 day(s)  spacer for inhaler: spacer for inhaler, See Instructions, Instructions: use with albuterol inhaler, Supply, # 1 EA, 0 Refill(s), Type: Maintenance, Pharmacy: [facility] [address] [facility], use with albuterol inhaler  Documented Medications  Documented  Advil PM: 2 tab(s), po, hs, PRN: as needed for insomnia, 0 Refill(s), Type: Maintenance  Flonase 50 mcg/inh nasal spray: 1 spray(s), nasal, daily, 0 Refill(s), Type: Maintenance  Multiple Vitamins oral tablet: 1 tab(s), po, daily, 0 Refill(s), Type: Maintenance  Vitamin B12 500 mcg oral tablet: 1 tab(s) ( 500 mcg ), po, daily, 0 Refill(s), Type: Maintenance  aspirin 81 mg oral tablet: 1 tab(s) ( 81 mg ), po, daily, tab(s), 0 Refill(s), Type: Maintenance  magnesium oxide 250 mg oral tablet: 1 tab(s) ( 250 mg ), po, daily, 0 Refill(s), Type:  Maintenance  omeprazole 20 mg oral delayed release tablet: 1 tab(s) ( 20 mg ), po, daily, 0 Refill(s), Type: Maintenance  zinc (as acetate) 50 mg oral capsule: 1 cap(s) ( 50 mg ), po, daily, 0 Refill(s), Type: Maintenance   Problem list:    All Problems (Selected)  AAA (abdominal aortic aneurysm) / SNOMED CT 091703058 / Confirmed  Adenocarcinoma of lung / SNOMED CT 510168686 / Confirmed  [place] Esophagus / SNOMED CT 2523436210 / Confirmed  CKD (chronic kidney disease) stage 3, GFR 30-59 ml/min / SNOMED CT 2342231409 / Confirmed  COPD (chronic obstructive pulmonary disease) with acute bronchitis / SNOMED CT 11344961 / Confirmed  Closed hip fracture / SNOMED CT 033149887 / Confirmed  Closed fracture of trochanter of femur / SNOMED CT 1517214027 / Confirmed  Cardiac arrhythmia / SNOMED CT 38807957 / Confirmed  Coronary artery disease due to lipid rich plaque / SNOMED CT 5736799360 / Confirmed  Lipids abnormal / SNOMED CT 757145974 / Confirmed  HTN (Hypertension) / SNOMED CT 58669995 / Confirmed  Former Smoker / SNOMED CT [id number] / Confirmed  GERD (gastroesophageal reflux disease) / SNOMED CT 1613560787 / Confirmed  Anticoagulated / SNOMED CT 829944863 / Confirmed  Oropharyngeal cancer / SNOMED CT 522193083 / Confirmed  DJD (Degenerative Joint Disease) / SNOMED CT 4745959218 / Confirmed  Prostate cancer / SNOMED CT 683812657 / Confirmed  H/O unilateral nephrectomy / SNOMED CT 545342498 / Confirmed      Histories   Past Medical History:    Active  Oropharyngeal cancer (539113058): Onset on 1/1/1994 at 48 years.  AAA (abdominal aortic aneurysm) (887066807)  Coronary artery disease due to lipid rich plaque (6300678490)  GERD (gastroesophageal reflux disease) (3299280921)  Cardiac arrhythmia (13447926)  CKD (chronic kidney disease) stage 3, GFR 30-59 ml/min (8937485347)  Resolved  *Hospitalized@[facility] - Septic hip vs hematoma: Onset on 7/3/2016 at 70 years.  Resolved on 7/7/2016 at 70  years.  History of sepsis (3882613161): Onset on 7/3/2016 at 70 years.  Resolved.  Comments:  2016 CDT 2:14 PM CDT - Alma Barfield  Severe; due to septic hip.    2016 CDT 3:37 PM CDT - Alma Barfield  Sepsis organ failure: Acute renal failure.  *Hospitalized@[facility] - Hip fracture: Onset on 2016 at 70 years.  Resolved on 2016 at 70 years.  *Hospitalized@[facility] - Atypical chest pain: Onset on 2015 at 69 years.  Resolved on 2015 at 69 years.   Family History:    CA - Breast cancer  Sister (Alexa, )  Lupus  Sister (Rebecca)  Alcohol abuse  Sister (Alexa, )  Mother ()  SMA type III  Grandfather (M)  Obese  Sister (Sujey, )     Procedure history:    Colonoscopy ([id number]) on 3/21/2017 at 71 Years.  Comments:  3/22/2017 2:33 PM - Filippo Barbour MD  Indication: Adenomatous Polyps  Sedation: MAC  Findings: Adenomatous polyp cecum, hemorrohids  Rec: Repeat in 5 years  Right Hip Bipoloar Hemiarthroplasty on 2016 at 70 Years.  Comments:  2016 7:26 AM - Debby Faustin CMA  Right displasced femoral neck fracture  right thoracotomy on 11/15/2015 at 70 Years.  Comments:  2015 9:07 AM - Debby Faustin CMA  wedge resection right upper lung nodule, wedge resection right middle lobe lung nodule, Mediastinal lymph node dissection  Colonoscopy (646021449) on 2014 at 68 Years.  Comments:  5/15/2014 10:56 AM - Livier Stallings RN  Sedation: midazolam, fentanyl  Indication: screen  5-6mm tubular adenom x3, 8mm tubular adenoma x1, 12mm tubular adenoma with advance adenoma due to size  Repeat in 3 years.  nepherectomy in the month of 3/2009 at 63 Years.  Lumbar discectomy in  at 61 Years.  Left salivary gland removal in  at 50 Years.  Oral surgery in  at 49 Years.  Aortic aneurysm, abdominal ([id number]).      Physical Examination   Vital Signs   2018 2:51 PM CDT     Temperature Tympanic      97.2 DegF  LOW      CONSTITUTIONAL  General Appearance:  Normal, well developed, well nourished, no obvious distress  Ability to Communicate:  communicates appropriately.    HEAD AND FACE  Appearance and Symmetry:  Normal, no scalp or facial scarring or suspicious lesions.  Paranasal sinuses tenderness:  Normal, Paranasal sinuses non tender    EARS  Clinical speech reception threshold:  Normal, able to hear normal speech.  Auricle:  Normal, Auricles without scars, lesions, masses.  External auditory canal:  Normal, External auditory canal normal.  Tympanic membrane:  Normal, Tympanic membranes normal without swelling or erythema.  Tympanic membrane mobility:  Normal, Normal tympanic membrane mobility.    NOSE (speculum or scope)  Architecture:  Normal, Grossly normal external nasal architecture with no masses or lesions.  Mucosa:  Normal mucosa, No polyps or masses.  Septum:  Normal, Septum non-obstructing.  Turbinates:  Normal, No turbinate abnormalities    ORAL CAVITY AND OROPHARYNX  Lips:  Normal.  Dental and gingiva:  Normal, No obvious dental or gingival disease.  Mucosa:  2cm suspicious lesion right buccal mucosa.  Tongue:  Normal, Tongue mobile with no mucosal abnormalities  Hard and soft palate:  Normal, Hard and soft palate without cleft or mucosal lesions.  Oral pharynx:  Normal, Posterior pharynx without lesions or remarkable asymmetry.  Saliva:  Normal, Clear saliva.  Masses:  Normal, No palpable masses or pathologically enlarged lymph nodes.    NECK  Masses/lymph nodes:  Normal, No worrisome neck masses or lymph nodes.  Salivary glands:  Normal, Parotid and submandibular glands.  Trachea and larynx position:  Normal, Trachea and larynx midline.  Thyroid:  Normal, No thyroid abnormality.  Tenderness:  Normal, No cervical tenderness.  Suppleness:  Normal, Neck supple    NEUROLOGICAL  Speech pattern:  Normal, Proasaic    RESPIRATORY  Symmetry and Respiratory effort:  Normal, Symmetric chest movement and expansion with no  increased intercostal retractions or use of accessory muscles.       Impression and Plan   Lesion right buccal mucosa. I discussed biopsy and the patient agreed. Multiple small biopsies were taken from the area and placed in formalin. He did not require a local anesthetic. I provided my contact information so he can call to request results next week.

## 2022-02-16 NOTE — PROGRESS NOTES
Chief Complaint    c/o cough, SOB/wheezing, chest congestion, runny nose, post nasal drainage for the past week  History of Present Illness      Has had congestion for a week with some coughing. No significant sputum. Feels a little shortness of breat intermittently. Had a COPD exacerbation January 2018 treated with zithromax.  Review of Systems      No fevers       No vomiting       Rarely gets sick since quitting smoking 2010  Physical Exam   Vitals & Measurements    T: 97.6(Tympanic)  HR: 68(Peripheral)  BP: 142/90  SpO2: 96%     HT: 71 in  WT: 187.8 lb  BMI: 26.19       General: No acute distress      Neck: Without lymphadenopathy      Lungs: No crackles but mild diffuse wheezing      Heart: Regular rate and rhythm      Musculoskeletal: Normal gait  Assessment/Plan   COPD exacerbation: Will start with prednisone 50 mg daily for 1 week.  If not getting better with prednisone will start doxycycline.  Follow-up if not getting better.  Patient Information     Name:CELIA LUTZ      Address:      [address] [place] [id number]     Sex:Male     YOB: 1945     [phone]     Emergency Contact:MICHAEL LUTZ     MRN:[id number]     FIN:[id number]     Location:[facility]     Date of Service:04/23/2018      Primary Care Physician:       Yong Boyd MD, [phone]      Attending Physician:       Carlos Celis MD, [phone]  Medications        aspirin 81 mg oral tablet: 81 mg, 1 tab(s), po, daily, tab(s).        Multiple Vitamins oral tablet: 1 tab(s), po, daily, 0 Refill(s).        Anti-static valved spacer chamber: See Instructions, use as directed with inhaler, 1 EA, 0 Refill(s).        Vitamin B12 500 mcg oral tablet: 500 mcg, 1 tab(s), po, daily, 0 Refill(s).        Advil PM: 2 tab(s), po, hs, PRN: as needed for insomnia, 0 Refill(s).        zinc (as acetate) 50 mg oral capsule: 50 mg, 1 cap(s), po, daily, 0 Refill(s).        omeprazole 20 mg oral  delayed release tablet: 20 mg, 1 tab(s), po, daily, 0 Refill(s).        spacer for inhaler: See Instructions, use with albuterol inhaler, 1 EA, 0 Refill(s).        Proventil HFA 90 mcg/inh inhalation aerosol: 2 puff(s), INH, q4hr (int), prn SOB/cough  use with spacer chamber, 1 EA, 3 Refill(s).        magnesium oxide 250 mg oral tablet: 250 mg, 1 tab(s), po, daily, 0 Refill(s).        hydrochlorothiazide-lisinopril 12.5 mg-20 mg oral tablet: 1 tab(s), PO, Daily, 90 tab(s), 1 Refill(s).        Metoprolol Tartrate 50 mg oral tablet: 50 mg, 1 tab(s), po, bid, 180 tab(s), 1 Refill(s).        Advair  mcg-21 mcg/inh inhalation aerosol: See Instructions, INHALE 2 PUFFS BY MOUTH TWICE DAILY. RINSE MOUTH AND THROAT AFTER USE, 3 EA, 1 Refill(s).        pravastatin 80 mg oral tablet: 80 mg, 1 tab(s), po, daily, 90 tab(s), 1 Refill(s).        Flonase 50 mcg/inh nasal spray: 1 spray(s), nasal, daily, 0 Refill(s).                Allergies    No known allergies

## 2022-02-16 NOTE — TELEPHONE ENCOUNTER
---------------------  From: Debby Faustin CMA   To: [facility] Message Pool ([id number]);     Sent: 1/8/2020 11:00:36 AM CST  Subject: General Message-labs     Per [facility] Scr trending down-good news!  will keep with the plan of rechecking labs in 1wk with visit and lab with [facility] in 2wks    LM for a return call at 1100contacted pt and discussed at 1325

## 2022-02-16 NOTE — TELEPHONE ENCOUNTER
---------------------  From: Debby Faustin CMA   To: Yong Boyd MD;     Sent: 5/25/2021 3:19:20 PM CDT  Subject: General Message-PT     Received a call from PT at [facility] stating [facility]).  Asking for an order from [facility] with Dx.  Contacted pt and he doesn't feel it's necessary at this time and declines any PT

## 2022-02-16 NOTE — NURSING NOTE
Comprehensive Intake Entered On:  12/8/2020 3:25 PM CST    Performed On:  12/8/2020 3:18 PM CST by Obi Barrera CMA               Summary   Chief Complaint :   Pt here for edema and pain  in both calves x 4 days.   Weight Measured :   215 lb(Converted to: 215 lb 0 oz, 97.522 kg)    Height Measured :   71 in(Converted to: 5 ft 11 in, 180.34 cm)    Body Mass Index :   29.98 kg/m2 (HI)    Body Surface Area :   2.21 m2   Systolic Blood Pressure :   124 mmHg   Diastolic Blood Pressure :   76 mmHg   Mean Arterial Pressure :   92 mmHg   Peripheral Pulse Rate :   98 bpm   BP Site :   Right arm   Pulse Site :   Radial artery   Temperature Tympanic :   96.5 DegF(Converted to: 35.8 DegC)  (LOW)    Respiratory Rate :   20 br/min   Oxygen Saturation :   94 %   Race :      Languages :   English   Ethnicity :   Not  or    Obi Barrera CMA - 12/8/2020 3:18 PM CST   Health Status   Allergies Verified? :   Yes   Medication History Verified? :   Yes   Medical History Verified? :   Yes   Pre-Visit Planning Status :   Not completed   Tobacco Use? :   Former smoker   Obi Barrera CMA - 12/8/2020 3:18 PM CST   Meds / Allergies   (As Of: 12/8/2020 3:25:03 PM CST)   Allergies (Active)   No Known Medication Allergies  Estimated Onset Date:   Unspecified ; Created By:   Debby Faustin CMA; Reaction Status:   Active ; Category:   Drug ; Substance:   No Known Medication Allergies ; Type:   Allergy ; Updated By:   Debby Faustin CMA; Reviewed Date:   12/8/2020 3:23 PM CST        Medication List   (As Of: 12/8/2020 3:25:03 PM CST)   Prescription/Discharge Order    albuterol  :   albuterol ; Status:   Prescribed ; Ordered As Mnemonic:   Ventolin HFA 90 mcg/inh inhalation aerosol ; Simple Display Line:   2 puff(s), Inhale, q6 hrs, 1 EA, 3 Refill(s) ; Ordering Provider:   Yong Boyd MD; Catalog Code:   albuterol ; Order Dt/Tm:   12/2/2020 5:00:50 PM CST          fluticasone-salmeterol  :   fluticasone-salmeterol ; Status:    Prescribed ; Ordered As Mnemonic:   Advair Diskus 500 mcg-50 mcg inhalation powder ; Simple Display Line:   1 puff, Inhale, bid, 1 EA, 3 Refill(s) ; Ordering Provider:   Yong Boyd MD; Catalog Code:   fluticasone-salmeterol ; Order Dt/Tm:   12/2/2020 4:31:18 PM CST          tiotropium  :   tiotropium ; Status:   Prescribed ; Ordered As Mnemonic:   tiotropium 18 mcg inhalation capsule ; Simple Display Line:   18 mcg, 1 cap(s), Inhale, daily, 90 cap(s), 3 Refill(s) ; Ordering Provider:   Yong Boyd MD; Catalog Code:   tiotropium ; Order Dt/Tm:   6/4/2020 11:17:16 AM CDT          Miscellaneous Rx Supply  :   Miscellaneous Rx Supply ; Status:   Prescribed ; Ordered As Mnemonic:   spacer for inhaler ; Simple Display Line:   See Instructions, use with albuterol inhaler, 1 EA, 0 Refill(s) ; Ordering Provider:   Severino Cannon PA-C; Catalog Code:   Miscellaneous Rx Supply ; Order Dt/Tm:   1/9/2018 2:18:32 PM CST            Home Meds    albuterol  :   albuterol ; Status:   Documented ; Ordered As Mnemonic:   albuterol 2.5 mg/3 mL (0.083%) inhalation solution ; Simple Display Line:   2.5 mg, 3 mL, Inhale, q6 hrs, PRN: for wheezing, 25 EA, 0 Refill(s) ; Catalog Code:   albuterol ; Order Dt/Tm:   10/14/2020 1:32:54 PM CDT          polyethylene glycol 3350  :   polyethylene glycol 3350 ; Status:   Documented ; Ordered As Mnemonic:   polyethylene glycol 3350 ; Simple Display Line:   17 gm, Oral, daily, 0 Refill(s) ; Catalog Code:   polyethylene glycol 3350 ; Order Dt/Tm:   10/12/2020 5:40:00 PM CDT          ezetimibe  :   ezetimibe ; Status:   Documented ; Ordered As Mnemonic:   Zetia 10 mg oral tablet ; Simple Display Line:   10 mg, 1 tab(s), Oral, daily, 30 tab(s), 0 Refill(s) ; Catalog Code:   ezetimibe ; Order Dt/Tm:   10/12/2020 5:39:23 PM CDT          acetaminophen-diphenhydramine  :   acetaminophen-diphenhydramine ; Status:   Documented ; Ordered As Mnemonic:   Tylenol Extra Strength PM ; Simple Display Line:   2  tab(s), Oral, hs, 0 Refill(s) ; Catalog Code:   acetaminophen-diphenhydramine ; Order Dt/Tm:   6/4/2020 10:56:08 AM CDT          omeprazole  :   omeprazole ; Status:   Documented ; Ordered As Mnemonic:   omeprazole 20 mg oral delayed release capsule ; Simple Display Line:   20 mg, 1 cap(s), Oral, daily, 90 cap(s), 0 Refill(s) ; Catalog Code:   omeprazole ; Order Dt/Tm:   1/29/2020 4:57:06 PM CST          metoprolol  :   metoprolol ; Status:   Documented ; Ordered As Mnemonic:   metoprolol succinate 25 mg oral capsule, extended release ; Simple Display Line:   25 mg, 1 cap(s), Oral, daily, 0 Refill(s) ; Catalog Code:   metoprolol ; Order Dt/Tm:   11/27/2019 9:58:05 AM CST          magnesium oxide  :   magnesium oxide ; Status:   Documented ; Ordered As Mnemonic:   magnesium oxide 250 mg oral tablet ; Simple Display Line:   250 mg, 1 tab(s), po, daily, 0 Refill(s) ; Catalog Code:   magnesium oxide ; Order Dt/Tm:   4/5/2018 4:37:34 PM CDT          cyanocobalamin  :   cyanocobalamin ; Status:   Documented ; Ordered As Mnemonic:   Vitamin B12 500 mcg oral tablet ; Simple Display Line:   500 mcg, 1 tab(s), po, daily, 0 Refill(s) ; Catalog Code:   cyanocobalamin ; Order Dt/Tm:   11/17/2015 10:02:05 AM CST          multivitamin  :   multivitamin ; Status:   Documented ; Ordered As Mnemonic:   Multiple Vitamins oral tablet ; Simple Display Line:   1 tab(s), po, daily, 0 Refill(s) ; Catalog Code:   multivitamin ; Order Dt/Tm:   4/22/2015 9:12:40 AM CDT            ID Risk Screen   Recent Travel History :   No recent travel   Family Member Travel History :   No recent travel   Other Exposure to Infectious Disease :   Unknown   Obi Barrera CMA - 12/8/2020 3:18 PM CST   Social History   Social History   (As Of: 12/8/2020 3:25:03 PM CST)   Alcohol:        Current, Liquor (Hard) (1.5 oz), 1-2 times per month, 2 drinks/episode average.   (Last Updated: 10/12/2020 1:47:42 PM CDT by Mariah Wilkes)          Tobacco:        Past, 20  per day.  49 year(s).   (Last Updated: 3/17/2011 2:21:14 PM CDT by Rose Barr CMA)   Former smoker, quit more than 30 days ago   (Last Updated: 12/2/2020 4:04:23 PM CST by Debby Faustin CMA)          Electronic Cigarette/Vaping:        Electronic Cigarette Use: Never.   (Last Updated: 10/12/2020 1:47:53 PM CDT by Mariah Wilkes)          Substance Abuse:        Never   (Last Updated: 3/19/2014 10:32:10 AM CDT by Brooke Krishnan)          Employment/School:        Retired, Highest education level: High school.   (Last Updated: 10/12/2020 1:48:08 PM CDT by Mariah Wilkes)          Home/Environment:        Marital status: .  Spouse/Partner name: Darlene Chau.  4 children.  Living situation: Home/Independent.  Injuries/Abuse/Neglect in household: No.  Feels unsafe at home: No.  Family/Friends available for support: Yes.   (Last Updated: 10/12/2020 1:48:26 PM CDT by Mariah Wilkes)          Nutrition/Health:        Type of diet: Regular.  Wants to lose weight: No.  Sleeping concerns: No.  Feels highly stressed: No.   (Last Updated: 10/12/2020 1:48:37 PM CDT by Mariah Wilkes)          Exercise:        Exercise frequency: Occasional.   (Last Updated: 10/12/2020 1:48:50 PM CDT by Mariah Wilkes)          Sexual:        Sexually active: No.  Identifies as male, Sexual orientation: Straight or heterosexual.  Contraceptive Use Details: None.   (Last Updated: 10/12/2020 1:49:26 PM CDT by Mariah Wilkes)

## 2022-02-16 NOTE — PROGRESS NOTES
Patient:   CELIA LUTZ            MRN: [id number]            FIN: [id number]               Age:   75 years     Sex:  Male     :  1945   Associated Diagnoses:   Adenocarcinoma of lung; COPD (chronic obstructive pulmonary disease) with acute bronchitis; Prostate cancer; Acute renal failure superimposed on stage 3 chronic kidney disease; Coronary artery disease due to lipid rich plaque   Author:   Yong Boyd MD      Visit Information      Date of Service: 2021 01:18 pm  Performing Location: [facility]  Encounter#: [id number]      Primary Care Provider (PCP):  Yong Boyd MD    NPI# [id number]      Referring Provider:  Yong Boyd MD    NPI# [id number]      Chief Complaint   2021 1:28 PM CDT    Pt here for FU on [facility] Discharge for Falls.              Additional Information:No additional information recorded during visit.   Chief complaint and symptoms as noted above and confirmed with patient.  Recent lab and diagnostic studies reviewed with patient      History of Present Illness   10/14/2020: Returns to clinic for hospital f/u.  Recent admission to [facility] for LEILA with SCr rise to 7.5 and anemia, Hgb 6.7.  Renal function steadily improved with IVFs and cessation of lisinopril.  Transfused 1 unit PRBCs and seen by GI.  Decisions for outpatient endoscopy and EUS (h/o pancreatic cyst).  Scheduled for excisional biopsy of left sided oral lesions and planned laryngeal biopsy (recent avidity on PET scan) with JVT on 10/16 - procedure since postponed.  Feeling better since hospitalization.      2020: Mitchell returns to clinic for follow-up.  Recently been seen by Dr. Celis in my clinical absence earlier this month.  Complaints of worsening lower extremity swelling.  Did trial on furosemide without any noticeable perceived effect.  Did have a normal BNP.  He did have a repeat echocardiogram which demonstrated stable overall cardiac function.  States that edema typically worsens  through the day and improves overnight.  Has used compression stockings before with some neck success.  Main complaint today is worsening claudication.  Describes walking a progressively shorter distance due to leg pain especially on the right side.  Has been established with vascular surgery in the past related to aortic aneurysm repair though has not seen in several years.  Is planning on seeing Dr. Valentine next month related to his cardiac cares.  He has remained off of dual antiplatelet therapy and rosuvastatin since his episode of acute kidney injury earlier in the fall    3/16/21:  Mitchell returns for follow-up.  Seen by vascular surgery - stable features, normal appearing arterial studies (MADELINE, toe pressures).  Persistent lower extremity edema complaints.  Completed XRT for known lung cancer.  Planning to see urology in future to discuss rising PSA - wants to avoid surgery.    5/21/2021: Mitcehll follows up after recent discharge from [facility] to [facility] related to episode of rhabdomyolysis and weakness.  Unclear etiologies of his rhabdo; potentially related to fall at home and immobility versus statin.  Concerns raised about excess alcohol intake.  At [facility] soft progressive improvement in overall strength.  Now back home and feeling at baseline.  Questions about exophytic growth on his scalp.  Due to follow-up with urology about known history of prostate cancer.         Review of Systems   Constitutional:  No fever, No chills.    Eye:  Negative except as documented in history of present illness.    Ear/Nose/Mouth/Throat:  Negative except as documented in history of present illness.    Respiratory:  Shortness of breath, No wheezing.    Cardiovascular:  Peripheral edema, No chest pain, No palpitations, No syncope.    Gastrointestinal:  No nausea, No vomiting, No heartburn, No abdominal pain.    Genitourinary:  Negative, No dysuria, No hematuria.    Hematology/Lymphatics:  No bruising tendency.    Endocrine:  No  excessive thirst, No polyuria.    Immunologic:  No recurrent fevers.    Musculoskeletal:  Joint pain, Claudication, No muscle pain, No decreased range of motion, No trauma.    Integumentary:  Skin lesion.    Neurologic:  Alert and oriented X4, No numbness, No tingling, No headache.       Health Status   Allergies:    Allergic Reactions (Selected)  No Known Medication Allergies   Medications:  (Selected)   Prescriptions  Prescribed  Efudex 5% topical cream: 1 eva, Topical, bid, # 25 gm, 3 Refill(s), Type: Maintenance, [facility]acy: [id number] [place], 1 eva Topical bid, 71, in, 05/21/21 13:28:00 CDT, Height Measured, 204, lb, 05/21/21 13:28:00 CDT, Weight Measured  Trelegy Ellipta 200 mcg-62.5 mcg-25 mcg/inh inhalation powder: 1 puff(s), Inhale, daily, # 60 blister, 3 Refill(s), Type: Maintenance, [id number] [place], 1 puff(s) Inhale daily, 71, in, 05/21/21 13:28:00 CDT, Height Measured, 204, lb, 05/21/21 13:28:00 CDT, Weight Measured  Ventolin HFA 90 mcg/inh inhalation aerosol: 2 puff(s), Inhale, q6 hrs, # 3 EA, 1 Refill(s), AD, Type: Maintenance, Pharmacy: [facility], keep on file, 2 puff(s) Inhale q6 hrs, 71, in, 03/16/21 13:06:00 CDT, Height Measured, 212.2, lb, 03/16/21 13:06:00 CDT, Weight Measured  omeprazole 20 mg oral delayed release capsule: = 1 cap(s) ( 20 mg ), Oral, daily, # 90 cap(s), 3 Refill(s), Type: Maintenance, [facility]: [id number] [place], 1 cap(s) Oral daily, 71, in, 12/29/20 7:45:00 CST, Height Measured, 212.4, lb, 12/29/20 7:45:00 CST, Weight Measured  spacer for inhaler: spacer for inhaler, See Instructions, Instructions: use with albuterol inhaler, Supply, # 1 EA, 0 Refill(s), Type: Maintenance, [facility]acy: [id number] [place], use with albuterol inhaler  Documented Medications  Documented  Aspirin Enteric Coated 81 mg oral delayed release tablet: = 1 tab(s) ( 81 mg ), Oral, daily, 0 Refill(s), Type: Maintenance  Metoprolol Succinate ER 25 mg oral tablet, extended release: 0  Refill(s), Type: Soft Stop  Milk of Magnesia: 30 mL, Oral, hs, 0 Refill(s), Type: Maintenance  Multiple Vitamins oral tablet: 1 tab(s), po, daily, 0 Refill(s), Type: Maintenance  Vitamin B12 500 mcg oral tablet: 1 tab(s) ( 500 mcg ), po, daily, 0 Refill(s), Type: Maintenance  cholecalciferol 1000 intl units oral tablet: ( 25 mcg ), Oral, daily, 0 Refill(s), Type: Maintenance  ferrous sulfate 325 mg (65 mg elemental iron) oral delayed release tablet: = 1 tab(s) ( 325 mg ), Oral, daily, # 30 tab(s), 0 Refill(s), Type: Maintenance  folic acid 1 mg oral tablet: = 1 tab(s) ( 1 mg ), Oral, daily, 0 Refill(s), Type: Maintenance  magnesium oxide 400 mg (240 mg elemental magnesium) oral tablet: 1 tab(s) ( 400 mg ), Oral, daily, 0 Refill(s), Type: Maintenance  nitroglycerin 0.4 mg sublingual tablet: = 1 tab(s) ( 0.4 mg ), SL, q5 min, PRN: for chest pain, # 100 tab(s), 0 Refill(s), Type: Maintenance  ondansetron 8 mg oral tablet: = 1 tab(s) ( 8 mg ), Oral, q8 hrs, # 3 tab(s), 0 Refill(s), Type: Maintenance  polyethylene glycol 3350: ( 17 gm ), Oral, daily, 0 Refill(s), Type: Maintenance  thiamine 100 mg oral tablet: = 1 tab(s) ( 100 mg ), Oral, daily, 0 Refill(s), Type: Maintenance  zinc (as gluconate) 50 mg oral tablet: Instructions: 1 tab by mouth daily (Pt taking OTC), 0 Refill(s), Type: Maintenance,    Medications          *denotes recorded medication          spacer for inhaler: See Instructions, use with albuterol inhaler, 1 EA, 0 Refill(s).          Ventolin HFA 90 mcg/inh inhalation aerosol: 2 puff(s), Inhale, q6 hrs, 3 EA, 1 Refill(s).          *Aspirin Enteric Coated 81 mg oral delayed release tablet: 81 mg, 1 tab(s), Oral, daily, 0 Refill(s).          *cholecalciferol 1000 intl units oral tablet: 25 mcg, Oral, daily, 0 Refill(s).          *Vitamin B12 500 mcg oral tablet: 500 mcg, 1 tab(s), po, daily, 0 Refill(s).          *ferrous sulfate 325 mg (65 mg elemental iron) oral delayed release tablet: 325 mg, 1 tab(s),  Oral, daily, 30 tab(s), 0 Refill(s).          Efudex 5% topical cream: 1 eva, Topical, bid, 25 gm, 3 Refill(s).          Trelegy Ellipta 200 mcg-62.5 mcg-25 mcg/inh inhalation powder: 1 puff(s), Inhale, daily, 60 blister, 3 Refill(s).          *folic acid 1 mg oral tablet: 1 mg, 1 tab(s), Oral, daily, 0 Refill(s).          *Milk of Magnesia: 30 mL, Oral, hs, 0 Refill(s).          *magnesium oxide 400 mg (240 mg elemental magnesium) oral tablet: 400 mg, 1 tab(s), Oral, daily, 0 Refill(s).          *Metoprolol Succinate ER 25 mg oral tablet, extended release: 0 Refill(s).          *Multiple Vitamins oral tablet: 1 tab(s), po, daily, 0 Refill(s).          *nitroglycerin 0.4 mg sublingual tablet: 0.4 mg, 1 tab(s), SL, q5 min, PRN: for chest pain, 100 tab(s), 0 Refill(s).          omeprazole 20 mg oral delayed release capsule: 20 mg, 1 cap(s), Oral, daily, 90 cap(s), 3 Refill(s).          *ondansetron 8 mg oral tablet: 8 mg, 1 tab(s), Oral, q8 hrs, 3 tab(s), 0 Refill(s).          *polyethylene glycol 3350: 17 gm, Oral, daily, 0 Refill(s).          *thiamine 100 mg oral tablet: 100 mg, 1 tab(s), Oral, daily, 0 Refill(s).          *zinc (as gluconate) 50 mg oral tablet: 1 tab by mouth daily (Pt taking OTC), 0 Refill(s).       Problem list:    All Problems  AAA (abdominal aortic aneurysm) / SNOMED CT 061770496 / Confirmed  Adenocarcinoma of lung / SNOMED CT 474735046 / Confirmed  Ragsdale Esophagus / SNOMED CT 1758694721 / Confirmed  CKD (chronic kidney disease) stage 3, GFR 30-59 ml/min / SNOMED CT 9098803754 / Confirmed  COPD (chronic obstructive pulmonary disease) with acute bronchitis / SNOMED CT 02319082 / Confirmed  Closed hip fracture / SNOMED CT 426116237 / Confirmed  Closed fracture of trochanter of femur / SNOMED CT 8195880952 / Confirmed  Cardiac arrhythmia / SNOMED CT 37764542 / Confirmed  Coronary artery disease due to lipid rich plaque / SNOMED CT 7292347402 / Confirmed  Lipids abnormal / SNOMED CT 601388766 /  Confirmed  HTN (Hypertension) / SNOMED CT 65002693 / Confirmed  Former Smoker / SNOMED CT [id number] / Confirmed  GERD (gastroesophageal reflux disease) / SNOMED CT 4370199147 / Confirmed  Anticoagulated / SNOMED CT 752282076 / Confirmed  History of oropharyngeal cancer / SNOMED CT 2729589975 / Confirmed  H/O prostate cancer / SNOMED CT 5313489288 / Confirmed  Hypomagnesemia / SNOMED CT 639148562 / Confirmed  Lung cancer / SNOMED CT 014334220 / Confirmed  DJD (Degenerative Joint Disease) / SNOMED CT 5191828286 / Confirmed  Rhabdomyolysis / SNOMED CT 647480070 / Confirmed  H/O unilateral nephrectomy / SNOMED CT 420481600 / Confirmed  Resolved: *Hospitalized@[facility] Atypical chest pain  Resolved: *Hospitalized@[facility] Hip fracture  Resolved: *Hospitalized@[facility] Septic hip vs hematoma  Resolved: History of sepsis / SNOMED CT 7778851328  Resolved: Inpatient stay / SNOMED CT 151932819  Resolved: Inpatient stay / SNOMED CT 064514206  Resolved: Inpatient stay / SNOMED CT 232628418  Resolved: Inpatient stay / SNOMED CT 543019068  Resolved: Prostate cancer / SNOMED CT 686432615  Canceled: Oropharyngeal cancer / SNOMED CT 734893377  Canceled: Solitary kidney / SNOMED CT 829946447  Canceled: Solitary kidney / SNOMED CT 180429630      Histories   Past Medical History:    Active  AAA (abdominal aortic aneurysm) (468266402)  Lipids abnormal (303846919)  HTN (Hypertension) (02744461)  DJD (Degenerative Joint Disease) (4981795079)  H/O unilateral nephrectomy (171674448)  Ragsdale Esophagus (9690092074)  COPD (chronic obstructive pulmonary disease) with acute bronchitis (32554055)  Adenocarcinoma of lung (837232806)  Closed hip fracture (076081162)  Coronary artery disease due to lipid rich plaque (6415629320)  GERD (gastroesophageal reflux disease) (8323291016)  Cardiac arrhythmia (91746189)  CKD (chronic kidney disease) stage 3, GFR 30-59 ml/min (7630733638)  Closed fracture of trochanter of femur  (8842886193)  Lung cancer (517276457)  Resolved  Inpatient stay (636309373): Onset on 2021 at 75 years.  Resolved on 5/10/2021 at 75 years.  Comments:  2021 CDT 6:41 AM CDT - Primo  Yoly  [facility], [place] - Weakness, frequent falls, and dizziness.  Inpatient stay (054811975): Onset on 10/8/2020 at 75 years.  Resolved on 10/11/2020 at 75 years.  Comments:  10/12/2020 CDT 3:36 PM CDT - Dayan Musa  @ [facility] due to acute renal failure.  Inpatient stay (460903575): Onset on 1/3/2020 at 74 years.  Resolved on 2020 at 74 years.  Comments:  2020 CST 1:41 PM CST - Primo [facility], [place] - Chest pain, acute renal failure.  Inpatient stay (827060333): Onset on 2019 at 74 years.  Resolved on 2019 at 74 years.  Comments:  12/10/2019 CST 1:46 PM CST - Jessi Eid  @[facility] - NSTEMI.  *Hospitalized@[facility] Septic hip vs hematoma: Onset on 7/3/2016 at 70 years.  Resolved on 2016 at 70 years.  History of sepsis (9355014364): Onset on 7/3/2016 at 70 years.  Resolved.  Comments:  2016 CDT 2:14 PM CDT - Alma Barfield  Severe; due to septic hip.    2016 CDT 3:37 PM CDT - Alma Barfield  Sepsis organ failure: Acute renal failure.  *Hospitalized@[facility] - Hip fracture: Onset on 2016 at 70 years.  Resolved on 2016 at 70 years.  *Hospitalized@[facility] - Atypical chest pain: Onset on 2015 at 69 years.  Resolved on 2015 at 69 years.  Prostate cancer (980609829):  Resolved.   Family History:    CA - Breast cancer  Sister (Alexa, )  Lupus  Sister (Rebecca)  Alcohol abuse  Sister (Alexa, )  Mother ()  Kidney disease  Aunt (M)  SMA type III  Grandfather (M)  Obese  Sister (Sujey, )     Procedure history:    Biopsy of lung ([id number]) on 2021 at 75 Years.  Esophagogastroduodenoscopy (505361060) on 2020 at 75 Years.  Excision of squamous cell carcinoma (7665877383) on 2020 at 75  Years.  Comments:  11/9/2020 10:15 AM CST - Yoly Tillman  Left buccal mucosa and left lateral tongue.  Coronary angiography (59097670) on 11/25/2019 at 74 Years.  Comments:  12/10/2019 1:47 PM CST - Jessi Eid  and PCI of the right coronary artery.  Esophagogastroduodenoscopy (357872663) on 7/26/2019 at 73 Years.  Comments:  10/12/2020 3:44 PM CDT - Dayan Musa  Endoscopic US, Fine Needle Aspiration; Gastric Biopsies of polyps.  Colonoscopy (658801263) on 3/21/2017 at 71 Years.  Comments:  3/22/2017 2:33 PM CDT - Filippo Barbour MD  Indication: Adenomatous Polyps  Sedation: MAC  Findings: Adenomatous polyp cecum, hemorrohids  Rec: Repeat in 5 years  Thoracoscopic wedge resection of lung (9803870487) in 2016 at 71 Years.  Comments:  10/12/2020 3:40 PM CDT - Dayan Musa  RUL, RML  Right Hip Bipoloar Hemiarthroplasty on 6/22/2016 at 70 Years.  Comments:  6/28/2016 7:26 AM CDT - Debby Faustin CMA  Right displasced femoral neck fracture  right thoracotomy on 11/15/2015 at 70 Years.  Comments:  11/17/2015 9:07 AM CST - Debby Faustin CMA  wedge resection right upper lung nodule, wedge resection right middle lobe lung nodule, Mediastinal lymph node dissection  Colonoscopy ([id number]) on 5/12/2014 at 68 Years.  Comments:  5/15/2014 10:56 AM CDT - Livier Stallings RN  Sedation: midazolam, fentanyl  Indication: screen  5-6mm tubular adenom x3, 8mm tubular adenoma x1, 12mm tubular adenoma with advance adenoma due to size  Repeat in 3 years.  nepherectomy in the month of 3/2009 at 63 Years.  Comments:  5/11/2021 11:18 AM NINOT - Yoly Tillman  Left  Lumbar discectomy in 2006 at 61 Years.  Left salivary gland removal in 1995 at 50 Years.  Oral surgery in 1994 at 49 Years.  Aortic aneurysm, abdominal ([id number]).   Social History:        Electronic Cigarette/Vaping Assessment            Electronic Cigarette Use: Never.      Alcohol Assessment: Current            Liquor (Hard) (1.5 oz),  Daily, 2 drinks/episode average.      Tobacco Assessment            Past, 20 per day.  50 year(s).            Quit 12/25/2009, Cigarettes, 40 per day.  49 year(s).      Substance Abuse Assessment: Denies Substance Abuse            Never      Employment and Education Assessment            Retired, Highest education level: High school.      Home and Environment Assessment            Marital status: .  Spouse/Partner name: Darlene Chau.  4 children.  Living situation: Home/Independent.               Home equipment: Walker/Cane.  Injuries/Abuse/Neglect in household: No.  Feels unsafe at home: No.               Family/Friends available for support: Yes.      Nutrition and Health Assessment            Type of diet: Regular.  Wants to lose weight: No.  Sleeping concerns: No.  Feels highly stressed: No.      Exercise and Physical Activity Assessment            Exercise frequency: Occasional.      Sexual Assessment            Sexually active: No.  Identifies as male, Sexual orientation: Straight or heterosexual.  Contraceptive Use               Details: None.        Physical Examination   vital signs stable, as noted above   VS/Measurements   General:  Alert and oriented, No acute distress.    Eye:  Extraocular movements are intact.    HENT:  Normocephalic, Oral mucosa is moist, No pharyngeal erythema, dentures.    Neck:  Supple, Previous right sided neck dissection with flap.    Respiratory:  Lungs are clear to auscultation, Respirations are non-labored, posterior thoracotomy scar.    Cardiovascular:  Normal rate, Regular rhythm, No murmur, 1+ pitting edema bilaterally.    Gastrointestinal:  Soft, Non-tender, Non-distended.    Integumentary:  exophytic skin lesion on scalp.    Neurologic:  Alert, Oriented, Normal motor function, No focal deficits.    Cognition and Speech:  Oriented, Speech clear and coherent.    Psychiatric:  Appropriate mood & affect.       Review / Management   Results review:  Lab results    5/10/2021 3:32 PM CDT Sodium Level  mEq/L    Potassium Level TR 5 mEq/L    Chloride  mEq/L    BUN TR 16 mg/dL    Creatinine TR 1.66 mg/dL    Calcium TR 7.7 mEq/dL    Bili Total TR 1.1 mg/dL    Alk Phos  unit/L    AST TR 57 unit/L    ALT TR 33 unit/L    Protein Total TR 4.6 gm/dL    WBC TR 4.9 x10^3/uL    Hgb TR 11.1 g/dL    Platelet TR 34 x10^3/uL   3/9/2021 12:45 PM CST Sodium Level 138 mmol/L    Potassium Level 4.9 mmol/L    Chloride Level 99 mmol/L    CO2 Level 30 mmol/L    Glucose Level 129 mg/dL  HI    BUN 16 mg/dL    Creatinine 1.71 mg/dL  HI    BUN/Creat Ratio 9    eGFR 38 mL/min/1.73m2  LOW    eGFR African American 44 mL/min/1.73m2  LOW    Calcium Level 10.3 mg/dL    Magnesium Level 1.9 mg/dL    Bili Total 1.0 mg/dL    Bili Direct 0.2 mg/dL    Bili Indirect 0.8    Alk Phos 121 unit/L    AST/SGOT 35 unit/L    ALT/SGPT 24 unit/L    Protein Total 6.5 gm/dL    Albumin Level 3.8 gm/dL    Globulin 2.7    A/G Ratio 1.4    Vitamin B12 Level 810 pg/mL    PSA 23.5 ng/mL  HI    WBC 4.6    RBC 4.13  LOW    Hgb 13.7 gm/dL    Hct 39.8 %    MCV 96.4 fL    MCH 33.2 pg  HI    MCHC 34.4 gm/dL    RDW 14.3 %    Platelet 131  LOW    MPV 9.5 fL   .       Impression and Plan   Diagnosis     Adenocarcinoma of lung (EHP91-MD C34.90).     COPD (chronic obstructive pulmonary disease) with acute bronchitis (TFF72-OA J44.1).     Prostate cancer (JOX16-CZ C61).     Acute renal failure superimposed on stage 3 chronic kidney disease (BRK24-SO N17.9).     Coronary artery disease due to lipid rich plaque (FFU59-EG I25.10).           .) recent NH discharge, hospital f/u  - admitted at [facility] for rhabdomyolysis, weakness - likely some statin association   - advised indefinite elimination of both rosuvastatin and ezetimibe  - doing well since discharge home    .) chronic lower extremity edema  - lower extremity venous duplex (12/2020): no DVT  - TTE: preserved LVEF, no further regional WMAs  - BNP 50s  - will start on  furosemide 40mg daily and increase to BID on prn basis based on degree of edema    .) CKD; baseline SCr 1.5-1.9   - multiple LEILA episodes; h/o contrast induced nephropathy (VITOR)  previous multiple LEILA (prior h/o of LEILA in 1/2020, 10/2020); no previous dialysis needs  - lisinopril indefinitely on hold  - heightened risk for future contrast induced nephropathy    .) 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 Valentine  - DAPT stopped (10/2020): profound anemia, concerns for GIB  - Toprol XL 25mg daily  - statin and ezetimibe stopped due to rhabdo (5/2021)    .) prostate Ca; watchful waiting   - original prostate biopsy in '11, repeat biopsy in '16; Ken 6   - watchful surveillance; q6 month PSA; last PSA 35 (rising)   - MRI of prostate (2019): focal coarse calcifications in prostate; no evidence of extra-prostate extension   - following with Dr. Bustos - advised he follow up in near future     .) pancreatic cyst; stable on last EUS/EGD on 10/23/2020; Dr. Edwards  - recommending repeat imaging in  2 yrs (noted stenotic CBD)  - on omeprazole 20mg BID    .) h/o AAA, endovascular aortic repair, follows with Dr. David    .) polyarthritis pains  - limited benefit with NSAIDs and APAP  s/p right MIRNA after hip fracture from fall (6/2016)   - normal DEXA (7/2015)  - working with Ortho - lasting benefits from intra-articular injections    .) health maintenance   - CRC due in '22   - q6 month PSA   - Prevacid 30mg BID   - Efudex for scalp  lesion    RTC as previously scheduled

## 2022-02-16 NOTE — LETTER
(Inserted Image. Unable to display)   July 19, 2021    CELIA LUTZ  [address]  [place] [address]            Dear CELIA,      Thank you for selecting [facility] for your healthcare needs.    Our records indicate you are due for the following services:    Kidney Disease Follow Up.  Hypertension check ~ please remember to bring your at-home blood pressure readings with you to your appointment.   Non-Fasting Labs.    If you had your labs done at another facility or with [facility] at Critical access hospital, please bring in a copy of the results to your next visit, mail a copy, or drop off a copy of your results to your Healthcare Provider.    (FYI   Regarding office visits: In some instances, a video visit or telephone visit may be offered as an option.)    You are due for lab work and an office visit; please schedule the lab appointment 1 week before the office visit.  This will assure all results are available to discuss with your Healthcare Provider during your visit.    **It is very helpful if you bring your medication bottles to your appointment.  This assures we have all of your current medications, including strength and dosing information, documented accurately in your medical record.    To schedule an appointment or if you have further questions, please contact your clinic at [phone].      Powered by [employer]    Sincerely,    Yong Boyd MD

## 2022-02-16 NOTE — TELEPHONE ENCOUNTER
Entered by Monik Zhang CMA on November 17, 2020 12:07:52 PM CST  ---------------------  From: Monik Zhang CMA   To: [facility] [id number] [facility]    Sent: 11/17/2020 12:07:52 PM CST  Subject: Medication Management     ** Not Approved: duplicate request-filled on 11/17 **  fluticasone-salmeterol (ADVAIR 500-50 DISKUS)  TAKE 1 PUFF BY MOUTH TWICE A DAY  Qty:  180 unknown unit        Days Supply:  90        Refills:  0          Substitutions Allowed     Route To [id number]   Signed by Monik Zhang CMA            ------------------------------------------  From: [facility] [id number] [facility]  To: Yong Boyd MD  Sent: November 17, 2020 9:05:28 AM CST  Subject: Medication Management  Due: November 7, 2020 12:21:51 AM CST     ** On Hold Pending Signature **     Dispensed Drug: fluticasone-salmeterol (Advair Diskus 500 mcg-50 mcg inhalation powder), TAKE 1 PUFF BY MOUTH TWICE A DAY  Quantity: 180 unknown unit  Days Supply: 90  Refills: 0  Substitutions Allowed  Notes from Pharmacy:  ------------------------------------------

## 2022-02-16 NOTE — PROGRESS NOTES
Patient:   CELIA LUTZ            MRN: [id number]            FIN: [id number]               Age:   74 years     Sex:  Male     :  1945   Associated Diagnoses:   AAA (Abdominal Aortic Aneurysm); Adenocarcinoma of lung; COPD (chronic obstructive pulmonary disease) with acute bronchitis; Chronic kidney disease (CKD), stage III (moderate); Prostate cancer   Author:   Yong Boyd MD      Visit Information      Date of Service: 2020 11:04 am  Performing Location: [facility]  Encounter#: [id number]      Primary Care Provider (PCP):  Yong Boyd MD    NPI# [id number]      Referring Provider:  Yong Boyd MD    NPI# [id number]      Chief Complaint   2020 11:25 AM CST   f/u LEILA              Additional Information:No additional information recorded during visit.   Chief complaint and symptoms as noted above and confirmed with patient.  Recent lab and diagnostic studies reviewed with patient      History of Present Illness   2015: Mitchell presents to clinic for hospital follow-up.  He recently underwent right sided wedge resection of an isolated pulmonary nodule.  Biopsy returning as adenocarcinoma of the lung.  Postoperative course complicated by an episode of both acute kidney injury and rectal bleeding felt to be consistent with ischemic colitis.  His creatinine on discharge was at his baseline of 1.3.  His lisinopril was held during his episode of acute kidney injury, resumed upon discharge.  He is scheduled to see his thoracic surgeon, Dr. Velazquez, tomorrow.  He also has a history of previous endovascular repair of a abdominal aneurysm.  He follows with a vascular surgeon through [facility] for this.    2019: Mitchell returns for general follow-up.  Feeling well.  Has planned right-sided vascular procedure with Dr. David scheduled in the near future; unclear on specific procedural information.  He had had a recent CT angiogram done earlier this year which did demonstrate a 2.2  cm pancreatic cyst.  Also reviewed labs showing a general upward trend in PSA.  Has not seen urology since 2016.  Did undergo a prostate MRI in April 2018 which did not demonstrate any extracapsular extension.    7/11/2019: Mitchell presents for preoperative assessment for planned endoscopic ultrasound and possible biopsy of pancreatic cyst.  Pancreatic cyst found incidentally as part of a CT angiogram.  Did meet with urology related to history of prostate cancer.  Did have prostate MRI which showed no significant extension of his cancer.  In general he would like to hold off on any invasive therapies    11/29/2019: Presents for hospital follow-up after recent admission to [facility] this past week in the setting of acute coronary syndrome.  Underwent left heart catheterization demonstrating multivessel disease.  Did undergo drug-eluting stent deployment and started on dual antiplatelet therapy.  Some complications with post catheter bleeding from femoral artery.  Subsequently resolved with hemostasis.  Does have significant ecchymoses amongst that groin and the no swelling or pain.  Feels well.  Plans to start on cardiac rehab through [facility] in the near future.  Also has cardiology follow-up in mid December 1/8/2020: Mitchell returns for hospital follow-up.  He was readmitted at [facility] with concerns for acute kidney injury with a creatinine rise up to 6.  Evaluated by nephrology as an inpatient.  Work-up most concerning for acute tubular necrosis versus possible cholesterol emboli.  Biopsies not pursued due to concerns of his baseline unit unit nephric state.  He endorses no uremic features.  He was advised to follow-up with me with serial lab monitoring.  No troubles since discharge.    1/22/2020: Mitchell returns to clinic for follow-up related to his acute kidney injury.  Labs checked 2 weeks ago showed interval improvement in creatinine down to 4.0 which was encouraging.  Here back for 2-week follow-up.  He has been  participating in cardiac rehab.  Dr. Valentine is trying to schedule him for a follow-up nuclear stress testing.  He had had questionable athero-stenotic lesion on last heart cath without stenting.  He has felt well.  He raises hesitations about moving forward with a stress testing primarily in the setting of his kidney issues.         Review of Systems   Constitutional:  No fever, No chills.    Eye:  Negative except as documented in history of present illness.    Ear/Nose/Mouth/Throat:  Negative except as documented in history of present illness.    Respiratory   Cardiovascular:  No chest pain, No palpitations, No peripheral edema, No syncope.    Gastrointestinal:  No nausea, No vomiting, No heartburn, No abdominal pain.    Genitourinary:  No dysuria, No hematuria.    Hematology/Lymphatics:  No bruising tendency.    Endocrine:  No excessive thirst, No polyuria.    Immunologic:  No recurrent fevers.    Musculoskeletal:  Joint pain, No muscle pain, No decreased range of motion, No trauma.    Neurologic:  Alert and oriented X4, No numbness, No tingling, No headache.       Health Status   Allergies:    Allergic Reactions (Selected)  No Known Medication Allergies   Medications:  (Selected)   Prescriptions  Prescribed  Advair  mcg-21 mcg/inh inhalation aerosol: See Instructions, Instructions: INHALE 2 PUFFS BY MOUTH TWICE DAILY. RINSE MOUTH AND THROAT AFTER USE, # 3 EA, 1 Refill(s), Type: Soft Stop, Pharmacy: [facility], keep on file and pt will notify when needed, INHALE 2 PUFFS BY MOUTH TWICE ALFREDITO...  Pepcid 20 mg oral tablet: = 1 tab(s) ( 20 mg ), Oral, bid, # 180 tab(s), 1 Refill(s), Type: Maintenance, Pharmacy: [facility], 1 tab(s) Oral bid  Ventolin HFA 90 mcg/inh inhalation aerosol: 2 puff(s), NEB, q4 hrs, PRN: AS NEEDED FOR COUGH OR SHORTNESS OF BREATH, # 3 EA, 0 Refill(s), Type: Maintenance, Pharmacy: [facility]  spacer for inhaler: spacer for inhaler, See Instructions,  Instructions: use with albuterol inhaler, Supply, # 1 EA, 0 Refill(s), Type: Maintenance, [id number], use with albuterol inhaler  Documented Medications  Documented  Advil PM: 2 tab(s), po, hs, PRN: as needed for insomnia, 0 Refill(s), Type: Maintenance  Crestor 40 mg oral tablet: = 1 tab(s) ( 40 mg ), Oral, daily, 0 Refill(s), Type: Maintenance  Multiple Vitamins oral tablet: 1 tab(s), po, daily, 0 Refill(s), Type: Maintenance  Vitamin B12 500 mcg oral tablet: 1 tab(s) ( 500 mcg ), po, daily, 0 Refill(s), Type: Maintenance  aspirin 81 mg oral tablet, chewable: = 1 tab(s) ( 81 mg ), Chewed, daily, 0 Refill(s), Type: Maintenance  clopidogrel 75 mg oral tablet: = 1 tab(s) ( 75 mg ), Oral, daily, 0 Refill(s), Type: Maintenance  magnesium oxide 250 mg oral tablet: 1 tab(s) ( 250 mg ), po, daily, 0 Refill(s), Type: Maintenance  metoprolol succinate 25 mg oral capsule, extended release: = 1 cap(s) ( 25 mg ), Oral, daily, 0 Refill(s), Type: Maintenance,    Medications          *denotes recorded medication          spacer for inhaler: See Instructions, use with albuterol inhaler, 1 EA, 0 Refill(s).          Ventolin HFA 90 mcg/inh inhalation aerosol: 2 puff(s), NEB, q4 hrs, PRN: AS NEEDED FOR COUGH OR SHORTNESS OF BREATH, 3 EA, 0 Refill(s).          *aspirin 81 mg oral tablet, chewable: 81 mg, 1 tab(s), Chewed, daily, 0 Refill(s).          *clopidogrel 75 mg oral tablet: 75 mg, 1 tab(s), Oral, daily, 0 Refill(s).          *Vitamin B12 500 mcg oral tablet: 500 mcg, 1 tab(s), po, daily, 0 Refill(s).          *Advil PM: 2 tab(s), po, hs, PRN: as needed for insomnia, 0 Refill(s).          Pepcid 20 mg oral tablet: 20 mg, 1 tab(s), Oral, bid, 180 tab(s), 1 Refill(s).          Advair  mcg-21 mcg/inh inhalation aerosol: See Instructions, INHALE 2 PUFFS BY MOUTH TWICE DAILY. RINSE MOUTH AND THROAT AFTER USE, 3 EA, 1 Refill(s).          *magnesium oxide 250 mg oral tablet: 250 mg, 1 tab(s), po, daily, 0  Refill(s).          *metoprolol succinate 25 mg oral capsule, extended release: 25 mg, 1 cap(s), Oral, daily, 0 Refill(s).          *Multiple Vitamins oral tablet: 1 tab(s), po, daily, 0 Refill(s).          *Crestor 40 mg oral tablet: 40 mg, 1 tab(s), Oral, daily, 0 Refill(s).       Problem list:    All Problems  AAA (abdominal aortic aneurysm) / SNOMED CT 293488337 / Confirmed  Adenocarcinoma of lung / SNOMED CT 473708928 / Confirmed  [place] Esophagus / SNOMED CT 0718865681 / Confirmed  CKD (chronic kidney disease) stage 3, GFR 30-59 ml/min / SNOMED CT 5459636740 / Confirmed  COPD (chronic obstructive pulmonary disease) with acute bronchitis / SNOMED CT 93882833 / Confirmed  Closed hip fracture / SNOMED CT 840388026 / Confirmed  Closed fracture of trochanter of femur / SNOMED CT 2574775069 / Confirmed  Cardiac arrhythmia / SNOMED CT 41747537 / Confirmed  Coronary artery disease due to lipid rich plaque / SNOMED CT 9911553079 / Confirmed  Lipids abnormal / SNOMED CT 232052192 / Confirmed  HTN (Hypertension) / SNOMED CT 07439268 / Confirmed  Former Smoker / SNOMED CT [id number] / Confirmed  GERD (gastroesophageal reflux disease) / SNOMED CT 4041905284 / Confirmed  Anticoagulated / SNOMED CT 479384335 / Confirmed  History of oropharyngeal cancer / SNOMED CT 7918992031 / Confirmed  H/O prostate cancer / SNOMED CT 4970542489 / Confirmed  DJD (Degenerative Joint Disease) / SNOMED CT 7205214857 / Confirmed  H/O unilateral nephrectomy / SNOMED CT 598802863 / Confirmed  Resolved: *Hospitalized@[facility] - Atypical chest pain  Resolved: *Hospitalized@[facility] Hip fracture  Resolved: *Hospitalized@[facility] Septic hip vs hematoma  Resolved: History of sepsis / SNOMED CT 8646791544  Resolved: Inpatient stay / SNOMED CT 288958884  Resolved: Inpatient stay / SNOMED CT 598570315  Resolved: Prostate cancer / SNOMED CT 237029121  Canceled: Oropharyngeal cancer / SNOMED CT [address]  Canceled: Solitary kidney /  SNOMED CT 013277213  Canceled: Solitary kidney / SNOMED CT 485725685      Histories   Past Medical History:    Active  AAA (abdominal aortic aneurysm) (348332177)  Coronary artery disease due to lipid rich plaque (8162184838)  GERD (gastroesophageal reflux disease) (2551290917)  Cardiac arrhythmia (58659174)  CKD (chronic kidney disease) stage 3, GFR 30-59 ml/min (2135771983)  Resolved  Inpatient stay (170441757): Onset on 1/3/2020 at 74 years.  Resolved on 2020 at 74 years.  Comments:  2020 CST 1:41 PM CST - Yoly Tillman  , [place] - Chest pain, acute renal failure.  Inpatient stay (180268800): Onset on 2019 at 74 years.  Resolved on 2019 at 74 years.  Comments:  12/10/2019 CST 1:46 PM CST - Jessi Eid  @ - NSTEMI.  *Hospitalized@[facility] - Septic hip vs hematoma: Onset on 7/3/2016 at 70 years.  Resolved on 2016 at 70 years.  History of sepsis (7320596620): Onset on 7/3/2016 at 70 years.  Resolved.  Comments:  2016 CDT 2:14 PM CDT - Alma Barfield  Severe; due to septic hip.    2016 CDT 3:37 PM CDT - Alma Barfield  Sepsis organ failure: Acute renal failure.  *Hospitalized@[facility] - Hip fracture: Onset on 2016 at 70 years.  Resolved on 2016 at 70 years.  *Hospitalized@[facility] - Atypical chest pain: Onset on 2015 at 69 years.  Resolved on 2015 at 69 years.  Prostate cancer (686693327):  Resolved.   Family History:    CA - Breast cancer  Sister (Alexa, )  Lupus  Sister (Rebecca)  Alcohol abuse  Sister (Alexa, )  Mother ()  SMA type III  Grandfather (M)  Obese  Sister (Sujey, )     Procedure history:    Coronary angiography (30594146) on 2019 at 74 Years.  Comments:  12/10/2019 1:47 PM CST - Jessi Eid  and PCI of the right coronary artery.  Colonoscopy (337327353) on 3/21/2017 at 71 Years.  Comments:  3/22/2017 2:33 PM CDT - Filippo Barbour MD  Indication: Adenomatous Polyps  Sedation: MAC  Findings:  Adenomatous polyp cecum, hemorrohids  Rec: Repeat in 5 years  Right Hip Bipoloar Hemiarthroplasty on 6/22/2016 at 70 Years.  Comments:  6/28/2016 7:26 AM CDT - Ramin HOWARDDebby  Right displasced femoral neck fracture  right thoracotomy on 11/15/2015 at 70 Years.  Comments:  11/17/2015 9:07 AM CST - Ramin HOWARDDebby  wedge resection right upper lung nodule, wedge resection right middle lobe lung nodule, Mediastinal lymph node dissection  Colonoscopy ([id number]) on 5/12/2014 at 68 Years.  Comments:  5/15/2014 10:56 AM CDT - Chandrakant GRAYSON, [place]  Sedation: midazolam, fentanyl  Indication: screen  5-6mm tubular adenom x3, 8mm tubular adenoma x1, 12mm tubular adenoma with advance adenoma due to size  Repeat in 3 years.  nepherectomy in the month of 3/2009 at 63 Years.  Lumbar discectomy in 2006 at 61 Years.  Left salivary gland removal in 1995 at 50 Years.  Oral surgery in 1994 at 49 Years.  Aortic aneurysm, abdominal ([id number]).   Social History:        Alcohol Assessment            Current, 1-2 times per week, 2 drinks/episode average.  3 drinks/episode maximum.      Tobacco Assessment            Past, 20 per day.  49 year(s).      Substance Abuse Assessment            Never      Employment and Education Assessment            Employed, Work/School description: [employer] .      Home and Environment Assessment            Marital status: .  Spouse/Partner name: Darlene Chau.      Nutrition and Health Assessment            Type of diet: Regular.      Exercise and Physical Activity Assessment            Exercise frequency: Never.      Sexual Assessment            Sexually active: Yes.        Physical Examination   vital signs stable, as noted above   Vital Signs   1/22/2020 11:25 AM CST Temperature Tympanic 97 DegF  LOW    Peripheral Pulse Rate 80 bpm    Systolic Blood Pressure 132 mmHg  HI    Diastolic Blood Pressure 78 mmHg    Mean Arterial Pressure 96 mmHg    BP Site Right arm       Measurements from flowsheet : Measurements   1/22/2020 11:25 AM CST Height Measured - Standard 71 in    Weight Measured - Standard 189 lb    BSA 2.07 m2    Body Mass Index 26.36 kg/m2  HI      General:  Alert and oriented, No acute distress.    Eye:  Extraocular movements are intact.    HENT:  Normocephalic, Oral mucosa is moist, No pharyngeal erythema, dentures.    Neck:  Supple, Previous right sided neck dissection with flap.    Respiratory:  Lungs are clear to auscultation, Respirations are non-labored, posterior thoracotomy scar.    Cardiovascular:  Normal rate, Regular rhythm, No murmur, No edema.    Gastrointestinal:  Soft.    Neurologic:  Alert, Oriented, Normal motor function, No focal deficits.    Cognition and Speech:  Oriented, Speech clear and coherent.    Psychiatric:  Appropriate mood & affect.       Review / Management   Results review:  Lab results   1/22/2020 11:03 AM CST Sodium Level 140 mmol/L    Potassium Level 5.1 mmol/L    Chloride Level 102 mmol/L    CO2 Level 26 mmol/L    AGAP 12    Glucose Level 104 mg/dL    BUN 26 mg/dL    Creatinine 2.38 mg/dL    BUN/Creat Ratio 11    eGFR  32    eGFR Non-African American 27    Calcium Level 10.3 mg/dL   1/15/2020 11:00 AM CST Sodium Level 138 mmol/L    Potassium Level 4.4 mmol/L    Chloride Level 101 mmol/L    CO2 Level 29 mmol/L    Glucose Level 98 mg/dL    BUN 30 mg/dL  HI    Creatinine 2.50 mg/dL  HI    BUN/Creat Ratio 12    eGFR 24 mL/min/1.73m2  LOW    eGFR African American 28 mL/min/1.73m2  LOW    Calcium Level 9.9 mg/dL   1/8/2020 9:55 AM CST Sodium Level 141 mmol/L    Potassium Level 5.1 mmol/L    Chloride Level 106 mmol/L    CO2 Level 26 mmol/L    AGAP 9    Glucose Level 105 mg/dL    BUN 46 mg/dL    Creatinine 4.12 mg/dL    BUN/Creat Ratio 11    eGFR  17    eGFR Non-African American 14    Calcium Level 10.2 mg/dL   1/4/2020 3:18 PM CST Sodium Level  mEq/L    Potassium Level TR 4.5 mEq/L    Chloride   mEq/L    CO2 TR 22 mEq/L    Anion Gap TR 13 mEq/L    Glucose Level  mg/dL    BUN TR 60 mg/dL    Creatinine TR 5.98 mg/dL    BUN/Creatinine Ratio TR 10    eGFR TR 9 mL/min    Calcium TR 9.5 mEq/dL    Phosphorus Level TR 4.3 mg/dL    Albumin Level TR 3.9 g/dL   1/3/2020 3:20 PM CST Bili Total TR 0.5 mg/dL    Direct Bilirubin TR 0.2 mg/dL    Alk Phos TR 75 unit/L    AST TR 27 unit/L    ALT TR 15 unit/L    Protein Total TR 7.0 gm/dL    Albumin Level TR 3.9 g/dL    Globulin TR 3.1 g/dL    A/G Ratio TR 1.3 mg/dL    PT TR 13.8    INR TR 1.1   12/4/2019 6:46 PM CST Sodium Level 135 mmol/L    Potassium Level 5.7 mmol/L    Chloride Level 97 mmol/L    CO2 Level 28 mmol/L    AGAP 10    Glucose Level 128 mg/dL    BUN 23 mg/dL    Creatinine 1.92 mg/dL    BUN/Creat Ratio 12    eGFR  42    eGFR Non-African American 34    Calcium Level 10.6 mg/dL    WBC 15.4    RBC 3.90    Hgb 12.2 g/dL    Hct 37.1 %    MCV 95 fL    MCH 31.3 pg    MCHC 32.9 g/dL    RDW 13.4 %    Platelet 186    MPV 9.0 fL    Lymphs 6.6 %    Abs Lymphs 1.0    Neutrophils 83.0 %    Abs Neutrophils 12.8    Monocytes 9.4 %    Abs Monocytes 1.4    Eosinophils 0.7 %    Abs Eosinophils 0.1    Basophils 0.3 %    Abs Basophils 0.0   12/4/2019 6:39 PM CST Influenza A NEGATIVE    Influenza B NEGATIVE   .       Impression and Plan   Diagnosis     AAA (Abdominal Aortic Aneurysm) (SPK16-JP I71.4).     Adenocarcinoma of lung (LSA02-HB C34.90).     COPD (chronic obstructive pulmonary disease) with acute bronchitis (CUC20-XD J44.1).     Chronic kidney disease (CKD), stage III (moderate) (SQZ32-ZC N18.3).     Prostate cancer (WYK60-LB C61).         .) LEILA on CKD; baseline SCr 1.5-1.9;  rise to 6.0 since [facility] in late 11/2019; asymptomatic - interval improvement to 2.5   - suspect likely contrast induced nephropathy (VITOR)  - lisinopril stopped during hospitalization  - SCr steadily improving which is reassuring  - heightened risk for future contrast induced  nephropathy  - reassurance provided that request nuclear cardiac stress testing with no implications on kidney function - advised he discuss further with Dr. Valentine    .) NSTEMI with PCI to RCA with multi-vessel disease (non-obstructive LAD lesion); following with Dr. Valentine  - on DAPT   - Toprol XL 25mg daily  - high dose pravastatin changed to rosuvastatin 40mg qhs  - participating in cardiac rehab in near future    plan as previously outlined:     .) EUS, 7/2019, of  2.2cm pancreatic cyst; no malignancy on biopsy   - recommendations for annual f/u through GI    .) prostate Ca; watchful waiting   - original prostate biopsy in '11, repeat biopsy in '16; [place] 6   - watchful surveillance; q6 month PSA; last PSA 15   - MRI of prostate (2019): focal coarse calcifications in prostate; no evidence of extra-prostate extension   - following with Dr. Bustos - prefers not to pursue XRT at this point    .) h/o AAA, endovascular aortic repair, follows with Dr. 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    .) s/p right wedge resection of limited stage adenocarcinoma of lung (11/2015)    .) COPD   - on Advair 115/21mcg BID with noticeable improvement in breathing   - albuterol on rare occasions   - has not participated in pulmonary rehab   - consider repeat PFTs in the future    .) hypertension; controlled  current antihypertensive regimen: Toprol XL 25mg daily  regimen changes: none  intolerance:  future titration/work-up plan:    - SBP <140 goal   - previously on lisinopril/hctz 20/12.5mg daily - since stopped    .) health maintenance   - CRC due in '22   - q6 month PSA   - Prevacid 30mg BID   - enrolled in  [facility]    RTC in 3 months

## 2022-02-16 NOTE — LETTER
(Inserted Image. Unable to display)       2020  Re: CELIA LUTZ  :  1945       [facility]   [address]  [address]  [place] [address]    To:  THAI    The following patient has been referred to your office/practice:   CELIA LUTZ       Appointment : Pending    Location : N/A        Please refer to the attached clinical documentation for a summary of CELIA's care.  Please do not hesitate to contact our office if any additional clinical questions arise. All relevant records and transition of care documents should be mailed or faxed.    Your assistance in providing continuity of care is appreciated          Sincerely,   [facility]  [address] [address]  (P) [phone]  (F) [phone]

## 2022-02-16 NOTE — NURSING NOTE
Quick Intake Entered On:  5/6/2020 2:19 PM CDT    Performed On:  5/6/2020 2:12 PM CDT by Hannah Lr RN               Summary   Chief Complaint :   Wt, BP   Weight Measured :   203 lb(Converted to: 203 lb 0 oz, 92.08 kg)    Height Measured :   71 in(Converted to: 5 ft 11 in, 180.34 cm)    Body Mass Index :   28.31 kg/m2 (HI)    Body Surface Area :   2.15 m2   Systolic Blood Pressure :   154 mmHg (HI)    Diastolic Blood Pressure :   86 mmHg (HI)    Mean Arterial Pressure :   109 mmHg   BP Site :   Right arm   BP Method :   Manual   Race :      Languages :   English   Ethnicity :   Not  or    Hannah Lr RN - 5/6/2020 2:19 PM CDT

## 2022-02-16 NOTE — NURSING NOTE
Hearing and Vision Screening Entered On:  5/14/2019 4:37 PM CDT    Performed On:  5/14/2019 4:36 PM CDT by Debby Faustin CMA               Hearing and Vision Screening   Audiogram Result Right Ear :   Fail   Audiogram Result Left Ear :   Fail   Hearing Screen Comments :   gets hearing screening at work yrly every summer   Debby Faustin CMA - 5/14/2019 4:36 PM CDT

## 2022-02-16 NOTE — CARE COORDINATION
ACTION PLAN   Goal(s):     Today s Date:      06/05/17                                                                             Resources provided:      Potential Challenges:       HCP Plan to manage CC Follow-Up Notes Goal completion date   polyarthritis pains  - lmited benefit with NSAIDs and APAP  s/p right MIRNA after hip fracture from fall (6/2016)   - normal DEXA (7/2015)  - doubt inflammatory arthritis; doubt polymyalgia rheumatica given no upper extremity involvement  - advised to trial off pravastatin for ~ 2 week trial; if substantial improvement in symptoms, should contact clinic for further instructions.  If no change in symptoms, I want to resume statin  - referral to Ortho for further assessment   (07/17/17) CC called Mitchell at 526pm.  He is doing well.  He is working with Ortho and going to PT and may see another specialist Dr. Miranda for more injections.  He has no furher questions or concenrs at this time. He would like a return call next month, however will call if anything changes in the meantime.    (06/05/17) CC LMTCB at 137pm,  Mitchell called back at 147pm.  He is doing much better, he saw Dr. Smith a week or so after seeing [facility].  He did multiple x-rays of shoulders, hips, and knees.  He Has arthritis in the lower spine and hip, he did recieve an injection in his right shoulder and Knee.  He is agiain feeling much better. He has no further questions or concerns.    (05/15/17) CC LMTCB at 1204pm      HCP Plan to manage CC Follow-Up Notes Goal completion date   rostate Ca   - original prostate biopsy in '11, repeat earlier this fall; [place] 6   - watchful surveillance; q6 month PSA         HCP Plan to manage CC Follow-Up Notes Goal completion date   s/p right wedge resection of limited stage adenocarcinoma of lung (11/2015)         HCP Plan to manage CC Follow-Up Notes Goal completion date   COPD   - on Advair 115/21mcg BID with noticeable improvement in breathing   - albuterol on rare  occasions   - has not participated in pulmonary rehab             HCP Plan to manage CC Follow-Up Notes Goal completion date   post-operative LEILA/ CKD (11/2015), baseline SCr 1.3 (previous left nephrectomy for benign hemangioma)           HCP Plan to manage CC Follow-Up Notes Goal completion date   hypertension; controlled  current antihypertensive regimen: lisinopril/hctz 20/12.5mg daily, metoprolol 50mg BID  regimen changes: none  intolerance:  future titration/work-up plan:    - SBP <140 goal   - interval rise in SCr; recheck today           HCP Plan to manage CC Follow-Up Notes Goal completion date   AAA; previous endovascular repair   - following with [facility] vascular surgery; Dr. Lazo           HCP Plan to manage CC Follow-Up Notes Goal completion date          Health Maintenance   - CRC due in '17; arrange now; no anticipated future screening after this coming endoscopy   - q6 month PSA   - Prevacid 30mg BID   - enrolled in [facility]    Medications          *denotes recorded medication          Anti-static valved spacer chamber: See Instructions, use as directed with inhaler, 1 EA, 0 Refill(s).          ProAir HFA 90 mcg/inh inhalation aerosol: 2 puff(s), inh, qid, use with spacer chamber, PRN: as needed for wheezing, 1 EA, 11 Refill(s).          *aspirin 81 mg oral tablet: 81 mg, 1 tab(s), po, daily, tab(s).          *Vitamin B12 500 mcg oral tablet: 500 mcg, 1 tab(s), po, daily, 0 Refill(s).          *Advil PM: 2 tab(s), po, hs, PRN: as needed for insomnia, 0 Refill(s).          Advair  mcg-21 mcg/inh inhalation aerosol: 2 puff(s), inh, bid, for 30 day(s), rinse mouth and throat after use, 1 EA, 11 Refill(s).          hydrochlorothiazide-lisinopril 12.5 mg-20 mg oral tablet: 1 tab(s), PO, Daily, 90 tab(s), 3 Refill(s).          lansoprazole 30 mg oral delayed release capsule: 30 mg, 1 cap(s), PO, bid, 180 cap(s), 3 Refill(s).          Metoprolol Tartrate 50 mg oral tablet: 50 mg, 1 tab(s), po, bid, 180  tab(s), 2 Refill(s).          *Multiple Vitamins oral tablet: 1 tab(s), po, daily, 0 Refill(s).          pravastatin 80 mg oral tablet: 80 mg, 1 tab(s), po, daily, 90 tab(s), 2 Refill(s).          *zinc (as acetate) 50 mg oral capsule: 50 mg, 1 cap(s), po, daily, 0 Refill(s).    Allergies          No known allergies    Problems          Oropharyngeal cancer          AAA (Abdominal Aortic Aneurysm)          Lipids abnormal          HTN (Hypertension)          Prostate cancer          DJD (Degenerative Joint Disease)          Former Smoker          Solitary kidney          Solitary kidney          H/O unilateral nephrectomy          [place] Esophagus          COPD (chronic obstructive pulmonary disease) with acute bronchitis          Adenocarcinoma of lung          Anticoagulated          Closed hip fracture          Coronary artery disease due to lipid rich plaque          GERD (gastroesophageal reflux disease)          Cardiac arrhythmia          CKD (chronic kidney disease) stage 3, GFR 30-59 ml/min          Closed fracture of trochanter of femur

## 2022-02-16 NOTE — LETTER
(Inserted Image. Unable to display)   December 03, 2021  CELIA LUTZ  [address]  [place] [address]        Dear CELIA,    Thank you for selecting [facility] for your healthcare needs.    Our records indicate you are due for the following services:     Kidney Disease Follow Up   Non-Fasting Labs    If you had your labs done at another facility or with Direct Access Lab Testing at [facility], please bring in a copy of the results to your next visit, mail a copy, or drop off a copy of your results to your Healthcare Provider.     (FYI   Regarding office visits: In some instances, a video visit or telephone visit may be offered as an option.)    To schedule an appointment or if you have further questions, please contact your clinic at [phone].    Powered by [employer]    Sincerely,    Yong Boyd MD

## 2022-02-16 NOTE — TELEPHONE ENCOUNTER
---------------------  From: Elvis/Connie TORRES (Phone Messages [employer] ([id number]_[facility]))   To: Dar Medellin MD;     Sent: 5/17/2021 2:06:46 PM CDT  Subject: General Message     Phone Message    PCP: [facility]    Time of Call: [phone]    Phone Number: [facility]    Returned call at: n/a    Note: Anitha from [facility] calls stating that ot has been cleared by everyone and is wanting to be discharged today but [facility] oc. Anitha wondering if ZIM will sign discharge orders. She is faxing over the orders.     Please advise.---------------------  From: Dar Medellin MD   To: Phone [employer] ([id number] - [place]);     Sent: 5/17/2021 2:08:32 PM CDT  Subject: RE: General Message     oknoted

## 2022-02-16 NOTE — PROGRESS NOTES
Patient:   CELIA LUTZ            MRN: [id number]            FIN: [id number]               Age:   74 years     Sex:  Male     :  1945   Associated Diagnoses:   Dyspnea; Pneumonia   Author:   Severino Cannon PA-C      Chief Complaint   2019 6:04 PM CST    c/o difficulty breathing that started last night, cough,  neb and inhaler not helping, week ago Monday had valve placed at [facility]      History of Present Illness   Chief complaint and symptoms noted above and confirmed with patient   as above,  he was discharged a week from NSTEMI, had stent placed, was seen in clinic of  and was doing well then  yesterday had sudden onset of difficulty breathing, on advair and albuterol which are not helping  had some fever and chills         Review of Systems   Constitutional:  Fever, Chills, Fatigue.    Respiratory:  Shortness of breath, Cough, Wheezing, No sputum production.    Cardiovascular:  Negative.       Health Status   Allergies:    Allergic Reactions (All)  No Known Medication Allergies  Canceled/Inactive Reactions (All)  No known allergies   Medications:  (Selected)   Prescriptions  Prescribed  Advair  mcg-21 mcg/inh inhalation aerosol: See Instructions, Instructions: INHALE 2 PUFFS BY MOUTH TWICE DAILY. RINSE MOUTH AND THROAT AFTER USE, # 3 EA, 1 Refill(s), Type: Soft Stop, Pharmacy: [facility], keep on file and pt will notify when needed, INHALE 2 PUFFS BY MOUTH TWICE ALFREDITO...  Ventolin HFA 90 mcg/inh inhalation aerosol: 2 puff(s), NEB, q4 hrs, PRN: AS NEEDED FOR COUGH OR SHORTNESS OF BREATH, # 3 EA, 0 Refill(s), Type: Maintenance, Pharmacy: [facility]  spacer for inhaler: spacer for inhaler, See Instructions, Instructions: use with albuterol inhaler, Supply, # 1 EA, 0 Refill(s), Type: Maintenance, Pharmacy: [facility], use with albuterol inhaler  Documented Medications  Documented  Advil PM: 2 tab(s), po, hs, PRN: as needed for insomnia, 0 Refill(s), Type:  Maintenance  Crestor 40 mg oral tablet: = 1 tab(s) ( 40 mg ), Oral, daily, 0 Refill(s), Type: Maintenance  Flonase 50 mcg/inh nasal spray: 1 spray(s), nasal, daily, 0 Refill(s), Type: Maintenance  Multiple Vitamins oral tablet: 1 tab(s), po, daily, 0 Refill(s), Type: Maintenance  Vitamin B12 500 mcg oral tablet: 1 tab(s) ( 500 mcg ), po, daily, 0 Refill(s), Type: Maintenance  aspirin 81 mg oral tablet, chewable: = 1 tab(s) ( 81 mg ), Chewed, daily, 0 Refill(s), Type: Maintenance  clopidogrel 75 mg oral tablet: = 1 tab(s) ( 75 mg ), Oral, daily, 0 Refill(s), Type: Maintenance  lansoprazole 15 mg oral delayed release capsule: = 1 cap(s) ( 15 mg ), PO, Daily, # 90 cap(s), 0 Refill(s), Type: Maintenance  lisinopril 5 mg oral tablet: = 2 tab(s) ( 10 mg ), Oral, daily, 0 Refill(s), Type: Maintenance  magnesium oxide 250 mg oral tablet: 1 tab(s) ( 250 mg ), po, daily, 0 Refill(s), Type: Maintenance  metoprolol succinate 25 mg oral capsule, extended release: = 1 cap(s) ( 25 mg ), Oral, daily, 0 Refill(s), Type: Maintenance      Histories   Past Medical History:    Active  AAA (abdominal aortic aneurysm) (157602076)  Coronary artery disease due to lipid rich plaque (0061893382)  GERD (gastroesophageal reflux disease) (3167552797)  Cardiac arrhythmia (32862752)  CKD (chronic kidney disease) stage 3, GFR 30-59 ml/min (8932766067)  Resolved  *Hospitalized@[facility] - Septic hip vs hematoma: Onset on 7/3/2016 at 70 years.  Resolved on 7/7/2016 at 70 years.  History of sepsis ([id number]): Onset on 7/3/2016 at 70 years.  Resolved.  Comments:  7/25/2016 CDT 2:14 PM Alma Leach  Severe; due to septic hip.    7/25/2016 CDT 3:37 PM Alma Leach  Sepsis organ failure: Acute renal failure.  *Hospitalized@[facility] - Hip fracture: Onset on 6/21/2016 at 70 years.  Resolved on 6/24/2016 at 70 years.  *Hospitalized@[facility] - Atypical chest pain: Onset on 5/16/2015 at 69 years.  Resolved on 5/16/2015 at 69 years.  Prostate cancer  ([id number]):  Resolved.   Family History:    CA - Breast cancer  Sister (Alexa, )  Lupus  Sister (Rebecca)  Alcohol abuse  Sister (Alexa, )  Mother ()  SMA type III  Grandfather (M)  Obese  Sister (Sujey, )     Procedure history:    Colonoscopy ([id number]) on 3/21/2017 at 71 Years.  Comments:  3/22/2017 2:33 PM CDT - Filippo Barbour MD  Indication: Adenomatous Polyps  Sedation: MAC  Findings: Adenomatous polyp cecum, hemorrohids  Rec: Repeat in 5 years  Right Hip Bipoloar Hemiarthroplasty on 2016 at 70 Years.  Comments:  2016 7:26 AM CDT - Debby Faustin CMA  Right displasced femoral neck fracture  right thoracotomy on 11/15/2015 at 70 Years.  Comments:  2015 9:07 AM CST - Debby Faustin CMA  wedge resection right upper lung nodule, wedge resection right middle lobe lung nodule, Mediastinal lymph node dissection  Colonoscopy ([id number]) on 2014 at 68 Years.  Comments:  5/15/2014 10:56 AM CDT - Livier Stallings RN  Sedation: midazolam, fentanyl  Indication: screen  5-6mm tubular adenom x3, 8mm tubular adenoma x1, 12mm tubular adenoma with advance adenoma due to size  Repeat in 3 years.  nepherectomy in the month of 3/2009 at 63 Years.  Lumbar discectomy in  at 61 Years.  Left salivary gland removal in  at 50 Years.  Oral surgery in  at 49 Years.  Aortic aneurysm, abdominal ([id number]).      Physical Examination   Vital Signs   2019 6:04 PM CST Temperature Tympanic 97.8 DegF  LOW    Peripheral Pulse Rate 90 bpm    Pulse Site Radial artery    HR Method Manual    Systolic Blood Pressure 130 mmHg    Diastolic Blood Pressure 72 mmHg    Mean Arterial Pressure 91 mmHg    BP Site Right arm    BP Method Manual    Oxygen Saturation 90 %  LOW    Oxygen Saturation 95 %      Measurements from flowsheet : Measurements   2019 6:04 PM CST Height Measured - Standard 71 in    Weight Measured - Standard 195.4 lb    BSA 2.1 m2    Body Mass  Index 27.25 kg/m2  HI      General:  Mild distress.    HENT:  Tympanic membranes are clear, No pharyngeal erythema, No sinus tenderness, nares are patent.    Neck:  Supple, Non-tender, No lymphadenopathy.    Respiratory:  lungs have coarse ronchi bilaterally, no wheezes, rales in left lower lobe.    Cardiovascular:  Normal rate, Regular rhythm, No murmur.       Review / Management   Results review:  Lab results   12/4/2019 6:46 PM CST Sodium Level 135 mmol/L    Potassium Level 5.7 mmol/L    Chloride Level 97 mmol/L    CO2 Level 28 mmol/L    AGAP 10    Glucose Level 128 mg/dL    BUN 23 mg/dL    Creatinine Level 1.92 mg/dL    BUN/Creat Ratio 12    eGFR  42    eGFR Non-African American 34    Calcium Level 10.6 mg/dL    WBC 15.4    RBC 3.90    Hgb 12.2 g/dL    Hct 37.1 %    MCV 95 fL    MCH 31.3 pg    MCHC 32.9 g/dL    RDW 13.4 %    Platelet 186    MPV 9.0 fL    Lymphocytes 6.6 %    Abs Lymphocytes 1.0    Neutrophils 83.0 %    Abs Neutrophils 12.8    Monocytes 9.4 %    Abs Monocytes 1.4    Eosinophils 0.7 %    Abs Eosinophils 0.1    Basophils 0.3 %    Abs Basophils 0.0   12/4/2019 6:39 PM CST Influenza A NEGATIVE    Influenza B NEGATIVE   .    Radiology results   Results reviewed with patient.  Chest xray shows left lower lobe effusion, will be over-read by radiologist.  Will call if over-read has additional information   Course:  he is given a Duoneb, he finds some relief with that, pulse ox increases to 95%.       Impression and Plan   Diagnosis     Dyspnea (WYG70-PU R06.00).     Pneumonia (FES69-IN J18.9).     Summary:  will treat with levaquin for 7 days, continue with advair and albuterol MDI, follow up with Dr Boyd next week, sooner if worsening.    Orders     Orders   Lab (Gen Lab  Reference Lab):  POC Influenza A and B Antigen* ([facility]) (Order): Specimen Type: Nasopharyngeal Swab, Collection Date: 12/4/2019 6:27 PM CST  Requests (Lab):  CBC w/ Diff/Plt (Request) (Order): Priority: Urgent,  Dyspnea  Basic Metabolic Panel (Request) (Order): Priority: Urgent, Dyspnea.     Orders   Requests (Radiology):  XR Chest PA/Lat (Request) (Order): Dyspnea.     Orders   Pharmacy:  levoFLOXacin 500 mg oral tablet (Prescribe): = 1 tab(s) ( 500 mg ), Oral, q 24 hrs, x 7 day(s), # 7 tab(s), 0 Refill(s), Type: Acute, Pharmacy: [facility] [id number] [facility], 1 tab(s) Oral q 24 hrs,x7 day(s).     Orders   Charges (Evaluation and Management):  [id number] office outpatient visit 25 minutes (Charge) (Order): Quantity: 1, Pneumonia  Dyspnea.

## 2022-02-16 NOTE — NURSING NOTE
Comprehensive Intake Entered On:  10/12/2020 5:41 PM CDT    Performed On:  10/12/2020 5:33 PM CDT by Nita Sweet RN               Summary   Chief Complaint :   Pt is here for hospital follow up. Was in hospital for elevated creatinine and potassium. Low Hgb.    Weight Measured :   205.6 lb(Converted to: 205 lb 10 oz, 93.259 kg)    Height Measured :   71 in(Converted to: 5 ft 11 in, 180.34 cm)    Body Mass Index :   28.67 kg/m2 (HI)    Body Surface Area :   2.16 m2   Systolic Blood Pressure :   111 mmHg   Diastolic Blood Pressure :   72 mmHg   Mean Arterial Pressure :   85 mmHg   Peripheral Pulse Rate :   87 bpm   BP Site :   Right arm   BP Method :   Electronic   HR Method :   Electronic   Temperature Tympanic :   97.1 DegF(Converted to: 36.2 DegC)  (LOW)    Race :      Languages :   English   Ethnicity :   Not  or    Nita Sweet RN - 10/12/2020 5:33 PM CDT   Health Status   Allergies Verified? :   Yes   Medication History Verified? :   Yes   Pre-Visit Planning Status :   N/A   Tobacco Use? :   Former smoker   Nita Sweet RN - 10/12/2020 5:33 PM CDT   Consents   Consent for Immunization Exchange :   Consent Granted   Consent for Immunizations to Providers :   Consent Granted   Nita Sweet RN - 10/12/2020 5:33 PM CDT   Meds / Allergies   (As Of: 10/12/2020 5:41:38 PM CDT)   Allergies (Active)   No Known Medication Allergies  Estimated Onset Date:   Unspecified ; Created By:   Debby Faustin CMA; Reaction Status:   Active ; Category:   Drug ; Substance:   No Known Medication Allergies ; Type:   Allergy ; Updated By:   Debby Faustin CMA; Reviewed Date:   10/12/2020 5:36 PM CDT        Medication List   (As Of: 10/12/2020 5:41:38 PM CDT)   Prescription/Discharge Order    tiotropium  :   tiotropium ; Status:   Prescribed ; Ordered As Mnemonic:   tiotropium 18 mcg inhalation capsule ; Simple Display Line:   18 mcg, 1 cap(s), Inhale, daily, 90 cap(s), 3 Refill(s) ; Ordering Provider:   Yong Boyd MD;  Catalog Code:   tiotropium ; Order Dt/Tm:   6/4/2020 11:17:16 AM CDT          Miscellaneous Rx Supply  :   Miscellaneous Rx Supply ; Status:   Prescribed ; Ordered As Mnemonic:   spacer for inhaler ; Simple Display Line:   See Instructions, use with albuterol inhaler, 1 EA, 0 Refill(s) ; Ordering Provider:   Severino Cannon PA-C; Catalog Code:   Miscellaneous Rx Supply ; Order Dt/Tm:   1/9/2018 2:18:32 PM CST          lisinopril  :   lisinopril ; Status:   Completed ; Ordered As Mnemonic:   lisinopril 20 mg oral tablet ; Simple Display Line:   20 mg, 1 tab(s), Oral, daily, 30 tab(s), 0 Refill(s) ; Ordering Provider:   Yong Boyd MD; Catalog Code:   lisinopril ; Order Dt/Tm:   5/12/2020 2:15:48 PM CDT          fluticasone-salmeterol  :   fluticasone-salmeterol ; Status:   Prescribed ; Ordered As Mnemonic:   Advair Diskus 500 mcg-50 mcg inhalation powder ; Simple Display Line:   See Instructions, INHALE 1 PUFF TWICE A DAY, 180 unknown unit, 0 Refill(s) ; Ordering Provider:   Yong Boyd MD; Catalog Code:   fluticasone-salmeterol ; Order Dt/Tm:   8/11/2020 2:49:07 PM CDT          amLODIPine  :   amLODIPine ; Status:   Completed ; Ordered As Mnemonic:   amLODIPine 5 mg oral tablet ; Simple Display Line:   5 mg, 1 tab(s), Oral, daily, 90 tab(s), 3 Refill(s) ; Ordering Provider:   Yong Boyd MD; Catalog Code:   amLODIPine ; Order Dt/Tm:   6/4/2020 11:11:59 AM CDT          albuterol  :   albuterol ; Status:   Prescribed ; Ordered As Mnemonic:   Ventolin HFA 90 mcg/inh inhalation aerosol ; Simple Display Line:   2 puff(s), Inhale, q6 hrs, 1 EA, 3 Refill(s) ; Ordering Provider:   Yong Boyd MD; Catalog Code:   albuterol ; Order Dt/Tm:   7/20/2020 9:46:52 AM CDT          albuterol  :   albuterol ; Status:   Prescribed ; Ordered As Mnemonic:   Ventolin HFA 90 mcg/inh inhalation aerosol ; Simple Display Line:   2 puff(s), Inhale, q6 hrs, 1 EA, 3 Refill(s) ; Ordering Provider:   Yong Boyd MD; Catalog Code:   albuterol ;  Order Dt/Tm:   7/17/2020 3:53:45 PM CDT            Home Meds    sodium bicarbonate  :   sodium bicarbonate ; Status:   Documented ; Ordered As Mnemonic:   sodium bicarbonate 650 mg oral tablet ; Simple Display Line:   650 mg, 1 tab(s), Oral, qid, 0 Refill(s) ; Catalog Code:   sodium bicarbonate ; Order Dt/Tm:   10/12/2020 5:40:16 PM CDT          rosuvastatin  :   rosuvastatin ; Status:   Completed ; Ordered As Mnemonic:   Crestor 40 mg oral tablet ; Simple Display Line:   40 mg, 1 tab(s), Oral, daily, 0 Refill(s) ; Catalog Code:   rosuvastatin ; Order Dt/Tm:   11/27/2019 10:02:43 AM CST          polyethylene glycol 3350  :   polyethylene glycol 3350 ; Status:   Documented ; Ordered As Mnemonic:   polyethylene glycol 3350 ; Simple Display Line:   17 gm, Oral, daily, 0 Refill(s) ; Catalog Code:   polyethylene glycol 3350 ; Order Dt/Tm:   10/12/2020 5:40:00 PM CDT          omeprazole  :   omeprazole ; Status:   Processing ; Ordered As Mnemonic:   omeprazole 20 mg oral delayed release capsule ; Action Display:   Complete ; Catalog Code:   omeprazole ; Order Dt/Tm:   10/12/2020 5:41:34 PM CDT          omeprazole  :   omeprazole ; Status:   Documented ; Ordered As Mnemonic:   omeprazole 20 mg oral delayed release capsule ; Simple Display Line:   20 mg, 1 cap(s), Oral, daily, 90 cap(s), 0 Refill(s) ; Catalog Code:   omeprazole ; Order Dt/Tm:   1/29/2020 4:57:06 PM CST          multivitamin  :   multivitamin ; Status:   Documented ; Ordered As Mnemonic:   Multiple Vitamins oral tablet ; Simple Display Line:   1 tab(s), po, daily, 0 Refill(s) ; Catalog Code:   multivitamin ; Order Dt/Tm:   4/22/2015 9:12:40 AM CDT          metoprolol  :   metoprolol ; Status:   Documented ; Ordered As Mnemonic:   metoprolol succinate 25 mg oral capsule, extended release ; Simple Display Line:   25 mg, 1 cap(s), Oral, daily, 0 Refill(s) ; Catalog Code:   metoprolol ; Order Dt/Tm:   11/27/2019 9:58:05 AM CST          magnesium oxide  :    magnesium oxide ; Status:   Documented ; Ordered As Mnemonic:   magnesium oxide 250 mg oral tablet ; Simple Display Line:   250 mg, 1 tab(s), po, daily, 0 Refill(s) ; Catalog Code:   magnesium oxide ; Order Dt/Tm:   4/5/2018 4:37:34 PM CDT          ezetimibe  :   ezetimibe ; Status:   Documented ; Ordered As Mnemonic:   Zetia 10 mg oral tablet ; Simple Display Line:   10 mg, 1 tab(s), Oral, daily, 30 tab(s), 0 Refill(s) ; Catalog Code:   ezetimibe ; Order Dt/Tm:   10/12/2020 5:39:23 PM CDT          cyanocobalamin  :   cyanocobalamin ; Status:   Documented ; Ordered As Mnemonic:   Vitamin B12 500 mcg oral tablet ; Simple Display Line:   500 mcg, 1 tab(s), po, daily, 0 Refill(s) ; Catalog Code:   cyanocobalamin ; Order Dt/Tm:   11/17/2015 10:02:05 AM CST          clopidogrel  :   clopidogrel ; Status:   Completed ; Ordered As Mnemonic:   clopidogrel 75 mg oral tablet ; Simple Display Line:   75 mg, 1 tab(s), Oral, daily, 0 Refill(s) ; Catalog Code:   clopidogrel ; Order Dt/Tm:   11/27/2019 9:55:13 AM CST          aspirin  :   aspirin ; Status:   Completed ; Ordered As Mnemonic:   aspirin 81 mg oral tablet, chewable ; Simple Display Line:   81 mg, 1 tab(s), Chewed, daily, 0 Refill(s) ; Catalog Code:   aspirin ; Order Dt/Tm:   11/27/2019 9:54:31 AM CST          acetaminophen-diphenhydramine  :   acetaminophen-diphenhydramine ; Status:   Documented ; Ordered As Mnemonic:   Tylenol Extra Strength PM ; Simple Display Line:   2 tab(s), Oral, hs, 0 Refill(s) ; Catalog Code:   acetaminophen-diphenhydramine ; Order Dt/Tm:   6/4/2020 10:56:08 AM CDT            ID Risk Screen   Recent Travel History :   No recent travel   Family Member Travel History :   No recent travel   Other Exposure to Infectious Disease :   Unknown   Micki GRAYSON, Nita - 10/12/2020 5:33 PM CDT

## 2022-02-16 NOTE — TELEPHONE ENCOUNTER
---------------------  From: Debby Faustin CMA   To: [facility] Message Pool ([id number] - [place]);     Sent: 10/14/2020 9:35:16 AM CDT  Subject: General Message     Per [facility] advising  a  f/u with [facility] - recommending cancelling surgery     LM for pt to return call at 0934 - asked him request to talk to me directlypt returned call and agrees to come in for appt this wk, transferred to scheduling

## 2022-02-16 NOTE — NURSING NOTE
Quick Intake Entered On:  7/11/2019 5:16 PM CDT    Performed On:  7/11/2019 5:15 PM CDT by Debby Faustin CMA               Summary   Systolic Blood Pressure :   148 mmHg (HI)    Diastolic Blood Pressure :   94 mmHg (HI)    Mean Arterial Pressure :   112 mmHg   Race :      Languages :   English   Ethnicity :   Not  or    Debby Faustin CMA - 7/11/2019 5:15 PM CDT

## 2022-02-16 NOTE — PROGRESS NOTES
Patient:   CELIA LUTZ            MRN: [id number]            FIN: [id number]               Age:   74 years     Sex:  Male     :  1945   Associated Diagnoses:   AAA (Abdominal Aortic Aneurysm); Adenocarcinoma of lung; COPD (chronic obstructive pulmonary disease) with acute bronchitis; Chronic kidney disease (CKD), stage III (moderate); Prostate cancer   Author:   Yong Boyd MD      Visit Information      Date of Service: 2020 03:33 pm  Performing Location: [facility]  Encounter#: [id number]      Primary Care Provider (PCP):  Yong Boyd MD    NPI# [id number]      Referring Provider:  Yong Boyd MD    NPI# [id number]      Chief Complaint            Additional Information:No additional information recorded during visit.   Chief complaint and symptoms as noted above and confirmed with patient.  Recent lab and diagnostic studies reviewed with patient      History of Present Illness   2015: Mitchell presents to clinic for hospital follow-up.  He recently underwent right sided wedge resection of an isolated pulmonary nodule.  Biopsy returning as adenocarcinoma of the lung.  Postoperative course complicated by an episode of both acute kidney injury and rectal bleeding felt to be consistent with ischemic colitis.  His creatinine on discharge was at his baseline of 1.3.  His lisinopril was held during his episode of acute kidney injury, resumed upon discharge.  He is scheduled to see his thoracic surgeon, Dr. Velazquez, tomorrow.  He also has a history of previous endovascular repair of a abdominal aneurysm.  He follows with a vascular surgeon through [facility] for this.    2019: Mitchell presents for preoperative assessment for planned endoscopic ultrasound and possible biopsy of pancreatic cyst.  Pancreatic cyst found incidentally as part of a CT angiogram.  Did meet with urology related to history of prostate cancer.  Did have prostate MRI which showed no significant extension of  his cancer.  In general he would like to hold off on any invasive therapies    11/29/2019: Presents for hospital follow-up after recent admission to [facility] this past week in the setting of acute coronary syndrome.  Underwent left heart catheterization demonstrating multivessel disease.  Did undergo drug-eluting stent deployment and started on dual antiplatelet therapy.  Some complications with post catheter bleeding from femoral artery.  Subsequently resolved with hemostasis.  Does have significant ecchymoses amongst that groin and the no swelling or pain.  Feels well.  Plans to start on cardiac rehab through [place] in the near future.  Also has cardiology follow-up in mid December 1/8/2020: Mitchell returns for hospital follow-up.  He was readmitted at [facility] with concerns for acute kidney injury with a creatinine rise up to 6.  Evaluated by nephrology as an inpatient.  Work-up most concerning for acute tubular necrosis versus possible cholesterol emboli.  Biopsies not pursued due to concerns of his baseline unit unit nephric state.  He endorses no uremic features.  He was advised to follow-up with me with serial lab monitoring.  No troubles since discharge.    1/22/2020: Mitchell returns to clinic for follow-up related to his acute kidney injury.  Labs checked 2 weeks ago showed interval improvement in creatinine down to 4.0 which was encouraging.  Here back for 2-week follow-up.  He has been participating in cardiac rehab.  Dr. Valentine is trying to schedule him for a follow-up nuclear stress testing.  He had had questionable athero-stenotic lesion on last heart cath without stenting.  He has felt well.  He raises hesitations about moving forward with a stress testing primarily in the setting of his kidney issues.    6/4/2020: Mitchell returns to clinic for one-month follow-up.  He was restarted on lisinopril after last telephone visit this past month.  Despite reintroduction of lisinopril blood pressures remain quite  elevated.  Main concerns are related to worsening dyspnea which has been progressively worsening for the past several months.  No anginal complaints.  He has remained on Advair.  When taking albuterol it seems to provide some short-term relief.  Not currently on any long-acting muscarinic antagonist.  He has appropriately been socially isolating.    7/17/2020: Mitchell returns to clinic with concerns about new oral ulcerations that he is noticed for last 2 weeks.  Describes one ulcer on the left lateral aspect of his tongue as well as on the left buccal surface.  Generally does chew only on that side due to history of surgeries on the right side of his neck.  Given his history he is appropriately concerned about any new malignancy.  Breathing continues to be more of an issue.  Would like to try different formulation of albuterol.  Has noticed some degree of pedal edema over recent weeks which is new for him.         Review of Systems   Constitutional:  No fever, No chills.    Eye:  Negative except as documented in history of present illness.    Ear/Nose/Mouth/Throat:  Negative except as documented in history of present illness, mouth sore.    Respiratory:  Shortness of breath, Wheezing.    Cardiovascular:  No chest pain, No palpitations, No peripheral edema, No syncope.    Gastrointestinal:  No nausea, No vomiting, No heartburn, No abdominal pain.    Genitourinary:  No dysuria, No hematuria.    Hematology/Lymphatics:  No bruising tendency.    Endocrine:  No excessive thirst, No polyuria.    Immunologic:  No recurrent fevers.    Musculoskeletal:  Joint pain, No muscle pain, No decreased range of motion, No trauma.    Neurologic:  Alert and oriented X4, No numbness, No tingling, No headache.       Health Status   Allergies:    Allergic Reactions (Selected)  No Known Medication Allergies   Medications:  (Selected)   Prescriptions  Prescribed  Advair Diskus 500 mcg-50 mcg inhalation powder: 1 puff(s), Inhale, bid, # 60 EA,  2 Refill(s), Type: Maintenance, Pharmacy: [id number], 1 puff(s) Inhale bid,x30 day(s)  Ventolin HFA 90 mcg/inh inhalation aerosol: 2 puff(s), Inhale, q6 hrs, # 1 EA, 3 Refill(s), Type: Maintenance, Pharmacy[id number], 2 puff(s) Inhale q6 hrs, 71, in, 07/17/20 15:40:00 CDT, Height Measured, 205, lb, 07/17/20 15:40:00 CDT, Weight Measured  amLODIPine 5 mg oral tablet: = 1 tab(s) ( 5 mg ), Oral, daily, # 90 tab(s), 3 Refill(s), Type: Maintenance, Pharmacy: [id number], 1 tab(s) Oral daily, 71, in, 06/04/20 11:00:00 CDT, Height Measured, 202, lb, 06/04/20 10:56:00 CDT, Weight Measured  lisinopril 20 mg oral tablet: = 1 tab(s) ( 20 mg ), Oral, daily, # 30 tab(s), 0 Refill(s), Type: Maintenance, [facility]: [id number], 1 tab(s) Oral daily  spacer for inhaler: spacer for inhaler, See Instructions, Instructions: use with albuterol inhaler, Supply, # 1 EA, 0 Refill(s), Type: Maintenance, [facility]acy: [id number], use with albuterol inhaler  tiotropium 18 mcg inhalation capsule: = 1 cap(s) ( 18 mcg ), Inhale, daily, # 90 cap(s), 3 Refill(s), Type: Maintenance, [facility]acy: [id number], 1 cap(s) Inhale daily, 71, in, 06/04/20 11:00:00 CDT, Height Measured, 202, lb, 06/04/20 10:56:00 CDT, Weight Measured  Documented Medications  Documented  Crestor 40 mg oral tablet: = 1 tab(s) ( 40 mg ), Oral, daily, 0 Refill(s), Type: Maintenance  Multiple Vitamins oral tablet: 1 tab(s), po, daily, 0 Refill(s), Type: Maintenance  Tylenol Extra Strength PM: 2 tab(s), Oral, hs, 0 Refill(s), Type: Maintenance  Vitamin B12 500 mcg oral tablet: 1 tab(s) ( 500 mcg ), po, daily, 0 Refill(s), Type: Maintenance  aspirin 81 mg oral tablet, chewable: = 1 tab(s) ( 81 mg ), Chewed, daily, 0 Refill(s), Type: Maintenance  clopidogrel 75 mg oral tablet: = 1 tab(s) ( 75 mg ), Oral, daily, 0 Refill(s), Type: Maintenance  magnesium oxide 250 mg oral tablet: 1 tab(s) ( 250 mg ), po, daily, 0 Refill(s), Type:  Maintenance  metoprolol succinate 25 mg oral capsule, extended release: = 1 cap(s) ( 25 mg ), Oral, daily, 0 Refill(s), Type: Maintenance  omeprazole 20 mg oral delayed release capsule: = 1 cap(s) ( 20 mg ), Oral, daily, # 90 cap(s), 0 Refill(s), Type: Maintenance,    Medications          *denotes recorded medication          spacer for inhaler: See Instructions, use with albuterol inhaler, 1 EA, 0 Refill(s).          *Tylenol Extra Strength PM: 2 tab(s), Oral, hs, 0 Refill(s).          Ventolin HFA 90 mcg/inh inhalation aerosol: 2 puff(s), Inhale, q6 hrs, 1 EA, 3 Refill(s).          amLODIPine 5 mg oral tablet: 5 mg, 1 tab(s), Oral, daily, 90 tab(s), 3 Refill(s).          *aspirin 81 mg oral tablet, chewable: 81 mg, 1 tab(s), Chewed, daily, 0 Refill(s).          *clopidogrel 75 mg oral tablet: 75 mg, 1 tab(s), Oral, daily, 0 Refill(s).          *Vitamin B12 500 mcg oral tablet: 500 mcg, 1 tab(s), po, daily, 0 Refill(s).          Advair Diskus 500 mcg-50 mcg inhalation powder: 1 puff(s), Inhale, bid, for 30 day(s), 60 EA, 2 Refill(s).          lisinopril 20 mg oral tablet: 20 mg, 1 tab(s), Oral, daily, 30 tab(s), 0 Refill(s).          *magnesium oxide 250 mg oral tablet: 250 mg, 1 tab(s), po, daily, 0 Refill(s).          *metoprolol succinate 25 mg oral capsule, extended release: 25 mg, 1 cap(s), Oral, daily, 0 Refill(s).          *Multiple Vitamins oral tablet: 1 tab(s), po, daily, 0 Refill(s).          *omeprazole 20 mg oral delayed release capsule: 20 mg, 1 cap(s), Oral, daily, 90 cap(s), 0 Refill(s).          *Crestor 40 mg oral tablet: 40 mg, 1 tab(s), Oral, daily, 0 Refill(s).          tiotropium 18 mcg inhalation capsule: 18 mcg, 1 cap(s), Inhale, daily, 90 cap(s), 3 Refill(s).       Problem list:    All Problems  AAA (abdominal aortic aneurysm) / SNOMED CT 807368849 / Confirmed  Adenocarcinoma of lung / SNOMED CT 807572280 / Confirmed  [place] Esophagus / SNOMED CT 5930096952 / Confirmed  CKD (chronic kidney  disease) stage 3, GFR 30-59 ml/min / SNOMED CT 4691195145 / Confirmed  COPD (chronic obstructive pulmonary disease) with acute bronchitis / SNOMED CT 68270260 / Confirmed  Closed hip fracture / SNOMED CT 994743567 / Confirmed  Closed fracture of trochanter of femur / SNOMED CT 6002983043 / Confirmed  Cardiac arrhythmia / SNOMED CT 35095764 / Confirmed  Coronary artery disease due to lipid rich plaque / SNOMED CT 4670529764 / Confirmed  Lipids abnormal / SNOMED CT 829111173 / Confirmed  HTN (Hypertension) / SNOMED CT 68918267 / Confirmed  Former Smoker / SNOMED CT [id number] / Confirmed  GERD (gastroesophageal reflux disease) / SNOMED CT 6800346127 / Confirmed  Anticoagulated / SNOMED CT 740808445 / Confirmed  History of oropharyngeal cancer / SNOMED CT 5134544419 / Confirmed  H/O prostate cancer / SNOMED CT 2661413090 / Confirmed  DJD (Degenerative Joint Disease) / SNOMED CT 7885430034 / Confirmed  H/O unilateral nephrectomy / SNOMED CT 717865770 / Confirmed  Resolved: *Hospitalized@[facility] Atypical chest pain  Resolved: *Hospitalized@[facility] Hip fracture  Resolved: *Hospitalized@[facility] Septic hip vs hematoma  Resolved: History of sepsis / SNOMED CT 1608568997  Resolved: Inpatient stay / SNOMED CT 388873372  Resolved: Inpatient stay / SNOMED CT 275053560  Resolved: Prostate cancer / SNOMED CT 112579240  Canceled: Oropharyngeal cancer / SNOMED CT 427688788  Canceled: Solitary kidney / SNOMED CT 020544870  Canceled: Solitary kidney / SNOMED CT 411016654      Histories   Past Medical History:    Active  AAA (abdominal aortic aneurysm) (635575889)  Coronary artery disease due to lipid rich plaque (2493940511)  GERD (gastroesophageal reflux disease) (3829414909)  Cardiac arrhythmia (56928694)  CKD (chronic kidney disease) stage 3, GFR 30-59 ml/min (7124219206)  Resolved  Inpatient stay (862260620): Onset on 1/3/2020 at 74 years.  Resolved on 1/4/2020 at 74 years.  Comments:  1/17/2020 CST 1:41  PM CST - Yoly Tillman  [facility], [place] - Chest pain, acute renal failure.  Inpatient stay (265204781): Onset on 2019 at 74 years.  Resolved on 2019 at 74 years.  Comments:  12/10/2019 CST 1:46 PM MINGO - Jessi Eid  @[facility] - NSTEMI.  *Hospitalized@[facility] Septic hip vs hematoma: Onset on 7/3/2016 at 70 years.  Resolved on 2016 at 70 years.  History of sepsis (8847401235): Onset on 7/3/2016 at 70 years.  Resolved.  Comments:  2016 CDT 2:14 PM CDT - Alma Barfield  Severe; due to septic hip.    2016 CDT 3:37 PM NINOT - Alma Barfield  Sepsis organ failure: Acute renal failure.  *Hospitalized@[facility] - Hip fracture: Onset on 2016 at 70 years.  Resolved on 2016 at 70 years.  *Hospitalized@[facility] - Atypical chest pain: Onset on 2015 at 69 years.  Resolved on 2015 at 69 years.  Prostate cancer (357315913):  Resolved.   Family History:    CA - Breast cancer  Sister (Alexa, )  Lupus  Sister (Rebecca)  Alcohol abuse  Sister (Alexa, )  Mother ()  SMA type III  Grandfather (M)  Obese  Sister (Sujey, )     Procedure history:    Coronary angiography (70476205) on 2019 at 74 Years.  Comments:  12/10/2019 1:47 PM Jessi Fisher  and PCI of the right coronary artery.  Colonoscopy (530398503) on 3/21/2017 at 71 Years.  Comments:  3/22/2017 2:33 PM CDT - Filippo Barbour MD  Indication: Adenomatous Polyps  Sedation: MAC  Findings: Adenomatous polyp cecum, hemorrohids  Rec: Repeat in 5 years  Right Hip Bipoloar Hemiarthroplasty on 2016 at 70 Years.  Comments:  2016 7:26 AM CDT - Debby Faustin CMA  Right displasced femoral neck fracture  right thoracotomy on 11/15/2015 at 70 Years.  Comments:  2015 9:07 AM CST - Debby Faustin CMA  wedge resection right upper lung nodule, wedge resection right middle lobe lung nodule, Mediastinal lymph node dissection  Colonoscopy (243083119) on 2014 at 68 Years.  Comments:  5/15/2014  10:56 AM NINOT - Livier Stallings RN  Sedation: midazolam, fentanyl  Indication: screen  5-6mm tubular adenom x3, 8mm tubular adenoma x1, 12mm tubular adenoma with advance adenoma due to size  Repeat in 3 years.  nepherectomy in the month of 3/2009 at 63 Years.  Lumbar discectomy in 2006 at 61 Years.  Left salivary gland removal in 1995 at 50 Years.  Oral surgery in 1994 at 49 Years.  Aortic aneurysm, abdominal ([id number]).   Social History:        Alcohol Assessment            Current, 2 times per week, 3 drinks/episode average.      Tobacco Assessment            Past, 20 per day.  49 year(s).      Substance Abuse Assessment            Never      Employment and Education Assessment            Employed, Work/School description: [employer] .      Home and Environment Assessment            Marital status: .  Spouse/Partner name: Darlene Chau.      Nutrition and Health Assessment            Type of diet: Regular.      Exercise and Physical Activity Assessment            Exercise frequency: Never.      Sexual Assessment            Sexually active: Yes.        Physical Examination   vital signs stable, as noted above   VS/Measurements   General:  Alert and oriented, No acute distress.    Eye:  Extraocular movements are intact.    HENT:  Normocephalic, Oral mucosa is moist, No pharyngeal erythema, dentures, discrete ulcerative/white lesion on left buccal surface; small punctate ulcer on lateral, left undersurface of tongue.    Neck:  Supple, Previous right sided neck dissection with flap.    Respiratory:  Respirations are non-labored, posterior thoracotomy scar, late expiratory rhonchi.    Cardiovascular:  Normal rate, Regular rhythm, No murmur, No edema.    Gastrointestinal:  Soft.    Neurologic:  Alert, Oriented, Normal motor function, No focal deficits.    Cognition and Speech:  Oriented, Speech clear and coherent.    Psychiatric:  Appropriate mood & affect.       Review / Management    Results review:  Lab results   6/4/2020 10:26 AM CDT Sodium Level 138 mmol/L    Potassium Level 5.2 mmol/L    Chloride Level 101 mmol/L    CO2 Level 27 mmol/L    AGAP 10    Glucose Level 153 mg/dL    BUN 25 mg/dL    Creatinine 1.97 mg/dL    BUN/Creat Ratio 13    eGFR  40    eGFR Non-African American 33    Calcium Level 10.2 mg/dL   5/6/2020 2:27 PM CDT Sodium Level 137 mmol/L    Potassium Level 4.8 mmol/L    Chloride Level 99 mmol/L    CO2 Level 31 mmol/L    Glucose Level 121 mg/dL  HI    BUN 15 mg/dL    Creatinine 1.76 mg/dL  HI    BUN/Creat Ratio 9    eGFR 37 mL/min/1.73m2  LOW    eGFR African American 43 mL/min/1.73m2  LOW    Calcium Level 9.7 mg/dL   .       Impression and Plan   Diagnosis     AAA (Abdominal Aortic Aneurysm) (KXI72-OK I71.4).     Adenocarcinoma of lung (MIW46-CG C34.90).     COPD (chronic obstructive pulmonary disease) with acute bronchitis (EDE79-QS J44.1).     Chronic kidney disease (CKD), stage III (moderate) (BOE14-RD N18.3).     Prostate cancer (CEF22-DM C61).         .) 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  - new mouth ulcer on left buccal surface  - referring to ENT (JVT) for reassessment    .) COPD; worsening dyspnea - unclear if contributing HF features (last TTE 11/2019 LVEF 45%)   - on Advair 500/50mcg BID + Spiriva   - has not participated in pulmonary rehab   - consider repeat PFTs in the future    .) CKD; baseline SCr 1.5-1.9;  h/o LEILA with rise to 6.0 since [facility] in late 11/2019; asymptomatic    - suspect likely contrast induced nephropathy (VITOR)  - heightened risk for future contrast induced nephropathy    .) hypertension; uncontrolled  current antihypertensive regimen: Toprol XL 25mg daily, lisinopril 20mg daily, amlodipine 5mg daily  regimen changes: none  intolerance:  future titration/work-up plan:    - monitor edema since amlodipine intro (6/2020)   - previously on lisinopril/hctz 20/12.5mg daily; given GFR, if considering diuretic, would favor loop    plan as  previously outlined and not discussed at today's visit     .) NSTEMI with PCI to RCA with multi-vessel disease (non-obstructive LAD lesion); following with Dr. Valentine  - on DAPT   - Toprol XL 25mg daily  - high dose pravastatin changed to rosuvastatin 40mg qhs  - participating in cardiac rehab in near future    .) EUS, 7/2019, of  2.2cm pancreatic cyst; no malignancy on biopsy   - recommendations for annual f/u through GI    .) prostate Ca; watchful waiting   - original prostate biopsy in '11, repeat biopsy in '16; [place] 6   - watchful surveillance; q6 month PSA; last PSA 15   - MRI of prostate (2019): focal coarse calcifications in prostate; no evidence of extra-prostate extension   - following with Dr. Bustos - prefers not to pursue XRT at this point    .) h/o AAA, endovascular aortic repair, follows with Dr. David     .) polyarthritis pains  - limited benefit with NSAIDs and APAP  s/p right MIRNA after hip fracture from fall (6/2016)   - normal DEXA (7/2015)  - doubt inflammatory arthritis; doubt polymyalgia rheumatica given no upper extremity involvement  - working with Ortho - lasting benefits from intra-articular injections    .) s/p right wedge resection of limited stage adenocarcinoma of lung (11/2015)    .) health maintenance   - CRC due in '22   - q6 month PSA   - Prevacid 30mg BID   - enrolled in [facility]    RTC as previously scheduled

## 2022-02-16 NOTE — CARE COORDINATION
ACTION PLAN   Steps may include concerns about medical condition, problems, barriers or goals and are followed by action, solutions, observations, the current status of the step, etc.        Description:  (Concerns/Problems/Actions/Comments)      Status:    Step (Goal): Prostate Ca    Action: 2-23-17 Per BRM's Note: - original prostate biopsy in '11, repeat earlier this fall; [place] 6   - watchful surveillance; q6 month PSA    CC:       Step (Goal):  Hypertension; controlled    Action:2-23-17 Per BRM's Note: current antihypertensive regimen: lisinopril/hctz 20/12.5mg daily, metoprolol 50mg BID  regimen changes: none  intolerance:  future titration/work-up plan:    - SBP <140 goal   - interval rise in SCr; recheck today    CC:       Step (Goal): COPD    Action:2-23-17 Per BRM's Note: - on Advair 115/21mcg BID with noticeable improvement in breathing   - albuterol on rare occasions   - has not participated in pulmonary rehab    CC:       Step(Goal): Adenocarcinoma of lung     Action: 2-23-17 Per BRM's Note: s/p right wedge resection of limited stage adenocarcinoma of lung (11/2015)       Step (Goal): Post-operative LEILA/ CKD     Action: 2-23-17 Per BRM's Note: post-operative LEILA/ CKD (11/2015), baseline SCr 1.3 (previous left nephrectomy for benign hemangioma)    CC:       Step (Goal):    Action:    CC:             Health Maintenance Goals:  .) health maintenance   - CRC due in '17; arrange now; no anticipated future screening after this coming endoscopy   - q6 month PSA   - Prevacid 30mg BID   - enrolled in [facility]   RTC q6 months    Medications          *denotes recorded medication          Anti-static valved spacer chamber: See Instructions, use as directed with inhaler, 1 EA, 0 Refill(s).          ProAir HFA 90 mcg/inh inhalation aerosol: 2 puff(s), inh, qid, use with spacer chamber, PRN: as needed for wheezing, 1 EA, 11 Refill(s).          *aspirin 81 mg oral tablet: 81 mg, 1 tab(s), po, daily, tab(s).           *Vitamin B12 500 mcg oral tablet: 500 mcg, 1 tab(s), po, daily, 0 Refill(s).          *Advil PM: 2 tab(s), po, hs, PRN: as needed for insomnia, 0 Refill(s).          Advair  mcg-21 mcg/inh inhalation aerosol: 2 puff(s), inh, bid, for 30 day(s), rinse mouth and throat after use, 1 EA, 11 Refill(s).          hydrochlorothiazide-lisinopril 12.5 mg-20 mg oral tablet: 1 tab(s), PO, Daily, 90 tab(s), 3 Refill(s).          lansoprazole 30 mg oral delayed release capsule: 30 mg, 1 cap(s), PO, bid, 180 cap(s), 3 Refill(s).          Metoprolol Tartrate 50 mg oral tablet: 50 mg, 1 tab(s), po, bid, 180 tab(s), 2 Refill(s).          *Multiple Vitamins oral tablet: 1 tab(s), po, daily, 0 Refill(s).          pravastatin 80 mg oral tablet: 80 mg, 1 tab(s), po, daily, 90 tab(s), 2 Refill(s).     Allergies          No known allergies     Problems          Oropharyngeal cancer          AAA (Abdominal Aortic Aneurysm)          Lipids abnormal          HTN (Hypertension)          Prostate cancer          DJD (Degenerative Joint Disease)          Former Smoker          Solitary kidney          Solitary kidney          H/O unilateral nephrectomy          [place] Esophagus          COPD (chronic obstructive pulmonary disease) with acute bronchitis          Adenocarcinoma of lung          Anticoagulated          Closed hip fracture          Coronary artery disease due to lipid rich plaque          GERD (gastroesophageal reflux disease)          Cardiac arrhythmia          CKD (chronic kidney disease) stage 3, GFR 30-59 ml/min          Closed fracture of trochanter of femur

## 2022-02-16 NOTE — NURSING NOTE
CAGE Assessment Entered On:  12/3/2020 2:45 PM CST    Performed On:  12/2/2020 2:45 PM CST by Debby Faustin CMA               Assessment   Have you ever felt you should cut down on your drinking :   No   Have people annoyed you by criticizing your drinking :   No   Have you ever felt bad or guilty about your drinking :   No   Have you ever taken a drink first thing in the morning to steady your nerves or get rid of a hangover (Eye-opener) :   No   CAGE Score :   0    Debby Faustin CMA - 12/3/2020 2:45 PM CST

## 2022-02-16 NOTE — NURSING NOTE
Quick Intake Entered On:  5/6/2019 8:27 AM CDT    Performed On:  5/6/2019 8:27 AM CDT by Hannah Lr RN               Summary   Chief Complaint :   BP   Systolic Blood Pressure :   146 mmHg (HI)    Diastolic Blood Pressure :   80 mmHg   Mean Arterial Pressure :   102 mmHg   Vital Signs Comments :   Declines recheck   BP Site :   Right arm   BP Method :   Manual   Race :      Languages :   English   Ethnicity :   Not  or    Hannah Lr RN - 5/6/2019 8:27 AM CDT

## 2022-02-16 NOTE — PROGRESS NOTES
Chief Complaint    follow up hosptial,  History of Present Illness       Patient is here with his daughter for follow up from the ED  last week. He was seen and treated for hypotension and dehydration.  He felt better after IV fluids and declined observation.  Amlodipine was discontinues.  He continues to take metoprolol.  He is more fatigued recently.  ED records, labs, imaging reviewed  Review of Systems          ROS reviewed and negative except for symptoms noted in HPI.  Physical Exam   Vitals & Measurements    T: 97.5  F (Tympanic)  HR: 94 (Peripheral)  RR: 20  BP: 85/51  SpO2: 97%     HT: 71 in  WT: 216 lb  BMI: 30.12           General:  Alert and oriented, No acute distress.            Eye:  Normal conjunctiva,          HENT:  Normocephalic          Neck:  Supple, Non-tender, No carotid bruit, No lymphadenopathy, No thyromegaly.            Respiratory:  Lungs have decreased air movement, respirations are non-labored.            Cardiovascular:  Normal rate, Regular rhythm, 2+ pitting edema knee to foot          Gastrointestinal:  Soft, Non-tender.            Genitourinary:  No costovertebral angle tenderness.            Musculoskeletal:  Normal range of motion, Normal strength.            Integumentary:  Warm, Dry, No rash.           Neurologic:  Alert, Oriented.            Psychiatric:  Cooperative, Appropriate mood & affect.    Assessment/Plan       1. Hypotension (I95.9)          His hypotension persists despite discontinuing amlodipine         Reduce metoprolol 12 and half milligrams daily and monitor home blood pressures.  If his blood pressure is not increased to the 120s with this reduction over the next several days he will clinically.  He will need to follow-up with Dr. Boyd next week.         Ordered:          06236 office o/p est mod 30-39 min (Charge), Quantity: 1, Hypotension  Encounter for immunization          Basic Metabolic Panel* ([facility]), Specimen Type: Serum, Collection Date: 11/01/21  14:40:00 CDT                Orders:         influenza virus vaccine, inactivated, 0.7 mL, IM, once, (Completed)         SARS-CoV-2 (COVID-19) mRNA-1273 vaccine, 0.25 mL, IM, once, (Completed)         0013A adm sarscov2 100mcg/0.5ml 3rd (Charge), Quantity: 1, Encounter for immunization         27771 influenza vaccine splt prsrv free inc antigen im (Charge), Quantity: 1, Encounter for immunization         24180 sarscov2 vaccine 100mcg/0.5ml im use (Charge), Quantity: 1, Encounter for immunization  Patient Information     Name:CELIA LUTZ      Address:      [address] [place] [phone]     Sex:Male     YOB: 1945     [phone]     Emergency Contact:DIANNA LEYVA     MRN:[id number]     FIN:[id number]     Location:[facility]     Date of Service:11/01/2021      Primary Care Physician:       Yong Boyd MD, [phone]      Attending Physician:       Carlos Guerrero MD, [phone]  Problem List/Past Medical History    Ongoing     AAA (abdominal aortic aneurysm)     Adenocarcinoma of lung     Anticoagulated     [place] Esophagus     Cardiac arrhythmia     CKD (chronic kidney disease) stage 3, GFR 30-59 ml/min     Closed fracture of trochanter of femur     Closed hip fracture     COPD (chronic obstructive pulmonary disease) with acute bronchitis     Coronary artery disease due to lipid rich plaque     DJD (Degenerative Joint Disease)     Former Smoker     GERD (gastroesophageal reflux disease)     H/O prostate cancer     H/O unilateral nephrectomy     History of oropharyngeal cancer     HTN (Hypertension)     Hypomagnesemia     Lipids abnormal     Lung cancer     Obesity     Rhabdomyolysis    Historical     *Hospitalized@[facility] - Atypical chest pain     *Hospitalized@[facility] - Hip fracture     *Hospitalized@[facility] Septic hip vs hematoma     History of sepsis       Comments: Sepsis organ failure: Acute renal failure. Severe; due to septic hip.     Inpatient stay        Comments: @[facility] - NSTEMI.     Inpatient stay       Comments: [facility], [place] - Chest pain, acute renal failure.     Inpatient stay       Comments: @ [facility] due to acute renal failure.     Inpatient stay       Comments: [facility], [place] - Weakness, frequent falls, and dizziness.     Prostate cancer  Procedure/Surgical History     Biopsy of lung (01/21/2021)     Esophagogastroduodenoscopy (11/13/2020)     Excision of squamous cell carcinoma (11/06/2020)      Comments: Left buccal mucosa and left lateral tongue..     Coronary angiography (11/25/2019)      Comments: and PCI of the right coronary artery..     Esophagogastroduodenoscopy (07/26/2019)      Comments: Endoscopic US, Fine Needle Aspiration; Gastric Biopsies of polyps..     Colonoscopy (03/21/2017)      Comments: Indication: Adenomatous Polyps      Sedation: MAC      Findings: Adenomatous polyp cecum, hemorrohids      Rec: Repeat in 5 years.     Right Hip Bipoloar Hemiarthroplasty (06/22/2016)      Comments: Right displasced femoral neck fracture.     Thoracoscopic wedge resection of lung (2016)      Comments: RUL, RML.     right thoracotomy (11/15/2015)      Comments: wedge resection right upper lung nodule, wedge resection right middle lobe lung nodule, Mediastinal lymph node dissection.     Colonoscopy (05/12/2014)      Comments: Sedation: midazolam, fentanyl      Indication: screen      5-6mm tubular adenom x3, 8mm tubular adenoma x1, 12mm tubular adenoma with advance adenoma due to size      Repeat in 3 years..     nepherectomy (03/2009)      Comments: Left.     Lumbar discectomy (2006)     Left salivary gland removal (1995)     Oral surgery (1994)     Aortic aneurysm, abdominal  Medications    acetaminophen 325 mg oral tablet, 1-2 tab(s), Oral, q6 hrs, PRN    Aspirin Enteric Coated 81 mg oral delayed release tablet, 81 mg= 1 tab(s), Oral, daily    cholecalciferol 1000 intl units oral tablet, 25 mcg, Oral,  daily    Efudex 5% topical cream, 1 eva, Topical, bid, 3 refills    Iron 100 Plus oral tablet, 1 tab(s), Oral, daily    magnesium oxide 400 mg (240 mg elemental magnesium) oral tablet, 400 mg= 1 tab(s), Oral, daily    Metoprolol Succinate ER 25 mg oral tablet, extended release    Multiple Vitamins oral tablet, 1 tab(s), Oral, daily    nitroglycerin 0.4 mg sublingual tablet, 0.4 mg= 1 tab(s), SL, q5 min, PRN    omeprazole 20 mg oral delayed release capsule, 20 mg= 1 cap(s), Oral, daily, 3 refills    polyethylene glycol 3350, 17 gm, Oral, daily    spacer for inhaler, See Instructions    thiamine 100 mg oral tablet, 100 mg= 1 tab(s), Oral, daily    Trelegy Ellipta 200 mcg-62.5 mcg-25 mcg/inh inhalation powder, 1 puff(s), Inhale, daily, 3 refills    Ventolin HFA 90 mcg/inh inhalation aerosol, See Instructions, 3 refills    Voltaren 1% topical gel, See Instructions    zinc (as gluconate) 50 mg oral tablet  Allergies    No Known Medication Allergies  Social History    Smoking Status     Former smoker     Alcohol - Current      Liquor (Hard) (1.5 oz), Daily, 2 drinks/episode average.     Electronic Cigarette/Vaping      Electronic Cigarette Use: Never.     Employment/School      Retired, Highest education level: High school.     Exercise      Exercise frequency: Occasional.     Home/Environment      Marital status: . Spouse/Partner name: Darlene Chau. 4 children. Living situation: Home/Independent. Home equipment: Walker/Cane. Injuries/Abuse/Neglect in household: No. Feels unsafe at home: No. Family/Friends available for support: Yes.     Nutrition/Health      Type of diet: Regular. Wants to lose weight: No. Sleeping concerns: No. Feels highly stressed: No.     Sexual      Sexually active: No. Identifies as male, Sexual orientation: Straight or heterosexual. Contraceptive Use Details: None.     Substance Abuse - Denies Substance Abuse      Never     Tobacco      Quit 12/25/2009, Cigarettes, 40 per day. 50  year(s).  Family History    Alcohol abuse: Mother and Sister.    CA - Breast cancer: Sister.    Kidney disease: Aunt (M).    Lupus: Sister.    Obese: Sister.    SMA type III: Grandfather (M).  Lab Results       Lab Results (Last 4 results within 90 days)        Vitamin B12 Level: 561 pg/mL [200 pg/mL - 1100 pg/mL] (09/21/21 14:03:00)       Hgb: 13.7 gm/dL [13.2 gm/dL - 17.1 gm/dL] (09/21/21 14:03:00)       Hct: 38 % Low [38.5 % - 50 %] (09/21/21 14:03:00)  Immunizations       Scheduled Immunizations       Dose Date(s)       influenza virus vaccine, inactivated       10/08/2013, 11/01/2016, 11/07/2006, 09/16/2014, 11/05/2019, 10/09/2020       pneumococcal (PPSV23)       03/06/2012, 02/16/2007, 10/16/2014       SARS-CoV-2 (COVID-19) Moderna-1273       01/28/2021, 02/28/2021       Td       08/18/2016, 02/03/2005       Other Immunizations               influenza virus vaccine, inactivated       11/19/2010, 09/08/2011, 09/24/2012, 10/07/2014, 10/28/2015, 09/19/2017, 09/23/2018, 10/30/2020, 11/01/2021       pneumococcal (PPSV23)       01/01/2007       Td       01/01/2005       ZOSdelmi       03/23/2013       pneumococcal (PCV13)       04/22/2015       SARS-CoV-2 (COVID-19) Moderna-1273       11/01/2021

## 2022-02-16 NOTE — NURSING NOTE
Comprehensive Intake Entered On:  11/1/2021 1:54 PM CDT    Performed On:  11/1/2021 1:41 PM CDT by Esperanza Pina LPN               Summary   Chief Complaint :   follow up hosptial,   Weight Measured :   216 lb(Converted to: 216 lb 0 oz, 97.976 kg)    Height Measured :   71 in(Converted to: 5 ft 11 in, 180.34 cm)    Body Mass Index :   30.12 kg/m2 (HI)    Body Surface Area :   2.21 m2   Systolic Blood Pressure :   85 mmHg   Diastolic Blood Pressure :   51 mmHg (LOW)    Mean Arterial Pressure :   62 mmHg   Peripheral Pulse Rate :   94 bpm   BP Site :   Right arm   Pulse Site :   Radial artery   BP Method :   Electronic   HR Method :   Electronic   Temperature Tympanic :   97.5 DegF(Converted to: 36.4 DegC)  (LOW)    Respiratory Rate :   20 br/min   Oxygen Saturation :   97 %   Race :      Languages :   English   Ethnicity :   Not  or    Esperanza Pina LPN - 11/1/2021 1:41 PM CDT   Health Status   Allergies Verified? :   Yes   Medication History Verified? :   Yes   Pre-Visit Planning Status :   Completed   Tobacco Use? :   Former smoker   Esperanza Pina LPN - 11/1/2021 1:41 PM CDT   Consents   Consent for Immunization Exchange :   Consent Granted   Consent for Immunizations to Providers :   Consent Granted   Esperanza Pina LPN - 11/1/2021 1:41 PM CDT   Meds / Allergies   (As Of: 11/1/2021 1:54:52 PM CDT)   Allergies (Active)   No Known Medication Allergies  Estimated Onset Date:   Unspecified ; Created By:   Debby Faustin CMA; Reaction Status:   Active ; Category:   Drug ; Substance:   No Known Medication Allergies ; Type:   Allergy ; Updated By:   Debby Faustin CMA; Reviewed Date:   11/1/2021 1:48 PM CDT        Medication List   (As Of: 11/1/2021 1:54:52 PM CDT)   Prescription/Discharge Order    albuterol  :   albuterol ; Status:   Prescribed ; Ordered As Mnemonic:   Ventolin HFA 90 mcg/inh inhalation aerosol ; Simple Display Line:   See Instructions, 2 puff(s) Inhale q4 hours  as needed for shortness of breath, cough or wheezing., 18 gm, 3 Refill(s) ; Ordering Provider:   Yong Boyd MD; Catalog Code:   albuterol ; Order Dt/Tm:   10/7/2021 1:03:46 PM CDT          fluorouracil topical  :   fluorouracil topical ; Status:   Prescribed ; Ordered As Mnemonic:   Efudex 5% topical cream ; Simple Display Line:   1 eva, Topical, bid, 25 gm, 3 Refill(s) ; Ordering Provider:   Yong Boyd MD; Catalog Code:   fluorouracil topical ; Order Dt/Tm:   5/21/2021 1:54:02 PM CDT          fluticasone/umeclidinium/vilanterol  :   fluticasone/umeclidinium/vilanterol ; Status:   Prescribed ; Ordered As Mnemonic:   Trelegy Ellipta 200 mcg-62.5 mcg-25 mcg/inh inhalation powder ; Simple Display Line:   1 puff(s), Inhale, daily, at the same time every day. Rinse mouth after use., 180 blister, 3 Refill(s) ; Ordering Provider:   Yong Boyd MD; Catalog Code:   fluticasone/umeclidinium/vilanterol ; Order Dt/Tm:   7/13/2021 12:36:22 PM CDT          Miscellaneous Rx Supply  :   Miscellaneous Rx Supply ; Status:   Prescribed ; Ordered As Mnemonic:   spacer for inhaler ; Simple Display Line:   See Instructions, use with albuterol inhaler, 1 EA, 0 Refill(s) ; Ordering Provider:   Severino Cannon PA-C; Catalog Code:   Miscellaneous Rx Supply ; Order Dt/Tm:   1/9/2018 2:18:32 PM CST          omeprazole  :   omeprazole ; Status:   Prescribed ; Ordered As Mnemonic:   omeprazole 20 mg oral delayed release capsule ; Simple Display Line:   20 mg, 1 cap(s), Oral, daily, 90 cap(s), 3 Refill(s) ; Ordering Provider:   Yong Boyd MD; Catalog Code:   omeprazole ; Order Dt/Tm:   1/28/2021 3:23:29 PM CST            Home Meds    acetaminophen  :   acetaminophen ; Status:   Documented ; Ordered As Mnemonic:   acetaminophen 325 mg oral tablet ; Simple Display Line:   1-2 tab(s), Oral, q6 hrs, not to exceed 2000 mg/day, PRN: as needed for pain, 0 Refill(s) ; Catalog Code:   acetaminophen ; Order Dt/Tm:   6/24/2021 12:09:00 PM CDT           amLODIPine  :   amLODIPine ; Status:   Completed ; Ordered As Mnemonic:   amLODIPine 2.5 mg oral tablet ; Simple Display Line:   2.5 mg, 1 tab(s), Oral, daily, in the evening, 0 Refill(s) ; Catalog Code:   amLODIPine ; Order Dt/Tm:   9/30/2021 4:47:59 PM CDT          aspirin  :   aspirin ; Status:   Documented ; Ordered As Mnemonic:   Aspirin Enteric Coated 81 mg oral delayed release tablet ; Simple Display Line:   81 mg, 1 tab(s), Oral, daily, 0 Refill(s) ; Catalog Code:   aspirin ; Order Dt/Tm:   5/11/2021 3:37:37 PM CDT          cholecalciferol  :   cholecalciferol ; Status:   Documented ; Ordered As Mnemonic:   cholecalciferol 1000 intl units oral tablet ; Simple Display Line:   25 mcg, Oral, daily, 0 Refill(s) ; Catalog Code:   cholecalciferol ; Order Dt/Tm:   5/21/2021 1:40:36 PM CDT          diclofenac topical  :   diclofenac topical ; Status:   Documented ; Ordered As Mnemonic:   Voltaren 1% topical gel ; Simple Display Line:   See Instructions, apply 2 gram(s) topically to upper extremities (max of 8 grams/joint/day) or 4 gram(s) topically to lower extremities (max of 16 grams/joint per day) up to 4 times daily as needed for pain. Max of 32 grams/day total., 0 Refill(s) ; Catalog Code:   diclofenac topical ; Order Dt/Tm:   10/8/2021 3:30:21 PM CDT          magnesium oxide  :   magnesium oxide ; Status:   Documented ; Ordered As Mnemonic:   magnesium oxide 400 mg (240 mg elemental magnesium) oral tablet ; Simple Display Line:   400 mg, 1 tab(s), Oral, daily, 0 Refill(s) ; Catalog Code:   magnesium oxide ; Order Dt/Tm:   5/11/2021 3:52:08 PM CDT          metoprolol  :   metoprolol ; Status:   Documented ; Ordered As Mnemonic:   Metoprolol Succinate ER 25 mg oral tablet, extended release ; Simple Display Line:   0 Refill(s) ; Catalog Code:   metoprolol ; Order Dt/Tm:   5/21/2021 1:27:26 PM CDT ; Comment:   Responsible Provider: RUFINA PALOMARES          multivitamin  :   multivitamin ; Status:    Documented ; Ordered As Mnemonic:   Multiple Vitamins oral tablet ; Simple Display Line:   1 tab(s), po, daily, 0 Refill(s) ; Catalog Code:   multivitamin ; Order Dt/Tm:   4/22/2015 9:12:40 AM CDT          multivitamin with iron  :   multivitamin with iron ; Status:   Documented ; Ordered As Mnemonic:   Iron 100 Plus oral tablet ; Simple Display Line:   1 tab(s), Oral, daily, 0 Refill(s) ; Catalog Code:   multivitamin with iron ; Order Dt/Tm:   7/29/2021 2:46:16 PM CDT          nitroglycerin  :   nitroglycerin ; Status:   Documented ; Ordered As Mnemonic:   nitroglycerin 0.4 mg sublingual tablet ; Simple Display Line:   0.4 mg, 1 tab(s), SL, q5 min, PRN: for chest pain, 100 tab(s), 0 Refill(s) ; Catalog Code:   nitroglycerin ; Order Dt/Tm:   5/11/2021 4:04:54 PM CDT          polyethylene glycol 3350  :   polyethylene glycol 3350 ; Status:   Documented ; Ordered As Mnemonic:   polyethylene glycol 3350 ; Simple Display Line:   17 gm, Oral, daily, 0 Refill(s) ; Catalog Code:   polyethylene glycol 3350 ; Order Dt/Tm:   10/12/2020 5:40:00 PM CDT          thiamine  :   thiamine ; Status:   Documented ; Ordered As Mnemonic:   thiamine 100 mg oral tablet ; Simple Display Line:   100 mg, 1 tab(s), Oral, daily, 0 Refill(s) ; Catalog Code:   thiamine ; Order Dt/Tm:   5/11/2021 3:57:45 PM CDT          zinc gluconate  :   zinc gluconate ; Status:   Documented ; Ordered As Mnemonic:   zinc (as gluconate) 50 mg oral tablet ; Simple Display Line:   1 tab by mouth daily (Pt taking OTC), 0 Refill(s) ; Catalog Code:   zinc gluconate ; Order Dt/Tm:   1/22/2021 7:46:25 PM CST

## 2022-02-16 NOTE — TELEPHONE ENCOUNTER
---------------------  From: Keke Lowe   To: [facility] Message Pool ([id number]_[place] [facility] [place]);     Sent: 5/21/2020 1:35:59 PM CDT  Subject: Appt f/u     Phone Message    PCP:   [facility]    Time of Call:  1:22pm       Person Calling:  pt  Phone number:  [phone]  Returned call at: 1:27pm  Last office visit and reason: CKD and labs     Note: Pt was told to f/u 1 month after starting lisinopril with in clinic visit and labs. Pt was told [facility] will be gone most of June. He thinks his June 4th appt is to early for f/u. He has only been on the medication for about 2 weeks.    Pt would like to know if he should keep this appt or f/u with another provider mid/end of June.contacted pt at 1406 and advised will be able to get labs and visit the same day 6/4

## 2022-02-16 NOTE — TELEPHONE ENCOUNTER
---------------------  From: Debby Faustin CMA ([employer] Message Pool ([id number]_[facility]))   To: Yong Boyd MD;     Sent: 10/1/2020 4:28:44 PM CDT  Subject: General Message-f/u needed      Rec'd a call from Dr Magaña today who was seeing him as f/u growing left upper lung lobe.  He is calling on f/u that was to be done regarding pancreatic cystic lesion - was suppose to have a f/u endoscopic u/s in 1 year ( see 7/31/2019 report from [facility]) - f/u with [facility] recommended?      See note from Dr Magaña scanned into chart from today---------------------  From: Yong Boyd MD   To: [employer] Message Pool ([id number] [facility] [place]);     Sent: 10/2/2020 11:09:12 AM CDT  Subject: RE: General Message-f/u needed      Yes, we've discussed this previously and he should have follow-up with [facility] when opportunity allows---------------------  From: Debby Faustin CMA ([facility] Message Pool ([id number]_[facility]))   To: [employer] Message Pool ([id number]);     Sent: 10/2/2020 12:48:19 PM CDT  Subject: FW: General Message-f/u needed      Could you discuss with Mitchell when he see's [facility] next week for preop  we can put in a referral when he's here and referrals could help get something set up---------------------  From: Esperanza Pina LPN ([facility] Message Pool ([id number]_[facility]))   To: Carlos Guerrero MD;     Sent: 10/2/2020 1:18:45 PM CDT  Subject: FW: General Message-f/u needed

## 2022-02-16 NOTE — CARE COORDINATION
Pt mailed back consent form to join [facility] per [facility]. Questionnaire and LUDIVINA was also sent back.  Will be managing HTN, COPD, and Heart disease.     Kamila Farooq,CMA

## 2022-02-16 NOTE — CARE COORDINATION
ACTION PLAN   Goal(s):     Today s Date:      06/05/17                                                                             Resources provided:      Potential Challenges:       HCP Plan to manage CC Follow-Up Notes Goal completion date   polyarthritis pains  - lmited benefit with NSAIDs and APAP  s/p right MIRNA after hip fracture from fall (6/2016)   - normal DEXA (7/2015)  - doubt inflammatory arthritis; doubt polymyalgia rheumatica given no upper extremity involvement  - advised to trial off pravastatin for ~ 2 week trial; if substantial improvement in symptoms, should contact clinic for further instructions.  If no change in symptoms, I want to resume statin  - referral to Ortho for further assessment   (11/01/17) Spoke with Lenny.  He is doing well. HE saw Dr. Smith, he was given a sencond injetion in his shoulder, he feels his pain may be torn rotator cuff.  If this injection doesn't work, he will need an MRI and possibly surgery.  Mitchell had no further questions or concerns. He will call if anything changes.     (09/12/17) LMTCB at 0922am  (08/31/17) LMTCB at 1141am    (07/17/17) CC called Mitchell at 526pm.  He is doing well.  He is working with Ortho and going to PT and may see another specialist Dr. Miranda for more injections.  He has no furher questions or concenrs at this time. He would like a return call next month, however will call if anything changes in the meantime.    (07/13/17) LMTCB at 126pm    (06/05/17) CC LMTCB at 137pm,  Mitchell called back at 147pm.  He is doing much better, he saw Dr. Smith a week or so after seeing [facility].  He did multiple x-rays of shoulders, hips, and knees.  He Has arthritis in the lower spine and hip, he did recieve an injection in his right shoulder and Knee.  He is agiain feeling much better. He has no further questions or concerns.    (05/15/17) CC LMTCB at 1204pm      HCP Plan to manage CC Follow-Up Notes Goal completion date   Prostate Ca   - original  prostate biopsy in '11, repeat earlier this fall; Ken 6   - watchful surveillance; q6 month PSA         HCP Plan to manage CC Follow-Up Notes Goal completion date   s/p right wedge resection of limited stage adenocarcinoma of lung (11/2015)         HCP Plan to manage CC Follow-Up Notes Goal completion date   COPD   - on Advair 115/21mcg BID with noticeable improvement in breathing   - albuterol on rare occasions   - has not participated in pulmonary rehab             HCP Plan to manage CC Follow-Up Notes Goal completion date   post-operative LEILA/ CKD (11/2015), baseline SCr 1.3 (previous left nephrectomy for benign hemangioma)           HCP Plan to manage CC Follow-Up Notes Goal completion date   hypertension; controlled  current antihypertensive regimen: lisinopril/hctz 20/12.5mg daily, metoprolol 50mg BID  regimen changes: none  intolerance:  future titration/work-up plan:    - SBP <140 goal   - interval rise in SCr; recheck today           HCP Plan to manage CC Follow-Up Notes Goal completion date   AAA; previous endovascular repair   - following with [facility] vascular surgery; Dr. Lazo           HCP Plan to manage CC Follow-Up Notes Goal completion date          Health Maintenance   - CRC due in '17; arrange now; no anticipated future screening after this coming endoscopy   - q6 month PSA   - Prevacid 30mg BID   - enrolled in CCM    Medications          *denotes recorded medication          Anti-static valved spacer chamber: See Instructions, use as directed with inhaler, 1 EA, 0 Refill(s).          ProAir HFA 90 mcg/inh inhalation aerosol: 2 puff(s), inh, qid, use with spacer chamber, PRN: as needed for wheezing, 1 EA, 11 Refill(s).          *aspirin 81 mg oral tablet: 81 mg, 1 tab(s), po, daily, tab(s).          *Vitamin B12 500 mcg oral tablet: 500 mcg, 1 tab(s), po, daily, 0 Refill(s).          *Advil PM: 2 tab(s), po, hs, PRN: as needed for insomnia, 0 Refill(s).          Advair  mcg-21 mcg/inh  inhalation aerosol: 2 puff(s), inh, bid, for 30 day(s), rinse mouth and throat after use, 1 EA, 11 Refill(s).          hydrochlorothiazide-lisinopril 12.5 mg-20 mg oral tablet: 1 tab(s), PO, Daily, 90 tab(s), 3 Refill(s).          lansoprazole 30 mg oral delayed release capsule: 30 mg, 1 cap(s), PO, bid, 180 cap(s), 3 Refill(s).          Metoprolol Tartrate 50 mg oral tablet: 50 mg, 1 tab(s), po, bid, 180 tab(s), 2 Refill(s).          *Multiple Vitamins oral tablet: 1 tab(s), po, daily, 0 Refill(s).          pravastatin 80 mg oral tablet: 80 mg, 1 tab(s), po, daily, 90 tab(s), 2 Refill(s).          *zinc (as acetate) 50 mg oral capsule: 50 mg, 1 cap(s), po, daily, 0 Refill(s).    Allergies          No known allergies    Problems          Oropharyngeal cancer          AAA (Abdominal Aortic Aneurysm)          Lipids abnormal          HTN (Hypertension)          Prostate cancer          DJD (Degenerative Joint Disease)          Former Smoker          Solitary kidney          Solitary kidney          H/O unilateral nephrectomy          Ragsdale Esophagus          COPD (chronic obstructive pulmonary disease) with acute bronchitis          Adenocarcinoma of lung          Anticoagulated          Closed hip fracture          Coronary artery disease due to lipid rich plaque          GERD (gastroesophageal reflux disease)          Cardiac arrhythmia          CKD (chronic kidney disease) stage 3, GFR 30-59 ml/min          Closed fracture of trochanter of femur

## 2022-02-16 NOTE — PROGRESS NOTES
Patient:   CELIA LUTZ            MRN: [id number]            FIN: [id number]               Age:   72 years     Sex:  Male     :  1945   Associated Diagnoses:   COPD with acute exacerbation   Author:   Severino Cannon PA-C      Chief Complaint   2018 1:32 PM CST     c/o worsening cold sxs since Friday, states ribs and chest hurt from coughing so much and has SOB.      History of Present Illness   Chief complaint and symptoms noted above and confirmed with patient   as above, 5 day hx of cold sxs, non productive cough  is using Advair, mucinex, Delsym  has hx of COPD         Review of Systems   Constitutional:  Fatigue, No fever.    Ear/Nose/Mouth/Throat:  Nasal congestion, sinus pressure, No sore throat.    Respiratory:  Shortness of breath, Cough, Wheezing, No sputum production.       Health Status   Allergies:    Allergic Reactions (Selected)  No known allergies   Problem list:    All Problems (Selected)  H/O unilateral nephrectomy / SNOMED CT 474346706 / Confirmed  DJD (Degenerative Joint Disease) / SNOMED CT 2394491243 / Confirmed  COPD (chronic obstructive pulmonary disease) with acute bronchitis / SNOMED CT 85962854 / Confirmed  Anticoagulated / SNOMED CT 477398963 / Confirmed  GERD (gastroesophageal reflux disease) / SNOMED CT 1919112770 / Confirmed  CKD (chronic kidney disease) stage 3, GFR 30-59 ml/min / SNOMED CT 6004903536 / Confirmed  Ragsdale Esophagus / SNOMED CT 1505227656 / Confirmed  Coronary artery disease due to lipid rich plaque / SNOMED CT 7800945512 / Confirmed  Closed fracture of trochanter of femur / SNOMED CT 4350333296 / Confirmed  AAA (abdominal aortic aneurysm) / SNOMED CT 962695424 / Confirmed  Adenocarcinoma of lung / SNOMED CT 631442071 / Confirmed  Lipids abnormal / SNOMED CT 279833276 / Confirmed  Former Smoker / SNOMED CT [id number] / Confirmed  Closed hip fracture / SNOMED CT 467327354 / Confirmed  Oropharyngeal cancer / SNOMED CT 739234679 /  Confirmed  Prostate cancer / SNOMED CT 025117499 / Confirmed  Cardiac arrhythmia / SNOMED CT 70925795 / Confirmed  HTN (Hypertension) / SNOMED CT 79496336 / Confirmed   Medications:  (Selected)   Prescriptions  Prescribed  Advair  mcg-21 mcg/inh inhalation aerosol: 2 puff(s), inh, bid, Instructions: rinse mouth and throat after use, # 1 EA, 11 Refill(s), Type: Maintenance, Pharmacy: [facility] [id number] [facility]  Anti-static valved spacer chamber: Anti-static valved spacer chamber, See Instructions, Instructions: use as directed with inhaler, Supply, # 1 EA, 0 Refill(s), Type: Maintenance, Pharmacy: [id number], use as directed with inhaler  Metoprolol Tartrate 50 mg oral tablet: 1 tab(s) ( 50 mg ), po, bid, # 180 tab(s), 1 Refill(s), Type: Maintenance, Pharmacy: [facility] [id number] [facility], keep on file and pt will notify when needed, 1 tab(s) po bid  hydrochlorothiazide-lisinopril 12.5 mg-20 mg oral tablet: 1 tab(s), PO, Daily, # 90 tab(s), 1 Refill(s), Type: Maintenance, Pharmacy: [facility] [id number] [facility], keep on file and pt will notify when needed, 1 tab(s) po daily  pravastatin 80 mg oral tablet: 1 tab(s) ( 80 mg ), po, daily, # 90 tab(s), 1 Refill(s), Type: Maintenance, Pharmacy: [facility] [id number] [facility], keep on file and pt will notify when needed, 1 tab(s) po daily  Documented Medications  Documented  Advil PM: 2 tab(s), po, hs, PRN: as needed for insomnia, 0 Refill(s), Type: Maintenance  Multiple Vitamins oral tablet: 1 tab(s), po, daily, 0 Refill(s), Type: Maintenance  Vitamin B12 500 mcg oral tablet: 1 tab(s) ( 500 mcg ), po, daily, 0 Refill(s), Type: Maintenance  aspirin 81 mg oral tablet: 1 tab(s) ( 81 mg ), po, daily, tab(s), 0 Refill(s), Type: Maintenance  omeprazole 20 mg oral delayed release tablet: 1 tab(s) ( 20 mg ), po, daily, 0 Refill(s), Type: Maintenance  zinc (as acetate) 50 mg oral capsule: 1 cap(s) ( 50 mg ), po, daily, 0 Refill(s), Type: Maintenance      Histories   Past Medical History:     Active  Oropharyngeal cancer (037922526): Onset on 1/1/1994 at 48 years.  AAA (abdominal aortic aneurysm) (561321139)  Coronary artery disease due to lipid rich plaque (6799588396)  GERD (gastroesophageal reflux disease) (2752395611)  Cardiac arrhythmia (29799063)  CKD (chronic kidney disease) stage 3, GFR 30-59 ml/min (3845031796)  Resolved  *Hospitalized@[facility] Septic hip vs hematoma: Onset on 7/3/2016 at 70 years.  Resolved on 7/7/2016 at 70 years.  History of sepsis (3759621636): Onset on 7/3/2016 at 70 years.  Resolved.  Comments:  7/25/2016 CDT 2:14 PM CDT - Alma Barfield  Severe; due to septic hip.    7/25/2016 CDT 3:37 PM CDT - Alma Barfield  Sepsis organ failure: Acute renal failure.  *Hospitalized@[facility] - Hip fracture: Onset on 6/21/2016 at 70 years.  Resolved on 6/24/2016 at 70 years.  *Hospitalized@[facility] - Atypical chest pain: Onset on 5/16/2015 at 69 years.  Resolved on 5/16/2015 at 69 years.   Family History:    Lupus  Sister     Procedure history:    Colonoscopy ([id number]) on 3/21/2017 at 71 Years.  Comments:  3/22/2017 2:33 PM - Filippo Barbour MD  Indication: Adenomatous Polyps  Sedation: MAC  Findings: Adenomatous polyp cecum, hemorrohids  Rec: Repeat in 5 years  Right Hip Bipoloar Hemiarthroplasty on 6/22/2016 at 70 Years.  Comments:  6/28/2016 7:26 AM - Debby Faustin CMA  Right displasced femoral neck fracture  right thoracotomy on 11/15/2015 at 70 Years.  Comments:  11/17/2015 9:07 AM - Debby Faustin CMA  wedge resection right upper lung nodule, wedge resection right middle lobe lung nodule, Mediastinal lymph node dissection  Colonoscopy ([id number]) on 5/12/2014 at 68 Years.  Comments:  5/15/2014 10:56 AM - Livier Stallings RN  Sedation: midazolam, fentanyl  Indication: screen  5-6mm tubular adenom x3, 8mm tubular adenoma x1, 12mm tubular adenoma with advance adenoma due to size  Repeat in 3 years.  nepherectomy in the month of 3/2009 at 63 Years.  Lumbar discectomy in 2006 at 61 Years.  Left  salivary gland removal in 1995 at 50 Years.  Oral surgery in 1994 at 49 Years.  Aortic aneurysm, abdominal ([id number]).   Social History:        Alcohol Assessment            Current, 1-2 times per week      Tobacco Assessment            Past, 20 per day.  49 year(s).      Substance Abuse Assessment            Never      Employment and Education Assessment            Employed, Work/School description: [employer] .      Home and Environment Assessment            Marital status: .  Spouse/Partner name: Darlene Chau.      Nutrition and Health Assessment            Type of diet: Regular.      Exercise and Physical Activity Assessment            Exercise frequency: Never.      Sexual Assessment            Sexually active: Yes.        Physical Examination   Vital Signs   1/9/2018 1:32 PM CST Temperature Tympanic 96.8 DegF  LOW    Peripheral Pulse Rate 75 bpm    Pulse Site Radial artery    HR Method Manual    Systolic Blood Pressure 148 mmHg  HI    Diastolic Blood Pressure 90 mmHg  HI    Mean Arterial Pressure 109 mmHg    BP Site Right arm    BP Method Manual    Oxygen Saturation 93 %  LOW      Measurements from flowsheet : Measurements   1/9/2018 1:32 PM CST Height Measured - Standard 71 in    Weight Measured - Standard 191.4 lb    BSA 2.08 m2    Body Mass Index 26.69 kg/m2  HI      General:  No acute distress.    HENT:  Tympanic membranes are clear, No pharyngeal erythema, No sinus tenderness, nares are patent.    Neck:  Supple, Non-tender, No lymphadenopathy.    Respiratory:  lungs have coarse ronchi bilaterally, inspiratory wheezes, no rales.       Review / Management   Course:  he is given a Duoneb, afterwards there is less wheezing on ausculation but pulse ox is still low.       Impression and Plan   Diagnosis     COPD with acute exacerbation ([id number] J44.1).     Patient Instructions:  push fluids, expectorants, decongestants and NSAIDs as needed, follow up if not improving or  worsening.    Orders     Orders   Pharmacy:  spacer for inhaler (Prescribe): spacer for inhaler, See Instructions, Instructions: use with albuterol inhaler, Supply, # 1 EA, 0 Refill(s), Type: Maintenance, Pharmacy: [id number], use with albuterol inhaler  Proventil HFA 90 mcg/inh inhalation aerosol (Prescribe): 2 puff(s), INH, q4hr (int), Instructions: prn SOB/cough, # 1 EA, 0 Refill(s), Type: Maintenance, Pharmacy: [id number], 2 puff(s) inh q4 hrs,Instr:prn SOB/cough  Zithromax Z-Jorge 250 mg oral tablet (Prescribe): Take 2 tablets on Day 1 and then 1 tablet, PO, Daily, Instructions: until finished, # 6 tab(s), 0 Refill(s), Type: Maintenance, Pharmacy: [facility] [id number] [facility], Take 2 tablets on Day 1 and then 1 tablet po daily,Instr:until finished.     Orders   Pharmacy:  Proventil HFA 90 mcg/inh inhalation aerosol (Prescribe): 2 puff(s), INH, q4hr (int), Instructions: prn SOB/cough  use with spacer chamber, # 1 EA, 3 Refill(s), Type: Maintenance, Pharmacy: [facility], 2 puff(s) inh q4 hrs,Instr:prn SOB/cough; use with spacer chamber  Proventil HFA 90 mcg/inh inhalation aerosol (Discontinue).     Orders   Charges (Evaluation and Management):  [id number] office outpatient visit 15 minutes (Charge) (Order): Quantity: 1, COPD with acute exacerbation.

## 2022-02-16 NOTE — TELEPHONE ENCOUNTER
----- Message from Sim Jaffe MD sent at 2/15/2022  4:48 PM CST -----  Please  advise patient that biopsy showed inflammation but no evidence of pre-cancer or malignancy

## 2022-02-16 NOTE — NURSING NOTE
Comprehensive Intake Entered On:  12/29/2020 7:48 AM CST    Performed On:  12/29/2020 7:45 AM CST by Debby Faustin CMA               Summary   Chief Complaint :   f/u  leg swelling, pain - inner lower leg pain with walking    Weight Measured :   212.4 lb(Converted to: 212 lb 6 oz, 96.343 kg)    Height Measured :   71 in(Converted to: 5 ft 11 in, 180.34 cm)    Body Mass Index :   29.62 kg/m2 (HI)    Body Surface Area :   2.19 m2   Systolic Blood Pressure :   132 mmHg (HI)    Diastolic Blood Pressure :   78 mmHg   Mean Arterial Pressure :   96 mmHg   Peripheral Pulse Rate :   90 bpm   Temperature Tympanic :   97.7 DegF(Converted to: 36.5 DegC)  (LOW)    Oxygen Saturation :   96 %   Race :      Languages :   English   Ethnicity :   Not  or    Debby Faustin CMA - 12/29/2020 7:45 AM CST   Health Status   Allergies Verified? :   Yes   Medication History Verified? :   Yes   Medical History Verified? :   Yes   Pre-Visit Planning Status :   Completed   Tobacco Use? :   Former smoker   Debby Faustin CMA - 12/29/2020 7:45 AM CST   ID Risk Screen   Recent Travel History :   No recent travel   Family Member Travel History :   No recent travel   Other Exposure to Infectious Disease :   Unknown   Debby Faustin CMA - 12/29/2020 7:45 AM CST

## 2022-02-16 NOTE — TELEPHONE ENCOUNTER
---------------------  From: Altagracia Blank MA (Phone Messages Pool ([id number]_[facility]))   To: [facility] Message Pool ([id number]_[place] [facility] [place]);     Sent: 1/18/2021 1:55:46 PM CST  Subject: Omeprazole rx     Phone Message    PCP:   MYRA      Time of Call:  1301       Person Calling:  pt  Phone number:  [phone]    Reason for call:  pt would like rx for omeprazole to be sent to his pharmacy as it's cheaper than OTC.  Please advise  Returned call at: _    Note:   _    Last office visit and reason:  12/29/2020 w/ [facility] for claudication    Transferred to: _---------------------  From: Debby Faustin CMA ([facility] Message Pool ([id number]_[facility]))   To: Yong Boyd MD;     Sent: 1/19/2021 7:18:33 AM CST  Subject: FW: Omeprazole rx---------------------  From: Yong Boyd MD   To: [facility] Message Pool ([id number]_[place] [facility] [place]);     Sent: 1/19/2021 9:41:34 AM CST  Subject: RE: Omeprazole rx     okay with same instructions via Rxpt contacted at 1002 and advised  rx sent to [facility] resent - Pharmacy states they did not receive prescription.

## 2022-02-16 NOTE — PROGRESS NOTES
Patient:   CELIA LUTZ            MRN: [id number]            FIN: [id number]               Age:   74 years     Sex:  Male     :  1945   Associated Diagnoses:   AAA (Abdominal Aortic Aneurysm); Adenocarcinoma of lung; COPD (chronic obstructive pulmonary disease) with acute bronchitis; Chronic kidney disease (CKD), stage III (moderate); Prostate cancer   Author:   Yong Boyd MD      Visit Information      Date of Service: 2020 10:19 am  Performing Location: [facility]  Encounter#: [id number]      Primary Care Provider (PCP):  Yong Boyd MD    NPI# [id number]      Referring Provider:  Yong Boyd MD    NPI# [id number]      Chief Complaint   2020 10:56 AM CDT    renal f/u - noting increased SOB              Additional Information:No additional information recorded during visit.   Chief complaint and symptoms as noted above and confirmed with patient.  Recent lab and diagnostic studies reviewed with patient      History of Present Illness   2015: Mitchell presents to clinic for hospital follow-up.  He recently underwent right sided wedge resection of an isolated pulmonary nodule.  Biopsy returning as adenocarcinoma of the lung.  Postoperative course complicated by an episode of both acute kidney injury and rectal bleeding felt to be consistent with ischemic colitis.  His creatinine on discharge was at his baseline of 1.3.  His lisinopril was held during his episode of acute kidney injury, resumed upon discharge.  He is scheduled to see his thoracic surgeon, Dr. Velazquez, tomorrow.  He also has a history of previous endovascular repair of a abdominal aneurysm.  He follows with a vascular surgeon through [facility] for this.    2019: Mitcehll presents for preoperative assessment for planned endoscopic ultrasound and possible biopsy of pancreatic cyst.  Pancreatic cyst found incidentally as part of a CT angiogram.  Did meet with urology related to history of prostate cancer.   Did have prostate MRI which showed no significant extension of his cancer.  In general he would like to hold off on any invasive therapies    11/29/2019: Presents for hospital follow-up after recent admission to [facility] this past week in the setting of acute coronary syndrome.  Underwent left heart catheterization demonstrating multivessel disease.  Did undergo drug-eluting stent deployment and started on dual antiplatelet therapy.  Some complications with post catheter bleeding from femoral artery.  Subsequently resolved with hemostasis.  Does have significant ecchymoses amongst that groin and the no swelling or pain.  Feels well.  Plans to start on cardiac rehab through [facility] in the near future.  Also has cardiology follow-up in mid December 1/8/2020: Mitchell returns for hospital follow-up.  He was readmitted at [facility] with concerns for acute kidney injury with a creatinine rise up to 6.  Evaluated by nephrology as an inpatient.  Work-up most concerning for acute tubular necrosis versus possible cholesterol emboli.  Biopsies not pursued due to concerns of his baseline unit unit nephric state.  He endorses no uremic features.  He was advised to follow-up with me with serial lab monitoring.  No troubles since discharge.    1/22/2020: Mitchell returns to clinic for follow-up related to his acute kidney injury.  Labs checked 2 weeks ago showed interval improvement in creatinine down to 4.0 which was encouraging.  Here back for 2-week follow-up.  He has been participating in cardiac rehab.  Dr. Valentine is trying to schedule him for a follow-up nuclear stress testing.  He had had questionable athero-stenotic lesion on last heart cath without stenting.  He has felt well.  He raises hesitations about moving forward with a stress testing primarily in the setting of his kidney issues.    6/4/2020: Mitchell returns to clinic for one-month follow-up.  He was restarted on lisinopril after last telephone visit this past month.  Despite  reintroduction of lisinopril blood pressures remain quite elevated.  Main concerns are related to worsening dyspnea which has been progressively worsening for the past several months.  No anginal complaints.  He has remained on Advair.  When taking albuterol it seems to provide some short-term relief.  Not currently on any long-acting muscarinic antagonist.  He has appropriately been socially isolating.         Review of Systems   Constitutional:  No fever, No chills.    Eye:  Negative except as documented in history of present illness.    Ear/Nose/Mouth/Throat:  Negative except as documented in history of present illness.    Respiratory:  Shortness of breath, Wheezing.    Cardiovascular:  No chest pain, No palpitations, No peripheral edema, No syncope.    Gastrointestinal:  No nausea, No vomiting, No heartburn, No abdominal pain.    Genitourinary:  No dysuria, No hematuria.    Hematology/Lymphatics:  No bruising tendency.    Endocrine:  No excessive thirst, No polyuria.    Immunologic:  No recurrent fevers.    Musculoskeletal:  Joint pain, No muscle pain, No decreased range of motion, No trauma.    Neurologic:  Alert and oriented X4, No numbness, No tingling, No headache.       Health Status   Allergies:    Allergic Reactions (Selected)  No Known Medication Allergies   Medications:  (Selected)   Prescriptions  Prescribed  Advair Diskus 500 mcg-50 mcg inhalation powder: 1 puff(s), Inhale, bid, # 60 EA, 2 Refill(s), Type: Maintenance, Pharmacy: [facility], 1 puff(s) Inhale bid,x30 day(s)  ProAir HFA 90 mcg/inh inhalation aerosol: See Instructions, Instructions: Please specify directions, refills and quantity, # 3 EA, 1 Refill(s), Type: Maintenance, Pharmacy: [facility], Please specify directions, refills and quantity, 71, in, 05/08/20 12:58:00 CDT, Height Measur...  amLODIPine 5 mg oral tablet: = 1 tab(s) ( 5 mg ), Oral, daily, # 90 tab(s), 3 Refill(s), Type: Maintenance, Pharmacy: [facility], 1 tab(s) Oral daily, 71, in, 06/04/20 11:00:00 CDT, Height Measured, 202, lb, 06/04/20 10:56:00 CDT, Weight Measured  lisinopril 20 mg oral tablet: = 1 tab(s) ( 20 mg ), Oral, daily, # 30 tab(s), 0 Refill(s), Type: Maintenance, Pharmacy: [facility] [id number] [facility], 1 tab(s) Oral daily  predniSONE 20 mg oral tablet: See Instructions, Instructions: 2 tabs daily for 1 week, then 1 tab daily for one weel, then stop, # 21 tab(s), 0 Refill(s), Type: Maintenance, Pharmacy: [id number], 2 tabs daily for 1 week, then 1 tab daily for one weel, then stop, 71, in, 0...  spacer for inhaler: spacer for inhaler, See Instructions, Instructions: use with albuterol inhaler, Supply, # 1 EA, 0 Refill(s), Type: Maintenance, Pharmacy: [id number], use with albuterol inhaler  tiotropium 18 mcg inhalation capsule: = 1 cap(s) ( 18 mcg ), Inhale, daily, # 90 cap(s), 3 Refill(s), Type: Maintenance, Pharmacy: [id number], 1 cap(s) Inhale daily, 71, in, 06/04/20 11:00:00 CDT, Height Measured, 202, lb, 06/04/20 10:56:00 CDT, Weight Measured  Documented Medications  Documented  Crestor 40 mg oral tablet: = 1 tab(s) ( 40 mg ), Oral, daily, 0 Refill(s), Type: Maintenance  Multiple Vitamins oral tablet: 1 tab(s), po, daily, 0 Refill(s), Type: Maintenance  Tylenol Extra Strength PM: 2 tab(s), Oral, hs, 0 Refill(s), Type: Maintenance  Vitamin B12 500 mcg oral tablet: 1 tab(s) ( 500 mcg ), po, daily, 0 Refill(s), Type: Maintenance  aspirin 81 mg oral tablet, chewable: = 1 tab(s) ( 81 mg ), Chewed, daily, 0 Refill(s), Type: Maintenance  clopidogrel 75 mg oral tablet: = 1 tab(s) ( 75 mg ), Oral, daily, 0 Refill(s), Type: Maintenance  magnesium oxide 250 mg oral tablet: 1 tab(s) ( 250 mg ), po, daily, 0 Refill(s), Type: Maintenance  metoprolol succinate 25 mg oral capsule, extended release: = 1 cap(s) ( 25 mg ), Oral, daily, 0 Refill(s), Type: Maintenance  omeprazole 20 mg oral delayed release capsule: = 1 cap(s) ( 20 mg ),  Oral, daily, # 90 cap(s), 0 Refill(s), Type: Maintenance,    Medications          *denotes recorded medication          spacer for inhaler: See Instructions, use with albuterol inhaler, 1 EA, 0 Refill(s).          *Tylenol Extra Strength PM: 2 tab(s), Oral, hs, 0 Refill(s).          ProAir HFA 90 mcg/inh inhalation aerosol: See Instructions, Please specify directions, refills and quantity, 3 EA, 1 Refill(s).          amLODIPine 5 mg oral tablet: 5 mg, 1 tab(s), Oral, daily, 90 tab(s), 3 Refill(s).          *aspirin 81 mg oral tablet, chewable: 81 mg, 1 tab(s), Chewed, daily, 0 Refill(s).          *clopidogrel 75 mg oral tablet: 75 mg, 1 tab(s), Oral, daily, 0 Refill(s).          *Vitamin B12 500 mcg oral tablet: 500 mcg, 1 tab(s), po, daily, 0 Refill(s).          Advair Diskus 500 mcg-50 mcg inhalation powder: 1 puff(s), Inhale, bid, for 30 day(s), 60 EA, 2 Refill(s).          lisinopril 20 mg oral tablet: 20 mg, 1 tab(s), Oral, daily, 30 tab(s), 0 Refill(s).          *magnesium oxide 250 mg oral tablet: 250 mg, 1 tab(s), po, daily, 0 Refill(s).          *metoprolol succinate 25 mg oral capsule, extended release: 25 mg, 1 cap(s), Oral, daily, 0 Refill(s).          *Multiple Vitamins oral tablet: 1 tab(s), po, daily, 0 Refill(s).          *omeprazole 20 mg oral delayed release capsule: 20 mg, 1 cap(s), Oral, daily, 90 cap(s), 0 Refill(s).          predniSONE 20 mg oral tablet: See Instructions, 2 tabs daily for 1 week, then 1 tab daily for one weel, then stop, 21 tab(s), 0 Refill(s).          *Crestor 40 mg oral tablet: 40 mg, 1 tab(s), Oral, daily, 0 Refill(s).          tiotropium 18 mcg inhalation capsule: 18 mcg, 1 cap(s), Inhale, daily, 90 cap(s), 3 Refill(s).       Problem list:    All Problems  AAA (abdominal aortic aneurysm) / SNOMED CT 262072692 / Confirmed  Adenocarcinoma of lung / SNOMED CT 782201971 / Confirmed  [place] Esophagus / SNOMED CT 3529297926 / Confirmed  CKD (chronic kidney disease) stage 3, GFR  30-59 ml/min / SNOMED CT 5074966996 / Confirmed  COPD (chronic obstructive pulmonary disease) with acute bronchitis / SNOMED CT 77593868 / Confirmed  Closed hip fracture / SNOMED CT 136767230 / Confirmed  Closed fracture of trochanter of femur / SNOMED CT 7881911198 / Confirmed  Cardiac arrhythmia / SNOMED CT 35739070 / Confirmed  Coronary artery disease due to lipid rich plaque / SNOMED CT 8542847070 / Confirmed  Lipids abnormal / SNOMED CT 509006919 / Confirmed  HTN (Hypertension) / SNOMED CT 01832960 / Confirmed  Former Smoker / SNOMED CT [id number] / Confirmed  GERD (gastroesophageal reflux disease) / SNOMED CT 7844085136 / Confirmed  Anticoagulated / SNOMED CT 567805218 / Confirmed  History of oropharyngeal cancer / SNOMED CT 0753483618 / Confirmed  H/O prostate cancer / SNOMED CT 8490170111 / Confirmed  DJD (Degenerative Joint Disease) / SNOMED CT 3124771734 / Confirmed  H/O unilateral nephrectomy / SNOMED CT 628966558 / Confirmed  Resolved: *Hospitalized@[facility] Atypical chest pain  Resolved: *Hospitalized@[facility] Hip fracture  Resolved: *Hospitalized@[facility] Septic hip vs hematoma  Resolved: History of sepsis / SNOMED CT 8622337868  Resolved: Inpatient stay / SNOMED CT 637741941  Resolved: Inpatient stay / SNOMED CT 501387775  Resolved: Prostate cancer / SNOMED CT 088236194  Canceled: Oropharyngeal cancer / SNOMED CT 049304222  Canceled: Solitary kidney / SNOMED CT 144446787  Canceled: Solitary kidney / SNOMED CT 548996399      Histories   Past Medical History:    Active  AAA (abdominal aortic aneurysm) (318480378)  Coronary artery disease due to lipid rich plaque (9714202375)  GERD (gastroesophageal reflux disease) (7424023833)  Cardiac arrhythmia (60325079)  CKD (chronic kidney disease) stage 3, GFR 30-59 ml/min (6171687560)  Resolved  Inpatient stay (142700853): Onset on 1/3/2020 at 74 years.  Resolved on 1/4/2020 at 74 years.  Comments:  1/17/2020 CST 1:41 PM CST - Primo  [facility] [place] - Chest pain, acute renal failure.  Inpatient stay (057051735): Onset on 2019 at 74 years.  Resolved on 2019 at 74 years.  Comments:  12/10/2019 CST 1:46 PM Jessi Fisher  @[facility] - NSTEMI.  *Hospitalized@[facility] Septic hip vs hematoma: Onset on 7/3/2016 at 70 years.  Resolved on 2016 at 70 years.  History of sepsis (5922482373): Onset on 7/3/2016 at 70 years.  Resolved.  Comments:  2016 CDT 2:14 PM CDT - Alma Barfield  Severe; due to septic hip.    2016 CDT 3:37 PM FALGUNI - Alma Barfield  Sepsis organ failure: Acute renal failure.  *Hospitalized@[facility] - Hip fracture: Onset on 2016 at 70 years.  Resolved on 2016 at 70 years.  *Hospitalized@[facility] - Atypical chest pain: Onset on 2015 at 69 years.  Resolved on 2015 at 69 years.  Prostate cancer (854353469):  Resolved.   Family History:    CA - Breast cancer  Sister (Alexa, )  Lupus  Sister (Rebecca)  Alcohol abuse  Sister (Alexa, )  Mother ()  SMA type III  Grandfather (M)  Obese  Sister (Sujey, )     Procedure history:    Coronary angiography ([id number]) on 2019 at 74 Years.  Comments:  12/10/2019 1:47 PM Jessi Fisher  and PCI of the right coronary artery.  Colonoscopy (378251210) on 3/21/2017 at 71 Years.  Comments:  3/22/2017 2:33 PM CDT - Filippo Barbour MD  Indication: Adenomatous Polyps  Sedation: MAC  Findings: Adenomatous polyp cecum, hemorrohids  Rec: Repeat in 5 years  Right Hip Bipoloar Hemiarthroplasty on 2016 at 70 Years.  Comments:  2016 7:26 AM CDT - Debby Faustin CMA  Right displasced femoral neck fracture  right thoracotomy on 11/15/2015 at 70 Years.  Comments:  2015 9:07 AM CST - Debby Faustin CMA  wedge resection right upper lung nodule, wedge resection right middle lobe lung nodule, Mediastinal lymph node dissection  Colonoscopy ([id number]) on 2014 at 68 Years.  Comments:  5/15/2014 10:56 AM CDT -  Chandrakant GRAYSON, [place]  Sedation: midazolam, fentanyl  Indication: screen  5-6mm tubular adenom x3, 8mm tubular adenoma x1, 12mm tubular adenoma with advance adenoma due to size  Repeat in 3 years.  nepherectomy in the month of 3/2009 at 63 Years.  Lumbar discectomy in 2006 at 61 Years.  Left salivary gland removal in 1995 at 50 Years.  Oral surgery in 1994 at 49 Years.  Aortic aneurysm, abdominal ([id number]).   Social History:        Alcohol Assessment            Current, 2 times per week, 3 drinks/episode average.      Tobacco Assessment            Past, 20 per day.  49 year(s).      Substance Abuse Assessment            Never      Employment and Education Assessment            Employed, Work/School description: [employer] .      Home and Environment Assessment            Marital status: .  Spouse/Partner name: Darlene Chau.      Nutrition and Health Assessment            Type of diet: Regular.      Exercise and Physical Activity Assessment            Exercise frequency: Never.      Sexual Assessment            Sexually active: Yes.        Physical Examination   vital signs stable, as noted above   Vital Signs   6/4/2020 11:00 AM CDT Systolic Blood Pressure 163 mmHg  HI    Diastolic Blood Pressure 94 mmHg  HI    Mean Arterial Pressure 117 mmHg   6/4/2020 10:56 AM CDT Temperature Tympanic 97.6 DegF  LOW    Peripheral Pulse Rate 80 bpm    Systolic Blood Pressure 157 mmHg  HI    Diastolic Blood Pressure 93 mmHg  HI    Mean Arterial Pressure 114 mmHg    Oxygen Saturation 95 %      Measurements from flowsheet : Measurements   6/4/2020 11:00 AM CDT Height Measured - Standard 71 in   6/4/2020 10:56 AM CDT Height Measured - Standard 71 in    Weight Measured - Standard 202 lb    BSA 2.14 m2    Body Mass Index 28.17 kg/m2  HI      General:  Alert and oriented, No acute distress.    Eye:  Extraocular movements are intact.    HENT:  Normocephalic, Oral mucosa is moist, No pharyngeal  erythema, dentures.    Neck:  Supple, Previous right sided neck dissection with flap.    Respiratory:  Respirations are non-labored, posterior thoracotomy scar, late expiratory rhonchi.    Cardiovascular:  Normal rate, Regular rhythm, No murmur, No edema.    Gastrointestinal:  Soft.    Neurologic:  Alert, Oriented, Normal motor function, No focal deficits.    Cognition and Speech:  Oriented, Speech clear and coherent.    Psychiatric:  Appropriate mood & affect.       Review / Management   Results review:  Lab results   6/4/2020 10:26 AM CDT Sodium Level 138 mmol/L    Potassium Level 5.2 mmol/L    Chloride Level 101 mmol/L    CO2 Level 27 mmol/L    AGAP 10    Glucose Level 153 mg/dL    BUN 25 mg/dL    Creatinine 1.97 mg/dL    BUN/Creat Ratio 13    eGFR  40    eGFR Non-African American 33    Calcium Level 10.2 mg/dL   5/6/2020 2:27 PM CDT Sodium Level 137 mmol/L    Potassium Level 4.8 mmol/L    Chloride Level 99 mmol/L    CO2 Level 31 mmol/L    Glucose Level 121 mg/dL  HI    BUN 15 mg/dL    Creatinine 1.76 mg/dL  HI    BUN/Creat Ratio 9    eGFR 37 mL/min/1.73m2  LOW    eGFR African American 43 mL/min/1.73m2  LOW    Calcium Level 9.7 mg/dL   2/25/2020 8:02 AM CST Cholesterol 162 mg/dL    Non-HDL 98    HDL 64 mg/dL    Chol/HDL Ratio 2.5    LDL 66    Triglyceride 308 mg/dL  HI   .       Impression and Plan   Diagnosis     AAA (Abdominal Aortic Aneurysm) (FYQ55-EP I71.4).     Adenocarcinoma of lung (FMI57-JG C34.90).     COPD (chronic obstructive pulmonary disease) with acute bronchitis (DPD18-DI J44.1).     Chronic kidney disease (CKD), stage III (moderate) (IHG89-AJ N18.3).     Prostate cancer (JQE94-QK C61).         .) COPD   - on Advair 500/50mcg BID    - will add spiriva daily    - depending on rug formulary, may need to think about Trelegy intro    - also consider MTM referral   - has not participated in pulmonary rehab   - consider repeat PFTs in the future    .) CKD; baseline SCr 1.5-1.9;  h/o LEILA  with rise to 6.0 since [facility] in late 11/2019; asymptomatic    - suspect likely contrast induced nephropathy (VITOR)  - heightened risk for future contrast induced nephropathy    .) hypertension; uncontrolled  current antihypertensive regimen: Toprol XL 25mg daily, lisinopril 20mg daily  regimen changes: adding amlodipine 5mg daily  intolerance:  future titration/work-up plan:    - SBP <140 goal   - previously on lisinopril/hctz 20/12.5mg daily; given GFR, if considering diuretic, would favor loop    plan as previously outlined and not discussed at today's visit     .) NSTEMI with PCI to RCA with multi-vessel disease (non-obstructive LAD lesion); following with Dr. Valentine  - on DAPT   - Toprol XL 25mg daily  - high dose pravastatin changed to rosuvastatin 40mg qhs  - participating in cardiac rehab in near future    .) EUS, 7/2019, of  2.2cm pancreatic cyst; no malignancy on biopsy   - recommendations for annual f/u through GI    .) prostate Ca; watchful waiting   - original prostate biopsy in '11, repeat biopsy in '16; Ken 6   - watchful surveillance; q6 month PSA; last PSA 15   - MRI of prostate (2019): focal coarse calcifications in prostate; no evidence of extra-prostate extension   - following with Dr. Bustos - prefers not to pursue XRT at this point    .) h/o AAA, endovascular aortic repair, follows with Dr. 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    .) s/p right wedge resection of limited stage adenocarcinoma of lung (11/2015)    .) health maintenance   - CRC due in '22   - q6 month PSA   - Prevacid 30mg BID   - enrolled in [facility]    RTC in 3 months

## 2022-02-16 NOTE — NURSING NOTE
Comprehensive Intake Entered On:  12/17/2019 3:30 PM CST    Performed On:  12/17/2019 3:24 PM CST by Nory Jordan CMA               Summary   Chief Complaint :   Cardio consult   Weight Measured :   199 lb(Converted to: 199 lb 0 oz, 90.26 kg)    Height Measured :   71 in(Converted to: 5 ft 11 in, 180.34 cm)    Body Mass Index :   27.75 kg/m2 (HI)    Body Surface Area :   2.12 m2   Systolic Blood Pressure :   144 mmHg (HI)    Diastolic Blood Pressure :   82 mmHg (HI)    Mean Arterial Pressure :   103 mmHg   Peripheral Pulse Rate :   72 bpm   Race :      Languages :   English   Ethnicity :   Not  or    Nory Jordan CMA - 12/17/2019 3:24 PM CST   Health Status   Allergies Verified? :   Yes   Medication History Verified? :   Yes   Pre-Visit Planning Status :   Completed   Nory Jordan CMA - 12/17/2019 3:24 PM CST   Consents   Consent for Immunization Exchange :   Consent Granted   Consent for Immunizations to Providers :   Consent Granted   Nory Jordan CMA - 12/17/2019 3:24 PM CST   Meds / Allergies   (As Of: 12/17/2019 3:30:03 PM CST)   Allergies (Active)   No Known Medication Allergies  Estimated Onset Date:   Unspecified ; Created By:   Debby Faustin CMA; Reaction Status:   Active ; Category:   Drug ; Substance:   No Known Medication Allergies ; Type:   Allergy ; Updated By:   Debby Faustin CMA; Reviewed Date:   12/4/2019 6:19 PM CST        Medication List   (As Of: 12/17/2019 3:30:03 PM CST)   Prescription/Discharge Order    albuterol  :   albuterol ; Status:   Prescribed ; Ordered As Mnemonic:   Ventolin HFA 90 mcg/inh inhalation aerosol ; Simple Display Line:   2 puff(s), NEB, q4 hrs, PRN: AS NEEDED FOR COUGH OR SHORTNESS OF BREATH, 3 EA, 0 Refill(s) ; Ordering Provider:   Yong Boyd MD; Catalog Code:   albuterol ; Order Dt/Tm:   11/26/2019 2:44:44 PM CST          fluticasone-salmeterol  :   fluticasone-salmeterol ; Status:   Prescribed ; Ordered As  Mnemonic:   Advair  mcg-21 mcg/inh inhalation aerosol ; Simple Display Line:   See Instructions, INHALE 2 PUFFS BY MOUTH TWICE DAILY. RINSE MOUTH AND THROAT AFTER USE, 3 EA, 1 Refill(s) ; Ordering Provider:   Yong Boyd MD; Catalog Code:   fluticasone-salmeterol ; Order Dt/Tm:   5/14/2019 5:03:16 PM CDT          lisinopril  :   lisinopril ; Status:   Prescribed ; Ordered As Mnemonic:   lisinopril 10 mg oral tablet ; Simple Display Line:   10 mg, 1 tab(s), Oral, daily, 90 tab(s), 0 Refill(s) ; Ordering Provider:   Yong Boyd MD; Catalog Code:   lisinopril ; Order Dt/Tm:   12/10/2019 12:26:19 PM CST          Miscellaneous Rx Supply  :   Miscellaneous Rx Supply ; Status:   Prescribed ; Ordered As Mnemonic:   spacer for inhaler ; Simple Display Line:   See Instructions, use with albuterol inhaler, 1 EA, 0 Refill(s) ; Ordering Provider:   Severino Cannon PA-C; Catalog Code:   Miscellaneous Rx Supply ; Order Dt/Tm:   1/9/2018 2:18:32 PM CST            Home Meds    aspirin  :   aspirin ; Status:   Documented ; Ordered As Mnemonic:   aspirin 81 mg oral tablet, chewable ; Simple Display Line:   81 mg, 1 tab(s), Chewed, daily, 0 Refill(s) ; Catalog Code:   aspirin ; Order Dt/Tm:   11/27/2019 9:54:31 AM CST          clopidogrel  :   clopidogrel ; Status:   Documented ; Ordered As Mnemonic:   clopidogrel 75 mg oral tablet ; Simple Display Line:   75 mg, 1 tab(s), Oral, daily, 0 Refill(s) ; Catalog Code:   clopidogrel ; Order Dt/Tm:   11/27/2019 9:55:13 AM CST          cyanocobalamin  :   cyanocobalamin ; Status:   Documented ; Ordered As Mnemonic:   Vitamin B12 500 mcg oral tablet ; Simple Display Line:   500 mcg, 1 tab(s), po, daily, 0 Refill(s) ; Catalog Code:   cyanocobalamin ; Order Dt/Tm:   11/17/2015 10:02:05 AM CST          diphenhydramine-ibuprofen  :   diphenhydramine-ibuprofen ; Status:   Documented ; Ordered As Mnemonic:   Advil PM ; Simple Display Line:   2 tab(s), po, hs, PRN: as needed for insomnia, 0  Refill(s) ; Catalog Code:   diphenhydramine-ibuprofen ; Order Dt/Tm:   2/23/2017 2:39:37 PM CST          fluticasone nasal  :   fluticasone nasal ; Status:   Documented ; Ordered As Mnemonic:   Flonase 50 mcg/inh nasal spray ; Simple Display Line:   1 spray(s), nasal, daily, 0 Refill(s) ; Catalog Code:   fluticasone nasal ; Order Dt/Tm:   4/23/2018 11:20:21 AM CDT          lansoprazole  :   lansoprazole ; Status:   Documented ; Ordered As Mnemonic:   lansoprazole 15 mg oral delayed release capsule ; Simple Display Line:   15 mg, 1 cap(s), PO, Daily, 90 cap(s), 0 Refill(s) ; Catalog Code:   lansoprazole ; Order Dt/Tm:   5/14/2019 4:31:14 PM CDT          magnesium oxide  :   magnesium oxide ; Status:   Documented ; Ordered As Mnemonic:   magnesium oxide 250 mg oral tablet ; Simple Display Line:   250 mg, 1 tab(s), po, daily, 0 Refill(s) ; Catalog Code:   magnesium oxide ; Order Dt/Tm:   4/5/2018 4:37:34 PM CDT          metoprolol  :   metoprolol ; Status:   Documented ; Ordered As Mnemonic:   metoprolol succinate 25 mg oral capsule, extended release ; Simple Display Line:   25 mg, 1 cap(s), Oral, daily, 0 Refill(s) ; Catalog Code:   metoprolol ; Order Dt/Tm:   11/27/2019 9:58:05 AM CST          multivitamin  :   multivitamin ; Status:   Documented ; Ordered As Mnemonic:   Multiple Vitamins oral tablet ; Simple Display Line:   1 tab(s), po, daily, 0 Refill(s) ; Catalog Code:   multivitamin ; Order Dt/Tm:   4/22/2015 9:12:40 AM CDT          rosuvastatin  :   rosuvastatin ; Status:   Documented ; Ordered As Mnemonic:   Crestor 40 mg oral tablet ; Simple Display Line:   40 mg, 1 tab(s), Oral, daily, 0 Refill(s) ; Catalog Code:   rosuvastatin ; Order Dt/Tm:   11/27/2019 10:02:43 AM CST

## 2022-02-16 NOTE — PROGRESS NOTES
Patient:   CELIA LUTZ            MRN: [id number]            FIN: [id number]               Age:   75 years     Sex:  Male     :  1945   Associated Diagnoses:   AAA (Abdominal Aortic Aneurysm); Adenocarcinoma of lung; COPD (chronic obstructive pulmonary disease) with acute bronchitis; Chronic kidney disease (CKD), stage III (moderate); Prostate cancer   Author:   Yong Boyd MD      Visit Information      Date of Service: 10/14/2020 01:27 pm  Performing Location: [facility]  Encounter#: [id number]      Primary Care Provider (PCP):  Yong Boyd MD    NPI# [id number]      Referring Provider:  Yong Boyd MD    NPI# [id number]      Chief Complaint   10/14/2020 1:34 PM CDT   LEILA f/u - review labs, discuss              Additional Information:No additional information recorded during visit.   Chief complaint and symptoms as noted above and confirmed with patient.  Recent lab and diagnostic studies reviewed with patient      History of Present Illness   10/14/2020: Returns to clinic for hospital f/u.  Recent admission to [facility] for LEILA with SCr rise to 7.5 and anemia, Hgb 6.7.  Renal function steadily improved with IVFs and cessation of lisinopril.  Transfused 1 unit PRBCs and seen by GI.  Decisions for outpatient endoscopy and EUS (h/o pancreatic cyst).  Scheduled for excisional biopsy of left sided oral lesions and planned laryngeal biopsy (recent avidity on PET scan) with JVT on 10/16 - procedure since postponed.  Feeling better since hospitalization.        Review of Systems   Constitutional:  No fever, No chills.    Eye:  Negative except as documented in history of present illness.    Ear/Nose/Mouth/Throat:  Negative except as documented in history of present illness, mouth sore.    Respiratory:  Shortness of breath, No wheezing.    Cardiovascular:  No chest pain, No palpitations, No peripheral edema, No syncope.    Gastrointestinal:  No nausea, No vomiting, No heartburn, No abdominal pain.     Genitourinary:  No dysuria, No hematuria.    Hematology/Lymphatics:  No bruising tendency.    Endocrine:  No excessive thirst, No polyuria.    Immunologic:  No recurrent fevers.    Musculoskeletal:  Joint pain, No muscle pain, No decreased range of motion, No trauma.    Neurologic:  Alert and oriented X4, No numbness, No tingling, No headache.       Health Status   Allergies:    Allergic Reactions (Selected)  No Known Medication Allergies   Medications:  (Selected)   Prescriptions  Prescribed  Advair Diskus 500 mcg-50 mcg inhalation powder: See Instructions, Instructions: INHALE 1 PUFF TWICE A DAY, # 180 unknown unit, 0 Refill(s), Type: Maintenance, Pharmacy: [facility] [id number] [facility], 71, in, 07/17/20 15:40:00 CDT, Height Measured, 205, lb, 07/17/20 15:40:00 CDT, Weight Measured  Ventolin HFA 90 mcg/inh inhalation aerosol: 2 puff(s), Inhale, q6 hrs, # 1 EA, 3 Refill(s), AD, Type: Maintenance, Pharmacy: [id number], 2 puff(s) Inhale q6 hrs, 71, in, 07/17/20 15:40:00 CDT, Height Measured, 205, lb, 07/17/20 15:40:00 CDT, Weight Measured  spacer for inhaler: spacer for inhaler, See Instructions, Instructions: use with albuterol inhaler, Supply, # 1 EA, 0 Refill(s), Type: Maintenance, Pharmacy: [facility], use with albuterol inhaler  tiotropium 18 mcg inhalation capsule: = 1 cap(s) ( 18 mcg ), Inhale, daily, # 90 cap(s), 3 Refill(s), Type: Maintenance, Pharmacy: [id number], 1 cap(s) Inhale daily, 71, in, 06/04/20 11:00:00 CDT, Height Measured, 202, lb, 06/04/20 10:56:00 CDT, Weight Measured  Documented Medications  Documented  Multiple Vitamins oral tablet: 1 tab(s), po, daily, 0 Refill(s), Type: Maintenance  Tylenol Extra Strength PM: 2 tab(s), Oral, hs, 0 Refill(s), Type: Maintenance  Vitamin B12 500 mcg oral tablet: 1 tab(s) ( 500 mcg ), po, daily, 0 Refill(s), Type: Maintenance  Zetia 10 mg oral tablet: = 1 tab(s) ( 10 mg ), Oral, daily, # 30 tab(s), 0 Refill(s), Type: Maintenance  albuterol  2.5 mg/3 mL (0.083%) inhalation solution: = 3 mL ( 2.5 mg ), Inhale, q6 hrs, PRN: for wheezing, # 25 EA, 0 Refill(s), Type: Maintenance  magnesium oxide 250 mg oral tablet: 1 tab(s) ( 250 mg ), po, daily, 0 Refill(s), Type: Maintenance  metoprolol succinate 25 mg oral capsule, extended release: = 1 cap(s) ( 25 mg ), Oral, daily, 0 Refill(s), Type: Maintenance  omeprazole 20 mg oral delayed release capsule: = 1 cap(s) ( 20 mg ), Oral, daily, # 90 cap(s), 0 Refill(s), Type: Maintenance  polyethylene glycol 3350: ( 17 gm ), Oral, daily, 0 Refill(s), Type: Maintenance  sodium bicarbonate 650 mg oral tablet: = 1 tab(s) ( 650 mg ), Oral, bid, 0 Refill(s), Type: Maintenance,    Medications          *denotes recorded medication          spacer for inhaler: See Instructions, use with albuterol inhaler, 1 EA, 0 Refill(s).          *Tylenol Extra Strength PM: 2 tab(s), Oral, hs, 0 Refill(s).          Ventolin HFA 90 mcg/inh inhalation aerosol: 2 puff(s), Inhale, q6 hrs, 1 EA, 3 Refill(s).          *albuterol 2.5 mg/3 mL (0.083%) inhalation solution: 2.5 mg, 3 mL, Inhale, q6 hrs, PRN: for wheezing, 25 EA, 0 Refill(s).          *Vitamin B12 500 mcg oral tablet: 500 mcg, 1 tab(s), po, daily, 0 Refill(s).          *Zetia 10 mg oral tablet: 10 mg, 1 tab(s), Oral, daily, 30 tab(s), 0 Refill(s).          Advair Diskus 500 mcg-50 mcg inhalation powder: See Instructions, INHALE 1 PUFF TWICE A DAY, 180 unknown unit, 0 Refill(s).          *magnesium oxide 250 mg oral tablet: 250 mg, 1 tab(s), po, daily, 0 Refill(s).          *metoprolol succinate 25 mg oral capsule, extended release: 25 mg, 1 cap(s), Oral, daily, 0 Refill(s).          *Multiple Vitamins oral tablet: 1 tab(s), po, daily, 0 Refill(s).          *omeprazole 20 mg oral delayed release capsule: 20 mg, 1 cap(s), Oral, daily, 90 cap(s), 0 Refill(s).          *polyethylene glycol 3350: 17 gm, Oral, daily, 0 Refill(s).          *sodium bicarbonate 650 mg oral tablet: 650 mg, 1  tab(s), Oral, bid, 0 Refill(s).          tiotropium 18 mcg inhalation capsule: 18 mcg, 1 cap(s), Inhale, daily, 90 cap(s), 3 Refill(s).       Problem list:    All Problems  AAA (abdominal aortic aneurysm) / SNOMED CT 336079678 / Confirmed  Adenocarcinoma of lung / SNOMED CT 415327405 / Confirmed  [place] Esophagus / SNOMED CT 4385689906 / Confirmed  CKD (chronic kidney disease) stage 3, GFR 30-59 ml/min / SNOMED CT 6309072647 / Confirmed  COPD (chronic obstructive pulmonary disease) with acute bronchitis / SNOMED CT 97465012 / Confirmed  Closed hip fracture / SNOMED CT 865825172 / Confirmed  Closed fracture of trochanter of femur / SNOMED CT 3768916838 / Confirmed  Cardiac arrhythmia / SNOMED CT 14182353 / Confirmed  Coronary artery disease due to lipid rich plaque / SNOMED CT 9443100119 / Confirmed  Lipids abnormal / SNOMED CT 297356340 / Confirmed  HTN (Hypertension) / SNOMED CT 17591467 / Confirmed  Former Smoker / SNOMED CT [id number] / Confirmed  GERD (gastroesophageal reflux disease) / SNOMED CT 3427333918 / Confirmed  Anticoagulated / SNOMED CT 638394843 / Confirmed  History of oropharyngeal cancer / SNOMED CT 9972345966 / Confirmed  H/O prostate cancer / SNOMED CT 3319362873 / Confirmed  Lung cancer / SNOMED CT 139225526 / Confirmed  DJD (Degenerative Joint Disease) / SNOMED CT 6189638628 / Confirmed  H/O unilateral nephrectomy / SNOMED CT 743124439 / Confirmed  Resolved: *Hospitalized@[facility] - Atypical chest pain  Resolved: *Hospitalized@[facility] - Hip fracture  Resolved: *Hospitalized@[facility] Septic hip vs hematoma  Resolved: History of sepsis / SNOMED CT 5408993320  Resolved: Inpatient stay / SNOMED CT 876208145  Resolved: Inpatient stay / SNOMED CT 109416516  Resolved: Inpatient stay / SNOMED CT 069692708  Resolved: Prostate cancer / SNOMED CT 807553487  Canceled: Oropharyngeal cancer / SNOMED CT 098535603  Canceled: Solitary kidney / SNOMED CT 424569551  Canceled: Solitary kidney /  SNOMED CT 903042994      Histories   Past Medical History:    Active  AAA (abdominal aortic aneurysm) (314264417)  Coronary artery disease due to lipid rich plaque (2371085063)  GERD (gastroesophageal reflux disease) (8174801292)  Cardiac arrhythmia (78974149)  CKD (chronic kidney disease) stage 3, GFR 30-59 ml/min (8468582647)  Lung cancer (356443703)  Resolved  Inpatient stay (043449823): Onset on 10/8/2020 at 75 years.  Resolved on 10/11/2020 at 75 years.  Comments:  10/12/2020 CDT 3:36 PM CDT - Dayan Musa  @ [facility] due to acute renal failure.  Inpatient stay (779813085): Onset on 1/3/2020 at 74 years.  Resolved on 2020 at 74 years.  Comments:  2020 CST 1:41 PM CST - Primo Yoly  [facility], [place] - Chest pain, acute renal failure.  Inpatient stay (275856678): Onset on 2019 at 74 years.  Resolved on 2019 at 74 years.  Comments:  12/10/2019 CST 1:46 PM CST - Jessi Eid  @[facility] - NSTEMI.  *Hospitalized@[place] - Septic hip vs hematoma: Onset on 7/3/2016 at 70 years.  Resolved on 2016 at 70 years.  History of sepsis (0700617633): Onset on 7/3/2016 at 70 years.  Resolved.  Comments:  2016 CDT 2:14 PM CDT - Alma Barfield  Severe; due to septic hip.    2016 CDT 3:37 PM CDT - Alma Barfield  Sepsis organ failure: Acute renal failure.  *Hospitalized@[facility] - Hip fracture: Onset on 2016 at 70 years.  Resolved on 2016 at 70 years.  *Hospitalized@[facility] - Atypical chest pain: Onset on 2015 at 69 years.  Resolved on 2015 at 69 years.  Prostate cancer (972214510):  Resolved.   Family History:    CA - Breast cancer  Sister (Alexa, )  Lupus  Sister (Rebecca)  Alcohol abuse  Sister (Alexa, )  Mother ()  SMA type III  Grandfather (M)  Obese  Sister (Sujey, )     Procedure history:    Coronary angiography (25894219) on 2019 at 74 Years.  Comments:  12/10/2019 1:47 PM CST - Jessi Eid  and PCI of the right  coronary artery.  Esophagogastroduodenoscopy (608921515) on 7/26/2019 at 73 Years.  Comments:  10/12/2020 3:44 PM CDT - Dayan Musa  Endoscopic US, Fine Needle Aspiration; Gastric Biopsies of polyps.  Colonoscopy ([id number]) on 3/21/2017 at 71 Years.  Comments:  3/22/2017 2:33 PM CDT - Filippo Barbour MD  Indication: Adenomatous Polyps  Sedation: MAC  Findings: Adenomatous polyp cecum, hemorrohids  Rec: Repeat in 5 years  Thoracoscopic wedge resection of lung (4704626335) in 2016 at 71 Years.  Comments:  10/12/2020 3:40 PM CDT - Dayan Musa  RUL, RML  Right Hip Bipoloar Hemiarthroplasty on 6/22/2016 at 70 Years.  Comments:  6/28/2016 7:26 AM CDT - Debby Faustin CMA  Right displasced femoral neck fracture  right thoracotomy on 11/15/2015 at 70 Years.  Comments:  11/17/2015 9:07 AM CST - Debby Faustin CMA  wedge resection right upper lung nodule, wedge resection right middle lobe lung nodule, Mediastinal lymph node dissection  Colonoscopy ([id number]) on 5/12/2014 at 68 Years.  Comments:  5/15/2014 10:56 AM CDT - Livier Stallings RN  Sedation: midazolam, fentanyl  Indication: screen  5-6mm tubular adenom x3, 8mm tubular adenoma x1, 12mm tubular adenoma with advance adenoma due to size  Repeat in 3 years.  nepherectomy in the month of 3/2009 at 63 Years.  Lumbar discectomy in 2006 at 61 Years.  Left salivary gland removal in 1995 at 50 Years.  Oral surgery in 1994 at 49 Years.  Aortic aneurysm, abdominal ([id number]).   Social History:        Electronic Cigarette/Vaping Assessment            Electronic Cigarette Use: Never.      Alcohol Assessment            Current, Liquor (Hard) (1.5 oz), 1-2 times per month, 2 drinks/episode average.      Tobacco Assessment            Past, 20 per day.  49 year(s).      Substance Abuse Assessment            Never      Employment and Education Assessment            Retired, Highest education level: High school.      Home and Environment Assessment             Marital status: .  Spouse/Partner name: Darlene Chau.  4 children.  Living situation: Home/Independent.               Injuries/Abuse/Neglect in household: No.  Feels unsafe at home: No.  Family/Friends available for support:               Yes.      Nutrition and Health Assessment            Type of diet: Regular.  Wants to lose weight: No.  Sleeping concerns: No.  Feels highly stressed: No.      Exercise and Physical Activity Assessment            Exercise frequency: Occasional.      Sexual Assessment            Sexually active: No.  Identifies as male, Sexual orientation: Straight or heterosexual.  Contraceptive Use               Details: None.        Physical Examination   vital signs stable, as noted above   Vital Signs   10/14/2020 1:34 PM CDT Temperature Tympanic 96 DegF  LOW    Peripheral Pulse Rate 90 bpm    Systolic Blood Pressure 102 mmHg    Diastolic Blood Pressure 59 mmHg  LOW    Mean Arterial Pressure 73 mmHg    Oxygen Saturation 95 %    Systolic Blood Pressure Standing 112 mmHg    Diastolic Blood Pressure Standing 63 mmHg      Measurements from flowsheet : Measurements   10/14/2020 1:34 PM CDT Height Measured - Standard 71 in    Weight Measured - Standard 206 lb    BSA 2.16 m2    Body Mass Index 28.73 kg/m2  HI      General:  Alert and oriented, No acute distress.    Eye:  Extraocular movements are intact.    HENT:  Normocephalic, Oral mucosa is moist, No pharyngeal erythema, dentures, discrete ulcerative/white lesion on left buccal surface; small punctate ulcer on lateral, left undersurface of tongue.    Neck:  Supple, Previous right sided neck dissection with flap.    Respiratory:  Lungs are clear to auscultation, Respirations are non-labored, posterior thoracotomy scar.    Cardiovascular:  Normal rate, Regular rhythm, No murmur, No edema.    Gastrointestinal:  Soft.    Neurologic:  Alert, Oriented, Normal motor function, No focal deficits.    Cognition and Speech:  Oriented, Speech  clear and coherent.    Psychiatric:  Appropriate mood & affect.       Review / Management   Results review:  Lab results   10/14/2020 1:35 PM CDT Sodium Level 139 mmol/L    Potassium Level 3.9 mmol/L    Chloride Level 105 mmol/L    CO2 Level 25 mmol/L    AGAP 9    Glucose Level 166 mg/dL    BUN 27 mg/dL    Creatinine 3.53 mg/dL    BUN/Creat Ratio 8    eGFR  21    eGFR Non-African American 17    Calcium Level 8.8 mg/dL   10/12/2020 6:06 PM CDT Sodium Level 139 mmol/L    Potassium Level 4.1 mmol/L    Chloride Level 104 mmol/L    CO2 Level 24 mmol/L    Glucose Level 89 mg/dL    BUN 33 mg/dL  HI    Creatinine 3.93 mg/dL  HI    BUN/Creat Ratio 8    eGFR 14 mL/min/1.73m2  LOW    eGFR African American 16 mL/min/1.73m2  LOW    Calcium Level 9.1 mg/dL    Magnesium Level 1.4 mg/dL  LOW   10/12/2020 6:03 PM CDT WBC 7.6    RBC 2.65    Hgb 8.7 g/dL    Hct 26.9 %     fL    MCH 32.8 pg    MCHC 32.3 g/dL    RDW 15.4 %    Platelet 127    MPV 8.2 fL   10/6/2020 2:29 PM CDT Sodium Level 135 mmol/L    Potassium Level 5.0 mmol/L    Chloride Level 103 mmol/L    CO2 Level 19 mmol/L  LOW    Glucose Level 92 mg/dL    BUN 33 mg/dL  HI    Creatinine 7.18 mg/dL  HI    BUN/Creat Ratio 5  LOW    eGFR 7 mL/min/1.73m2  LOW    eGFR African American 8 mL/min/1.73m2  LOW    Calcium Level 9.3 mg/dL   .       Impression and Plan   Diagnosis     AAA (Abdominal Aortic Aneurysm) (RMZ03-VZ I71.4).     Adenocarcinoma of lung (PWI36-OU C34.90).     COPD (chronic obstructive pulmonary disease) with acute bronchitis (OTU76-QB J44.1).     Chronic kidney disease (CKD), stage III (moderate) (SNF54-YY N18.3).     Prostate cancer (YKI96-WF C61).         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  - new mouth ulcer on left buccal surface  - following with JVT   - will need to reschedule excisional biopsy, originally scheduled for 10/16    .) COPD   - on Advair 500/50mcg BID + Spiriva   - consider repeat PFTs in the future  .) s/p right wedge resection of limited stage adenocarcinoma of lung (11/2015)   - new MELODY lung nodule; PET avid    - ultimately will need CT surgery/pulmonary evaluation      .) CKD; baseline SCr 1.5-1.9   - multiple LEILA episodes; h/o contrast induced nephropathy (VITOR)  - heightened risk for future contrast induced nephropathy    .) hypertension; controlled  current antihypertensive regimen: Toprol XL 25mg daily  regimen changes: none  intolerance:  future titration/work-up plan:     .) NSTEMI with PCI to RCA with multi-vessel disease (non-obstructive LAD lesion); following with Dr. Valentine  - DAPT stopped (10/2020): profound anemia, concerns for GIB  - Toprol XL 25mg daily  - rosuvastatin 40mg  held with recent LEILA    .) EUS, 7/2019, of  2.2cm pancreatic cyst; no malignancy on biopsy   - recommendations for annual f/u through GI    .) prostate Ca; watchful waiting   - original prostate biopsy in '11, repeat biopsy in '16; P[place] 6   - watchful surveillance; q6 month PSA; last PSA 15   - MRI of prostate (2019): focal coarse calcifications in prostate; no evidence of extra-prostate extension   - following with Dr. Bustos - prefers not to pursue XRT at this point    .) h/o AAA, endovascular aortic repair, follows with Dr. David     plan as previously outlined and not discussed at today's visit     .) polyarthritis pains  - limited benefit with NSAIDs and APAP  s/p right MIRNA after hip fracture from fall (6/2016)   - normal DEXA (7/2015)  - doubt  inflammatory arthritis; doubt polymyalgia rheumatica given no upper extremity involvement  - working with Ortho - lasting benefits from intra-articular injections    .) health maintenance   - CRC due in '22   - q6 month PSA   - Prevacid 30mg BID   - enrolled in [facility]    RTC as previously scheduled

## 2022-02-16 NOTE — LETTER
(Inserted Image. Unable to display)   [address] [place]  [address]  November 03, 2021                      CELIA LUTZ      [address] [address]        Dear CELIA,    Thank you for selecting [facility] for your health care needs.  Below you will find the results of your recent test(s) done at our clinic.    Your kidney function has decreased compared to your tests in the emergency department.  Please have them repeated on Friday of this week.      Result Name Current Result Previous Result Reference Range   Glucose Level (mg/dL) ((H)) 166 11/1/2021 ((H)) 121 7/23/2021 65 - 99   BUN (mg/dL)  10 11/1/2021 ((H)) 29 7/23/2021 7 - 25   Creatinine Level (mg/dL) ((H)) 2.49 11/1/2021 ((H)) 1.59 7/23/2021 0.70 - 1.18   eGFR (mL/min/1.73m2) ((L)) 24 11/1/2021 ((L)) 42 7/23/2021 > OR = 60 -    eGFR  (mL/min/1.73m2) ((L)) 28 11/1/2021 ((L)) 48 7/23/2021 > OR = 60 -    BUN/Creat Ratio ((L)) 4 11/1/2021  18 7/23/2021 6 - 22   Sodium Level (mmol/L) ((L)) 133 11/1/2021  139 7/23/2021 135 - 146   Potassium Level (mmol/L)  4.6 11/1/2021  5.0 7/23/2021 3.5 - 5.3   Chloride Level (mmol/L) ((L)) 91 11/1/2021  101 7/23/2021 98 - 110   CO2 Level (mmol/L)  29 11/1/2021  30 7/23/2021 20 - 32   Calcium Level (mg/dL)  10.3 11/1/2021  10.0 7/23/2021 8.6 - 10.3       Please contact me or my assistant at [phone] if you have any questions about your results.    Sincerely,        Jl Guerrero MD        What do your labs mean?  Below is a glossary of commonly ordered labs:  LDL   Bad Cholesterol   HDL   Good Cholesterol  AST/ALT   Liver Function   Cr/Creatinine   Kidney Function  Microalbumin   Kidney Function  BUN   Kidney Function  PSA   Prostate    TSH   Thyroid Hormone  HgbA1c   Diabetes Test   Hgb (Hemoglobin)   Red Blood Cells

## 2022-02-16 NOTE — TELEPHONE ENCOUNTER
Called THAI. He has endoscopies scheduled in near future. Called Dr. Nunez' office regarding surgery, still waiting for call back. Called labs to patient. Will have repeat labs and FU with Dr. Boyd within a week.    KAH

## 2022-02-16 NOTE — NURSING NOTE
Comprehensive Intake Entered On:  10/20/2020 12:43 PM CDT    Performed On:  10/20/2020 12:39 PM CDT by Debby Faustin CMA               Summary   Chief Complaint :   Preop - scheduled for endoscopic u/s - for pancreatic cyst and for esophagitis f/u 10/23 with Dr Edwards at [facility]   Weight Measured :   206 lb(Converted to: 206 lb 0 oz, 93.440 kg)    Height Measured :   71 in(Converted to: 5 ft 11 in, 180.34 cm)    Body Mass Index :   28.73 kg/m2 (HI)    Body Surface Area :   2.16 m2   Systolic Blood Pressure :   120 mmHg   Diastolic Blood Pressure :   68 mmHg   Mean Arterial Pressure :   85 mmHg   Peripheral Pulse Rate :   91 bpm   Temperature Tympanic :   97.7 DegF(Converted to: 36.5 DegC)  (LOW)    Oxygen Saturation :   97 %   Race :      Languages :   English   Ethnicity :   Not  or    Debby Faustin CMA - 10/20/2020 12:39 PM CDT   Health Status   Allergies Verified? :   Yes   Medication History Verified? :   Yes   Medical History Verified? :   Yes   Pre-Visit Planning Status :   Completed   Tobacco Use? :   Former smoker   Debby Faustin CMA - 10/20/2020 12:39 PM CDT   ID Risk Screen   Recent Travel History :   No recent travel   Family Member Travel History :   No recent travel   Other Exposure to Infectious Disease :   Unknown   Debby Faustin CMA - 10/20/2020 12:39 PM CDT   Social History   Social History   (As Of: 10/20/2020 12:43:15 PM CDT)   Alcohol:        Current, Liquor (Hard) (1.5 oz), 1-2 times per month, 2 drinks/episode average.   (Last Updated: 10/12/2020 1:47:42 PM CDT by Mariah Wilkes)          Tobacco:        Past, 20 per day.  49 year(s).   (Last Updated: 3/17/2011 2:21:14 PM CDT by Rose Barr CMA)          Electronic Cigarette/Vaping:        Electronic Cigarette Use: Never.   (Last Updated: 10/12/2020 1:47:53 PM CDT by Mariah Wilkes)          Substance Abuse:        Never   (Last Updated: 3/19/2014 10:32:10 AM CDT by Brooke Krishnan)          Employment/School:         Retired, Highest education level: High school.   (Last Updated: 10/12/2020 1:48:08 PM CDT by Mariah Wilkes)          Home/Environment:        Marital status: .  Spouse/Partner name: Darlene Cahu.  4 children.  Living situation: Home/Independent.  Injuries/Abuse/Neglect in household: No.  Feels unsafe at home: No.  Family/Friends available for support: Yes.   (Last Updated: 10/12/2020 1:48:26 PM CDT by Mariah Wilkes)          Nutrition/Health:        Type of diet: Regular.  Wants to lose weight: No.  Sleeping concerns: No.  Feels highly stressed: No.   (Last Updated: 10/12/2020 1:48:37 PM CDT by Mariah Wilkes)          Exercise:        Exercise frequency: Occasional.   (Last Updated: 10/12/2020 1:48:50 PM CDT by Mariah Wilkes)          Sexual:        Sexually active: No.  Identifies as male, Sexual orientation: Straight or heterosexual.  Contraceptive Use Details: None.   (Last Updated: 10/12/2020 1:49:26 PM CDT by Mariah Wilkes)   coffee

## 2022-02-16 NOTE — TELEPHONE ENCOUNTER
---------------------  From: Trang Pozo LPN (Phone Messages Pool ([id number]_[facility]))   Sent: 10/27/2021 12:28:41 PM CDT  Subject: question from visit     Phone Message    PCP:   MYRA      Time of Call:  12:24pm       Person Calling:  pt daughter Georgette    Note:   Georgette calling stating she came with pt to see [facility] yesterday. She says they had discussed hospice orders but was unsure if [facility] had done this or not since they had left in a hurry.    Told pt that a referral was put in for hospice inquiry. She has no further questions.    Last office visit and reason:  10/26/21 multiple

## 2022-02-16 NOTE — NURSING NOTE
Comprehensive Intake Entered On:  10/6/2020 1:27 PM CDT    Performed On:  10/6/2020 1:20 PM CDT by Rosamaria TORRES, Altagracia               Summary   Chief Complaint :   preop:  surgery 10/16/2020 at [facility]/ Dr. Nunez for oral cancer resection.   Weight Measured :   210.6 lb(Converted to: 210 lb 10 oz, 95.527 kg)    Height Measured :   71 in(Converted to: 5 ft 11 in, 180.34 cm)    Body Mass Index :   29.37 kg/m2 (HI)    Body Surface Area :   2.19 m2   Systolic Blood Pressure :   106 mmHg   Diastolic Blood Pressure :   42 mmHg (LOW)    Mean Arterial Pressure :   63 mmHg   Peripheral Pulse Rate :   51 bpm (LOW)    BP Site :   Right arm   Pulse Site :   Radial artery   BP Method :   Manual   HR Method :   Manual   Temperature Tympanic :   96.1 DegF(Converted to: 35.6 DegC)  (LOW)    Oxygen Saturation :   94 %   Race :      Languages :   English   Ethnicity :   Not  or    Altagracia Blank MA - 10/6/2020 1:20 PM CDT   Health Status   Allergies Verified? :   Yes   Medication History Verified? :   Yes   Medical History Verified? :   Yes   Pre-Visit Planning Status :   Completed   Tobacco Use? :   Former smoker   Altagracia Blank MA - 10/6/2020 1:20 PM CDT   Consents   Consent for Immunization Exchange :   Consent Granted   Consent for Immunizations to Providers :   Consent Granted   Altagracia Blank MA - 10/6/2020 1:20 PM CDT   Meds / Allergies   (As Of: 10/6/2020 1:27:34 PM CDT)   Allergies (Active)   No Known Medication Allergies  Estimated Onset Date:   Unspecified ; Created By:   Debby Faustin CMA; Reaction Status:   Active ; Category:   Drug ; Substance:   No Known Medication Allergies ; Type:   Allergy ; Updated By:   Debby Faustin CMA; Reviewed Date:   7/17/2020 3:41 PM CDT        Medication List   (As Of: 10/6/2020 1:27:34 PM CDT)   Prescription/Discharge Order    albuterol  :   albuterol ; Status:   Prescribed ; Ordered As Mnemonic:   Ventolin HFA 90 mcg/inh inhalation aerosol ; Simple Display  Line:   2 puff(s), Inhale, q6 hrs, 1 EA, 3 Refill(s) ; Ordering Provider:   Yong Boyd MD; Catalog Code:   albuterol ; Order Dt/Tm:   7/20/2020 9:46:52 AM CDT          albuterol  :   albuterol ; Status:   Prescribed ; Ordered As Mnemonic:   Ventolin HFA 90 mcg/inh inhalation aerosol ; Simple Display Line:   2 puff(s), Inhale, q6 hrs, 1 EA, 3 Refill(s) ; Ordering Provider:   Yong Boyd MD; Catalog Code:   albuterol ; Order Dt/Tm:   7/17/2020 3:53:45 PM CDT          amLODIPine  :   amLODIPine ; Status:   Prescribed ; Ordered As Mnemonic:   amLODIPine 5 mg oral tablet ; Simple Display Line:   5 mg, 1 tab(s), Oral, daily, 90 tab(s), 3 Refill(s) ; Ordering Provider:   Yong Boyd MD; Catalog Code:   amLODIPine ; Order Dt/Tm:   6/4/2020 11:11:59 AM CDT          fluticasone-salmeterol  :   fluticasone-salmeterol ; Status:   Prescribed ; Ordered As Mnemonic:   Advair Diskus 500 mcg-50 mcg inhalation powder ; Simple Display Line:   See Instructions, INHALE 1 PUFF TWICE A DAY, 180 unknown unit, 0 Refill(s) ; Ordering Provider:   Yong Boyd MD; Catalog Code:   fluticasone-salmeterol ; Order Dt/Tm:   8/11/2020 2:49:07 PM CDT          lisinopril  :   lisinopril ; Status:   Prescribed ; Ordered As Mnemonic:   lisinopril 20 mg oral tablet ; Simple Display Line:   20 mg, 1 tab(s), Oral, daily, 30 tab(s), 0 Refill(s) ; Ordering Provider:   Yong Boyd MD; Catalog Code:   lisinopril ; Order Dt/Tm:   5/12/2020 2:15:48 PM CDT          Miscellaneous Rx Supply  :   Miscellaneous Rx Supply ; Status:   Prescribed ; Ordered As Mnemonic:   spacer for inhaler ; Simple Display Line:   See Instructions, use with albuterol inhaler, 1 EA, 0 Refill(s) ; Ordering Provider:   Severino Cannon PA-C; Catalog Code:   Miscellaneous Rx Supply ; Order Dt/Tm:   1/9/2018 2:18:32 PM CST          tiotropium  :   tiotropium ; Status:   Prescribed ; Ordered As Mnemonic:   tiotropium 18 mcg inhalation capsule ; Simple Display Line:   18 mcg, 1 cap(s), Inhale,  daily, 90 cap(s), 3 Refill(s) ; Ordering Provider:   Yong Boyd MD; Catalog Code:   tiotropium ; Order Dt/Tm:   6/4/2020 11:17:16 AM CDT            Home Meds    acetaminophen-diphenhydramine  :   acetaminophen-diphenhydramine ; Status:   Documented ; Ordered As Mnemonic:   Tylenol Extra Strength PM ; Simple Display Line:   2 tab(s), Oral, hs, 0 Refill(s) ; Catalog Code:   acetaminophen-diphenhydramine ; Order Dt/Tm:   6/4/2020 10:56:08 AM CDT          aspirin  :   aspirin ; Status:   Documented ; Ordered As Mnemonic:   aspirin 81 mg oral tablet, chewable ; Simple Display Line:   81 mg, 1 tab(s), Chewed, daily, 0 Refill(s) ; Catalog Code:   aspirin ; Order Dt/Tm:   11/27/2019 9:54:31 AM CST          clopidogrel  :   clopidogrel ; Status:   Documented ; Ordered As Mnemonic:   clopidogrel 75 mg oral tablet ; Simple Display Line:   75 mg, 1 tab(s), Oral, daily, 0 Refill(s) ; Catalog Code:   clopidogrel ; Order Dt/Tm:   11/27/2019 9:55:13 AM CST          cyanocobalamin  :   cyanocobalamin ; Status:   Documented ; Ordered As Mnemonic:   Vitamin B12 500 mcg oral tablet ; Simple Display Line:   500 mcg, 1 tab(s), po, daily, 0 Refill(s) ; Catalog Code:   cyanocobalamin ; Order Dt/Tm:   11/17/2015 10:02:05 AM CST          magnesium oxide  :   magnesium oxide ; Status:   Documented ; Ordered As Mnemonic:   magnesium oxide 250 mg oral tablet ; Simple Display Line:   250 mg, 1 tab(s), po, daily, 0 Refill(s) ; Catalog Code:   magnesium oxide ; Order Dt/Tm:   4/5/2018 4:37:34 PM CDT          metoprolol  :   metoprolol ; Status:   Documented ; Ordered As Mnemonic:   metoprolol succinate 25 mg oral capsule, extended release ; Simple Display Line:   25 mg, 1 cap(s), Oral, daily, 0 Refill(s) ; Catalog Code:   metoprolol ; Order Dt/Tm:   11/27/2019 9:58:05 AM CST          multivitamin  :   multivitamin ; Status:   Documented ; Ordered As Mnemonic:   Multiple Vitamins oral tablet ; Simple Display Line:   1 tab(s), po, daily, 0  Refill(s) ; Catalog Code:   multivitamin ; Order Dt/Tm:   4/22/2015 9:12:40 AM CDT          omeprazole  :   omeprazole ; Status:   Documented ; Ordered As Mnemonic:   omeprazole 20 mg oral delayed release capsule ; Simple Display Line:   20 mg, 1 cap(s), Oral, daily, 90 cap(s), 0 Refill(s) ; Catalog Code:   omeprazole ; Order Dt/Tm:   1/29/2020 4:57:06 PM CST          rosuvastatin  :   rosuvastatin ; Status:   Documented ; Ordered As Mnemonic:   Crestor 40 mg oral tablet ; Simple Display Line:   40 mg, 1 tab(s), Oral, daily, 0 Refill(s) ; Catalog Code:   rosuvastatin ; Order Dt/Tm:   11/27/2019 10:02:43 AM CST            ID Risk Screen   Recent Travel History :   No recent travel   Family Member Travel History :   No recent travel   Other Exposure to Infectious Disease :   Unknown   Rosamaria TORRES, Altagracia - 10/6/2020 1:20 PM CDT   Social History   Social History   (As Of: 10/6/2020 1:27:34 PM CDT)   Alcohol:        Current, 2 times per week, 3 drinks/episode average.   (Last Updated: 1/22/2020 2:53:06 PM CST by Debby Faustin CMA)          Tobacco:        Past, 20 per day.  49 year(s).   (Last Updated: 3/17/2011 2:21:14 PM CDT by Rose Barr CMA)          Substance Abuse:        Never   (Last Updated: 3/19/2014 10:32:10 AM CDT by Brooke Krishnan)          Employment/School:        Employed, Work/School description: [employer] .   (Last Updated: 3/17/2011 2:20:42 PM CDT by Rose Barr CMA)          Home/Environment:        Marital status: .  Spouse/Partner name: Darlene Chau.   (Last Updated: 3/19/2014 10:33:12 AM CDT by Brooke Krishnan)          Nutrition/Health:        Type of diet: Regular.   (Last Updated: 3/19/2014 10:32:20 AM CDT by Brooke Krishnan)          Exercise:        Exercise frequency: Never.   (Last Updated: 4/22/2015 10:11:22 AM CDT by Brooke Krishnan)          Sexual:        Sexually active: Yes.   (Last Updated: 3/6/2012 12:53:53 PM CST by Rose Barr CMA)

## 2022-02-16 NOTE — PROGRESS NOTES
Patient:   CELIA LUTZ            MRN: [id number]            FIN: [id number]               Age:   73 years     Sex:  Male     :  1945   Associated Diagnoses:   AAA (Abdominal Aortic Aneurysm); Adenocarcinoma of lung; COPD (chronic obstructive pulmonary disease) with acute bronchitis; Chronic kidney disease (CKD), stage III (moderate); Prostate cancer   Author:   Yong Boyd MD      Visit Information      Date of Service: 2019 04:40 pm  Performing Location: [facility]  Encounter#: [id number]      Primary Care Provider (PCP):  Yong Boyd MD    NPI# [id number]      Referring Provider:  Yong Boyd MD    NPI# [id number]      Chief Complaint   2019 4:47 PM CDT    preop scheduled for upper endoscopy with Dr Edwards at [facility] on               Additional Information:No additional information recorded during visit.   Chief complaint and symptoms as noted above and confirmed with patient.  Recent lab and diagnostic studies reviewed with patient      History of Present Illness   2015: Mitchell presents to clinic for hospital follow-up.  He recently underwent right sided wedge resection of an isolated pulmonary nodule.  Biopsy returning as adenocarcinoma of the lung.  Postoperative course complicated by an episode of both acute kidney injury and rectal bleeding felt to be consistent with ischemic colitis.  His creatinine on discharge was at his baseline of 1.3.  His lisinopril was held during his episode of acute kidney injury, resumed upon discharge.  He is scheduled to see his thoracic surgeon, Dr. Velazquez, tomorrow.  He also has a history of previous endovascular repair of a abdominal aneurysm.  He follows with a vascular surgeon through [facility] for this.    10/25/2018: Mitchell returns for general follow-up.  Overall feeling well.  His wife recently underwent rotator cuff surgery and has been undergoing rehab.  No interval changes in health since her last visit.  He has yet  to schedule with interventional vein specialist.  Did have a MRI of prostate after last visit.  Not currently following with urology.    11/14/2018: Mitchell presents to clinic at the request of his vascular surgeon, Dr. David for further evaluation of his kidney function.  He was scheduled to undergo a CT angiogram of his aorta for surveillance protocol of his previous endovascular aortic repair remotely.  Unfortunately his creatinine returned at 1.7 which was somewhat higher than his baseline of approximately 1.4-1.5.  This was below  eGFR threshold of 40 mL/min.  He feels fine.  No recent hypovolemic history or insult.  No recent medication changes.  He remains on lisinopril hydrochlorothiazide combination for history of hypertension    5/14/2019: Mitchell returns for general follow-up.  Feeling well.  Has planned right-sided vascular procedure with Dr. David scheduled in the near future; unclear on specific procedural information.  He had had a recent CT angiogram done earlier this year which did demonstrate a 2.2 cm pancreatic cyst.  Also reviewed labs showing a general upward trend in PSA.  Has not seen urology since 2016.  Did undergo a prostate MRI in April 2018 which did not demonstrate any extracapsular extension.    7/11/2019: Mitchell presents for preoperative assessment for planned endoscopic ultrasound and possible biopsy of pancreatic cyst.  Pancreatic cyst found incidentally as part of a CT angiogram.  Did meet with urology related to history of prostate cancer.  Did have prostate MRI which showed no significant extension of his cancer.  In general he would like to hold off on any invasive therapies         Review of Systems   Constitutional:  No fever, No chills.    Eye:  Negative except as documented in history of present illness.    Ear/Nose/Mouth/Throat:  Negative except as documented in history of present illness.    Respiratory   Cardiovascular:  Peripheral edema, No chest pain, No palpitations, No  syncope.    Gastrointestinal:  No nausea, No vomiting, No heartburn, No abdominal pain.    Genitourinary:  No dysuria, No hematuria.    Hematology/Lymphatics:  Negative except as documented in history of present illness.    Endocrine:  No excessive thirst, No polyuria.    Immunologic:  No recurrent fevers.    Musculoskeletal:  Joint pain, Decreased range of motion, No muscle pain, No trauma.    Neurologic:  Alert and oriented X4, No numbness, No tingling, No headache.       Health Status   Allergies:    Allergic Reactions (Selected)  No Known Medication Allergies   Medications:  (Selected)   Prescriptions  Prescribed  Advair  mcg-21 mcg/inh inhalation aerosol: See Instructions, Instructions: INHALE 2 PUFFS BY MOUTH TWICE DAILY. RINSE MOUTH AND THROAT AFTER USE, # 3 EA, 1 Refill(s), Type: Soft Stop, Pharmacy: [facility], keep on file and pt will notify when needed, INHALE 2 PUFFS BY MOUTH TWICE ALFREDITO...  Metoprolol Tartrate 50 mg oral tablet: = 1 tab(s) ( 50 mg ), po, bid, # 180 tab(s), 3 Refill(s), Type: Maintenance, Pharmacy: [facility], keep on file and pt will notify when needed, 1 tab(s) Oral bid  Proventil HFA 90 mcg/inh inhalation aerosol: 2 puff(s), INH, q4hr (int), Instructions: prn SOB/cough  use with spacer chamber, # 1 EA, 3 Refill(s), Type: Maintenance, Pharmacy: [facility], 2 puff(s) Inhale q4 hrs,Instr:prn SOB/cough; use with spacer chamber  hydrochlorothiazide-lisinopril 12.5 mg-20 mg oral tablet: 1 tab(s), PO, Daily, # 90 tab(s), 3 Refill(s), Type: Maintenance, Pharmacy: [facility], keep on file and pt will notify when needed, 1 tab(s) Oral daily  pravastatin 80 mg oral tablet: = 1 tab(s) ( 80 mg ), po, daily, # 90 tab(s), 3 Refill(s), Type: Maintenance, Pharmacy: [facility], keep on file and pt will notify when needed, 1 tab(s) Oral daily  spacer for inhaler: spacer for inhaler, See Instructions, Instructions: use with albuterol inhaler, Supply, # 1  EA, 0 Refill(s), Type: Maintenance, Pharmacy: [id number], use with albuterol inhaler  Documented Medications  Documented  Advil PM: 2 tab(s), po, hs, PRN: as needed for insomnia, 0 Refill(s), Type: Maintenance  Flonase 50 mcg/inh nasal spray: 1 spray(s), nasal, daily, 0 Refill(s), Type: Maintenance  Multiple Vitamins oral tablet: 1 tab(s), po, daily, 0 Refill(s), Type: Maintenance  Vitamin B12 500 mcg oral tablet: 1 tab(s) ( 500 mcg ), po, daily, 0 Refill(s), Type: Maintenance  lansoprazole 15 mg oral delayed release capsule: = 1 cap(s) ( 15 mg ), PO, Daily, # 90 cap(s), 0 Refill(s), Type: Maintenance  magnesium oxide 250 mg oral tablet: 1 tab(s) ( 250 mg ), po, daily, 0 Refill(s), Type: Maintenance  potassium gluconate: ( 99 mg ), daily, 0 Refill(s), Type: Maintenance,    Medications          *denotes recorded medication          spacer for inhaler: See Instructions, use with albuterol inhaler, 1 EA, 0 Refill(s).          Proventil HFA 90 mcg/inh inhalation aerosol: 2 puff(s), INH, q4hr (int), prn SOB/cough  use with spacer chamber, 1 EA, 3 Refill(s).          *Vitamin B12 500 mcg oral tablet: 500 mcg, 1 tab(s), po, daily, 0 Refill(s).          *Advil PM: 2 tab(s), po, hs, PRN: as needed for insomnia, 0 Refill(s).          *Flonase 50 mcg/inh nasal spray: 1 spray(s), nasal, daily, 0 Refill(s).          Advair  mcg-21 mcg/inh inhalation aerosol: See Instructions, INHALE 2 PUFFS BY MOUTH TWICE DAILY. RINSE MOUTH AND THROAT AFTER USE, 3 EA, 1 Refill(s).          hydrochlorothiazide-lisinopril 12.5 mg-20 mg oral tablet: 1 tab(s), PO, Daily, 90 tab(s), 3 Refill(s).          *lansoprazole 15 mg oral delayed release capsule: 15 mg, 1 cap(s), PO, Daily, 90 cap(s), 0 Refill(s).          *magnesium oxide 250 mg oral tablet: 250 mg, 1 tab(s), po, daily, 0 Refill(s).          Metoprolol Tartrate 50 mg oral tablet: 50 mg, 1 tab(s), po, bid, 180 tab(s), 3 Refill(s).          *Multiple Vitamins oral tablet: 1  tab(s), po, daily, 0 Refill(s).          *potassium gluconate: 99 mg, daily, 0 Refill(s).          pravastatin 80 mg oral tablet: 80 mg, 1 tab(s), po, daily, 90 tab(s), 3 Refill(s).     Problem list:    All Problems  AAA (abdominal aortic aneurysm) / SNOMED CT 945240120 / Confirmed  Adenocarcinoma of lung / SNOMED CT 819164980 / Confirmed  [place] Esophagus / SNOMED CT 8959800862 / Confirmed  CKD (chronic kidney disease) stage 3, GFR 30-59 ml/min / SNOMED CT 7092028909 / Confirmed  COPD (chronic obstructive pulmonary disease) with acute bronchitis / SNOMED CT 31163522 / Confirmed  Closed hip fracture / SNOMED CT 545750177 / Confirmed  Closed fracture of trochanter of femur / SNOMED CT 5052309224 / Confirmed  Cardiac arrhythmia / SNOMED CT 82666542 / Confirmed  Coronary artery disease due to lipid rich plaque / SNOMED CT 2008270543 / Confirmed  Lipids abnormal / SNOMED CT 462506082 / Confirmed  HTN (Hypertension) / SNOMED CT 31547054 / Confirmed  Former Smoker / SNOMED CT [id number] / Confirmed  GERD (gastroesophageal reflux disease) / SNOMED CT 5742198126 / Confirmed  Anticoagulated / SNOMED CT 116865559 / Confirmed  History of oropharyngeal cancer / SNOMED CT 4674001954 / Confirmed  H/O prostate cancer / SNOMED CT 4147164034 / Confirmed  DJD (Degenerative Joint Disease) / SNOMED CT 1138867913 / Confirmed  H/O unilateral nephrectomy / SNOMED CT 850483587 / Confirmed  Resolved: *Hospitalized@[facility] - Atypical chest pain  Resolved: *Hospitalized@[facility] - Hip fracture  Resolved: *Hospitalized@[facility] Septic hip vs hematoma  Resolved: History of sepsis / SNOMED CT 4280498564  Resolved: Prostate cancer / SNOMED CT 957536118  Canceled: Oropharyngeal cancer / SNOMED CT 890490034  Canceled: Solitary kidney / SNOMED CT 447060376  Canceled: Solitary kidney / SNOMED CT 126303466      Histories   Past Medical History:    Active  AAA (abdominal aortic aneurysm) (468771967)  Coronary artery disease due to  lipid rich plaque (7358912990)  GERD (gastroesophageal reflux disease) (8514975157)  Cardiac arrhythmia (98783822)  CKD (chronic kidney disease) stage 3, GFR 30-59 ml/min (7225914888)  Resolved  *Hospitalized@[facility] Septic hip vs hematoma: Onset on 7/3/2016 at 70 years.  Resolved on 2016 at 70 years.  History of sepsis (4907173995): Onset on 7/3/2016 at 70 years.  Resolved.  Comments:  2016 CDT 2:14 PM CDT - Alma Barfield  Severe; due to septic hip.    2016 CDT 3:37 PM CDT - Alma Barfield  Sepsis organ failure: Acute renal failure.  *Hospitalized@[facility] - Hip fracture: Onset on 2016 at 70 years.  Resolved on 2016 at 70 years.  *Hospitalized@[facility] - Atypical chest pain: Onset on 2015 at 69 years.  Resolved on 2015 at 69 years.  Prostate cancer (684356937):  Resolved.   Family History:    CA - Breast cancer  Sister (Alexa, )  Lupus  Sister (Rebecca)  Alcohol abuse  Sister (Alexa, )  Mother ()  SMA type III  Grandfather (M)  Obese  Sister (Sujey, )     Procedure history:    Colonoscopy (141632052) on 3/21/2017 at 71 Years.  Comments:  3/22/2017 2:33 PM CDT - Filippo Barbour MD  Indication: Adenomatous Polyps  Sedation: MAC  Findings: Adenomatous polyp cecum, hemorrohids  Rec: Repeat in 5 years  Right Hip Bipoloar Hemiarthroplasty on 2016 at 70 Years.  Comments:  2016 7:26 AM CDT - Debby Faustin CMA  Right displasced femoral neck fracture  right thoracotomy on 11/15/2015 at 70 Years.  Comments:  2015 9:07 AM CST - Debby Faustin CMA  wedge resection right upper lung nodule, wedge resection right middle lobe lung nodule, Mediastinal lymph node dissection  Colonoscopy (807240272) on 2014 at 68 Years.  Comments:  5/15/2014 10:56 AM CDT - Livier Stallings RN  Sedation: midazolam, fentanyl  Indication: screen  5-6mm tubular adenom x3, 8mm tubular adenoma x1, 12mm tubular adenoma with advance adenoma due to size  Repeat in 3  years.  nepherectomy in the month of 3/2009 at 63 Years.  Lumbar discectomy in 2006 at 61 Years.  Left salivary gland removal in 1995 at 50 Years.  Oral surgery in 1994 at 49 Years.  Aortic aneurysm, abdominal ([id number]).   Social History:        Alcohol Assessment            Current, 1-2 times per week, 2 drinks/episode average.  3 drinks/episode maximum.      Tobacco Assessment            Past, 20 per day.  49 year(s).      Substance Abuse Assessment            Never      Employment and Education Assessment            Employed, Work/School description: [employer] .      Home and Environment Assessment            Marital status: .  Spouse/Partner name: Darlene Chau.      Nutrition and Health Assessment            Type of diet: Regular.      Exercise and Physical Activity Assessment            Exercise frequency: Never.      Sexual Assessment            Sexually active: Yes.      Physical Examination   vital signs stable, as noted above   Vital Signs   7/11/2019 5:15 PM CDT Systolic Blood Pressure 148 mmHg  HI    Diastolic Blood Pressure 94 mmHg  HI    Mean Arterial Pressure 112 mmHg   7/11/2019 4:47 PM CDT Temperature Tympanic 96.4 DegF  LOW    Peripheral Pulse Rate 94 bpm    Systolic Blood Pressure 142 mmHg  HI    Diastolic Blood Pressure 85 mmHg  HI    Mean Arterial Pressure 104 mmHg    BP Site Right arm    Oxygen Saturation 85 %  LOW      Measurements from flowsheet : Measurements   7/11/2019 4:47 PM CDT Height Measured - Standard 71 in    Weight Measured - Standard 192.8 lb    BSA 2.09 m2    Body Mass Index 26.89 kg/m2  HI      General:  Alert and oriented, No acute distress.    Eye:  Extraocular movements are intact.    HENT:  Normocephalic, Oral mucosa is moist, No pharyngeal erythema, dentures.    Neck:  Supple, Previous right sided neck dissection with flap.    Respiratory:  Lungs are clear to auscultation, Respirations are non-labored, posterior thoracotomy scar.     Cardiovascular:  Normal rate, Regular rhythm, No murmur, No edema.    Gastrointestinal:  Soft.    Lymphatics:  right ankle/pretibial varicosities; soft to touch.    Neurologic:  Alert, Oriented, Normal motor function, No focal deficits.    Cognition and Speech:  Oriented, Speech clear and coherent.    Psychiatric:  Appropriate mood & affect.       Review / Management   Results review:  Lab results   7/11/2019 5:27 PM CDT Sodium Level 137 mmol/L    Potassium Level 5.2 mmol/L    Chloride Level 98 mmol/L    CO2 Level 31 mmol/L    Glucose Level 96 mg/dL    BUN 36 mg/dL  HI    Creatinine 1.72 mg/dL  HI    BUN/Creat Ratio 21    eGFR 38 mL/min/1.73m2  LOW    eGFR African American 44 mL/min/1.73m2  LOW    Calcium Level 10.1 mg/dL    WBC 5.3    RBC 3.95  LOW    Hgb 12.4 gm/dL  LOW    Hct 36.3 %  LOW    MCV 91.9 fL    MCH 31.4 pg    MCHC 34.2 gm/dL    RDW 13.2 %    Platelet 152    MPV 9.8 fL   5/6/2019 8:06 AM CDT Sodium Level 137 mmol/L    Potassium Level 5.2 mmol/L    Chloride Level 99 mmol/L    CO2 Level 32 mmol/L    Glucose Level 96 mg/dL    BUN 24 mg/dL    Creatinine 1.32 mg/dL  HI    BUN/Creat Ratio 18    eGFR 53 mL/min/1.73m2  LOW    eGFR African American 61 mL/min/1.73m2    Calcium Level 9.9 mg/dL    Phosphorus Level 3.4 mg/dL    PTH Intact 25 pg/mL    Alk Phos 82 unit/L    Cholesterol 227 mg/dL  HI    Non-  HI    HDL 70 mg/dL    Chol/HDL Ratio 3.2      HI    Triglyceride 225 mg/dL  HI    PSA 15.8 ng/mL  HI    U Protein 62 mg/dL  HI    U Protein/Creatinine Ratio 969  HI    Ur Creatinine 64 mg/dL    WBC 4.3    RBC 4.08  LOW    Hgb 12.7 gm/dL  LOW    Hct 37.0 %  LOW    MCV 90.7 fL    MCH 31.1 pg    MCHC 34.3 gm/dL    RDW 14.0 %    Platelet 177    MPV 9.3 fL   .       Impression and Plan   Diagnosis     AAA (Abdominal Aortic Aneurysm) (CIS15-KI I71.4).     Adenocarcinoma of lung (END24-RU C34.90).     COPD (chronic obstructive pulmonary disease) with acute bronchitis (NGI79-AY J44.1).     Chronic kidney  disease (CKD), stage III (moderate) (HPE62-RN N18.3).     Prostate cancer (VUO70-YY C61).           .) pre-op, planned EUS on 7/26/2019 for incidentally discovered 2.2cm pancreatic cyst  ECG: trifascicular block - no history of symptomatic heart block  BMP, CBC - stable changes  - advised taking metoprolol and Advair on morning of procedure; otherwise can hold off on other medications until after endoscopy  - of note, strong individual history of malignancy    .) prostate Ca; watchful waiting   - original prostate biopsy in '11, repeat biopsy in '16; [place] 6   - watchful surveillance; q6 month PSA; last PSA 15   - MRI of prostate (2019): focal coarse calcifications in prostate; no evidence of extra-prostate extension   - following with Dr. Bustos - prefers not to pursue XRT at this point    plan as previously outlined:     .) h/o AAA, endovascular aortic repair, follows with Dr. David     .) chronic vein disease  - planned greater saphenous vein closure planned in near future    .) polyarthritis pains  - limited benefit with NSAIDs and APAP  s/p right MIRNA after hip fracture from fall (6/2016)   - normal DEXA (7/2015)  - doubt inflammatory arthritis; doubt polymyalgia rheumatica given no upper extremity involvement  - working with Ortho - lasting benefits from intra-articular injections    .) 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    .) s/p right wedge resection of limited stage adenocarcinoma of lung (11/2015)    .) COPD   - on Advair 115/21mcg BID with noticeable improvement in breathing   - albuterol on rare occasions   - has not participated in pulmonary rehab   - consider repeat PFTs in the future    .) hypertension; controlled  current antihypertensive regimen: lisinopril/hctz 20/12.5mg daily, metoprolol 50mg BID  regimen changes: none  intolerance:  future titration/work-up plan:    - SBP <140 goal    .) HLPD   - pravastatin 80mg qhs    .) health maintenance   - CRC due in '22   - q6 month PSA   - Prevacid  30mg BID   - enrolled in [facility]    RTC as previously scheduled

## 2022-02-16 NOTE — PROGRESS NOTES
Patient:   CELIA LUTZ            MRN: [id number]            FIN: [id number]               Age:   75 years     Sex:  Male     :  1945   Associated Diagnoses:   Bilateral calf pain; Peripheral edema   Author:   Severino Cannon PA-C      Chief Complaint   2020 3:18 PM CST    Pt here for edema and pain  in both calves x 4 days.      History of Present Illness   4 day hx of bilateral swelling in calves, painful, better in the morning,  worse as day goes on, has tried elevation  no increased SOB    has hx of adenocarcinoma of lungs, COPD, prostate CA, CKD, CAD         Review of Systems   Constitutional:  Negative.    Respiratory:  No shortness of breath.    Gastrointestinal:  Negative.       Health Status   Allergies:    Allergic Reactions (Selected)  No Known Medication Allergies   Medications:  (Selected)   Prescriptions  Prescribed  Advair Diskus 500 mcg-50 mcg inhalation powder: 1 puff, Inhale, bid, # 1 EA, 3 Refill(s), Type: Maintenance, [facility]acy: [facility] [place] [facility], Needs appt for further refills, 1 puff Inhale bid, 71, in, 20 16:03:00 CST, Height Measured, 213, lb, 20 16:03:00 CST, Weight Measured  Ventolin HFA 90 mcg/inh inhalation aerosol: 2 puff(s), Inhale, q6 hrs, # 1 EA, 3 Refill(s), AD, Type: Maintenance, Pharmacy: [facility] [place] [facility], keep on file, 2 puff(s) Inhale q6 hrs, 71, in, 20 16:03:00 CST, Height Measured, 213, lb, 20 16:03:00 CST, Weight Measured  spacer for inhaler: spacer for inhaler, See Instructions, Instructions: use with albuterol inhaler, Supply, # 1 EA, 0 Refill(s), Type: Maintenance, Pharmacy[facility] [place] [facility], use with albuterol inhaler  tiotropium 18 mcg inhalation capsule: = 1 cap(s) ( 18 mcg ), Inhale, daily, # 90 cap(s), 3 Refill(s), Type: Maintenance, [id number] [place] [facility], 1 cap(s) Inhale daily, 71, in, 20 11:00:00 CDT, Height Measured, 202, lb, 20 10:56:00 CDT, Weight Measured  Documented  Medications  Documented  Multiple Vitamins oral tablet: 1 tab(s), po, daily, 0 Refill(s), Type: Maintenance  Tylenol Extra Strength PM: 2 tab(s), Oral, hs, 0 Refill(s), Type: Maintenance  Vitamin B12 500 mcg oral tablet: 1 tab(s) ( 500 mcg ), po, daily, 0 Refill(s), Type: Maintenance  Zetia 10 mg oral tablet: = 1 tab(s) ( 10 mg ), Oral, daily, # 30 tab(s), 0 Refill(s), Type: Maintenance  albuterol 2.5 mg/3 mL (0.083%) inhalation solution: = 3 mL ( 2.5 mg ), Inhale, q6 hrs, PRN: for wheezing, # 25 EA, 0 Refill(s), Type: Maintenance  magnesium oxide 250 mg oral tablet: 1 tab(s) ( 250 mg ), po, daily, 0 Refill(s), Type: Maintenance  metoprolol succinate 25 mg oral capsule, extended release: = 1 cap(s) ( 25 mg ), Oral, daily, 0 Refill(s), Type: Maintenance  omeprazole 20 mg oral delayed release capsule: = 1 cap(s) ( 20 mg ), Oral, daily, # 90 cap(s), 0 Refill(s), Type: Maintenance  polyethylene glycol 3350: ( 17 gm ), Oral, daily, 0 Refill(s), Type: Maintenance   Problem list.   Histories   Past Medical History:    Active  AAA (abdominal aortic aneurysm) (179000313)  Coronary artery disease due to lipid rich plaque (8040181111)  GERD (gastroesophageal reflux disease) (2326998752)  Cardiac arrhythmia (48782679)  CKD (chronic kidney disease) stage 3, GFR 30-59 ml/min (1388782241)  Lung cancer (283990441)  Resolved  Inpatient stay ([id number]): Onset on 10/8/2020 at 75 years.  Resolved on 10/11/2020 at 75 years.  Comments:  10/12/2020 CDT 3:36 PM CDT - Dayan Musa  @ [facility] due to acute renal failure.  Inpatient stay ([id number]): Onset on 1/3/2020 at 74 years.  Resolved on 1/4/2020 at 74 years.  Comments:  1/17/2020 CST 1:41 PM CST - Yoly Tillman  [facility] [place], [place] - Chest pain, acute renal failure.  Inpatient stay ([id number]): Onset on 11/24/2019 at 74 years.  Resolved on 11/26/2019 at 74 years.  Comments:  12/10/2019 CST 1:46 PM CST - Jessi Eid  @[facility] - NSTEMI.  *[email or web address]  Septic hip vs hematoma: Onset on 7/3/2016 at 70 years.  Resolved on 2016 at 70 years.  History of sepsis (3108237335): Onset on 7/3/2016 at 70 years.  Resolved.  Comments:  2016 CDT 2:14 PM CDT - Alma Barfield  Severe; due to septic hip.    2016 CDT 3:37 PM CDT - Alma Barfield  Sepsis organ failure: Acute renal failure.  *[email or web address] - Hip fracture: Onset on 2016 at 70 years.  Resolved on 2016 at 70 years.  *[email or web address] - Atypical chest pain: Onset on 2015 at 69 years.  Resolved on 2015 at 69 years.  Prostate cancer ([id number]):  Resolved.   Family History:    CA - Breast cancer  Sister (Alexa, )  Lupus  Sister (Rebecca)  Alcohol abuse  Sister (Alexa, )  Mother ()  SMA type III  Grandfather (M)  Obese  Sister (Sujey, )     Procedure history:    Excision of squamous cell carcinoma ([id number]) on 2020 at 75 Years.  Comments:  2020 10:15 AM CST - Yoly Tillman  Left buccal mucosa and left lateral tongue.  Coronary angiography ([id number]) on 2019 at 74 Years.  Comments:  12/10/2019 1:47 PM CST - Jessi Eid  and PCI of the right coronary artery.  Esophagogastroduodenoscopy ([id number]) on 2019 at 73 Years.  Comments:  10/12/2020 3:44 PM CDT - Dayan Musa  Endoscopic US, Fine Needle Aspiration; Gastric Biopsies of polyps.  Colonoscopy ([id number]) on 3/21/2017 at 71 Years.  Comments:  3/22/2017 2:33 PM CDT - Filippo Barbour MD  Indication: Adenomatous Polyps  Sedation: MAC  Findings: Adenomatous polyp cecum, hemorrohids  Rec: Repeat in 5 years  Thoracoscopic wedge resection of lung ([id number]) in 2016 at 71 Years.  Comments:  10/12/2020 3:40 PM CDT - Dayan Musa  RUL, RML  Right Hip Bipoloar Hemiarthroplasty on 2016 at 70 Years.  Comments:  2016 7:26 AM CDT - Debby Faustin CMA  Right displasced femoral neck fracture  right thoracotomy on 11/15/2015 at 70 Years.  Comments:  2015 9:07 AM CST -  Ramin CMA, Debby  wedge resection right upper lung nodule, wedge resection right middle lobe lung nodule, Mediastinal lymph node dissection  Colonoscopy ([id number]) on 5/12/2014 at 68 Years.  Comments:  5/15/2014 10:56 AM NINOT - Livier Stallings RN  Sedation: midazolam, fentanyl  Indication: screen  5-6mm tubular adenom x3, 8mm tubular adenoma x1, 12mm tubular adenoma with advance adenoma due to size  Repeat in 3 years.  nepherectomy in the month of 3/2009 at 63 Years.  Lumbar discectomy in 2006 at 61 Years.  Left salivary gland removal in 1995 at 50 Years.  Oral surgery in 1994 at 49 Years.  Aortic aneurysm, abdominal ([id number]).   Social History:        Electronic Cigarette/Vaping Assessment            Electronic Cigarette Use: Never.      Alcohol Assessment            Current, Liquor (Hard) (1.5 oz), 1-2 times per month, 2 drinks/episode average.      Tobacco Assessment            Past, 20 per day.  49 year(s).            Former smoker, quit more than 30 days ago      Substance Abuse Assessment            Never      Employment and Education Assessment            Retired, Highest education level: High school.      Home and Environment Assessment            Marital status: .  Spouse/Partner name: Darlene Chau.  4 children.  Living situation: Home/Independent.               Injuries/Abuse/Neglect in household: No.  Feels unsafe at home: No.  Family/Friends available for support:               Yes.      Nutrition and Health Assessment            Type of diet: Regular.  Wants to lose weight: No.  Sleeping concerns: No.  Feels highly stressed: No.      Exercise and Physical Activity Assessment            Exercise frequency: Occasional.      Sexual Assessment            Sexually active: No.  Identifies as male, Sexual orientation: Straight or heterosexual.  Contraceptive Use               Details: None.        Physical Examination   Vital Signs   12/8/2020 3:18 PM CST Temperature  Tympanic 96.5 DegF  LOW    Peripheral Pulse Rate 98 bpm    Pulse Site Radial artery    Respiratory Rate 20 br/min    Systolic Blood Pressure 124 mmHg    Diastolic Blood Pressure 76 mmHg    Mean Arterial Pressure 92 mmHg    BP Site Right arm    Oxygen Saturation 94 %      Measurements from flowsheet : Measurements   12/8/2020 3:18 PM CST Height Measured - Standard 71 in    Weight Measured - Standard 215 lb    BSA 2.21 m2    Body Mass Index 29.98 kg/m2  HI      General:  No acute distress.    Respiratory:  Lungs are clear to auscultation.    Cardiovascular:  Normal rate, Regular rhythm, No murmur, both calves are warm and taut, more pain in right calf.    Gastrointestinal:  Soft, Non-tender, Non-distended.       Review / Management   Radiology results   bilateral venous leg ultrasound was negative for DVT      Impression and Plan   Diagnosis     Bilateral calf pain (MLW57-NS M79.661).     Peripheral edema (ANJ80-GK R60.9).     Summary:  will treat with furosemide 20 mg qd for 7 days, advised gentle compression stockings, keep leg elevation  follow up if not improving.    Orders     Orders   Requests (Radiology):  US Lower Extremity Venous Doppler (Request) (Order): Priority: STAT, Instructions: bilateral, more pain on right calf, Bilateral calf pain.     Orders   Pharmacy:  furosemide 20 mg oral tablet (Prescribe): = 1 tab(s) ( 20 mg ), Oral, daily, x 7 day(s), # 30 EA, 0 Refill(s), Type: Maintenance, [id number], 1 tab(s) Oral daily,x7 day(s), 71, in, 12/8/2020 3:18 PM CST, Height Measured, 215, lb, 12/8/2020 3:18 PM CST, Weight Measured.     Orders   Charges (Evaluation and Management):  [id number] office outpatient visit 25 minutes (Charge) (Order): Quantity: 1, Bilateral calf pain  Peripheral edema.

## 2022-02-16 NOTE — NURSING NOTE
"Comprehensive Intake Entered On:  1/8/2020 9:22 AM CST    Performed On:  1/8/2020 9:19 AM CST by Monik Zhang CMA               Summary   Chief Complaint :   Pt is here for f/u hospital stay for renal failure-doing \"fair\"    Weight Measured :   191 lb(Converted to: 191 lb 0 oz, 86.64 kg)    Height Measured :   71 in(Converted to: 5 ft 11 in, 180.34 cm)    Body Mass Index :   26.64 kg/m2 (HI)    Body Surface Area :   2.08 m2   Systolic Blood Pressure :   143 mmHg (HI)    Diastolic Blood Pressure :   92 mmHg (HI)    Mean Arterial Pressure :   109 mmHg   Peripheral Pulse Rate :   81 bpm   BP Site :   Right arm   BP Method :   Electronic   Temperature Tympanic :   96.8 DegF(Converted to: 36.0 DegC)  (LOW)    Oxygen Saturation :   95 %   Race :      Languages :   English   Ethnicity :   Not  or    Monik Zhang CMA - 1/8/2020 9:19 AM CST   Health Status   Allergies Verified? :   Yes   Medication History Verified? :   Yes   Pre-Visit Planning Status :   Completed   Tobacco Use? :   Former smoker   Hospitalized since last visit? :   Yes   Inpatient? :   Yes   Date of Discharge :   1/4/2020 CST   Follow-up visit date :   1/8/2020 CST   Med List Reconciled? :   Yes   Monik Zhang CMA - 1/8/2020 9:19 AM CST   Meds / Allergies   (As Of: 1/8/2020 9:22:25 AM CST)   Allergies (Active)   No Known Medication Allergies  Estimated Onset Date:   Unspecified ; Created By:   Debby Faustin CMA; Reaction Status:   Active ; Category:   Drug ; Substance:   No Known Medication Allergies ; Type:   Allergy ; Updated By:   Debby Faustin CMA; Reviewed Date:   12/4/2019 6:19 PM CST        Medication List   (As Of: 1/8/2020 9:22:25 AM CST)   Prescription/Discharge Order    albuterol  :   albuterol ; Status:   Prescribed ; Ordered As Mnemonic:   Ventolin HFA 90 mcg/inh inhalation aerosol ; Simple Display Line:   2 puff(s), NEB, q4 hrs, PRN: AS NEEDED FOR COUGH OR SHORTNESS OF BREATH, 3 EA, 0 Refill(s) ; Ordering Provider:   " Yong Boyd MD; Catalog Code:   albuterol ; Order Dt/Tm:   11/26/2019 2:44:44 PM CST          fluticasone-salmeterol  :   fluticasone-salmeterol ; Status:   Prescribed ; Ordered As Mnemonic:   Advair  mcg-21 mcg/inh inhalation aerosol ; Simple Display Line:   See Instructions, INHALE 2 PUFFS BY MOUTH TWICE DAILY. RINSE MOUTH AND THROAT AFTER USE, 3 EA, 1 Refill(s) ; Ordering Provider:   Yong Boyd MD; Catalog Code:   fluticasone-salmeterol ; Order Dt/Tm:   5/14/2019 5:03:16 PM CDT          Miscellaneous Rx Supply  :   Miscellaneous Rx Supply ; Status:   Prescribed ; Ordered As Mnemonic:   spacer for inhaler ; Simple Display Line:   See Instructions, use with albuterol inhaler, 1 EA, 0 Refill(s) ; Ordering Provider:   Severino Cannon PA-C; Catalog Code:   Miscellaneous Rx Supply ; Order Dt/Tm:   1/9/2018 2:18:32 PM CST            Home Meds    aspirin  :   aspirin ; Status:   Documented ; Ordered As Mnemonic:   aspirin 81 mg oral tablet, chewable ; Simple Display Line:   81 mg, 1 tab(s), Chewed, daily, 0 Refill(s) ; Catalog Code:   aspirin ; Order Dt/Tm:   11/27/2019 9:54:31 AM CST          clopidogrel  :   clopidogrel ; Status:   Documented ; Ordered As Mnemonic:   clopidogrel 75 mg oral tablet ; Simple Display Line:   75 mg, 1 tab(s), Oral, daily, 0 Refill(s) ; Catalog Code:   clopidogrel ; Order Dt/Tm:   11/27/2019 9:55:13 AM CST          cyanocobalamin  :   cyanocobalamin ; Status:   Documented ; Ordered As Mnemonic:   Vitamin B12 500 mcg oral tablet ; Simple Display Line:   500 mcg, 1 tab(s), po, daily, 0 Refill(s) ; Catalog Code:   cyanocobalamin ; Order Dt/Tm:   11/17/2015 10:02:05 AM CST          diphenhydramine-ibuprofen  :   diphenhydramine-ibuprofen ; Status:   Documented ; Ordered As Mnemonic:   Advil PM ; Simple Display Line:   2 tab(s), po, hs, PRN: as needed for insomnia, 0 Refill(s) ; Catalog Code:   diphenhydramine-ibuprofen ; Order Dt/Tm:   2/23/2017 2:39:37 PM CST          famotidine  :    famotidine ; Status:   Documented ; Ordered As Mnemonic:   Pepcid 20 mg oral tablet ; Simple Display Line:   20 mg, 1 tab(s), Oral, bid, 0 Refill(s) ; Catalog Code:   famotidine ; Order Dt/Tm:   1/6/2020 3:15:57 PM CST          fluticasone nasal  :   fluticasone nasal ; Status:   Documented ; Ordered As Mnemonic:   Flonase 50 mcg/inh nasal spray ; Simple Display Line:   1 spray(s), nasal, daily, 0 Refill(s) ; Catalog Code:   fluticasone nasal ; Order Dt/Tm:   4/23/2018 11:20:21 AM CDT          magnesium oxide  :   magnesium oxide ; Status:   Documented ; Ordered As Mnemonic:   magnesium oxide 250 mg oral tablet ; Simple Display Line:   250 mg, 1 tab(s), po, daily, 0 Refill(s) ; Catalog Code:   magnesium oxide ; Order Dt/Tm:   4/5/2018 4:37:34 PM CDT          metoprolol  :   metoprolol ; Status:   Documented ; Ordered As Mnemonic:   metoprolol succinate 25 mg oral capsule, extended release ; Simple Display Line:   25 mg, 1 cap(s), Oral, daily, 0 Refill(s) ; Catalog Code:   metoprolol ; Order Dt/Tm:   11/27/2019 9:58:05 AM CST          multivitamin  :   multivitamin ; Status:   Documented ; Ordered As Mnemonic:   Multiple Vitamins oral tablet ; Simple Display Line:   1 tab(s), po, daily, 0 Refill(s) ; Catalog Code:   multivitamin ; Order Dt/Tm:   4/22/2015 9:12:40 AM CDT          rosuvastatin  :   rosuvastatin ; Status:   Documented ; Ordered As Mnemonic:   Crestor 40 mg oral tablet ; Simple Display Line:   40 mg, 1 tab(s), Oral, daily, 0 Refill(s) ; Catalog Code:   rosuvastatin ; Order Dt/Tm:   11/27/2019 10:02:43 AM CST

## 2022-02-16 NOTE — TELEPHONE ENCOUNTER
---------------------  From: Georgette Plascencia RN (Phone Messages Pool ([id number]_[facility]))   To: [facility] Message Pool ([id number]_[place] [facility] [place]);     Sent: 12/20/2021 9:01:17 AM CST  Subject: home care refusal       PCP:  MYRA o/c      Time of Call:  8:29am       [facility] Calling[facility]  [facility]  Phone number:  [phone]    Note:   NAVNEET received, an FYI, stating referral for home care was received. Pt was seen on 12/19/21 for first visit/admission to services. Pt is refusing home care services. No return call is needed.     Last office visit and reason:---------------------  From: Debby Faustin CMA ([facility] Message Pool ([id number]_[facility]))   To: Yong Boyd MD;     Sent: 12/21/2021 7:12:31 AM CST  Subject: FW: home care refusal

## 2022-02-16 NOTE — TELEPHONE ENCOUNTER
Order is faxed to [facility] and they will contact patient and schedule.  Patient informed.

## 2022-02-16 NOTE — LETTER
(Inserted Image. Unable to display)     November 16, 2020      CELIA LUTZ  [address]  [place], [place] [id number]          Dear CELIA,      Thank you for selecting [facility] (previously [facility]) for your healthcare needs.    Our records indicate you are due for the following services:    Follow-up office visit.    Non-Fasting Labs.    If you had your labs done at another facility or with Direct Access Lab Testing at [facility], please bring in a copy of the results to your next visit, mail a copy, or drop off a copy of your results to your Healthcare Provider.    (FYI   Regarding office visits: In some instances, a video visit or telephone visit may be offered as an option.)    You are due for lab work and an office visit; please schedule the lab appointment 1 week before the office visit.  This will assure all results are available to discuss with your Healthcare Provider during your visit.    **It is very helpful if you bring your medication bottles to your appointment.  This assures we have all of your current medications, including strength and dosing information, documented accurately in your medical record.    To schedule an appointment or if you have further questions, please contact your clinic at [phone].      Powered by [employer]    Sincerely,    Yong Boyd MD

## 2022-02-16 NOTE — CARE COORDINATION
Patient:   CELIA LUTZ            MRN: [id number]            FIN: [id number]               Age:   71 years     Sex:  Male     :  1945   Associated Diagnoses:   None   Author:   Kamila Farooq CMA      Comprehensive Care Plan    Risk Assessment Score:  _Moderate    Medical Health Care Team:  (Care Team Members are people and/or organizations who help you manage your health)    Primary Care Provider: _Yong Boyd MD     Clinical Assistant: _ Debby CARD CMA       Care Coordinator Name:  _Kamila Farooq CMA.    Other: _ Severino Mcwilliams MD - Urology                  Brett Bustos MD - Urology                  Maximo Chen MD - Oncology                  Lucluisana Mratell MD - Oncology                  Filippo Carlton MD - Cardiology                  Agustín Barrera MD, PHD - General Surgery                  Agustín Lazo MD - Vascular Surgeon                  Nasir Brice MD - Orthopaedics  (Specialists, Dentist, Eye Care, Home Health Agencies, Meals on Wheels, Physical Therapy, Dialysis, Housing Facilities, Respite/ Programs)     I would like to communicate by:  X_ Phone   _ Portal   _ Letters   _ Face to face visit  _ Other: _    Personal Support Team:  (Family Members, Yazidi, Neighbors, Anyone who will help)  _Spouse  _  _  _  _    The Most important information you need to know about me:  (This is for you to record important details about your health and life that will help health care professionals understand your needs.)    _ Learns best by being shown    I have:   _ Advance Directives     _ Living Will     _ Power of      _ Other: _none    I have challenges and/or concerns with:   (Identify the types of problems or concerns you are currently facing as you manage your health.  Sharing your concerns helps your Care Team assist you.)     _ Thinking/Memory Problems     _ Spiritual Support     _ Vision     _ Transportation     _ Family Issues     _ Emotional Issues     _ Stress Management     _  Hearing     _ Access to Health Care     _X My Ability to Manage my chronic Conditions     _ Financial Issues     _ Safety    _ Mobility (balance, falls, moving around)     _ Other: _  Comments: _Pt has very seldomly fallen and had trouble getting back up.  Has minimal difficulty hearing.    I have dietary needs:   Yes      No       Comments:_    I live:  _ Alone     _X With a spouse     _ With family     _ With others     _ In assisted living     _ In a nursing home     _ Other  Comments:_    I learn best by:   _ Reading     _ Being Spoken to     _X Being shown     _ Listening to audio     _ Seeing pictures or videos     _ Other  Comments: _    Additional Information:          My goals for working toward better health:    _  _  _  _    What small steps can I take to reach these goals?  What will make reaching these goals difficult? :    _  _  _  _    Your Providers goals toward better health:    _.) s/p right MIRNA after hip fracture from fall (6/2016)   - normal DEXA (7/2015)    .) prostate Ca   - original prostate biopsy in '11, repeat earlier this fall; [place] 6   - watchful surveillance; q6 month PSA    .) s/p right wedge resection of limited stage adenocarcinoma of lung (11/2015)    .) COPD   - on Advair 115/21mcg BID with noticeable improvement in breathing   - albuterol on rare occasions   - has not participated in pulmonary rehab    .) post-operative LEILA/ CKD (11/2015), baseline SCr 1.3 (previous left nephrectomy for benign hemangioma)     .) hypertension; controlled  current antihypertensive regimen: lisinopril/hctz 20/12.5mg daily, metoprolol 50mg BID  regimen changes: none  intolerance:  future titration/work-up plan:    - SBP <140 goal   - interval rise in SCr; recheck today    .) AAA; previous endovascular repair   - following with [facility] vascular [facility]; Dr. Lazo    .) health maintenance   - CRC due in '17; arrange now; no anticipated future screening after this coming endoscopy   - q6 month PSA   -  Prevacid 30mg BID   - enrolled in [facility]    RTC q6 months        Medications          *denotes recorded medication          Anti-static valved spacer chamber: See Instructions, use as directed with inhaler, 1 EA, 0 Refill(s).          ProAir HFA 90 mcg/inh inhalation aerosol: 2 puff(s), inh, qid, use with spacer chamber, PRN: as needed for wheezing, 1 EA, 11 Refill(s).          *aspirin 81 mg oral tablet: 81 mg, 1 tab(s), po, daily, tab(s).          *Vitamin B12 500 mcg oral tablet: 500 mcg, 1 tab(s), po, daily, 0 Refill(s).          *Advil PM: 2 tab(s), po, hs, PRN: as needed for insomnia, 0 Refill(s).          Advair  mcg-21 mcg/inh inhalation aerosol: 2 puff(s), inh, bid, for 30 day(s), rinse mouth and throat after use, 1 EA, 11 Refill(s).          hydrochlorothiazide-lisinopril 12.5 mg-20 mg oral tablet: 1 tab(s), PO, Daily, 90 tab(s), 3 Refill(s).          lansoprazole 30 mg oral delayed release capsule: 30 mg, 1 cap(s), PO, bid, 180 cap(s), 3 Refill(s).          Metoprolol Tartrate 50 mg oral tablet: 50 mg, 1 tab(s), po, bid, 180 tab(s), 2 Refill(s).          *Multiple Vitamins oral tablet: 1 tab(s), po, daily, 0 Refill(s).          pravastatin 80 mg oral tablet: 80 mg, 1 tab(s), po, daily, 90 tab(s), 2 Refill(s).     Allergies          No known allergies  Problems          Oropharyngeal cancer          AAA (Abdominal Aortic Aneurysm)          Lipids abnormal          HTN (Hypertension)          Prostate cancer          DJD (Degenerative Joint Disease)          Former Smoker          Solitary kidney          Solitary kidney          H/O unilateral nephrectomy          [place] Esophagus          COPD (chronic obstructive pulmonary disease) with acute bronchitis          Adenocarcinoma of lung          Anticoagulated          Closed hip fracture          Coronary artery disease due to lipid rich plaque          GERD (gastroesophageal reflux disease)          Cardiac arrhythmia          CKD (chronic kidney  disease) stage 3, GFR 30-59 ml/min          Closed fracture of trochanter of femur    On a scale from 1 to 10: (1 not good at all - 10 my health is great)   1.  Most of the time would you say your health is:  _6     2.  Compared to one year ago, how would you rate your health in general now:  _  Much better     _  Somewhat better     _About the same     _x  Somewhat worse     _  Much worse    Contact will be made with patient:  _X Monthly   _ Biyearly   _ Yearly   _ As patient needs   _ As provider needs

## 2022-02-16 NOTE — TELEPHONE ENCOUNTER
Called and left VM to call back.    [facility]      Debra Hobbs RN  [facility]  ENT  [address]  [place] [address]  [email or web address]   Office:[phone]  Employed by [employer]

## 2022-02-16 NOTE — NURSING NOTE
Comprehensive Intake Entered On:  3/31/2020 1:03 PM CDT    Performed On:  3/31/2020 12:58 PM CDT by Rosamaria TORRES, Altagracia               Summary   Chief Complaint :   c/o left ear plugged, would like ear lavage.   Weight Measured :   197.2 lb(Converted to: 197 lb 3 oz, 89.45 kg)    Height Measured :   71 in(Converted to: 5 ft 11 in, 180.34 cm)    Body Mass Index :   27.5 kg/m2 (HI)    Body Surface Area :   2.11 m2   Systolic Blood Pressure :   190 mmHg (HI)    Diastolic Blood Pressure :   98 mmHg (HI)    Mean Arterial Pressure :   129 mmHg   Peripheral Pulse Rate :   85 bpm   BP Site :   Right arm   Pulse Site :   Radial artery   BP Method :   Manual   HR Method :   Manual   Temperature Tympanic :   96.6 DegF(Converted to: 35.9 DegC)  (LOW)    Oxygen Saturation :   94 %   Race :      Languages :   English   Ethnicity :   Not  or    Altagracia Blank MA - 3/31/2020 12:58 PM CDT   Health Status   Allergies Verified? :   Yes   Medication History Verified? :   Yes   Medical History Verified? :   Yes   Pre-Visit Planning Status :   Completed   Tobacco Use? :   Former smoker   Altagracia Blank MA - 3/31/2020 12:58 PM CDT   Consents   Consent for Immunization Exchange :   Consent Granted   Consent for Immunizations to Providers :   Consent Granted   Altagracia Blank MA - 3/31/2020 12:58 PM CDT   Meds / Allergies   (As Of: 3/31/2020 1:03:48 PM CDT)   Allergies (Active)   No Known Medication Allergies  Estimated Onset Date:   Unspecified ; Created By:   Debby Faustin CMA; Reaction Status:   Active ; Category:   Drug ; Substance:   No Known Medication Allergies ; Type:   Allergy ; Updated By:   Debby Faustin CMA; Reviewed Date:   12/4/2019 6:19 PM CST        Medication List   (As Of: 3/31/2020 1:03:48 PM CDT)   Prescription/Discharge Order    albuterol  :   albuterol ; Status:   Prescribed ; Ordered As Mnemonic:   Ventolin HFA 90 mcg/inh inhalation aerosol ; Simple Display Line:   2 puff(s), NEB, q4 hrs, PRN:  AS NEEDED FOR COUGH OR SHORTNESS OF BREATH, 3 EA, 0 Refill(s) ; Ordering Provider:   Yong Boyd MD; Catalog Code:   albuterol ; Order Dt/Tm:   11/26/2019 2:44:44 PM CST          fluticasone-salmeterol  :   fluticasone-salmeterol ; Status:   Prescribed ; Ordered As Mnemonic:   Advair  mcg-21 mcg/inh inhalation aerosol ; Simple Display Line:   See Instructions, INHALE 2 PUFFS BY MOUTH TWICE DAILY. RINSE MOUTH AND THROAT AFTER USE, 3 EA, 1 Refill(s) ; Ordering Provider:   Yong Boyd MD; Catalog Code:   fluticasone-salmeterol ; Order Dt/Tm:   5/14/2019 5:03:16 PM CDT          Miscellaneous Rx Supply  :   Miscellaneous Rx Supply ; Status:   Prescribed ; Ordered As Mnemonic:   spacer for inhaler ; Simple Display Line:   See Instructions, use with albuterol inhaler, 1 EA, 0 Refill(s) ; Ordering Provider:   Severino Cannon PA-C; Catalog Code:   Miscellaneous Rx Supply ; Order Dt/Tm:   1/9/2018 2:18:32 PM CST            Home Meds    aspirin  :   aspirin ; Status:   Documented ; Ordered As Mnemonic:   aspirin 81 mg oral tablet, chewable ; Simple Display Line:   81 mg, 1 tab(s), Chewed, daily, 0 Refill(s) ; Catalog Code:   aspirin ; Order Dt/Tm:   11/27/2019 9:54:31 AM CST          clopidogrel  :   clopidogrel ; Status:   Documented ; Ordered As Mnemonic:   clopidogrel 75 mg oral tablet ; Simple Display Line:   75 mg, 1 tab(s), Oral, daily, 0 Refill(s) ; Catalog Code:   clopidogrel ; Order Dt/Tm:   11/27/2019 9:55:13 AM CST          cyanocobalamin  :   cyanocobalamin ; Status:   Documented ; Ordered As Mnemonic:   Vitamin B12 500 mcg oral tablet ; Simple Display Line:   500 mcg, 1 tab(s), po, daily, 0 Refill(s) ; Catalog Code:   cyanocobalamin ; Order Dt/Tm:   11/17/2015 10:02:05 AM CST          diphenhydramine-ibuprofen  :   diphenhydramine-ibuprofen ; Status:   Documented ; Ordered As Mnemonic:   Advil PM ; Simple Display Line:   2 tab(s), po, hs, PRN: as needed for insomnia, 0 Refill(s) ; Catalog Code:    diphenhydramine-ibuprofen ; Order Dt/Tm:   2/23/2017 2:39:37 PM CST          ezetimibe  :   ezetimibe ; Status:   Deleted ; Ordered As Mnemonic:   ezetimibe 10 mg oral tablet ; Simple Display Line:   0 Refill(s) ; Catalog Code:   ezetimibe ; Order Dt/Tm:   3/31/2020 11:41:30 AM CDT ; Comment:   Responsible Provider: RUFINA PALOMARES          magnesium oxide  :   magnesium oxide ; Status:   Documented ; Ordered As Mnemonic:   magnesium oxide 250 mg oral tablet ; Simple Display Line:   250 mg, 1 tab(s), po, daily, 0 Refill(s) ; Catalog Code:   magnesium oxide ; Order Dt/Tm:   4/5/2018 4:37:34 PM CDT          metoprolol  :   metoprolol ; Status:   Documented ; Ordered As Mnemonic:   metoprolol succinate 25 mg oral capsule, extended release ; Simple Display Line:   25 mg, 1 cap(s), Oral, daily, 0 Refill(s) ; Catalog Code:   metoprolol ; Order Dt/Tm:   11/27/2019 9:58:05 AM CST          multivitamin  :   multivitamin ; Status:   Documented ; Ordered As Mnemonic:   Multiple Vitamins oral tablet ; Simple Display Line:   1 tab(s), po, daily, 0 Refill(s) ; Catalog Code:   multivitamin ; Order Dt/Tm:   4/22/2015 9:12:40 AM CDT          omeprazole  :   omeprazole ; Status:   Documented ; Ordered As Mnemonic:   omeprazole 20 mg oral delayed release capsule ; Simple Display Line:   20 mg, 1 cap(s), Oral, daily, 90 cap(s), 0 Refill(s) ; Catalog Code:   omeprazole ; Order Dt/Tm:   1/29/2020 4:57:06 PM CST          rosuvastatin  :   rosuvastatin ; Status:   Documented ; Ordered As Mnemonic:   Crestor 40 mg oral tablet ; Simple Display Line:   40 mg, 1 tab(s), Oral, daily, 0 Refill(s) ; Catalog Code:   rosuvastatin ; Order Dt/Tm:   11/27/2019 10:02:43 AM CST            Social History   Social History   (As Of: 3/31/2020 1:03:48 PM CDT)   Alcohol:        Current, 2 times per week, 3 drinks/episode average.   (Last Updated: 1/22/2020 2:53:06 PM CST by Debby Faustin CMA)          Tobacco:        Past, 20 per day.  49 year(s).   (Last  Updated: 3/17/2011 2:21:14 PM CDT by Rose Barr CMA)          Substance Abuse:        Never   (Last Updated: 3/19/2014 10:32:10 AM CDT by Brooke Krishnan)          Employment/School:        Employed, Work/School description: [employer] .   (Last Updated: 3/17/2011 2:20:42 PM CDT by Rose Barr CMA)          Home/Environment:        Marital status: .  Spouse/Partner name: Darlene Chau.   (Last Updated: 3/19/2014 10:33:12 AM CDT by Brooke Krishnan)          Nutrition/Health:        Type of diet: Regular.   (Last Updated: 3/19/2014 10:32:20 AM CDT by Brooke Krishnan)          Exercise:        Exercise frequency: Never.   (Last Updated: 4/22/2015 10:11:22 AM CDT by Brooke Krishnan)          Sexual:        Sexually active: Yes.   (Last Updated: 3/6/2012 12:53:53 PM CST by Rose Barr CMA)

## 2022-02-16 NOTE — TELEPHONE ENCOUNTER
Entered by Caitie Marti on May 11, 2020 8:33:13 AM CDT  LM on  to schedule appointments.      ---------------------  From: Connie Horton ([facility] Message Pool ([id number]))   To: Appointment Pool ([id number]);     Sent: 5/8/2020 1:23:38 PM CDT  Subject: RE: General Message     Please call pt to schedule f/u in 1 mo and please schedule lab only for BMP prior to f/u. Patient will need vitals at the time of labs. Thanks!        ---------------------  From: Yong Boyd MD   To: [employer] Message Pool ([id number]);     Sent: 5/8/2020 1:17:15 PM CDT  Subject: General Message     I need Mitchell to be scheduled for 1 month f/u with repeat BMP and vitals drawn prior to visit  Dx: CKD  Rx sent in for high dose Advair 500/50mcg BIDpt returned call at 1208 [phone]  pt advised and agrees to set up for lab/BP and f/u via phonelisinopril wasn't sent to pharm, resent 20mg lisinopril to [facility]

## 2022-02-16 NOTE — NURSING NOTE
Comprehensive Intake Entered On:  5/14/2019 4:35 PM CDT    Performed On:  5/14/2019 4:32 PM CDT by Debby Faustin CMA               Summary   Chief Complaint :   f/u labs    Weight Measured :   192.12 lb(Converted to: 192 lb 2 oz, 87.14 kg)    Systolic Blood Pressure :   138 mmHg (HI)    Diastolic Blood Pressure :   80 mmHg   Mean Arterial Pressure :   99 mmHg   Peripheral Pulse Rate :   88 bpm   Temperature Tympanic :   96 DegF(Converted to: 35.6 DegC)  (LOW)    Oxygen Saturation :   94 %   Race :      Languages :   English   Ethnicity :   Not  or    Fideeyad HOWARD Debby - 5/14/2019 4:32 PM CDT   Health Status   Allergies Verified? :   Yes   Medication History Verified? :   Yes   Medical History Verified? :   Yes   Pre-Visit Planning Status :   Completed   Tobacco Use? :   Former smoker   Fideyead HOWARD Debby - 5/14/2019 4:32 PM CDT   Social History   Social History   (As Of: 5/14/2019 4:35:48 PM CDT)   Alcohol:        Current, 1-2 times per week, 2 drinks/episode average.  3 drinks/episode maximum.   (Last Updated: 11/6/2018 12:49:02 PM CST by Anitha Batista)          Tobacco:        Past, 20 per day.  49 year(s).   (Last Updated: 3/17/2011 2:21:14 PM CDT by Rose Barr CMA)          Substance Abuse:        Never   (Last Updated: 3/19/2014 10:32:10 AM CDT by Brooke Krishnan)          Employment and Education:        Employed, Work/School description: Print .   (Last Updated: 3/17/2011 2:20:42 PM CDT by Rose Barr CMA)          Home and Environment:        Marital status: .  Spouse/Partner name: Darlene Chau.   (Last Updated: 3/19/2014 10:33:12 AM CDT by Brooke Krishnan)          Nutrition and Health:        Type of diet: Regular.   (Last Updated: 3/19/2014 10:32:20 AM CDT by Brooke Krishnan)          Exercise and Physical Activity:        Exercise frequency: Never.   (Last Updated: 4/22/2015 10:11:22 AM CDT by Brooke Krishnan)          Sexual:        Sexually  active: Yes.   (Last Updated: 3/6/2012 12:53:53 PM CST by Rose Barr CMA)

## 2022-02-16 NOTE — NURSING NOTE
Comprehensive Intake Entered On:  7/17/2020 3:47 PM CDT    Performed On:  7/17/2020 3:40 PM CDT by Esperanza Pina LPN               Summary   Chief Complaint :   skin lesion inside right cheek, lump on chest, bilateral feet swelling.    Weight Measured :   205 lb(Converted to: 205 lb 0 oz, 92.99 kg)    Height Measured :   71 in(Converted to: 5 ft 11 in, 180.34 cm)    Body Mass Index :   28.59 kg/m2 (HI)    Body Surface Area :   2.16 m2   Systolic Blood Pressure :   110 mmHg   Diastolic Blood Pressure :   64 mmHg   Mean Arterial Pressure :   79 mmHg   Peripheral Pulse Rate :   86 bpm   BP Site :   Right arm   Pulse Site :   Radial artery   BP Method :   Manual   HR Method :   Manual   Temperature Tympanic :   97.6 DegF(Converted to: 36.4 DegC)  (LOW)    Race :      Languages :   English   Ethnicity :   Not  or    Esperanza Pina LPN - 7/17/2020 3:40 PM CDT   Health Status   Allergies Verified? :   Yes   Medication History Verified? :   Yes   Pre-Visit Planning Status :   Completed   Esperanza Pina LPN - 7/17/2020 3:40 PM CDT   Consents   Consent for Immunization Exchange :   Consent Granted   Consent for Immunizations to Providers :   Consent Granted   Esperanza Pina LPN - 7/17/2020 3:40 PM CDT   Meds / Allergies   (As Of: 7/17/2020 3:47:08 PM CDT)   Allergies (Active)   No Known Medication Allergies  Estimated Onset Date:   Unspecified ; Created By:   Debby Faustin CMA; Reaction Status:   Active ; Category:   Drug ; Substance:   No Known Medication Allergies ; Type:   Allergy ; Updated By:   Debby Faustin CMA; Reviewed Date:   7/17/2020 3:41 PM CDT        Medication List   (As Of: 7/17/2020 3:47:08 PM CDT)   Prescription/Discharge Order    predniSONE  :   predniSONE ; Status:   Processing ; Ordered As Mnemonic:   predniSONE 20 mg oral tablet ; Ordering Provider:   Yong Boyd MD; Action Display:   Complete ; Catalog Code:   predniSONE ; Order Dt/Tm:   7/17/2020 3:44:04 PM CDT           tiotropium  :   tiotropium ; Status:   Prescribed ; Ordered As Mnemonic:   tiotropium 18 mcg inhalation capsule ; Simple Display Line:   18 mcg, 1 cap(s), Inhale, daily, 90 cap(s), 3 Refill(s) ; Ordering Provider:   Yong Boyd MD; Catalog Code:   tiotropium ; Order Dt/Tm:   6/4/2020 11:17:16 AM CDT          amLODIPine  :   amLODIPine ; Status:   Prescribed ; Ordered As Mnemonic:   amLODIPine 5 mg oral tablet ; Simple Display Line:   5 mg, 1 tab(s), Oral, daily, 90 tab(s), 3 Refill(s) ; Ordering Provider:   Yong Boyd MD; Catalog Code:   amLODIPine ; Order Dt/Tm:   6/4/2020 11:11:59 AM CDT          albuterol  :   albuterol ; Status:   Prescribed ; Ordered As Mnemonic:   ProAir HFA 90 mcg/inh inhalation aerosol ; Simple Display Line:   See Instructions, Please specify directions, refills and quantity, 3 EA, 1 Refill(s) ; Ordering Provider:   Yong Boyd MD; Catalog Code:   albuterol ; Order Dt/Tm:   6/2/2020 12:00:25 PM CDT          lisinopril  :   lisinopril ; Status:   Prescribed ; Ordered As Mnemonic:   lisinopril 20 mg oral tablet ; Simple Display Line:   20 mg, 1 tab(s), Oral, daily, 30 tab(s), 0 Refill(s) ; Ordering Provider:   Yong Boyd MD; Catalog Code:   lisinopril ; Order Dt/Tm:   5/12/2020 2:15:48 PM CDT          fluticasone-salmeterol  :   fluticasone-salmeterol ; Status:   Prescribed ; Ordered As Mnemonic:   Advair Diskus 500 mcg-50 mcg inhalation powder ; Simple Display Line:   1 puff(s), Inhale, bid, for 30 day(s), 60 EA, 2 Refill(s) ; Ordering Provider:   Yong Boyd MD; Catalog Code:   fluticasone-salmeterol ; Order Dt/Tm:   5/8/2020 1:14:31 PM CDT          Miscellaneous Rx Supply  :   Miscellaneous Rx Supply ; Status:   Prescribed ; Ordered As Mnemonic:   spacer for inhaler ; Simple Display Line:   See Instructions, use with albuterol inhaler, 1 EA, 0 Refill(s) ; Ordering Provider:   Severino Cannon PA-C; Catalog Code:   Miscellaneous Rx Supply ; Order Dt/Tm:   1/9/2018 2:18:32 PM CST             Home Meds    acetaminophen-diphenhydramine  :   acetaminophen-diphenhydramine ; Status:   Documented ; Ordered As Mnemonic:   Tylenol Extra Strength PM ; Simple Display Line:   2 tab(s), Oral, hs, 0 Refill(s) ; Catalog Code:   acetaminophen-diphenhydramine ; Order Dt/Tm:   6/4/2020 10:56:08 AM CDT          omeprazole  :   omeprazole ; Status:   Documented ; Ordered As Mnemonic:   omeprazole 20 mg oral delayed release capsule ; Simple Display Line:   20 mg, 1 cap(s), Oral, daily, 90 cap(s), 0 Refill(s) ; Catalog Code:   omeprazole ; Order Dt/Tm:   1/29/2020 4:57:06 PM CST          rosuvastatin  :   rosuvastatin ; Status:   Documented ; Ordered As Mnemonic:   Crestor 40 mg oral tablet ; Simple Display Line:   40 mg, 1 tab(s), Oral, daily, 0 Refill(s) ; Catalog Code:   rosuvastatin ; Order Dt/Tm:   11/27/2019 10:02:43 AM CST          clopidogrel  :   clopidogrel ; Status:   Documented ; Ordered As Mnemonic:   clopidogrel 75 mg oral tablet ; Simple Display Line:   75 mg, 1 tab(s), Oral, daily, 0 Refill(s) ; Catalog Code:   clopidogrel ; Order Dt/Tm:   11/27/2019 9:55:13 AM CST          metoprolol  :   metoprolol ; Status:   Documented ; Ordered As Mnemonic:   metoprolol succinate 25 mg oral capsule, extended release ; Simple Display Line:   25 mg, 1 cap(s), Oral, daily, 0 Refill(s) ; Catalog Code:   metoprolol ; Order Dt/Tm:   11/27/2019 9:58:05 AM CST          aspirin  :   aspirin ; Status:   Documented ; Ordered As Mnemonic:   aspirin 81 mg oral tablet, chewable ; Simple Display Line:   81 mg, 1 tab(s), Chewed, daily, 0 Refill(s) ; Catalog Code:   aspirin ; Order Dt/Tm:   11/27/2019 9:54:31 AM CST          magnesium oxide  :   magnesium oxide ; Status:   Documented ; Ordered As Mnemonic:   magnesium oxide 250 mg oral tablet ; Simple Display Line:   250 mg, 1 tab(s), po, daily, 0 Refill(s) ; Catalog Code:   magnesium oxide ; Order Dt/Tm:   4/5/2018 4:37:34 PM CDT          cyanocobalamin  :   cyanocobalamin ;  Status:   Documented ; Ordered As Mnemonic:   Vitamin B12 500 mcg oral tablet ; Simple Display Line:   500 mcg, 1 tab(s), po, daily, 0 Refill(s) ; Catalog Code:   cyanocobalamin ; Order Dt/Tm:   11/17/2015 10:02:05 AM CST          multivitamin  :   multivitamin ; Status:   Documented ; Ordered As Mnemonic:   Multiple Vitamins oral tablet ; Simple Display Line:   1 tab(s), po, daily, 0 Refill(s) ; Catalog Code:   multivitamin ; Order Dt/Tm:   4/22/2015 9:12:40 AM CDT            ID Risk Screen   Recent Travel History :   No recent travel   Family Member Travel History :   No recent travel   Other Exposure to Infectious Disease :   Unknown   Esperanza Pina LPN - 7/17/2020 3:40 PM CDT

## 2022-02-16 NOTE — PROGRESS NOTES
Patient:   CELIA LUTZ            MRN: [id number]            FIN: [id number]               Age:   75 years     Sex:  Male     :  1945   Associated Diagnoses:   GERD (gastroesophageal reflux disease); Anemia; HTN (Hypertension)   Author:   Donis Garcia PA-C      Visit Information   Visit type:  General concerns.    Accompanied by:  Spouse.    Source of history:  Self, Spouse, Medical record.       Chief Complaint   10/12/2020 5:33 PM CDT   Pt is here for hospital follow up. Was in hospital for elevated creatinine and potassium. Low Hgb.      Interval History   Anemia   The course is improving.  The effect on daily activities is Still feels run down. History of Acute on chronic renal failure. Anemia, ?GI bleed. Needs lab work. On oral iron. Stools always dark. No change in color or consistency today. These issues were discovered at pre op last week. Was in [facility] until discharge yesterday. Those records were reviewed. Oral surgery scheduled for this Friday with Dr. Alon Nunez. CC above noted and confirmed with the patient.  and Hgb 7.9 at discharge. Creatinines in the 4's..        Review of Systems   Constitutional:  Weakness, Fatigue.    Respiratory:  Negative.    Cardiovascular:  Negative.    Gastrointestinal:  Negative except as documented in history of present illness.       Health Status   Allergies:    Allergic Reactions (Selected)  No Known Medication Allergies   Medications:  (Selected)   Prescriptions  Prescribed  Advair Diskus 500 mcg-50 mcg inhalation powder: See Instructions, Instructions: INHALE 1 PUFF TWICE A DAY, # 180 unknown unit, 0 Refill(s), Type: Maintenance, [facility]acy: [facility], 71, in, 20 15:40:00 CDT, Height Measured, 205, lb, 20 15:40:00 CDT, Weight Measured  Ventolin HFA 90 mcg/inh inhalation aerosol: 2 puff(s), Inhale, q6 hrs, # 1 EA, 3 Refill(s), AD, Type: Maintenance, [facility]acy: [facility], 2 puff(s) Inhale q6 hrs, 71, in, 20 15:40:00 CDT,  Height Measured, 205, lb, 07/17/20 15:40:00 CDT, Weight Measured  Ventolin HFA 90 mcg/inh inhalation aerosol: 2 puff(s), Inhale, q6 hrs, # 1 EA, 3 Refill(s), Type: Maintenance, Pharmacy: [facility] [id number] [facility], 2 puff(s) Inhale q6 hrs, 71, in, 07/17/20 15:40:00 CDT, Height Measured, 205, lb, 07/17/20 15:40:00 CDT, Weight Measured  spacer for inhaler: spacer for inhaler, See Instructions, Instructions: use with albuterol inhaler, Supply, # 1 EA, 0 Refill(s), Type: Maintenance, Pharmacy: [facility] [id number] [facility], use with albuterol inhaler  tiotropium 18 mcg inhalation capsule: = 1 cap(s) ( 18 mcg ), Inhale, daily, # 90 cap(s), 3 Refill(s), Type: Maintenance, Pharmacy: [id number], 1 cap(s) Inhale daily, 71, in, 06/04/20 11:00:00 CDT, Height Measured, 202, lb, 06/04/20 10:56:00 CDT, Weight Measured  Documented Medications  Documented  Multiple Vitamins oral tablet: 1 tab(s), po, daily, 0 Refill(s), Type: Maintenance  Tylenol Extra Strength PM: 2 tab(s), Oral, hs, 0 Refill(s), Type: Maintenance  Vitamin B12 500 mcg oral tablet: 1 tab(s) ( 500 mcg ), po, daily, 0 Refill(s), Type: Maintenance  Zetia 10 mg oral tablet: = 1 tab(s) ( 10 mg ), Oral, daily, # 30 tab(s), 0 Refill(s), Type: Maintenance  magnesium oxide 250 mg oral tablet: 1 tab(s) ( 250 mg ), po, daily, 0 Refill(s), Type: Maintenance  metoprolol succinate 25 mg oral capsule, extended release: = 1 cap(s) ( 25 mg ), Oral, daily, 0 Refill(s), Type: Maintenance  omeprazole 20 mg oral delayed release capsule: = 1 cap(s) ( 20 mg ), Oral, daily, # 90 cap(s), 0 Refill(s), Type: Maintenance  polyethylene glycol 3350: ( 17 gm ), Oral, daily, 0 Refill(s), Type: Maintenance  sodium bicarbonate 650 mg oral tablet: = 1 tab(s) ( 650 mg ), Oral, qid, 0 Refill(s), Type: Maintenance   Problem list:    All Problems (Selected)  Lung cancer / SNOMED CT 567848885 / Confirmed  Lipids abnormal / SNOMED CT 857357421 / Confirmed  HTN (Hypertension) / SNOMED CT 36488837 /  Confirmed  History of oropharyngeal cancer / SNOMED CT 2554087279 / Confirmed  H/O unilateral nephrectomy / SNOMED CT 609478034 / Confirmed  H/O prostate cancer / SNOMED CT 6568231399 / Confirmed  GERD (gastroesophageal reflux disease) / SNOMED CT 6154768254 / Confirmed  Former Smoker / SNOMED CT [id number] / Confirmed  DJD (Degenerative Joint Disease) / SNOMED CT 9874908596 / Confirmed  Coronary artery disease due to lipid rich plaque / SNOMED CT 6956691857 / Confirmed  COPD (chronic obstructive pulmonary disease) with acute bronchitis / SNOMED CT 35962873 / Confirmed  Closed hip fracture / SNOMED CT 770500789 / Confirmed  Closed fracture of trochanter of femur / SNOMED CT 7889929455 / Confirmed  CKD (chronic kidney disease) stage 3, GFR 30-59 ml/min / SNOMED CT 4391611093 / Confirmed  Cardiac arrhythmia / SNOMED CT 54831605 / Confirmed  Ragsdale Esophagus / SNOMED CT 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  Resolved on 10/11/2020 at 75 years.  Comments:  10/12/2020 CDT 3:36 PM CDT - Dayan Musa  @ [facility] due to acute renal failure.  Inpatient stay (120099601): Onset on 1/3/2020 at 74 years.  Resolved on 1/4/2020 at 74 years.  Comments:  1/17/2020 CST 1:41 PM CST - [facility] [place] - Chest pain, acute renal failure.  Inpatient stay (514461476): Onset on 11/24/2019 at 74 years.  Resolved on 11/26/2019 at  74 years.  Comments:  12/10/2019 CST 1:46 PM Jessi Fisher  @[facility] - NSTEMI.  *Hospitalized@[facility] Septic hip vs hematoma: Onset on 7/3/2016 at 70 years.  Resolved on 2016 at 70 years.  History of sepsis (8575796357): Onset on 7/3/2016 at 70 years.  Resolved.  Comments:  2016 CDT 2:14 PM CDT - Alma Barfield  Severe; due to septic hip.    2016 CDT 3:37 PM CDT - Alma Barfield  Sepsis organ failure: Acute renal failure.  *Hospitalized@[facility] - Hip fracture: Onset on 2016 at 70 years.  Resolved on 2016 at 70 years.  *Hospitalized@[facility] - Atypical chest pain: Onset on 2015 at 69 years.  Resolved on 2015 at 69 years.  Prostate cancer (520367226):  Resolved.   Family History:    CA - Breast cancer  Sister (Alexa, )  Lupus  Sister (Rebecca)  Alcohol abuse  Sister (Alexa, )  Mother ()  SMA type III  Grandfather (M)  Obese  Sister (Sujey, )     Procedure history:    Coronary angiography (68876239) on 2019 at 74 Years.  Comments:  12/10/2019 1:47 PM Jessi Fisher  and PCI of the right coronary artery.  Esophagogastroduodenoscopy (771258920) on 2019 at 73 Years.  Comments:  10/12/2020 3:44 PM CDT - Dayan Musa  Endoscopic US, Fine Needle Aspiration; Gastric Biopsies of polyps.  Colonoscopy ([id number]) on 3/21/2017 at 71 Years.  Comments:  3/22/2017 2:33 PM CDT - Filippo Barbour MD  Indication: Adenomatous Polyps  Sedation: MAC  Findings: Adenomatous polyp cecum, hemorrohids  Rec: Repeat in 5 years  Thoracoscopic wedge resection of lung (3877920788) in 2016 at 71 Years.  Comments:  10/12/2020 3:40 PM CDT - Dayan Musa  RUL, RML  Right Hip Bipoloar Hemiarthroplasty on 2016 at 70 Years.  Comments:  2016 7:26 AM CDT - Debby Faustin CMA  Right displasced femoral neck fracture  right thoracotomy on 11/15/2015 at 70 Years.  Comments:  2015 9:07 AM Debby Davis CMA  wedge resection right upper lung nodule,  wedge resection right middle lobe lung nodule, Mediastinal lymph node dissection  Colonoscopy ([id number]) on 5/12/2014 at 68 Years.  Comments:  5/15/2014 10:56 AM CDT - Livier Stallings RN  Sedation: midazolam, fentanyl  Indication: screen  5-6mm tubular adenom x3, 8mm tubular adenoma x1, 12mm tubular adenoma with advance adenoma due to size  Repeat in 3 years.  nepherectomy in the month of 3/2009 at 63 Years.  Lumbar discectomy in 2006 at 61 Years.  Left salivary gland removal in 1995 at 50 Years.  Oral surgery in 1994 at 49 Years.  Aortic aneurysm, abdominal ([id number]).   Social History:        Electronic Cigarette/Vaping Assessment            Electronic Cigarette Use: Never.      Alcohol Assessment            Current, Liquor (Hard) (1.5 oz), 1-2 times per month, 2 drinks/episode average.      Tobacco Assessment            Past, 20 per day.  49 year(s).      Substance Abuse Assessment            Never      Employment and Education Assessment            Retired, Highest education level: High school.      Home and Environment Assessment            Marital status: .  Spouse/Partner name: Darlene Chau.  4 children.  Living situation: Home/Independent.               Injuries/Abuse/Neglect in household: No.  Feels unsafe at home: No.  Family/Friends available for support:               Yes.      Nutrition and Health Assessment            Type of diet: Regular.  Wants to lose weight: No.  Sleeping concerns: No.  Feels highly stressed: No.      Exercise and Physical Activity Assessment            Exercise frequency: Occasional.      Sexual Assessment            Sexually active: No.  Identifies as male, Sexual orientation: Straight or heterosexual.  Contraceptive Use               Details: None.        Physical Examination   Vital Signs   10/12/2020 5:33 PM CDT Temperature Tympanic 97.1 DegF  LOW    Peripheral Pulse Rate 87 bpm    HR Method Electronic    Systolic Blood Pressure 111 mmHg     Diastolic Blood Pressure 72 mmHg    Mean Arterial Pressure 85 mmHg    BP Site Right arm    BP Method Electronic      Respiratory:  Lungs are clear to auscultation, Respirations are non-labored.    Cardiovascular:  Normal rate, Regular rhythm.    Gastrointestinal:  Soft, Non-tender, Normal bowel sounds, No organomegaly.       Review / Management   Results review:  Hemoglobin up to 8.7.       Impression and Plan   Diagnosis     GERD (gastroesophageal reflux disease) (VWJ81-ZC K21.9).     Anemia (QVI60-EW D64.9).     HTN (Hypertension) (YFD41-LQ I10).     Summary:  I will contact ENT and GI tomorrow to develop plan after I have the labs back. Then, I will contact the patient. To ED if acute symptoms as we discussed..    Medications reconciled

## 2022-02-16 NOTE — TELEPHONE ENCOUNTER
---------------------  From: Georgette Plascencia RN (Phone Messages Pool ([id number]_[facility]))   To: Advanced Practice Provider Wilbur ([id number]_[facility]);     Sent: 9/29/2021 2:14:28 PM CDT  Subject: General Message       PCP:   MYRA  O/C     Time of Call:  1:55pm       Person Calling:  pt  Phone number:  [phone]    Returned call at: 2pm    Note:  VM received from pt, stating he has multiple medical problems. Cardiologist, Dr. Valentine wants pt to begin Amlodipine for HTN. Pt wanted to make sure [facility] approved this medication was safe for him to take. Pt wants his PCP to know what is being prescribed by other providers. Pt did not remember the dose of Amlodipine.     Please advise if amlodipine is safe.     Last office visit and reason: 7/29/21 multiple  BRM---------------------  From: Davis BENEDICT, Severino CAMACHO (Advanced Practice Provider Pool ([id number]_[facility]))   To: Phone [employer] Pool ([id number]_[place] [facility] [place]);     Sent: 9/29/2021 2:33:53 PM CDT  Subject: RE: General Message     Amlodipine is safe for him to takeCalled and informed pt.  He was thankful for the call.   15-May-2019 16:39

## 2022-02-16 NOTE — NURSING NOTE
Depression Screening Entered On:  10/26/2021 2:59 PM CDT    Performed On:  10/26/2021 2:58 PM CDT by Debby Faustin CMA               Depression Screening   Feeling Down, Depressed, Hopeless :   Several days   Poor Appetite or Overeating :   Nearly every day   Trouble Falling or Staying Asleep :   More than half the days   Feeling Tired or Little Energy :   More than half the days   Feeling Bad About Yourself :   Several days   Trouble Concentrating :   More than half the days   Moving or Speaking Slowly :   More than half the days   Thoughts Better Off Dead or Hurting Self :   Not at all   Difficulty at Work, Home, Getting Along :   Very difficult   Detailed Depression Screen Score :   12    Debby Faustin CMA - 10/26/2021 2:58 PM CDT

## 2022-02-16 NOTE — LETTER
(Inserted Image. Unable to display)   May 14, 2019      CELIA LUTZ  [address]    [place], [place] [id number]                  Result Name Current Result Previous Result Reference Range   eGFR Non- ((L)) 48 11/26/2018  >60 -    Cholesterol (mg/dL) ((H)) 227 5/6/2019   - <200   HDL (mg/dL)  70 5/6/2019  >40 -    Triglyceride (mg/dL) ((H)) 225 5/6/2019   - <150   LDL ((H)) 123 5/6/2019     Cholesterol/HDL Ratio  3.2 5/6/2019   - <5.0   Non-HDL Cholesterol ((H)) 157 5/6/2019   - <130   Alkaline Phosphatase (unit/L)  82 5/6/2019  94 10/15/2018 40 - 115   Phosphorus Level (mg/dL)  3.4 5/6/2019  3.3 10/15/2018 2.1 - 4.3   Glucose Level (mg/dL)  96 5/6/2019  96 10/15/2018 65 - 99   BUN (mg/dL)  24 5/6/2019  23 10/15/2018 7 - 25   Creatinine Level (mg/dL) ((H)) 1.32 5/6/2019 ((H)) 1.44 11/26/2018 0.70 - 1.18   eGFR (mL/min/1.73m2) ((L)) 53 5/6/2019 ((L)) 49 10/15/2018 > OR = 60 -    eGFR  (mL/min/1.73m2)  61 5/6/2019 ((L)) 58 11/26/2018 > OR = 60 -    BUN/Creat Ratio  18 5/6/2019  16 10/15/2018 6 - 22   Sodium Level (mmol/L)  137 5/6/2019  137 10/15/2018 135 - 146   Potassium Level (mmol/L)  5.2 5/6/2019  5.3 10/15/2018 3.5 - 5.3   Chloride Level (mmol/L)  99 5/6/2019  100 10/15/2018 98 - 110   CO2 Level (mmol/L)  32 5/6/2019  31 10/15/2018 20 - 32   Calcium Level (mg/dL)  9.9 5/6/2019  9.8 10/15/2018 8.6 - 10.3   WBC  4.3 5/6/2019  6.4 10/15/2018 3.8 - 10.8   RBC ((L)) 4.08 5/6/2019 ((L)) 3.84 10/15/2018 4.20 - 5.80   Hgb (gm/dL) ((L)) 12.7 5/6/2019 ((L)) 11.4 10/15/2018 13.2 - 17.1   Hct (%) ((L)) 37.0 5/6/2019 ((L)) 33.9 10/15/2018 38.5 - 50.0   MCV (fL)  90.7 5/6/2019  88.3 10/15/2018 80.0 - 100.0   MCH (pg)  31.1 5/6/2019  29.7 10/15/2018 27.0 - 33.0   MCHC (gm/dL)  34.3 5/6/2019  33.6 10/15/2018 32.0 - 36.0   RDW (%)  14.0 5/6/2019  14.6 10/15/2018 11.0 - 15.0   Platelet  177 5/6/2019  186 10/15/2018 140 - 400   MPV (fL)  9.3 5/6/2019  9.6 10/15/2018 7.5 - 12.5   PSA (ng/mL) ((H))  15.8 5/6/2019 ((H)) 10.8 10/15/2018  - < OR = 4.0   PTH Intact (pg/mL)  25 5/6/2019  26 10/15/2018 14 - 64   Ur Creatinine (mg/dL)  64 5/6/2019  35 10/15/2018 20 - 320   U Protein/Creatinine Ratio ((H)) 969 5/6/2019 ((H)) 457 10/15/2018 22 - 128   U Protein (mg/dL) ((H)) 62 5/6/2019  16 10/15/2018 5 - 25         Sincerely,        Yong Boyd MD

## 2022-02-16 NOTE — TELEPHONE ENCOUNTER
Order is faxed to [facility] and they will contact patient and schedule.  Patient informed.Patients wife called stating she has not heard anything and would like to get his done asap/Friday. CSS called [facility] who state the order was submitted yesterday and that they had received a call from pt also, but have not called them back.  Asked that they contact wife.

## 2022-02-16 NOTE — NURSING NOTE
Comprehensive Intake Entered On:  7/11/2019 4:50 PM CDT    Performed On:  7/11/2019 4:47 PM CDT by Debby Faustin CMA               Summary   Chief Complaint :   preop scheduled for upper endoscopy with Dr Edwards at [facility] on 7/26   Weight Measured :   192.8 lb(Converted to: 192 lb 13 oz, 87.45 kg)    Height Measured :   71 in(Converted to: 5 ft 11 in, 180.34 cm)    Body Mass Index :   26.89 kg/m2 (HI)    Body Surface Area :   2.09 m2   Systolic Blood Pressure :   142 mmHg (HI)    Diastolic Blood Pressure :   85 mmHg (HI)    Mean Arterial Pressure :   104 mmHg   Peripheral Pulse Rate :   94 bpm   BP Site :   Right arm   Temperature Tympanic :   96.4 DegF(Converted to: 35.8 DegC)  (LOW)    Oxygen Saturation :   85 % (LOW)    Race :      Languages :   English   Ethnicity :   Not  or    Debby Faustin CMA - 7/11/2019 4:47 PM CDT   Health Status   Allergies Verified? :   Yes   Medication History Verified? :   Yes   Medical History Verified? :   Yes   Pre-Visit Planning Status :   Completed   Tobacco Use? :   Former smoker   Debby Faustin CMA - 7/11/2019 4:47 PM CDT   Social History   Social History   (As Of: 7/11/2019 4:50:53 PM CDT)   Alcohol:        Current, 1-2 times per week, 2 drinks/episode average.  3 drinks/episode maximum.   (Last Updated: 11/6/2018 12:49:02 PM CST by Anitha Batista)          Tobacco:        Past, 20 per day.  49 year(s).   (Last Updated: 3/17/2011 2:21:14 PM CDT by Rose Barr CMA)          Substance Abuse:        Never   (Last Updated: 3/19/2014 10:32:10 AM CDT by Brooke Krishnan)          Employment/School:        Employed, Work/School description: Print .   (Last Updated: 3/17/2011 2:20:42 PM CDT by Rose Barr CMA)          Home/Environment:        Marital status: .  Spouse/Partner name: Darlene Chau.   (Last Updated: 3/19/2014 10:33:12 AM CDT by Brooke Krishnan)          Nutrition/Health:        Type of diet: Regular.   (Last Updated:  3/19/2014 10:32:20 AM CDT by Brooke Krishnan)          Exercise:        Exercise frequency: Never.   (Last Updated: 4/22/2015 10:11:22 AM CDT by Brooke Krishnan)          Sexual:        Sexually active: Yes.   (Last Updated: 3/6/2012 12:53:53 PM CST by Rose Barr CMA)

## 2022-02-16 NOTE — TELEPHONE ENCOUNTER
---------------------  From: Esperanza Pina LPN (eRx Pool ([id number]_[facility]))   To: MYRA Message Pool (32224_[place] [facility] [place]);     Sent: 6/1/2020 2:36:40 PM CDT  Subject: FW: Medication Management   Due Date/Time: 5/29/2020 1:59:00 PM CDT     Please advise medication change from Ventolin to Pro air per insurance.     Med Refill    Date of last office visit and reason:  05/08/20, telemed/CDM    Date of last Med Check or Px:  _  Date of last labs pertaining to med:  _    RTC order in chart:  yes patient has appt on 06/04/20    For protocol refill, has patient been contacted?  _      ---------------------  From: Jahaira Vasquez CMA   To: eRx Pool ([id number]_[place] [facility] [place]);     Sent: 6/1/2020 11:11:19 AM CDT  Subject: FW: Medication Management   Due Date/Time: 5/29/2020 1:59:00 PM CDT           From: [facility] [id number]  To: Yong Boyd MD  Sent: May 28, 2020 1:59:51 PM CDT  Subject: Medication Management  Due: May 29, 2020 1:59:51 PM CDT     Originally Prescribed Drug:  Drug: albuterol (albuterol 90 mcg/inh inhalation aerosol), INHALE 2 PUFFS BY MOUTH EVERY 4 HOURS AS NEEDED FOR COUGH OR SHORTNESS OF BREATH  Quantity: 54 inhalers  Days Supply: 30  Refills: 0  Substitutions Allowed  Notes from Pharmacy: Alternative Requested:INSURANCE PREFERS DIFFERENT BRAND NOW NO LONGER VENTOLIN     ** On Hold Pending Signature **  Preferred Alternative Drug: albuterol (ProAir HFA 90 mcg/inh inhalation aerosol), Please specify directions, refills and quantity  Quantity: 1 inhalers  Days Supply: 0  Refills: 0  Substitutions Allowed  Notes from Pharmacy: Alternative Requested:INSURANCE PREFERS DIFFERENT BRAND NOW NO LONGER VENTOLIN---------------------  From: Debby Faustin CMA   To: [id number]    Sent: 6/2/2020 12:00:45 PM CDT  Subject: FW: Medication Management     ** Approved with modifications: **  Order:albuterol (ProAir HFA 90 mcg/inh inhalation aerosol)  Please specify directions, refills and quantity  Qty:   3 EA        Refills:  1          Substitutions Allowed     Route To Pharmacy - [id number]   Note from Pharmacy:  Alternative Requested:INSURANCE PREFERS DIFFERENT BRAND NOW NO LONGER VENTOLIN  Signed by Debby Faustin CMA    ** Cancelled: **  Discontinue:albuterol (Ventolin HFA 90 mcg/inh inhalation aerosol)  Signed by Debby Faustin CMA

## 2022-02-16 NOTE — TELEPHONE ENCOUNTER
---------------------  From: Carlos Guerrero MD   Sent: 10/8/2020 1:34:48 PM CDT  Subject: General Message     Patient admitted to [facility] under the care of Dr. Sanches for acute on chronic kidney failure.

## 2022-02-16 NOTE — NURSING NOTE
Comprehensive Intake Entered On:  10/14/2020 1:41 PM CDT    Performed On:  10/14/2020 1:34 PM CDT by Debby Faustin CMA               Summary   Chief Complaint :   LEILA f/u - review labs, discuss    Weight Measured :   206 lb(Converted to: 206 lb 0 oz, 93.440 kg)    Height Measured :   71 in(Converted to: 5 ft 11 in, 180.34 cm)    Body Mass Index :   28.73 kg/m2 (HI)    Body Surface Area :   2.16 m2   Systolic Blood Pressure :   102 mmHg   Diastolic Blood Pressure :   59 mmHg (LOW)    Mean Arterial Pressure :   73 mmHg   Peripheral Pulse Rate :   90 bpm   Temperature Tympanic :   96 DegF(Converted to: 35.6 DegC)  (LOW)    Oxygen Saturation :   95 %   Race :      Languages :   English   Ethnicity :   Not  or    Debby Faustin CMA - 10/14/2020 1:34 PM CDT   Health Status   Allergies Verified? :   Yes   Medication History Verified? :   Yes   Medical History Verified? :   Yes   Pre-Visit Planning Status :   Completed   Tobacco Use? :   Former smoker   Debby Faustin CMA - 10/14/2020 1:34 PM CDT   ID Risk Screen   Recent Travel History :   No recent travel   Family Member Travel History :   No recent travel   Other Exposure to Infectious Disease :   Unknown   Debby Faustin CMA - 10/14/2020 1:34 PM CDT   More Vitals   Systolic Blood Pressure Standing :   112 mmHg   Diastolic Blood Pressure Standing :   63 mmHg   Debby Faustin CMA - 10/14/2020 1:34 PM CDT

## 2022-02-16 NOTE — CARE COORDINATION
Patient:   CELIA LUTZ            MRN: [id number]            FIN: [id number]               Age:   74 years     Sex:  Male     :  1945   Associated Diagnoses:   None   Author:   Monik Zhang CMA      Sources of Information:  [ ] Patient, family member, or caregiver (Please list):  [x ] Hospital discharge summary  [ ] Hospital fax  [ ] List of recent hospitalizations or ED visits  [ ] Other:     Discharged From: [facility]  Discharge Date: 2020    Diagnosis/Problem: Renal failure    Medication Changes: [x ] Yes [ ] No   Medication List Updated: [x ] Yes [ ] No  Hospital medication list reconciled with clinic medication list: Yes  Medications          *denotes recorded medication          spacer for inhaler: See Instructions, use with albuterol inhaler, 1 EA, 0 Refill(s).          Ventolin HFA 90 mcg/inh inhalation aerosol: 2 puff(s), NEB, q4 hrs, PRN: AS NEEDED FOR COUGH OR SHORTNESS OF BREATH, 3 EA, 0 Refill(s).          *aspirin 81 mg oral tablet, chewable: 81 mg, 1 tab(s), Chewed, daily, 0 Refill(s).          *clopidogrel 75 mg oral tablet: 75 mg, 1 tab(s), Oral, daily, 0 Refill(s).          *Vitamin B12 500 mcg oral tablet: 500 mcg, 1 tab(s), po, daily, 0 Refill(s).          *Advil PM: 2 tab(s), po, hs, PRN: as needed for insomnia, 0 Refill(s).          *Pepcid 20 mg oral tablet: 20 mg, 1 tab(s), Oral, bid, 0 Refill(s).          *Flonase 50 mcg/inh nasal spray: 1 spray(s), nasal, daily, 0 Refill(s).          Advair  mcg-21 mcg/inh inhalation aerosol: See Instructions, INHALE 2 PUFFS BY MOUTH TWICE DAILY. RINSE MOUTH AND THROAT AFTER USE, 3 EA, 1 Refill(s).          *magnesium oxide 250 mg oral tablet: 250 mg, 1 tab(s), po, daily, 0 Refill(s).          *metoprolol succinate 25 mg oral capsule, extended release: 25 mg, 1 cap(s), Oral, daily, 0 Refill(s).          *Multiple Vitamins oral tablet: 1 tab(s), po, daily, 0 Refill(s).          *Crestor 40 mg oral tablet: 40 mg, 1 tab(s), Oral, daily, 0  Refill(s).      Needs Referral or Lab: [x ] Yes [ ] No  Needs repeat BMP  Needs Follow-up Appointment:  [x ] Within 7 days of discharge (highly complex visit)  [ ] Within 14 days of discharge (moderately complex visit)    Appointment Made With: MYRA  Date: 1/8/2020    Called and spoke to pt-states he is feeling OK. Confirmed meds and appt w/ [facility]. No further questions at this time.appt completed

## 2022-02-16 NOTE — NURSING NOTE
Quick Intake Entered On:  9/28/2021 4:43 PM CDT    Performed On:  9/28/2021 11:44 AM CDT by Wiley Cottrell Kaity               Summary   Weight Measured :   209.8 lb(Converted to: 209 lb 13 oz, 95.164 kg)    Height Measured :   71 in(Converted to: 5 ft 11 in, 180.34 cm)    Body Mass Index :   29.26 kg/m2 (HI)    Body Surface Area :   2.18 m2   Systolic Blood Pressure :   157 mmHg (HI)    Diastolic Blood Pressure :   84 mmHg (HI)    Mean Arterial Pressure :   108 mmHg   Peripheral Pulse Rate :   98 bpm   Oxygen Saturation :   96 %   Race :      Languages :   English   Ethnicity :   Not  or    Wiley Cottrell Kaity - 9/28/2021 4:42 PM CDT

## 2022-02-16 NOTE — LETTER
(Inserted Image. Unable to display)   March 10, 2021      CELIA LUTZ  [address]   [place], [place] [id number]        Dear CELIA,     Thank you for selecting [facility] (previously [facility]) for your healthcare needs. Below you will find the results of your recent test(s) done at our clinic.       Labs for your review.  We can discuss in more detail at future clinic visit.       Result Name Current Result Previous Result Reference Range   Sodium Level (mmol/L)  138 3/9/2021  135 12/14/2020 135 - 146   Potassium Level (mmol/L)  4.9 3/9/2021  4.1 12/14/2020 3.5 - 5.3   Chloride Level (mmol/L)  99 3/9/2021 ((L)) 95 12/14/2020 98 - 110   CO2 Level (mmol/L)  30 3/9/2021  29 12/14/2020 20 - 32   Glucose Level (mg/dL) ((H)) 129 3/9/2021 ((H)) 149 12/14/2020 65 - 99   BUN (mg/dL)  16 3/9/2021  15 12/14/2020 7 - 25   Creatinine Level (mg/dL) ((H)) 1.71 3/9/2021 ((H)) 2.38 12/14/2020 0.70 - 1.18   BUN/Creat Ratio  9 3/9/2021 ((L)) 6 12/14/2020 6 - 22   eGFR (mL/min/1.73m2) ((L)) 38 3/9/2021 ((L)) 33 11/23/2020 > OR = 60 -    eGFR  (mL/min/1.73m2) ((L)) 44 3/9/2021 ((L)) 32 12/14/2020 > OR = 60 -    Calcium Level (mg/dL)  10.3 3/9/2021  9.4 12/14/2020 8.6 - 10.3   Magnesium Level (mg/dL)  1.9 3/9/2021 ((L)) 1.4 10/12/2020 1.5 - 2.5   Bilirubin Total (mg/dL)  1.0 3/9/2021  0.2 - 1.2   Bilirubin Direct (mg/dL)  0.2 3/9/2021   - < OR = 0.2   Bilirubin Indirect  0.8 3/9/2021  0.2 - 1.2   Alkaline Phosphatase (unit/L)  121 3/9/2021  82 5/6/2019 35 - 144   AST/SGOT (unit/L)  35 3/9/2021  10 - 35   ALT/SGPT (unit/L)  24 3/9/2021  9 - 46   Protein Total (gm/dL)  6.5 3/9/2021  6.1 - 8.1   Albumin Level (gm/dL)  3.8 3/9/2021  3.6 - 5.1   Globulin  2.7 3/9/2021  1.9 - 3.7   A/G Ratio  1.4 3/9/2021  1.0 - 2.5   Vitamin B12 Level (pg/mL)  810 3/9/2021  200 - 1,100   PSA (ng/mL) ((H)) 23.5 3/9/2021 ((H)) 15.8 5/6/2019  - < OR = 4.0   WBC  4.6 3/9/2021  6.2 11/23/2020 3.8 - 10.8    RBC ((L)) 4.13 3/9/2021 ((L)) 3.37 11/23/2020 4.20 - 5.80   Hgb (gm/dL)  13.7 3/9/2021 ((L)) 10.9 11/23/2020 13.2 - 17.1   Hct (%)  39.8 3/9/2021 ((L)) 32.2 11/23/2020 38.5 - 50.0   MCV (fL)  96.4 3/9/2021  95.5 11/23/2020 80.0 - 100.0   MCH (pg) ((H)) 33.2 3/9/2021  32.3 11/23/2020 27.0 - 33.0   MCHC (gm/dL)  34.4 3/9/2021  33.9 11/23/2020 32.0 - 36.0   RDW (%)  14.3 3/9/2021  12.6 11/23/2020 11.0 - 15.0   Platelet ((L)) 131 3/9/2021  221 11/23/2020 140 - 400   MPV (fL)  9.5 3/9/2021  9.5 11/23/2020 7.5 - 12.5       Please contact me or my assistant at [phone] if you have any questions or concerns.     Sincerely,        Yong Boyd MD    What do your labs mean?  Below is a glossary of commonly ordered labs:  LDL - Bad Cholesterol  HDL - Good Cholesterol  AST/ALT - Liver Function  Cr/Creatinine - Kidney Function  Microalbumin - Kidney Function  BUN - Kidney Function  PSA - Prostate   TSH - Thyroid Hormone  HgbA1c - Diabetes Test  Hgb (Hemoglobin) - Red Blood Cells

## 2022-02-16 NOTE — TELEPHONE ENCOUNTER
---------------------  From: Trang Pozo LPN (Phone Messages Pool ([id number]))   To: [facility] Message Pool ([id number]);     Sent: 12/16/2020 3:48:32 PM CST  Subject: echo order     Phone Message    PCP:   MYRA asked for GJM but he is out of clinic.      Time of Call:  2:57pm       Person Calling:  pt  Phone number:  [phone]    Note:   Pt LM stating he seen GJM last Friday and ordered an echocardiogram. Pt says [facility] cannot get him in until 12/28 because the order was not STAT. Pt says he wants it done sooner.    Per Echocardiogram note form 12/14- pt's wife called today as they had not heard anything and wanted it scheduled for Friday.    Please advise if order can be changed to STAT or if has to wait until the 28th    Last office visit and reason:  12/11/20 Peripheral edema with GJM---------------------  From: Debby Faustin CMA ([facility] Message Pool ([id number]))   To: Yong Boyd MD;     Sent: 12/16/2020 3:59:11 PM CST  Subject: FW: echo order---------------------  From: Yong Boyd MD   To: [facility] Message Pool ([id number]);     Sent: 12/16/2020 4:11:03 PM CST  Subject: RE: echo order     I'm okay ordering as stat.  He is 6 weeks overdue from requested echo per Dr. Valentine in May, 2020pt contacted at 1617 and advised that pre hosp no such thing as STAT Echo  they were going to contact the tech to see if they can add another one on for Friday and place him on a cancellation list.  Pt advised and expressed understanding

## 2022-02-16 NOTE — PROGRESS NOTES
Patient:   CELIA LUTZ            MRN: [id number]            FIN: [id number]               Age:   72 years     Sex:  Male     :  1945   Associated Diagnoses:   AAA (Abdominal Aortic Aneurysm); Adenocarcinoma of lung; COPD (chronic obstructive pulmonary disease) with acute bronchitis; Chronic kidney disease (CKD), stage III (moderate); Prostate cancer   Author:   Yong Boyd MD      Visit Information      Date of Service: 2018 04:26 pm  Performing Location: [facility]  Encounter#: [id number]      Primary Care Provider (PCP):  Yong Boyd MD    NPI# [id number]      Referring Provider:  Yong Boyd MD    NPI# [id number]      Chief Complaint       2018 4:45 PM CDT f/u labs-COPD, Varicose veins   2018 4:38 PM CDT f/u labs -               Additional Information:No additional information recorded during visit.   Chief complaint and symptoms as noted above and confirmed with patient.  Recent lab and diagnostic studies reviewed with patient      History of Present Illness   2015: Mitchell presents to clinic for hospital follow-up.  He recently underwent right sided wedge resection of an isolated pulmonary nodule.  Biopsy returning as adenocarcinoma of the lung.  Postoperative course complicated by an episode of both acute kidney injury and rectal bleeding felt to be consistent with ischemic colitis.  His creatinine on discharge was at his baseline of 1.3.  His lisinopril was held during his episode of acute kidney injury, resumed upon discharge.  He is scheduled to see his thoracic surgeon, Dr. Velazquez, tomorrow.  He also has a history of previous endovascular repair of a abdominal aneurysm.  He follows with a vascular surgeon through [facility] for this.    5/10/2017: Presents with 2 month history of bilateral hip pains and right-sided knee pain present now for approximately 2 months.  He says symptoms bother him the most when seated in his car and commuting into the [facility].  He says he frequently will have to stop in between to get out of the car, stretch out his legs and then resume driving.  Now pains present with walking and activity as well.  He underwent a right-sided hip repair due to hip fracture in June 2016 with Dr. Smith.  No change in activities.  No upper arm or torso arthralgias.  Pravastatin dose increased this past year.    9/19/2017: Mitchell presents for regular follow-up.  He has been seeing an orthopedist related to his chronic polyarticular complaints.  He has seen some degree of symptom improvement with intra-articular injections in both shoulders and his knee.  Currently not exploring any future surgical options.  Still undergoing active surveillance related to his history of prostate cancer    4/5/2018: Mitchell presenting for regular follow-up.  Brings up concerns related to worsening varicosities affecting right foot and ankle.  At times are painful.  More swollen than he has ever remembered.  Has tried to wear an ace wrap to help with degree of swelling.  Last urology follow-up in late 2016.  We discussed his continued rise in PSA levels.  No back pain complaints.  No change in lower urinary tract symptoms.  Breathing is relatively stable.  He shares that his youngest sister recently passed away related to advanced stage breast cancer.         Review of Systems   Constitutional:  No fever, No chills.    Eye:  Negative except as documented in history of present illness.    Ear/Nose/Mouth/Throat:  Negative except as documented in history of present illness.    Respiratory   Cardiovascular:  No chest pain, No palpitations, No peripheral edema, No syncope.    Gastrointestinal:  No nausea, No vomiting, No heartburn, No abdominal pain.    Genitourinary:  No dysuria, No hematuria.    Hematology/Lymphatics:  Negative except as documented in history of present illness.    Endocrine:  No excessive thirst, No polyuria.    Immunologic:  No recurrent fevers.     Musculoskeletal:  Joint pain, Decreased range of motion, No muscle pain, No trauma.    Neurologic:  Alert and oriented X4, No numbness, No tingling, No headache.       Health Status   Allergies:    Allergic Reactions (Selected)  No known allergies   Medications:  (Selected)   Prescriptions  Prescribed  Advair  mcg-21 mcg/inh inhalation aerosol: See Instructions, Instructions: INHALE 2 PUFFS BY MOUTH TWICE DAILY. RINSE MOUTH AND THROAT AFTER USE, # 1 EA, 0 Refill(s), Type: Maintenance, Pharmacy: [facility] [id number] [facility]  Anti-static valved spacer chamber: Anti-static valved spacer chamber, See Instructions, Instructions: use as directed with inhaler, Supply, # 1 EA, 0 Refill(s), Type: Maintenance, Pharmacy: [id number], use as directed with inhaler  Metoprolol Tartrate 50 mg oral tablet: 1 tab(s) ( 50 mg ), po, bid, # 180 tab(s), 1 Refill(s), Type: Maintenance, Pharmacy: [facility] [id number] [facility], keep on file and pt will notify when needed, 1 tab(s) po bid  Proventil HFA 90 mcg/inh inhalation aerosol: 2 puff(s), INH, q4hr (int), Instructions: prn SOB/cough  use with spacer chamber, # 1 EA, 3 Refill(s), Type: Maintenance, Pharmacy: [id number], 2 puff(s) inh q4 hrs,Instr:prn SOB/cough; use with spacer chamber  hydrochlorothiazide-lisinopril 12.5 mg-20 mg oral tablet: 1 tab(s), PO, Daily, # 90 tab(s), 1 Refill(s), Type: Maintenance, Pharmacy: [facility] [id number] [facility], keep on file and pt will notify when needed, 1 tab(s) po daily  pravastatin 80 mg oral tablet: 1 tab(s) ( 80 mg ), po, daily, # 90 tab(s), 1 Refill(s), Type: Maintenance, Pharmacy: [facility] [id number] [facility], keep on file and pt will notify when needed, 1 tab(s) po daily  spacer for inhaler: spacer for inhaler, See Instructions, Instructions: use with albuterol inhaler, Supply, # 1 EA, 0 Refill(s), Type: Maintenance, Pharmacy: [facility], use with albuterol inhaler  Documented Medications  Documented  Advil PM: 2 tab(s), po, hs, PRN: as  needed for insomnia, 0 Refill(s), Type: Maintenance  Multiple Vitamins oral tablet: 1 tab(s), po, daily, 0 Refill(s), Type: Maintenance  Vitamin B12 500 mcg oral tablet: 1 tab(s) ( 500 mcg ), po, daily, 0 Refill(s), Type: Maintenance  aspirin 81 mg oral tablet: 1 tab(s) ( 81 mg ), po, daily, tab(s), 0 Refill(s), Type: Maintenance  magnesium oxide 250 mg oral tablet: 1 tab(s) ( 250 mg ), po, daily, 0 Refill(s), Type: Maintenance  omeprazole 20 mg oral delayed release tablet: 1 tab(s) ( 20 mg ), po, daily, 0 Refill(s), Type: Maintenance  zinc (as acetate) 50 mg oral capsule: 1 cap(s) ( 50 mg ), po, daily, 0 Refill(s), Type: Maintenance   Problem list:    All Problems  AAA (abdominal aortic aneurysm) / SNOMED CT 465550890 / Confirmed  Adenocarcinoma of lung / SNOMED CT 368500156 / Confirmed  [place] Esophagus / SNOMED CT 7256670296 / Confirmed  CKD (chronic kidney disease) stage 3, GFR 30-59 ml/min / SNOMED CT 9645320809 / Confirmed  COPD (chronic obstructive pulmonary disease) with acute bronchitis / SNOMED CT 10708384 / Confirmed  Closed hip fracture / SNOMED CT 589172829 / Confirmed  Closed fracture of trochanter of femur / SNOMED CT 4284743621 / Confirmed  Cardiac arrhythmia / SNOMED CT 22558193 / Confirmed  Coronary artery disease due to lipid rich plaque / SNOMED CT 0073734221 / Confirmed  Lipids abnormal / SNOMED CT 889727576 / Confirmed  HTN (Hypertension) / SNOMED CT 83318378 / Confirmed  Former Smoker / SNOMED CT [id number] / Confirmed  GERD (gastroesophageal reflux disease) / SNOMED CT 9633149797 / Confirmed  Anticoagulated / SNOMED CT 545239289 / Confirmed  Oropharyngeal cancer / SNOMED CT 274627022 / Confirmed  DJD (Degenerative Joint Disease) / SNOMED CT 2094146388 / Confirmed  Prostate cancer / SNOMED CT 798098561 / Confirmed  H/O unilateral nephrectomy / SNOMED CT 420277083 / Confirmed  Resolved: *Hospitalized@[facility] - Atypical chest pain  Resolved: *Hospitalized@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  AAA (abdominal aortic aneurysm) (218479240)  Coronary artery disease due to lipid rich plaque (2936372955)  GERD (gastroesophageal reflux disease) (0369700099)  Cardiac arrhythmia (88846537)  CKD (chronic kidney disease) stage 3, GFR 30-59 ml/min (9649734993)  Resolved  *Hospitalized@[facility] - Septic hip vs hematoma: Onset on 7/3/2016 at 70 years.  Resolved on 2016 at 70 years.  History of sepsis (6943450087): Onset on 7/3/2016 at 70 years.  Resolved.  Comments:  2016 CDT 2:14 PM CDT - Alma Barfield  Severe; due to septic hip.    2016 CDT 3:37 PM NINOT - Alma Barfield  Sepsis organ failure: Acute renal failure.  *Hospitalized@[facility] - Hip fracture: Onset on 2016 at 70 years.  Resolved on 2016 at 70 years.  *Hospitalized@[facility] - Atypical chest pain: Onset on 2015 at 69 years.  Resolved on 2015 at 69 years.   Family History:    CA - Breast cancer  Sister (Alexa, )  Lupus  Sister (Rebecca)  Alcohol abuse  Sister (Alexa, )  Mother ()  SMA type III  Grandfather (M)  Obese  Sister (Sujey, )     Procedure history:    Colonoscopy (588253760) on 3/21/2017 at 71 Years.  Comments:  3/22/2017 2:33 PM - Filippo Barbour MD  Indication: Adenomatous Polyps  Sedation: MAC  Findings: Adenomatous polyp cecum, hemorrohids  Rec: Repeat in 5 years  Right Hip Bipoloar Hemiarthroplasty on 2016 at 70 Years.  Comments:  2016 7:26 AM - DontestDebby castano CMA  Right displasced femoral neck fracture  right thoracotomy on 11/15/2015 at 70 Years.  Comments:  2015 9:07 AM - [place] Debby HOWARD  wedge resection right upper lung nodule, wedge resection  right middle lobe lung nodule, Mediastinal lymph node dissection  Colonoscopy (029381311) on 5/12/2014 at 68 Years.  Comments:  5/15/2014 10:56 AM - Livier Stallings RN  Sedation: midazolam, fentanyl  Indication: screen  5-6mm tubular adenom x3, 8mm tubular adenoma x1, 12mm tubular adenoma with advance adenoma due to size  Repeat in 3 years.  nepherectomy in the month of 3/2009 at 63 Years.  Lumbar discectomy in 2006 at 61 Years.  Left salivary gland removal in 1995 at 50 Years.  Oral surgery in 1994 at 49 Years.  Aortic aneurysm, abdominal ([id number]).   Social History:        Alcohol Assessment            Current, 1-2 times per week      Tobacco Assessment            Past, 20 per day.  49 year(s).      Substance Abuse Assessment            Never      Employment and Education Assessment            Employed, Work/School description: [employer] .      Home and Environment Assessment            Marital status: .  Spouse/Partner name: Darlene Chau.      Nutrition and Health Assessment            Type of diet: Regular.      Exercise and Physical Activity Assessment            Exercise frequency: Never.      Sexual Assessment            Sexually active: Yes.        Physical Examination   vital signs stable, as noted above   Vital Signs   4/5/2018 4:45 PM CDT Temperature Tympanic 96.7 DegF  LOW    Peripheral Pulse Rate 88 bpm    Systolic Blood Pressure 130 mmHg    Diastolic Blood Pressure 70 mmHg    Mean Arterial Pressure 90 mmHg    BP Site Right arm    Oxygen Saturation 94 %      Measurements from flowsheet : Measurements   4/5/2018 4:45 PM CDT Weight Measured - Standard 188.4 lb   4/5/2018 4:38 PM CDT Weight Measured - Standard 188.4 lb      General:  Alert and oriented, No acute distress.    Eye:  Extraocular movements are intact.    HENT:  Normocephalic, Oral mucosa is moist, No pharyngeal erythema, protruding papular lesion on right buccal mucosal surface, dentures.    Neck:   Supple, Previous right sided neck dissection with flap.    Respiratory:  Lungs are clear to auscultation, Respirations are non-labored, posterior thoracotomy scar.    Cardiovascular:  Normal rate, Regular rhythm, No murmur, No edema.    Gastrointestinal:  Soft.    Lymphatics:  right ankle/pretibial varicosities; soft to touch.    Neurologic:  Alert, Oriented, Normal motor function, No focal deficits.    Cognition and Speech:  Oriented, Speech clear and coherent.    Psychiatric:  Appropriate mood & affect.       Review / Management   Results review:  Lab results   3/29/2018 4:00 PM CDT Sodium Level 135 mmol/L    Potassium Level 5.4 mmol/L  HI    Chloride Level 97 mmol/L  LOW    CO2 Level 30 mmol/L    Glucose Level 104 mg/dL  HI    BUN 27 mg/dL  HI    Creatinine 1.59 mg/dL  HI    BUN/Creat Ratio 17    eGFR 42 mL/min/1.73m2  LOW    eGFR African American 49 mL/min/1.73m2  LOW    Calcium Level 9.8 mg/dL    Calcium Level 9.8 mg/dL    Phosphorus Level 3.8 mg/dL    PTH Intact 31 pg/mL    PSA 13.0 ng/mL  HI    U Protein 12 mg/dL    U Protein/Creatinine Ratio 226  HI    Ur Creatinine 53 mg/dL    Hgb 11.4 gm/dL  LOW   .       Impression and Plan   Diagnosis     AAA (Abdominal Aortic Aneurysm) (IFZ24-MH I71.4).     Adenocarcinoma of lung (ZPI91-ZG C34.90).     COPD (chronic obstructive pulmonary disease) with acute bronchitis (VYN27-ZY J44.1).     Chronic kidney disease (CKD), stage III (moderate) (ANF64-XZ N18.3).     Prostate cancer (MCV14-AM C61).       .) polyarthritis pains  - lmited benefit with NSAIDs and APAP  s/p right MIRNA after hip fracture from fall (6/2016)   - normal DEXA (7/2015)  - doubt inflammatory arthritis; doubt polymyalgia rheumatica given no upper extremity involvement  - working with Ortho - lasting benefits from intra-articular injections    .) prostate Ca   - original prostate biopsy in '11, repeat biopsy in '16; [facility] 6   - watchful surveillance; q6 month PSA - steadily rising   - last seen by  Urology in Fall, 2016   - will make arrangements for MRI of prostate    .) 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   - has new buccal mucosal lesion - will refer to [facility] for further assessment    .) s/p right wedge resection of limited stage adenocarcinoma of lung (11/2015)    .) COPD   - on Advair 115/21mcg BID with noticeable improvement in breathing   - albuterol on rare occasions   - has not participated in pulmonary rehab    .) lower extremity vein disease with varicosities   - schedule venous duplex study to assess deep veins   - referral to interventional vein specialist    .) post-operative LEILA/ CKD (11/2015), baseline SCr 1.3-1.5 (previous left nephrectomy for benign hemangioma)     .) hypertension; controlled  current antihypertensive regimen: lisinopril/hctz 20/12.5mg daily, metoprolol 50mg BID  regimen changes: none  intolerance:  future titration/work-up plan:    - SBP <140 goal    .) HLPD   - pravastatin 80mg qhs    .) AAA; previous endovascular repair   - following with [facility] vascular surgery; Dr. Lazo    .) health maintenance   - CRC due in '22   - q6 month PSA   - Prevacid 30mg BID   - enrolled in [facility]    RTC in 6 months

## 2022-02-16 NOTE — PROGRESS NOTES
Patient:   CELIA LUTZ            MRN: [id number]            FIN: [id number]               Age:   74 years     Sex:  Male     :  1945   Associated Diagnoses:   AAA (Abdominal Aortic Aneurysm); Adenocarcinoma of lung; COPD (chronic obstructive pulmonary disease) with acute bronchitis; Chronic kidney disease (CKD), stage III (moderate); Prostate cancer   Author:   Yong Boyd MD      Visit Information      Date of Service: 2019 11:40 am  Performing Location: [facility]  Encounter#: [id number]      Primary Care Provider (PCP):  Yong Boyd MD    NPI# [id number]      Referring Provider:  Yong Boyd MD# [id number]      Chief Complaint   2019 11:55 AM [facility]  Hospital fup              Additional Information:No additional information recorded during visit.   Chief complaint and symptoms as noted above and confirmed with patient.  Recent lab and diagnostic studies reviewed with patient      History of Present Illness   2015: Mitchell presents to clinic for hospital follow-up.  He recently underwent right sided wedge resection of an isolated pulmonary nodule.  Biopsy returning as adenocarcinoma of the lung.  Postoperative course complicated by an episode of both acute kidney injury and rectal bleeding felt to be consistent with ischemic colitis.  His creatinine on discharge was at his baseline of 1.3.  His lisinopril was held during his episode of acute kidney injury, resumed upon discharge.  He is scheduled to see his thoracic surgeon, Dr. Velazquez, tomorrow.  He also has a history of previous endovascular repair of a abdominal aneurysm.  He follows with a vascular surgeon through [facility] for this.    2019: Mitchell returns for general follow-up.  Feeling well.  Has planned right-sided vascular procedure with Dr. David scheduled in the near future; unclear on specific procedural information.  He had had a recent CT angiogram done earlier this year which did demonstrate a  2.2 cm pancreatic cyst.  Also reviewed labs showing a general upward trend in PSA.  Has not seen urology since 2016.  Did undergo a prostate MRI in April 2018 which did not demonstrate any extracapsular extension.    7/11/2019: Mitchell presents for preoperative assessment for planned endoscopic ultrasound and possible biopsy of pancreatic cyst.  Pancreatic cyst found incidentally as part of a CT angiogram.  Did meet with urology related to history of prostate cancer.  Did have prostate MRI which showed no significant extension of his cancer.  In general he would like to hold off on any invasive therapies    11/29/2019: Presents for hospital follow-up after recent admission to [facility] this past week in the setting of acute coronary syndrome.  Underwent left heart catheterization demonstrating multivessel disease.  Did undergo drug-eluting stent deployment and started on dual antiplatelet therapy.  Some complications with post catheter bleeding from femoral artery.  Subsequently resolved with hemostasis.  Does have significant ecchymoses amongst that groin and the no swelling or pain.  Feels well.  Plans to start on cardiac rehab through [place] in the near future.  Also has cardiology follow-up in mid December         Review of Systems   Constitutional:  No fever, No chills.    Eye:  Negative except as documented in history of present illness.    Ear/Nose/Mouth/Throat:  Negative except as documented in history of present illness.    Respiratory   Cardiovascular:  No chest pain, No palpitations, No peripheral edema, No syncope.    Gastrointestinal:  No nausea, No vomiting, No heartburn, No abdominal pain.    Genitourinary:  No dysuria, No hematuria.    Hematology/Lymphatics:  Bruising tendency.    Endocrine:  No excessive thirst, No polyuria.    Immunologic:  No recurrent fevers.    Musculoskeletal:  Joint pain, Decreased range of motion, No muscle pain, No trauma.    Neurologic:  Alert and oriented X4, No numbness, No  tingling, No headache.       Health Status   Allergies:    Allergic Reactions (Selected)  No Known Medication Allergies   Medications:  (Selected)   Prescriptions  Prescribed  Advair  mcg-21 mcg/inh inhalation aerosol: See Instructions, Instructions: INHALE 2 PUFFS BY MOUTH TWICE DAILY. RINSE MOUTH AND THROAT AFTER USE, # 3 EA, 1 Refill(s), Type: Soft Stop, Pharmacy: [facility], keep on file and pt will notify when needed, INHALE 2 PUFFS BY MOUTH TWICE ALFREDITO...  Ventolin HFA 90 mcg/inh inhalation aerosol: 2 puff(s), NEB, q4 hrs, PRN: AS NEEDED FOR COUGH OR SHORTNESS OF BREATH, # 3 EA, 0 Refill(s), Type: Maintenance, Pharmacy: [facility]  spacer for inhaler: spacer for inhaler, See Instructions, Instructions: use with albuterol inhaler, Supply, # 1 EA, 0 Refill(s), Type: Maintenance, Pharmacy: [facility], use with albuterol inhaler  Documented Medications  Documented  Advil PM: 2 tab(s), po, hs, PRN: as needed for insomnia, 0 Refill(s), Type: Maintenance  Crestor 40 mg oral tablet: = 1 tab(s) ( 40 mg ), Oral, daily, 0 Refill(s), Type: Maintenance  Flonase 50 mcg/inh nasal spray: 1 spray(s), nasal, daily, 0 Refill(s), Type: Maintenance  Multiple Vitamins oral tablet: 1 tab(s), po, daily, 0 Refill(s), Type: Maintenance  Vitamin B12 500 mcg oral tablet: 1 tab(s) ( 500 mcg ), po, daily, 0 Refill(s), Type: Maintenance  aspirin 81 mg oral tablet, chewable: = 1 tab(s) ( 81 mg ), Chewed, daily, 0 Refill(s), Type: Maintenance  clopidogrel 75 mg oral tablet: = 1 tab(s) ( 75 mg ), Oral, daily, 0 Refill(s), Type: Maintenance  lansoprazole 15 mg oral delayed release capsule: = 1 cap(s) ( 15 mg ), PO, Daily, # 90 cap(s), 0 Refill(s), Type: Maintenance  lisinopril 5 mg oral tablet: = 1 tab(s) ( 5 mg ), Oral, daily, 0 Refill(s), Type: Maintenance  magnesium oxide 250 mg oral tablet: 1 tab(s) ( 250 mg ), po, daily, 0 Refill(s), Type: Maintenance  metoprolol succinate 25 mg oral capsule, extended  release: = 1 cap(s) ( 25 mg ), Oral, daily, 0 Refill(s), Type: Maintenance,    Medications          *denotes recorded medication          spacer for inhaler: See Instructions, use with albuterol inhaler, 1 EA, 0 Refill(s).          Ventolin HFA 90 mcg/inh inhalation aerosol: 2 puff(s), NEB, q4 hrs, PRN: AS NEEDED FOR COUGH OR SHORTNESS OF BREATH, 3 EA, 0 Refill(s).          *aspirin 81 mg oral tablet, chewable: 81 mg, 1 tab(s), Chewed, daily, 0 Refill(s).          *clopidogrel 75 mg oral tablet: 75 mg, 1 tab(s), Oral, daily, 0 Refill(s).          *Vitamin B12 500 mcg oral tablet: 500 mcg, 1 tab(s), po, daily, 0 Refill(s).          *Advil PM: 2 tab(s), po, hs, PRN: as needed for insomnia, 0 Refill(s).          *Flonase 50 mcg/inh nasal spray: 1 spray(s), nasal, daily, 0 Refill(s).          Advair  mcg-21 mcg/inh inhalation aerosol: See Instructions, INHALE 2 PUFFS BY MOUTH TWICE DAILY. RINSE MOUTH AND THROAT AFTER USE, 3 EA, 1 Refill(s).          *lansoprazole 15 mg oral delayed release capsule: 15 mg, 1 cap(s), PO, Daily, 90 cap(s), 0 Refill(s).          *lisinopril 5 mg oral tablet: 5 mg, 1 tab(s), Oral, daily, 0 Refill(s).          *magnesium oxide 250 mg oral tablet: 250 mg, 1 tab(s), po, daily, 0 Refill(s).          *metoprolol succinate 25 mg oral capsule, extended release: 25 mg, 1 cap(s), Oral, daily, 0 Refill(s).          *Multiple Vitamins oral tablet: 1 tab(s), po, daily, 0 Refill(s).          *Crestor 40 mg oral tablet: 40 mg, 1 tab(s), Oral, daily, 0 Refill(s).       Problem list:    All Problems  AAA (abdominal aortic aneurysm) / SNOMED CT 728639442 / Confirmed  Adenocarcinoma of lung / SNOMED CT 090629859 / Confirmed  [place] Esophagus / SNOMED CT 6939051776 / Confirmed  CKD (chronic kidney disease) stage 3, GFR 30-59 ml/min / SNOMED CT 4055801509 / Confirmed  COPD (chronic obstructive pulmonary disease) with acute bronchitis / SNOMED CT 10928648 / Confirmed  Closed hip fracture / SNOMED CT  520708483 / Confirmed  Closed fracture of trochanter of femur / SNOMED CT 6331040210 / Confirmed  Cardiac arrhythmia / SNOMED CT 55416170 / Confirmed  Coronary artery disease due to lipid rich plaque / SNOMED CT 7811141525 / Confirmed  Lipids abnormal / SNOMED CT 124651510 / Confirmed  HTN (Hypertension) / SNOMED CT 98441050 / Confirmed  Former Smoker / SNOMED CT [id number] / Confirmed  GERD (gastroesophageal reflux disease) / SNOMED CT 1527786685 / Confirmed  Anticoagulated / SNOMED CT 838371740 / Confirmed  History of oropharyngeal cancer / SNOMED CT 5560407893 / Confirmed  H/O prostate cancer / SNOMED CT 4215901694 / Confirmed  DJD (Degenerative Joint Disease) / SNOMED CT 8586385012 / Confirmed  H/O unilateral nephrectomy / SNOMED CT 238741818 / Confirmed  Resolved: *Hospitalized@[facility] Atypical chest pain  Resolved: *Hospitalized@[facility] Hip fracture  Resolved: *Hospitalized@[facility] Septic hip vs hematoma  Resolved: History of sepsis / SNOMED CT 2118737852  Resolved: Prostate cancer / SNOMED CT 397876087  Canceled: Oropharyngeal cancer / SNOMED CT 189386277  Canceled: Solitary kidney / SNOMED CT 505886682  Canceled: Solitary kidney / SNOMED CT 958409875      Histories   Past Medical History:    Active  AAA (abdominal aortic aneurysm) (315376966)  Coronary artery disease due to lipid rich plaque (3455980550)  GERD (gastroesophageal reflux disease) (4942177401)  Cardiac arrhythmia (49919094)  CKD (chronic kidney disease) stage 3, GFR 30-59 ml/min (7583131769)  Resolved  *Hospitalized@[facility] Septic hip vs hematoma: Onset on 7/3/2016 at 70 years.  Resolved on 7/7/2016 at 70 years.  History of sepsis (9566680536): Onset on 7/3/2016 at 70 years.  Resolved.  Comments:  7/25/2016 CDT 2:14 PM CDT - Alma Barfield  Severe; due to septic hip.    7/25/2016 CDT 3:37 PM CDT - Alma Barfield  Sepsis organ failure: Acute renal failure.  *Hospitalized@[facility] - Hip fracture: Onset on 6/21/2016 at 70 years.   Resolved on 2016 at 70 years.  *Hospitalized@[facility] - Atypical chest pain: Onset on 2015 at 69 years.  Resolved on 2015 at 69 years.  Prostate cancer (108180643):  Resolved.   Family History:    CA - Breast cancer  Sister (Alexa, )  Lupus  Sister (Rebecca)  Alcohol abuse  Sister (Alexa, )  Mother ()  SMA type III  Grandfather (M)  Obese  Sister (Sujey, )     Procedure history:    Colonoscopy (618290691) on 3/21/2017 at 71 Years.  Comments:  3/22/2017 2:33 PM CDT - Filippo Barbour MD  Indication: Adenomatous Polyps  Sedation: MAC  Findings: Adenomatous polyp cecum, hemorrohids  Rec: Repeat in 5 years  Right Hip Bipoloar Hemiarthroplasty on 2016 at 70 Years.  Comments:  2016 7:26 AM CDT - Debby Faustin CMA  Right displasced femoral neck fracture  right thoracotomy on 11/15/2015 at 70 Years.  Comments:  2015 9:07 AM CST - Debby Faustin CMA  wedge resection right upper lung nodule, wedge resection right middle lobe lung nodule, Mediastinal lymph node dissection  Colonoscopy (163561754) on 2014 at 68 Years.  Comments:  5/15/2014 10:56 AM CDT - Livier Stallings RN  Sedation: midazolam, fentanyl  Indication: screen  5-6mm tubular adenom x3, 8mm tubular adenoma x1, 12mm tubular adenoma with advance adenoma due to size  Repeat in 3 years.  nepherectomy in the month of 3/2009 at 63 Years.  Lumbar discectomy in  at 61 Years.  Left salivary gland removal in  at 50 Years.  Oral surgery in  at 49 Years.  Aortic aneurysm, abdominal ([id number]).   Social History:        Alcohol Assessment            Current, 1-2 times per week, 2 drinks/episode average.  3 drinks/episode maximum.      Tobacco Assessment            Past, 20 per day.  49 year(s).      Substance Abuse Assessment            Never      Employment and Education Assessment            Employed, Work/School description: [employer] .      Home and Environment  Assessment            Marital status: .  Spouse/Partner name: Darlene Chau.      Nutrition and Health Assessment            Type of diet: Regular.      Exercise and Physical Activity Assessment            Exercise frequency: Never.      Sexual Assessment            Sexually active: Yes.        Physical Examination   vital signs stable, as noted above   Vital Signs   11/29/2019 11:55 AM CST Temperature Tympanic 97.2 DegF  LOW    Peripheral Pulse Rate 91 bpm    HR Method Electronic    Systolic Blood Pressure 147 mmHg  HI    Diastolic Blood Pressure 87 mmHg  HI    Mean Arterial Pressure 107 mmHg    BP Method Electronic      Measurements from flowsheet : Measurements   11/29/2019 11:55 AM CST Height Measured - Standard 71 in    Weight Measured - Standard 197.0 lb    BSA 2.11 m2    Body Mass Index 27.47 kg/m2  HI      General:  Alert and oriented, No acute distress.    Eye:  Extraocular movements are intact.    HENT:  Normocephalic, Oral mucosa is moist, No pharyngeal erythema, dentures.    Neck:  Supple, Previous right sided neck dissection with flap.    Respiratory:  Lungs are clear to auscultation, Respirations are non-labored, posterior thoracotomy scar.    Cardiovascular:  Normal rate, Regular rhythm, No murmur, No edema.    Gastrointestinal:  Soft.    Lymphatics:  right ankle/pretibial varicosities; soft to touch.    Integumentary:  extensive ecchymoses throughout right groin/testicular region.  Femoral closure site is soft; no firmness, non-tender.    Neurologic:  Alert, Oriented, Normal motor function, No focal deficits.    Cognition and Speech:  Oriented, Speech clear and coherent.    Psychiatric:  Appropriate mood & affect.       Review / Management   Results review      Impression and Plan   Diagnosis     AAA (Abdominal Aortic Aneurysm) (UVB63-HX I71.4).     Adenocarcinoma of lung (HAZ97-QX C34.90).     COPD (chronic obstructive pulmonary disease) with acute bronchitis (MPA41-EY J44.1).     Chronic kidney  disease (CKD), stage III (moderate) (ACV97-QA N18.3).     Prostate cancer (BTY39-JJ C61).           .) hospital f/u, NSTEMI with PCI to RCA with multi-vessel disease (non-obstructive LAD lesion)   - cath complicated by femoral artery ecchymoses - stable appearance now  - planned f/u with Dr. Valentine in December; discussion of potential future nuclear stress testing  - on DAPT - discussed importance of continuing without stoppage  - Toprol XL 25mg daily  - high dose pravastatin changed to rosuvastatin 40mg qhs  - advised him to increase his lisinopril from 5mg to 10mg daily   - planned cardiac rehab in near future    plan as previously outlined:     .) EUS, 7/2019, of  2.2cm pancreatic cyst; no malignancy on biopsy   - recommendations for annual f/u through GI    .) prostate Ca; watchful waiting   - original prostate biopsy in '11, repeat biopsy in '16; Ken 6   - watchful surveillance; q6 month PSA; last PSA 15   - MRI of prostate (2019): focal coarse calcifications in prostate; no evidence of extra-prostate extension   - following with Dr. Bustos - prefers not to pursue XRT at this point    .) h/o AAA, endovascular aortic repair, follows with Dr. David     .) chronic vein disease  - planned greater saphenous vein closure planned in near future    .) polyarthritis pains  - limited benefit with NSAIDs and APAP  s/p right MIRNA after hip fracture from fall (6/2016)   - normal DEXA (7/2015)  - doubt inflammatory arthritis; doubt polymyalgia rheumatica given no upper extremity involvement  - working with Ortho - lasting benefits from intra-articular injections    .) 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    .) s/p right wedge resection of limited stage adenocarcinoma of lung (11/2015)    .) COPD   - on Advair 115/21mcg BID with noticeable improvement in breathing   - albuterol on rare occasions   - has not participated in pulmonary rehab   - consider repeat PFTs in the future    .) hypertension; controlled  current  antihypertensive regimen: lisinopril/hctz 20/12.5mg daily, metoprolol 50mg BID  regimen changes: none  intolerance:  future titration/work-up plan:    - SBP <140 goal    .) health maintenance   - CRC due in '22   - q6 month PSA   - Prevacid 30mg BID   - enrolled in [facility]    RTC in 3 months

## 2022-02-16 NOTE — NURSING NOTE
Asthma Control Test (ACT) Total Entered On:  8/3/2021 6:54 AM CDT    Performed On:  7/29/2021 6:53 AM CDT by Debby Faustin CMA               Asthma Control Test (ACT) Total   Asthma Control Test Total (Adult) :   17    Debby Faustin CMA - 8/3/2021 6:53 AM CDT

## 2022-02-16 NOTE — PROGRESS NOTES
Patient:   CELIA LUTZ            MRN: [id number]            FIN: [id number]               Age:   75 years     Sex:  Male     :  1945   Associated Diagnoses:   Preoperative clearance; Acute on chronic kidney failure; Cancer of oral cavity   Author:   Carlos Guerrero MD      Chief Complaint   10/6/2020 1:20 PM CDT    preop:  surgery 10/16/2020 at [facility]/ Dr. Nunez for oral cancer resection.      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    Accompanied by:  No one.    Source of history:  Self, Medical record.           Review of Systems   Constitutional:  No fever, No chills, No sweats, No weakness, No fatigue.    Eye:  No recent visual problem.    Ear/Nose/Mouth/Throat:  No decreased hearing, No nasal congestion, No sore throat.    Respiratory:  No shortness of breath, No cough.    Cardiovascular:  Negative, No chest pain, No palpitations, No peripheral edema.    Gastrointestinal:  No nausea, No vomiting, No diarrhea, No constipation, No heartburn.    Genitourinary:  No dysuria, No change in urine stream.    Hematology/Lymphatics:  No bruising tendency, No bleeding tendency.    Endocrine:  No cold intolerance, No heat intolerance.    Immunologic:  Negative.    Musculoskeletal:  No back pain, No neck pain, No joint pain, No muscle pain.    Integumentary:  No rash, No dryness, No skin lesion.    Neurologic:  Alert and oriented X4, No headache.    Psychiatric:  No anxiety, No depression.       Health Status   Allergies:    Allergic Reactions (Selected)  No Known Medication Allergies   Medications:  (Selected)   Prescriptions  Prescribed  Advair Diskus 500 mcg-50 mcg inhalation powder: See Instructions, Instructions: INHALE 1 PUFF TWICE A DAY, # 180 unknown unit, 0 Refill(s), Type: Maintenance, Pharmacy: [facility] [id number] [facility], 71, in, 20 15:40:00 CDT, Height Measured, 205, lb, 20 15:40:00 CDT, Weight Measured  Ventolin  HFA 90 mcg/inh inhalation aerosol: 2 puff(s), Inhale, q6 hrs, # 1 EA, 3 Refill(s), AD, Type: Maintenance, Pharmacy: [id number], 2 puff(s) Inhale q6 hrs, 71, in, 07/17/20 15:40:00 CDT, Height Measured, 205, lb, 07/17/20 15:40:00 CDT, Weight Measured  Ventolin HFA 90 mcg/inh inhalation aerosol: 2 puff(s), Inhale, q6 hrs, # 1 EA, 3 Refill(s), Type: Maintenance, Pharmacy: [id number], 2 puff(s) Inhale q6 hrs, 71, in, 07/17/20 15:40:00 CDT, Height Measured, 205, lb, 07/17/20 15:40:00 CDT, Weight Measured  amLODIPine 5 mg oral tablet: = 1 tab(s) ( 5 mg ), Oral, daily, # 90 tab(s), 3 Refill(s), Type: Maintenance, Pharmacy: [id number], 1 tab(s) Oral daily, 71, in, 06/04/20 11:00:00 CDT, Height Measured, 202, lb, 06/04/20 10:56:00 CDT, Weight Measured  lisinopril 20 mg oral tablet: = 1 tab(s) ( 20 mg ), Oral, daily, # 30 tab(s), 0 Refill(s), Type: Maintenance, Pharmacy: [id number], 1 tab(s) Oral daily  spacer for inhaler: spacer for inhaler, See Instructions, Instructions: use with albuterol inhaler, Supply, # 1 EA, 0 Refill(s), Type: Maintenance, Pharmacy: [id number], use with albuterol inhaler  tiotropium 18 mcg inhalation capsule: = 1 cap(s) ( 18 mcg ), Inhale, daily, # 90 cap(s), 3 Refill(s), Type: Maintenance, Pharmacy: [id number], 1 cap(s) Inhale daily, 71, in, 06/04/20 11:00:00 CDT, Height Measured, 202, lb, 06/04/20 10:56:00 CDT, Weight Measured  Documented Medications  Documented  Crestor 40 mg oral tablet: = 1 tab(s) ( 40 mg ), Oral, daily, 0 Refill(s), Type: Maintenance  Multiple Vitamins oral tablet: 1 tab(s), po, daily, 0 Refill(s), Type: Maintenance  Tylenol Extra Strength PM: 2 tab(s), Oral, hs, 0 Refill(s), Type: Maintenance  Vitamin B12 500 mcg oral tablet: 1 tab(s) ( 500 mcg ), po, daily, 0 Refill(s), Type: Maintenance  aspirin 81 mg oral tablet, chewable: = 1 tab(s) ( 81 mg ), Chewed, daily, 0 Refill(s), Type: Maintenance  clopidogrel 75 mg oral tablet:  = 1 tab(s) ( 75 mg ), Oral, daily, 0 Refill(s), Type: Maintenance  magnesium oxide 250 mg oral tablet: 1 tab(s) ( 250 mg ), po, daily, 0 Refill(s), Type: Maintenance  metoprolol succinate 25 mg oral capsule, extended release: = 1 cap(s) ( 25 mg ), Oral, daily, 0 Refill(s), Type: Maintenance  omeprazole 20 mg oral delayed release capsule: = 1 cap(s) ( 20 mg ), Oral, daily, # 90 cap(s), 0 Refill(s), Type: Maintenance   Problem list:    All Problems  H/O unilateral nephrectomy / SNOMED CT 961051137 / Confirmed  DJD (Degenerative Joint Disease) / SNOMED CT 1284516818 / Confirmed  COPD (chronic obstructive pulmonary disease) with acute bronchitis / SNOMED CT 93676522 / Confirmed  Anticoagulated / SNOMED CT 823217245 / Confirmed  GERD (gastroesophageal reflux disease) / SNOMED CT 2850579832 / Confirmed  H/O prostate cancer / SNOMED CT 5707425238 / Confirmed  History of oropharyngeal cancer / SNOMED CT 3459001381 / Confirmed  CKD (chronic kidney disease) stage 3, GFR 30-59 ml/min / SNOMED CT 2760346291 / Confirmed  [place] Esophagus / SNOMED CT 8691449580 / Confirmed  Coronary artery disease due to lipid rich plaque / SNOMED CT 6768849704 / Confirmed  Closed fracture of trochanter of femur / SNOMED CT 8150451501 / Confirmed  AAA (abdominal aortic aneurysm) / SNOMED CT 533512316 / Confirmed  Adenocarcinoma of lung / SNOMED CT 118422775 / Confirmed  Lipids abnormal / SNOMED CT 632986690 / Confirmed  Former Smoker / SNOMED CT [id number] / Confirmed  Closed hip fracture / SNOMED CT 677839824 / Confirmed  Cardiac arrhythmia / SNOMED CT 54104778 / Confirmed  HTN (Hypertension) / SNOMED CT 21283216 / Confirmed  Resolved: *Hospitalized@[facility] - Atypical chest pain  Resolved: *Hospitalized@[facility] - Hip fracture  Resolved: *Hospitalized@[facility] Septic hip vs hematoma  Resolved: History of sepsis / SNOMED CT 8552543007  Resolved: Inpatient stay / SNOMED CT 134560901  Resolved: Inpatient stay / SNOMED CT  171786251  Resolved: Prostate cancer / SNOMED CT 228083492  Canceled: Solitary kidney / SNOMED CT 571643415  Canceled: Solitary kidney / SNOMED CT 810629152  Canceled: Oropharyngeal cancer / SNOMED CT 147099960      Histories   Past Medical History:    Active  AAA (abdominal aortic aneurysm) (269023865)  Coronary artery disease due to lipid rich plaque (9242270316)  GERD (gastroesophageal reflux disease) (0285864716)  Cardiac arrhythmia (66661349)  CKD (chronic kidney disease) stage 3, GFR 30-59 ml/min (9134887621)  Resolved  Inpatient stay (830848751): Onset on 1/3/2020 at 74 years.  Resolved on 2020 at 74 years.  Comments:  2020 CST 1:41 PM CST - Yoly Tillman  [facility], [place] - Chest pain, acute renal failure.  Inpatient stay (801349618): Onset on 2019 at 74 years.  Resolved on 2019 at 74 years.  Comments:  12/10/2019 CST 1:46 PM CST - Jessi Eid  @[facility] - NSTEMI.  *Hospitalized@[facility] - Septic hip vs hematoma: Onset on 7/3/2016 at 70 years.  Resolved on 2016 at 70 years.  History of sepsis (9909296190): Onset on 7/3/2016 at 70 years.  Resolved.  Comments:  2016 CDT 2:14 PM CDT - Alma Barfield  Severe; due to septic hip.    2016 CDT 3:37 PM CDT - Alma Barfield  Sepsis organ failure: Acute renal failure.  *Hospitalized@[facility] - Hip fracture: Onset on 2016 at 70 years.  Resolved on 2016 at 70 years.  *Hospitalized@[facility] - Atypical chest pain: Onset on 2015 at 69 years.  Resolved on 2015 at 69 years.  Prostate cancer (595077115):  Resolved.   Family History:    CA - Breast cancer  Sister (Alexa, )  Lupus  Sister (Rebecca)  Alcohol abuse  Sister (Alexa, )  Mother ()  SMA type III  Grandfather (M)  Obese  Sister (Sujey, )     Procedure history:    Coronary angiography (SNOMED CT 83322824) on 2019 at 74 Years.  Comments:  12/10/2019 1:47 PM CST - Jessi Eid  and PCI of the right coronary artery.  Colonoscopy  (SNOMED CT 158488278) on 3/21/2017 at 71 Years.  Comments:  3/22/2017 2:33 PM CDT - Filippo Barbour MD  Indication: Adenomatous Polyps  Sedation: MAC  Findings: Adenomatous polyp cecum, hemorrohids  Rec: Repeat in 5 years  Right Hip Bipoloar Hemiarthroplasty on 6/22/2016 at 70 Years.  Comments:  6/28/2016 7:26 AM CDT - [place] Debby HOWARD  Right displasced femoral neck fracture  right thoracotomy on 11/15/2015 at 70 Years.  Comments:  11/17/2015 9:07 AM CST - [place] ANITA, Debby  wedge resection right upper lung nodule, wedge resection right middle lobe lung nodule, Mediastinal lymph node dissection  Colonoscopy (SNOMED CT 572486286) on 5/12/2014 at 68 Years.  Comments:  5/15/2014 10:56 AM CDT - Livier Stallings RN  Sedation: midazolam, fentanyl  Indication: screen  5-6mm tubular adenom x3, 8mm tubular adenoma x1, 12mm tubular adenoma with advance adenoma due to size  Repeat in 3 years.  nepherectomy in the month of 3/2009 at 63 Years.  Lumbar discectomy in 2006 at 61 Years.  Left salivary gland removal in 1995 at 50 Years.  Oral surgery in 1994 at 49 Years.  Aortic aneurysm, abdominal (SNOMED CT [id number]).   Social History:        Alcohol Assessment            Current, 2 times per week, 3 drinks/episode average.      Tobacco Assessment            Past, 20 per day.  49 year(s).      Substance Abuse Assessment            Never      Employment and Education Assessment            Employed, Work/School description: [employer] .      Home and Environment Assessment            Marital status: .  Spouse/Partner name: Darlene Chau.      Nutrition and Health Assessment            Type of diet: Regular.      Exercise and Physical Activity Assessment            Exercise frequency: Never.      Sexual Assessment            Sexually active: Yes.       Has no history of anemia.  Has no history of DVT or pulmonary embolism.  Has no personal history of bleeding problems.   Has no personal or family  history of anesthesia reactions.  Patient does not have active tuberculosis.    S/he has taken aspirin or aspirin containing products in the last week.     S/he has taken Plavix (Clopidogrel) in the last 2 weeks.    S/he has not taken warfarin in the past week.    S/he has not been on corticosteroids for more than 2 weeks recently.      S/he is not DNR before, during or after surgery.    Chest pain / SOB walking up 2 flights of steps:  no  Pain in neck or jaw:  no  CAD MI:  yes  Afib:  no  Heart Failure:  no  Asthma  or Bronchitis:  COPD  Diabetes:  no       Insulin/Orals   Seizure Disorder:  no  CKD:  no  Thyroid Disease:  no  Liver Disease:  no  CVA:  no         Physical Examination   Vital Signs   10/6/2020 1:20 PM CDT Temperature Tympanic 96.1 DegF  LOW    Peripheral Pulse Rate 51 bpm  LOW    Pulse Site Radial artery    HR Method Manual    Systolic Blood Pressure 106 mmHg    Diastolic Blood Pressure 42 mmHg  LOW    Mean Arterial Pressure 63 mmHg    BP Site Right arm    BP Method Manual    Oxygen Saturation 94 %      Measurements from flowsheet : Measurements   10/6/2020 1:20 PM CDT Height Measured - Standard 71 in    Weight Measured - Standard 210.6 lb    BSA 2.19 m2    Body Mass Index 29.37 kg/m2  HI      General:  Alert and oriented, No acute distress.    Eye:  Pupils are equal, round and reactive to light, Extraocular movements are intact, Normal conjunctiva.    HENT:  Normocephalic, Tympanic membranes are clear, Oral mucosa is moist, No pharyngeal erythema, No sinus tenderness.    Neck:  Supple, Non-tender, No carotid bruit, No lymphadenopathy, No thyromegaly.    Respiratory:  Lungs are clear to auscultation, Respirations are non-labored, Breath sounds are equal, No chest wall tenderness.    Cardiovascular:  Normal rate, Regular rhythm, No murmur, No gallop, Good pulses equal in all extremities, Normal peripheral perfusion, No edema.    Gastrointestinal:  Soft, Non-tender, Non-distended, Normal bowel sounds,  No organomegaly.    Genitourinary:  No costovertebral angle tenderness.    Lymphatics:  WNL.    Musculoskeletal:  Normal range of motion, Normal strength, No tenderness, No swelling, No deformity.    Integumentary:  Warm, Dry, No rash.    Neurologic:  Alert, Oriented, Normal sensory, Normal motor function, No focal deficits.    Psychiatric:  Cooperative, Appropriate mood & affect.       Review / Management         Results review:  Lab results   10/6/2020 2:29 PM CDT Sodium Level 135 mmol/L    Potassium Level 5.0 mmol/L    Chloride Level 103 mmol/L    CO2 Level 19 mmol/L  LOW    Glucose Level 92 mg/dL    BUN 33 mg/dL  HI    Creatinine Level 7.18 mg/dL  HI    BUN/Creat Ratio 5  LOW    eGFR 7 mL/min/1.73m2  LOW    eGFR African American 8 mL/min/1.73m2  LOW    Calcium Level 9.3 mg/dL   .    ECG interpretation:  Date:  10/6/2020.     Indication: pre-operative exam.     EKG findings   Rhythm: heart rate  57  beats/min, sinus bradycardia, heart block (first degree, RBBB).     Axis: left axis deviation.     Interpretation: abnormal ECG.   .       Impression and Plan   Diagnosis     Preoperative clearance (NFP70-AE Z01.818).     Condition:  Fair.    Diagnosis     Acute on chronic kidney failure (ZYO75-WK N17.9).     Cancer of oral cavity (OWJ33-JA C06.9).     Course:  Worsening.    Plan:  Discussed lab results with patient. May need to postpone surgery until kidney failure improves.  Patient admitted to [facility] on 10/08/20 for management of the kidney failure..

## 2022-02-16 NOTE — LETTER
(Inserted Image. Unable to display)   [place] [address]  December 24, 2021      CELIA LUTZ  [address] [place] [address]        Dear CELIA,     Thank you for selecting [facility] ([facility]viously [facility]) for your healthcare needs. Below you will find the results of your recent test(s) done at our clinic.       Labs for your review.  We can discuss in more detail at future clinic visit.       Result Name Current Result Previous Result Reference Range   Glucose Level (mg/dL) ((H)) 124 12/21/2021 ((H)) 166 11/1/2021 65 - 99   BUN (mg/dL) ((H)) 26 12/21/2021  10 11/1/2021 7 - 25   Creatinine Level (mg/dL) ((H)) 1.70 12/21/2021 ((H)) 2.49 11/1/2021 0.70 - 1.18   eGFR (mL/min/1.73m2) ((L)) 38 12/21/2021 ((L)) 24 11/1/2021 > OR = 60 -    eGFR  (mL/min/1.73m2) ((L)) 44 12/21/2021 ((L)) 28 11/1/2021 > OR = 60 -    BUN/Creat Ratio  15 12/21/2021 ((L)) 4 11/1/2021 6 - 22   Sodium Level (mmol/L)  142 12/21/2021 ((L)) 133 11/1/2021 135 - 146   Potassium Level (mmol/L)  4.5 12/21/2021  4.6 11/1/2021 3.5 - 5.3   Chloride Level (mmol/L)  105 12/21/2021 ((L)) 91 11/1/2021 98 - 110   CO2 Level (mmol/L)  28 12/21/2021  29 11/1/2021 20 - 32   Calcium Level (mg/dL)  10.2 12/21/2021  10.2 12/21/2021 8.6 - 10.3   Hgb (gm/dL) ((L)) 12.9 12/21/2021  13.7 9/21/2021 13.2 - 17.1   Cholesterol (mg/dL)  192 12/21/2021  162 2/25/2020  - <200   HDL (mg/dL) ((L)) 35 12/21/2021  64 2/25/2020 > OR = 40 -    Triglyceride (mg/dL)  141 12/21/2021 ((H)) 308 2/25/2020  - <150   LDL ((H)) 132 12/21/2021  66 2/25/2020    Cholesterol/HDL Ratio ((H)) 5.5 12/21/2021  2.5 2/25/2020  - <5.0   Non-HDL Cholesterol ((H)) 157 12/21/2021  98 2/25/2020  - <130   U Creatinine (mg/dL)  85 12/21/2021  86 7/23/2021 20 - 320   U Microalbumin (mg/dL)  5.3 12/21/2021  58.7 7/23/2021 See Note: -    Ur Microalbumin/Creatinine Ratio ((H)) 62 12/21/2021 ((H)) 683 7/23/2021  - <30   PTH  Intact (pg/mL)  15 12/21/2021  29 7/23/2021 14 - 64   Calcium Level (mg/dL)  10.2 12/21/2021  10.2 12/21/2021 8.6 - 10.3   Phosphorus Level (mg/dL) ((H)) 4.5 12/21/2021  3.5 7/23/2021 2.1 - 4.3       Please contact me or my assistant at [phone] if you have any questions or concerns.     Sincerely,        Yong Boyd MD    What do your labs mean?  Below is a glossary of commonly ordered labs:  LDL - Bad Cholesterol  HDL - Good Cholesterol  AST/ALT - Liver Function  Cr/Creatinine - Kidney Function  Microalbumin - Kidney Function  BUN - Kidney Function  PSA - Prostate   TSH - Thyroid Hormone  HgbA1c - Diabetes Test  Hgb (Hemoglobin) - Red Blood Cells

## 2022-02-16 NOTE — LETTER
(Inserted Image. Unable to display) June 18, 2019Re: CELIA LUTZ1945 [facility] [address] [address], [place] [address]Rd:  [facility]The following patient has been referred to your office/practice:  CELIA LUTZ Appointment: June 24, 2019 at 7:40 A.M.[facility] documentation for a summary of CELIA's care.  Please do not hesitate to contact our office if any additional questions arise.  All relevant documents and transition of care documents should be mailed or faxed.Your assistance in providing continuity of care is appreciated.Sincerely, [facility] [place] [address] [place] [address](P) [phone](F) [phone]

## 2022-02-16 NOTE — NURSING NOTE
Comprehensive Intake Entered On:  5/21/2021 1:42 PM CDT    Performed On:  5/21/2021 1:28 PM CDT by Obi Barrera CMA               Summary   Chief Complaint :   Pt here for FU on [facility] for [facility].   Weight Measured :   204 lb(Converted to: 204 lb 0 oz, 92.533 kg)    Height Measured :   71 in(Converted to: 5 ft 11 in, 180.34 cm)    Body Mass Index :   28.45 kg/m2 (HI)    Body Surface Area :   2.15 m2   Systolic Blood Pressure :   108 mmHg   Diastolic Blood Pressure :   74 mmHg   Mean Arterial Pressure :   85 mmHg   Peripheral Pulse Rate :   88 bpm   BP Site :   Right arm   Pulse Site :   Radial artery   BP Method :   Electronic   Temperature Tympanic :   96 DegF(Converted to: 35.6 DegC)  (LOW)    Respiratory Rate :   16 br/min   Oxygen Saturation :   97 %   Race :      Languages :   English   Ethnicity :   Not  or    Obi Barrera CMA - 5/21/2021 1:28 PM CDT   Health Status   Allergies Verified? :   Yes   Medication History Verified? :   Yes   Medical History Verified? :   Yes   Pre-Visit Planning Status :   Completed   Tobacco Use? :   Former smoker   Obi Barrera CMA - 5/21/2021 1:28 PM CDT   Meds / Allergies   (As Of: 5/21/2021 1:42:55 PM CDT)   Allergies (Active)   No Known Medication Allergies  Estimated Onset Date:   Unspecified ; Created By:   Debby Faustin CMA; Reaction Status:   Active ; Category:   Drug ; Substance:   No Known Medication Allergies ; Type:   Allergy ; Updated By:   Debby Faustin CMA; Reviewed Date:   12/11/2020 1:42 PM CST        Medication List   (As Of: 5/21/2021 1:42:55 PM CDT)   Prescription/Discharge Order    fluticasone-salmeterol  :   fluticasone-salmeterol ; Status:   Completed ; Ordered As Mnemonic:   Advair Diskus 500 mcg-50 mcg inhalation powder ; Simple Display Line:   1 puff, Inhale, bid, 1 EA, 3 Refill(s) ; Ordering Provider:   Yong Boyd MD; Catalog Code:   fluticasone-salmeterol ; Order Dt/Tm:   12/2/2020 4:31:18 PM CST          albuterol   :   albuterol ; Status:   Prescribed ; Ordered As Mnemonic:   Ventolin HFA 90 mcg/inh inhalation aerosol ; Simple Display Line:   2 puff(s), Inhale, q6 hrs, 3 EA, 1 Refill(s) ; Ordering Provider:   Yong Thorne MD; Catalog Code:   albuterol ; Order Dt/Tm:   3/17/2021 6:09:05 PM CDT          omeprazole  :   omeprazole ; Status:   Prescribed ; Ordered As Mnemonic:   omeprazole 20 mg oral delayed release capsule ; Simple Display Line:   20 mg, 1 cap(s), Oral, daily, 90 cap(s), 3 Refill(s) ; Ordering Provider:   Yong Thorne MD; Catalog Code:   omeprazole ; Order Dt/Tm:   1/28/2021 3:23:29 PM CST          Miscellaneous Rx Supply  :   Miscellaneous Rx Supply ; Status:   Prescribed ; Ordered As Mnemonic:   spacer for inhaler ; Simple Display Line:   See Instructions, use with albuterol inhaler, 1 EA, 0 Refill(s) ; Ordering Provider:   Severino Cannon PA-C; Catalog Code:   Miscellaneous Rx Supply ; Order Dt/Tm:   1/9/2018 2:18:32 PM CST            Home Meds    furosemide  :   furosemide ; Status:   Completed ; Ordered As Mnemonic:   furosemide 40 mg oral tablet ; Simple Display Line:   0 Refill(s) ; Catalog Code:   furosemide ; Order Dt/Tm:   5/21/2021 1:27:12 PM CDT ; Comment:   Responsible Provider: ABEBA THORNE          rosuvastatin  :   rosuvastatin ; Status:   Suspended ; Ordered As Mnemonic:   rosuvastatin 10 mg oral tablet ; Simple Display Line:   0 Refill(s) ; Catalog Code:   rosuvastatin ; Order Dt/Tm:   5/21/2021 1:27:37 PM CDT ; Comment:   Responsible Provider: ABEBA THORNE          tiotropium  :   tiotropium ; Status:   Completed ; Ordered As Mnemonic:   Spiriva HandiHaler 18 mcg inhalation capsule ; Simple Display Line:   0 Refill(s) ; Catalog Code:   tiotropium ; Order Dt/Tm:   5/21/2021 1:27:53 PM CDT ; Comment:   Responsible Provider: ABEBA THORNE          oxyCODONE  :   oxyCODONE ; Status:   Completed ; Ordered As Mnemonic:   oxyCODONE 5 mg oral tablet ; Simple Display Line:   See  Instructions, 1-2 tab(s) Oral q4 hrs PRN, 0 Refill(s) ; Catalog Code:   oxyCODONE ; Order Dt/Tm:   5/11/2021 4:06:59 PM CDT          ondansetron  :   ondansetron ; Status:   Documented ; Ordered As Mnemonic:   ondansetron 8 mg oral tablet ; Simple Display Line:   8 mg, 1 tab(s), Oral, q8 hrs, 3 tab(s), 0 Refill(s) ; Catalog Code:   ondansetron ; Order Dt/Tm:   5/11/2021 4:06:35 PM CDT          OLANZapine  :   OLANZapine ; Status:   Completed ; Ordered As Mnemonic:   OLANZapine 5 mg oral tablet, disintegrating ; Simple Display Line:   1-2 tabs, Oral, q6 hrs, 30 tab(s), 0 Refill(s) ; Catalog Code:   OLANZapine ; Order Dt/Tm:   5/11/2021 4:05:14 PM CDT          melatonin  :   melatonin ; Status:   Completed ; Ordered As Mnemonic:   melatonin 5 mg oral capsule ; Simple Display Line:   5 mg, 1 cap(s), Oral, hs, PRN: sleep, 0 Refill(s) ; Catalog Code:   melatonin ; Order Dt/Tm:   5/11/2021 4:04:17 PM CDT          LORazepam  :   LORazepam ; Status:   Completed ; Ordered As Mnemonic:   LORazepam 1 mg oral tablet ; Simple Display Line:   See Instructions, 1-2mg oral Q30min PRN, 0 Refill(s) ; Catalog Code:   LORazepam ; Order Dt/Tm:   5/11/2021 4:02:22 PM CDT          flumazenil  :   flumazenil ; Status:   Completed ; Ordered As Mnemonic:   flumazenil ; Simple Display Line:   0.2 mg, IV, once, 0 Refill(s) ; Catalog Code:   flumazenil ; Order Dt/Tm:   5/11/2021 4:00:45 PM CDT          bisacodyl  :   bisacodyl ; Status:   Completed ; Ordered As Mnemonic:   bisacodyl 10 mg rectal suppository ; Simple Display Line:   10 mg, 1 supp, MT, daily, PRN: for constipation, 10 supp, 0 Refill(s) ; Catalog Code:   bisacodyl ; Order Dt/Tm:   5/11/2021 3:59:44 PM CDT          umeclidinium  :   umeclidinium ; Status:   Completed ; Ordered As Mnemonic:   Incruse Ellipta 62.5 mcg/inh inhalation powder ; Simple Display Line:   Inhale, q 24 hrs, 0 Refill(s) ; Catalog Code:   umeclidinium ; Order Dt/Tm:   5/11/2021 3:56:57 PM CDT           gabapentin  :   gabapentin ; Status:   Completed ; Ordered As Mnemonic:   gabapentin ; Simple Display Line:   900 mg, Oral, q8 hrs, 0 Refill(s) ; Catalog Code:   gabapentin ; Order Dt/Tm:   5/11/2021 3:53:20 PM CDT          ezetimibe  :   ezetimibe ; Status:   Suspended ; Ordered As Mnemonic:   Zetia 10 mg oral tablet ; Simple Display Line:   10 mg, 1 tab(s), Oral, daily, 30 tab(s), 0 Refill(s) ; Catalog Code:   ezetimibe ; Order Dt/Tm:   10/12/2020 5:39:23 PM CDT          fluticasone/umeclidinium/vilanterol  :   fluticasone/umeclidinium/vilanterol ; Status:   Documented ; Ordered As Mnemonic:   Trelegy Ellipta 200 mcg-62.5 mcg-25 mcg/inh inhalation powder ; Simple Display Line:   1 puff(s), Inhale, daily, 0 Refill(s) ; Catalog Code:   fluticasone/umeclidinium/vilanterol ; Order Dt/Tm:   5/21/2021 1:40:09 PM CDT          cholecalciferol  :   cholecalciferol ; Status:   Documented ; Ordered As Mnemonic:   cholecalciferol 1000 intl units oral tablet ; Simple Display Line:   25 mcg, Oral, daily, 0 Refill(s) ; Catalog Code:   cholecalciferol ; Order Dt/Tm:   5/21/2021 1:40:36 PM CDT          metoprolol  :   metoprolol ; Status:   Documented ; Ordered As Mnemonic:   Metoprolol Succinate ER 25 mg oral tablet, extended release ; Simple Display Line:   0 Refill(s) ; Catalog Code:   metoprolol ; Order Dt/Tm:   5/21/2021 1:27:26 PM CDT ; Comment:   Responsible Provider: RUFINA PALOMARES          folic acid  :   folic acid ; Status:   Documented ; Ordered As Mnemonic:   folic acid 1 mg oral tablet ; Simple Display Line:   1 mg, 1 tab(s), Oral, daily, 0 Refill(s) ; Catalog Code:   folic acid ; Order Dt/Tm:   5/12/2021 2:32:37 PM CDT          nitroglycerin  :   nitroglycerin ; Status:   Documented ; Ordered As Mnemonic:   nitroglycerin 0.4 mg sublingual tablet ; Simple Display Line:   0.4 mg, 1 tab(s), SL, q5 min, PRN: for chest pain, 100 tab(s), 0 Refill(s) ; Catalog Code:   nitroglycerin ; Order Dt/Tm:   5/11/2021  4:04:54 PM CDT          magnesium hydroxide  :   magnesium hydroxide ; Status:   Documented ; Ordered As Mnemonic:   Milk of Magnesia ; Simple Display Line:   30 mL, Oral, hs, 0 Refill(s) ; Catalog Code:   magnesium hydroxide ; Order Dt/Tm:   5/11/2021 4:03:35 PM CDT          thiamine  :   thiamine ; Status:   Documented ; Ordered As Mnemonic:   thiamine 100 mg oral tablet ; Simple Display Line:   100 mg, 1 tab(s), Oral, daily, 0 Refill(s) ; Catalog Code:   thiamine ; Order Dt/Tm:   5/11/2021 3:57:45 PM CDT          magnesium oxide  :   magnesium oxide ; Status:   Documented ; Ordered As Mnemonic:   magnesium oxide 400 mg (240 mg elemental magnesium) oral tablet ; Simple Display Line:   400 mg, 1 tab(s), Oral, daily, 0 Refill(s) ; Catalog Code:   magnesium oxide ; Order Dt/Tm:   5/11/2021 3:52:08 PM CDT          ferrous sulfate  :   ferrous sulfate ; Status:   Documented ; Ordered As Mnemonic:   ferrous sulfate 325 mg (65 mg elemental iron) oral delayed release tablet ; Simple Display Line:   325 mg, 1 tab(s), Oral, daily, 30 tab(s), 0 Refill(s) ; Catalog Code:   ferrous sulfate ; Order Dt/Tm:   5/11/2021 3:47:27 PM CDT          aspirin  :   aspirin ; Status:   Documented ; Ordered As Mnemonic:   Aspirin Enteric Coated 81 mg oral delayed release tablet ; Simple Display Line:   81 mg, 1 tab(s), Oral, daily, 0 Refill(s) ; Catalog Code:   aspirin ; Order Dt/Tm:   5/11/2021 3:37:37 PM CDT          zinc gluconate  :   zinc gluconate ; Status:   Documented ; Ordered As Mnemonic:   zinc (as gluconate) 50 mg oral tablet ; Simple Display Line:   1 tab by mouth daily (Pt taking OTC), 0 Refill(s) ; Catalog Code:   zinc gluconate ; Order Dt/Tm:   1/22/2021 7:46:25 PM CST          polyethylene glycol 3350  :   polyethylene glycol 3350 ; Status:   Documented ; Ordered As Mnemonic:   polyethylene glycol 3350 ; Simple Display Line:   17 gm, Oral, daily, 0 Refill(s) ; Catalog Code:   polyethylene glycol 3350 ; Order Dt/Tm:    10/12/2020 5:40:00 PM CDT          cyanocobalamin  :   cyanocobalamin ; Status:   Documented ; Ordered As Mnemonic:   Vitamin B12 500 mcg oral tablet ; Simple Display Line:   500 mcg, 1 tab(s), po, daily, 0 Refill(s) ; Catalog Code:   cyanocobalamin ; Order Dt/Tm:   11/17/2015 10:02:05 AM CST          multivitamin  :   multivitamin ; Status:   Documented ; Ordered As Mnemonic:   Multiple Vitamins oral tablet ; Simple Display Line:   1 tab(s), po, daily, 0 Refill(s) ; Catalog Code:   multivitamin ; Order Dt/Tm:   4/22/2015 9:12:40 AM CDT            ID Risk Screen   Recent Travel History :   No recent travel   Family Member Travel History :   No recent travel   Other Exposure to Infectious Disease :   Unknown   COVID-19 Testing Status :   No positive COVID-19 test   Obi Barrera CMA - 5/21/2021 1:28 PM CDT   Social History   Social History   (As Of: 5/21/2021 1:42:55 PM CDT)   Alcohol:  Current      Liquor (Hard) (1.5 oz), Daily, 2 drinks/episode average.   (Last Updated: 5/11/2021 10:49:59 AM CDT by Yoly Tillman)          Tobacco:        Past, 20 per day.  50 year(s).   (Last Updated: 3/17/2011 2:21:14 PM CDT by Rose Barr CMA)   Quit 12/25/2009, Cigarettes, 40 per day.  50 year(s).   (Last Updated: 5/11/2021 11:28:32 AM CDT by Yoly Tillman)          Electronic Cigarette/Vaping:        Electronic Cigarette Use: Never.   (Last Updated: 10/12/2020 1:47:53 PM CDT by Mariah Wilkes)          Substance Abuse:  Denies Substance Abuse      Never   (Last Updated: 3/19/2014 10:32:10 AM CDT by Brokoe Krishnan)          Employment/School:        Retired, Highest education level: High school.   (Last Updated: 10/12/2020 1:48:08 PM CDT by Mariah Wilkes)          Home/Environment:        Marital status: .  Spouse/Partner name: Darlene Chau.  4 children.  Living situation: Home/Independent.  Home equipment: Walker/Cane.  Injuries/Abuse/Neglect in household: No.  Feels unsafe at home: No.  Family/Friends available for  support: Yes.   (Last Updated: 5/11/2021 11:11:14 AM CDT by Yoly Tillman)          Nutrition/Health:        Type of diet: Regular.  Wants to lose weight: No.  Sleeping concerns: No.  Feels highly stressed: No.   (Last Updated: 10/12/2020 1:48:37 PM CDT by Mariah Wilkes)          Exercise:        Exercise frequency: Occasional.   (Last Updated: 10/12/2020 1:48:50 PM CDT by Mariah Wilkes)          Sexual:        Sexually active: No.  Identifies as male, Sexual orientation: Straight or heterosexual.  Contraceptive Use Details: None.   (Last Updated: 10/12/2020 1:49:26 PM CDT by Mariah Wilkes)

## 2022-02-17 NOTE — TELEPHONE ENCOUNTER
Talked with Mitchell and let him know his biopsy results.  Per Dr. Jaffe I advised the patient that the biopsy showed inflammation but no evidence of pre-cancer or malignancy.  Patient expressed understanding.    [facility]      Debra Hobbs RN  [address] [address]  [email or web address]   Office:[phone]  Employed by [facility]

## 2022-02-17 NOTE — TELEPHONE ENCOUNTER
Left a VM to call back.    [facility]      Debra Hobbs RN  [facility]  ENT  [address] [address]  [email or web address]   Office:[phone]  Employed by [facility]

## 2022-02-21 ENCOUNTER — OFFICE VISIT (OUTPATIENT)
Dept: RADIATION ONCOLOGY | Facility: HOSPITAL | Age: 77
End: 2022-02-21
Attending: RADIOLOGY
Payer: MEDICARE

## 2022-02-21 VITALS
HEART RATE: 50 BPM | OXYGEN SATURATION: 98 % | DIASTOLIC BLOOD PRESSURE: 74 MMHG | RESPIRATION RATE: 16 BRPM | SYSTOLIC BLOOD PRESSURE: 138 MMHG | BODY MASS INDEX: 25.69 KG/M2 | WEIGHT: 184.2 LBS

## 2022-02-21 DIAGNOSIS — C34.12 MALIGNANT NEOPLASM OF UPPER LOBE OF LEFT LUNG (H): ICD-10-CM

## 2022-02-21 PROCEDURE — 77334 RADIATION TREATMENT AID(S): CPT | Mod: 26 | Performed by: RADIOLOGY

## 2022-02-21 PROCEDURE — 77470 SPECIAL RADIATION TREATMENT: CPT | Performed by: RADIOLOGY

## 2022-02-21 PROCEDURE — 99215 OFFICE O/P EST HI 40 MIN: CPT | Mod: 25 | Performed by: RADIOLOGY

## 2022-02-21 PROCEDURE — 77263 THER RADIOLOGY TX PLNG CPLX: CPT | Performed by: RADIOLOGY

## 2022-02-21 PROCEDURE — 77470 SPECIAL RADIATION TREATMENT: CPT | Mod: 26 | Performed by: RADIOLOGY

## 2022-02-21 PROCEDURE — 77334 RADIATION TREATMENT AID(S): CPT | Performed by: RADIOLOGY

## 2022-02-21 PROCEDURE — G0463 HOSPITAL OUTPT CLINIC VISIT: HCPCS | Mod: 25

## 2022-02-21 ASSESSMENT — PAIN SCALES - GENERAL: PAINLEVEL: MODERATE PAIN (4)

## 2022-02-21 NOTE — PROCEDURES
SBRT Special Treatment Procedure Note  Date of Service: 2/21/2022    Diagnosis:  Non-small cell lung cancer, stage [id number]    Medical Indication:  To escalate the radiotherapy dose to a curative dose (5000 cGy) using stereotactic body radiotherapy technique and to spare surrounding lung and normal tissues from excessive toxicity.    SBRT planning was completed today to prepare for lung cancer treatment.  SBRT planning is chosen to avoid the critical structures, which cannot be done adequately with conventional planning due to the dose constraints of the normal surrounding critical organs.  In addition, the treatment will be high dose per fraction with complete course to be done in 5 sessions. The patient previously had CT scan acquisition for planning and special treatment aids used include.  The patient was simulated in a supine position. After the contouring of the GTV, ITV (MIPS 90%) and MIPS Averages, a clinical tumor volume (CTV), expanded volume of planned treatment volume (PTV) was carried out on the treatment planning system.  Critical structures outlined included both right and left lung, brachial plexus, spinal cord, esophagus, and airway. Extra efforts were required to immobilize the patient using the custom designed stereotactic frame as well as planning.  Extra efforts during the planning process included contouring of critical structures, GTV, ITV, CTV, PTV and evaluating the DVH and coverage of the PTV.    The patient is going to have SBRT technique for his lung cancer. I have explained to the patient this is a very complicated process which will require an extensive amount of time for planning, delivering and monitoring the patient. The patient understands well and wished to proceed.      Mariana Batista MD, PhD  Department of Radiation Oncology   [facility] Radiation Oncology  Tel: [phone]  Page: [phone]    [id number] [place] [address]  [place] [address] [facility] [place] [address]

## 2022-02-21 NOTE — PROCEDURES
Clinical Treatment Planning Note    The complex radiotherapy planning will be completed for his lung cancer. The patient had a planning CT earlier today for planning. The treatment aids were used for planning, including headrest and SBRT Board to help keep the same position during the daily radiation therapy. The therapy planning is necessary to reduce radiotherapy dose to the normal critical organs which are not possible with simple treatment. In addition, dose to the target and the critical structures requires three-dimensional analysis of the isodose distribution. The planning will be done to reduce dose to lungs, liver, esophagus, heart, great vessels, bronchial trees, nerves, spinal cord, bone and soft tissue.    I will contour the clinical tumor volume  CTV , with expanded volume of planning treatment volume  PTV  on the treatment planning system. The critical structures will be outlined, including lungs, liver, esophagus, heart, great vessels, bronchial trees, nerves, spinal cord, bone and soft tissue.    Treatment planning will be done on the computer treatment planning system. The multiple fields will be used to achieve optimal coverage of the target volume. Dose distribution to the above critical structures will be reviewed. Isodose distribution along with the X, Y, Z plan will also be reviewed. Custom blocking will be used to shield normal structures. The beam s eye views will be reviewed and the digital reconstructed image will be reviewed on the planning software. The patient will receive 5000 cGy in 5 treatments targeted to the lung tumors using 6 MV photons with SBRT/IGRT.      Mariana Batista MD, PhD  Department of Radiation Oncology   [facility] Radiation Oncology  Tel: [phone]  Page: [phone]    [facility]  [address] [place] [address]     [address] [place] [address]

## 2022-02-21 NOTE — LETTER
2022         RE: Lenny Chau  [address]  [place] [address]        Dear Colleague,    Thank you for referring your patient, Lenny Chau, to the [facility] RADIATION ONCOLOGY [place]. Please see a copy of my visit note below.    Pt ambulatory to radiation clinic for follow up and SIM. Further recommendations and orders per provider.      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  The biopsy confirmed moderately differentiated adenocarcinoma.        TYPE OF RADIATION THERAPY ADMINISTERED: SBRT to the left upper lobe with a total dose of 5000 cGy in 5 treatments given from 3/2/2021-3/10/2021.     INTERVAL SINCE COMPLETION OF RADIATION THERAPY: 11 months.      SUBJECTIVE:  Mr. Chau is a 76 y.o. male who is a chronic smoker and quit smoking since .  The patient had a complicated history of multiple cancer in the past as detailed as followin.  Low risk prostate cancer, clinical stage T1c N0 M0, recent grade 3+3 =6 and the last PSA was 10.4 in 2006.  The patient is currently on clinical observation with no therapy.  PSA: 12.47 on 2021.     2.  Floor of mouth cancer, status post surgical resection in 1994 with no adjuvant therapy.     3.  Left kidney hemangioma, status post left nephrectomy on 3/26/2009.     4.  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  Patient had local recurrence and is status post surgical resection on 9/11/2009.     5.  Non-small cell lung cancer involving right lung, stage I, status post right thoracotomy and excision of right upper lobe and right middle lobe lung tumor 11/5/2015 with no evidence of lymph node metastasis and no adjuvant therapy.  Patient lost to follow-up since 2016.     6.  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     7.  Left upper lobe lung  cancer, stage T1N0 M0.  The biopsy confirmed moderately differentiated adenocarcinoma on 1/21/2021.  The patient received SBRT with a total dose of 5000 cGy in 5 treatments given from 3/2/2021-3/10/2021.     Patient had two prior early stage right lung cancers that were apparently resected in their entirety and may have been synchronous primary lung cancers. It does not appear he had the appropriate recommended follow up at [facility]. The MELODY lesion was known to be present in 2016 and appears to be slightly larger now with significant FDG-avidity on the recent PET/CT. It is likely another primary lung cancer, although metastasis from previous lung cancers is possible.  The patient had a restaging PET CT scan on 8/31/2020.  The scan showed enlarging FDG avid left upper lobe mass measuring 2.1 x 2.5 cm with central solid component consistent with primary lung cancer without metastasis.  There was also a FDG avid lesion in the left buccal region consistent with squamous cell carcinoma without metastasis.  The patient had a surgical resection for his left buccal/lateral tongue squamous cell carcinoma 11/6/2020 by KERRY Renee with pathology showed squamous cell carcinoma with negative margin.  He was determined not a good candidate for lung  surgery given his complicated medical history and health status.  He therefore received definitive radiation therapy using SBRT with a total dose of 5000 cGy in 5 treatments given from 3/2/2021-3/10/2021. The patient tolerated radiation therapy very well with minimal side effect.     The patient has been doing well since completion of radiation therapy.  He denies any pain or discomfort related to the therapy at the time of evaluation.  The patient was found to a new 9 mm small nodule in the left upper lobe outside of patient therapy area from restaging CT scan on 12/20/2021.  He was recommended to have staging PET CT scan and was done 1/7/2022. 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  There is no evidence of other systemic metastasis.The patient's case has been discussed at thoracic tumor conference 1/13/2022.  MRI brain on 1/22/2022 showed no evidence of brain metastasis. Patient is not good candidate for surgery and poor candidate for systemic therapy given his current medical status.  The consensus recommendation is to consider SBRT for his oligo disease in the left lung and T7 spine if the patient wishes not to consider systemic therapy at this time.  He was seen earlier on 1/26/2022 and patient wished to take some time for consideration.  He is here for office follow-up and management recommendation.        Assessment / Impression     The patient is a 76-year-old gentleman with multiple history of cancer in the past and a new finding of left upper lobe lung cancer.  The patient received stereotactic radiosurgery 10 months ago.  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    Plan:     I have personally reviewed his restaging PET CT scan and  compared to the previous PET CT scan and radiation therapy field.  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  There is no evidence of other systemic metastasis.  The possible treatment options including surgery, systemic therapy, elevation therapy has been discussed with patient in detail and at the greatest.  The possible risks and side effects of radiation therapy has also been explained to the patient.  Questions are answered to patient's satisfaction.  The patient has oligo recurrence involving left upper lobe and T7 vertebral body.  This can be treated by stereotactic radiosurgery given his reasonably good health status. The patient's case has been discussed at thoracic tumor conference 1/13/2022.  Patient is not good candidate for surgery and poor candidate for systemic therapy given his current medical status.  The consensus recommendation is to consider SBRT for his oligo disease in the left lung and T7 spine if the patient wishes not to consider systemic therapy at this time.     The pros and cons of different options has also been discussed with patient.  Patient is not considering surgery nor chemotherapy at this time given his advanced age and poor medical status.  The patient therefore elected proceed with definitive radiation therapy as his treatment choice being aware of potential risks and side effect involved.  He wished to proceed with radiation therapy to the left upper lung first followed by 2 weeks break and then proceed with SBRT for T 7 spine.  I think this will be okay.  He is therefore scheduled return to radiation oncology later on today for simulation.  I plan to give him radiation therapy to a total dose of 5000 cGy in 5 treatments targeting the left upper lobe tumor.  This will be followed with 2 weeks break and simulation for T7 oligo metastasis.    Face to face  time  30 minutes with > 80% spent on consultation, education and coordination of care.      Mariana Batista MD, PhD  Department of Radiation Oncology   [facility] Radiation Oncology  Tel: [phone]  Page: [phone]    [facility]  [address] [place] [address]     [facility]  [address] [place] [address]      CC:  Patient Care Team:  Shade Boyd MD as PCP - General (Internal Medicine)  Mariana Batista MD as MD (Hematology & Oncology)  [facility] (Pharmacist)  Alno Nunez MD as Assigned Surgical Provider  Mariana Batista MD as Assigned Cancer Care Provider  Matt Magaña MD as Assigned Heart and Vascular Provider        Again, thank you for allowing me to participate in the care of your patient.        Sincerely,        Mariana Batista MD

## 2022-02-21 NOTE — PROGRESS NOTES
Pt ambulatory to radiation clinic for follow up and SIM. Further recommendations and orders per provider.

## 2022-02-21 NOTE — PROCEDURES
SIMULATION NOTE:     DIAGNOSIS: The patient is a 76-year-old gentleman with multiple history of cancer in the past and a new finding of left upper lobe lung cancer.  The patient received stereotactic radiosurgery 11 months ago.  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    INDICATION: After discussion with patient about various treatment options, the patient elected to proceed with definitive radiation therapy using SBRT technique for his new lung cancer. The patient is here for simulation.     CONSENT: The possible risks and side effects of radiation therapy have been discussed with the patient in detail and at a great length. The patient's questions are answered to patient's satisfaction. Written consent was obtained.     SIMULATION: Patient is in supine position with SBRT frame and VacLok to help keep the same position during the radiation therapy. Tentative isocenter is set in the center of thoracic region. We will acquire 4D scan to help us better locate target and design radiation therapy field. We are also going to use the respiratory gating technique to help us to better locate the movement of the tumor.     BLOCKS: Custom blocks will be drawn to minimize radiation to normal tissues and to protect normal organs including, but not limited to, spinal cord, brachial plexus, lungs, bronchial tree, esophagus, liver, heart/large vessels, spleen, stomach, small bowel, diaphragm, bone and soft tissue.     DOSAGE: I plan to give him radiation therapy at 1000 cGy each fraction to a total of 5000 cGy in 5 fractions over 2 weeks. I will consider using SBRT/IGRT technique to help us to better locate the target and to protect normal tissue.       Mariana Batista MD, PhD  Department of Radiation Oncology   [facility] Radiation Oncology  Tel: [phone]  Page: [phone]      [facility]  [address] [place] [address]     [facility]  [address] [place] [address]

## 2022-02-21 NOTE — PROGRESS NOTES
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  The biopsy confirmed moderately differentiated adenocarcinoma.        TYPE OF RADIATION THERAPY ADMINISTERED: SBRT to the left upper lobe with a total dose of 5000 cGy in 5 treatments given from 3/2/2021-3/10/2021.     INTERVAL SINCE COMPLETION OF RADIATION THERAPY: 11 months.      SUBJECTIVE:  Mr. Chau is a 76 y.o. male who is a chronic smoker and quit smoking since .  The patient had a complicated history of multiple cancer in the past as detailed as followin.  Low risk prostate cancer, clinical stage T1c N0 M0, recent grade 3+3 =6 and the last PSA was 10.4 in 2006.  The patient is currently on clinical observation with no therapy.  PSA: 12.47 on 2021.     2.  Floor of mouth cancer, status post surgical resection in 1994 with no adjuvant therapy.     3.  Left kidney hemangioma, status post left nephrectomy on 3/26/2009.     4.  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  Patient had local recurrence and is status post surgical resection on 2009.     5.  Non-small cell lung cancer involving right lung, stage I, status post right thoracotomy and excision of right upper lobe and right middle lobe lung tumor 2015  with no evidence of lymph node metastasis and no adjuvant therapy.  Patient lost to follow-up since 2016.     6.  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     7.  Left upper lobe lung  cancer, stage T1N0 M0.  The biopsy confirmed moderately differentiated adenocarcinoma on 1/21/2021.  The patient received SBRT with a total dose of 5000 cGy in 5 treatments given from 3/2/2021-3/10/2021.     Patient had two prior early stage right lung cancers that were apparently resected in their entirety and may have been synchronous primary lung cancers. It does not appear he had the appropriate recommended follow up at [facility]. The MELODY lesion was known to be present in 2016 and appears to be slightly larger now with significant FDG-avidity on the recent PET/CT. It is likely another primary lung cancer, although metastasis from previous lung cancers is possible.  The patient had a restaging PET CT scan on 8/31/2020.  The scan showed enlarging FDG avid left upper lobe mass measuring 2.1 x 2.5 cm with central solid component consistent with primary lung cancer without metastasis.  There was also a FDG avid lesion in the left buccal region consistent with squamous cell carcinoma without metastasis.  The patient had a surgical resection for his left buccal/lateral tongue squamous cell carcinoma 11/6/2020 by KERRY Renee with pathology showed squamous cell carcinoma with negative margin.  He was determined not a good candidate for lung surgery given his complicated medical history and health status.  He therefore received definitive radiation therapy using SBRT with a total dose of 5000 cGy in 5 treatments given from 3/2/2021-3/10/2021. The patient tolerated radiation therapy very well with minimal side effect.     The patient has been doing well since completion of radiation therapy.  He denies any pain  or discomfort related to the therapy at the time of evaluation.  The patient was found to a new 9 mm small nodule in the left upper lobe outside of patient therapy area from restaging CT scan on 12/20/2021.  He was recommended to have staging PET CT scan and was done 1/7/2022. 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  There is no evidence of other systemic metastasis.The patient's case has been discussed at thoracic tumor conference 1/13/2022.  MRI brain on 1/22/2022 showed no evidence of brain metastasis. Patient is not good candidate for surgery and poor candidate for systemic therapy given his current medical status.  The consensus recommendation is to consider SBRT for his oligo disease in the left lung and T7 spine if the patient wishes not to consider systemic therapy at this time.  He was seen earlier on 1/26/2022 and patient wished to take some time for consideration.  He is here for office follow-up and management recommendation.        Assessment / Impression     The patient is a 76-year-old gentleman with multiple history of cancer in the past and a new finding of left upper lobe lung cancer.  The patient received stereotactic radiosurgery 10 months ago.  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    Plan:     I have personally reviewed his restaging PET CT scan and compared to the previous PET CT scan and radiation therapy field.  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  There is no evidence of other systemic metastasis.  The possible treatment options including surgery, systemic therapy, elevation therapy has been  discussed with patient in detail and at the greatest.  The possible risks and side effects of radiation therapy has also been explained to the patient.  Questions are answered to patient's satisfaction.  The patient has oligo recurrence involving left upper lobe and T7 vertebral body.  This can be treated by stereotactic radiosurgery given his reasonably good health status. The patient's case has been discussed at thoracic tumor conference 1/13/2022.  Patient is not good candidate for surgery and poor candidate for systemic therapy given his current medical status.  The consensus recommendation is to consider SBRT for his oligo disease in the left lung and T7 spine if the patient wishes not to consider systemic therapy at this time.     The pros and cons of different options has also been discussed with patient.  Patient is not considering surgery nor chemotherapy at this time given his advanced age and poor medical status.  The patient therefore elected proceed with definitive radiation therapy as his treatment choice being aware of potential risks and side effect involved.  He wished to proceed with radiation therapy to the left upper lung first followed by 2 weeks break and then proceed with SBRT for T 7 spine.  I think this will be okay.  He is therefore scheduled return to radiation oncology later on today for simulation.  I plan to give him radiation therapy to a total dose of 5000 cGy in 5 treatments targeting the left upper lobe tumor.  This will be followed with 2 weeks break and simulation for T7 oligo metastasis.    Face to face time  30 minutes with > 80% spent on consultation, education and coordination of care.      Mariana Batista MD, PhD  Department of Radiation Oncology   [facility] Radiation Oncology  Tel: [phone]  Page: [phone]    [facility]  [address] [place] [address]     [address] [place] [address]      CC:  Patient Care Team:  Oliver  MD Shade as PCP - General (Internal Medicine)  Mariana Batista MD as MD (Hematology & Oncology)  Faviola Cottrell [facility] as Pharmacist (Pharmacist)  Alon Nunez MD as Assigned Surgical Provider  Mariana Batista MD as Assigned Cancer Care Provider  Matt Magaña MD as Assigned Heart and Vascular Provider

## 2022-03-02 VITALS
HEIGHT: 71 IN | TEMPERATURE: 96.3 F | BODY MASS INDEX: 26.05 KG/M2 | HEART RATE: 60 BPM | WEIGHT: 186.1 LBS | SYSTOLIC BLOOD PRESSURE: 117 MMHG | DIASTOLIC BLOOD PRESSURE: 76 MMHG

## 2022-03-02 NOTE — NURSING NOTE
Comprehensive Intake Entered On:  2/2/2022 11:59 AM CST    Performed On:  2/2/2022 11:51 AM CST by Rosamaria TORRES, Altagracia               Summary   Chief Complaint :   c/o severe right hip pain--ongoing since last June.  has been on Oxycodone.  also shoulder pain from using walking.   Weight Measured :   186.1 lb(Converted to: 186 lb 2 oz, 84.414 kg)    Height Measured :   71 in(Converted to: 5 ft 11 in, 180.34 cm)    Body Mass Index :   25.95 kg/m2 (HI)    Body Surface Area :   2.05 m2   Systolic Blood Pressure :   117 mmHg   Diastolic Blood Pressure :   76 mmHg   Mean Arterial Pressure :   90 mmHg   Peripheral Pulse Rate :   60 bpm   BP Site :   Right arm   Pulse Site :   Brachial artery   BP Method :   Electronic   HR Method :   Electronic   Temperature Tympanic :   96.3 DegF(Converted to: 35.7 DegC)  (LOW)    Race :      Languages :   English   Ethnicity :   Not  or    Altagracia Blank MA - 2/2/2022 11:51 AM CST   Health Status   Allergies Verified? :   Yes   Medication History Verified? :   Yes   Medical History Verified? :   Yes   Pre-Visit Planning Status :   Completed   Tobacco Use? :   Former smoker   Altagracia Blank MA - 2/2/2022 11:51 AM CST   Consents   Consent for Immunization Exchange :   Consent Granted   Consent for Immunizations to Providers :   Consent Granted   Altagracia Blank MA - 2/2/2022 11:51 AM CST   Meds / Allergies   (As Of: 2/2/2022 11:59:07 AM CST)   Allergies (Active)   No Known Medication Allergies  Estimated Onset Date:   Unspecified ; Created By:   Debby Faustin CMA; Reaction Status:   Active ; Category:   Drug ; Substance:   No Known Medication Allergies ; Type:   Allergy ; Updated By:   Debby Faustin CMA; Reviewed Date:   11/1/2021 1:48 PM CDT        Medication List   (As Of: 2/2/2022 11:59:07 AM CST)   Prescription/Discharge Order    albuterol  :   albuterol ; Status:   Prescribed ; Ordered As Mnemonic:   Ventolin HFA 90 mcg/inh inhalation aerosol ; Simple Display  Line:   See Instructions, 2 puff(s) Inhale q4 hours as needed for shortness of breath, cough or wheezing., 18 gm, 3 Refill(s) ; Ordering Provider:   Yong Boyd MD; Catalog Code:   albuterol ; Order Dt/Tm:   10/7/2021 1:03:46 PM CDT          fluorouracil topical  :   fluorouracil topical ; Status:   Prescribed ; Ordered As Mnemonic:   Efudex 5% topical cream ; Simple Display Line:   1 eva, Topical, bid, 25 gm, 3 Refill(s) ; Ordering Provider:   Yong Boyd MD; Catalog Code:   fluorouracil topical ; Order Dt/Tm:   5/21/2021 1:54:02 PM CDT          fluticasone/umeclidinium/vilanterol  :   fluticasone/umeclidinium/vilanterol ; Status:   Prescribed ; Ordered As Mnemonic:   Trelegy Ellipta 200 mcg-62.5 mcg-25 mcg/inh inhalation powder ; Simple Display Line:   1 puff(s), Inhale, daily, at the same time every day. Rinse mouth after use., 180 blister, 3 Refill(s) ; Ordering Provider:   Yong Boyd MD; Catalog Code:   fluticasone/umeclidinium/vilanterol ; Order Dt/Tm:   7/13/2021 12:36:22 PM CDT          omeprazole  :   omeprazole ; Status:   Prescribed ; Ordered As Mnemonic:   omeprazole 20 mg oral delayed release capsule ; Simple Display Line:   20 mg, 1 cap(s), Oral, daily, 90 cap(s), 3 Refill(s) ; Ordering Provider:   Yong Boyd MD; Catalog Code:   omeprazole ; Order Dt/Tm:   1/28/2021 3:23:29 PM CST          oxyCODONE  :   oxyCODONE ; Status:   Prescribed ; Ordered As Mnemonic:   oxyCODONE 5 mg oral tablet ; Simple Display Line:   5 mg, 1 tab(s), Oral, q6 hrs, 50 tab(s), 0 Refill(s) ; Ordering Provider:   Yong Boyd MD; Catalog Code:   oxyCODONE ; Order Dt/Tm:   12/28/2021 2:16:42 PM CST            Home Meds    acetaminophen  :   acetaminophen ; Status:   Documented ; Ordered As Mnemonic:   acetaminophen 500 mg oral tablet ; Simple Display Line:   1-2 tab(s), Oral, q6 hrs, 0 Refill(s) ; Catalog Code:   acetaminophen ; Order Dt/Tm:   12/28/2021 1:48:43 PM CST          aspirin  :   aspirin ; Status:   Documented ;  Ordered As Mnemonic:   Aspirin Enteric Coated 81 mg oral delayed release tablet ; Simple Display Line:   81 mg, 1 tab(s), Oral, daily, 0 Refill(s) ; Catalog Code:   aspirin ; Order Dt/Tm:   5/11/2021 3:37:37 PM CDT          diclofenac topical  :   diclofenac topical ; Status:   Documented ; Ordered As Mnemonic:   Voltaren 1% topical gel ; Simple Display Line:   See Instructions, apply 2 gram(s) topically to upper extremities (max of 8 grams/joint/day) or 4 gram(s) topically to lower extremities (max of 16 grams/joint per day) up to 4 times daily as needed for pain. Max of 32 grams/day total., 0 Refill(s) ; Catalog Code:   diclofenac topical ; Order Dt/Tm:   10/8/2021 3:30:21 PM CDT          ferrous sulfate  :   ferrous sulfate ; Status:   Documented ; Ordered As Mnemonic:   ferrous sulfate 325 mg (65 mg elemental iron) oral delayed release tablet ; Simple Display Line:   See Instructions, 1 tab(s) Oral QOD, 0 Refill(s) ; Catalog Code:   ferrous sulfate ; Order Dt/Tm:   12/28/2021 1:50:23 PM CST          magnesium oxide  :   magnesium oxide ; Status:   Documented ; Ordered As Mnemonic:   magnesium oxide 250 mg oral tablet ; Simple Display Line:   500 mg, 2 tab(s), Oral, daily, 0 Refill(s) ; Catalog Code:   magnesium oxide ; Order Dt/Tm:   12/28/2021 1:48:10 PM CST          metoprolol  :   metoprolol ; Status:   Documented ; Ordered As Mnemonic:   Metoprolol Succinate ER 25 mg oral tablet, extended release ; Simple Display Line:   25 mg, 1 tab(s), Oral, daily, 0 Refill(s) ; Catalog Code:   metoprolol ; Order Dt/Tm:   5/21/2021 1:27:26 PM CDT ; Comment:   Responsible Provider: RUFINA PALOMARES          multivitamin  :   multivitamin ; Status:   Documented ; Ordered As Mnemonic:   Multiple Vitamins oral tablet ; Simple Display Line:   1 tab(s), po, daily, 0 Refill(s) ; Catalog Code:   multivitamin ; Order Dt/Tm:   4/22/2015 9:12:40 AM CDT          nitroglycerin  :   nitroglycerin ; Status:   Documented ; Ordered  As Mnemonic:   nitroglycerin 0.4 mg sublingual tablet ; Simple Display Line:   0.4 mg, 1 tab(s), SL, q5 min, PRN: for chest pain, 100 tab(s), 0 Refill(s) ; Catalog Code:   nitroglycerin ; Order Dt/Tm:   5/11/2021 4:04:54 PM CDT          polyethylene glycol 3350  :   polyethylene glycol 3350 ; Status:   Documented ; Ordered As Mnemonic:   polyethylene glycol 3350 ; Simple Display Line:   17 gm, Oral, daily, 0 Refill(s) ; Catalog Code:   polyethylene glycol 3350 ; Order Dt/Tm:   10/12/2020 5:40:00 PM CDT          thiamine  :   thiamine ; Status:   Documented ; Ordered As Mnemonic:   thiamine 100 mg oral tablet ; Simple Display Line:   100 mg, 1 tab(s), Oral, daily, 0 Refill(s) ; Catalog Code:   thiamine ; Order Dt/Tm:   5/11/2021 3:57:45 PM CDT          zinc gluconate  :   zinc gluconate ; Status:   Documented ; Ordered As Mnemonic:   zinc (as gluconate) 50 mg oral tablet ; Simple Display Line:   1 tab by mouth daily (Pt taking OTC), 0 Refill(s) ; Catalog Code:   zinc gluconate ; Order Dt/Tm:   1/22/2021 7:46:25 PM CST            Social History   Social History   (As Of: 2/2/2022 11:59:07 AM CST)   Alcohol:  Current      Liquor (Hard) (1.5 oz), Daily, 2 drinks/episode average.   (Last Updated: 5/11/2021 10:49:59 AM CDT by Yoly Tillman)          Tobacco:        Past, 20 per day.  50 year(s).   (Last Updated: 3/17/2011 2:21:14 PM CDT by Rose Barr CMA)   Quit 12/25/2009, Cigarettes, 40 per day.  50 year(s).   (Last Updated: 5/11/2021 11:28:32 AM CDT by Yoly Tillman)          Electronic Cigarette/Vaping:        Electronic Cigarette Use: Never.   (Last Updated: 10/12/2020 1:47:53 PM CDT by Mariah Wilkes)   Electronic Cigarette Use: Never.   (Last Updated: 10/26/2021 1:49:43 PM CDT by Debby Faustin CMA)          Substance Abuse:  Denies Substance Abuse      Never   (Last Updated: 3/19/2014 10:32:10 AM CDT by Brooke Krishnan)          Employment/School:        Retired, Highest education level: High school.   (Last  Updated: 10/12/2020 1:48:08 PM CDT by Mariah Wilkes)          Home/Environment:        Marital status: .  Spouse/Partner name: Darlene Chau.  4 children.  Living situation: Home/Independent.  Home equipment: Walker/Cane.  Injuries/Abuse/Neglect in household: No.  Feels unsafe at home: No.  Family/Friends available for support: Yes.   (Last Updated: 5/11/2021 11:11:14 AM CDT by Yoly Tillman)          Nutrition/Health:        Type of diet: Regular.  Wants to lose weight: No.  Sleeping concerns: No.  Feels highly stressed: No.   (Last Updated: 10/12/2020 1:48:37 PM CDT by Mariah Wilkes)          Exercise:        Exercise frequency: Occasional.   (Last Updated: 10/12/2020 1:48:50 PM CDT by Mariah Wilkes)          Sexual:        Sexually active: No.  Identifies as male, Sexual orientation: Straight or heterosexual.  Contraceptive Use Details: None.   (Last Updated: 10/12/2020 1:49:26 PM CDT by Mariah Wilkes)

## 2022-03-02 NOTE — LETTER
(Inserted Image. Unable to display)   January 31, 2022  CELIA LUTZ  [address] [address]        Dear CELIA,    Thank you for selecting [facility] for your healthcare needs.    Our records indicate you are due for the following services:     Medication Therapy Management Follow-up Visit ~ Our records show that you are due for a Medication Therapy Management (MTM) appointment.   We would like to meet with you to review how your medications are working for you and to answer any questions you may have.       Appointments are available on Tuesdays and Thursdays and Fridays.    (FYI   Regarding office visits: In some instances, a video visit or telephone visit may be offered as an option.)    To schedule an appointment or if you have further questions, please contact your clinic at [phone].    Powered by [employer]    Sincerely,    Sara Cottrell, WileyD  Medication Therapy Management (MTM) Pharmacist

## 2022-03-02 NOTE — PROGRESS NOTES
Chief Complaint    c/o severe right hip pain--ongoing since last June.  has been on Oxycodone.  also shoulder pain from using walking.  History of Present Illness      Mitchell is here with ongoing right hip pain.  He has had relief with oxycodone.  He has used less that 50 in the last month.  Review of Systems          ROS reviewed and negative except for symptoms noted in HPI.  Physical Exam   Vitals & Measurements    T: 96.3  F (Tympanic)  HR: 60 (Peripheral)  BP: 117/76     HT: 71 in  WT: 186.1 lb  BMI: 25.95       General:  Alert and oriented, No acute distress.       Eye: Normal conjunctiva.        HENT:  Oral mucosa is moist.        Neck:  Supple.        Respiratory:  Respirations are non-labored.        Cardiovascular:  Normal rate      Musculoskeletal:  favors right hip      Psychiatric:  Cooperative, Appropriate mood & affect, Normal judgment.  Assessment/Plan       1. 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  Follow up in one month            [facility]                   Orders:         oxyCODONE, = 1 tab(s) ( 5 mg ), Oral, q6 hrs, # 50 tab(s), 0 Refill(s), Type: Maintenance, Pharmacy: [id number], 1 tab(s) Oral q6 hrs, 71, in, 02/02/22 11:51:00 CST, Height Measured, 186.1, lb, 02/02/22 11:51:00 CST, Weight Measured, (Ordered)         oxyCODONE, = 1 tab(s) ( 5 mg ), Oral, q6 hrs, # 50 tab(s), 0 Refill(s), Type: Hard Stop, Pharmacy: [id number], 71, in, 12/28/21 13:52:00 CST, Height Measured, 192.6, lb, 12/28/21 13:52:00 CST, Weight Measured, (Completed)  Patient Information     Name:CELIA LUTZ      Address:      [address] [place] [id number]     Sex:Male     YOB: 1945     [phone]     Emergency Contact:DIANNA LEYVA     MRN:[id number]     FIN:[id number]     Location:[facility]     Date of Service:02/02/2022       Primary Care Physician:       Yong Boyd MD, [phone]      Attending Physician:       Carlos Guerrero MD, [phone]  Problem List/Past Medical History    Ongoing     AAA (abdominal aortic aneurysm)     Adenocarcinoma of lung     Anticoagulated     [place] Esophagus     Cardiac arrhythmia     CKD (chronic kidney disease) stage 3, GFR 30-59 ml/min     Closed fracture of trochanter of femur     Closed hip fracture     COPD (chronic obstructive pulmonary disease) with acute bronchitis     Coronary artery disease due to lipid rich plaque     DJD (Degenerative Joint Disease)     Former Smoker     GERD (gastroesophageal reflux disease)     H/O prostate cancer     H/O unilateral nephrectomy     History of oropharyngeal cancer     HTN (Hypertension)     Hypomagnesemia     Lipids abnormal     Lung cancer     Obesity     Rhabdomyolysis    Historical     *Hospitalized@[facility] - Atypical chest pain     *Hospitalized@[facility] - Hip fracture     *Hospitalized@[facility] Septic hip vs hematoma     History of sepsis       Comments: Sepsis organ failure: Acute renal failure. Severe; due to septic hip.     Inpatient stay       Comments: @[facility] - NSTEMI.     Inpatient stay       Comments: [place], [place] - Chest pain, acute renal failure.     Inpatient stay       Comments: @ [facility] due to acute renal failure.     Inpatient stay       Comments: [place], [place] - Weakness, frequent falls, and dizziness.     Prostate cancer  Procedure/Surgical History     Biopsy of lung (01/21/2021)     Esophagogastroduodenoscopy (11/13/2020)     Excision of squamous cell carcinoma (11/06/2020)      Comments: Left buccal mucosa and left lateral tongue..     Coronary angiography (11/25/2019)      Comments: and PCI of the right coronary artery..     Esophagogastroduodenoscopy (07/26/2019)      Comments: Endoscopic US, Fine Needle Aspiration; Gastric Biopsies of polyps..     Colonoscopy (03/21/2017)       Comments: Indication: Adenomatous Polyps      Sedation: MAC      Findings: Adenomatous polyp cecum, hemorrohids      Rec: Repeat in 5 years.     Right Hip Bipoloar Hemiarthroplasty (06/22/2016)      Comments: Right displasced femoral neck fracture.     Thoracoscopic wedge resection of lung (2016)      Comments: RUL, RML.     right thoracotomy (11/15/2015)      Comments: wedge resection right upper lung nodule, wedge resection right middle lobe lung nodule, Mediastinal lymph node dissection.     Colonoscopy (05/12/2014)      Comments: Sedation: midazolam, fentanyl      Indication: screen      5-6mm tubular adenom x3, 8mm tubular adenoma x1, 12mm tubular adenoma with advance adenoma due to size      Repeat in 3 years..     nepherectomy (03/2009)      Comments: Left.     Lumbar discectomy (2006)     Left salivary gland removal (1995)     Oral surgery (1994)     Aortic aneurysm, abdominal  Medications    acetaminophen 500 mg oral tablet, 1-2 tab(s), Oral, q6 hrs    Aspirin Enteric Coated 81 mg oral delayed release tablet, 81 mg= 1 tab(s), Oral, daily    Efudex 5% topical cream, 1 eva, Topical, bid, 3 refills    ferrous sulfate 325 mg (65 mg elemental iron) oral delayed release tablet, See Instructions    magnesium oxide 250 mg oral tablet, 500 mg= 2 tab(s), Oral, daily    Metoprolol Succinate ER 25 mg oral tablet, extended release, 25 mg= 1 tab(s), Oral, daily    Multiple Vitamins oral tablet, 1 tab(s), Oral, daily    nitroglycerin 0.4 mg sublingual tablet, 0.4 mg= 1 tab(s), SL, q5 min, PRN    omeprazole 20 mg oral delayed release capsule, 20 mg= 1 cap(s), Oral, daily, 3 refills    oxyCODONE 5 mg oral tablet, 5 mg= 1 tab(s), Oral, q6 hrs    polyethylene glycol 3350, 17 gm, Oral, daily    thiamine 100 mg oral tablet, 100 mg= 1 tab(s), Oral, daily    Trelegy Ellipta 200 mcg-62.5 mcg-25 mcg/inh inhalation powder, 1 puff(s), Inhale, daily, 3 refills    Ventolin HFA 90 mcg/inh inhalation aerosol, See Instructions, 3  refills    Voltaren 1% topical gel, See Instructions    zinc (as gluconate) 50 mg oral tablet  Allergies    No Known Medication Allergies  Social History    Smoking Status     Former smoker     Alcohol - Current      Liquor (Hard) (1.5 oz), Daily, 2 drinks/episode average.     Electronic Cigarette/Vaping      Electronic Cigarette Use: Never.     Employment/School      Retired, Highest education level: High school.     Exercise      Exercise frequency: Occasional.     Home/Environment      Marital status: . Spouse/Partner name: Darlene Chau. 4 children. Living situation: Home/Independent. Home equipment: Walker/Cane. Injuries/Abuse/Neglect in household: No. Feels unsafe at home: No. Family/Friends available for support: Yes.     Nutrition/Health      Type of diet: Regular. Wants to lose weight: No. Sleeping concerns: No. Feels highly stressed: No.     Sexual      Sexually active: No. Identifies as male, Sexual orientation: Straight or heterosexual. Contraceptive Use Details: None.     Substance Abuse - Denies Substance Abuse      Never     Tobacco      Quit 12/25/2009, Cigarettes, 40 per day. 50 year(s).  Family History    Alcohol abuse: Mother and Sister.    CA - Breast cancer: Sister.    Kidney disease: Aunt (M).    Lupus: Sister.    Obese: Sister.    SMA type III: Grandfather (M).  Lab Results       Lab Results (Last 4 results within 90 days)        Sodium Level: 142 mmol/L [135 mmol/L - 146 mmol/L] (12/21/21 09:12:00)       Potassium Level: 4.5 mmol/L [3.5 mmol/L - 5.3 mmol/L] (12/21/21 09:12:00)       Chloride Level: 105 mmol/L [98 mmol/L - 110 mmol/L] (12/21/21 09:12:00)       CO2 Level: 28 mmol/L [20 mmol/L - 32 mmol/L] (12/21/21 09:12:00)       Glucose Level: 124 mg/dL High [65 mg/dL - 99 mg/dL] (12/21/21 09:12:00)       BUN: 26 mg/dL High [7 mg/dL - 25 mg/dL] (12/21/21 09:12:00)       Creatinine Level: 1.7 mg/dL High [0.7 mg/dL - 1.18 mg/dL] (12/21/21 09:12:00)       BUN/Creat Ratio: 15 [6  - 22]  (12/21/21 09:12:00)       eGFR: 38 mL/min/1.73m2 Low (12/21/21 09:12:00)       eGFR : 44 mL/min/1.73m2 Low (12/21/21 09:12:00)       Calcium Level: 10.2 mg/dL [8.6 mg/dL - 10.3 mg/dL] (12/21/21 09:12:00)       Calcium Level: 10.2 mg/dL [8.6 mg/dL - 10.3 mg/dL] (12/21/21 09:12:00)       Phosphorus Level: 4.5 mg/dL High [2.1 mg/dL - 4.3 mg/dL] (12/21/21 09:12:00)       PTH Intact: 15 pg/mL [14 pg/mL - 64 pg/mL] (12/21/21 09:12:00)       Cholesterol: 192 mg/dL (12/21/21 09:12:00)       Non-HDL Cholesterol: 157 High (12/21/21 09:12:00)       HDL: 35 mg/dL Low (12/21/21 09:12:00)       Cholesterol/HDL Ratio: 5.5 High (12/21/21 09:12:00)       LDL: 132 High (12/21/21 09:12:00)       Triglyceride: 141 mg/dL (12/21/21 09:12:00)       U Creatinine: 85 mg/dL [20 mg/dL - 320 mg/dL] (12/21/21 09:12:00)       U Microalbumin: 5.3 mg/dL (12/21/21 09:12:00)       Ur Microalbumin/Creatinine Ratio: 62 High (12/21/21 09:12:00)       Hgb: 12.9 gm/dL Low [13.2 gm/dL - 17.1 gm/dL] (12/21/21 09:12:00)  Immunizations       Scheduled Immunizations       Dose Date(s)       influenza virus vaccine, inactivated       10/08/2013, 11/01/2016, 11/07/2006, 09/16/2014, 11/05/2019, 10/09/2020       pneumococcal (PPSV23)       03/06/2012, 02/16/2007, 10/16/2014       SARS-CoV-2 (COVID-19) Moderna-1273       01/28/2021, 02/28/2021       Td       08/18/2016, 02/03/2005       Other Immunizations               influenza virus vaccine, inactivated       11/19/2010, 09/08/2011, 09/24/2012, 10/07/2014, 10/28/2015, 09/19/2017, 09/23/2018, 10/30/2020, 11/01/2021       pneumococcal (PPSV23)       01/01/2007       Td       01/01/2005       ZOS, shingles       03/23/2013       pneumococcal (PCV13)       04/22/2015       SARS-CoV-2 (COVID-19) Moderna-1273       11/01/2021

## 2022-03-03 ENCOUNTER — OFFICE VISIT (OUTPATIENT)
Dept: PHARMACY | Facility: CLINIC | Age: 77
End: 2022-03-03
Payer: COMMERCIAL

## 2022-03-03 VITALS
SYSTOLIC BLOOD PRESSURE: 121 MMHG | DIASTOLIC BLOOD PRESSURE: 76 MMHG | WEIGHT: 184.6 LBS | HEART RATE: 64 BPM | OXYGEN SATURATION: 98 % | BODY MASS INDEX: 25.75 KG/M2

## 2022-03-03 DIAGNOSIS — J44.9 COPD, GROUP B, BY GOLD 2017 CLASSIFICATION (H): Primary | ICD-10-CM

## 2022-03-03 DIAGNOSIS — R60.0 LOWER LEG EDEMA: ICD-10-CM

## 2022-03-03 DIAGNOSIS — K21.9 GASTROESOPHAGEAL REFLUX DISEASE, UNSPECIFIED WHETHER ESOPHAGITIS PRESENT: ICD-10-CM

## 2022-03-03 DIAGNOSIS — Z78.9 TAKES DIETARY SUPPLEMENTS: ICD-10-CM

## 2022-03-03 DIAGNOSIS — N18.9 CHRONIC KIDNEY DISEASE, UNSPECIFIED CKD STAGE: ICD-10-CM

## 2022-03-03 DIAGNOSIS — R52 GENERALIZED PAIN: ICD-10-CM

## 2022-03-03 DIAGNOSIS — I10 HYPERTENSION, UNSPECIFIED TYPE: ICD-10-CM

## 2022-03-03 DIAGNOSIS — K59.00 CONSTIPATION, UNSPECIFIED CONSTIPATION TYPE: ICD-10-CM

## 2022-03-03 DIAGNOSIS — I25.118 CORONARY ARTERY DISEASE OF NATIVE HEART WITH STABLE ANGINA PECTORIS, UNSPECIFIED VESSEL OR LESION TYPE (H): ICD-10-CM

## 2022-03-03 DIAGNOSIS — J44.9 CHRONIC OBSTRUCTIVE PULMONARY DISEASE, UNSPECIFIED COPD TYPE (H): Primary | ICD-10-CM

## 2022-03-03 DIAGNOSIS — Z78.9 ALCOHOL USE: ICD-10-CM

## 2022-03-03 PROCEDURE — 99605 MTMS BY PHARM NP 15 MIN: CPT | Performed by: PHARMACIST

## 2022-03-03 PROCEDURE — 99607 MTMS BY PHARM ADDL 15 MIN: CPT | Performed by: PHARMACIST

## 2022-03-03 RX ORDER — SENNOSIDES 8.6 MG
650 CAPSULE ORAL EVERY 8 HOURS PRN
COMMUNITY
End: 2022-06-30

## 2022-03-03 RX ORDER — ALBUTEROL SULFATE 90 UG/1
1-2 AEROSOL, METERED RESPIRATORY (INHALATION) EVERY 4 HOURS PRN
Qty: 18 G | Refills: 2 | Status: SHIPPED | OUTPATIENT
Start: 2022-03-03 | End: 2023-01-01

## 2022-03-03 NOTE — LETTER
"Recommended To-Do List      Prepared on: 3/17/2022     You can get the best results from your medications by completing the items on this \"To-Do List.\"      Bring your To-Do List when you go to your doctor. And, share it with your family or caregivers.    My To-Do List:  What we talked about: What I should do:   The importance of taking your medication as intended    Education: We will refill albuterol (ventolin) so that you have this on hand to use intermittently as needed for shortness of breath, cough or wheezing.          What we talked about: What I should do:   Your medication dosage being too low    Increase how often you take acetaminophen (TYLENOL): Increase the acetaminophen 650 mg tabs to three times daily scheduled.          What we talked about: What I should do:   The importance of taking your medication as intended    Reminder to take your medication as prescribed for metoprolol succinate ER (TOPROL-XL) - medication was refilled by Dr. Boyd.          What we talked about: What I should do:                   "

## 2022-03-03 NOTE — LETTER
March 17, 2022  Lenny C Isac  [address]  [place] [address]    Dear KATERINE Macias [facility]        Thank you for talking with me on Mar 3, 2022 about your health and medications. As a follow-up to our conversation, I have included two documents:      1. Your Recommended To-Do List has steps you should take to get the best results from your medications.  2. Your Medication List will help you keep track of your medications and how to take them.    If you want to talk about these documents, please call Faviola Cottrell RPH at phone: [phone], Monday-Friday 8-4:30pm.    I look forward to working with you and your doctors to make sure your medications work well for you.    Sincerely,    Faviola Cottrell RPH  [facility] Pharmacist, [facility]

## 2022-03-03 NOTE — PROGRESS NOTES
Medication Therapy Management (MTM) Encounter    ASSESSMENT:                            Medication Adherence/Access: see below for considerations.    COPD: improved control from historical; recommend initiating MUKESH inhaler for Patient to have on hand as needed for shortness of breath, cough or wheezing - education provided about this today.  Discussed [employer] Patient Assistance Program for [facility] - paperwork provided.    Vitamins/Supplements in setting of alcohol use: continue current medications.    Pain: discussed that there should be remaining qty of oxycodone on Patient's prescription at the pharmacy.  Recommend increasing acetaminophen to three times daily to help minimize oxycodone use.  We discussed importance of continuing to use sparingly, reviewed the respiratory depression risks with increased doses.    Hypertension/CAD/CKD/ lower leg edema : education provided about checking on nitroglycerin expiration at home; blood pressure stable, will check with PCP re: metoprolol refill since previously prescribed by cards.    GERD: symptoms stable, continue current medications.    Constipation: stable, did discuss potential for opioid induced constipation in future and to notify clinic if concerning.    PLAN:                            1. Increase the acetaminophen 650 mg tabs to three times daily scheduled.    2. There is oxycodone at the pharmacy you can fill.    3. We will refill albuterol (ventolin) so that you have this on hand to use intermittently as needed for shortness of breath, cough or wheezing. Rx sent.    4. Complete Trelegy [employer] Patient Assistance Program paperwork. Bring back to clinic when you have met the $600 out of pocket spend.    5. Refill the nitroglycerin at the pharmacy (refills already on file there).    6. I will check with Dr. Boyd whether he wants you to continue following with cardiology Dr. Valentine or not.    MTM to: check on metoprolol refill with Dr. Boyd.  Mann sent - see  "below.    Follow-up: 6-8 weeks, make an appointment at the , sooner if needed.    Addended on March 4, 2022  PCP okay with refilling metoprolol, Rx sent.    ===View-only below this line===  ----- Message -----  From: Shade Boyd MD  Sent: 3/4/2022   1:14 PM CST  To: Faviola Cottrell TEJAL  Subject: RE: metoprolol Q                               I'm okay with it being filled under my name  ----- Message -----  From: Faviola Cottrell TEJAL  Sent: 3/3/2022   3:00 PM CST  To: Shdae Boyd MD  Subject: metoprolol Q                                   Mitchell is out of refills of his metoprolol.  His blood pressure was stable today.  He expresses a preference not to follow-up with cardiology - Dr. Valentine -who has previously prescribed his metoprolol.   \"I see to many doctors\"  Do you want us to fill his metoprolol?  Or would you like him to continue following with [facility]?  KB    SUBJECTIVE/OBJECTIVE:                          Lenny Chau is a 76 year old male coming in for a follow-up visit. Patient was accompanied by sister to today's visit. Today's visit is a follow-up MT visit from 9/28/2021     Reason for visit: overdue follow-up.  Patient states that he's been feeling well, breathing better than in the past; happy with the fact that he has been alcohol free since discharge from the [facility].    Allergies/ADRs: Reviewed in chart  Past Medical History: Reviewed in chart  Tobacco: He reports that he quit smoking about 12 years ago. He has a 100.00 pack-year smoking history. He has never used smokeless tobacco.  Alcohol: not drinking at this time; previously was drinking 2-3 drinks/day, each containing 1-2 oz of vodka.  Caffeine: 1-2 cups/day of coffee  Activity: no routine  Past Medical History: Reviewed in chart - history of prostate cancer, lung cancer, unilateral nephrectomy, oropharyngeal cancer.  Social: lives with wife.     Medication Adherence/Access:   Patient uses pill box(es), sets " this up himself.  Patient takes medications 2 time(s) per day.   Per patient, misses medication 0 times per week, estimates maybe once/month.  Medication barriers: trouble affording inhalers - has [employer] Patient Assistance Program for  year, planning to re-enroll in .  The patient fills medications at [place]: NO, fills medications at [facility] in [place].    AM:  -Trelegy: 1 puff once daily in the morning (no albuterol beforehand).  -Multivitamin  -Ferrous sulfate 325 mg: every other day  -Zinc 50 mg: AM  -Metoprolol succinate ER 25 mg     PM:  -Aspirin 81 mg  -Thiamine 100 mg  -Magnesium 250 m tabs  -Omeprazole 20 mg     As needed:   -Albuterol inhaler -   -Nitroglycerin   -miralax  -Acetaminophen  -oxycodone    *states he's no longer taking quetiapine or mirtazapine - thinks might have been on these when in the care center.    COPD:  Patient follows with [facility]monology. Last appointment 2021- no med changes.  History of R sided lung cancer x2.  Tobacco status: former smoker - quit .  3/3: Patient states that his breathing has been good recently and he's been feeling well.  Current Medications:  Trelegy Ellipta (Fluticasone 200 mcg/Umeclidinium 62.5mcg- Vilanterol 25 mcg) Inhaler: 1 inhalation once daily. Rinsing mouth out after trelegy, history of oral thrush with advair.  Access through [employer] Patient Assistance Program through 2021; Patient aware needs to meet out of pocket spend for  for eligibility.  Albuterol MDI. Patient use:  does not have, left at assisted living facility when discharged.  Has not refilled bc he hasn't felt like he's needed it recently.  Medication History: spiriva, advair (switched to Trelegy 2021)  COPD assessment test (CAT):  1. I never cough  0 - 1 - 2 - 3 - 4 - 5  I cough all the time = 1  2. I have no phlegm (mucus) in my chest  0 - 1 - 2 - 3 - 4 - 5  My chest is completely full of phlegm (mucus) = 0  3. My chest does not feel tight at all   0 - 1 -  "2 - 3 - 4 - 5  my chest feels very tight = 0  4. When I walk up a hill or a flight of stairs I am not out of breath  0 - 1 - 2 - 3 - 4 - 5  when I walk up a hill or one flight of stairs I am completely out of breath = 3  5. I am not limited to doing any activities at home  0 - 1 - 2 - 3 - 4 - 5  I am very limited in doing all activities at home = 2  6. I am confident leaving my home despite my lung condition  0 - 1 - 2 - 3 - 4 - 5  I am not at all confident leaving my home because of my lung condition = 0  7. I sleep soundly  0 - 1 - 2 - 3 - 4 - 5  I do not sleep soundly because of medical condition = 3  8. I have lots of energy  0 - 1 - 2 - 3 - 4 - 5  I have no energy at all = 3  COPD assessment test (CAT) history  Date: TODAY (3/3/2022) score: 12  Date 2021: 16  Date: 6/15/2021: 15  Date: 2021: 17    Vitamins/Supplements in setting of alcohol use:  \"[facility] then [facility] for about 1 month and this helped. Have gone 10 weeks without drinking\"  Patient described a situation when he was hallucinating while at [employer] and this was disturbing to him and he realized that he did not want to be drinking so much and he realized that this was a problem.  Ferrous Sulfate 325 mg (65 FE): 1 tab every other day.  Multivitamin: once daily  Magnesium oxide 250 m tabs once daily   Zinc gluconate 50 m tab daily  Thiamine (vit B1) 100 mg: once daily.  Medication History: folate (stopped 2021 d/t normal level), vit B12  (stopped d/t high level), vitamin D (removed from medication list in the past).    Pain:  Pain is most bothersome in right hip; history of hip surgery.  States the pain hurts in the morning, noon and night. He cannot lay in a position to relieve it; walker helps a lot bc it takes some of the weight off of his hip.  Current Meds:  Acetaminophen 650 mg: takes about twice daily; recommended max 2000 mg.  Oxycodone 5 mg: taking 1-2 tabs daily (has a few tabs left " and requests a refill); 28 tabs were filled on 2022; wakes up at 2 or 3 in the morning to use a tablet if in pain overnight. Finds this somewhat effective, but not complete relief.  Topical Hemp Lotion / CBD production (Patient unsure of brand/exact ingredients). Using topically about once daily and has noted some benefits. States packaging says can take up to four times daily max.  Medication History:  oxycodone, hydrocodone and gabapentin (for back pain 10-12 years ago); had therapy for about 8 weeks and a minor surgery. Ibuprofen (recommended to stop due to renal and GI risks); Diclofenac (tried to minimal/no effect).    Hypertension/CAD/CKD/ lower leg edema :  Followed with Dr. Valentine. Last appointment with Dr. Valentine was 2021.  Followed with vascular clinic/Dr. David.  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 Thoracic AAA, history of abdominal AAA s/p endovascular repair.  PCI of RCA 2019 following NSTEMI; DAPT stopped 10/2020.  3/3: needs refill of metoprolol; Patient would prefer to get filled with Dr. Boyd, does not want to continue seeing so many doctors.  Current Meds:  Metoprolol succinate 25 mg: once daily  Aspirin 81 mg: once daily  Nitroglycerin 0.4 mg sublingual tab: Place 1 tablet under the tongue at onset of chest pain.  May repeat x 3 doses q 5 min.  Call 911 after first dose if pain persists. Pt use: none recently; has not used in long time, denies any chest pain; Patient will check to ensure this is not .  Medication History: clopidogrel/aspirin (per chart), amlodipine, midodrine (suspects he was taking while in the care center, not taking at this time, not having low BP at this time).  Avoiding ACEi/ARB due to repeat LEILA.  ECHO: 2020 EF: 55%  Patient does not self-monitor blood pressure.   BP Readings from Last 3 Encounters:   22 121/76   22 138/74   22 117/76     Pulse Readings from Last 3 Encounters:   22 64   22  "50   02/02/22 60     GERD:  Current medications include: Prilosec (omeprazole) 20 mg once daily (remote history of taking twice daily).  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 No GERD symptoms, no acid reflux, etc. States that sometimes he has trouble swallowing and he feels this is secondary to jaw/mouth surgery.   Mag level: takes mag supplement.          Constipation: Feels BMs are stable at this time.  Miralax (PEG powder): 17 grams (1 capful) mixed in liquid once daily (did not bring with to appointment - but has at home) - Patient  Using intermittently as needed. States that he is not having an issue with constipation at this time despite having oxycodone.     Today's Vitals: /76   Pulse 64   Wt 184 lb 9.6 oz (83.7 kg)   SpO2 98%   BMI 25.75 kg/m     ----------------      I spent 45 minutes with this patient today. All changes were made via collaborative practice agreement with Shade Boyd MD. A copy of the visit note was provided to the patient's provider(s).    The patient was given a summary of these recommendations.  MAP Completed.    Sara Cottrell, WileyD  Medication Therapy Management (MTM) Pharmacist  [place] [place] [facility] (Tu/Th/Fr)  Clinic Phone: [phone]  Pager: [phone]     Medication Therapy Recommendations  Chronic obstructive pulmonary disease, unspecified COPD type (H)    Current Medication: albuterol (PROAIR HFA/PROVENTIL HFA/VENTOLIN HFA) 108 (90 Base) MCG/ACT inhaler   Rationale: Does not understand instructions - Adherence - Adherence   Recommendation: Provide Education   Status: Patient Agreed - Adherence/Education         Generalized pain    Current Medication: acetaminophen (TYLENOL) 500 MG tablet (Discontinued)   Rationale: Dose too low - Dosage too low - Effectiveness   Recommendation: Increase Frequency   Status: Patient Agreed - Adherence/Education         Hypertension    " Current Medication: metoprolol succinate ER (TOPROL-XL) 25 MG 24 hr tablet (Discontinued)   Rationale: Medication product not available - Adherence - Adherence   Recommendation: Provide Adherence Intervention   Status: Accepted per Provider

## 2022-03-03 NOTE — LETTER
_  Medication List        Prepared on: 3/17/2022     Bring your Medication List when you go to the doctor, hospital, or   emergency room. And, share it with your family or caregivers.     Note any changes to how you take your medications.  Cross out medications when you no longer use them.    Medication How I take it Why I use it Prescriber   acetaminophen (TYLENOL) 650 MG CR tablet Take 650 mg by mouth every 8 hours as needed for mild pain or fever  Pain Patient Reported   albuterol (PROAIR HFA/PROVENTIL HFA/VENTOLIN HFA) 108 (90 Base) MCG/ACT inhaler Inhale 1-2 puffs into the lungs every 4 hours as needed for shortness of breath / dyspnea or wheezing Chronic obstructive pulmonary disease, unspecified COPD type (H) Shade Boyd MD   aspirin (ASA) 81 MG EC tablet Take 81 mg by mouth At Bedtime  Percutaneous Coronary Intervention Patient Reported   ferrous sulfate (FEROSUL) 325 (65 Fe) MG tablet Take 325 mg by mouth every other day With breakfast  Anemia From Inadequate Iron in the Body Patient Reported   Fluticasone-Umeclidin-Vilant (TRELEGY ELLIPTA) 200-62.5-25 MCG/INH AEPB Inhale 1 puff into the lungs daily  COPD Naman Summers MD   Magnesium Oxide 250 MG TABS Take 500 mg by mouth At Bedtime  Disorder with Low Magnesium Levels Patient Reported   metoprolol succinate ER (TOPROL-XL) 25 MG 24 hr tablet Take 1 tablet (25 mg) by mouth daily Hypertension, unspecified type Shade Boyd MD   multivitamin w/minerals (THERA-VIT-M) tablet Take 1 tablet by mouth daily  vitamin deficiency Patient Reported   nitroGLYcerin (NITROSTAT) 0.4 MG sublingual tablet For chest pain place 1 tablet under the tongue every 5 minutes for 3 doses. If symptoms persist 5 minutes after 1st dose call 911. Coronary artery disease involving autologous artery coronary bypass graft without angina pectoris Shade Boyd MD   omeprazole (PRILOSEC) 20 MG DR capsule Take 20 mg by mouth daily (with dinner) Before a meal Ragsdale's Esophagus  Patient Reported   oxyCODONE (ROXICODONE) 5 MG tablet Take 1 mg by mouth every 6 hours as needed for pain  Severe Pain Shade Boyd MD   polyethylene glycol (MIRALAX) 17 GM/Dose powder Take 17 g by mouth daily As needed Constipation Patient Reported   thiamine (B-1) 100 MG tablet Take 100 mg by mouth daily  Deficiency of Vitamin B1 Patient Reported   zinc gluconate 50 MG tablet Take 1 tablet by mouth daily  vitamin supplementation Patient Reported         Add new medications, over-the-counter drugs, herbals, vitamins, or  minerals in the blank rows below.    Medication How I take it Why I use it Prescriber                          Allergies:      No Known Allergies        Side effects I have had:              Other Information:             My notes and questions:

## 2022-03-03 NOTE — PATIENT INSTRUCTIONS
Recommendations from today's MTM visit:                                                    MTM (medication therapy management) is a service provided by a clinical pharmacist designed to help you get the most of out of your medicines.      1. Increase the acetaminophen 650 mg tabs to three times daily scheduled.    2. There is oxycodone at the pharmacy you can fill.    3. We will refill albuterol (ventolin) so that you have this on hand to use intermittently as needed for shortness of breath, cough or wheezing.    4. Complete Trelegy [employer] Patient Assistance Program paperwork. Bring back to clinic when you have met the $600 out of pocket spend.    5. Refill the nitroglycerin at the pharmacy (refills already on file there).    6. I will check with Dr. Boyd whether he wants you to continue following with cardiology Dr. Valentine or not.    Follow-up: 6-8 weeks, make an appointment at the , sooner if needed.    It was great to speak with you today.  I value your experience and would be very thankful for your time with providing feedback on our clinic survey. You may receive a survey via email or text message in the next few days.     To schedule another MTM appointment, please call the clinic directly or you may call the MTM scheduling line at [phone] or toll-free at [phone].     My Clinical Pharmacist's contact information:                                                      Please feel free to contact me with any questions or concerns you have.      Sara Cottrell, PharmD  Medication Therapy Management (MTM) Pharmacist

## 2022-03-04 ENCOUNTER — APPOINTMENT (OUTPATIENT)
Dept: RADIATION ONCOLOGY | Facility: HOSPITAL | Age: 77
End: 2022-03-04
Attending: RADIOLOGY
Payer: MEDICARE

## 2022-03-04 DIAGNOSIS — I25.810 CORONARY ARTERY DISEASE INVOLVING AUTOLOGOUS ARTERY CORONARY BYPASS GRAFT WITHOUT ANGINA PECTORIS: Primary | ICD-10-CM

## 2022-03-04 PROCEDURE — 77300 RADIATION THERAPY DOSE PLAN: CPT | Mod: 26 | Performed by: RADIOLOGY

## 2022-03-04 PROCEDURE — 77338 DESIGN MLC DEVICE FOR IMRT: CPT | Performed by: RADIOLOGY

## 2022-03-04 PROCEDURE — 77301 RADIOTHERAPY DOSE PLAN IMRT: CPT | Performed by: RADIOLOGY

## 2022-03-04 PROCEDURE — 77338 DESIGN MLC DEVICE FOR IMRT: CPT | Mod: 26 | Performed by: RADIOLOGY

## 2022-03-04 PROCEDURE — 77293 RESPIRATOR MOTION MGMT SIMUL: CPT | Mod: 26 | Performed by: RADIOLOGY

## 2022-03-04 PROCEDURE — 77293 RESPIRATOR MOTION MGMT SIMUL: CPT | Performed by: RADIOLOGY

## 2022-03-04 PROCEDURE — 77301 RADIOTHERAPY DOSE PLAN IMRT: CPT | Mod: 26 | Performed by: RADIOLOGY

## 2022-03-04 PROCEDURE — 77300 RADIATION THERAPY DOSE PLAN: CPT | Performed by: RADIOLOGY

## 2022-03-04 RX ORDER — METOPROLOL SUCCINATE 25 MG/1
25 TABLET, EXTENDED RELEASE ORAL DAILY
Qty: 90 TABLET | Refills: 1 | Status: SHIPPED | OUTPATIENT
Start: 2022-03-04 | End: 2022-08-26

## 2022-03-04 NOTE — TELEPHONE ENCOUNTER
I suspect patient's question is about metoprolol as he requested a refill yesterday.   Dr. Boyd approved that this afternoon and I just sent it to the pharmacy.   Please communicate to Patient.     If he has further questions about medications - please clarify what those questions are.    Routing to [facility] center to inform Patient/ address Qs.    Sara Cottrell, WileyD  Medication Therapy Management (MTM) Pharmacist

## 2022-03-04 NOTE — TELEPHONE ENCOUNTER
TC with pt, who stated the pharmacy only dispensed 28 tablets of Oxycodone, when GTG prescribed 50. Stated his insurance only covered 28; pt requesting the remaining 22 tabs. Pt is asking for a renewal of NTG too.   Pharmacy has filled Metoprolol. Pt stated he will call back to schedule appt with BRM.      NTG 0.4mg SL q5mins prn #100 documented medication on 5/21/21    Oxycodone 5mg 1 tab q6hrs #50 on 2/2/22.   Please advise on refill request.

## 2022-03-04 NOTE — TELEPHONE ENCOUNTER
Reason for Call:  Other medication    Detailed comments: has questions about his medications you discussed with him yesterday    Phone Number Patient can be reached at: Home number on file [phone] (home)    Best Time: any    Can we leave a detailed message on this number? NO    Call taken on 3/4/2022 at 1:46 PM by Anitha Carlson

## 2022-03-07 ENCOUNTER — LAB (OUTPATIENT)
Dept: LAB | Facility: CLINIC | Age: 77
End: 2022-03-07
Attending: RADIOLOGY
Payer: COMMERCIAL

## 2022-03-07 DIAGNOSIS — C34.12 MALIGNANT NEOPLASM OF UPPER LOBE OF LEFT LUNG (H): ICD-10-CM

## 2022-03-07 LAB — SARS-COV-2 RNA RESP QL NAA+PROBE: NEGATIVE

## 2022-03-07 PROCEDURE — U0003 INFECTIOUS AGENT DETECTION BY NUCLEIC ACID (DNA OR RNA); SEVERE ACUTE RESPIRATORY SYNDROME CORONAVIRUS 2 (SARS-COV-2) (CORONAVIRUS DISEASE [COVID-19]), AMPLIFIED PROBE TECHNIQUE, MAKING USE OF HIGH THROUGHPUT TECHNOLOGIES AS DESCRIBED BY CMS-2020-01-R: HCPCS

## 2022-03-07 PROCEDURE — U0005 INFEC AGEN DETEC AMPLI PROBE: HCPCS

## 2022-03-08 RX ORDER — NITROGLYCERIN 0.4 MG/1
TABLET SUBLINGUAL
Qty: 100 TABLET | Refills: 0 | Status: SHIPPED | OUTPATIENT
Start: 2022-03-08 | End: 2022-04-04

## 2022-03-09 ENCOUNTER — APPOINTMENT (OUTPATIENT)
Dept: RADIATION ONCOLOGY | Facility: CLINIC | Age: 77
End: 2022-03-09
Attending: RADIOLOGY
Payer: MEDICARE

## 2022-03-09 PROCEDURE — 77370 RADIATION PHYSICS CONSULT: CPT | Performed by: RADIOLOGY

## 2022-03-09 PROCEDURE — 77373 STRTCTC BDY RAD THER TX DLVR: CPT | Performed by: RADIOLOGY

## 2022-03-11 ENCOUNTER — APPOINTMENT (OUTPATIENT)
Dept: RADIATION ONCOLOGY | Facility: CLINIC | Age: 77
End: 2022-03-11
Attending: RADIOLOGY
Payer: MEDICARE

## 2022-03-11 PROCEDURE — 77373 STRTCTC BDY RAD THER TX DLVR: CPT | Performed by: RADIOLOGY

## 2022-03-14 ENCOUNTER — APPOINTMENT (OUTPATIENT)
Dept: RADIATION ONCOLOGY | Facility: CLINIC | Age: 77
End: 2022-03-14
Attending: RADIOLOGY
Payer: MEDICARE

## 2022-03-14 PROCEDURE — 77373 STRTCTC BDY RAD THER TX DLVR: CPT | Performed by: RADIOLOGY

## 2022-03-16 ENCOUNTER — APPOINTMENT (OUTPATIENT)
Dept: RADIATION ONCOLOGY | Facility: CLINIC | Age: 77
End: 2022-03-16
Attending: RADIOLOGY
Payer: MEDICARE

## 2022-03-16 VITALS
RESPIRATION RATE: 16 BRPM | HEART RATE: 61 BPM | WEIGHT: 185 LBS | OXYGEN SATURATION: 97 % | DIASTOLIC BLOOD PRESSURE: 84 MMHG | SYSTOLIC BLOOD PRESSURE: 131 MMHG | TEMPERATURE: 97.7 F | BODY MASS INDEX: 25.8 KG/M2

## 2022-03-16 DIAGNOSIS — C34.12 MALIGNANT NEOPLASM OF UPPER LOBE OF LEFT LUNG (H): Primary | ICD-10-CM

## 2022-03-16 DIAGNOSIS — C61 MALIGNANT NEOPLASM OF PROSTATE (H): ICD-10-CM

## 2022-03-16 PROCEDURE — 77263 THER RADIOLOGY TX PLNG CPLX: CPT | Performed by: RADIOLOGY

## 2022-03-16 PROCEDURE — 77470 SPECIAL RADIATION TREATMENT: CPT | Performed by: RADIOLOGY

## 2022-03-16 PROCEDURE — 77373 STRTCTC BDY RAD THER TX DLVR: CPT | Performed by: RADIOLOGY

## 2022-03-16 PROCEDURE — 77470 SPECIAL RADIATION TREATMENT: CPT | Mod: 26 | Performed by: RADIOLOGY

## 2022-03-16 NOTE — LETTER
3/16/2022         RE: Lenny Chau  [address]  [place] [address]        Dear Colleague,    Thank you for referring your patient, Lenny Chau, to the [facility] RADIATION ONCOLOGY [place]. Please see a copy of my visit note below.      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  All questions and concerns addressed.    Plan:     Continue radiation treatment as prescribed.    Follow-up with radiation oncology in 2 weeks with psa.    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    Assessment                  Body Site:  Thoracic Site: Left Lung  Stereotactic Radiosurgery: Yes  Stereotactic Radiosurgery date: 22  Today's Dose: 4000  Total Dose for Thoracic: 5000  Today's Fraction/Total Fraction Thoracic: 4/5  Voice Chances/Stridor/Larynx: 0: Normal  Pharynx and Esphogaus: 0: No change over baseline  Constipation: 0: None  Diarrhea W/O Colostomy: 0: None  Hemoptysis: 0: None  Dyspnea: 2: Dyspnea on exertion                                       Sexuality Alteration                    Emotional Alteration    Copin: Effective  Comfort Alteration   KPS: 70% Cannot do active work, but can care for self  Fatigue (ONS scale): 0: No Fatigue  Pain Location: right hip  Pain Intensity. Rate degree of pain ranging from 0 (no pain) to 10 (severe pain): 6  Pain Description: Dull constant - Dull type of ache which is constant  Pain Intervention: 1: Over the counter medications  Effectiveness of pain intervention: 3: Pain relieved 75%   Nutrition  Alteration   Anorexia: 0: None  Nausea: 0: None  Vomitin: None  Weight: 83.9 kg (185 lb)  Pharynx and Esphogaus: 0: No change over baseline  Skin Alteration   Skin Sensation: 0: No problem  Skin Reaction: 0: None  AUA Assessment                                           Accompanied by       Objective:     Exam: Examination reviewed no significant changes.    Vitals:    22 1313   BP: 131/84   Pulse: 61   Resp: 16   Temp: 97.7  F (36.5  C)   TempSrc: Oral   SpO2: 97%   Weight: 83.9 kg (185 lb)       Wt Readings from Last 8 Encounters:   22 83.9 kg (185 lb)   22 83.7 kg (184 lb 9.6 oz)   22 83.6 kg (184 lb 3.2 oz)   22 84.4 kg (186 lb 1.6 oz)   22 84.1 kg (185 lb 4.8 oz)   22 85.2 kg (187 lb 14.4 oz)   21 87.4 kg (192 lb 9.6 oz)   21 86.8 kg (191 lb 6.4 oz)       General: Alert and oriented, in no acute distress  Lenny has no Erythema.  Aria chart and setup information reviewed    Mariana Batista MD        Again, thank you for allowing me to participate in the care of your patient.        Sincerely,        Mariana Batista MD

## 2022-03-16 NOTE — PROGRESS NOTES
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  All questions and concerns addressed.    Plan:     Continue radiation treatment as prescribed.    Follow-up with radiation oncology in 2 weeks with psa.    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    Assessment                  Body Site:  Thoracic Site: Left Lung  Stereotactic Radiosurgery: Yes  Stereotactic Radiosurgery date: 22  Today's Dose: 4000  Total Dose for Thoracic: 5000  Today's Fraction/Total Fraction Thoracic: 4/5  Voice Chances/Stridor/Larynx: 0: Normal  Pharynx and Esphogaus: 0: No change over baseline  Constipation: 0: None  Diarrhea W/O Colostomy: 0: None  Hemoptysis: 0: None  Dyspnea: 2: Dyspnea on exertion                                       Sexuality Alteration                    Emotional Alteration    Copin: Effective  Comfort Alteration   KPS: 70% Cannot do active work, but can care for self  Fatigue (ONS scale): 0: No Fatigue  Pain Location: right hip  Pain Intensity. Rate degree of pain ranging from 0 (no pain) to 10 (severe pain): 6  Pain Description: Dull constant - Dull type of ache which is constant  Pain Intervention: 1: Over the counter medications  Effectiveness of pain intervention: 3: Pain relieved 75%   Nutrition Alteration   Anorexia: 0: None  Nausea: 0: None  Vomitin: None  Weight: 83.9 kg (185 lb)  Pharynx and Esphogaus: 0: No change over baseline  Skin Alteration   Skin Sensation: 0: No problem  Skin Reaction: 0: None  AUA Assessment                                            Accompanied by       Objective:     Exam: Examination reviewed no significant changes.    Vitals:    03/16/22 1313   BP: 131/84   Pulse: 61   Resp: 16   Temp: 97.7  F (36.5  C)   TempSrc: Oral   SpO2: 97%   Weight: 83.9 kg (185 lb)       Wt Readings from Last 8 Encounters:   03/16/22 83.9 kg (185 lb)   03/03/22 83.7 kg (184 lb 9.6 oz)   02/21/22 83.6 kg (184 lb 3.2 oz)   02/02/22 84.4 kg (186 lb 1.6 oz)   01/26/22 84.1 kg (185 lb 4.8 oz)   01/12/22 85.2 kg (187 lb 14.4 oz)   12/28/21 87.4 kg (192 lb 9.6 oz)   12/22/21 86.8 kg (191 lb 6.4 oz)       General: Alert and oriented, in no acute distress  Lenny has no Erythema.  Aria chart and setup information reviewed    Mariana Batista MD

## 2022-03-17 ENCOUNTER — TELEPHONE (OUTPATIENT)
Dept: FAMILY MEDICINE | Facility: CLINIC | Age: 77
End: 2022-03-17
Payer: COMMERCIAL

## 2022-03-17 NOTE — TELEPHONE ENCOUNTER
Reason for Call:  Medication or medication refill:    Do you use a [facility]?  Name of the pharmacy and phone number for the current request:  [facility]    Name of the medication requested: Trelegy Elipta Inhaler    Other request: Patient states that he gets it from the . Patient states that Dr. Cottrell has helped him set this up in the past and is needing refills on this. Patient is aware that Dr. Cottrell is out of the office this week.     Can we leave a detailed message on this number? YES    Phone number patient can be reached at: Cell number on file:    Telephone Information:   Mobile [phone]       Best Time: Anytime    Call taken on 3/17/2022 at 11:48 AM by Rosalba Bee

## 2022-03-18 ENCOUNTER — APPOINTMENT (OUTPATIENT)
Dept: RADIATION ONCOLOGY | Facility: CLINIC | Age: 77
End: 2022-03-18
Attending: RADIOLOGY
Payer: MEDICARE

## 2022-03-18 PROCEDURE — 77373 STRTCTC BDY RAD THER TX DLVR: CPT | Performed by: STUDENT IN AN ORGANIZED HEALTH CARE EDUCATION/TRAINING PROGRAM

## 2022-03-18 PROCEDURE — 77336 RADIATION PHYSICS CONSULT: CPT | Performed by: RADIOLOGY

## 2022-03-18 PROCEDURE — 77435 SBRT MANAGEMENT: CPT | Performed by: RADIOLOGY

## 2022-03-18 NOTE — TELEPHONE ENCOUNTER
Nursing staff -   Please contact Patient directly and inquire:    A) is he simply requesting a refill of the Trelegy Ellipta inhaler? If so, please send Rx to Patient requested pharmacy.    B) is he requesting paperwork be completed for the Patient Assistance Program for 2022 to gain access to free inhaler through [employer]? If so, per my last note Patient must:  Complete [employer] [facility] Patient Assistance Program paperwork. Bring back to clinic when you have met the $600 out of pocket spend.  If he has in fact met the $600 out of pocket spend and also has his paperwork completed, please have him schedule an in person visit with me and bring the paperwork to the visit. I will complete clinic portion and send to program.    I am out of clinic until Tuesday, 3/22/2022    Sara Cottrell, Sky  Medication Therapy Management (MTM) Pharmacist

## 2022-03-18 NOTE — TELEPHONE ENCOUNTER
Called and spoke with patient.  He has his paperwork, but does not have paperwork for his wife.    He has questions about the paperwork for himself and wife.    He will call next week to speak with you.    KENAN Patrick  [facility]

## 2022-03-18 NOTE — PROGRESS NOTES
Patient here ambulatory with wheeled walker for his final radiation therapy treatment for his lung cancer.  Patient denies complaints or needs today.  Written discharge instructions reviewed and given to patient.  Follow up scheduled with Dr. Batista on 3/31/22 at [facility] for simulation of new area.  Patient able to verbalize and left ambulatory.

## 2022-03-21 NOTE — PROGRESS NOTES
SBRT/SRS Treatment Summary    Patient: Lenny Chau   MRN: [id number]  : 1945  Care Provider: Mariana Batista MD    Date of Service: Mar 18, 2022        Matt Magaña MD  [address] [place] [address] [address]  [place] [address]           Dear Dr. Magaña:     Your patient Mr. Lenny Chau completed his radiation therapy on 3/18/2022. As you know Mr. Chau is a 75 y.o. 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  The patient received SBRT for his primary lung cancer with a total dose of 5000 cGy in 5 treatment completed on 3/10/2021.  He was find to have a second primary of lung cancer by restaging PET CT scan for which he received second course of SBRT with a total dose of 5000 cGy in 5 treatments given from 3/9/2022-3/18/2022.  He tolerated radiation therapy very well with minimal side effect.  The patient is scheduled return to radiation oncology in 2 weeks for routine office follow-up.  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    Again, thank you very much for the referral and allowing me to participate in the care of this patient.  If you have any questions or concerns about this patient, please do not hesitate to call.          Sincerely,      Mariana Batista MD, PhD  Department of Radiation Oncology   [facility]  Tel: [phone]  Page: [phone]    [facility]  [address] [place] [address]     [facility]  [address]  [place] [address]    CC:  Patient Care Team:  Shade Boyd MD as PCP - General (Internal Medicine)  Mariana Batista MD as MD (Hematology & Oncology)  Faviola Cottrell [facility] as Pharmacist (Pharmacist)  Mariana Batista MD as Assigned Cancer Care Provider  Matt Magaña MD as Assigned Heart and Vascular Provider  Sim Jaffe MD as Assigned Surgical Provider

## 2022-03-22 PROBLEM — M19.90 OSTEOARTHRITIS: Status: ACTIVE | Noted: 2021-05-07

## 2022-03-22 PROBLEM — I95.1 ORTHOSTATIC HYPOTENSION: Status: RESOLVED | Noted: 2021-11-08 | Resolved: 2022-03-22

## 2022-03-22 PROBLEM — N17.9 ACUTE RENAL FAILURE (ARF) (H): Status: RESOLVED | Noted: 2020-10-08 | Resolved: 2022-03-22

## 2022-03-22 PROBLEM — Z85.46 HISTORY OF MALIGNANT NEOPLASM OF PROSTATE: Status: ACTIVE | Noted: 2022-03-22

## 2022-03-22 PROBLEM — N17.9 ACUTE RENAL FAILURE SUPERIMPOSED ON STAGE 3 CHRONIC KIDNEY DISEASE, UNSPECIFIED ACUTE RENAL FAILURE TYPE, UNSPECIFIED WHETHER STAGE 3A OR 3B CKD (H): Status: RESOLVED | Noted: 2021-11-08 | Resolved: 2022-03-22

## 2022-03-22 PROBLEM — N28.89 RENAL MASS: Status: RESOLVED | Noted: 2020-02-14 | Resolved: 2022-03-22

## 2022-03-22 PROBLEM — K92.2 ACUTE UPPER GI BLEED: Status: RESOLVED | Noted: 2020-10-11 | Resolved: 2022-03-22

## 2022-03-22 PROBLEM — R09.89 SUSPECTED PULMONARY EMBOLISM: Status: RESOLVED | Noted: 2021-11-08 | Resolved: 2022-03-22

## 2022-03-22 PROBLEM — R50.9 FEVER: Status: RESOLVED | Noted: 2021-11-13 | Resolved: 2022-03-22

## 2022-03-22 PROBLEM — I49.1 PAC (PREMATURE ATRIAL CONTRACTION): Status: RESOLVED | Noted: 2021-05-10 | Resolved: 2022-03-22

## 2022-03-22 PROBLEM — R94.02 ABNORMAL PET SCAN OF HEAD: Status: RESOLVED | Noted: 2020-09-29 | Resolved: 2022-03-22

## 2022-03-22 PROBLEM — R60.0 BILATERAL LOWER EXTREMITY EDEMA: Status: RESOLVED | Noted: 2021-11-08 | Resolved: 2022-03-22

## 2022-03-22 PROBLEM — N18.30 ACUTE RENAL FAILURE SUPERIMPOSED ON STAGE 3 CHRONIC KIDNEY DISEASE, UNSPECIFIED ACUTE RENAL FAILURE TYPE, UNSPECIFIED WHETHER STAGE 3A OR 3B CKD (H): Status: RESOLVED | Noted: 2021-11-08 | Resolved: 2022-03-22

## 2022-03-22 PROBLEM — R17 ELEVATED BILIRUBIN: Status: RESOLVED | Noted: 2021-11-08 | Resolved: 2022-03-22

## 2022-03-22 PROBLEM — M00.9 SEPTIC HIP (H): Status: RESOLVED | Noted: 2022-02-10 | Resolved: 2022-03-22

## 2022-03-22 PROBLEM — N18.30 ACUTE WORSENING OF STAGE 3 CHRONIC KIDNEY DISEASE (H): Status: RESOLVED | Noted: 2020-10-11 | Resolved: 2022-03-22

## 2022-03-22 PROBLEM — R42 ORTHOSTATIC LIGHTHEADEDNESS: Status: RESOLVED | Noted: 2021-11-08 | Resolved: 2022-03-22

## 2022-03-22 PROBLEM — K86.2 CYST OF PANCREAS: Status: ACTIVE | Noted: 2022-03-22

## 2022-03-23 ENCOUNTER — HOSPITAL ENCOUNTER (OUTPATIENT)
Dept: OCCUPATIONAL THERAPY | Facility: REHABILITATION | Age: 77
Discharge: HOME | End: 2022-03-23
Attending: OTOLARYNGOLOGY
Payer: COMMERCIAL

## 2022-03-23 DIAGNOSIS — Z78.9 DECREASED ACTIVITIES OF DAILY LIVING (ADL): ICD-10-CM

## 2022-03-23 DIAGNOSIS — H81.11 BENIGN PAROXYSMAL POSITIONAL VERTIGO, RIGHT: Primary | ICD-10-CM

## 2022-03-23 DIAGNOSIS — H81.10 BENIGN PAROXYSMAL POSITIONAL VERTIGO, UNSPECIFIED LATERALITY: ICD-10-CM

## 2022-03-23 PROCEDURE — 97165 OT EVAL LOW COMPLEX 30 MIN: CPT | Mod: GO | Performed by: OCCUPATIONAL THERAPIST

## 2022-03-23 PROCEDURE — 95992 CANALITH REPOSITIONING PROC: CPT | Mod: GO | Performed by: OCCUPATIONAL THERAPIST

## 2022-03-23 NOTE — PROGRESS NOTES
[facility]          OUTPATIENT OCCUPATIONAL THERAPY  EVALUATION  PLAN OF TREATMENT FOR OUTPATIENT REHABILITATION  (COMPLETE FOR INITIAL CLAIMS ONLY)  Patient's Last Name, First Name, M.I.  YOB: 1945  IsacLenny  C                        Provider's Name  [facility] Medical Record No.  [id number]                               Onset Date:     09/15/21   Start of Care Date:     03/23/22   Type:     ___PT   _X_OT   ___SLP Medical Diagnosis:     BPPV                          OT Diagnosis:     right PC BPPV Visits from SOC:  1   _________________________________________________________________________________  Plan of Treatment/Functional Goals:  Neuromuscular re-education, canalith maneuvers     Goals  Goal Description: Patient will be able to roll in bed without vertigo  Target Date: 06/21/22     Goal Description: Patient will be able to bend without vertigo  Target Date: 06/21/22       Therapy Frequency: 1-4 visits     Predicted Duration of Therapy Intervention (days/wks): 12 weeks  Estella Brito OT          I CERTIFY THE NEED FOR THESE SERVICES FURNISHED UNDER        THIS PLAN OF TREATMENT AND WHILE UNDER MY CARE     (Physician co-signature of this document indicates review and certification of the therapy plan).              Certification date from: 03/23/22, Certification date to: 06/21/22            Referring Physician: Dr. Sim Jaffe     Initial Assessment        See Epic Evaluation      Start Of Care Date: 03/23/22 03/23/22 1100   Quick Adds   Quick Adds Certification;Vestibular Eval   Type of Visit Initial Outpatient Occupational Therapy Evaluation   General Information   Start Of Care Date 03/23/22   Referring Physician Dr. Sim Jaffe   Orders Evaluate and treat as indicated   Orders Date 02/10/22   Medical Diagnosis BPPV   Onset of  Illness/Injury or Date of Surgery 09/15/21   Precautions/Limitations Immunosuppressed   Surgical/Medical History Reviewed Yes   Additional Occupational Profile Info/Pertinent History of Current Problem Patient reports that last September he had onset of vertigo with bed mobility. He states that it can also occur with bending over. Patient denies ear pain, recent hearing changes or headaches. No history of vertigo.   Pain   Patient currently in pain Yes   Pain location right hip   Pain rating 3/10   Fall Risk Screen   Fall screen completed by OT   Have you fallen 2 or more times in the past year? Yes   Have you fallen and had an injury in the past year? No   Is patient a fall risk? Yes   Fall screen comments Patient reports that he has had PT for balance about 3-5 years ago and it didn't help. He declined PT at this time   Abuse Screen (yes response referral indicated)   Feels Unsafe at Home or Work/School no   Feels Threatened by Someone no   Does Anyone Try to Keep You From Having Contact with Others or Doing Things Outside Your Home? no   Physical Signs of Abuse Present no   Patient needs abuse support services and resources No   Infrared Goggle Exam or Frenzel Lense Exam   Vestibular Suppressant in Last 24 Hours? No   Exam completed with Room Light   Erinn-Hallpike (right) Upbeating R torsional   Erinn-Hallpike (right) Comments 5 second latency and fatigues in 20 seconds   Erinn-Hallpike (Left) Negative   BPPV Canal(s) R Posterior   BPPV Type Canalithasis   Planned Therapy Interventions   Planned Therapy Interventions Neuromuscular re-education   Intervention Comments canalith maneuvers    OT Goal 1   Goal Description Patient will be able to roll in bed without vertigo   Target Date 06/21/22    OT Goal 2   Goal Description Patient will be able to bend without vertigo   Target Date 06/21/22   Clinical Impression   Criteria for Skilled Therapeutic Interventions Met Yes, treatment indicated   OT Diagnosis right PC BPPV    Clinical Decision Making (Complexity) Low complexity   Therapy Frequency 1-4 visits   Predicted Duration of Therapy Intervention (days/wks) 12 weeks   Risks and Benefits of Treatment have been explained. Yes   Patient, Family & other staff in agreement with plan of care Yes   Clinical Impression Comments patient has right PC BPPV and was treated with right Epley maneuver today   Education Assessment   Barriers To Learning No Barriers   Therapy Certification   Certification date from 03/23/22   Certification date to 06/21/22   Certification I certify the need for these services furnished under this plan of treatment and while under my care.  (Physician co-signature of this document indicates review and certification of the therapy plan)   Total Evaluation Time   OT Eval, Low Complexity Minutes (81453) 20

## 2022-03-23 NOTE — TELEPHONE ENCOUNTER
Noted; Have not yet heard back from Patient.  Therefore, sent a [employer] message to Patient today giving instructions for [employer] Patient Assistance Program enrollment for Trelegy Ellipta and encouraging Patient to make an appointment.    Sara Cottrell, WileyD  Medication Therapy Management (MTM) Pharmacist

## 2022-03-31 ENCOUNTER — LAB (OUTPATIENT)
Dept: INFUSION THERAPY | Facility: HOSPITAL | Age: 77
End: 2022-03-31
Attending: RADIOLOGY
Payer: MEDICARE

## 2022-03-31 ENCOUNTER — ALLIED HEALTH/NURSE VISIT (OUTPATIENT)
Dept: RADIATION ONCOLOGY | Facility: HOSPITAL | Age: 77
End: 2022-03-31
Attending: RADIOLOGY
Payer: MEDICARE

## 2022-03-31 VITALS
RESPIRATION RATE: 16 BRPM | OXYGEN SATURATION: 95 % | DIASTOLIC BLOOD PRESSURE: 74 MMHG | SYSTOLIC BLOOD PRESSURE: 112 MMHG | HEART RATE: 64 BPM

## 2022-03-31 DIAGNOSIS — C34.12 MALIGNANT NEOPLASM OF UPPER LOBE OF LEFT LUNG (H): Primary | ICD-10-CM

## 2022-03-31 DIAGNOSIS — C79.51 SPINE METASTASIS: ICD-10-CM

## 2022-03-31 DIAGNOSIS — C61 MALIGNANT NEOPLASM OF PROSTATE (H): ICD-10-CM

## 2022-03-31 LAB — PSA SERPL-MCNC: 18.25 UG/L (ref 0–6.5)

## 2022-03-31 PROCEDURE — G0463 HOSPITAL OUTPT CLINIC VISIT: HCPCS | Mod: 25

## 2022-03-31 PROCEDURE — 77334 RADIATION TREATMENT AID(S): CPT | Mod: 26 | Performed by: RADIOLOGY

## 2022-03-31 PROCEDURE — 36415 COLL VENOUS BLD VENIPUNCTURE: CPT

## 2022-03-31 PROCEDURE — 77334 RADIATION TREATMENT AID(S): CPT | Performed by: RADIOLOGY

## 2022-03-31 PROCEDURE — 84153 ASSAY OF PSA TOTAL: CPT

## 2022-03-31 NOTE — PROCEDURES
SIMULATION NOTE:     DIAGNOSIS: The patient is a 76-year-old gentleman with multiple history of cancer in the past and a new finding of left upper lobe lung cancer.  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      INDICATION: After discussion with patient about various treatment options, the patient elected to proceed with definitive radiation therapy using SBRT technique for his oligo metastasis of T7 spine. The patient is here for simulation.       CONSENT: The possible risks and side effects of radiation therapy have been discussed with the patient in detail and at great length. Questions were answered to patient's satisfaction. Written consent was obtained.       SIMULATION: The patient is in the supine position with a head rest and vac-loc to help keep the same position during daily radiation therapy. Tentative isocenter is set in the center of the lower thoracic region. We will acquire CT information to help us better locate target and design the radiation therapy field. We are also going to fuse his diagnostic PET/CT/MRI scan with our planning CT to help us to better outline the target.       BLOCKS: Custom blocks will be drawn to minimize radiation to normal tissues and to protect normal organs including, but not to, spinal cord, kidneys, liver, small bowel, large vessels, heart, lung, esophagus, peripheral nerve, bone and soft tissue.       DOSAGE: I plan to give him radiation therapy to a total dose of 1890-9917 cGy in 1-5 treatment. I will consider to use SRS/SBRT technique to help us to better locate target and to protect normal tissue.      Mariana Batista MD, PhD  Department of Radiation Oncology   [facility] Radiation Oncology  Tel: [phone]  Page: [phone]    [facility]  [address] [place] [address]     [address] [place] [address]

## 2022-03-31 NOTE — PROCEDURES
Clinical Treatment Planning Note     The complex radiotherapy planning will be completed for his oligo metastasis of T7 spine. The patient had a planning CT earlier today for planning. The treatment aids were used for planning, including headrest and vac-loc to help keep the same position during the daily radiation therapy. The therapy planning is necessary to reduce radiotherapy dose to the normal critical organs which are not possible with simple treatment. In addition, dose to the target and the critical structures requires three-dimensional analysis of the isodose distribution. The planning will be done to reduce dose to the spinal cord, kidneys, liver, heart/large vessels, lung, esophagus, heart, lung, spinal cord, spleen, stomach, small bowel, diaphragm, bone and soft tissue.     I will contour the clinical tumor volume  CTV , with expanded volume of planning treatment volume  PTV  on the treatment planning system. The critical structures will be outlined, including spinal cord,  lungs, bronchial tree, esophagus, liver, heart/large vessels, spleen, stomach, small bowel, diaphragm, bone and soft tissue.     Treatment planning will be done on the computer treatment planning system. The multiple fields will be used to achieve optimal coverage of the target volume. Dose distribution to the above critical structures will be reviewed. Isodose distribution along with the X, Y, Z plan will also be reviewed. Custom blocking will be used to shield normal structures. The beam s eye views will be reviewed and the digital reconstructed image will be reviewed on the planning software. The patient will receive 5507-7042 cGy in 1-5 treatments targeted to the tumors using 6 MV photons.      Mariana Batista MD, PhD  Department of Radiation Oncology   [facility] Radiation Oncology  Tel: [phone]  Page: [phone]    [place] [address] [place], [place] [id number] [place] [address]  [place] [address]

## 2022-03-31 NOTE — PATIENT INSTRUCTIONS
Patient Education     Stereotactic Body Radiotherapy (SBRT) for Cancer    Stereotactic body radiotherapy (SBRT) is a form of radiation therapy that is used to treat cancer. Your healthcare provider may suggest SBRT if your cancer cannot be treated with surgery or other methods. SBRT may be used to help cure or control the growth of your cancer. Or, it may be used to help reduce pain and other symptoms caused by your cancer. This sheet tells you more about SBRT and what to expect. If you have more questions about the treatment, talk with your healthcare provider.  How SBRT Works  SBRT uses strong  X-rays to destroy cancer cells. Imaging studies are used to get very clear pictures of the tumor. Then a machine called a linear accelerator (linac) is aimed at the tumor. It sends high doses of X-rays into the tumor. This can help kill cancer cells or slow their growth. It can also shrink the size of the tumor. Each radiation dose is precisely aimed so that normal tissue around the tumor receives little or no radiation. This helps reduce the risk of side effects.  Preparing for Your Treatment  SBRT is performed by a radiation therapy team. This team often includes a radiation healthcare provider, nurse, radiation therapist, physicist, and dosimetrist. Before your treatment begins, you ll have one or more visits with your team to plan and prepare for your treatment. This may involve having other tests and procedures done. Prior to your first treatment session:    You may need to have fiducial markers (also called seeds) placed in or near the tumor site. The markers are tiny pieces of gold metal. They show up clearly on imaging studies and are used later to help guide your radiation treatment. Your healthcare provider will explain this procedure to you in more detail if it is needed.    You may have scans or other imaging tests done. These are used to map out the exact sites in your body that will be treated.    You may have  things made that will help hold your body in the same position for each treatment session. These can include molds, masks, rests, and cushions.  Having SBRT Treatments  With SBRT, one to five doses are given over the course of one to two weeks. You and your team will discuss the exact schedule for your treatment in advance. Each treatment session takes about 60 to 90 minutes. Here s what to expect during each session:    You change into a patient gown. The radiation therapist positions you on the treatment table. If positioning devices were made, they are used at this time.    The therapist leaves the room and turns on the machine from outside. He or she watches you on a TV monitor. You and the therapist can speak through an intercom.    X-ray or other scan images are used to make sure that the beams from the machine are positioned and lined up correctly with your body. The beams are then directed at the tumor. You will hear the machine, but you won t feel anything.    You can go home shortly after the treatment is done. Your healthcare provider or nurse will let you know if and when you need to return for your next session.  Possible Side Effects of SBRT  With any form of radiation therapy, healthy cells and tissue around the tumor can also be affected by the treatment. This can lead to side effects, such as fatigue and skin changes. Most side effects go away soon after treatment ends. But some side effects do not occur until months or even years after the treatment. The type of side effects you have and how severe they are will depend on the amount of radiation you get and the part of your body being treated. Your healthcare provider can tell you more about what side effects to expect and how to manage them. If needed, your healthcare provider may give you medicines to treat some side effects. Your healthcare team can also teach you ways to help cope with the side effects.  Call your healthcare provider if you  have any of the following:    Fever of 100.4  F (38  C) or higher, or as directed by your healthcare provider    Shortness of breath or trouble breathing    Tiredness that doesn t go away between treatments    Pain that doesn t go away, especially if it s in the same place    A new or unusual lump, bump, or swelling    Dizziness or lightheadedness    Unusual rashes, bruises, or bleeding    Uncontrolled nausea and vomiting    Diarrhea that doesn t improve over time    Skin breakdown or severe pain due to skin irritation    Any new or concerning symptom  Follow-Up  You ll have one or more follow-up visits with your healthcare provider. These allow your healthcare provider to check your health and the progress of your treatment. If more tests or treatments are needed, your healthcare provider will discuss these with you.  [employer] last reviewed this educational content on 6/1/2018 2000-2021 [employer]. All rights reserved. This information is not intended as a substitute for professional medical care. Always follow your healthcare professional's instructions.

## 2022-03-31 NOTE — PROGRESS NOTES
"Oncology Rooming Note    March 31, 2022 10:15 AM   Lenny Chau is a 76 year old male who presents for:    Chief Complaint   Patient presents with     Oncology Clinic Visit     f/u with [facility]     Initial Vitals: /74   Pulse 64   Resp 16   SpO2 95%  Estimated body mass index is 25.8 kg/m  as calculated from the following:    Height as of 2/2/22: 1.803 m (5' 11\").    Weight as of 3/16/22: 83.9 kg (185 lb). There is no height or weight on file to calculate BSA.  Data Unavailable Comment: Data Unavailable   No LMP for male patient.  Allergies reviewed: No  Medications reviewed: No    Medications: Medication refills not needed today.  Pharmacy name entered into [employer]: [facility] [id number] [place] [address]    Clinical concerns: R hip pain, chronic and unchanged. No back or neck pain.  Dr. Batista was notified.      Kristie Babcock RN              "

## 2022-03-31 NOTE — LETTER
"    3/31/2022         RE: Lenny Chau  [address]  [place] [facility] [address]        Dear Colleague,    Thank you for referring your patient, Lenny Chau, to the [facility] RADIATION ONCOLOGY [place]. Please see a copy of my visit note below.    Oncology Rooming Note    March 31, 2022 10:15 AM   Lenny Chau is a 76 year old male who presents for:    Chief Complaint   Patient presents with     Oncology Clinic Visit     f/u with [facility]     Initial Vitals: /74   Pulse 64   Resp 16   SpO2 95%  Estimated body mass index is 25.8 kg/m  as calculated from the following:    Height as of 2/2/22: 1.803 m (5' 11\").    Weight as of 3/16/22: 83.9 kg (185 lb). There is no height or weight on file to calculate BSA.  Data Unavailable Comment: Data Unavailable   No LMP for male patient.  Allergies reviewed: No  Medications reviewed: No    Medications: Medication refills not needed today.  Pharmacy name entered into [facility] [employer]: [id number] [place] [address]    Clinical concerns: R hip pain, chronic and unchanged. No back or neck pain.  Dr. Batista was notified.      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  The biopsy confirmed moderately differentiated adenocarcinoma.        TYPE OF RADIATION THERAPY ADMINISTERED:   1. SBRT to the left upper lobe with a total dose of " 5000 cGy in 5 treatments given from 3/2/2021-3/10/2021.    2. SBRT with a total dose of 5000 cGy in 5 treatments for the second left tumor given from 3/9/2022-3/18/2022.      INTERVAL SINCE COMPLETION OF RADIATION THERAPY: 11 months.      SUBJECTIVE:  Mr. Chau is a 76 y.o. male who is a chronic smoker and quit smoking since .  The patient had a complicated history of multiple cancer in the past as detailed as followin.  Low risk prostate cancer, clinical stage T1c N0 M0, recent grade 3+3 =6 and the last PSA was 10.4 in 2006.  The patient is currently on clinical observation with no therapy.  PSA: 12.47 on 2021.     2.  Floor of mouth cancer, status post surgical resection in 1994 with no adjuvant therapy.     3.  Left kidney hemangioma, status post left nephrectomy on 3/26/2009.     4.  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  Patient had local recurrence and is status post surgical resection on 2009.     5.  Non-small cell lung cancer involving right lung, stage I, status post right thoracotomy and excision of right upper lobe and right middle lobe lung tumor 2015 with no evidence of lymph node metastasis and no adjuvant therapy.  Patient lost to follow-up since .     6.  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 Alon Nunez, ENT on 2020.     7.  Left upper lobe lung  cancer, stage T1N0 M0.  The biopsy confirmed moderately differentiated adenocarcinoma on 2021.  The patient received SBRT with a total dose of 5000 cGy in 5 treatments given from 3/2/2021-3/10/2021.     Patient had two prior early stage right lung cancers that were apparently resected in their entirety and may have been synchronous primary lung cancers. It does not  appear he had the appropriate recommended follow up at [facility]. The MELODY lesion was known to be present in 2016 and appears to be slightly larger now with significant FDG-avidity on the recent PET/CT. It is likely another primary lung cancer, although metastasis from previous lung cancers is possible.  The patient had a restaging PET CT scan on 8/31/2020.  The scan showed enlarging FDG avid left upper lobe mass measuring 2.1 x 2.5 cm with central solid component consistent with primary lung cancer without metastasis.  There was also a FDG avid lesion in the left buccal region consistent with squamous cell carcinoma without metastasis.  The patient had a surgical resection for his left buccal/lateral tongue squamous cell carcinoma 11/6/2020 by Dr. Alon Nunez, ENT with pathology showed squamous cell carcinoma with negative margin.  He was determined not a good candidate for lung surgery given his complicated medical history and health status.  He therefore received definitive radiation therapy using SBRT with a total dose of 5000 cGy in 5 treatments given from 3/2/2021-3/10/2021. The patient tolerated radiation therapy very well with minimal side effect.     The patient has been doing well since completion of radiation therapy.  He denies any pain or discomfort related to the therapy at the time of evaluation.  The patient was found to a new 9 mm small nodule in the left upper lobe outside of patient therapy area from restaging CT scan on 12/20/2021.  He was recommended to have staging PET CT scan and was done 1/7/2022. 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  There is no evidence of other systemic metastasis.The patient's case has been discussed at thoracic tumor conference 1/13/2022.  MRI brain on 1/22/2022 showed no evidence of brain metastasis. Patient is not good candidate for surgery and  poor candidate for systemic therapy given his current medical status.  The consensus recommendation is to consider SBRT for his oligo disease in the left lung and T7 spine if the patient wishes not to consider systemic therapy at this time.   The patient received the second course of SBRT to the second left lung cancer with a total dose of 5000 cGy in 5 treatments given from 3/9/2022-3/18/2022.  He tolerated radiation therapy very well with minimal side effect.     Patient has been doing well since treatment.  He denies any ongoing issues at the time of evaluation.  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    Medications were reviewed and are up to date on EPIC.    The following portions of the patient's history were reviewed and updated as appropriate: allergies, current medications, past family history, past medical history, past social history, past surgical history and problem list.    Review of Systems:      General  Constitutional  Constitutional (WDL): All constitutional elements are within defined limits  EENT  Eye Disorders  Eye Disorder (WDL): Assessment not pertinent to visit  Ear Disorders  Ear Disorder (WDL): Assessment not pertinent to visit  Respiratory  Respiratory  Respiratory (WDL): All respiratory elements are within defined limits  Cardiovascular  Cardiovascular  Cardiovascular (WDL): All cardiovascular elements are within defined limits  Gastrointestinal  Gastrointestinal  Gastrointestinal (WDL): All gastrointestinal elements are within defined limits  Musculoskeletal  Musculoskeletal and Connective Tissue Disorders  Musculoskeletal & Connective (WDL): Exceptions to WDL  Arthralgia: Moderate pain OR limiting instrumental ADL (right hip)  Integumentary  Integumentary  Integumentary (WDL): All integumentary elements are within defined limits  Neurological  Neurosensory  Neurosensory (WDL): Exceptions to WDL  Ataxia: Moderate symptoms  OR limiting instrumental ADL (uses wheeled walker)  Genitourinary/Reproductive  Genitourinary  Genitourinary (WDL): Exceptions to WDL  Urinary Frequency: Present  Lymphatic  Lymph System Disorders  Lymph (WDL): All lymph elements are within defined limits  Pain     AUA Assessment                                                              Accompanied by  Accompanied By: family (daughter, Georgette)    Objective:     PHYSICAL EXAMINATION:    /74   Pulse 64   Resp 16   SpO2 95%     Gen: Alert, in NAD  Eyes: PERRL, EOMI, sclera anicteric  Pulm: No wheezing, stridor or respiratory distress  CV: Well-perfused, no cyanosis, no pedal edema  Back: No step-offs or pain to palpation along the thoracolumbar spine  Rectal: Deferred  : Deferred  Musculoskeletal: Normal muscle bulk and tone  Skin: Normal color and turgor  Neurologic: A/Ox3, CN II-XII intact, normal gait and station  Psychiatric: Appropriate mood and affect      Impression     The patient is a 76-year-old gentleman with multiple history of cancer in the past and a new finding of left upper lobe lung cancer.  The restaging PET CT scan showed new 1.1 cm FDG avid left upper lobe nodule consistent with malignant lung tumor and a sclerotic hypermetabolic metastasis within the T7 vertebral body.The patient received stereotactic radiosurgery 2 weeks  ago.     Assessment & Plan:     I have personally reviewed his restaging PET CT scan and compared to the previous PET CT scan and radiation therapy field.  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  There is no evidence of other systemic metastasis.  The possible treatment options including surgery, systemic therapy, elevation therapy has been discussed with patient in detail and at the greatest.  The possible risks and side effects of radiation therapy has also been explained to the patient.   Questions are answered to patient's satisfaction.  The patient has oligo recurrence involving left upper lobe and T7 vertebral body.  This can be treated by stereotactic radiosurgery given his reasonably good health status. The patient's case has been discussed at thoracic tumor conference 1/13/2022.  Patient is not good candidate for surgery and poor candidate for systemic therapy given his current medical status.  The consensus recommendation is to consider SBRT for his oligo disease in the left lung and T7 spine if the patient wishes not to consider systemic therapy at this time.  He received second course of SBRT for his lung cancer completed 2 weeks ago.  Patient has been doing well.  The possible risks and side effects of radiation therapy has been again discussed with the patient.  Questions are answered to patient satisfaction. He is ready to proceed with radiation therapy for his oligo metastasis of T7 spine.  Patient is therefore scheduled return to radiation oncology later on today for simulation.  I plan to give him radiation therapy to a total dose of 1600 to 3500 cGy in 1-5 treatments using SRS/SBRT.     Face to face time  20 minutes with > 80% spent on consultation, education and coordination of care.          Mariana Batista MD, PhD  Department of Radiation Oncology   [facility]  Tel: [phone]  Page: [phone]    [facility]  [address] [place] [address]     [address] [place] [address]    CC:  Patient Care Team:  Shade Boyd MD as PCP - General (Internal Medicine)  Mariana Batista MD as MD (Hematology & Oncology)  [facility] as Pharmacist (Pharmacist)  Mariana Batista MD as Assigned Cancer Care Provider  Matt Magaña MD as Assigned Heart and Vascular Provider  Sim Jaffe MD as Assigned Surgical Provider  Shade Boyd MD as Assigned PCP        Again, thank you for allowing me to participate in the care of your  patient.        Sincerely,        Mariana Batista MD

## 2022-03-31 NOTE — PROCEDURES
SBRT Special Treatment Procedure Note  Date of Service: 3/31/2022     Diagnosis: The patient is a 76-year-old gentleman with multiple history of cancer in the past and a new finding of left upper lobe lung cancer.  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       Medical Indication:  To escalate the radiotherapy dose to a curative dose (1258-0093 cGy in 1-5 treatment) using stereotactic body radiotherapy technique and to spare surrounding spinal cord, small bowels, heart, lungs, esophagus, spinal cord, bone and soft tissue from excessive toxicity. SBRT planning will be completed to prepare for the treatment.  SBRT planning is chosen to avoid the critical structures, which cannot be done adequately with conventional planning due to the dose constraints of the normal surrounding critical organs.  In addition, the treatment will be high dose per fraction with complete course to be done in 1-5 session. The patient previously had CT scan acquisition for planning and special treatment aids used include.  The patient was simulated in a supine position. After the contouring of the GTV, ITV (MIPS 90%) and MIPS Averages, a clinical tumor volume (CTV), expanded volume of planned treatment volume (PTV) was carried out on the treatment planning system.  Critical structures outlined included spinal cord, small bowels, heart, lungs, esophagus, bone and soft tissue. Extra efforts during the planning process included contouring of critical structures, GTV, ITV, CTV, PTV and evaluating the DVH and coverage of the PTV.     The patient is going to have SBRT technique for his cancer. I have explained to the patient this is a very complicated process which will require an extensive amount of time for planning, delivering and monitoring the patient. The patient understands well and wished to  proceed.       Mariana Batista MD, PhD  Department of Radiation Oncology   [facility] Radiation Oncology  Tel: [phone]  Page: [phone]    [facility]  [address] [place] [address]     [place] [id number] [facility]   [place] [address]

## 2022-03-31 NOTE — PROGRESS NOTES
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  The biopsy confirmed moderately differentiated adenocarcinoma.        TYPE OF RADIATION THERAPY ADMINISTERED:   1. SBRT to the left upper lobe with a total dose of 5000 cGy in 5 treatments given from 3/2/2021-3/10/2021.    2. SBRT with a total dose of 5000 cGy in 5 treatments for the second left tumor given from 3/9/2022-3/18/2022.      INTERVAL SINCE COMPLETION OF RADIATION THERAPY: 11 months.      SUBJECTIVE:  Mr. Chau is a 76 y.o. male who is a chronic smoker and quit smoking since .  The patient had a complicated history of multiple cancer in the past as detailed as followin.  Low risk prostate cancer, clinical stage T1c N0 M0, recent grade 3+3 =6 and the last PSA was 10.4 in 2006.  The patient is currently on clinical observation with no therapy.  PSA: 12.47 on 2021.     2.  Floor of mouth cancer, status post surgical resection in 1994 with no adjuvant therapy.     3.  Left kidney hemangioma, status post left nephrectomy on 3/26/2009.     4.  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  Patient had local recurrence and is status post surgical resection on 2009.     5.  Non-small cell lung cancer involving right lung, stage  I, status post right thoracotomy and excision of right upper lobe and right middle lobe lung tumor 11/5/2015 with no evidence of lymph node metastasis and no adjuvant therapy.  Patient lost to follow-up since 2016.     6.  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     7.  Left upper lobe lung  cancer, stage T1N0 M0.  The biopsy confirmed moderately differentiated adenocarcinoma on 1/21/2021.  The patient received SBRT with a total dose of 5000 cGy in 5 treatments given from 3/2/2021-3/10/2021.     Patient had two prior early stage right lung cancers that were apparently resected in their entirety and may have been synchronous primary lung cancers. It does not appear he had the appropriate recommended follow up at [facility]. The MELODY lesion was known to be present in 2016 and appears to be slightly larger now with significant FDG-avidity on the recent PET/CT. It is likely another primary lung cancer, although metastasis from previous lung cancers is possible.  The patient had a restaging PET CT scan on 8/31/2020.  The scan showed enlarging FDG avid left upper lobe mass measuring 2.1 x 2.5 cm with central solid component consistent with primary lung cancer without metastasis.  There was also a FDG avid lesion in the left buccal region consistent with squamous cell carcinoma without metastasis.  The patient had a surgical resection for his left buccal/lateral tongue squamous cell carcinoma 11/6/2020 by KERRY Renee with pathology showed squamous cell carcinoma with negative margin.  He was determined not a good candidate for lung surgery given his complicated medical history and health status.  He therefore received definitive radiation therapy using SBRT with a total dose of 5000 cGy in 5 treatments given from 3/2/2021-3/10/2021. The patient tolerated radiation therapy very well with minimal  side effect.     The patient has been doing well since completion of radiation therapy.  He denies any pain or discomfort related to the therapy at the time of evaluation.  The patient was found to a new 9 mm small nodule in the left upper lobe outside of patient therapy area from restaging CT scan on 12/20/2021.  He was recommended to have staging PET CT scan and was done 1/7/2022. 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  There is no evidence of other systemic metastasis.The patient's case has been discussed at thoracic tumor conference 1/13/2022.  MRI brain on 1/22/2022 showed no evidence of brain metastasis. Patient is not good candidate for surgery and poor candidate for systemic therapy given his current medical status.  The consensus recommendation is to consider SBRT for his oligo disease in the left lung and T7 spine if the patient wishes not to consider systemic therapy at this time.   The patient received the second course of SBRT to the second left lung cancer with a total dose of 5000 cGy in 5 treatments given from 3/9/2022-3/18/2022.  He tolerated radiation therapy very well with minimal side effect.     Patient has been doing well since treatment.  He denies any ongoing issues at the time of evaluation.  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    Medications were reviewed and are up to date on EPIC.    The following portions of the patient's history were reviewed and updated as appropriate: allergies, current medications, past family history, past medical history, past social history, past surgical history and problem list.    Review of Systems:      General  Constitutional  Constitutional (WDL): All constitutional elements are within defined limits  EENT  Eye Disorders  Eye Disorder (WDL): Assessment not  pertinent to visit  Ear Disorders  Ear Disorder (WDL): Assessment not pertinent to visit  Respiratory  Respiratory  Respiratory (WDL): All respiratory elements are within defined limits  Cardiovascular  Cardiovascular  Cardiovascular (WDL): All cardiovascular elements are within defined limits  Gastrointestinal  Gastrointestinal  Gastrointestinal (WDL): All gastrointestinal elements are within defined limits  Musculoskeletal  Musculoskeletal and Connective Tissue Disorders  Musculoskeletal & Connective (WDL): Exceptions to WDL  Arthralgia: Moderate pain OR limiting instrumental ADL (right hip)  Integumentary  Integumentary  Integumentary (WDL): All integumentary elements are within defined limits  Neurological  Neurosensory  Neurosensory (WDL): Exceptions to WDL  Ataxia: Moderate symptoms OR limiting instrumental ADL (uses wheeled walker)  Genitourinary/Reproductive  Genitourinary  Genitourinary (WDL): Exceptions to WDL  Urinary Frequency: Present  Lymphatic  Lymph System Disorders  Lymph (WDL): All lymph elements are within defined limits  Pain     AUA Assessment                                                              Accompanied by  Accompanied By: family (daughter, Georgette)    Objective:     PHYSICAL EXAMINATION:    /74   Pulse 64   Resp 16   SpO2 95%     Gen: Alert, in NAD  Eyes: PERRL, EOMI, sclera anicteric  Pulm: No wheezing, stridor or respiratory distress  CV: Well-perfused, no cyanosis, no pedal edema  Back: No step-offs or pain to palpation along the thoracolumbar spine  Rectal: Deferred  : Deferred  Musculoskeletal: Normal muscle bulk and tone  Skin: Normal color and turgor  Neurologic: A/Ox3, CN II-XII intact, normal gait and station  Psychiatric: Appropriate mood and affect      Impression     The patient is a 76-year-old gentleman with multiple history of cancer in the past and a new finding of left upper lobe lung cancer.  The restaging PET CT scan showed new 1.1 cm FDG avid left  upper lobe nodule consistent with malignant lung tumor and a sclerotic hypermetabolic metastasis within the T7 vertebral body.The patient received stereotactic radiosurgery 2 weeks  ago.     Assessment & Plan:     I have personally reviewed his restaging PET CT scan and compared to the previous PET CT scan and radiation therapy field.  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  There is no evidence of other systemic metastasis.  The possible treatment options including surgery, systemic therapy, elevation therapy has been discussed with patient in detail and at the greatest.  The possible risks and side effects of radiation therapy has also been explained to the patient.  Questions are answered to patient's satisfaction.  The patient has oligo recurrence involving left upper lobe and T7 vertebral body.  This can be treated by stereotactic radiosurgery given his reasonably good health status. The patient's case has been discussed at thoracic tumor conference 1/13/2022.  Patient is not good candidate for surgery and poor candidate for systemic therapy given his current medical status.  The consensus recommendation is to consider SBRT for his oligo disease in the left lung and T7 spine if the patient wishes not to consider systemic therapy at this time.  He received second course of SBRT for his lung cancer completed 2 weeks ago.  Patient has been doing well.  The possible risks and side effects of radiation therapy has been again discussed with the patient.  Questions are answered to patient satisfaction. He is ready to proceed with radiation therapy for his oligo metastasis of T7 spine.  Patient is therefore scheduled return to radiation oncology later on today for simulation.  I plan to give him radiation therapy to a total dose of 1600 to 3500 cGy in 1-5 treatments using SRS/SBRT.     Face to  face time  20 minutes with > 80% spent on consultation, education and coordination of care.          Mariana Batista MD, PhD  Department of Radiation Oncology   [facility]  Tel: [phone]  Page: [phone]    [facility]  [address] [place] [address]     [facility]   [address] [place] [address] [address] [place] [address]    CC:  Patient Care Team:  Shade Boyd MD as PCP - General (Internal Medicine)  Mariana Batista MD as MD (Hematology & Oncology)  [facility]ist (Pharmacist)  Mariana Batista MD as Assigned Cancer Care Provider  Matt Magaña MD as Assigned Heart and Vascular Provider  Sim Jaffe MD as Assigned Surgical Provider  Shade Boyd MD as Assigned PCP

## 2022-04-01 DIAGNOSIS — I25.810 CORONARY ARTERY DISEASE INVOLVING AUTOLOGOUS ARTERY CORONARY BYPASS GRAFT WITHOUT ANGINA PECTORIS: ICD-10-CM

## 2022-04-04 RX ORDER — NITROGLYCERIN 0.4 MG/1
TABLET SUBLINGUAL
Qty: 100 TABLET | Refills: 0 | Status: SHIPPED | OUTPATIENT
Start: 2022-04-04

## 2022-04-04 NOTE — TELEPHONE ENCOUNTER
Routing refill request to provider for review/approval because:  Drug not active on patient's medication list    Documented on chart 5/11/2021.    Sublingual nitro order needs review.    Osvaldo Melton RN  [facility]

## 2022-04-05 ENCOUNTER — OFFICE VISIT (OUTPATIENT)
Dept: FAMILY MEDICINE | Facility: CLINIC | Age: 77
End: 2022-04-05
Payer: COMMERCIAL

## 2022-04-05 VITALS
TEMPERATURE: 97 F | WEIGHT: 183.4 LBS | HEART RATE: 60 BPM | BODY MASS INDEX: 25.58 KG/M2 | SYSTOLIC BLOOD PRESSURE: 116 MMHG | OXYGEN SATURATION: 97 % | DIASTOLIC BLOOD PRESSURE: 72 MMHG

## 2022-04-05 DIAGNOSIS — I10 PRIMARY HYPERTENSION: ICD-10-CM

## 2022-04-05 DIAGNOSIS — N18.31 STAGE 3A CHRONIC KIDNEY DISEASE (H): Primary | ICD-10-CM

## 2022-04-05 DIAGNOSIS — C76.0 MALIGNANT NEOPLASM OF HEAD, NECK AND FACE (H): ICD-10-CM

## 2022-04-05 DIAGNOSIS — C61 MALIGNANT NEOPLASM OF PROSTATE (H): ICD-10-CM

## 2022-04-05 DIAGNOSIS — C34.12 MALIGNANT NEOPLASM OF UPPER LOBE OF LEFT LUNG (H): ICD-10-CM

## 2022-04-05 DIAGNOSIS — G89.29 CHRONIC RIGHT-SIDED LOW BACK PAIN WITH RIGHT-SIDED SCIATICA: ICD-10-CM

## 2022-04-05 DIAGNOSIS — I25.118 CORONARY ARTERY DISEASE OF NATIVE HEART WITH STABLE ANGINA PECTORIS, UNSPECIFIED VESSEL OR LESION TYPE (H): ICD-10-CM

## 2022-04-05 DIAGNOSIS — J44.9 COPD, GROUP B, BY GOLD 2017 CLASSIFICATION (H): ICD-10-CM

## 2022-04-05 DIAGNOSIS — M54.41 CHRONIC RIGHT-SIDED LOW BACK PAIN WITH RIGHT-SIDED SCIATICA: ICD-10-CM

## 2022-04-05 DIAGNOSIS — M54.16 LUMBAR RADICULOPATHY: ICD-10-CM

## 2022-04-05 LAB
ANION GAP SERPL CALCULATED.3IONS-SCNC: 8 MMOL/L (ref 3–14)
BASOPHILS # BLD AUTO: 0 10E3/UL (ref 0–0.2)
BASOPHILS NFR BLD AUTO: 1 %
BUN SERPL-MCNC: 37 MG/DL (ref 7–30)
CALCIUM SERPL-MCNC: 10 MG/DL (ref 8.5–10.1)
CHLORIDE BLD-SCNC: 106 MMOL/L (ref 94–109)
CO2 SERPL-SCNC: 28 MMOL/L (ref 20–32)
CREAT SERPL-MCNC: 1.7 MG/DL (ref 0.66–1.25)
CREAT UR-MCNC: 90 MG/DL
EOSINOPHIL # BLD AUTO: 0.3 10E3/UL (ref 0–0.7)
EOSINOPHIL NFR BLD AUTO: 3 %
ERYTHROCYTE [DISTWIDTH] IN BLOOD BY AUTOMATED COUNT: 12.9 % (ref 10–15)
GFR SERPL CREATININE-BSD FRML MDRD: 41 ML/MIN/1.73M2
GLUCOSE BLD-MCNC: 103 MG/DL (ref 70–99)
HCT VFR BLD AUTO: 44.7 % (ref 40–53)
HGB BLD-MCNC: 14.3 G/DL (ref 13.3–17.7)
IMM GRANULOCYTES # BLD: 0 10E3/UL
IMM GRANULOCYTES NFR BLD: 0 %
LYMPHOCYTES # BLD AUTO: 1.1 10E3/UL (ref 0.8–5.3)
LYMPHOCYTES NFR BLD AUTO: 12 %
MCH RBC QN AUTO: 29.3 PG (ref 26.5–33)
MCHC RBC AUTO-ENTMCNC: 32 G/DL (ref 31.5–36.5)
MCV RBC AUTO: 92 FL (ref 78–100)
MICROALBUMIN UR-MCNC: 117 MG/L
MICROALBUMIN/CREAT UR: 130 MG/G CR (ref 0–17)
MONOCYTES # BLD AUTO: 0.6 10E3/UL (ref 0–1.3)
MONOCYTES NFR BLD AUTO: 7 %
NEUTROPHILS # BLD AUTO: 6.6 10E3/UL (ref 1.6–8.3)
NEUTROPHILS NFR BLD AUTO: 77 %
PHOSPHATE SERPL-MCNC: 3.7 MG/DL (ref 2.5–4.5)
PLATELET # BLD AUTO: 199 10E3/UL (ref 150–450)
POTASSIUM BLD-SCNC: 5 MMOL/L (ref 3.4–5.3)
PTH-INTACT SERPL-MCNC: 26 PG/ML (ref 18–80)
RBC # BLD AUTO: 4.88 10E6/UL (ref 4.4–5.9)
SODIUM SERPL-SCNC: 142 MMOL/L (ref 133–144)
WBC # BLD AUTO: 8.6 10E3/UL (ref 4–11)

## 2022-04-05 PROCEDURE — 83970 ASSAY OF PARATHORMONE: CPT | Performed by: INTERNAL MEDICINE

## 2022-04-05 PROCEDURE — 80048 BASIC METABOLIC PNL TOTAL CA: CPT | Performed by: INTERNAL MEDICINE

## 2022-04-05 PROCEDURE — 85025 COMPLETE CBC W/AUTO DIFF WBC: CPT | Mod: QW | Performed by: INTERNAL MEDICINE

## 2022-04-05 PROCEDURE — 99214 OFFICE O/P EST MOD 30 MIN: CPT | Performed by: INTERNAL MEDICINE

## 2022-04-05 PROCEDURE — 82043 UR ALBUMIN QUANTITATIVE: CPT | Performed by: INTERNAL MEDICINE

## 2022-04-05 PROCEDURE — 84100 ASSAY OF PHOSPHORUS: CPT | Performed by: INTERNAL MEDICINE

## 2022-04-05 PROCEDURE — 36415 COLL VENOUS BLD VENIPUNCTURE: CPT | Performed by: INTERNAL MEDICINE

## 2022-04-05 RX ORDER — GABAPENTIN 100 MG/1
100 CAPSULE ORAL 2 TIMES DAILY
Qty: 180 CAPSULE | Refills: 1 | Status: SHIPPED | OUTPATIENT
Start: 2022-04-05 | End: 2022-06-21

## 2022-04-05 NOTE — PROGRESS NOTES
Assessment & Plan   Problem List Items Addressed This Visit        Nervous and Auditory    Coronary artery disease of native heart with stable angina pectoris, unspecified vessel or lesion type (H)     NSTEMI with PCI to RCA with multi-vessel disease (non-obstructive LAD lesion); following with Dr. Valentine  - DAPT stopped (10/2020): profound anemia, concerns for GIB  - Toprol XL 25mg daily  - statin and ezetimibe stopped due to rhabdo (5/2021)   - consider future rechallenge on statin         Chronic right-sided low back pain with right-sided sciatica     - limited benefit with NSAIDs and APAP  H/o MIRNA after hip fracture from fall (6/2016)  - working with Ortho, Dr. 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 (H)     - on Trelegy daily         Malignant neoplasm of upper lobe of left lung (H)     s/p right wedge resection of limited stage adenocarcinoma of lung (11/2015)  - 1-3/2021 XRT to MELODY malignancy (PET avid)  - [facility] with [facility] radiation [facility]ology            Circulatory    Primary hypertension     controlled  current antihypertensive regimen: Toprol XL 25mg daily  regimen changes: none  intolerance:  future titration/work-up plan:             Urinary    Stage 3a chronic kidney disease (H) - Primary     - baseline SCr 1.7  - multiple LEILA episodes; h/o contrast induced nephropathy (VITOR)  previous multiple LEILA (prior h/o of LEILA in 1/2020, 10/2020); no previous dialysis needs  - lisinopril indefinitely on hold  - heightened risk for future contrast induced nephropathy  - recheck CKD labs today         Relevant Orders    Basic metabolic panel     "CBC with Platelets & Differential (Completed)    Parathyroid Hormone Intact    Albumin Random Urine Quantitative with Creat Ratio    Phosphorus    Malignant neoplasm of prostate (H)     prostate Ca; watchful waiting   - original prostate biopsy in '11, repeat biopsy in '16; [place] 6   - watchful surveillance; q6 month PSA; last PSA 18 (stable)   - MRI of prostate (2019): focal coarse calcifications in prostate; no evidence of extra-prostate extension   - following peripherally with Dr. 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    Weight as of this encounter: 83.2 kg (183 lb 6.4 oz).           Return in about 2 months (around 6/5/2022).    Shade Boyd MD  [facility]    Pilar Medrano is a 76 year old who presents for the following health issues  accompanied by his daughter.  Presents primarily with concerns of persistent low back pain.  Describes having the pain intermittently now for several months.  After last visit, has been referred to physical therapy without any significant symptom benefit.  Trialed short-term oxycodone use which did not really alleviate pains significantly.  Now describing some bilateral numbness and tingling-like pain sensation in both feet.  Describes persistent right-sided posterior/gluteal pains.  No weakness.  Questions whether he may have some low back origin of pains.  Describes remote history of lower lumbar procedure performed approximately 15 years ago.  Continues ongoing follow-up " for radiation related to his lung cancer.  Recent PSA which showed stable reading.  Has not seen urology.  Also complains of persistent left-sided breast tenderness.  On no antiandrogen therapy historically.    History of Present Illness       Reason for visit:  Office visit  Symptom onset:  More than a month  Symptoms include:  Pain  Symptom intensity:  Moderate  Symptom progression:  Worsening  Had these symptoms before:  Yes  Has tried/received treatment for these symptoms:  Yes  Previous treatment was successful:  No  What makes it worse:  No  What makes it better:  No    He eats 0-1 servings of fruits and vegetables daily.He exercises with enough effort to increase his heart rate 20 to 29 minutes per day.    He is taking medications regularly.       Concern - 1.  BL feet neuropathy, circulation- feet always cold x mos  2. Low back pain x 10 mos- right leg radicular symptoms -  Sent to ortho XR done, steroid injections, PT - no change in sx  3.  Left breast pain x 1yr         Review of Systems   A 10 point complete review of systems was inquired and negative except for the pertinent following findings        Objective    /72   Pulse 60   Temp 97  F (36.1  C) (Tympanic)   Wt 83.2 kg (183 lb 6.4 oz)   SpO2 97%   BMI 25.58 kg/m    Body mass index is 25.58 kg/m .  Physical Exam   GENERAL: healthy, alert and no distress  NECK: no adenopathy, no asymmetry, masses, or scars and thyroid normal to palpation  RESP: lungs clear to auscultation - no rales, rhonchi or wheezes  BREAST: focal tenderness around left nipple.  Bilateral gynecomastia present.  CV: regular rate and rhythm, normal S1 S2, no S3 or S4, no murmur, click or rub, no peripheral edema and peripheral pulses strong  ABDOMEN: soft, nontender, no hepatosplenomegaly, no masses and bowel sounds normal  MS: no gross musculoskeletal defects noted, no edema

## 2022-04-05 NOTE — PATIENT INSTRUCTIONS
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  Increase by 100mg increments morning and night each week to goal dose of 400mg 2x/day.  Reassess buttocks pains/feet tingling pains      MRI of lumbar spine    Referral to Dr. Montoya ([facility], to be scheduled here locally)

## 2022-04-05 NOTE — ASSESSMENT & PLAN NOTE
NSTEMI with PCI to RCA with multi-vessel disease (non-obstructive LAD lesion); following with Dr. Valentine  - DAPT stopped (10/2020): profound anemia, concerns for GIB  - Toprol XL 25mg daily  - statin and ezetimibe stopped due to rhabdo (5/2021)   - consider future rechallenge on statin

## 2022-04-05 NOTE — ASSESSMENT & PLAN NOTE
controlled  current antihypertensive regimen: Toprol XL 25mg daily  regimen changes: none  intolerance:  future titration/work-up plan:

## 2022-04-05 NOTE — ASSESSMENT & PLAN NOTE
- baseline SCr 1.7  - multiple LEILA episodes; h/o contrast induced nephropathy (VITOR)  previous multiple LEILA (prior h/o of LEILA in 1/2020, 10/2020); no previous dialysis needs  - lisinopril indefinitely on hold  - heightened risk for future contrast induced nephropathy  - recheck CKD labs today

## 2022-04-05 NOTE — ASSESSMENT & PLAN NOTE
prostate Ca; watchful waiting   - original prostate biopsy in '11, repeat biopsy in '16; [place] 6   - watchful surveillance; q6 month PSA; last PSA 18 (stable)   - MRI of prostate (2019): focal coarse calcifications in prostate; no evidence of extra-prostate extension   - following peripherally with Dr. Bustos

## 2022-04-05 NOTE — ASSESSMENT & PLAN NOTE
- limited benefit with NSAIDs and APAP  H/o MIRNA after hip fracture from fall (6/2016)  - working with Ortho, Dr. Sainz - previous attempts at intra-articular right hip injection - no lasting benefit  - poor response to oxycodone  - will trial gabapentin, titrate to goal dose of 400mg BID  - arrange for MRI of lumbar spine  - referral to Ortho spine for further non-operative intervention

## 2022-04-06 DIAGNOSIS — K21.9 GERD (GASTROESOPHAGEAL REFLUX DISEASE): Primary | ICD-10-CM

## 2022-04-08 ENCOUNTER — LAB (OUTPATIENT)
Dept: LAB | Facility: CLINIC | Age: 77
End: 2022-04-08
Attending: RADIOLOGY
Payer: COMMERCIAL

## 2022-04-08 DIAGNOSIS — C34.12 MALIGNANT NEOPLASM OF UPPER LOBE OF LEFT LUNG (H): ICD-10-CM

## 2022-04-08 DIAGNOSIS — C79.51 SPINE METASTASIS: ICD-10-CM

## 2022-04-08 PROCEDURE — U0003 INFECTIOUS AGENT DETECTION BY NUCLEIC ACID (DNA OR RNA); SEVERE ACUTE RESPIRATORY SYNDROME CORONAVIRUS 2 (SARS-COV-2) (CORONAVIRUS DISEASE [COVID-19]), AMPLIFIED PROBE TECHNIQUE, MAKING USE OF HIGH THROUGHPUT TECHNOLOGIES AS DESCRIBED BY CMS-2020-01-R: HCPCS

## 2022-04-08 PROCEDURE — U0005 INFEC AGEN DETEC AMPLI PROBE: HCPCS

## 2022-04-08 NOTE — TELEPHONE ENCOUNTER
"Prescription approved per [facility] Refill Protocol.    Last Written Prescription Date:  1/28/21  Last Fill Quantity: 90,  # refills: 3   Last office visit provider:  4/5/22     Requested Prescriptions   Pending Prescriptions Disp Refills     omeprazole (PRILOSEC) 20 MG DR capsule [Pharmacy Med Name: OMEPRAZOLE DR 20 MG CAPSULE] 90 capsule 3     Sig: TAKE 1 CAPSULE BY MOUTH EVERY DAY       PPI Protocol Passed - 4/6/2022  2:35 PM        Passed - Not on Clopidogrel (unless Pantoprazole ordered)        Passed - No diagnosis of osteoporosis on record        Passed - Recent (12 mo) or future (30 days) visit within the authorizing provider's specialty     Patient has had an office visit with the authorizing provider or a provider within the authorizing providers department within the previous 12 mos or has a future within next 30 days. See \"Patient Info\" tab in inbasket, or \"Choose Columns\" in Meds & Orders section of the refill encounter.              Passed - Medication is active on med list        Passed - Patient is age 18 or older             Lilly Melton RN 04/08/22 12:41 PM  "

## 2022-04-09 LAB — SARS-COV-2 RNA RESP QL NAA+PROBE: NEGATIVE

## 2022-04-11 ENCOUNTER — APPOINTMENT (OUTPATIENT)
Dept: RADIATION ONCOLOGY | Facility: CLINIC | Age: 77
End: 2022-04-11
Attending: RADIOLOGY
Payer: MEDICARE

## 2022-04-11 PROCEDURE — 77300 RADIATION THERAPY DOSE PLAN: CPT | Mod: 26 | Performed by: RADIOLOGY

## 2022-04-11 PROCEDURE — 77338 DESIGN MLC DEVICE FOR IMRT: CPT | Mod: 26 | Performed by: RADIOLOGY

## 2022-04-11 PROCEDURE — 77338 DESIGN MLC DEVICE FOR IMRT: CPT | Performed by: RADIOLOGY

## 2022-04-11 PROCEDURE — 77300 RADIATION THERAPY DOSE PLAN: CPT | Performed by: RADIOLOGY

## 2022-04-11 PROCEDURE — 77301 RADIOTHERAPY DOSE PLAN IMRT: CPT | Mod: 26 | Performed by: RADIOLOGY

## 2022-04-11 PROCEDURE — 77301 RADIOTHERAPY DOSE PLAN IMRT: CPT | Performed by: RADIOLOGY

## 2022-04-12 ENCOUNTER — APPOINTMENT (OUTPATIENT)
Dept: RADIATION ONCOLOGY | Facility: CLINIC | Age: 77
End: 2022-04-12
Attending: RADIOLOGY
Payer: MEDICARE

## 2022-04-12 VITALS
BODY MASS INDEX: 26.03 KG/M2 | DIASTOLIC BLOOD PRESSURE: 67 MMHG | OXYGEN SATURATION: 97 % | TEMPERATURE: 97.6 F | RESPIRATION RATE: 16 BRPM | HEART RATE: 59 BPM | WEIGHT: 186.6 LBS | SYSTOLIC BLOOD PRESSURE: 138 MMHG

## 2022-04-12 DIAGNOSIS — C79.51 SPINE METASTASIS: Primary | ICD-10-CM

## 2022-04-12 DIAGNOSIS — C61 MALIGNANT NEOPLASM OF PROSTATE (H): ICD-10-CM

## 2022-04-12 DIAGNOSIS — C34.12 MALIGNANT NEOPLASM OF UPPER LOBE OF LEFT LUNG (H): ICD-10-CM

## 2022-04-12 PROCEDURE — 77370 RADIATION PHYSICS CONSULT: CPT | Performed by: RADIOLOGY

## 2022-04-12 PROCEDURE — 77373 STRTCTC BDY RAD THER TX DLVR: CPT | Performed by: RADIOLOGY

## 2022-04-12 PROCEDURE — 77280 THER RAD SIMULAJ FIELD SMPL: CPT | Mod: 26 | Performed by: RADIOLOGY

## 2022-04-12 NOTE — LETTER
4/12/2022         RE: Lenny Chau  [address]  [place] [address]        Dear Colleague,    Thank you for referring your patient, Lenny Chau, to the [facility] RADIATION ONCOLOGY [place]. Please see a copy of my visit note below.      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  All questions and concerns addressed.    Plan:     Continue radiation treatment as prescribed.    We will schedule patient to have follow-up in 2 months with repeat PSA and CT chest.    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    Assessment                  Body Site:  Bone Information  Site: T7 spine  Stereotactic Radiosurgery: Yes  Stereotactic Radiosurgery date: 04/12/22  Comfort Alteration  KPS: 70% Cannot do active work, but can care for self  Fatigue (ONS scale): 3: Mild Fatigue  Pain Location: right hip  Pain Intensity. Rate degree of pain ranging from 0 (no pain) to 10 (severe pain): 7  Pain Description: Dull constant - Dull type of ache which is constant  Pain Intervention: 1: Over the counter medications  Effectiveness of pain intervention: 3: Pain relieved 75%  Elimination Alteration  Diarrhea W/O Colostomy: 0: None  Constipation: 0: None  Proctitis: 0: None  Urinary frequency and/or urgency: 1: Increase in frequency or nocturia up to two times normal  Urinary Retention: 0 : Normal  Urinary Incontinence: 0: None  Dysuria: 0: None  Nocturia: 2  Urine Color Change: 0: Normal  Fall Risk  Have you fallen since last visit?: no  Are you unsteady?: no  Skin Alteration  Skin Sensation: 0: No problem  Skin Reaction: 0:  None  Nutrition Alteration  Anorexia: 0: None  Nausea: 0: None  Vomitin: None  Pharynx and Esphogaus: 0: No change over baseline  Elimination Alteration  Constipation: 0: None  Diarrhea W/O Colostomy: 0: None  Bowel Incontinence: 0: None  Urinary Incontinence: 0: None                                     Sexuality Alteration                    Emotional Alteration       Comfort Alteration   KPS: 70% Cannot do active work, but can care for self  Fatigue (ONS scale): 3: Mild Fatigue  Pain Location: right hip  Pain Intensity. Rate degree of pain ranging from 0 (no pain) to 10 (severe pain): 7  Pain Description: Dull constant - Dull type of ache which is constant  Pain Intervention: 1: Over the counter medications  Effectiveness of pain intervention: 3: Pain relieved 75%   Nutrition Alteration   Anorexia: 0: None  Nausea: 0: None  Vomitin: None  Weight: 84.6 kg (186 lb 9.6 oz)  Pharynx and Esphogaus: 0: No change over baseline  Skin Alteration   Skin Sensation: 0: No problem  Skin Reaction: 0: None  AUA Assessment                                           Accompanied by       Objective:     Exam: Examination reviewed no significant changes.    Vitals:    22 1022   BP: 138/67   Pulse: 59   Resp: 16   Temp: 97.6  F (36.4  C)   TempSrc: Oral   SpO2: 97%   Weight: 84.6 kg (186 lb 9.6 oz)       Wt Readings from Last 8 Encounters:   22 84.6 kg (186 lb 9.6 oz)   22 83.2 kg (183 lb 6.4 oz)   22 83.9 kg (185 lb)   22 83.7 kg (184 lb 9.6 oz)   22 83.6 kg (184 lb 3.2 oz)   22 84.4 kg (186 lb 1.6 oz)   22 84.1 kg (185 lb 4.8 oz)   22 85.2 kg (187 lb 14.4 oz)       General: Alert and oriented, in no acute distress  Lenny has no Erythema.  Aria chart and setup information reviewed    Mariana Batista MD        Again, thank you for allowing me to participate in the care of your patient.        Sincerely,        Mariana Batista MD

## 2022-04-12 NOTE — PROGRESS NOTES
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  All questions and concerns addressed.    Plan:     Continue radiation treatment as prescribed.    We will schedule patient to have follow-up in 2 months with repeat PSA and CT chest.    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    Assessment                  Body Site:  Bone Information  Site: T7 spine  Stereotactic Radiosurgery: Yes  Stereotactic Radiosurgery date: 22  Comfort Alteration  KPS: 70% Cannot do active work, but can care for self  Fatigue (ONS scale): 3: Mild Fatigue  Pain Location: right hip  Pain Intensity. Rate degree of pain ranging from 0 (no pain) to 10 (severe pain): 7  Pain Description: Dull constant - Dull type of ache which is constant  Pain Intervention: 1: Over the counter medications  Effectiveness of pain intervention: 3: Pain relieved 75%  Elimination Alteration  Diarrhea W/O Colostomy: 0: None  Constipation: 0: None  Proctitis: 0: None  Urinary frequency and/or urgency: 1: Increase in frequency or nocturia up to two times normal  Urinary Retention: 0 : Normal  Urinary Incontinence: 0: None  Dysuria: 0: None  Nocturia: 2  Urine Color Change: 0: Normal  Fall Risk  Have you fallen since last visit?: no  Are you unsteady?: no  Skin Alteration  Skin Sensation: 0: No problem  Skin Reaction: 0: None  Nutrition Alteration  Anorexia: 0: None  Nausea: 0: None  Vomitin: None  Pharynx and Esphogaus: 0: No change over baseline  Elimination Alteration  Constipation: 0: None  Diarrhea W/O Colostomy: 0: None  Bowel Incontinence: 0: None  Urinary Incontinence: 0:  None                                     Sexuality Alteration                    Emotional Alteration       Comfort Alteration   KPS: 70% Cannot do active work, but can care for self  Fatigue (ONS scale): 3: Mild Fatigue  Pain Location: right hip  Pain Intensity. Rate degree of pain ranging from 0 (no pain) to 10 (severe pain): 7  Pain Description: Dull constant - Dull type of ache which is constant  Pain Intervention: 1: Over the counter medications  Effectiveness of pain intervention: 3: Pain relieved 75%   Nutrition Alteration   Anorexia: 0: None  Nausea: 0: None  Vomitin: None  Weight: 84.6 kg (186 lb 9.6 oz)  Pharynx and Esphogaus: 0: No change over baseline  Skin Alteration   Skin Sensation: 0: No problem  Skin Reaction: 0: None  AUA Assessment                                           Accompanied by       Objective:     Exam: Examination reviewed no significant changes.    Vitals:    22 1022   BP: 138/67   Pulse: 59   Resp: 16   Temp: 97.6  F (36.4  C)   TempSrc: Oral   SpO2: 97%   Weight: 84.6 kg (186 lb 9.6 oz)       Wt Readings from Last 8 Encounters:   22 84.6 kg (186 lb 9.6 oz)   22 83.2 kg (183 lb 6.4 oz)   22 83.9 kg (185 lb)   22 83.7 kg (184 lb 9.6 oz)   22 83.6 kg (184 lb 3.2 oz)   22 84.4 kg (186 lb 1.6 oz)   22 84.1 kg (185 lb 4.8 oz)   22 85.2 kg (187 lb 14.4 oz)       General: Alert and oriented, in no acute distress  Lenny has no Erythema.  Aria chart and setup information reviewed    Mariana Batista MD

## 2022-04-14 ENCOUNTER — APPOINTMENT (OUTPATIENT)
Dept: RADIATION ONCOLOGY | Facility: CLINIC | Age: 77
End: 2022-04-14
Attending: RADIOLOGY
Payer: MEDICARE

## 2022-04-14 PROCEDURE — 77373 STRTCTC BDY RAD THER TX DLVR: CPT

## 2022-04-15 ENCOUNTER — TRANSFERRED RECORDS (OUTPATIENT)
Dept: HEALTH INFORMATION MANAGEMENT | Facility: CLINIC | Age: 77
End: 2022-04-15

## 2022-04-18 ENCOUNTER — APPOINTMENT (OUTPATIENT)
Dept: RADIATION ONCOLOGY | Facility: CLINIC | Age: 77
End: 2022-04-18
Attending: RADIOLOGY
Payer: MEDICARE

## 2022-04-18 PROCEDURE — 77373 STRTCTC BDY RAD THER TX DLVR: CPT | Performed by: RADIOLOGY

## 2022-04-20 ENCOUNTER — APPOINTMENT (OUTPATIENT)
Dept: RADIATION ONCOLOGY | Facility: CLINIC | Age: 77
End: 2022-04-20
Attending: RADIOLOGY
Payer: MEDICARE

## 2022-04-20 PROCEDURE — 77373 STRTCTC BDY RAD THER TX DLVR: CPT | Performed by: RADIOLOGY

## 2022-04-21 ENCOUNTER — TRANSFERRED RECORDS (OUTPATIENT)
Dept: HEALTH INFORMATION MANAGEMENT | Facility: CLINIC | Age: 77
End: 2022-04-21
Payer: COMMERCIAL

## 2022-04-22 ENCOUNTER — APPOINTMENT (OUTPATIENT)
Dept: RADIATION ONCOLOGY | Facility: CLINIC | Age: 77
End: 2022-04-22
Attending: RADIOLOGY
Payer: MEDICARE

## 2022-04-22 PROCEDURE — 77373 STRTCTC BDY RAD THER TX DLVR: CPT | Performed by: STUDENT IN AN ORGANIZED HEALTH CARE EDUCATION/TRAINING PROGRAM

## 2022-04-22 PROCEDURE — 77336 RADIATION PHYSICS CONSULT: CPT | Performed by: RADIOLOGY

## 2022-04-22 PROCEDURE — 77435 SBRT MANAGEMENT: CPT | Performed by: RADIOLOGY

## 2022-04-22 NOTE — PROGRESS NOTES
Date of Service: 06/04/19      CHIEF COMPLAINT:  Uncontrolled type 2 diabetes.  Goiter, hashimoto thyroiditis     HISTORY OF PRESENT ILLNESS:  This 71 year old [place] lady is here for reevaluation.  She was diagnosed with type 2 diabetes many years ago,     on janumet 50/1000 mg twice a day and glimepiride 2 mg once a day, before dinner  BGs have been in the 200s over the last month, checking BG 1-2 a day    No illness, no excessive thirst or urination, no UTI, vaginal infection, cough, CP   She admits to not following DM diet, started taking juice and eating sweets and higher carbs   She does not want to start insulin  Did not skip her medications    Her meter was not downloadable today, memory was reviewed, showed BGs in mid 100 to mid 200   No hypoglycemia     No recent steroid use, but had a 5 day prednisone course on 4/25/19 for painful shoulder      Had left knee replacement 6/6/16.       Eye exam 11/2018, no history of retinopathy.  Had cataract surgery.    She does have paresthesia in her feet. With worsening lately  No history of foot ulcers or surgeries.    Pt was found to have goiter and TPO were positive, started on levothyroxine 50 mcg that she takes appropriate,        REVIEW OF SYSTEMS:  Vertigo   No nausea, vomiting, abdominal pain, diarrhea or constipation   No difficulty swallowing or choking   No fever, no chills, no rash   No chest pain, SOB, or pain in legs  No headache, no change in vision      MEDICATIONS:  Reviewed     ALLERGIES:  No known drug allergies.    PAST MEDICAL HISTORY:  Hyperlipidemia, type 2 diabetes.    FAMILY HISTORY:  Positive for type 2 diabetes.  Positive for hypothyroidism in her daughter.    SOCIAL HISTORY:  Does not smoke or drink alcohol.  Lives with family.    PHYSICAL EXAMINATION:  Visit Vitals  /69   Pulse 67   Ht 5' 1\" (1.549 m)   Wt 78.4 kg   BMI 32.66 kg/m²     Was 79.5, 81.3, 79.3 kg, 77 kg last visit   GENERAL:  The patient is not in any distress,  SBRT/SRS Treatment Summary    Patient: Lenny Chau   MRN: [id number]  : 1945  Care Provider: Mariana Batista MD    Date of Service: 2022      Matt Magaña MD  [address] [place] [address] [address]  [place] [address]           Dear Dr. Magaña:     Your patient Mr. Lenny Chau completed his radiation therapy on 2022. As you know Mr. Chau is a 75 y.o. 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  The patient received SBRT for his primary lung cancer with a total dose of 5000 cGy in 5 treatment completed on 3/10/2021.  He was find to have a second primary of lung cancer by restaging PET CT scan for which he received second course of SBRT with a total dose of 5000 cGy in 5 treatments given from 3/9/2022-3/18/2022.     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  He received stereotactic radiosurgery with a total dose of 3275 cGy in 5 treatments given from 2022- 2022.  The patient tolerated ration therapy very well with minimal side effect.  He is scheduled return to radiation oncology in 2 months for routine post therapy office follow-up and restaging CT scan.    Again, thank you very much for the referral and allowing me to participate in the care of this patient.  If you have any questions or concerns about this patient, please do not hesitate to call.          Sincerely,      Mariana Batista MD, PhD  Department of Radiation Oncology   [facility]iation [facility]  Tel:  [phone]  Page: [phone]    [facility]  [address]  [place] [address]     [facility]  [address]  [place] [address]    CC:  Patient Care Team:  Shade Boyd MD as PCP - General (Internal Medicine)  Mariana Batista MD as MD (Hematology & Oncology)  Faviola Cottrell [facility] as Pharmacist (Pharmacist)  Mariana Batista MD as Assigned Cancer Care Provider  Matt Magaña MD as Assigned Heart and Vascular Provider  Sim Jaffe MD as Assigned Surgical Provider  Shade Boyd MD as Assigned PCP     responding to questions appropriately.  Oriented to time, place and person.  Has good eye contact.  SKIN:  Warm, dry, no rash.  HEENT:  Normal conjunctivae.  No lid lag or exophthalmus.  Extraocular muscles are intact.  Oral mucosa is moist.  No ulcers.  NECK:  Supple.  Thyroid is diffusely enlarged, nodular, firm in consistency.  No lymph nodes in the cervical or supraclavicular area bilaterally.  CHEST:  Clear to auscultation bilaterally.  Normal respiratory effort.  HEART:  Normal S1, S2, no murmur, no carotid bruit, no swelling in both legs.    EXTREMITIES:  Feet exam showed pedal pulses are palpable.  Sensation is decrease by monofilament , ankles are swollen , callus on pressure areas with arthritic changes on midfoot bilat     LABORATORY:  Creatinine (mg/dL)   Date Value   04/18/2019 1.06 (H)     CALCULATED LDL (mg/dL)   Date Value   01/08/2019 57     TSH (mcUnits/mL)   Date Value   01/08/2019 1.761     Hemoglobin A1C (no units)   Date Value   05/03/2019 6.6     ASSESSMENT AND PLAN:   1.  Uncontrolled type 2 diabetes. With hyperglycemia   Went over diet again, she is stopping all juices, sweets and carbs    continue  Janumet     change glimepiride   2 mg BID    Close BGM monitoring   Return in 4 weeks for reevaluation      Peripheral neuropathy with pain in the feet with walking         2.  Hypothyroidism  and goiter.    Continue levothyroxine 50 mcg     3.  Hyperlipidemia.  Continue lipitor ,  CALCULATED LDL (mg/dL)   Date Value   01/08/2019 57   continue lisinopril      Michael Sosa MD

## 2022-05-03 ENCOUNTER — TELEPHONE (OUTPATIENT)
Dept: RADIATION ONCOLOGY | Facility: CLINIC | Age: 77
End: 2022-05-03
Payer: COMMERCIAL

## 2022-05-03 NOTE — TELEPHONE ENCOUNTER
Called patient for routine follow up call s/p radiation for his metastatic cancer.  Both his lung and spine treated over the past couple months.  Patient states he is feeling good and has no side effects from treatment.  Confirmed follow up appointments and told patient to call with any questions or concerns.

## 2022-06-01 ENCOUNTER — TELEPHONE (OUTPATIENT)
Dept: FAMILY MEDICINE | Facility: CLINIC | Age: 77
End: 2022-06-01
Payer: COMMERCIAL

## 2022-06-01 DIAGNOSIS — G62.9 PERIPHERAL POLYNEUROPATHY: Primary | ICD-10-CM

## 2022-06-01 NOTE — TELEPHONE ENCOUNTER
I would recommend neurology referral.  I think it would be more fruitful than podiatry evaluation.  I placed referral order

## 2022-06-01 NOTE — TELEPHONE ENCOUNTER
Contacted patient with his concerns of neuropathy in both fee.  Pain/buring ball of fee and large toe.   Has tried gabapentin starting at 200mg and increased to 800mg and really didn't notice much of a change.  Also stating both feet are cold all the time.  Please advise on further recomendations

## 2022-06-01 NOTE — TELEPHONE ENCOUNTER
Reason for Call:  Other call back    Detailed comments: Please call patient he would like to discuss seeing a podiatrist for his feet      Phone Number Patient can be reached at: Home number on file [phone] (home)    Best Time: any    Can we leave a detailed message on this number? YES    Call taken on 6/1/2022 at 10:43 AM by Anitha Carlson

## 2022-06-02 ENCOUNTER — TRANSFERRED RECORDS (OUTPATIENT)
Dept: HEALTH INFORMATION MANAGEMENT | Facility: CLINIC | Age: 77
End: 2022-06-02
Payer: COMMERCIAL

## 2022-06-04 NOTE — PROGRESS NOTES
[facility]    Outpatient Occupational Therapy Discharge Note  Patient: Lenny Chau  : 1945    Beginning/End Dates of Reporting Period:  3-23-22    Referring Provider: Dr. Sim Jaffe    Therapy Diagnosis: right PC BPPV    Goals:   Goal Identifier     Goal Description Patient will be able to roll in bed without vertigo   Target Date 22   Date Met   3-23-22   Progress (detail required for progress note):       Goal Identifier     Goal Description Patient will be able to bend without vertigo   Target Date 22   Date Met   3-23-22   Progress (detail required for progress note):       Plan:  Discharge from therapy.

## 2022-06-04 NOTE — ADDENDUM NOTE
Encounter addended by: Estella Brito, OT on: 6/4/2022 6:55 PM   Actions taken: Episode resolved, Clinical Note Signed

## 2022-06-06 ENCOUNTER — TELEPHONE (OUTPATIENT)
Dept: FAMILY MEDICINE | Facility: CLINIC | Age: 77
End: 2022-06-06

## 2022-06-06 NOTE — TELEPHONE ENCOUNTER
Reason for Call:  Other call back    Detailed comments: Wanting [facility]'s care team to call back would like to discuss a couple of things. Patient did not want to go into detail. Please advise.     Phone Number Patient can be reached at: Home number on file [phone] (home)    Best Time: anytime    Can we leave a detailed message on this number? YES    Call taken on 6/6/2022 at 11:16 AM by Rosalba Bee

## 2022-06-08 ENCOUNTER — TELEPHONE (OUTPATIENT)
Dept: FAMILY MEDICINE | Facility: CLINIC | Age: 77
End: 2022-06-08
Payer: COMMERCIAL

## 2022-06-08 DIAGNOSIS — J44.9 COPD, GROUP B, BY GOLD 2017 CLASSIFICATION (H): Primary | ICD-10-CM

## 2022-06-08 NOTE — TELEPHONE ENCOUNTER
Mitchell calling back stating he can't get in until 11/1 ([facility]) to see neurology.  States his feet are killing him.  BL feet R> L, difficulty walking  - Was taking gabapentin 400mg BID and no relief.  Anything sooner [place]?  Or any other options??

## 2022-06-08 NOTE — TELEPHONE ENCOUNTER
Pt's PAP has run out for his Trelegy from [employer] - he needs to meet 600.00 out of pocket.  He's asking for #3 of the Trelegy inhalers be sent to [facility]     Last visit 4/5/22

## 2022-06-16 ENCOUNTER — HOSPITAL ENCOUNTER (OUTPATIENT)
Dept: CT IMAGING | Facility: CLINIC | Age: 77
Discharge: HOME | End: 2022-06-16
Attending: RADIOLOGY | Admitting: RADIOLOGY
Payer: MEDICARE

## 2022-06-16 DIAGNOSIS — C34.12 MALIGNANT NEOPLASM OF UPPER LOBE OF LEFT LUNG (H): ICD-10-CM

## 2022-06-16 DIAGNOSIS — C79.51 SPINE METASTASIS: ICD-10-CM

## 2022-06-16 PROCEDURE — 71250 CT THORAX DX C-: CPT | Mod: ME

## 2022-06-16 NOTE — PROCEDURES
Accession Number:       [id number]  PATHOLOGIST:     See comment       Brett Ontiveros M.D., Board Certified in Anatomic       Pathology, Clinical Pathology, Specializing in       Gynecological Pathology       4   (electronic signature)  A SOURCE:     Skin, right cheek  A GROSS DESCRIPTION:     See comment       Specimen is received in formalin, labeled with       multiple patient identifier(s) and consists of       two piece(s) of tissue measuring 0.6 x 0.4 x 0.15       cm, irregular in shape and yellow-white in color.       The margins are inked green. The specimen is       entirely submitted in one cassette(s).           Gross exam(s) performed at: [address] [address]         : DELVIN ESQUEDA MD  A DIAGNOSIS:     Benign inflamed seborrheic keratosis, partially extending to the biopsy margins.

## 2022-06-21 ENCOUNTER — LAB (OUTPATIENT)
Dept: INFUSION THERAPY | Facility: CLINIC | Age: 77
End: 2022-06-21
Attending: RADIOLOGY
Payer: MEDICARE

## 2022-06-21 ENCOUNTER — OFFICE VISIT (OUTPATIENT)
Dept: RADIATION ONCOLOGY | Facility: CLINIC | Age: 77
End: 2022-06-21
Attending: RADIOLOGY
Payer: MEDICARE

## 2022-06-21 VITALS
TEMPERATURE: 98.3 F | BODY MASS INDEX: 25.73 KG/M2 | SYSTOLIC BLOOD PRESSURE: 147 MMHG | OXYGEN SATURATION: 95 % | RESPIRATION RATE: 18 BRPM | HEART RATE: 71 BPM | DIASTOLIC BLOOD PRESSURE: 77 MMHG | WEIGHT: 184.5 LBS

## 2022-06-21 DIAGNOSIS — C34.12 MALIGNANT NEOPLASM OF UPPER LOBE OF LEFT LUNG (H): ICD-10-CM

## 2022-06-21 DIAGNOSIS — C79.51 SPINE METASTASIS: Primary | ICD-10-CM

## 2022-06-21 DIAGNOSIS — C61 MALIGNANT NEOPLASM OF PROSTATE (H): ICD-10-CM

## 2022-06-21 DIAGNOSIS — N18.30 ACUTE RENAL FAILURE SUPERIMPOSED ON STAGE 3 CHRONIC KIDNEY DISEASE, UNSPECIFIED ACUTE RENAL FAILURE TYPE, UNSPECIFIED WHETHER STAGE 3A OR 3B CKD (H): ICD-10-CM

## 2022-06-21 DIAGNOSIS — N17.9 ACUTE RENAL FAILURE SUPERIMPOSED ON STAGE 3 CHRONIC KIDNEY DISEASE, UNSPECIFIED ACUTE RENAL FAILURE TYPE, UNSPECIFIED WHETHER STAGE 3A OR 3B CKD (H): ICD-10-CM

## 2022-06-21 LAB — PSA SERPL-MCNC: 21.81 UG/L (ref 0–6.5)

## 2022-06-21 PROCEDURE — 84153 ASSAY OF PSA TOTAL: CPT

## 2022-06-21 PROCEDURE — 36415 COLL VENOUS BLD VENIPUNCTURE: CPT

## 2022-06-21 PROCEDURE — G0463 HOSPITAL OUTPT CLINIC VISIT: HCPCS

## 2022-06-21 PROCEDURE — 99214 OFFICE O/P EST MOD 30 MIN: CPT | Performed by: RADIOLOGY

## 2022-06-21 NOTE — PROGRESS NOTES
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  The biopsy confirmed moderately differentiated adenocarcinoma.        TYPE OF RADIATION THERAPY ADMINISTERED:   1. SBRT to the left upper lobe with a total dose of 5000 cGy in 5 treatments given from 3/2/2021-3/10/2021.     2. SBRT with a total dose of 5000 cGy in 5 treatments for the second left tumor given from 3/9/2022-3/18/2022.     3. SBRT with a total dose of 3275 cGy in 5 treatments for T7 spine given from 2022- 2022.     INTERVAL SINCE COMPLETION OF RADIATION THERAPY: 2 months.      SUBJECTIVE:  Mr. Chau is a 76 y.o. male who is a chronic smoker and quit smoking since .  The patient had a complicated history of multiple cancer in the past as detailed as followin.  Low risk prostate cancer, clinical stage T1c N0 M0, recent grade 3+3 =6 and the last PSA was 10.4 in 2006.  The patient is currently on clinical observation with no therapy.  PSA: 12.47 on 2021.     2.  Floor of mouth cancer, status post surgical resection in 1994 with no adjuvant therapy.     3.  Left kidney hemangioma, status post left nephrectomy on 3/26/2009.     4.  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  Patient had local recurrence and is  status post surgical resection on 9/11/2009.     5.  Non-small cell lung cancer involving right lung, stage I, status post right thoracotomy and excision of right upper lobe and right middle lobe lung tumor 11/5/2015 with no evidence of lymph node metastasis and no adjuvant therapy.  Patient lost to follow-up since 2016.     6.  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     7.  Left upper lobe lung  cancer, stage T1N0 M0.  The biopsy confirmed moderately differentiated adenocarcinoma on 1/21/2021.  The patient received SBRT with a total dose of 5000 cGy in 5 treatments given from 3/2/2021-3/10/2021.     Patient had two prior early stage right lung cancers that were apparently resected in their entirety and may have been synchronous primary lung cancers. It does not appear he had the appropriate recommended follow up at [facility]. The MELODY lesion was known to be present in 2016 and appears to be slightly larger now with significant FDG-avidity on the recent PET/CT. It is likely another primary lung cancer, although metastasis from previous lung cancers is possible.  The patient had a restaging PET CT scan on 8/31/2020.  The scan showed enlarging FDG avid left upper lobe mass measuring 2.1 x 2.5 cm with central solid component consistent with primary lung cancer without metastasis.  There was also a FDG avid lesion in the left buccal region consistent with squamous cell carcinoma without metastasis.  The patient had a surgical resection for his left buccal/lateral tongue squamous cell carcinoma 11/6/2020 by KERRY Renee with pathology showed squamous cell carcinoma with negative margin.  He was determined not a good candidate for lung surgery given his complicated medical history and health status.  He therefore received definitive radiation therapy using SBRT with a total dose of 5000 cGy in  5 treatments given from 3/2/2021-3/10/2021. The patient tolerated radiation therapy very well with minimal side effect.     The patient has been doing well since completion of radiation therapy.  He denies any pain or discomfort related to the therapy at the time of evaluation.  The patient was found to a new 9 mm small nodule in the left upper lobe outside of patient therapy area from restaging CT scan on 12/20/2021.  He was recommended to have staging PET CT scan and was done 1/7/2022. 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  There is no evidence of other systemic metastasis.The patient's case has been discussed at thoracic tumor conference 1/13/2022.  MRI brain on 1/22/2022 showed no evidence of brain metastasis. Patient is not good candidate for surgery and poor candidate for systemic therapy given his current medical status.  The consensus recommendation is to consider SBRT for his oligo disease in the left lung and T7 spine if the patient wishes not to consider systemic therapy at this time.   The patient received the second course of SBRT to the second left lung cancer with a total dose of 5000 cGy in 5 treatments given from 3/9/2022-3/18/2022.  He tolerated radiation therapy very well with minimal side effect.      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  He received stereotactic radiosurgery with a total dose of 3275 cGy in 5 treatments given from 4/12/2022- 4/22/2022.  The patient tolerated ration therapy very well with minimal side effect.     The patient has been stable since completion of radiation therapy.  He denies any new symptoms at the time of evaluation.  He is here for routine post therapy office follow-up.    Medications were reviewed and are up to date on EPIC.    The following portions of the patient's history were reviewed and updated as appropriate:  "allergies, current medications, past family history, past medical history, past social history, past surgical history and problem list.    Review of Systems:      General  Constitutional  Constitutional (WDL): All constitutional elements are within defined limits  EENT  Eye Disorders  Eye Disorder (WDL): All eye disorder elements are within defined limits  Ear Disorders  Ear Disorder (WDL): All ear disorder elements are within defined limits  Respiratory  Respiratory  Respiratory (WDL): Exceptions to WDL  Cough: Mild symptoms OR nonprescription intervention indicated (\"my normal cough\")  Cardiovascular  Cardiovascular  Cardiovascular (WDL): All cardiovascular elements are within defined limits  Gastrointestinal  Gastrointestinal  Gastrointestinal (WDL): All gastrointestinal elements are within defined limits  Musculoskeletal  Musculoskeletal and Connective Tissue Disorders  Musculoskeletal & Connective (WDL): Exceptions to WDL  Arthralgia: Mild pain  Bone Pain: Mild pain  Generalized Muscle Weakness: Symptomatic OR perceived by patient but not evident on physical exam  Integumentary  Integumentary  Integumentary (WDL): All integumentary elements are within defined limits  Neurological  Neurosensory  Neurosensory (WDL): Exceptions to WDL  Peripheral Motor Neuropathy: Asymptomatic OR clinical or diagnostic observations only  Ataxia: Asymptomatic OR clinical or diagnostic observations only OR intervention not indicated  Peripheral Sensory Neuropathy: Asymptomatic (feet)  Genitourinary/Reproductive  Genitourinary  Genitourinary (WDL): All genitourinary elements are within defined limits  Lymphatic  Lymph System Disorders  Lymph (WDL): All lymph elements are within defined limits  Pain  Pain Score: Mild Pain (2)  AUA Assessment                                                              Accompanied by  Accompanied By: family    Objective:      PHYSICAL EXAMINATION:    BP (!) 147/77   Pulse 71   Temp 98.3  F (36.8  C)  "  Resp 18   Wt 83.7 kg (184 lb 8 oz)   SpO2 95%   BMI 25.73 kg/m      Gen: Alert, in NAD  Eyes: PERRL, EOMI, sclera anicteric  HENT     Head: NC/AT     Ears: No external auricular lesions     Nose/sinus: No rhinorrhea or epistaxis     Oropharynx: MMM, no visible oral lesions  Neck: Supple, full ROM, no LAD  Pulm: No wheezing, stridor or respiratory distress  CV: Well-perfused, no cyanosis, no pedal edema  Abdominal: BS+, soft, nontender, nondistended, no hepatomegaly  Back: No step-offs or pain to palpation along the thoracolumbar spine  Rectal: Deferred  : Deferred  Musculoskeletal: Normal muscle bulk and tone  Skin: Normal color and turgor  Neurologic: A/Ox3, CN II-XII intact, normal gait and station  Psychiatric: Appropriate mood and affect     Imaging: Reviewed    Prostate Specific Antigen Screen   Date Value Ref Range Status   05/08/2021 34.0 (H) 0.0 - 6.5 ng/mL Final   03/09/2021 23.5 (H) < OR = 4.0 ng/mL Final     Comment:     The total PSA value from this assay system is   standardized against the WHO standard. The test   result will be approximately 20% lower when compared   to the equimolar-standardized total PSA ([place]). Comparison of serial PSA results should be   interpreted with this fact in mind.     This test was performed using the Siemens   chemiluminescent method. Values obtained from   different assay methods cannot be used  interchangeably. PSA levels, regardless of  value, should not be interpreted as absolute  evidence of the presence or absence of disease.  Lab test performed by:  Lab Mnemonic:  [facility]  [address] [place] [address]  Nando Nunez M.D.  QUEST Specimen received date and time: 09-MAR-2021 12:46:00.00     05/06/2019 15.8 (H) < OR = 4.0 ng/mL Final     Comment:     The total PSA value from this assay system is   standardized against the WHO standard. The test   result will be approximately 20% lower when compared   to the  equimolar-standardized total PSA ([place]). Comparison of serial PSA results should be   interpreted with this fact in mind.     This test was performed using the Siemens   chemiluminescent method. Values obtained from   different assay methods cannot be used  interchangeably. PSA levels, regardless of  value, should not be interpreted as absolute  evidence of the presence or absence of disease.  Lab test performed by:  Lab Mnemonic:  [facility]  [address] [place] [address]  Nando Nunez M.D.  QUEST Specimen received date and time: 06-MAY-2019 08:07:00.00     10/15/2018 10.8 (H) < OR = 4.0 ng/mL Final     Comment:     The total PSA value from this assay system is   standardized against the WHO standard. The test   result will be approximately 20% lower when compared   to the equimolar-standardized total PSA ([place]). Comparison of serial PSA results should be   interpreted with this fact in mind.     This test was performed using the Siemens   chemiluminescent method. Values obtained from   different assay methods cannot be used  interchangeably. PSA levels, regardless of  value, should not be interpreted as absolute  evidence of the presence or absence of disease.  Lab test performed by:  Lab Mnemonic:  [facility]  [address] [place] [address]  Nando Nunez M.D.  QUEST Specimen received date and time: 15-OCT-2018 17:14:00.00     03/29/2018 13.0 (H) < OR = 4.0 ng/mL Final     Comment:     The total PSA value from this assay system is   standardized against the WHO standard. The test   result will be approximately 20% lower when compared   to the equimolar-standardized total PSA ([place]). Comparison of serial PSA results should be   interpreted with this fact in mind.     This test was performed using the Siemens   chemiluminescent method. Values obtained from   different assay methods cannot be  used  interchangeably. PSA levels, regardless of  value, should not be interpreted as absolute  evidence of the presence or absence of disease.  Lab test performed by:  Lab Mnemonic:  [facility]  [address] [place] [address]  Nando Nunez M.D.  QUEST Specimen received date and time: 29-MAR-2018 16:01:00.00     09/11/2017 11.9 (H) < OR = 4.0 ng/mL Final     Comment:     The total PSA value from this assay system is   standardized against the WHO standard. The test   result will be approximately 20% lower when compared   to the equimolar-standardized total PSA ([place]). Comparison of serial PSA results should be   interpreted with this fact in mind.     This test was performed using the Siemens   chemiluminescent method. Values obtained from   different assay methods cannot be used  interchangeably. PSA levels, regardless of  value, should not be interpreted as absolute  evidence of the presence or absence of disease.  Lab test performed by:  Lab Mnemonic:  [facility]  [address] [place] [address]  Nando Nunez M.D.  QUEST Specimen received date and time: 11-SEP-2017 08:29:00.00     07/15/2016 10.4 (H) < OR = 4.0 ng/mL Final     Comment:        This test was performed using the Siemens  chemiluminescent method. Values obtained from  different assay methods cannot be used  interchangeably. PSA levels, regardless of  value, should not be interpreted as absolute  evidence of the presence or absence of disease.     Lab test performed by:  Lab Mnemonic:  [facility]  [address] [place] [address]  Nando Nunez M.D.     02/09/2016 7.4 (H) < OR = 4.0 ng/mL Final     Comment:        This test was performed using the Siemens  chemiluminescent method. Values obtained from  different assay methods cannot be used  interchangeably. PSA levels, regardless of  value, should not be interpreted as  absolute  evidence of the presence or absence of disease.     Lab test performed by:  Lab Mnemonic:  [facility] [place]  [address] [place] [address]  Nando Nunez M.D.     04/22/2015 7.2 (H) < OR = 4.0 ng/mL Final     Comment:        This test was performed using the Siemens  chemiluminescent method. Values obtained from  different assay methods cannot be used  interchangeably. PSA levels, regardless of  value, should not be interpreted as absolute  evidence of the presence or absence of disease.       Lab test performed by:  Lab Mnemonic:  [facility] [place]  [address] [place] [address]  Nando Nunez M.D.     03/19/2014 6.8 (H) < OR = 4.0 ng/mL Final     Comment:        This test was performed using the Siemens  chemiluminescent method. Values obtained from  different assay methods cannot be used  interchangeably. PSA levels, regardless of  value, should not be interpreted as absolute  evidence of the presence or absence of disease.       Lab test performed by:  Lab Mnemonic:  [facility] [place]  [address] [place] [address]  Nando Nunez M.D.     03/14/2013 6.1 (H) < OR = 4.0 ng/mL Final     Comment:        This test was performed using the Siemens  chemiluminescent method. Values obtained from  different assay methods cannot be used  interchangeably. PSA levels, regardless of  value, should not be interpreted as absolute  evidence of the presence or absence of disease.       Lab test performed by:  Lab Mnemonic:  [facility] [place]  [address] [place] [address]  Nando Nunez M.D.     03/06/2012 5.5 (H) < OR = 4.0 ng/mL Final     Comment:        This test was performed using the Siemens  chemiluminescent method. Values obtained from  different assay methods cannot be used  interchangeably. PSA levels, regardless of  value, should not be interpreted as absolute  evidence of the presence or  absence of disease.       Lab test performed by:  Lab Mnemonic:  [facility]  [address] [place] [address]  Nando Nunez M.D.     06/10/2011 4.7 (H) < OR = 4.0 ng/mL Final     Comment:        This test was performed using the Siemens  chemiluminescent method. Values obtained from  different assay methods cannot be used  interchangeably. PSA levels, regardless of  value, should not be interpreted as absolute  evidence of the presence or absence of disease.       Lab test performed by:  Lab Mnemonic:  [facility]  [address] [place] [address]  Nando Nunez M.D.       PSA Tumor Marker   Date Value Ref Range Status   06/21/2022 21.81 (H) 0.00 - 6.50 ug/L Final   03/31/2022 18.25 (H) 0.00 - 6.50 ug/L Final   12/22/2021 12.47 (H) 0.00 - 6.50 ug/L Final          Impression     The patient is a 76-year-old gentleman with multiple history of cancer in the past and a new finding of left upper lobe lung cancer.  The restaging PET CT scan showed new 1.1 cm FDG avid left upper lobe nodule consistent with malignant lung tumor and a sclerotic hypermetabolic metastasis within the T7 vertebral body.The patient received stereotactic radiosurgery treatments ago for lung cancer and 2 months ago for T7 spine with restaging CT scan showed good response and no evidence of recurrence.    Assessment & Plan:     1.  I have personally reviewed his most recent CT scan and compared to the previous CT scan today.  The patient had a good response of his lung cancer there is no evidence of cancer recurrence.  We will schedule patient to have restaging CT scan in 3 months and follow-up.    2.  His PSA today is 21.81 slightly higher than 18.25 2-month ago.  Patient feels comfortable at this time and wished to continue clinical observation.  If his PSA continue going up upon next visit, we will consider initiating hormone therapy.    3.  Recommend patient to continue follow-up  with Dr. Alon Nunez, ENT for his head neck cancer surveillance.    4.  Continue follow-up with Dr. Shade Boyd, PCP for other medical needs.    Face to face time  30 minutes with > 80% spent on consultation, education and coordination of care.      Mariana Batista MD, PhD  Department of Radiation Oncology   [facility]  Tel: [phone]  Page: [phone]    [facility]  [address] [place] [address] [id number] [place] [address]   [place] [address]    CC:  Patient Care Team:  Shade Boyd MD as PCP - General (Internal Medicine)  Mariana Batista MD as MD (Hematology & Oncology)  Faviola Cottrell, [facility] as Pharmacist (Pharmacist)  Mariana Batista MD as Assigned Cancer Care Provider  Sim Jaffe MD as Assigned Surgical Provider  Shade Boyd MD as Assigned PCP  Rosas Carr MD as MD (Neurology)

## 2022-06-21 NOTE — LETTER
2022         RE: Lenny Chau  [address]  [place] [address]        Dear Colleague,    Thank you for referring your patient, Lenny Chau, to the [facility] RADIATION ONCOLOGY [place]. Please see a copy of my visit note below.    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  The biopsy confirmed moderately differentiated adenocarcinoma.        TYPE OF RADIATION THERAPY ADMINISTERED:   1. SBRT to the left upper lobe with a total dose of 5000 cGy in 5 treatments given from 3/2/2021-3/10/2021.     2. SBRT with a total dose of 5000 cGy in 5 treatments for the second left tumor given from 3/9/2022-3/18/2022.     3. SBRT with a total dose of 3275 cGy in 5 treatments for T7 spine given from 2022- 2022.     INTERVAL SINCE COMPLETION OF RADIATION THERAPY: 2 months.      SUBJECTIVE:  Mr. Chau is a 76 y.o. male who is a chronic smoker and quit smoking since .  The patient had a complicated history of multiple cancer in the past as detailed as followin.  Low risk prostate cancer, clinical stage T1c N0 M0, recent grade 3+3 =6 and the last PSA was 10.4 in 2006.  The patient is currently on clinical observation with no therapy.  PSA: 12.47 on 2021.     2.  Floor of mouth cancer, status post surgical resection in 1994 with no adjuvant therapy.     3.  Left kidney hemangioma, status post left  nephrectomy on 3/26/2009.     4.  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  Patient had local recurrence and is status post surgical resection on 9/11/2009.     5.  Non-small cell lung cancer involving right lung, stage I, status post right thoracotomy and excision of right upper lobe and right middle lobe lung tumor 11/5/2015 with no evidence of lymph node metastasis and no adjuvant therapy.  Patient lost to follow-up since 2016.     6.  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     7.  Left upper lobe lung  cancer, stage T1N0 M0.  The biopsy confirmed moderately differentiated adenocarcinoma on 1/21/2021.  The patient received SBRT with a total dose of 5000 cGy in 5 treatments given from 3/2/2021-3/10/2021.     Patient had two prior early stage right lung cancers that were apparently resected in their entirety and may have been synchronous primary lung cancers. It does not appear he had the appropriate recommended follow up at [facility]. The MELODY lesion was known to be present in 2016 and appears to be slightly larger now with significant FDG-avidity on the recent PET/CT. It is likely another primary lung cancer, although metastasis from previous lung cancers is possible.  The patient had a restaging PET CT scan on 8/31/2020.  The scan showed enlarging FDG avid left upper lobe mass measuring 2.1 x 2.5 cm with central solid component consistent with primary lung cancer without metastasis.  There was also a FDG avid lesion in the left buccal region consistent with squamous cell carcinoma without metastasis.  The patient had a surgical resection for his left buccal/lateral tongue squamous cell carcinoma 11/6/2020 by KERRY Renee with pathology  showed squamous cell carcinoma with negative margin.  He was determined not a good candidate for lung surgery given his complicated medical history and health status.  He therefore received definitive radiation therapy using SBRT with a total dose of 5000 cGy in 5 treatments given from 3/2/2021-3/10/2021. The patient tolerated radiation therapy very well with minimal side effect.     The patient has been doing well since completion of radiation therapy.  He denies any pain or discomfort related to the therapy at the time of evaluation.  The patient was found to a new 9 mm small nodule in the left upper lobe outside of patient therapy area from restaging CT scan on 12/20/2021.  He was recommended to have staging PET CT scan and was done 1/7/2022. 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  There is no evidence of other systemic metastasis.The patient's case has been discussed at thoracic tumor conference 1/13/2022.  MRI brain on 1/22/2022 showed no evidence of brain metastasis. Patient is not good candidate for surgery and poor candidate for systemic therapy given his current medical status.  The consensus recommendation is to consider SBRT for his oligo disease in the left lung and T7 spine if the patient wishes not to consider systemic therapy at this time.   The patient received the second course of SBRT to the second left lung cancer with a total dose of 5000 cGy in 5 treatments given from 3/9/2022-3/18/2022.  He tolerated radiation therapy very well with minimal side effect.      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  He received stereotactic radiosurgery with a total dose of 3275 cGy in 5 treatments given from 4/12/2022- 4/22/2022.  The patient tolerated ration therapy very well with minimal side effect.     The patient has been stable since completion of radiation therapy.  " He denies any new symptoms at the time of evaluation.  He is here for routine post therapy office follow-up.    Medications were reviewed and are up to date on EPIC.    The following portions of the patient's history were reviewed and updated as appropriate: allergies, current medications, past family history, past medical history, past social history, past surgical history and problem list.    Review of Systems:      General  Constitutional  Constitutional (WDL): All constitutional elements are within defined limits  EENT  Eye Disorders  Eye Disorder (WDL): All eye disorder elements are within defined limits  Ear Disorders  Ear Disorder (WDL): All ear disorder elements are within defined limits  Respiratory  Respiratory  Respiratory (WDL): Exceptions to WDL  Cough: Mild symptoms OR nonprescription intervention indicated (\"my normal cough\")  Cardiovascular  Cardiovascular  Cardiovascular (WDL): All cardiovascular elements are within defined limits  Gastrointestinal  Gastrointestinal  Gastrointestinal (WDL): All gastrointestinal elements are within defined limits  Musculoskeletal  Musculoskeletal and Connective Tissue Disorders  Musculoskeletal & Connective (WDL): Exceptions to WDL  Arthralgia: Mild pain  Bone Pain: Mild pain  Generalized Muscle Weakness: Symptomatic OR perceived by patient but not evident on physical exam  Integumentary  Integumentary  Integumentary (WDL): All integumentary elements are within defined limits  Neurological  Neurosensory  Neurosensory (WDL): Exceptions to WDL  Peripheral Motor Neuropathy: Asymptomatic OR clinical or diagnostic observations only  Ataxia: Asymptomatic OR clinical or diagnostic observations only OR intervention not indicated  Peripheral Sensory Neuropathy: Asymptomatic (feet)  Genitourinary/Reproductive  Genitourinary  Genitourinary (WDL): All genitourinary elements are within defined limits  Lymphatic  Lymph System Disorders  Lymph (WDL): All lymph elements are within " defined limits  Pain  Pain Score: Mild Pain (2)  AUA Assessment                                                              Accompanied by  Accompanied By: family    Objective:      PHYSICAL EXAMINATION:    BP (!) 147/77   Pulse 71   Temp 98.3  F (36.8  C)   Resp 18   Wt 83.7 kg (184 lb 8 oz)   SpO2 95%   BMI 25.73 kg/m      Gen: Alert, in NAD  Eyes: PERRL, EOMI, sclera anicteric  HENT     Head: NC/AT     Ears: No external auricular lesions     Nose/sinus: No rhinorrhea or epistaxis     Oropharynx: MMM, no visible oral lesions  Neck: Supple, full ROM, no LAD  Pulm: No wheezing, stridor or respiratory distress  CV: Well-perfused, no cyanosis, no pedal edema  Abdominal: BS+, soft, nontender, nondistended, no hepatomegaly  Back: No step-offs or pain to palpation along the thoracolumbar spine  Rectal: Deferred  : Deferred  Musculoskeletal: Normal muscle bulk and tone  Skin: Normal color and turgor  Neurologic: A/Ox3, CN II-XII intact, normal gait and station  Psychiatric: Appropriate mood and affect     Imaging: Reviewed    Prostate Specific Antigen Screen   Date Value Ref Range Status   05/08/2021 34.0 (H) 0.0 - 6.5 ng/mL Final   03/09/2021 23.5 (H) < OR = 4.0 ng/mL Final     Comment:     The total PSA value from this assay system is   standardized against the WHO standard. The test   result will be approximately 20% lower when compared   to the equimolar-standardized total PSA ([place]). Comparison of serial PSA results should be   interpreted with this fact in mind.     This test was performed using the Siemens   chemiluminescent method. Values obtained from   different assay methods cannot be used  interchangeably. PSA levels, regardless of  value, should not be interpreted as absolute  evidence of the presence or absence of disease.  Lab test performed by:  Lab Mnemonic:MARLENY  [facility]  [address] [place] [address]  Nando Nunez M.D.  ONEL Specimen received  date and time: 09-MAR-2021 12:46:00.00     05/06/2019 15.8 (H) < OR = 4.0 ng/mL Final     Comment:     The total PSA value from this assay system is   standardized against the WHO standard. The test   result will be approximately 20% lower when compared   to the equimolar-standardized total PSA ([place]). Comparison of serial PSA results should be   interpreted with this fact in mind.     This test was performed using the Siemens   chemiluminescent method. Values obtained from   different assay methods cannot be used  interchangeably. PSA levels, regardless of  value, should not be interpreted as absolute  evidence of the presence or absence of disease.  Lab test performed by:  Lab Mnemonic:  [facility]  [address] [place] [address]  Nando Nunez M.D.  QUEST Specimen received date and time: 06-MAY-2019 08:07:00.00     10/15/2018 10.8 (H) < OR = 4.0 ng/mL Final     Comment:     The total PSA value from this assay system is   standardized against the WHO standard. The test   result will be approximately 20% lower when compared   to the equimolar-standardized total PSA ([place]). Comparison of serial PSA results should be   interpreted with this fact in mind.     This test was performed using the Siemens   chemiluminescent method. Values obtained from   different assay methods cannot be used  interchangeably. PSA levels, regardless of  value, should not be interpreted as absolute  evidence of the presence or absence of disease.  Lab test performed by:  Lab Mnemonic:  [facility]  [address] [place] [address]  Nando Nunez M.D.  QUEST Specimen received date and time: 15-OCT-2018 17:14:00.00     03/29/2018 13.0 (H) < OR = 4.0 ng/mL Final     Comment:     The total PSA value from this assay system is   standardized against the WHO standard. The test   result will be approximately 20% lower when compared   to the  equimolar-standardized total PSA ([place]). Comparison of serial PSA results should be   interpreted with this fact in mind.     This test was performed using the Siemens   chemiluminescent method. Values obtained from   different assay methods cannot be used  interchangeably. PSA levels, regardless of  value, should not be interpreted as absolute  evidence of the presence or absence of disease.  Lab test performed by:  Lab Mnemonic:  [facility]  [address] [place] [address]  Nando Nunez M.D.  QUEST Specimen received date and time: 29-MAR-2018 16:01:00.00     09/11/2017 11.9 (H) < OR = 4.0 ng/mL Final     Comment:     The total PSA value from this assay system is   standardized against the WHO standard. The test   result will be approximately 20% lower when compared   to the equimolar-standardized total PSA ([place]). Comparison of serial PSA results should be   interpreted with this fact in mind.     This test was performed using the Siemens   chemiluminescent method. Values obtained from   different assay methods cannot be used  interchangeably. PSA levels, regardless of  value, should not be interpreted as absolute  evidence of the presence or absence of disease.  Lab test performed by:  Lab Mnemonic:  [facility]  [address] [place] [address]  Nando Nunez M.D.  QUEST Specimen received date and time: 11-SEP-2017 08:29:00.00     07/15/2016 10.4 (H) < OR = 4.0 ng/mL Final     Comment:        This test was performed using the Siemens  chemiluminescent method. Values obtained from  different assay methods cannot be used  interchangeably. PSA levels, regardless of  value, should not be interpreted as absolute  evidence of the presence or absence of disease.     Lab test performed by:  Lab Mnemonic:  [facility]  [address] [place] [address]  Nando Nunez M.D.     02/09/2016 7.4 (H) < OR  = 4.0 ng/mL Final     Comment:        This test was performed using the Siemens  chemiluminescent method. Values obtained from  different assay methods cannot be used  interchangeably. PSA levels, regardless of  value, should not be interpreted as absolute  evidence of the presence or absence of disease.     Lab test performed by:  Lab Mnemonic:  [facility] [place]  [address] [place] [address]  Nando Nunez M.D.     04/22/2015 7.2 (H) < OR = 4.0 ng/mL Final     Comment:        This test was performed using the Siemens  chemiluminescent method. Values obtained from  different assay methods cannot be used  interchangeably. PSA levels, regardless of  value, should not be interpreted as absolute  evidence of the presence or absence of disease.       Lab test performed by:  Lab Mnemonic:  [facility] [place]  [address] [place] [address]  Nando Nunez M.D.     03/19/2014 6.8 (H) < OR = 4.0 ng/mL Final     Comment:        This test was performed using the Siemens  chemiluminescent method. Values obtained from  different assay methods cannot be used  interchangeably. PSA levels, regardless of  value, should not be interpreted as absolute  evidence of the presence or absence of disease.       Lab test performed by:  Lab Mnemonic:MARLENY  [facility] [place]  [address] [place] [address]  Nando Nunez M.D.     03/14/2013 6.1 (H) < OR = 4.0 ng/mL Final     Comment:        This test was performed using the Siemens  chemiluminescent method. Values obtained from  different assay methods cannot be used  interchangeably. PSA levels, regardless of  value, should not be interpreted as absolute  evidence of the presence or absence of disease.       Lab test performed by:  Lab Mnemonic:MARLENY  [facility] [place]  [address] [place] [address]  Nando Nunez M.D.     03/06/2012 5.5 (H) < OR = 4.0 ng/mL Final     Comment:        This  test was performed using the Siemens  chemiluminescent method. Values obtained from  different assay methods cannot be used  interchangeably. PSA levels, regardless of  value, should not be interpreted as absolute  evidence of the presence or absence of disease.       Lab test performed by:  Lab Mnemonic:[facility]  [address] [place] [address]  Nando Nunez M.D.     06/10/2011 4.7 (H) < OR = 4.0 ng/mL Final     Comment:        This test was performed using the Siemens  chemiluminescent method. Values obtained from  different assay methods cannot be used  interchangeably. PSA levels, regardless of  value, should not be interpreted as absolute  evidence of the presence or absence of disease.       Lab test performed by:  Lab Mnemonic:[facility]  [address] [facility] [place] [address]  Nando Nunez M.D.       PSA Tumor Marker   Date Value Ref Range Status   06/21/2022 21.81 (H) 0.00 - 6.50 ug/L Final   03/31/2022 18.25 (H) 0.00 - 6.50 ug/L Final   12/22/2021 12.47 (H) 0.00 - 6.50 ug/L Final          Impression     The patient is a 76-year-old gentleman with multiple history of cancer in the past and a new finding of left upper lobe lung cancer.  The restaging PET CT scan showed new 1.1 cm FDG avid left upper lobe nodule consistent with malignant lung tumor and a sclerotic hypermetabolic metastasis within the T7 vertebral body.The patient received stereotactic radiosurgery treatments ago for lung cancer and 2 months ago for T7 spine with restaging CT scan showed good response and no evidence of recurrence.    Assessment & Plan:     1.  I have personally reviewed his most recent CT scan and compared to the previous CT scan today.  The patient had a good response of his lung cancer there is no evidence of cancer recurrence.  We will schedule patient to have restaging CT scan in 3 months and follow-up.    2.  His PSA today is 21.81 slightly higher than  "18.25 2-month ago.  Patient feels comfortable at this time and wished to continue clinical observation.  If his PSA continue going up upon next visit, we will consider initiating hormone therapy.    3.  Recommend patient to continue follow-up with Dr. Alon Nunez, ENT for his head neck cancer surveillance.    4.  Continue follow-up with Dr. Shade Boyd, PCP for other medical needs.    Face to face time  30 minutes with > 80% spent on consultation, education and coordination of care.      Mariana Batista MD, PhD  Department of Radiation Oncology   [facility]  Tel: [phone]  Page: [phone]    [facility]  [address] [place] [address]     [facility]   [address] [place] [address]    CC:  Patient Care Team:  Shade Boyd MD as PCP - General (Internal Medicine)  Mariana Batista MD as MD (Hematology & Oncology)  Faviola Cottrell [facility] as Pharmacist (Pharmacist)  Mariana Batista MD as Assigned Cancer Care Provider  Sim Jaffe MD as Assigned Surgical Provider  Shade Boyd MD as Assigned PCP  Rosas Carr MD as MD (Neurology)      Oncology Rooming Note    June 21, 2022 1:20 PM   Lenny Chau is a 76 year old male who presents for:    Chief Complaint   Patient presents with     Oncology Clinic Visit     Rad Onc follow up     Initial Vitals: BP (!) 147/77   Pulse 71   Temp 98.3  F (36.8  C)   Resp 18   Wt 83.7 kg (184 lb 8 oz)   SpO2 95%   BMI 25.73 kg/m   Estimated body mass index is 25.73 kg/m  as calculated from the following:    Height as of 2/2/22: 1.803 m (5' 11\").    Weight as of this encounter: 83.7 kg (184 lb 8 oz). Body surface area is 2.05 meters squared.  Mild Pain (2) Comment: Data Unavailable   No LMP for male patient.  Allergies reviewed: Yes  Medications reviewed: Yes    Medications: Medication refills not needed today.  Pharmacy name entered into [facility]: [facility] [id number] [place] [address]    Clinical concerns: nothing new, still some " mild cough, chronic low back pain radiating to right upper leg.  Dr. Batista was notified.      Kristie Babcock RN                  Again, thank you for allowing me to participate in the care of your patient.        Sincerely,        Mariana Batista MD

## 2022-06-24 ENCOUNTER — TELEPHONE (OUTPATIENT)
Dept: FAMILY MEDICINE | Facility: CLINIC | Age: 77
End: 2022-06-24

## 2022-06-24 NOTE — TELEPHONE ENCOUNTER
Reason for Call:  Other call back    Detailed comments: Patient calling in today to discuss one of his medications, he stated since I was unable to help him he did not want to discuss with me. He asked that you call him back     Phone Number Patient can be reached at: Home number on file [phone] (home)    Best Time: any     Can we leave a detailed message on this number? Not Applicable    Call taken on 6/24/2022 at 10:23 AM by Anitha Carlson

## 2022-06-24 NOTE — TELEPHONE ENCOUNTER
Pharm[facility] Outbound Call:    Reason for call: Patient has questions about his wife's medication. We set up a visit to discuss his wife's med when his wife will be present.    Mitchell is also due for an MTM visit - we scheduled this appointment.  I instructed him to bring paperwork for Patient Assistance Program to the visit.    Sara Cottrell PharmD  Medication Therapy Management (MTM) Pharmacist

## 2022-06-30 ENCOUNTER — TRANSFERRED RECORDS (OUTPATIENT)
Dept: HEALTH INFORMATION MANAGEMENT | Facility: CLINIC | Age: 77
End: 2022-06-30

## 2022-06-30 ENCOUNTER — MEDICAL CORRESPONDENCE (OUTPATIENT)
Dept: HEALTH INFORMATION MANAGEMENT | Facility: CLINIC | Age: 77
End: 2022-06-30

## 2022-06-30 ENCOUNTER — OFFICE VISIT (OUTPATIENT)
Dept: PHARMACY | Facility: CLINIC | Age: 77
End: 2022-06-30
Payer: COMMERCIAL

## 2022-06-30 VITALS
DIASTOLIC BLOOD PRESSURE: 87 MMHG | BODY MASS INDEX: 26.35 KG/M2 | HEART RATE: 58 BPM | OXYGEN SATURATION: 97 % | WEIGHT: 188.9 LBS | SYSTOLIC BLOOD PRESSURE: 132 MMHG

## 2022-06-30 DIAGNOSIS — I10 HYPERTENSION, UNSPECIFIED TYPE: ICD-10-CM

## 2022-06-30 DIAGNOSIS — J44.9 CHRONIC OBSTRUCTIVE PULMONARY DISEASE, UNSPECIFIED COPD TYPE (H): Primary | ICD-10-CM

## 2022-06-30 DIAGNOSIS — N18.9 CHRONIC KIDNEY DISEASE, UNSPECIFIED CKD STAGE: ICD-10-CM

## 2022-06-30 DIAGNOSIS — R52 GENERALIZED PAIN: ICD-10-CM

## 2022-06-30 DIAGNOSIS — I25.118 CORONARY ARTERY DISEASE OF NATIVE HEART WITH STABLE ANGINA PECTORIS, UNSPECIFIED VESSEL OR LESION TYPE (H): ICD-10-CM

## 2022-06-30 DIAGNOSIS — Z78.9 TAKES DIETARY SUPPLEMENTS: ICD-10-CM

## 2022-06-30 DIAGNOSIS — J44.9 COPD, GROUP B, BY GOLD 2017 CLASSIFICATION (H): ICD-10-CM

## 2022-06-30 DIAGNOSIS — R60.0 LOWER LEG EDEMA: ICD-10-CM

## 2022-06-30 DIAGNOSIS — F10.21 HISTORY OF ALCOHOLISM (H): ICD-10-CM

## 2022-06-30 PROCEDURE — 99607 MTMS BY PHARM ADDL 15 MIN: CPT | Performed by: PHARMACIST

## 2022-06-30 PROCEDURE — 99606 MTMS BY PHARM EST 15 MIN: CPT | Performed by: PHARMACIST

## 2022-06-30 RX ORDER — ACETAMINOPHEN 500 MG
500-1000 TABLET ORAL EVERY 6 HOURS PRN
COMMUNITY

## 2022-06-30 NOTE — PATIENT INSTRUCTIONS
"Recommendations from today's MTM visit:                                                    MTM (medication therapy management) is a service provided by a clinical pharmacist designed to help you get the most of out of your medicines.      1. [place] paperwork completed today, will fax to [employer] Patient Assistance Program.  You can contact the program directly for status updates on your eligibility if you do not hear back from them promptly.  Reach out of you have questions or concerns about program approval.     2. Continue current medications.    3. Reminder about nitroglycerin proper storage /use.  Nitroglycerin 0.4 mg sublingual tab: Place 1 tablet under the tongue at onset of chest pain.  If pain persists after 5 minutes, call 911 and place a second tablet under the tongue. If pain persists after another 5 minutes, okay to place a third tablet under the tongue.    Be sure to store this medication in the original bottle (out of light) and check the expiration date periodically to ensure your product is not  and will still work properly.    Follow-up: 6 months, sooner if needed.    It was great speaking with you today.  I value your experience and would be very thankful for your time in providing feedback in our clinic survey. In the next few days, you may receive an email or text message from [employer] with a link to a survey related to your  clinical pharmacist.\"     To schedule another MTM appointment, please call the clinic directly or you may call the MTM scheduling line at [phone] or toll-free at [phone].     My Clinical Pharmacist's contact information:                                                      Please feel free to contact me with any questions or concerns you have.      Sara Cottrell, PharmD  Medication Therapy Management (MTM) Pharmacist   "

## 2022-06-30 NOTE — PROGRESS NOTES
Medication Therapy Management (MTM) Encounter    ASSESSMENT:                            Medication Adherence/Access: See below for considerations    COPD: Stable; Trelegy access - will send Patient Assistance Program paperwork today.    Hypertension/CAD/CKD/ lower leg edema:   Blood pressure stable; Reviewed importance of proper nitroglycerin storage for efficacy and watching expiration dates.  Recommend not opening the nitroglycerin bottle until using the tablet and if opened replace after approximately 6 months.    Pain: no safety concerns with nervive supplement at this time. Plan in place for neurology appointment.    Vitamins/Supplements history of alcohol use:  Recommend vit/supplement level labs given previous supplementation, change in alcohol intake and now added nervive supplement. Patient is over the RDA for zinc supplementation of 11 mg/day - therefore also recommend zinc/copper lab; unclear indication for zinc-will verify with primary care provider.    PLAN:                            1. Trelegy paperwork completed today, will fax to [employer] Patient Assistance Program.  You can contact the program directly for status updates on your eligibility if you do not hear back from them promptly.  Reach out of you have questions or concerns about program approval.     *paperwork routed to primary care provider to sign and fax.    2. Continue current medications.    3. Nitroglycerin 0.4 mg sublingual tab: Place 1 tablet under the tongue at onset of chest pain.  If pain persists after 5 minutes, call 911 and place a second tablet under the tongue. If pain persists after another 5 minutes, okay to place a third tablet under the tongue.    Be sure to store this medication in the original bottle (out of light) and check the expiration date periodically to ensure your product is not  and will still work properly.    Future labs: magnesium, vitamin B1, vitamin B12, vitamin B6, zinc, copper - future orders entered,  okay to complete with next lab draw.  Zinc/copper lab not covered - alma sent to PCP to inquire.    Follow-up: 6 months, sooner if needed.    Addended on July 1, 2022  Discussed with primary care provider, Yong Boyd MD has not advised zinc supplementation, in favor of stopping therapy.  Called Patient to discuss zinc; he does not know why he is taking, okay with stopping.   Advised stop, Patient agreed. Removed from med list. Will not draw zinc/copper lab.  KB      SUBJECTIVE/OBJECTIVE:                          Lenny Chau is a 76 year old male coming in for a follow-up visit. Patient was accompanied by Georgette, daughter. Today's visit is a follow-up MTM visit from 3/3/2022.    Reason for visit: overdue 6-8 week follow-up. Would like [employer] Patient Assistance Program help.    Allergies/ADRs: Reviewed in chart  Past Medical History: Reviewed in chart  Tobacco: He reports that he quit smoking about 12 years ago. He has a 100.00 pack-year smoking history. He has never used smokeless tobacco.  Alcohol: last full drink was 11/8/2021, has had a few partial drinks since then. Not routinely, no alcohol at home.  Caffeine: 1-2 cups/day of coffee  Activity: no routine  Past Medical History: Reviewed in chart - history of prostate cancer, lung cancer, unilateral nephrectomy, oropharyngeal cancer.  Social: lives with wife.     Medication Adherence/Access:   Patient uses pill box(es), sets this up himself.  Patient takes medications 2 time(s) per day.   Per patient, misses medication 0 times per week, estimates maybe once/month.  Medication barriers: trouble affording inhalers -see below for information about [employer] patient assistance program.  The patient fills medications at [facility]: NO, fills medications at [facility] in [place] in [place].     AM:  -Trelegy: 1 puff once daily in the morning (no albuterol beforehand).  -Multivitamin  -Ferrous sulfate 325 mg: every other day  -Zinc 50 mg: AM  -Metoprolol succinate ER 25  mg     PM:  -Aspirin 81 mg  -Thiamine 100 mg  -Magnesium 250 m tabs  -Omeprazole 20 mg     As needed:   -Albuterol inhaler  -Nitroglycerin   -miralax  -Acetaminophen    Stopped since last visit:Oxycodone didn't help that much with pain anyway so stopped taking it. Finds acetaminophen just as effective.    COPD:  Patient follows with [facility] pulmonology. Last appointment 2021- no med changes.  History of R sided lung cancer x2.  Tobacco status: former smoker - quit .  : no concerns with breathing, has been stable.  Current Medications:  Trelegy Ellipta (Fluticasone 200 mcg/Umeclidinium 62.5mcg- Vilanterol 25 mcg) Inhaler: 1 inhalation once daily. Rinsing mouth out after trelegy.  *Patient has met out-of-pocket spend in  for Trelegy and would like to apply for the assistance program.  He brings his paperwork into the visit today.  Albuterol MDI. Patient use: used 6 puffs since out last visit. Expires 2023.  Medication History: spiriva, advair (switched to Trelegy 2021, history of oral thrush with advair)  COPD assessment test (CAT) history  Date: 3/3/2022: 12  Date 2021: 16  Date: 6/15/2021: 15  Date: 2021: 17    Hypertension/CAD/CKD/ lower leg edema :  Followed with Dr. Valentine, no recent visits. Followed with vascular clinic/Dr. David.  Dr. Boyd has been prescribing cardiac meds.  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 Thoracic AAA, history of abdominal AAA s/p endovascular repair.  PCI of RCA 2019 following NSTEMI; DAPT stopped 10/2020.  Current Meds:  Metoprolol succinate 25 mg: once daily in the morning  Aspirin 81 mg: once daily in the evening.  Nitroglycerin 0.4 mg sublingual tab: Place 1 tablet under the tongue at onset of chest pain.  May repeat x 3 doses q 5 min.  Call 911 after first dose if pain persists. Pt use: none recently, recently picked up 100 tablets from the pharmacy and he has a few tablets stored in different places.  Medication  History: clopidogrel/aspirin (per chart), amlodipine, midodrine (suspects he was taking while in the care center, not taking at this time, not having low BP at this time).  Avoiding ACEi/ARB due to repeat LEILA.  ECHO: 2020 EF: 55%  Patient does not self-monitor blood pressure.   BP Readings from Last 3 Encounters:   22 132/87   22 (!) 147/77   22 138/67     Pulse Readings from Last 3 Encounters:   22 58   22 71   22 59     Pain:  Pain is most bothersome in right hip; history of hip surgery. Reports neuropathy pains in his legs as well.  Relieving factors: acetaminophen, using walker to take weight off hip.   Providers: ortho, Dr. Sainz - previous attempts at intra-articular right hip injection - no lasting benefit; Has an appointment  with neurology to address nerve pain.  Current Meds:  Acetaminophen 500 mg tabs: taking up to 3000 mg/day in divided doses; previously recommended max 2000 mg.  Recently started taking Nervive ([facility]): Thiamine 12 mg, vitamin B6 17 mg, vitamin B12 24 mcg, calcium 75 mg, alpha lipoic acid 300 mg. 1 tab once daily. Found some benefit with starting this.  Medication History:  oxycodone (stopped due to minimal efficacy and felt that acetaminophen was just as effective), hydrocodone and gabapentin (for back pain 10-12 years ago);  Ibuprofen (recommended to stop due to renal and GI risks); Diclofenac (tried to minimal/no effect).    Vitamins/Supplements history of alcohol use:  Current alcohol use: infrequent. States that he's had a few partial drinks, but last full beer was 2021. He does not keep alcohol at home.  Ferrous Sulfate 325 mg (65 FE): 1 tab every other day.  Multivitamin: once daily  Magnesium oxide 250 m tabs once daily   Zinc gluconate 50 m tab daily  Thiamine (vit B1) 100 mg: once daily.  + nervive supplement as above  Medication History: folate (stopped 2021 d/t normal level), vit B12  (stopped d/t high  level), vitamin D (removed from medication list in the past).    Today's Vitals: /87   Pulse 58   Wt 188 lb 14.4 oz (85.7 kg)   SpO2 97%   BMI 26.35 kg/m     ----------------    I spent 25 minutes with this patient today. All changes were made via collaborative practice agreement with Shade Boyd MD. A copy of the visit note was provided to the patient's provider(s).    The patient was given a summary of these recommendations.     Sraa Cottrell, PharmD  Medication Therapy Management (MTM) Pharmacist  [place] [place] [facility] (Tu/Th/Fr)  Clinic Phone: [phone]  Pager: [phone]     Medication Therapy Recommendations  Chronic obstructive pulmonary disease, unspecified COPD type (H)    Current Medication: Fluticasone-Umeclidin-Vilanterol (TRELEGY ELLIPTA) 200-62.5-25 MCG/INH oral inhaler   Rationale: Cannot afford medication product - Cost - Adherence   Recommendation: Referral to Service    Status: Accepted per Provider   Note: ppk completed today 6/30, [place] sig and fax by PCP         Takes dietary supplements    Current Medication: zinc gluconate 50 MG tablet   Rationale: Medication requires monitoring - Needs additional monitoring - Safety   Recommendation: Order Lab   Status: Accepted per Provider

## 2022-07-01 ENCOUNTER — TRANSCRIBE ORDERS (OUTPATIENT)
Dept: OTHER | Age: 77
End: 2022-07-01

## 2022-07-01 DIAGNOSIS — M54.16 LUMBAR RADICULITIS: ICD-10-CM

## 2022-07-01 DIAGNOSIS — M54.50 LOW BACK PAIN: Primary | ICD-10-CM

## 2022-07-20 NOTE — TELEPHONE ENCOUNTER
Action July 20, 2022 9:00 AM MT   Action Taken CSS sent a request to [facility] [phone]     Action July 22, 2022 9:46 AM MT   Action Taken CSS recvd and resolved imags from [facility].     DIAGNOSIS: Low back pain / MRI @ [facility] / Dr. Mariana Montoya / Spine consult / [facility]   APPOINTMENT DATE: 07/25/2022   NOTES STATUS DETAILS   OFFICE NOTE from referring provider Spine Referral 06/30/2022, 06/02/2022, 04/21/2022 - Mariana Montoya MD - [facility]   OFFICE NOTE from other specialist Internal 06/21/2022, 04/12/2022 - Mariana Batista  - [facility]     DISCHARGE SUMMARY from hospital Care Everywhere 06/21/2016 to 06/24/2016 - [facility]     OPERATIVE REPORT Care Everywhere 06/22/2016 - [facility] Arthroplasty Hip Bipolar Prosthesis, RT Hip     MEDICATION LIST Care Everywhere/Internal    IMPLANT RECORD/STICKER Care Everywhere    LABS     CBC/DIFF Internal 04/05/2022   NM Nil Read INTERNAL:  08/31/2020 - Skull to Mid Thigh   ULTRASOUND Nil Read INTERNAL:  11/08/2021, 05/08/2021 - Bilateral Lower Extremity Venous Duplex   DEXA PACS [facility]:  08/01/2016, 07/25/2016     PET Nil Read/PACS INTERNAL:  01/07/2022, 01/04/2021, 12/16/2020, 10/01/2020 - Whole Body   MRI PACS [facility]:  04/15/2022 - L Spine   04/12/2018 - Pelvis   CT SCAN PACS INTERNAL:  06/16/2022 to 08/02/2015 - Chest  [facility]:  02/25/2019 to 12/03/2009 - Chest/Abd/Pel  07/03/2016 - RT Hip     XRAYS (IMAGES & REPORTS) PACS INTERNAL:  11/13/2021 to 11/29/2019 - Chest  05/07/2021 - Pelvis    [facility]:   06/21/2016 - RT Hip/Pelvis  01/02/2020 to 10/02/2007 - Chest

## 2022-07-25 ENCOUNTER — PRE VISIT (OUTPATIENT)
Dept: ORTHOPEDICS | Facility: CLINIC | Age: 77
End: 2022-07-25

## 2022-07-25 ENCOUNTER — OFFICE VISIT (OUTPATIENT)
Dept: ORTHOPEDICS | Facility: CLINIC | Age: 77
End: 2022-07-25
Attending: PHYSICAL MEDICINE & REHABILITATION
Payer: COMMERCIAL

## 2022-07-25 VITALS — BODY MASS INDEX: 25.06 KG/M2 | HEIGHT: 72 IN | WEIGHT: 185 LBS

## 2022-07-25 DIAGNOSIS — G89.29 CHRONIC MIDLINE LOW BACK PAIN WITH RIGHT-SIDED SCIATICA: ICD-10-CM

## 2022-07-25 DIAGNOSIS — M54.50 LUMBAR PAIN: Primary | ICD-10-CM

## 2022-07-25 DIAGNOSIS — M54.16 LUMBAR RADICULITIS: ICD-10-CM

## 2022-07-25 DIAGNOSIS — M54.41 CHRONIC MIDLINE LOW BACK PAIN WITH RIGHT-SIDED SCIATICA: ICD-10-CM

## 2022-07-25 PROCEDURE — 99204 OFFICE O/P NEW MOD 45 MIN: CPT | Performed by: ORTHOPAEDIC SURGERY

## 2022-07-25 NOTE — LETTER
7/25/2022         RE: Lenny Chau  [address]  [place] [address]        Dear Colleague,    Thank you for referring your patient, Lenny Chau, to the [facility]. Please see a copy of my visit note below.    Spine Surgery Consultation    REFERRING PHYSICIAN: Mariana Montoya   PRIMARY CARE PHYSICIAN: Shade Boyd           Chief Complaint:   RECHECK (Low back pain, has been having bulging discs back about 10 years ago, has been having right leg radiating pain has tried PT and injections)      History of Present Illness:  Symptom Profile Including: location of symptoms, onset, severity, exacerbating/alleviating factors, previous treatments:        Lenny Chau is a 76 year old male who presents today for evaluation of right radicular symptoms predominantly in an L4 distribution down the anterior and lateral aspect of the right leg.  Is been going on for about a year.  He is having to walk with a walker because of the severity of the symptoms worse with activity and improved with rest.  Therapy has not helped him.  He tried gabapentin which was not effective.  He did injections which helped for a few hours and then they would wear off.  He had a previous lumbar discectomy which helped him a number of years ago.         Past Medical History:     Past Medical History:   Diagnosis Date     Abdominal aortic aneurysm (AAA) (H) 03/30/2010    Formatting of this note might be different from the original. 5.6 cm infrarenal AAA, on 12/21/2010 CTA Formatting of this note might be different from the original. 5.6 cm infrarenal AAA, on 12/21/2010 CTA      Anemia      [place] esophagus      Cancer (H)     lung     CHF (congestive heart failure) (H)      Chronic kidney disease      COPD, group B, by GOLD 2017 classification (H)      Coronary artery disease of native heart with stable angina pectoris, unspecified vessel or lesion type (H)      Degenerative joint  disease      Head and neck cancer (H)      History of transfusion      Hyperlipidemia      Hypertension      Lung cancer (H)     s/p RUL RML wedge resection in 2016 by Dr. Carter Velazquez     Lung mass     MELODY FDG-avid 2.5cm     Myocardial infarction (H)     2019     Oral cancer (H)      Prostate cancer (H)      Septic hip (H) 02/10/2022     Shortness of breath     with exertion     Trochanteric fracture of right femur (H) 2016            Past Surgical History:     Past Surgical History:   Procedure Laterality Date     ABDOMINAL AORTIC ANEURYSM REPAIR       CORONARY STENT PLACEMENT  12/2019    x1     CT BIOPSY LUNG  2021     ESOPHAGOSCOPY, GASTROSCOPY, DUODENOSCOPY (EGD), COMBINED N/A 2019    Procedure: ENDOSCOPIC ULTRASOUND FINE NEEDLE ASPIRATION, GASTRIC BIOPSIES OF POLYPS;  Surgeon: Quoc Edwards MD;  [place] [facility] [place]  Service: Gastroenterology     EXCISE LESION INTRAORAL Left 2020    Procedure: 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 Need pathology for margins;  Surgeon: Alon Nunez MD;  Location: [facility];  Service: ENT     NEPHRECTOMY Left     for benign hemangioma     OTHER SURGICAL HISTORY Right 2015    wedge resectionLung cancer isolated pulmonary nodule     OTHER SURGICAL HISTORY      placement of stentfor AAA     OTHER SURGICAL HISTORY      right hip surgeryfor fracture     OTHER SURGICAL HISTORY      Oral cancer resection     AL ESOPHAGOGASTRODUODENOSCOPY US SCOPE W/ADJ STRXRS N/A 2020    Procedure: ESOPHAGOGASTRODUODENOSCOPY,  ENDOSCOPIC ULTRASOUND;  Surgeon: Quoc Edwards MD;  [place] [facility] [place];  Service: Gastroenterology            Social History:     Social History     Tobacco Use     Smoking status: Former Smoker     Packs/day: 2.00     Years: 50.00     Pack years: 100.00     Quit date: 2009     Years since quittin.5     Smokeless tobacco: Never Used   Substance Use Topics     Alcohol  "use: Not Currently     Alcohol/week: 14.0 standard drinks     Comment: 1 liter of vodka per day- nothing since 11/2021            Family History:     Family History   Problem Relation Age of Onset     Alcoholism Sister      Breast Cancer Sister      Cerebrovascular Disease Sister      Lupus Sister      Spinal muscular atrophy Maternal Grandfather      Kidney Disease Maternal Aunt             Allergies:   No Known Allergies         Medications:     Current Outpatient Medications   Medication     acetaminophen (TYLENOL) 500 MG tablet     albuterol (PROAIR HFA/PROVENTIL HFA/VENTOLIN HFA) 108 (90 Base) MCG/ACT inhaler     aspirin (ASA) 81 MG EC tablet     ferrous sulfate (FEROSUL) 325 (65 Fe) MG tablet     Fluticasone-Umeclidin-Vilanterol (TRELEGY ELLIPTA) 200-62.5-25 MCG/INH oral inhaler     Magnesium Oxide 250 MG TABS     metoprolol succinate ER (TOPROL-XL) 25 MG 24 hr tablet     multivitamin w/minerals (THERA-VIT-M) tablet     nitroGLYcerin (NITROSTAT) 0.4 MG sublingual tablet     omeprazole (PRILOSEC) 20 MG DR capsule     polyethylene glycol (MIRALAX) 17 GM/Dose powder     thiamine (B-1) 100 MG tablet     UNABLE TO FIND     No current facility-administered medications for this visit.             Review of Systems:     A 10 point ROS was performed and reviewed. Specific responses to these questions are noted at the end of the document.         Physical Exam:   Vitals: Ht 1.816 m (5' 11.5\")   Wt 83.9 kg (185 lb)   BMI 25.44 kg/m    Constitutional: awake, alert, cooperative, no apparent distress, appears stated age.    Eyes: The sclera are white.  Ears, Nose, Throat: The trachea is midline.  Psychiatric: The patient has a normal affect.  Respiratory: breathing non-labored  Cardiovascular: The extremities are warm and perfused.  Skin: no obvious rashes or lesions.  Musculoskeletal, Neurologic, and Spine:          Lumbar Spine:    Appearance - No gross stepoffs or deformities    Motor -     L2-3: Hip flexion 5/5 R and " 5/5 L strength          L3/4:  Knee extension R 5/5 and L 5/5 strength         L4/5:  Foot dorsiflexion R 5/5 L 5/5 and       EHL dorsiflexion R 4/5 L 4/5 strength         S1:  Plantarflexion/Peroneal Muscles  R 5/5 and L 5/5 strength    Sensation: intact to light touch L3-S1 distribution BLE      Neurologic:      REFLEXES Left Right                  Patella 1+ 1+   Ankle jerk 1+ 1+   Babinski No upgoing great toe No upgoing great toe   Clonus 0 beats 0 beats     Hip Exam:  No pain with hip log roll and no tenderness over the greater trochanters.    Alignment:  Patient stands with a neutral standing sagittal balance.           Imaging:   We ordered and independently reviewed new radiographs at this clinic visit. The results were discussed with the patient.  Findings include:    Lumbar radiographs show degenerative scoliosis, asymmetric disc collapse and large lateral osteophyte noted on the anterior view at L4-5    Lumbar MRI shows severe right foraminal stenosis at L4-5 in the setting of multilevel spondylosis             Assessment and Plan:   Assessment:  76 year old male with asymmetric disc collapse, bony foraminal stenosis at L4-5 in the setting of degenerative scoliosis with right L4 distribution radicular complaints     Plan:  1. We discussed options, and if he decided to proceed with surgery I think the best surgical option would be an L4-5 interbody fusion due to the asymmetric disc collapse and foraminal stenosis which has a large bony component based on the x-rays.  Risks of this surgery include risk of infection, risk of dural tear resulting in CSF leak which might result in headaches, or possible need for lumbar drain, or possible revision surgery in the setting of a persistent leak. Risk of hematoma or seroma resulting in wound complications.  Possible nerve root injury resulting in numbness weakness or paralysis into the arms or legs. Possible radiculitis which could result in similar symptoms or  could result in significant neurogenic type pain. There is also a risk of delayed onset nerve root pals or radiculitis, which I explained is potentially due to stretch injury or possibly due to delayed onset swelling, and is sometimes temporary but can also be permanent.  Risk of incomplete decompression which might require revision surgery in the future.  Risk of adjacent segment problems requiring surgery in the future. Risk of incomplete relief of symptoms possibly requiring revision surgery in the future. Furthermore, although rare, there are risks of major vessel injury such as to the major vessels anteriorly or to the bowel from the surgery.  Sometimes this can happen if an instrument is passed anteriorly through the disc space. There is a risk of nonunion which might require revision surgery in the future, and risk of hardware complications from the instrumentation.  I do use imaging to help guide the placement of the instrumentation, but even with this there is a chance of implant malposition or problem.  There is a risk of blood clots in the legs or the lungs.  Lastly, although rare, there are certainly risks of the anesthetic including stroke heart attack and death.    2. In most cases we need to use a bone graft extender in addition to local autograft.  The bone graft extender is usually crushed cancellous allograft from the musculoskeletal transplant foundation.  This is needed because there is usually not enough autograft available to complete the fusion.  In some cases we will also add autogenous iliac crest autograft if the quality of the local bone is poor or very limited in quantity.    3. He takes care of his ailing wife, and would have a number of logistics to work out.  They are going to think about things and he will let me know if he has any questions.  I am happy to speak with him by phone or via [employer] or do another in person visit at his convenience.      Respectfully,  Nilton GARCIA  MD [place]  Spine Surgery  [facility]

## 2022-07-25 NOTE — NURSING NOTE
"Reason For Visit:   Chief Complaint   Patient presents with     RECHECK     Low back pain, has been having bulging discs back about 10 years ago, has been having right leg radiating pain has tried PT and injections       Primary MD: Shade Boyd  Ref. MD: Jan    ?  No  Occupation retired .  Currently working? No.  Work status?  Retired.  Date of injury: about 10 years ago   Type of injury: chronic .  Date of surgery: none   Type of surgery: none .  Smoker: No  Request smoking cessation information: No    Ht 1.816 m (5' 11.5\")   Wt 83.9 kg (185 lb)   BMI 25.44 kg/m           Oswestry (LEONARDO) Questionnaire    No flowsheet data found.         Neck Disability Index (NDI) Questionnaire    No flowsheet data found.                Promis 10 Assessment    No flowsheet data found.             Nasir Milner, ATC  "

## 2022-07-25 NOTE — PROGRESS NOTES
Spine Surgery Consultation    REFERRING PHYSICIAN: Mariana Montoya   PRIMARY CARE PHYSICIAN: Shade Boyd           Chief Complaint:   RECHECK (Low back pain, has been having bulging discs back about 10 years ago, has been having right leg radiating pain has tried PT and injections)      History of Present Illness:  Symptom Profile Including: location of symptoms, onset, severity, exacerbating/alleviating factors, previous treatments:        Lenny Chau is a 76 year old male who presents today for evaluation of right radicular symptoms predominantly in an L4 distribution down the anterior and lateral aspect of the right leg.  Is been going on for about a year.  He is having to walk with a walker because of the severity of the symptoms worse with activity and improved with rest.  Therapy has not helped him.  He tried gabapentin which was not effective.  He did injections which helped for a few hours and then they would wear off.  He had a previous lumbar discectomy which helped him a number of years ago.         Past Medical History:     Past Medical History:   Diagnosis Date     Abdominal aortic aneurysm (AAA) (H) 03/30/2010    Formatting of this note might be different from the original. 5.6 cm infrarenal AAA, on 12/21/2010 CTA Formatting of this note might be different from the original. 5.6 cm infrarenal AAA, on 12/21/2010 CTA      Anemia      [place] esophagus      Cancer (H)     lung     CHF (congestive heart failure) (H)      Chronic kidney disease      COPD, group B, by GOLD 2017 classification (H)      Coronary artery disease of native heart with stable angina pectoris, unspecified vessel or lesion type (H)      Degenerative joint disease      Head and neck cancer (H)      History of transfusion      Hyperlipidemia      Hypertension      Lung cancer (H)     s/p RUL RML wedge resection in 2016 by Dr. Carter Velazquez     Lung mass     MELODY FDG-avid 2.5cm     Myocardial infarction (H)     November  2019     Oral cancer (H)      Prostate cancer (H)      Septic hip (H) 02/10/2022     Shortness of breath     with exertion     Trochanteric fracture of right femur (H) 2016            Past Surgical History:     Past Surgical History:   Procedure Laterality Date     ABDOMINAL AORTIC ANEURYSM REPAIR       CORONARY STENT PLACEMENT  12/2019    x1     CT BIOPSY LUNG  2021     ESOPHAGOSCOPY, GASTROSCOPY, DUODENOSCOPY (EGD), COMBINED N/A 2019    Procedure: ENDOSCOPIC ULTRASOUND FINE NEEDLE ASPIRATION, GASTRIC BIOPSIES OF POLYPS;  Surgeon: Quoc Edwards MD;  Location: [facility];  Service: Gastroenterology     EXCISE LESION INTRAORAL Left 2020    Procedure: 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 Need pathology for margins;  Surgeon: Alon Nunez MD;  Location: [facility];  Service: ENT     NEPHRECTOMY Left     for benign hemangioma     OTHER SURGICAL HISTORY Right 2015    wedge resectionLung cancer isolated pulmonary nodule     OTHER SURGICAL HISTORY      placement of stentfor AAA     OTHER SURGICAL HISTORY      right hip surgeryfor fracture     OTHER SURGICAL HISTORY      Oral cancer resection     IL ESOPHAGOGASTRODUODENOSCOPY US SCOPE W/ADJ STRXRS N/A 2020    Procedure: ESOPHAGOGASTRODUODENOSCOPY,  ENDOSCOPIC ULTRASOUND;  Surgeon: Quoc Edwards MD;  Location: [facility];  Service: Gastroenterology            Social History:     Social History     Tobacco Use     Smoking status: Former Smoker     Packs/day: 2.00     Years: 50.00     Pack years: 100.00     Quit date: 2009     Years since quittin.5     Smokeless tobacco: Never Used   Substance Use Topics     Alcohol use: Not Currently     Alcohol/week: 14.0 standard drinks     Comment: 1 liter of vodka per day- nothing since 2021            Family History:     Family History   Problem Relation Age of Onset     Alcoholism Sister      Breast Cancer Sister      Cerebrovascular  "Disease Sister      Lupus Sister      Spinal muscular atrophy Maternal Grandfather      Kidney Disease Maternal Aunt             Allergies:   No Known Allergies         Medications:     Current Outpatient Medications   Medication     acetaminophen (TYLENOL) 500 MG tablet     albuterol (PROAIR HFA/PROVENTIL HFA/VENTOLIN HFA) 108 (90 Base) MCG/ACT inhaler     aspirin (ASA) 81 MG EC tablet     ferrous sulfate (FEROSUL) 325 (65 Fe) MG tablet     Fluticasone-Umeclidin-Vilanterol (TRELEGY ELLIPTA) 200-62.5-25 MCG/INH oral inhaler     Magnesium Oxide 250 MG TABS     metoprolol succinate ER (TOPROL-XL) 25 MG 24 hr tablet     multivitamin w/minerals (THERA-VIT-M) tablet     nitroGLYcerin (NITROSTAT) 0.4 MG sublingual tablet     omeprazole (PRILOSEC) 20 MG DR capsule     polyethylene glycol (MIRALAX) 17 GM/Dose powder     thiamine (B-1) 100 MG tablet     UNABLE TO FIND     No current facility-administered medications for this visit.             Review of Systems:     A 10 point ROS was performed and reviewed. Specific responses to these questions are noted at the end of the document.         Physical Exam:   Vitals: Ht 1.816 m (5' 11.5\")   Wt 83.9 kg (185 lb)   BMI 25.44 kg/m    Constitutional: awake, alert, cooperative, no apparent distress, appears stated age.    Eyes: The sclera are white.  Ears, Nose, Throat: The trachea is midline.  Psychiatric: The patient has a normal affect.  Respiratory: breathing non-labored  Cardiovascular: The extremities are warm and perfused.  Skin: no obvious rashes or lesions.  Musculoskeletal, Neurologic, and Spine:          Lumbar Spine:    Appearance - No gross stepoffs or deformities    Motor -     L2-3: Hip flexion 5/5 R and 5/5 L strength          L3/4:  Knee extension R 5/5 and L 5/5 strength         L4/5:  Foot dorsiflexion R 5/5 L 5/5 and       EHL dorsiflexion R 4/5 L 4/5 strength         S1:  Plantarflexion/Peroneal Muscles  R 5/5 and L 5/5 strength    Sensation: intact to light " touch L3-S1 distribution BLE      Neurologic:      REFLEXES Left Right                  Patella 1+ 1+   Ankle jerk 1+ 1+   Babinski No upgoing great toe No upgoing great toe   Clonus 0 beats 0 beats     Hip Exam:  No pain with hip log roll and no tenderness over the greater trochanters.    Alignment:  Patient stands with a neutral standing sagittal balance.           Imaging:   We ordered and independently reviewed new radiographs at this clinic visit. The results were discussed with the patient.  Findings include:    Lumbar radiographs show degenerative scoliosis, asymmetric disc collapse and large lateral osteophyte noted on the anterior view at L4-5    Lumbar MRI shows severe right foraminal stenosis at L4-5 in the setting of multilevel spondylosis             Assessment and Plan:   Assessment:  76 year old male with asymmetric disc collapse, bony foraminal stenosis at L4-5 in the setting of degenerative scoliosis with right L4 distribution radicular complaints     Plan:  1. We discussed options, and if he decided to proceed with surgery I think the best surgical option would be an L4-5 interbody fusion due to the asymmetric disc collapse and foraminal stenosis which has a large bony component based on the x-rays.  Risks of this surgery include risk of infection, risk of dural tear resulting in CSF leak which might result in headaches, or possible need for lumbar drain, or possible revision surgery in the setting of a persistent leak. Risk of hematoma or seroma resulting in wound complications.  Possible nerve root injury resulting in numbness weakness or paralysis into the arms or legs. Possible radiculitis which could result in similar symptoms or could result in significant neurogenic type pain. There is also a risk of delayed onset nerve root pals or radiculitis, which I explained is potentially due to stretch injury or possibly due to delayed onset swelling, and is sometimes temporary but can also be  permanent.  Risk of incomplete decompression which might require revision surgery in the future.  Risk of adjacent segment problems requiring surgery in the future. Risk of incomplete relief of symptoms possibly requiring revision surgery in the future. Furthermore, although rare, there are risks of major vessel injury such as to the major vessels anteriorly or to the bowel from the surgery.  Sometimes this can happen if an instrument is passed anteriorly through the disc space. There is a risk of nonunion which might require revision surgery in the future, and risk of hardware complications from the instrumentation.  I do use imaging to help guide the placement of the instrumentation, but even with this there is a chance of implant malposition or problem.  There is a risk of blood clots in the legs or the lungs.  Lastly, although rare, there are certainly risks of the anesthetic including stroke heart attack and death.    2. In most cases we need to use a bone graft extender in addition to local autograft.  The bone graft extender is usually crushed cancellous allograft from the musculoskeletal transplant foundation.  This is needed because there is usually not enough autograft available to complete the fusion.  In some cases we will also add autogenous iliac crest autograft if the quality of the local bone is poor or very limited in quantity.    3. He takes care of his ailing wife, and would have a number of logistics to work out.  They are going to think about things and he will let me know if he has any questions.  I am happy to speak with him by phone or via Aereo or do another in person visit at his convenience.      Respectfully,  Nilton Gtz MD  Spine Surgery  [facility]

## 2022-07-26 ENCOUNTER — TELEPHONE (OUTPATIENT)
Dept: PHARMACY | Facility: CLINIC | Age: 77
End: 2022-07-26

## 2022-07-26 NOTE — TELEPHONE ENCOUNTER
Reason for Call:  Other prescription    Detailed comments: Patient was notified that he was declined the Rx Trelegy.  Patient is wondering what is the next step.    Phone Number Patient can be reached at: Home number on file [phone] (home)    Best Time: any    Can we leave a detailed message on this number? YES    Call taken on 7/26/2022 at 10:48 AM by DOMENIC CRUZ

## 2022-07-26 NOTE — TELEPHONE ENCOUNTER
PharmD Outbound Call:    Reason for call: returning call.    Per paperwork from [employer], his income is too high.    I recommended Patient contact [employer] or the [employer] assistance company or   Contact the [place] Prescription Assistance Program/Fund (Kristin Martinez) at [phone].    Discussed with Patient that he may be eligible for another assistance program, but would appreciate Kristin's assistance with this.   I informed Kristin of the Patient situation and she recommended he set up an appointment with her.    Sara Cottrell, WileyD  Medication Therapy Management (MTM) Pharmacist

## 2022-07-28 ENCOUNTER — TELEPHONE (OUTPATIENT)
Dept: FAMILY MEDICINE | Facility: CLINIC | Age: 77
End: 2022-07-28

## 2022-07-28 NOTE — TELEPHONE ENCOUNTER
Spoke with patient at 1740 - he had an appt with neurology in August and was just called and cancelled and now can get in September, but in [place]. He doesn't want to travel that far.  Referral was placed for neuropathy last month.  He getting very frustrated with all the run around.  Any ideas/recommendations

## 2022-07-28 NOTE — TELEPHONE ENCOUNTER
Patient was to see Neurology in Aug but patient informed they are out till the end of Sept and Aug appt was cancelled.  Mitchell would like to have [facility] assistant to call him and discuss if there is another facility or doctor that he can schedule with.  Ph; [phone]

## 2022-07-29 ENCOUNTER — TELEPHONE (OUTPATIENT)
Dept: FAMILY MEDICINE | Facility: CLINIC | Age: 77
End: 2022-07-29

## 2022-07-29 NOTE — TELEPHONE ENCOUNTER
Please contact Patient and provide this information:    I recommended Patient contact [facility] ([phone]) to inquire about his application denial or contact the [place] Prescription Assistance Program/Fund (Kristin Martinez) at [phone]. He can set up an appointment to meet with Kristin and review his application details- she is the expert in Patient assistance.    If Patient wants to speak directly with me, please set up a telephone appointment.    Sara Cottrell, WileyD  Medication Therapy Management (MTM) Pharmacist

## 2022-07-29 NOTE — TELEPHONE ENCOUNTER
Please call patient he states you gave him a phone number for help with medication and he has misplaced it wondering if you would call him,

## 2022-07-29 NOTE — TELEPHONE ENCOUNTER
It looks like the appt is with [facility].  Is that correct?  Other options would be through [facility] (office in [place]) or [facility], but their locations are not close.

## 2022-08-02 NOTE — TELEPHONE ENCOUNTER
Talked with patient and if there is anywhere he can get in sooner, he would appreciate it.  Dealing with pretty severe neuropathy.  Frustrated that he had an appt scheduled, I believe for last wk and they called and cancelled a day or 2 before.  He's happy they moved it up to Aug-was end of Sept.      Again, if anything else could be done to get in sooner, he would be appreciative.

## 2022-08-02 NOTE — TELEPHONE ENCOUNTER
8-2-22    General Call    Contacts       Type Contact Phone/Fax    07/28/2022 02:20 PM CDT Phone (Incoming) Mitchell Chau (Self) [phone] (H)        Reason for Call:  Pt called to report his has a neurology appt @ :  [facility]  appt date 8-24-22  Pt wants to know if Dr Boyd can recommend someone sooner            Could we send this information to you [facility] or would you prefer to receive a phone call?:   Patient would prefer a phone call   Okay to leave a detailed message?: Yes at Cell number on file:    Telephone Information:   Mobile [phone]

## 2022-08-07 ENCOUNTER — HEALTH MAINTENANCE LETTER (OUTPATIENT)
Age: 77
End: 2022-08-07

## 2022-08-10 ENCOUNTER — TELEPHONE (OUTPATIENT)
Dept: FAMILY MEDICINE | Facility: CLINIC | Age: 77
End: 2022-08-10

## 2022-08-10 NOTE — TELEPHONE ENCOUNTER
MECHE~ Aug 10, 2022 I spoke with Mitchell, he is in need of financial assistance for medication.    Mitchell had applied to [employer] for Trelegy and was denied for over income. I let him know that [employer] has the final say in this decision, there is nothing I can do to overturn their decision.    I inquired to breakdown his income information and he would only tell me about their Social Security.     Unfortunately, there is nothing I can do to help with his Trelegy.    Kristin Martinez  Prescription Assistance Supervisor  Pharmacy Assistance  [phone]

## 2022-08-24 ENCOUNTER — TRANSFERRED RECORDS (OUTPATIENT)
Dept: HEALTH INFORMATION MANAGEMENT | Facility: CLINIC | Age: 77
End: 2022-08-24

## 2022-08-24 ENCOUNTER — TELEPHONE (OUTPATIENT)
Dept: FAMILY MEDICINE | Facility: CLINIC | Age: 77
End: 2022-08-24

## 2022-08-24 DIAGNOSIS — N18.31 STAGE 3A CHRONIC KIDNEY DISEASE (H): ICD-10-CM

## 2022-08-24 DIAGNOSIS — I25.10 CORONARY ARTERY DISEASE INVOLVING NATIVE CORONARY ARTERY OF NATIVE HEART WITHOUT ANGINA PECTORIS: Primary | ICD-10-CM

## 2022-08-24 NOTE — TELEPHONE ENCOUNTER
Patient Returning Call    Reason for call:  Would like to discuss updates on care and needing labs ordered with PCP or care team.    Patient has additional questions:  No    What are your questions/concerns:  Scheduling an appointment and getting orders for proper labs.     Could we send this information to you in [employer] or would you prefer to receive a phone call?:   Patient would prefer a phone call   Okay to leave a detailed message?: Yes at Home number on file [phone] (home)

## 2022-08-24 NOTE — TELEPHONE ENCOUNTER
Mitchell should have the following labs:  CBC w/o diff, BMP, urine microalbumin/Cr, PTH, hepatic function panel

## 2022-08-25 ENCOUNTER — DOCUMENTATION ONLY (OUTPATIENT)
Dept: LAB | Facility: CLINIC | Age: 77
End: 2022-08-25

## 2022-08-25 ENCOUNTER — LAB (OUTPATIENT)
Dept: LAB | Facility: CLINIC | Age: 77
End: 2022-08-25
Payer: COMMERCIAL

## 2022-08-25 DIAGNOSIS — I10 HYPERTENSION, UNSPECIFIED TYPE: ICD-10-CM

## 2022-08-25 DIAGNOSIS — R20.0 NUMBNESS OF LEGS: ICD-10-CM

## 2022-08-25 DIAGNOSIS — I25.10 CORONARY ARTERY DISEASE INVOLVING NATIVE CORONARY ARTERY OF NATIVE HEART WITHOUT ANGINA PECTORIS: ICD-10-CM

## 2022-08-25 DIAGNOSIS — N18.31 STAGE 3A CHRONIC KIDNEY DISEASE (H): ICD-10-CM

## 2022-08-25 DIAGNOSIS — I25.118 CORONARY ARTERY DISEASE OF NATIVE HEART WITH STABLE ANGINA PECTORIS, UNSPECIFIED VESSEL OR LESION TYPE (H): ICD-10-CM

## 2022-08-25 DIAGNOSIS — R20.0 NUMBNESS OF LEGS: Primary | ICD-10-CM

## 2022-08-25 LAB
ALBUMIN SERPL BCG-MCNC: 4.4 G/DL (ref 3.5–5.2)
ALP SERPL-CCNC: 136 U/L (ref 40–129)
ALT SERPL W P-5'-P-CCNC: 18 U/L (ref 10–50)
ANION GAP SERPL CALCULATED.3IONS-SCNC: 11 MMOL/L (ref 7–15)
AST SERPL W P-5'-P-CCNC: 28 U/L (ref 10–50)
BILIRUB DIRECT SERPL-MCNC: <0.2 MG/DL (ref 0–0.3)
BILIRUB SERPL-MCNC: 0.4 MG/DL
BUN SERPL-MCNC: 24 MG/DL (ref 8–23)
CALCIUM SERPL-MCNC: 10.2 MG/DL (ref 8.8–10.2)
CHLORIDE SERPL-SCNC: 102 MMOL/L (ref 98–107)
CREAT SERPL-MCNC: 1.49 MG/DL (ref 0.67–1.17)
CREAT UR-MCNC: 64.3 MG/DL
DEPRECATED HCO3 PLAS-SCNC: 27 MMOL/L (ref 22–29)
ERYTHROCYTE [DISTWIDTH] IN BLOOD BY AUTOMATED COUNT: 12.4 % (ref 10–15)
FOLATE SERPL-MCNC: 17.7 NG/ML (ref 4.6–34.8)
GFR SERPL CREATININE-BSD FRML MDRD: 48 ML/MIN/1.73M2
GLUCOSE SERPL-MCNC: 85 MG/DL (ref 70–99)
HCT VFR BLD AUTO: 46 % (ref 40–53)
HGB BLD-MCNC: 15.2 G/DL (ref 13.3–17.7)
MCH RBC QN AUTO: 30.6 PG (ref 26.5–33)
MCHC RBC AUTO-ENTMCNC: 33 G/DL (ref 31.5–36.5)
MCV RBC AUTO: 93 FL (ref 78–100)
MICROALBUMIN UR-MCNC: 48.4 MG/L
MICROALBUMIN/CREAT UR: 75.27 MG/G CR (ref 0–17)
PLATELET # BLD AUTO: 190 10E3/UL (ref 150–450)
POTASSIUM SERPL-SCNC: 5 MMOL/L (ref 3.4–5.3)
PROT SERPL-MCNC: 7.5 G/DL (ref 6.4–8.3)
PTH-INTACT SERPL-MCNC: 40 PG/ML (ref 15–65)
RBC # BLD AUTO: 4.97 10E6/UL (ref 4.4–5.9)
SODIUM SERPL-SCNC: 140 MMOL/L (ref 136–145)
VIT B12 SERPL-MCNC: 713 PG/ML (ref 232–1245)
WBC # BLD AUTO: 7.6 10E3/UL (ref 4–11)

## 2022-08-25 PROCEDURE — 86038 ANTINUCLEAR ANTIBODIES: CPT

## 2022-08-25 PROCEDURE — 86235 NUCLEAR ANTIGEN ANTIBODY: CPT

## 2022-08-25 PROCEDURE — 36415 COLL VENOUS BLD VENIPUNCTURE: CPT

## 2022-08-25 PROCEDURE — 86039 ANTINUCLEAR ANTIBODIES (ANA): CPT

## 2022-08-25 PROCEDURE — 83921 ORGANIC ACID SINGLE QUANT: CPT

## 2022-08-25 PROCEDURE — 85027 COMPLETE CBC AUTOMATED: CPT

## 2022-08-25 PROCEDURE — 86235 NUCLEAR ANTIGEN ANTIBODY: CPT | Mod: 59

## 2022-08-25 PROCEDURE — 82248 BILIRUBIN DIRECT: CPT

## 2022-08-25 PROCEDURE — 82607 VITAMIN B-12: CPT

## 2022-08-25 PROCEDURE — 83970 ASSAY OF PARATHORMONE: CPT

## 2022-08-25 PROCEDURE — 86617 LYME DISEASE ANTIBODY: CPT

## 2022-08-25 PROCEDURE — 86618 LYME DISEASE ANTIBODY: CPT

## 2022-08-25 PROCEDURE — 82043 UR ALBUMIN QUANTITATIVE: CPT

## 2022-08-25 PROCEDURE — 82746 ASSAY OF FOLIC ACID SERUM: CPT

## 2022-08-25 PROCEDURE — 80053 COMPREHEN METABOLIC PANEL: CPT

## 2022-08-25 NOTE — PROGRESS NOTES
Hi Dr Boyd,    I see you put in orders for Mitchell but they are in pending status, could you sign them so we can get the

## 2022-08-25 NOTE — TELEPHONE ENCOUNTER
LM for pt re: labs per [facility] and to call clinic to schedule lab appt and follow up w/ [facility] to review labs/care.

## 2022-08-26 LAB
ANA PAT SER IF-IMP: ABNORMAL
ANA SER QL IF: POSITIVE
ANA TITR SER IF: ABNORMAL {TITER}
B BURGDOR IGG SERPL QL IA: 0.04 INDEX
B BURGDOR IGG SERPL QL IA: NONREACTIVE
B BURGDOR IGG+IGM SER QL: 1.2
B BURGDOR IGM SERPL QL IA: <0.01 INDEX
B BURGDOR IGM SERPL QL IA: NONREACTIVE
ENA SS-A AB SER IA-ACNC: <0.5 U/ML
ENA SS-A AB SER IA-ACNC: NEGATIVE
ENA SS-B IGG SER IA-ACNC: <0.6 U/ML
ENA SS-B IGG SER IA-ACNC: NEGATIVE

## 2022-08-26 RX ORDER — METOPROLOL SUCCINATE 25 MG/1
TABLET, EXTENDED RELEASE ORAL
Qty: 90 TABLET | Refills: 0 | Status: SHIPPED | OUTPATIENT
Start: 2022-08-26 | End: 2022-11-25

## 2022-09-01 LAB — METHYLMALONATE SERPL-SCNC: 0.33 UMOL/L (ref 0–0.4)

## 2022-09-15 ENCOUNTER — TRANSFERRED RECORDS (OUTPATIENT)
Dept: HEALTH INFORMATION MANAGEMENT | Facility: CLINIC | Age: 77
End: 2022-09-15

## 2022-09-19 ENCOUNTER — LAB (OUTPATIENT)
Dept: INFUSION THERAPY | Facility: CLINIC | Age: 77
End: 2022-09-19
Attending: RADIOLOGY
Payer: MEDICARE

## 2022-09-19 ENCOUNTER — HOSPITAL ENCOUNTER (OUTPATIENT)
Dept: CT IMAGING | Facility: CLINIC | Age: 77
Discharge: HOME | End: 2022-09-19
Attending: RADIOLOGY
Payer: MEDICARE

## 2022-09-19 DIAGNOSIS — C79.51 SPINE METASTASIS: ICD-10-CM

## 2022-09-19 DIAGNOSIS — C34.12 MALIGNANT NEOPLASM OF UPPER LOBE OF LEFT LUNG (H): ICD-10-CM

## 2022-09-19 DIAGNOSIS — C61 MALIGNANT NEOPLASM OF PROSTATE (H): ICD-10-CM

## 2022-09-19 LAB — PSA SERPL-MCNC: 23.5 NG/ML (ref 0–6.5)

## 2022-09-19 PROCEDURE — 36415 COLL VENOUS BLD VENIPUNCTURE: CPT

## 2022-09-19 PROCEDURE — 84153 ASSAY OF PSA TOTAL: CPT

## 2022-09-19 PROCEDURE — 71250 CT THORAX DX C-: CPT

## 2022-09-21 ENCOUNTER — OFFICE VISIT (OUTPATIENT)
Dept: RADIATION ONCOLOGY | Facility: CLINIC | Age: 77
End: 2022-09-21
Attending: RADIOLOGY
Payer: MEDICARE

## 2022-09-21 ENCOUNTER — PATIENT OUTREACH (OUTPATIENT)
Dept: ONCOLOGY | Facility: CLINIC | Age: 77
End: 2022-09-21

## 2022-09-21 VITALS
RESPIRATION RATE: 16 BRPM | DIASTOLIC BLOOD PRESSURE: 73 MMHG | SYSTOLIC BLOOD PRESSURE: 141 MMHG | OXYGEN SATURATION: 96 % | WEIGHT: 193.8 LBS | TEMPERATURE: 97.7 F | BODY MASS INDEX: 26.65 KG/M2 | HEART RATE: 58 BPM

## 2022-09-21 DIAGNOSIS — C61 MALIGNANT NEOPLASM OF PROSTATE (H): ICD-10-CM

## 2022-09-21 DIAGNOSIS — C34.12 MALIGNANT NEOPLASM OF UPPER LOBE OF LEFT LUNG (H): ICD-10-CM

## 2022-09-21 DIAGNOSIS — C79.51 SPINE METASTASIS: Primary | ICD-10-CM

## 2022-09-21 PROCEDURE — 99214 OFFICE O/P EST MOD 30 MIN: CPT | Performed by: RADIOLOGY

## 2022-09-21 PROCEDURE — G0463 HOSPITAL OUTPT CLINIC VISIT: HCPCS

## 2022-09-21 RX ORDER — GABAPENTIN 100 MG/1
200 CAPSULE ORAL 3 TIMES DAILY
COMMUNITY
Start: 2022-08-24 | End: 2023-01-01

## 2022-09-21 NOTE — PROGRESS NOTES
New Patient Oncology Nurse Navigator Note     Referring provider: Mariana Batista MD  [facility] Radiation Oncology  [facility]     Referred to (specialty): Medical Oncology    Date Referral Received: 09/21/22     Evaluation for : prostate cancer, Lupron therapy        Clinical History (per Nurse review of records provided):      Complicated history of multiple cancers.  See Dr. Batista's 9/21/22 progress note for history. (Bookmarked)     Low risk prostate cancer, clinical stage T1c N0 M0, recent grade 3+3 =6 and the last PSA was 10.4 in 7/2006.  The patient is currently on clinical observation with no therapy.  PSA: 12.47 on 12/22/2021.      Current PSA 9/19/22 23.50    Records Location (Care Everywhere, Media, etc.): Epic      Referral received and reviewed.  I will reach out to patient on 9/22/22 to discuss.  Plan to offer first available at  location for medical oncology consult.    9/22/22  I called Mitchell and discussed with him the referral to medical oncology.  I reviewed with him what to expect at this visit.  He would like to continue on at the  site. He has no questions at this time. I forwarded on the call and referral to our new patient scheduling team to finalize an appointment for him.

## 2022-09-21 NOTE — LETTER
2022         RE: Lenny hCau  [address]  [place] [address]        Dear Colleague,    Thank you for referring your patient, Lenny Chau, to the [facility] [place]. Please see a copy of my visit note below.    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  The biopsy confirmed moderately differentiated adenocarcinoma.        TYPE OF RADIATION THERAPY ADMINISTERED:   1. SBRT to the left upper lobe with a total dose of 5000 cGy in 5 treatments given from 3/2/2021-3/10/2021.     2. SBRT with a total dose of 5000 cGy in 5 treatments for the second left tumor given from 3/9/2022-3/18/2022.      3. SBRT with a total dose of 3275 cGy in 5 treatments for T7 spine given from 2022- 2022.     INTERVAL SINCE COMPLETION OF RADIATION THERAPY: 5 months.      SUBJECTIVE:  Mr. Chau is a 76 y.o. male who is a chronic smoker and quit smoking since .  The patient had a complicated history of multiple cancer in the past as detailed as followin.  Low risk prostate cancer, clinical stage T1c N0 M0, recent grade 3+3 =6 and the last PSA was 10.4 in 2006.  The patient is currently on clinical observation with no therapy.  PSA: 12.47 on 2021.     2.  Floor of mouth cancer, status post surgical resection in 1994 with no adjuvant therapy.     3.  Left kidney hemangioma, status post left  nephrectomy on 3/26/2009.     4.  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  Patient had local recurrence and is status post surgical resection on 9/11/2009.     5.  Non-small cell lung cancer involving right lung, stage I, status post right thoracotomy and excision of right upper lobe and right middle lobe lung tumor 11/5/2015 with no evidence of lymph node metastasis and no adjuvant therapy.  Patient lost to follow-up since 2016.     6.  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     7.  Left upper lobe lung  cancer, stage T1N0 M0.  The biopsy confirmed moderately differentiated adenocarcinoma on 1/21/2021.  The patient received SBRT with a total dose of 5000 cGy in 5 treatments given from 3/2/2021-3/10/2021.     Patient had two prior early stage right lung cancers that were apparently resected in their entirety and may have been synchronous primary lung cancers. It does not appear he had the appropriate recommended follow up at [facility]. The MELODY lesion was known to be present in 2016 and appears to be slightly larger now with significant FDG-avidity on the recent PET/CT. It is likely another primary lung cancer, although metastasis from previous lung cancers is possible.  The patient had a restaging PET CT scan on 8/31/2020.  The scan showed enlarging FDG avid left upper lobe mass measuring 2.1 x 2.5 cm with central solid component consistent with primary lung cancer without metastasis.  There was also a FDG avid lesion in the left buccal region consistent with squamous cell carcinoma without metastasis.  The patient had a surgical resection for his left buccal/lateral tongue squamous cell carcinoma 11/6/2020 by KERRY Renee with pathology  showed squamous cell carcinoma with negative margin.  He was determined not a good candidate for lung surgery given his complicated medical history and health status.  He therefore received definitive radiation therapy using SBRT with a total dose of 5000 cGy in 5 treatments given from 3/2/2021-3/10/2021. The patient tolerated radiation therapy very well with minimal side effect.     The patient has been doing well since completion of radiation therapy.  He denies any pain or discomfort related to the therapy at the time of evaluation.  The patient was found to a new 9 mm small nodule in the left upper lobe outside of patient therapy area from restaging CT scan on 12/20/2021.  He was recommended to have staging PET CT scan and was done 1/7/2022. 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  There is no evidence of other systemic metastasis.The patient's case has been discussed at thoracic tumor conference 1/13/2022.  MRI brain on 1/22/2022 showed no evidence of brain metastasis. Patient is not good candidate for surgery and poor candidate for systemic therapy given his current medical status.  The consensus recommendation is to consider SBRT for his oligo disease in the left lung and T7 spine if the patient wishes not to consider systemic therapy at this time.   The patient received the second course of SBRT to the second left lung cancer with a total dose of 5000 cGy in 5 treatments given from 3/9/2022-3/18/2022.  He tolerated radiation therapy very well with minimal side effect.      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  He received stereotactic radiosurgery with a total dose of 3275 cGy in 5 treatments given from 4/12/2022- 4/22/2022.  The patient tolerated ration therapy very well with minimal side effect.     The patient has been stable since completion of radiation therapy.   He denies any new symptoms at the time of evaluation.  He is here for routine post therapy office follow-up.    Medications were reviewed and are up to date on EPIC.    The following portions of the patient's history were reviewed and updated as appropriate: allergies, current medications, past family history, past medical history, past social history, past surgical history and problem list.    Review of Systems:      General  Constitutional  Constitutional (WDL): All constitutional elements are within defined limits  EENT  Eye Disorders  Eye Disorder (WDL): All eye disorder elements are within defined limits (wears glasses)  Ear Disorders  Ear Disorder (WDL): All ear disorder elements are within defined limits  Respiratory  Respiratory  Respiratory (WDL): Exceptions to WDL  Cough: Mild symptoms OR nonprescription intervention indicated (occasional dry cough)  Cardiovascular  Cardiovascular  Cardiovascular (WDL): All cardiovascular elements are within defined limits  Gastrointestinal  Gastrointestinal  Gastrointestinal (WDL): Exceptions to WDL  Constipation: Occasional or intermittent symptoms OR occasional use of stool softeners, laxatives, dietary modification, or enema  Musculoskeletal  Musculoskeletal and Connective Tissue Disorders  Musculoskeletal & Connective (WDL): Exceptions to WDL  Arthralgia: Mild pain (back, right hip)  Bone Pain: Mild pain (back, right hip)  Generalized Muscle Weakness: Symptomatic OR perceived by patient but not evident on physical exam (uses walker occasionally)  Integumentary  Integumentary  Integumentary (WDL): All integumentary elements are within defined limits  Neurological  Neurosensory  Neurosensory (WDL): Exceptions to WDL  Peripheral Motor Neuropathy: Asymptomatic OR clinical or diagnostic observations only (feet)  Ataxia: Asymptomatic OR clinical or diagnostic observations only OR intervention not indicated  Peripheral Sensory Neuropathy: Asymptomatic  (feet)  Genitourinary/Reproductive  Genitourinary  Genitourinary (WDL): Exceptions to WDL (nocturia x2)  Urinary Frequency: Present  Lymphatic  Lymph System Disorders  Lymph (WDL): All lymph elements are within defined limits  Pain  Pain Score: No Pain (0)  AUA Assessment                                                              Accompanied by  Accompanied By: family (daughter)    Objective:      PHYSICAL EXAMINATION:    BP (!) 141/73 (BP Location: Right arm, Patient Position: Sitting, Cuff Size: Adult Regular)   Pulse 58   Temp 97.7  F (36.5  C) (Oral)   Resp 16   Wt 87.9 kg (193 lb 12.8 oz)   SpO2 96%   BMI 26.65 kg/m      Gen: Alert, in NAD  Eyes: PERRL, EOMI, sclera anicteric  HENT     Head: NC/AT     Ears: No external auricular lesions     Nose/sinus: No rhinorrhea or epistaxis     Oropharynx: MMM, no visible oral lesions  Neck: Supple, full ROM, no LAD  Pulm: No wheezing, stridor or respiratory distress  CV: Well-perfused, no cyanosis, no pedal edema  Abdominal: BS+, soft, nontender, nondistended, no hepatomegaly  Back: No step-offs or pain to palpation along the thoracolumbar spine  Rectal: Deferred  : Deferred  Musculoskeletal: Normal muscle bulk and tone  Skin: Normal color and turgor  Neurologic: A/Ox3, CN II-XII intact, normal gait and station  Psychiatric: Appropriate mood and affect     Imaging: Imaging results 30 days: CT Chest w/o Contrast    Result Date: 9/19/2022  EXAM: CT CHEST W/O CONTRAST LOCATION: [facility] DATE/TIME: 9/19/2022 12:46 PM INDICATION: Non-small cell lung cancer, metastatic, assess treatment response COMPARISON: CT 6/16/2022 and 12/20/2021 PET scan 01/07/2022.. TECHNIQUE: CT chest without IV contrast. Multiplanar reformats were obtained. Dose reduction techniques were used. CONTRAST: None. FINDINGS: LUNGS AND PLEURA: Post treatment changes around the left upper lobe lung mass and fiducial marker fairly similar to the 2 prior CTs. Maximum  diameter of 3.2 cm image 59 series 5. The nodule just anterior to this is less well defined due to treatment changes but clearly has not increased in size. Likely slightly smaller measuring 8 mm image 62. Posttreatment changes right upper lobe unchanged. No concerning new findings. MEDIASTINUM/AXILLAE: No lymphadenopathy. CORONARY ARTERY CALCIFICATION: Severe. UPPER ABDOMEN: Left nephrectomy. Stable cyst in the body the pancreas. MUSCULOSKELETAL: Unremarkable.     IMPRESSION: 1.  Stable post treatment changes left upper lobe. 2.  The mass and nodule described previously more difficult to definitively measure due to treatment changes but no increase in size. 3.  Posttreatment changes right upper lobe are stable. 4.  No concerning new findings.     Prostate Specific Antigen Screen   Date Value Ref Range Status   05/08/2021 34.0 (H) 0.0 - 6.5 ng/mL Final   03/09/2021 23.5 (H) < OR = 4.0 ng/mL Final     Comment:     The total PSA value from this assay system is   standardized against the WHO standard. The test   result will be approximately 20% lower when compared   to the equimolar-standardized total PSA ([place]). Comparison of serial PSA results should be   interpreted with this fact in mind.     This test was performed using the Siemens   chemiluminescent method. Values obtained from   different assay methods cannot be used  interchangeably. PSA levels, regardless of  value, should not be interpreted as absolute  evidence of the presence or absence of disease.  Lab test performed by:  Lab Mnemonic:MARLENY  [facility]  [address] [place] [address]  Nando Nunez M.D.  QUEST Specimen received date and time: 09-MAR-2021 12:46:00.00     05/06/2019 15.8 (H) < OR = 4.0 ng/mL Final     Comment:     The total PSA value from this assay system is   standardized against the WHO standard. The test   result will be approximately 20% lower when compared   to the equimolar-standardized total  PSA ([place]). Comparison of serial PSA results should be   interpreted with this fact in mind.     This test was performed using the Siemens   chemiluminescent method. Values obtained from   different assay methods cannot be used  interchangeably. PSA levels, regardless of  value, should not be interpreted as absolute  evidence of the presence or absence of disease.  Lab test performed by:  Lab Mnemonic:  [facility]  [address] [place] [address]  Nando Nunez M.D.  QUEST Specimen received date and time: 06-MAY-2019 08:07:00.00     10/15/2018 10.8 (H) < OR = 4.0 ng/mL Final     Comment:     The total PSA value from this assay system is   standardized against the WHO standard. The test   result will be approximately 20% lower when compared   to the equimolar-standardized total PSA ([place]). Comparison of serial PSA results should be   interpreted with this fact in mind.     This test was performed using the Siemens   chemiluminescent method. Values obtained from   different assay methods cannot be used  interchangeably. PSA levels, regardless of  value, should not be interpreted as absolute  evidence of the presence or absence of disease.  Lab test performed by:  Lab Mnemonic:  [facility]  [address] [place] [address]  Nando Nunez M.D.  QUEST Specimen received date and time: 15-OCT-2018 17:14:00.00     03/29/2018 13.0 (H) < OR = 4.0 ng/mL Final     Comment:     The total PSA value from this assay system is   standardized against the WHO standard. The test   result will be approximately 20% lower when compared   to the equimolar-standardized total PSA ([place]). Comparison of serial PSA results should be   interpreted with this fact in mind.     This test was performed using the Siemens   chemiluminescent method. Values obtained from   different assay methods cannot be used  interchangeably. PSA levels,  regardless of  value, should not be interpreted as absolute  evidence of the presence or absence of disease.  Lab test performed by:  Lab Mnemonic:  [facility]  [address] [place] [address]  Nando Nunez M.D.  QUEST Specimen received date and time: 29-MAR-2018 16:01:00.00     09/11/2017 11.9 (H) < OR = 4.0 ng/mL Final     Comment:     The total PSA value from this assay system is   standardized against the WHO standard. The test   result will be approximately 20% lower when compared   to the equimolar-standardized total PSA ([place]). Comparison of serial PSA results should be   interpreted with this fact in mind.     This test was performed using the Siemens   chemiluminescent method. Values obtained from   different assay methods cannot be used  interchangeably. PSA levels, regardless of  value, should not be interpreted as absolute  evidence of the presence or absence of disease.  Lab test performed by:  Lab Mnemonic:  [facility]  [address] [place] [address]  Nando Nunez M.D.  QUEST Specimen received date and time: 11-SEP-2017 08:29:00.00     07/15/2016 10.4 (H) < OR = 4.0 ng/mL Final     Comment:        This test was performed using the Siemens  chemiluminescent method. Values obtained from  different assay methods cannot be used  interchangeably. PSA levels, regardless of  value, should not be interpreted as absolute  evidence of the presence or absence of disease.     Lab test performed by:  Lab Mnemonic:  [facility]  [address] [place] [address]  Nando Nunez M.D.     02/09/2016 7.4 (H) < OR = 4.0 ng/mL Final     Comment:        This test was performed using the Siemens  chemiluminescent method. Values obtained from  different assay methods cannot be used  interchangeably. PSA levels, regardless of  value, should not be interpreted as absolute  evidence of the presence or absence of  disease.     Lab test performed by:  Lab Mnemonic:  [facility] [place]  [address] [facility] [address]  Nando Nunez M.D.     04/22/2015 7.2 (H) < OR = 4.0 ng/mL Final     Comment:        This test was performed using the Siemens  chemiluminescent method. Values obtained from  different assay methods cannot be used  interchangeably. PSA levels, regardless of  value, should not be interpreted as absolute  evidence of the presence or absence of disease.       Lab test performed by:  Lab Mnemonic:  [facility] [place]  [address] [facility] [place] [address]  Nando Nunez M.D.     03/19/2014 6.8 (H) < OR = 4.0 ng/mL Final     Comment:        This test was performed using the Siemens  chemiluminescent method. Values obtained from  different assay methods cannot be used  interchangeably. PSA levels, regardless of  value, should not be interpreted as absolute  evidence of the presence or absence of disease.       Lab test performed by:  Lab Mnemonic:  [facility]  [facility] [place] [address]  Nando Nunez M.D.     03/14/2013 6.1 (H) < OR = 4.0 ng/mL Final     Comment:        This test was performed using the Siemens  chemiluminescent method. Values obtained from  different assay methods cannot be used  interchangeably. PSA levels, regardless of  value, should not be interpreted as absolute  evidence of the presence or absence of disease.       Lab test performed by:  Lab Mnemonic:  [facility]  [address] [facility] [place] [address]  Nando Nunez M.D.     03/06/2012 5.5 (H) < OR = 4.0 ng/mL Final     Comment:        This test was performed using the Siemens  chemiluminescent method. Values obtained from  different assay methods cannot be used  interchangeably. PSA levels, regardless of  value, should not be interpreted as absolute  evidence of the presence or absence of disease.       Lab test performed by:  Lab  Mnemonic:  [facility]  [address] [place] [address]  Nando Nunez M.D.     06/10/2011 4.7 (H) < OR = 4.0 ng/mL Final     Comment:        This test was performed using the Siemens  chemiluminescent method. Values obtained from  different assay methods cannot be used  interchangeably. PSA levels, regardless of  value, should not be interpreted as absolute  evidence of the presence or absence of disease.       Lab test performed by:  Lab Mnemonic:  [facility]  [address] [place] [address]  Nando Nunez M.D.       PSA Tumor Marker   Date Value Ref Range Status   09/19/2022 23.50 (H) 0.00 - 6.50 ng/mL Final   06/21/2022 21.81 (H) 0.00 - 6.50 ug/L Final   03/31/2022 18.25 (H) 0.00 - 6.50 ug/L Final   12/22/2021 12.47 (H) 0.00 - 6.50 ug/L Final        Impression     The patient is a 76-year-old gentleman with multiple history of cancer in the past and a new finding of left upper lobe lung cancer.  The restaging PET CT scan showed new 1.1 cm FDG avid left upper lobe nodule consistent with malignant lung tumor and a sclerotic hypermetabolic metastasis within the T7 vertebral body.The patient received stereotactic radiosurgery for T7 spine 5-month ago and SBRT for lung cancer 6 months ago with restaging CT scan showed good response and no evidence of recurrence.    Assessment & Plan:     1.  I have personally reviewed his most recent CT scan and compared to the previous CT scan today.  The patient had a good response of his lung cancer there is no evidence of cancer recurrence.  We will schedule patient to have restaging CT scan in 3 months and follow-up.     2.  His PSA this time is 23.5 and has been continue to increase.    This is indication of progression of disease from his prostate cancer.  The patient is now considering possible hormonal therapy and I will refer patient to medical oncology for evaluation and discussion.     3.  Recommend  "patient to continue follow-up with Dr. Alon Nunez, ENT for his head neck cancer surveillance.     4.  Continue follow-up with Dr. Shade Boyd, PCP for other medical needs.     Face to face time  30 minutes with > 80% spent on consultation, education and coordination of care.        Mariana Batista MD, PhD  Department of Radiation Oncology   [facility]  Tel: [phone]  Page: [phone]    [facility]  [address] [place] [address]     [facility]   [address] [place] [address]    CC:  Patient Care Team:  Shade Boyd MD as PCP - General (Internal Medicine)  Mariana Batista MD as MD (Hematology & Oncology)  Faviola Cottrell RP as Pharmacist (Pharmacist)  Mariana Batista MD as Assigned Cancer Care Provider  Sim Jaffe MD as Assigned Surgical Provider  Shade Boyd MD as Assigned PCP  Rosas Carr MD as MD (Neurology)  Faviola Cottrell RPH as Assigned MTM Pharmacist  Nilton Gtz MD as Assigned Musculoskeletal Provider      Patient here ambulatory accompanied by family for follow up s/p radiation for his lung cancer.  Seen by Dr. Batista.  Plan return to clinic for follow up as directed by physician.    Oncology Rooming Note    September 21, 2022 1:17 PM   Lenny Chau is a 76 year old male who presents for:    Chief Complaint   Patient presents with     Oncology Clinic Visit     Follow up with Dr. Batista     Initial Vitals: BP (!) 141/73 (BP Location: Right arm, Patient Position: Sitting, Cuff Size: Adult Regular)   Pulse 58   Temp 97.7  F (36.5  C) (Oral)   Resp 16   Wt 87.9 kg (193 lb 12.8 oz)   SpO2 96%   BMI 26.65 kg/m   Estimated body mass index is 26.65 kg/m  as calculated from the following:    Height as of 7/25/22: 1.816 m (5' 11.5\").    Weight as of this encounter: 87.9 kg (193 lb 12.8 oz). Body surface area is 2.11 meters squared.  No Pain (0) Comment: Data Unavailable   No LMP for male patient.  Allergies reviewed: Yes  Medications " reviewed: Yes    Medications: Medication refills not needed today.  Pharmacy name entered into EPIC: [facility] [id number] [place] [address]    Clinical concerns: CT results, neuropathy in feet started on gabapentin Dr. Batista was notified.      Anne-Marie Durbin RN                Again, thank you for allowing me to participate in the care of your patient.        Sincerely,        Mariana Batista MD

## 2022-09-21 NOTE — PROGRESS NOTES
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  The biopsy confirmed moderately differentiated adenocarcinoma.        TYPE OF RADIATION THERAPY ADMINISTERED:   1. SBRT to the left upper lobe with a total dose of 5000 cGy in 5 treatments given from 3/2/2021-3/10/2021.     2. SBRT with a total dose of 5000 cGy in 5 treatments for the second left tumor given from 3/9/2022-3/18/2022.      3. SBRT with a total dose of 3275 cGy in 5 treatments for T7 spine given from 2022- 2022.     INTERVAL SINCE COMPLETION OF RADIATION THERAPY: 5 months.      SUBJECTIVE:  Mr. Chau is a 76 y.o. male who is a chronic smoker and quit smoking since .  The patient had a complicated history of multiple cancer in the past as detailed as followin.  Low risk prostate cancer, clinical stage T1c N0 M0, recent grade 3+3 =6 and the last PSA was 10.4 in 2006.  The patient is currently on clinical observation with no therapy.  PSA: 12.47 on 2021.     2.  Floor of mouth cancer, status post surgical resection in 1994 with no adjuvant therapy.     3.  Left kidney hemangioma, status post left nephrectomy on 3/26/2009.     4.  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  Patient had local recurrence and is  status post surgical resection on 9/11/2009.     5.  Non-small cell lung cancer involving right lung, stage I, status post right thoracotomy and excision of right upper lobe and right middle lobe lung tumor 11/5/2015 with no evidence of lymph node metastasis and no adjuvant therapy.  Patient lost to follow-up since 2016.     6.  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     7.  Left upper lobe lung  cancer, stage T1N0 M0.  The biopsy confirmed moderately differentiated adenocarcinoma on 1/21/2021.  The patient received SBRT with a total dose of 5000 cGy in 5 treatments given from 3/2/2021-3/10/2021.     Patient had two prior early stage right lung cancers that were apparently resected in their entirety and may have been synchronous primary lung cancers. It does not appear he had the appropriate recommended follow up at [facility]. The MELODY lesion was known to be present in 2016 and appears to be slightly larger now with significant FDG-avidity on the recent PET/CT. It is likely another primary lung cancer, although metastasis from previous lung cancers is possible.  The patient had a restaging PET CT scan on 8/31/2020.  The scan showed enlarging FDG avid left upper lobe mass measuring 2.1 x 2.5 cm with central solid component consistent with primary lung cancer without metastasis.  There was also a FDG avid lesion in the left buccal region consistent with squamous cell carcinoma without metastasis.  The patient had a surgical resection for his left buccal/lateral tongue squamous cell carcinoma 11/6/2020 by KERRY Renee with pathology showed squamous cell carcinoma with negative margin.  He was determined not a good candidate for lung surgery given his complicated medical history and health status.  He therefore received definitive radiation therapy using SBRT with a total dose of 5000 cGy in  5 treatments given from 3/2/2021-3/10/2021. The patient tolerated radiation therapy very well with minimal side effect.     The patient has been doing well since completion of radiation therapy.  He denies any pain or discomfort related to the therapy at the time of evaluation.  The patient was found to a new 9 mm small nodule in the left upper lobe outside of patient therapy area from restaging CT scan on 12/20/2021.  He was recommended to have staging PET CT scan and was done 1/7/2022. 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  There is no evidence of other systemic metastasis.The patient's case has been discussed at thoracic tumor conference 1/13/2022.  MRI brain on 1/22/2022 showed no evidence of brain metastasis. Patient is not good candidate for surgery and poor candidate for systemic therapy given his current medical status.  The consensus recommendation is to consider SBRT for his oligo disease in the left lung and T7 spine if the patient wishes not to consider systemic therapy at this time.   The patient received the second course of SBRT to the second left lung cancer with a total dose of 5000 cGy in 5 treatments given from 3/9/2022-3/18/2022.  He tolerated radiation therapy very well with minimal side effect.      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  He received stereotactic radiosurgery with a total dose of 3275 cGy in 5 treatments given from 4/12/2022- 4/22/2022.  The patient tolerated ration therapy very well with minimal side effect.     The patient has been stable since completion of radiation therapy.  He denies any new symptoms at the time of evaluation.  He is here for routine post therapy office follow-up.    Medications were reviewed and are up to date on EPIC.    The following portions of the patient's history were reviewed and updated as appropriate:  allergies, current medications, past family history, past medical history, past social history, past surgical history and problem list.    Review of Systems:      General  Constitutional  Constitutional (WDL): All constitutional elements are within defined limits  EENT  Eye Disorders  Eye Disorder (WDL): All eye disorder elements are within defined limits (wears glasses)  Ear Disorders  Ear Disorder (WDL): All ear disorder elements are within defined limits  Respiratory  Respiratory  Respiratory (WDL): Exceptions to WDL  Cough: Mild symptoms OR nonprescription intervention indicated (occasional dry cough)  Cardiovascular  Cardiovascular  Cardiovascular (WDL): All cardiovascular elements are within defined limits  Gastrointestinal  Gastrointestinal  Gastrointestinal (WDL): Exceptions to WDL  Constipation: Occasional or intermittent symptoms OR occasional use of stool softeners, laxatives, dietary modification, or enema  Musculoskeletal  Musculoskeletal and Connective Tissue Disorders  Musculoskeletal & Connective (WDL): Exceptions to WDL  Arthralgia: Mild pain (back, right hip)  Bone Pain: Mild pain (back, right hip)  Generalized Muscle Weakness: Symptomatic OR perceived by patient but not evident on physical exam (uses walker occasionally)  Integumentary  Integumentary  Integumentary (WDL): All integumentary elements are within defined limits  Neurological  Neurosensory  Neurosensory (WDL): Exceptions to WDL  Peripheral Motor Neuropathy: Asymptomatic OR clinical or diagnostic observations only (feet)  Ataxia: Asymptomatic OR clinical or diagnostic observations only OR intervention not indicated  Peripheral Sensory Neuropathy: Asymptomatic (feet)  Genitourinary/Reproductive  Genitourinary  Genitourinary (WDL): Exceptions to WDL (nocturia x2)  Urinary Frequency: Present  Lymphatic  Lymph System Disorders  Lymph (WDL): All lymph elements are within defined limits  Pain  Pain Score: No Pain (0)  AUA Assessment                                                               Accompanied by  Accompanied By: family (daughter)    Objective:      PHYSICAL EXAMINATION:    BP (!) 141/73 (BP Location: Right arm, Patient Position: Sitting, Cuff Size: Adult Regular)   Pulse 58   Temp 97.7  F (36.5  C) (Oral)   Resp 16   Wt 87.9 kg (193 lb 12.8 oz)   SpO2 96%   BMI 26.65 kg/m      Gen: Alert, in NAD  Eyes: PERRL, EOMI, sclera anicteric  HENT     Head: NC/AT     Ears: No external auricular lesions     Nose/sinus: No rhinorrhea or epistaxis     Oropharynx: MMM, no visible oral lesions  Neck: Supple, full ROM, no LAD  Pulm: No wheezing, stridor or respiratory distress  CV: Well-perfused, no cyanosis, no pedal edema  Abdominal: BS+, soft, nontender, nondistended, no hepatomegaly  Back: No step-offs or pain to palpation along the thoracolumbar spine  Rectal: Deferred  : Deferred  Musculoskeletal: Normal muscle bulk and tone  Skin: Normal color and turgor  Neurologic: A/Ox3, CN II-XII intact, normal gait and station  Psychiatric: Appropriate mood and affect     Imaging: Imaging results 30 days: CT Chest w/o Contrast    Result Date: 9/19/2022  EXAM: CT CHEST W/O CONTRAST LOCATION: [facility] DATE/TIME: 9/19/2022 12:46 PM INDICATION: Non-small cell lung cancer, metastatic, assess treatment response COMPARISON: CT 6/16/2022 and 12/20/2021 PET scan 01/07/2022.. TECHNIQUE: CT chest without IV contrast. Multiplanar reformats were obtained. Dose reduction techniques were used. CONTRAST: None. FINDINGS: LUNGS AND PLEURA: Post treatment changes around the left upper lobe lung mass and fiducial marker fairly similar to the 2 prior CTs. Maximum diameter of 3.2 cm image 59 series 5. The nodule just anterior to this is less well defined due to treatment changes but clearly has not increased in size. Likely slightly smaller measuring 8 mm image 62. Posttreatment changes right upper lobe unchanged. No concerning new findings.  MEDIASTINUM/AXILLAE: No lymphadenopathy. CORONARY ARTERY CALCIFICATION: Severe. UPPER ABDOMEN: Left nephrectomy. Stable cyst in the body the pancreas. MUSCULOSKELETAL: Unremarkable.     IMPRESSION: 1.  Stable post treatment changes left upper lobe. 2.  The mass and nodule described previously more difficult to definitively measure due to treatment changes but no increase in size. 3.  Posttreatment changes right upper lobe are stable. 4.  No concerning new findings.     Prostate Specific Antigen Screen   Date Value Ref Range Status   05/08/2021 34.0 (H) 0.0 - 6.5 ng/mL Final   03/09/2021 23.5 (H) < OR = 4.0 ng/mL Final     Comment:     The total PSA value from this assay system is   standardized against the WHO standard. The test   result will be approximately 20% lower when compared   to the equimolar-standardized total PSA ([place]). Comparison of serial PSA results should be   interpreted with this fact in mind.     This test was performed using the Siemens   chemiluminescent method. Values obtained from   different assay methods cannot be used  interchangeably. PSA levels, regardless of  value, should not be interpreted as absolute  evidence of the presence or absence of disease.  Lab test performed by:  Lab Mnemonic:  [facility]  [address] [place] [address]  Nando Nunez M.D.  QUEST Specimen received date and time: 09-MAR-2021 12:46:00.00     05/06/2019 15.8 (H) < OR = 4.0 ng/mL Final     Comment:     The total PSA value from this assay system is   standardized against the WHO standard. The test   result will be approximately 20% lower when compared   to the equimolar-standardized total PSA ([place]). Comparison of serial PSA results should be   interpreted with this fact in mind.     This test was performed using the Siemens   chemiluminescent method. Values obtained from   different assay methods cannot be used  interchangeably. PSA levels, regardless  of  value, should not be interpreted as absolute  evidence of the presence or absence of disease.  Lab test performed by:  Lab Mnemonic:  [facility]  [address] [place] [address]  Nando Nunez M.D.  QUEST Specimen received date and time: 06-MAY-2019 08:07:00.00     10/15/2018 10.8 (H) < OR = 4.0 ng/mL Final     Comment:     The total PSA value from this assay system is   standardized against the WHO standard. The test   result will be approximately 20% lower when compared   to the equimolar-standardized total PSA ([place]). Comparison of serial PSA results should be   interpreted with this fact in mind.     This test was performed using the Siemens   chemiluminescent method. Values obtained from   different assay methods cannot be used  interchangeably. PSA levels, regardless of  value, should not be interpreted as absolute  evidence of the presence or absence of disease.  Lab test performed by:  Lab Mnemonic:  [facility]  [address] [place] [address]  Nando Nunez M.D.  QUEST Specimen received date and time: 15-OCT-2018 17:14:00.00     03/29/2018 13.0 (H) < OR = 4.0 ng/mL Final     Comment:     The total PSA value from this assay system is   standardized against the WHO standard. The test   result will be approximately 20% lower when compared   to the equimolar-standardized total PSA ([place]). Comparison of serial PSA results should be   interpreted with this fact in mind.     This test was performed using the Siemens   chemiluminescent method. Values obtained from   different assay methods cannot be used  interchangeably. PSA levels, regardless of  value, should not be interpreted as absolute  evidence of the presence or absence of disease.  Lab test performed by:  Lab Mnemonic:[facility]  [address] [place] [address]  Nando Nunez M.D.  QUEST Specimen received date and time:  29-MAR-2018 16:01:00.00     09/11/2017 11.9 (H) < OR = 4.0 ng/mL Final     Comment:     The total PSA value from this assay system is   standardized against the WHO standard. The test   result will be approximately 20% lower when compared   to the equimolar-standardized total PSA ([place]). Comparison of serial PSA results should be   interpreted with this fact in mind.     This test was performed using the Siemens   chemiluminescent method. Values obtained from   different assay methods cannot be used  interchangeably. PSA levels, regardless of  value, should not be interpreted as absolute  evidence of the presence or absence of disease.  Lab test performed by:  Lab Mnemonic:[facility]  [address] [place] [address]  Nando Nunez M.D.  QUEST Specimen received date and time: 11-SEP-2017 08:29:00.00     07/15/2016 10.4 (H) < OR = 4.0 ng/mL Final     Comment:        This test was performed using the Siemens  chemiluminescent method. Values obtained from  different assay methods cannot be used  interchangeably. PSA levels, regardless of  value, should not be interpreted as absolute  evidence of the presence or absence of disease.     Lab test performed by:  Lab Mnemonic:[facility]  [address] [email or web address] [address]  Nando Nunez M.D.     02/09/2016 7.4 (H) < OR = 4.0 ng/mL Final     Comment:        This test was performed using the Siemens  chemiluminescent method. Values obtained from  different assay methods cannot be used  interchangeably. PSA levels, regardless of  value, should not be interpreted as absolute  evidence of the presence or absence of disease.     Lab test performed by:  Lab Mnemonic:[facility]  [address] [place] [address]  Nando Nunez M.D.     04/22/2015 7.2 (H) < OR = 4.0 ng/mL Final     Comment:        This test was performed using the Siemens  chemiluminescent method.  Values obtained from  different assay methods cannot be used  interchangeably. PSA levels, regardless of  value, should not be interpreted as absolute  evidence of the presence or absence of disease.       Lab test performed by:  Lab Mnemonic:[facility] [place]  [address] [place] [address]  Nando Nunez M.D.     03/19/2014 6.8 (H) < OR = 4.0 ng/mL Final     Comment:        This test was performed using the Siemens  chemiluminescent method. Values obtained from  different assay methods cannot be used  interchangeably. PSA levels, regardless of  value, should not be interpreted as absolute  evidence of the presence or absence of disease.       Lab test performed by:  Lab Mnemonic:[facility] [place]  [address] [place] [address]  Nando Nunez M.D.     03/14/2013 6.1 (H) < OR = 4.0 ng/mL Final     Comment:        This test was performed using the Siemens  chemiluminescent method. Values obtained from  different assay methods cannot be used  interchangeably. PSA levels, regardless of  value, should not be interpreted as absolute  evidence of the presence or absence of disease.       Lab test performed by:  Lab Mnemonic:  [facility] [place]  [address] [place] [address]  Nando Nunez M.D.     03/06/2012 5.5 (H) < OR = 4.0 ng/mL Final     Comment:        This test was performed using the Siemens  chemiluminescent method. Values obtained from  different assay methods cannot be used  interchangeably. PSA levels, regardless of  value, should not be interpreted as absolute  evidence of the presence or absence of disease.       Lab test performed by:  Lab Mnemonic:[facility]  [address] [place] [address]  Nando Nunez M.D.     06/10/2011 4.7 (H) < OR = 4.0 ng/mL Final     Comment:        This test was performed using the Siemens  chemiluminescent method. Values obtained from  different assay  methods cannot be used  interchangeably. PSA levels, regardless of  value, should not be interpreted as absolute  evidence of the presence or absence of disease.       Lab test performed by:  Lab Mnemonic:  [facility]  [address] [place] [address]  Nando Nunez M.D.       PSA Tumor Marker   Date Value Ref Range Status   09/19/2022 23.50 (H) 0.00 - 6.50 ng/mL Final   06/21/2022 21.81 (H) 0.00 - 6.50 ug/L Final   03/31/2022 18.25 (H) 0.00 - 6.50 ug/L Final   12/22/2021 12.47 (H) 0.00 - 6.50 ug/L Final        Impression     The patient is a 76-year-old gentleman with multiple history of cancer in the past and a new finding of left upper lobe lung cancer.  The restaging PET CT scan showed new 1.1 cm FDG avid left upper lobe nodule consistent with malignant lung tumor and a sclerotic hypermetabolic metastasis within the T7 vertebral body.The patient received stereotactic radiosurgery for T7 spine 5-month ago and SBRT for lung cancer 6 months ago with restaging CT scan showed good response and no evidence of recurrence.    Assessment & Plan:     1.  I have personally reviewed his most recent CT scan and compared to the previous CT scan today.  The patient had a good response of his lung cancer there is no evidence of cancer recurrence.  We will schedule patient to have restaging CT scan in 3 months and follow-up.     2.  His PSA this time is 23.5 and has been continue to increase.    This is indication of progression of disease from his prostate cancer.  The patient is now considering possible hormonal therapy and I will refer patient to medical oncology for evaluation and discussion.     3.  Recommend patient to continue follow-up with Dr. Alon Nunez, ENT for his head neck cancer surveillance.     4.  Continue follow-up with Dr. Shade Boyd, PCP for other medical needs.     Face to face time  30 minutes with > 80% spent on consultation, education and coordination of  care.        Mariana Batista MD, PhD  Department of Radiation Oncology   [facility]  Tel: [phone]  Page: [phone]    [facility]  [address] [place] [address]     [facility]   [address] [place] [address]    CC:  Patient Care Team:  Shade Boyd MD as PCP - General (Internal Medicine)  Mariana Batista MD as MD (Hematology & Oncology)  Faviola Cottrell [facility] as Pharmacist (Pharmacist)  Mariana Batista MD as Assigned Cancer Care Provider  Sim Jaffe MD as Assigned Surgical Provider  Shade Boyd MD as Assigned PCP  Rosas Carr MD as MD (Neurology)  Faviola Cottrell [facility] as Assigned MTM Pharmacist  Nilton Gtz MD as Assigned Musculoskeletal Provider

## 2022-09-21 NOTE — PROGRESS NOTES
"Patient here ambulatory accompanied by family for follow up s/p radiation for his lung cancer.  Seen by Dr. Batista.  Plan return to clinic for follow up as directed by physician.    Oncology Rooming Note    September 21, 2022 1:17 PM   Lenny Chau is a 76 year old male who presents for:    Chief Complaint   Patient presents with     Oncology Clinic Visit     Follow up with Dr. Batista     Initial Vitals: BP (!) 141/73 (BP Location: Right arm, Patient Position: Sitting, Cuff Size: Adult Regular)   Pulse 58   Temp 97.7  F (36.5  C) (Oral)   Resp 16   Wt 87.9 kg (193 lb 12.8 oz)   SpO2 96%   BMI 26.65 kg/m   Estimated body mass index is 26.65 kg/m  as calculated from the following:    Height as of 7/25/22: 1.816 m (5' 11.5\").    Weight as of this encounter: 87.9 kg (193 lb 12.8 oz). Body surface area is 2.11 meters squared.  No Pain (0) Comment: Data Unavailable   No LMP for male patient.  Allergies reviewed: Yes  Medications reviewed: Yes    Medications: Medication refills not needed today.  Pharmacy name entered into [facility]: [id number] [place] [address]    Clinical concerns: CT results, neuropathy in feet started on gabapentin Dr. Batista was notified.      Anne-Marie Durbin RN            "

## 2022-09-22 ENCOUNTER — LAB (OUTPATIENT)
Dept: LAB | Facility: CLINIC | Age: 77
End: 2022-09-22
Payer: COMMERCIAL

## 2022-09-22 DIAGNOSIS — Z78.9 TAKES DIETARY SUPPLEMENTS: ICD-10-CM

## 2022-09-22 DIAGNOSIS — F10.21 HISTORY OF ALCOHOLISM (H): ICD-10-CM

## 2022-09-22 DIAGNOSIS — N18.9 CHRONIC KIDNEY DISEASE, UNSPECIFIED CKD STAGE: ICD-10-CM

## 2022-09-22 LAB
ANION GAP SERPL CALCULATED.3IONS-SCNC: 12 MMOL/L (ref 7–15)
BUN SERPL-MCNC: 23.7 MG/DL (ref 8–23)
CALCIUM SERPL-MCNC: 9.6 MG/DL (ref 8.8–10.2)
CHLORIDE SERPL-SCNC: 100 MMOL/L (ref 98–107)
CREAT SERPL-MCNC: 1.59 MG/DL (ref 0.67–1.17)
DEPRECATED HCO3 PLAS-SCNC: 25 MMOL/L (ref 22–29)
GFR SERPL CREATININE-BSD FRML MDRD: 44 ML/MIN/1.73M2
GLUCOSE SERPL-MCNC: 56 MG/DL (ref 70–99)
MAGNESIUM SERPL-MCNC: 1.8 MG/DL (ref 1.7–2.3)
POTASSIUM SERPL-SCNC: 5.1 MMOL/L (ref 3.4–5.3)
SODIUM SERPL-SCNC: 137 MMOL/L (ref 136–145)
VIT B12 SERPL-MCNC: 538 PG/ML (ref 232–1245)

## 2022-09-22 PROCEDURE — 82607 VITAMIN B-12: CPT

## 2022-09-22 PROCEDURE — 84207 ASSAY OF VITAMIN B-6: CPT | Mod: 90

## 2022-09-22 PROCEDURE — 84425 ASSAY OF VITAMIN B-1: CPT | Mod: 90

## 2022-09-22 PROCEDURE — 82310 ASSAY OF CALCIUM: CPT

## 2022-09-22 PROCEDURE — 83735 ASSAY OF MAGNESIUM: CPT

## 2022-09-22 PROCEDURE — 99000 SPECIMEN HANDLING OFFICE-LAB: CPT

## 2022-09-28 LAB
PYRIDOXAL PHOS SERPL-SCNC: 63.5 NMOL/L
VIT B1 PYROPHOSHATE BLD-SCNC: 186 NMOL/L

## 2022-09-29 ENCOUNTER — OFFICE VISIT (OUTPATIENT)
Dept: FAMILY MEDICINE | Facility: CLINIC | Age: 77
End: 2022-09-29
Payer: COMMERCIAL

## 2022-09-29 VITALS
BODY MASS INDEX: 26.82 KG/M2 | OXYGEN SATURATION: 96 % | SYSTOLIC BLOOD PRESSURE: 122 MMHG | WEIGHT: 195 LBS | HEART RATE: 114 BPM | DIASTOLIC BLOOD PRESSURE: 78 MMHG

## 2022-09-29 DIAGNOSIS — C61 MALIGNANT NEOPLASM OF PROSTATE (H): ICD-10-CM

## 2022-09-29 DIAGNOSIS — Z00.00 HEALTH CARE MAINTENANCE: ICD-10-CM

## 2022-09-29 DIAGNOSIS — M54.41 CHRONIC MIDLINE LOW BACK PAIN WITH RIGHT-SIDED SCIATICA: ICD-10-CM

## 2022-09-29 DIAGNOSIS — G89.29 CHRONIC MIDLINE LOW BACK PAIN WITH RIGHT-SIDED SCIATICA: ICD-10-CM

## 2022-09-29 DIAGNOSIS — F10.10 ALCOHOL ABUSE: Primary | ICD-10-CM

## 2022-09-29 DIAGNOSIS — I10 PRIMARY HYPERTENSION: ICD-10-CM

## 2022-09-29 DIAGNOSIS — I25.118 CORONARY ARTERY DISEASE OF NATIVE HEART WITH STABLE ANGINA PECTORIS, UNSPECIFIED VESSEL OR LESION TYPE (H): ICD-10-CM

## 2022-09-29 DIAGNOSIS — C34.12 MALIGNANT NEOPLASM OF UPPER LOBE OF LEFT LUNG (H): ICD-10-CM

## 2022-09-29 DIAGNOSIS — J44.9 COPD, GROUP B, BY GOLD 2017 CLASSIFICATION (H): ICD-10-CM

## 2022-09-29 DIAGNOSIS — E78.5 DYSLIPIDEMIA: Primary | ICD-10-CM

## 2022-09-29 DIAGNOSIS — F10.10 ALCOHOL ABUSE: ICD-10-CM

## 2022-09-29 DIAGNOSIS — N18.31 STAGE 3A CHRONIC KIDNEY DISEASE (H): ICD-10-CM

## 2022-09-29 DIAGNOSIS — Z79.899 ENCOUNTER FOR LONG-TERM (CURRENT) USE OF MEDICATIONS: ICD-10-CM

## 2022-09-29 PROCEDURE — 99214 OFFICE O/P EST MOD 30 MIN: CPT | Performed by: INTERNAL MEDICINE

## 2022-09-29 NOTE — ASSESSMENT & PLAN NOTE
s/p right wedge resection of limited stage adenocarcinoma of lung (11/2015)  - 1-3/2021 XRT to MELODY malignancy (PET avid)  - following with [facility] radiation oncology

## 2022-09-29 NOTE — ASSESSMENT & PLAN NOTE
- limited benefit with NSAIDs and APAP  H/o MIRNA after hip fracture from fall (6/2016)  - working with Ortho, Dr. Sainz - previous attempts at intra-articular right hip injection - no lasting benefit  - poor response to oxycodone  - MRI L-spine (4/2022): high grade spinal stenosis, with severe L4-5 bilateral foraminal stenosis   - using gabapentin 200mg TID - noticeable symptom improvement  - working with Ortho spine - consideration for future surgery

## 2022-09-29 NOTE — ASSESSMENT & PLAN NOTE
NSTEMI with PCI to RCA with multi-vessel disease (non-obstructive LAD lesion); following with Dr. Valentine  - DAPT stopped (10/2020): profound anemia, concerns for GIB - since tolerating ASA 81mg daily  - Toprol XL 25mg daily  - statin and ezetimibe stopped due to rhabdo (5/2021)   - consider future rechallenge on statin

## 2022-09-29 NOTE — ASSESSMENT & PLAN NOTE
prostate Ca; watchful waiting   - original prostate biopsy in '11, repeat biopsy in '16; Ken 6   - watchful surveillance; q6 month PSA; progressively rising (9/2022t 23.5)   - MRI of prostate (2019): focal coarse calcifications in prostate; no evidence of extra-prostate extension  - referred on to medical oncology to consider GnRH therapy

## 2022-09-29 NOTE — PROGRESS NOTES
Ordering follow-up vitamin B1 (thiamine) lab with vitamin B1 discontinuation today.  Per collaborative practice agreement with primary care provider: Shade Boyd MD.    Routing to provider as FYI.    Sara Cottrell PharmD  Medication Therapy Management (MTM) Pharmacist

## 2022-09-29 NOTE — ASSESSMENT & PLAN NOTE
Vaccinations up to date   - completed COVID bivalent booster and influenza  - discussed advance directive - has paperwork, not completed

## 2022-09-29 NOTE — PROGRESS NOTES
Assessment & Plan   Problem List Items Addressed This Visit        Nervous and Auditory    Coronary artery disease of native heart with stable angina pectoris, unspecified vessel or lesion type (H)     NSTEMI with PCI to RCA with multi-vessel disease (non-obstructive LAD lesion); following with Dr. Valentine  - DAPT stopped (10/2020): profound anemia, concerns for GIB - since tolerating ASA 81mg daily  - Toprol XL 25mg daily  - statin and ezetimibe stopped due to rhabdo (5/2021)   - consider future rechallenge on statin           Chronic midline low back pain with right-sided sciatica     - limited benefit with NSAIDs and APAP  H/o MIRNA after hip fracture from fall (6/2016)  - working with Ortho, Dr. Sainz - previous attempts at intra-articular right hip injection - no lasting benefit  - poor response to oxycodone  - MRI L-spine (4/2022): high grade spinal stenosis, with severe L4-5 bilateral foraminal stenosis   - using gabapentin 200mg TID - noticeable symptom improvement  - working with Ortho spine - consideration for future surgery              Respiratory    COPD, group B, by GOLD 2017 classification (H)     - on Trelegy daily           Malignant neoplasm of upper lobe of left lung (H)     s/p right wedge resection of limited stage adenocarcinoma of lung (11/2015)  - 1-3/2021 XRT to MELODY malignancy (PET avid)  - following with [facility] radiation oncology              Endocrine    Dyslipidemia - Primary       Circulatory    Primary hypertension     controlled  current antihypertensive regimen: Toprol XL 25mg daily  regimen changes: none  intolerance:  future titration/work-up plan:               Urinary    Stage 3a chronic kidney disease (H)     - baseline SCr 1.7  - multiple LEILA episodes; h/o contrast induced nephropathy (VITOR)  previous multiple LEILA (prior h/o of LEILA in 1/2020, 10/2020); no previous dialysis needs  - lisinopril indefinitely on hold  - heightened risk for future contrast induced nephropathy  -  "recheck CKD labs today           Malignant neoplasm of prostate (H)     prostate Ca; watchful waiting   - original prostate biopsy in '11, repeat biopsy in '16; [place] 6   - watchful surveillance; q6 month PSA; progressively rising (9/2022t 23.5)   - MRI of prostate (2019): focal coarse calcifications in prostate; no evidence of extra-prostate extension  - referred on to medical oncology to consider GnRH therapy              Other    Alcohol abuse    Health care maintenance     Vaccinations up to date   - completed COVID bivalent booster and influenza  - discussed advance directive - has paperwork, not completed             Other Visit Diagnoses     Encounter for long-term (current) use of medications                    BMI:   Estimated body mass index is 26.82 kg/m  as calculated from the following:    Height as of 7/25/22: 1.816 m (5' 11.5\").    Weight as of this encounter: 88.5 kg (195 lb).         Return in about 6 months (around 3/29/2023) for Follow up, with me.    Shade Boyd MD  [facility]    Subjective   Mitchell is a 77 year old accompanied by his spouse, presenting for the following health issues: Returns for general follow-up.  Overall has been doing remarkably well.  Wife's health is declining, has been is her caretaker primarily related to memory.  Recently seen by radiation oncologist.  Stable status of previous upper lobe lung cancer after radiation treatment.  Had been referred to medical oncology for consideration of possible hormone therapies for prostate cancer.  He continues to be resistant to more invasive cares including radiation and prostatectomy.  Breathing doing well at baseline on Trelegy.  Low back issues have generally been better with gabapentin introduction, currently taking 200 mg 3 times a day.  Did meet with orthospine and told he had severe spinal stenosis and bilateral foraminal stenosis at L4-L5 level.  Consideration for future surgery which Mitchell " would understandably like to avoid.  No anginal complaints.  No bleeding complications.  Abstaining from alcohol  Follow Up (Lab Results)      History of Present Illness       Reason for visit:  Check up    He eats 0-1 servings of fruits and vegetables daily.He consumes 1 sweetened beverage(s) daily.He exercises with enough effort to increase his heart rate 10 to 19 minutes per day.  He exercises with enough effort to increase his heart rate 3 or less days per week.   He is taking medications regularly.         Review of Systems   Constitutional, HEENT, cardiovascular, pulmonary, gi and gu systems are negative, except as otherwise noted.      Objective    /78 (BP Location: Right arm, Cuff Size: Adult Regular)   Pulse 114   Wt 88.5 kg (195 lb)   SpO2 96%   BMI 26.82 kg/m    Body mass index is 26.82 kg/m .  Physical Exam   GENERAL: healthy, alert and no distress  NECK: no adenopathy, no asymmetry, masses, or scars and thyroid normal to palpation  RESP: lungs clear to auscultation - no rales, rhonchi or wheezes  CV: regular rate and rhythm, normal S1 S2, no S3 or S4, no murmur, click or rub, no peripheral edema and peripheral pulses strong  ABDOMEN: soft, nontender, no hepatosplenomegaly, no masses and bowel sounds normal  MS: no gross musculoskeletal defects noted, no edema    Lab on 09/22/2022   Component Date Value Ref Range Status     Vitamin B12 09/22/2022 538  232 - 1,245 pg/mL Final     Magnesium 09/22/2022 1.8  1.7 - 2.3 mg/dL Final     Vitamin B6 09/22/2022 63.5  20.0 - 125.0 nmol/L Final    INTERPRETIVE INFORMATION: Vitamin B6 (Pyridoxal 5-Phosphate)    Pyridoxal 5'-phosphate measured in a specimen collected   following an 8-hour or overnight fast accurately indicates   vitamin B6 nutritional status. Non-fasting specimen   concentration reflects recent vitamin intake.    This test was developed and its performance characteristics   determined by [facility]. It has not been cleared or    approved by the US Food and Drug Administration. This test   was performed in a CLIA certified laboratory and is   intended for clinical purposes.  Performed By: [facility]  [address] [place] [id number]  : Gato Hdz MD, PhD     Vitamin B1 Whole Blood Level 09/22/2022 186 (A) 70 - 180 nmol/L Final    INTERPRETIVE INFORMATION: Vitamin B1, Whole Blood    This assay measures the concentration of thiamine   diphosphate (TDP), the primary active form of vitamin B1.   Approximately 90 percent of vitamin B1 present in whole   blood is TDP. Thiamine and thiamine monophosphate, which   comprise the remaining 10 percent, are not measured.    This test was developed and its performance characteristics   determined by [email or web address]. It has not been cleared or   approved by the US Food and Drug Administration. This test   was performed in a CLIA certified laboratory and is   intended for clinical purposes.  Performed By: [facility]  [address] [place] [id number]  : Gato Hdz MD, PhD

## 2022-09-30 NOTE — PROGRESS NOTES
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    Impression:  1.  Serologically progressive but clinically stable prostatic adenocarcinoma  2.  Enlarging right buccal mucosal mass  3.  Enlarging 6.8 cm abdominal aortic aneurysm  4.  History of metachronous bilateral primary lung neoplasms, s/p March-April, 2022 SBRT and SRS to lung and T7 spine lesion.    Recommendation, plan, instructions:  1.  Radioisotope whole-body scan and MR prostate with contrast  2.  ENT to see regarding ENT surveillance exam and reexamination of right buccal mucosal lesion  3.  Vascular medicine examination to reevaluate enlarging 6.8 cm AAA seen on 1/2022 PET scan, s/p 2011 endovascular stent  4.  Follow-up with me after bone scan and MR prostate study results are available to discuss how we would intervene.    Time with patient including review, documentation, history, examination, coordination of care and counseling was 60 minutes.    History of present illness:  In 2011, a [place] grade 3+3, cT1a prostatic adenocarcinoma with a PSA of 4.7 was diagnosed and observed. A subsequent July, 2016 PSA was 10.4 with repeat biopsy revealing a [place] grade 3+3, cT1c lesion which continued to be observed.  He has been followed by Dr. Brett Bustos of [facility] with evidently a last MR scan(I see no report) in 2019 revealing no apparent extraprostatic extension when his PSA was 15.8.    With various PSA methods his 23.5 on 3/9/2021, 34 on 5/8/2021, 12.47 on 12/22/2020, 18.25 on 3/31/2022, 21.81 on 6/21/2020, and finally 23.5 on 9/19/2022.     He complains of stable, mild lower  urinary tract symptoms with nocturia 2-4, mild urgency, no incontinence or hesitancy.    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  surgery for localized recurrence.     left nephrectomy for a hemangioma     right RUL/RML wedge resection for a stage I, (pN0) lung carcinoma     surgical resection by Dr. Alon Nunez for a left oral (buccal, base of tongue) SCC     SBRT 5,000 cGy/5 until 3/10/2021 for a left upper lobe cT1 cN0 moderately differentiated adenocarcinoma.    2022 PET revealing new 1.1 cm left upper lobe nodule and T7 sclerotic bone lesion presumed to be recurrence treated with SBRT 5,000 cGy/5 until 3/18/2022 to the lung lesion and SRS 3,000 cGy/5 until 2022 to the spine lesion.    2022 ENT evaluation by Dr. Sim Jaffe for a new right buccal lesion with negative biopsies.    Subsequent 2022 CT chest revealed stable left upper lobe lesion without evidence of further progression or recurrence.    Past medical history:  Remarkable for excessive alcohol abstinent since 2021, COPD, coronary artery disease, hyperlipidemia, hypertension, 3 CKD, DJD, AAA, s/p 2011 endovascular stent by Dr. David David.    Family history:  I have reviewed this patient's family history and updated it with pertinent information if needed.  Family History   Problem Relation Age of Onset     Alcoholism Mother          at age:38 years Cause of death: Suicide     Suicide Mother      No Known Problems Father          at age: unknown     Alcoholism Sister      Breast Cancer Sister      Cerebrovascular Disease Sister      Obesity Sister          at age: 50 years Cause of death: MVA     Lupus Sister      Spinal muscular atrophy Maternal Grandfather         type 3     No Known Problems Daughter        "  age: 37 years     No Known Problems Daughter         age: 55 years     No Known Problems Daughter         age: 52 years     No Known Problems Daughter         age: 37 years     Kidney Disease Maternal Aunt          Medications:  Current Outpatient Medications   Medication     acetaminophen (TYLENOL) 500 MG tablet     albuterol (PROAIR HFA/PROVENTIL HFA/VENTOLIN HFA) 108 (90 Base) MCG/ACT inhaler     aspirin (ASA) 81 MG EC tablet     ferrous sulfate (FEROSUL) 325 (65 Fe) MG tablet     Fluticasone-Umeclidin-Vilanterol (TRELEGY ELLIPTA) 200-62.5-25 MCG/INH oral inhaler     gabapentin (NEURONTIN) 100 MG capsule     Magnesium Oxide 250 MG TABS     metoprolol succinate ER (TOPROL XL) 25 MG 24 hr tablet     multivitamin w/minerals (THERA-VIT-M) tablet     nitroGLYcerin (NITROSTAT) 0.4 MG sublingual tablet     omeprazole (PRILOSEC) 20 MG DR capsule     polyethylene glycol (MIRALAX) 17 GM/Dose powder     UNABLE TO FIND     No current facility-administered medications for this visit.       Allergies:  No Known Allergies    Review of systems:  Low back and right hip pain for which he declined lumbar spine fusion this year. Lower extremity peripheral neuropathy improved and stable on gabapentin.    Except as noted in the note above, the patient denies headaches, diplopia, hearing loss or dizziness; dyspnea, cough, hemoptysis, pleurisy; chest pain/pressure, palpitations, lightheadedness; anorexia, nausea, vomiting, abdominal pain, diarrhea, constipation, melena or rectal bleeding; dysuria, frequency, nocturia, blood in the urine; fever, chills, sweats; tingling, numbness, loss of balance; insomnia, depression, anxiety.    Physical examination:  /80 (BP Location: Left arm, Patient Position: Sitting, Cuff Size: Adult Large)   Pulse 74   Ht 1.803 m (5' 11\")   Wt 88.8 kg (195 lb 11.2 oz)   SpO2 95%   BMI 27.29 kg/m      The patient is alert and oriented. Throat and oral mucosa reveals a right posterior buccal lesion to " be apparently larger than earlier this year based on comparison with photographs in February, 2022.. Thyroid gland is not enlarged. Adenopathy is absent in the neck, axilla, groin and supraclavicular fossa; Lungs are clear to auscultation and percussion without rales, wheezes or rubs; heart rhythm is regular, heart sounds are without murmurs or gallops; the abdomen is soft and flat without hepatosplenomegaly, masses, ascites or tenderness.; extremities and skin reveal no unusual skin lesions, joint swelling or edema; prostate exam reveals the prostate to be minimally enlarged without any firmness or nodularity.  Andrew Wallace MD

## 2022-10-01 DIAGNOSIS — K21.9 GERD (GASTROESOPHAGEAL REFLUX DISEASE): ICD-10-CM

## 2022-10-03 ENCOUNTER — ONCOLOGY VISIT (OUTPATIENT)
Dept: ONCOLOGY | Facility: CLINIC | Age: 77
End: 2022-10-03
Attending: RADIOLOGY
Payer: COMMERCIAL

## 2022-10-03 VITALS
OXYGEN SATURATION: 95 % | DIASTOLIC BLOOD PRESSURE: 80 MMHG | SYSTOLIC BLOOD PRESSURE: 126 MMHG | HEIGHT: 71 IN | WEIGHT: 195.7 LBS | BODY MASS INDEX: 27.4 KG/M2 | HEART RATE: 74 BPM

## 2022-10-03 DIAGNOSIS — C61 MALIGNANT NEOPLASM OF PROSTATE (H): ICD-10-CM

## 2022-10-03 DIAGNOSIS — C06.0 SQUAMOUS CELL CARCINOMA OF BUCCAL MUCOSA (H): ICD-10-CM

## 2022-10-03 DIAGNOSIS — C34.12 MALIGNANT NEOPLASM OF UPPER LOBE OF LEFT LUNG (H): ICD-10-CM

## 2022-10-03 DIAGNOSIS — I71.43 INFRARENAL ABDOMINAL AORTIC ANEURYSM (AAA) WITHOUT RUPTURE (H): Primary | ICD-10-CM

## 2022-10-03 DIAGNOSIS — C79.51 SPINE METASTASIS: ICD-10-CM

## 2022-10-03 PROCEDURE — 99205 OFFICE O/P NEW HI 60 MIN: CPT | Performed by: INTERNAL MEDICINE

## 2022-10-03 PROCEDURE — G0463 HOSPITAL OUTPT CLINIC VISIT: HCPCS

## 2022-10-03 ASSESSMENT — PAIN SCALES - GENERAL: PAINLEVEL: NO PAIN (0)

## 2022-10-03 NOTE — LETTER
10/3/2022         RE: Lenny Chau  [address]  [place] [address]        Dear Colleague,    Thank you for referring your patient, Lenny Chau, to the [facility]. Please see a copy of my visit note below.    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    Impression:  1.  Serologically progressive but clinically stable prostatic adenocarcinoma  2.  Enlarging right buccal mucosal mass  3.  Enlarging 6.8 cm abdominal aortic aneurysm  4.  History of metachronous bilateral primary lung neoplasms, s/p March-April, 2022 SBRT and SRS to lung and T7 spine lesion.    Recommendation, plan, instructions:  1.  Radioisotope whole-body scan and MR prostate with contrast  2.  ENT to see regarding ENT surveillance exam and reexamination of right buccal mucosal lesion  3.  Vascular medicine examination to reevaluate enlarging 6.8 cm AAA seen on 1/2022 PET scan, s/p 2011 endovascular stent  4.  Follow-up with me after bone scan and MR prostate study results are available to discuss how we would intervene.    Time with patient including review, documentation, history, examination, coordination of care and counseling was 60 minutes.    History of present illness:  In 2011, a Ken grade 3+3, cT1a prostatic adenocarcinoma with a PSA of 4.7 was diagnosed and observed. A subsequent July, 2016 PSA was 10.4 with repeat biopsy revealing a Madison grade 3+3, cT1c lesion which continued to be observed.  He has been followed by Dr. Brett Bustos of [facility] with evidently a last MR scan(I see no report) in 2019 revealing no  apparent extraprostatic extension when his PSA was 15.8.    With various PSA methods his 23.5 on 3/9/2021, 34 on 2021, 12.47 on 2020, 18.25 on 3/31/2022, 21.81 on 2020, and finally 23.5 on 2022.     He complains of stable, mild lower urinary tract symptoms with nocturia 2-4, mild urgency, no incontinence or hesitancy.    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  surgery for localized recurrence.     left nephrectomy for a hemangioma     right RUL/RML wedge resection for a stage I, (pN0) lung carcinoma     surgical resection by Dr. Alon Nunez for a left oral (buccal, base of tongue) SCC     SBRT 5,000 cGy/5 until 3/10/2021 for a left upper lobe cT1 cN0 moderately differentiated adenocarcinoma.    2022 PET revealing new 1.1 cm left upper lobe nodule and T7 sclerotic bone lesion presumed to be recurrence treated with SBRT 5,000 cGy/5 until 3/18/2022 to the lung lesion and SRS 3,000 cGy/5 until 2022 to the spine lesion.    2022 ENT evaluation by Dr. Sim Jaffe for a new right buccal lesion with negative biopsies.    Subsequent 2022 CT chest revealed stable left upper lobe lesion without evidence of further progression or recurrence.    Past medical history:  Remarkable for excessive alcohol abstinent since 2021, COPD, coronary artery disease, hyperlipidemia, hypertension, 3 CKD, DJD, AAA, s/p 2011 endovascular stent by Dr. David David.    Family history:  I have reviewed this patient's family history and updated it with pertinent information if needed.  Family History   Problem Relation Age of Onset     Alcoholism Mother          at age:38 years Cause of death: Suicide     Suicide Mother      No Known Problems Father          at age: unknown     Alcoholism Sister       Breast Cancer Sister      Cerebrovascular Disease Sister      Obesity Sister          at age: 50 years Cause of death: MVA     Lupus Sister      Spinal muscular atrophy Maternal Grandfather         type 3     No Known Problems Daughter         age: 37 years     No Known Problems Daughter         age: 55 years     No Known Problems Daughter         age: 52 years     No Known Problems Daughter         age: 37 years     Kidney Disease Maternal Aunt          Medications:  Current Outpatient Medications   Medication     acetaminophen (TYLENOL) 500 MG tablet     albuterol (PROAIR HFA/PROVENTIL HFA/VENTOLIN HFA) 108 (90 Base) MCG/ACT inhaler     aspirin (ASA) 81 MG EC tablet     ferrous sulfate (FEROSUL) 325 (65 Fe) MG tablet     Fluticasone-Umeclidin-Vilanterol (TRELEGY ELLIPTA) 200-62.5-25 MCG/INH oral inhaler     gabapentin (NEURONTIN) 100 MG capsule     Magnesium Oxide 250 MG TABS     metoprolol succinate ER (TOPROL XL) 25 MG 24 hr tablet     multivitamin w/minerals (THERA-VIT-M) tablet     nitroGLYcerin (NITROSTAT) 0.4 MG sublingual tablet     omeprazole (PRILOSEC) 20 MG DR capsule     polyethylene glycol (MIRALAX) 17 GM/Dose powder     UNABLE TO FIND     No current facility-administered medications for this visit.       Allergies:  No Known Allergies    Review of systems:  Low back and right hip pain for which he declined lumbar spine fusion this year. Lower extremity peripheral neuropathy improved and stable on gabapentin.    Except as noted in the note above, the patient denies headaches, diplopia, hearing loss or dizziness; dyspnea, cough, hemoptysis, pleurisy; chest pain/pressure, palpitations, lightheadedness; anorexia, nausea, vomiting, abdominal pain, diarrhea, constipation, melena or rectal bleeding; dysuria, frequency, nocturia, blood in the urine; fever, chills, sweats; tingling, numbness, loss of balance; insomnia, depression, anxiety.    Physical examination:  /80 (BP Location: Left arm,  "Patient Position: Sitting, Cuff Size: Adult Large)   Pulse 74   Ht 1.803 m (5' 11\")   Wt 88.8 kg (195 lb 11.2 oz)   SpO2 95%   BMI 27.29 kg/m      The patient is alert and oriented. Throat and oral mucosa reveals a right posterior buccal lesion to be apparently larger than earlier this year based on comparison with photographs in February, 2022.. Thyroid gland is not enlarged. Adenopathy is absent in the neck, axilla, groin and supraclavicular fossa; Lungs are clear to auscultation and percussion without rales, wheezes or rubs; heart rhythm is regular, heart sounds are without murmurs or gallops; the abdomen is soft and flat without hepatosplenomegaly, masses, ascites or tenderness.; extremities and skin reveal no unusual skin lesions, joint swelling or edema; prostate exam reveals the prostate to be minimally enlarged without any firmness or nodularity.  Andrew Wallace MD              Oncology Rooming Note    October 3, 2022 2:07 PM   Lenny Chau is a 77 year old male who presents for:    Chief Complaint   Patient presents with     Oncology Clinic Visit     New Consult,   Spine metastasis, Malignant neoplasm of the upper lobe of the left lung,  Referred by Mariana Batista MD     Initial Vitals: /80 (BP Location: Left arm, Patient Position: Sitting, Cuff Size: Adult Large)   Pulse 74   Ht 1.803 m (5' 11\")   Wt 88.8 kg (195 lb 11.2 oz)   SpO2 95%   BMI 27.29 kg/m   Estimated body mass index is 27.29 kg/m  as calculated from the following:    Height as of this encounter: 1.803 m (5' 11\").    Weight as of this encounter: 88.8 kg (195 lb 11.2 oz). Body surface area is 2.11 meters squared.  No Pain (0) Comment: Data Unavailable   No LMP for male patient.  Allergies reviewed: Yes  Medications reviewed: Yes    Medications: Medication refills not needed today.  Pharmacy name entered into [facility]: [id number] [place] [address]    Clinical concerns: New Consult      So Malhotra, " MA                Again, thank you for allowing me to participate in the care of your patient.        Sincerely,        Andrew Wallace MD

## 2022-10-03 NOTE — PROGRESS NOTES
"Oncology Rooming Note    October 3, 2022 2:07 PM   Lenny Chau is a 77 year old male who presents for:    Chief Complaint   Patient presents with     Oncology Clinic Visit     New Consult,   Spine metastasis, Malignant neoplasm of the upper lobe of the left lung,  Referred by Mariana Batista MD     Initial Vitals: /80 (BP Location: Left arm, Patient Position: Sitting, Cuff Size: Adult Large)   Pulse 74   Ht 1.803 m (5' 11\")   Wt 88.8 kg (195 lb 11.2 oz)   SpO2 95%   BMI 27.29 kg/m   Estimated body mass index is 27.29 kg/m  as calculated from the following:    Height as of this encounter: 1.803 m (5' 11\").    Weight as of this encounter: 88.8 kg (195 lb 11.2 oz). Body surface area is 2.11 meters squared.  No Pain (0) Comment: Data Unavailable   No LMP for male patient.  Allergies reviewed: Yes  Medications reviewed: Yes    Medications: Medication refills not needed today.  Pharmacy name entered into [facility]: [id number] [place] [address]    Clinical concerns: New Consult      So Malhotra MA            "

## 2022-10-03 NOTE — PATIENT INSTRUCTIONS
1. Radioisotope bone scan and MR prostate  2. Ear Nose and Throat follow-up with Dr. Sim Jaffe or his successor  3. Vascular Consult with Dr. David David regarding your enlarging abdominal aortic aneurysm  4. Follow-up Dr. Wallace  after results of your bone scan and MR prostate are available.

## 2022-10-03 NOTE — TELEPHONE ENCOUNTER
Prescription approved per [facility] Refill Protocol.    Last Written Prescription Date: 4/8/22  Last Fill Quantity: 90,  # refills: 1   Last office visit: 9/29/2022 with prescribing provider   Future Office Visit:   Next 5 appointments (look out 90 days)    Dec 21, 2022  1:00 PM  (Arrive by 12:45 PM)  Return Visit with Mraiana Batista MD  [facility] Radiation Oncology [place] ([facility] ) [address] [place] [address]  [address] [address] [phone]

## 2022-10-05 NOTE — TELEPHONE ENCOUNTER
FUTURE VISIT INFORMATION      FUTURE VISIT INFORMATION:    Date: 10/10/22    Time: 9:40AM    Location: [facility]  REFERRAL INFORMATION:    Referring provider:  Andrew Wallace MD2    Referring providers clinic:  [facility]     Reason for visit/diagnosis  Squamous cell carcinoma of buccal mucosa - Referred by Andrew Wallace MD in [facility] MEDICAL ONCOLOGY    RECORDS REQUESTED FROM:       Clinic name Comments Records Status Imaging Status     [facility]  10/3/22 note and referral from Andrew Wallace MD [facility]    Imaging  22 MR Brain   22 PET  [facility] PACS   [facility] ENT  2020 note from Alon Nunez MD   Epic    [facility]   10/19/2009 TRACHEOTOMY Care everywhere     [facility] [address] [place] [address]  Phone: [phone] 2/10/22 Mouth  (Case: [id number]  )    *trackin Epic    Path req 10/5/22  Received 10/10/22    [facility], [address] [place] [address], [facility]   [address] [address]  Phone: [phone]  Fax: [phone] 2020 BUCCAL MUCOSA and TONGUE (CASE: [id number])    *trackin Epic    Path req 10/5/22     Received 10/6/22          10/5/22 8AM sent a fax for path send out - [facility]   10/6/22 2:45PM received path from [facility]   10/10/22 11:57AM received path from [facility]

## 2022-10-07 ENCOUNTER — TELEPHONE (OUTPATIENT)
Dept: FAMILY MEDICINE | Facility: CLINIC | Age: 77
End: 2022-10-07

## 2022-10-07 NOTE — TELEPHONE ENCOUNTER
RN called patient to relay lab results and recommendations from [facility] . Patient understood. Said he had not consumed alcohol in about a year and felt he could stop the B1. Patient understood recommendations if he were to start consuming again to start taking B1.    KENAN Valentin  [facility]

## 2022-10-07 NOTE — TELEPHONE ENCOUNTER
Patient has not yet read [employer] communication regarding Vitamin B1 labs and med change recommendation (copied below).    Nursing staff - please contact Patient to communicate recommendations.          Sara Cottrell, PharmD  Medication Therapy Management (MTM) Pharmacist

## 2022-10-10 ENCOUNTER — PRE VISIT (OUTPATIENT)
Dept: OTOLARYNGOLOGY | Facility: CLINIC | Age: 77
End: 2022-10-10

## 2022-10-10 ENCOUNTER — LAB REQUISITION (OUTPATIENT)
Dept: LAB | Facility: CLINIC | Age: 77
End: 2022-10-10
Payer: COMMERCIAL

## 2022-10-10 ENCOUNTER — OFFICE VISIT (OUTPATIENT)
Dept: OTOLARYNGOLOGY | Facility: CLINIC | Age: 77
End: 2022-10-10
Payer: COMMERCIAL

## 2022-10-10 VITALS
WEIGHT: 196 LBS | HEART RATE: 55 BPM | HEIGHT: 71 IN | OXYGEN SATURATION: 95 % | SYSTOLIC BLOOD PRESSURE: 125 MMHG | BODY MASS INDEX: 27.44 KG/M2 | TEMPERATURE: 96 F | DIASTOLIC BLOOD PRESSURE: 74 MMHG

## 2022-10-10 DIAGNOSIS — C06.0 SQUAMOUS CELL CARCINOMA OF BUCCAL MUCOSA (H): ICD-10-CM

## 2022-10-10 DIAGNOSIS — C06.0 BUCCAL MUCOSA SQUAMOUS CELL CARCINOMA (H): Primary | ICD-10-CM

## 2022-10-10 DIAGNOSIS — K13.70 ORAL LESION: ICD-10-CM

## 2022-10-10 PROCEDURE — 88305 TISSUE EXAM BY PATHOLOGIST: CPT | Mod: TC | Performed by: STUDENT IN AN ORGANIZED HEALTH CARE EDUCATION/TRAINING PROGRAM

## 2022-10-10 PROCEDURE — 99215 OFFICE O/P EST HI 40 MIN: CPT | Mod: 25 | Performed by: STUDENT IN AN ORGANIZED HEALTH CARE EDUCATION/TRAINING PROGRAM

## 2022-10-10 PROCEDURE — 88305 TISSUE EXAM BY PATHOLOGIST: CPT | Mod: 26 | Performed by: PATHOLOGY

## 2022-10-10 PROCEDURE — 40808 BIOPSY OF MOUTH LESION: CPT | Performed by: STUDENT IN AN ORGANIZED HEALTH CARE EDUCATION/TRAINING PROGRAM

## 2022-10-10 PROCEDURE — 31575 DIAGNOSTIC LARYNGOSCOPY: CPT | Mod: 51 | Performed by: STUDENT IN AN ORGANIZED HEALTH CARE EDUCATION/TRAINING PROGRAM

## 2022-10-10 NOTE — PROGRESS NOTES
October 10, 2022      Andrew Wallace M.D.  [facility] [place] [facility]  [address]  [address]  [place], [place]  [address]    Sim Jaffe M.D.  [facility]  Otolaryngology  [address] [place] [address]  [id number]      Dear Doctors:      I had the pleasure of meeting Mr. Chau today in clinic.    HISTORY OF PRESENT ILLNESS:  As you know, he is a pleasant 77-year-old male referred for a followup of his history of head and neck malignancies as well as evaluation of a right buccal lesion.  He has a history of a right buccal cancer treated by surgery including a wide local excision, neck dissection and reconstruction with a left radial forearm free flap as well as adjuvant radiation therapy, completed in 2009.  There is some comment that he may have had an early recurrence and had local resection of this.  He subsequently has had a left buccal cancer and a left tongue cancer, treated in 2020 with surgery alone by Dr. Donn Nunez.  He also has a history of prostate cancer as well as a metachronous bilateral lung cancers.  He says that the lesion in the right buccal area has been present for almost a year.  He is asymptomatic from this.  Dr. Sim Jaffe did a biopsy in February 2022 that was reported as acute inflammation and severe chronic inflammation as well as reactive epithelial hyperplasia.  There is no evidence of malignancy or dysplasia.  He has no new complaints today.    ONCOLOGIC HISTORY:  As described above.    PAST MEDICAL HISTORY:   1.  Hypertension.    2.  Coronary artery disease.  3.  COPD.  4.  Chronic kidney disease.  5.  Abdominal aortic aneurysm.    PAST SURGICAL HISTORY:    1.  Endovascular stenting of the aortic aneurysm.  2.  Nephrectomy.  3.  Head and neck cancer surgery as described above.  4.  Lumbar spine surgery.  5.  Right hemiarthroplasty.    MEDICATIONS:    1.  Albuterol.  2.  Aspirin.  3.  Ferrous sulfate.  4.  Gabapentin.  5.  Metoprolol.  6.   Omeprazole.    ALLERGIES:  No known drug allergies.    SOCIAL HISTORY:  He is a former smoker and quit in 2009.  He smoked two packs a day for 50 years.  He was a prior heavy alcohol user and quit about a year ago.  No drug use.    FAMILY HISTORY:  None.    REVIEW OF SYSTEMS:  A 10-point review of system was performed and noted in the HPI.    PHYSICAL EXAMINATION:  He is alert, in no acute distress.  He has a right neck incision extended into a lip split incision.  There is no palpable parotid or neck masses.  On intraoral examination, he has scarring of the right buccal mucosa from his prior resection and free flap.  He has an exophytic lesion just posterior to the scar that appears highly inflammatory.  It is diffuse in nature and extends along the right buccal mucosa up to the soft palate into the floor of mouth.    PROCEDURE:  Fiberoptic laryngoscopy was performed.  The scope was advanced into the nasal cavity.  The nasopharynx was clear.  Base of tongue and vallecula was clear.  The epiglottis was sharp without lesions.  The piriform sinuses were clear.  The vocal cords are mobile bilaterally and the airway was patent.    Biopsy of the right buccal lesion was performed.  1% lidocaine with 1:100,000 epinephrine was used.  A #15 blade was used to perform an incisional biopsy.  The patient tolerated the procedure well.    ASSESSMENT AND PLAN:  A 69-year-old male with an extensive head and neck malignancy history who presents with a right buccal lesion.  This has been present for about a year or so.  The patient is not sure if it has been growing or not.  However, we did see Dr. Sim Jaffe in February 2022 and the lesion was benign at that time.  He has a picture in the chart of the lesion from then and it appears relatively stable.    We will follow up on the biopsy results and determine the next steps in management.  Regardless, I will see him back in about four months.    Thank you for allowing me to participate  in the care of this patient. If you have any further questions, please do not hesitate to contact me.      Sincerely,      Kwesi Roldan M.D.      Head and Neck Surgical Oncology and Microvascular Reconstruction  Department of Otolaryngology - Head and Neck Surgery  [facility]          45 minutes spent on the date of the encounter in chart review, patient visit, review of tests, documentation and/or discussion with other providers about the issues documented above asides from time spent doing flexible laryngoscopy and mouth biopsy.

## 2022-10-10 NOTE — LETTER
10/10/2022       RE: Lenny Chau  [address]  [place] [address]     Dear Colleague,    Thank you for referring your patient, Lenny Chau, to the [facility] EAR NOSE AND [facility]AT [facility] [place] [facility]. Please see a copy of my visit note below.    October 10, 2022      Andrew Wallace M.D.  [facility] [place] [address]  [address]  [place], [place]  [address]    Sim Jaffe M.D.  [facility]  Otolaryngology  [address] [place] [address]  [address]      Dear Doctors:      I had the pleasure of meeting Mr. Chau today in clinic.    HISTORY OF PRESENT ILLNESS:  As you know, he is a pleasant 77-year-old male referred for a followup of his history of head and neck malignancies as well as evaluation of a right buccal lesion.  He has a history of a right buccal cancer treated by surgery including a wide local excision, neck dissection and reconstruction with a left radial forearm free flap as well as adjuvant radiation therapy, completed in 2009.  There is some comment that he may have had an early recurrence and had local resection of this.  He subsequently has had a left buccal cancer and a left tongue cancer, treated in 2020 with surgery alone by Dr. Donn Nunez.  He also has a history of prostate cancer as well as a metachronous bilateral lung cancers.  He says that the lesion in the right buccal area has been present for almost a year.  He is asymptomatic from this.  Dr. Sim Jaffe did a biopsy in February 2022 that was reported as acute inflammation and severe chronic inflammation as well as reactive epithelial hyperplasia.  There is no evidence of malignancy or dysplasia.  He has no new complaints today.    ONCOLOGIC HISTORY:  As described above.    PAST MEDICAL HISTORY:   1.  Hypertension.    2.  Coronary artery disease.  3.  COPD.  4.  Chronic kidney disease.  5.  Abdominal aortic  aneurysm.    PAST SURGICAL HISTORY:    1.  Endovascular stenting of the aortic aneurysm.  2.  Nephrectomy.  3.  Head and neck cancer surgery as described above.  4.  Lumbar spine surgery.  5.  Right hemiarthroplasty.    MEDICATIONS:    1.  Albuterol.  2.  Aspirin.  3.  Ferrous sulfate.  4.  Gabapentin.  5.  Metoprolol.  6.  Omeprazole.    ALLERGIES:  No known drug allergies.    SOCIAL HISTORY:  He is a former smoker and quit in 2009.  He smoked two packs a day for 50 years.  He was a prior heavy alcohol user and quit about a year ago.  No drug use.    FAMILY HISTORY:  None.    REVIEW OF SYSTEMS:  A 10-point review of system was performed and noted in the HPI.    PHYSICAL EXAMINATION:  He is alert, in no acute distress.  He has a right neck incision extended into a lip split incision.  There is no palpable parotid or neck masses.  On intraoral examination, he has scarring of the right buccal mucosa from his prior resection and free flap.  He has an exophytic lesion just posterior to the scar that appears highly inflammatory.  It is diffuse in nature and extends along the right buccal mucosa up to the soft palate into the floor of mouth.    PROCEDURE:  Fiberoptic laryngoscopy was performed.  The scope was advanced into the nasal cavity.  The nasopharynx was clear.  Base of tongue and vallecula was clear.  The epiglottis was sharp without lesions.  The piriform sinuses were clear.  The vocal cords are mobile bilaterally and the airway was patent.    Biopsy of the right buccal lesion was performed.  1% lidocaine with 1:100,000 epinephrine was used.  A #15 blade was used to perform an incisional biopsy.  The patient tolerated the procedure well.    ASSESSMENT AND PLAN:  A 69-year-old male with an extensive head and neck malignancy history who presents with a right buccal lesion.  This has been present for about a year or so.  The patient is not sure if it has been growing or not.  However, we did see Dr. Sim Jaffe in  February 2022 and the lesion was benign at that time.  He has a picture in the chart of the lesion from then and it appears relatively stable.    We will follow up on the biopsy results and determine the next steps in management.  Regardless, I will see him back in about four months.    Thank you for allowing me to participate in the care of this patient. If you have any further questions, please do not hesitate to contact me.      Sincerely,      Kwesi Roldan M.D.      Head and Neck Surgical Oncology and Microvascular Reconstruction  Department of Otolaryngology - Head and Neck Surgery  [facility]          45 minutes spent on the date of the encounter in chart review, patient visit, review of tests, documentation and/or discussion with other providers about the issues documented above asides from time spent doing flexible laryngoscopy and mouth biopsy.

## 2022-10-10 NOTE — PATIENT INSTRUCTIONS
1. Please follow-up in clinic in 4 months. We will call you with pathology results.   2. Please call the ENT clinic with any questions,concerns, new or worsening symptoms.    -Clinic number is [phone]   - Magda's direct line (Dr. Roldan's nurse) [phone]

## 2022-10-10 NOTE — NURSING NOTE
"Chief Complaint   Patient presents with     Consult     Squamous cell carcinoma      Blood pressure 125/74, pulse 55, temperature (!) 96  F (35.6  C), height 1.803 m (5' 11\"), weight 88.9 kg (196 lb), SpO2 95 %.    Ulisses Kwok LPN    "

## 2022-10-11 LAB
PATH REPORT.COMMENTS IMP SPEC: ABNORMAL
PATH REPORT.COMMENTS IMP SPEC: ABNORMAL
PATH REPORT.COMMENTS IMP SPEC: YES
PATH REPORT.FINAL DX SPEC: ABNORMAL
PATH REPORT.GROSS SPEC: ABNORMAL
PATH REPORT.MICROSCOPIC SPEC OTHER STN: ABNORMAL
PATH REPORT.RELEVANT HX SPEC: ABNORMAL
PATH REPORT.RELEVANT HX SPEC: ABNORMAL
PATH REPORT.SITE OF ORIGIN SPEC: ABNORMAL

## 2022-10-11 PROCEDURE — 88321 CONSLTJ&REPRT SLD PREP ELSWR: CPT | Performed by: PATHOLOGY

## 2022-10-12 ENCOUNTER — PATIENT OUTREACH (OUTPATIENT)
Dept: OTOLARYNGOLOGY | Facility: CLINIC | Age: 77
End: 2022-10-12

## 2022-10-12 LAB
PATH REPORT.COMMENTS IMP SPEC: NORMAL
PATH REPORT.COMMENTS IMP SPEC: NORMAL
PATH REPORT.FINAL DX SPEC: NORMAL
PATH REPORT.GROSS SPEC: NORMAL
PATH REPORT.MICROSCOPIC SPEC OTHER STN: NORMAL
PATH REPORT.RELEVANT HX SPEC: NORMAL
PHOTO IMAGE: NORMAL

## 2022-10-12 NOTE — PROGRESS NOTES
Called and left message for patient to return call to discuss pathology results. Left direct line for return call.     Dr. Roldan spoke with patient regarding the following path results:  Final Diagnosis   A. ORAL CAVITY, RIGHT BUCCAL MUCOSA, BIOPSY:  -Benign mucosa with squamous hyperplasia, marked chronic inflammation and reactive changes.  -Negative for high-grade dysplasia or malignancy.         He will return as planned for follow-up in 4 months.       Magda Hernandez, RN, BSN

## 2022-10-13 LAB
PATH REPORT.ADDENDUM SPEC: NORMAL
PATH REPORT.COMMENTS IMP SPEC: NORMAL
PATH REPORT.FINAL DX SPEC: NORMAL
PATH REPORT.GROSS SPEC: NORMAL
PATH REPORT.MICROSCOPIC SPEC OTHER STN: NORMAL
PATH REPORT.RELEVANT HX SPEC: NORMAL
PHOTO IMAGE: NORMAL

## 2022-10-23 ENCOUNTER — HEALTH MAINTENANCE LETTER (OUTPATIENT)
Age: 77
End: 2022-10-23

## 2022-10-28 ENCOUNTER — HOSPITAL ENCOUNTER (OUTPATIENT)
Dept: NUCLEAR MEDICINE | Facility: CLINIC | Age: 77
Setting detail: NUCLEAR MEDICINE
Discharge: HOME | End: 2022-10-28
Attending: INTERNAL MEDICINE
Payer: MEDICARE

## 2022-10-28 ENCOUNTER — HOSPITAL ENCOUNTER (OUTPATIENT)
Dept: MRI IMAGING | Facility: CLINIC | Age: 77
Discharge: HOME | End: 2022-10-28
Attending: INTERNAL MEDICINE | Admitting: INTERNAL MEDICINE
Payer: MEDICARE

## 2022-10-28 DIAGNOSIS — C61 MALIGNANT NEOPLASM OF PROSTATE (H): ICD-10-CM

## 2022-10-28 DIAGNOSIS — C79.51 SPINE METASTASIS: ICD-10-CM

## 2022-10-28 PROCEDURE — G1010 CDSM STANSON: HCPCS | Performed by: RADIOLOGY

## 2022-10-28 PROCEDURE — G1010 CDSM STANSON: HCPCS

## 2022-10-28 PROCEDURE — 255N000002 HC RX 255 OP 636: Performed by: INTERNAL MEDICINE

## 2022-10-28 PROCEDURE — 343N000001 HC RX 343: Performed by: INTERNAL MEDICINE

## 2022-10-28 PROCEDURE — A9503 TC99M MEDRONATE: HCPCS | Performed by: INTERNAL MEDICINE

## 2022-10-28 PROCEDURE — 72197 MRI PELVIS W/O & W/DYE: CPT | Mod: 26 | Performed by: RADIOLOGY

## 2022-10-28 PROCEDURE — A9585 GADOBUTROL INJECTION: HCPCS | Performed by: INTERNAL MEDICINE

## 2022-10-28 PROCEDURE — 78306 BONE IMAGING WHOLE BODY: CPT | Mod: MG

## 2022-10-28 PROCEDURE — 78306 BONE IMAGING WHOLE BODY: CPT | Mod: 26

## 2022-10-28 PROCEDURE — G1010 CDSM STANSON: HCPCS | Mod: GC

## 2022-10-28 RX ORDER — TC 99M MEDRONATE 20 MG/10ML
25.8 INJECTION, POWDER, LYOPHILIZED, FOR SOLUTION INTRAVENOUS ONCE
Status: COMPLETED | OUTPATIENT
Start: 2022-10-28 | End: 2022-10-28

## 2022-10-28 RX ORDER — GADOBUTROL 604.72 MG/ML
7.5 INJECTION INTRAVENOUS ONCE
Status: COMPLETED | OUTPATIENT
Start: 2022-10-28 | End: 2022-10-28

## 2022-10-28 RX ADMIN — GADOBUTROL 7.5 ML: 604.72 INJECTION INTRAVENOUS at 16:33

## 2022-10-28 RX ADMIN — TC 99M MEDRONATE 25.8 MCI.: 20 INJECTION, POWDER, LYOPHILIZED, FOR SOLUTION INTRAVENOUS at 11:08

## 2022-10-31 ENCOUNTER — TELEPHONE (OUTPATIENT)
Dept: ONCOLOGY | Facility: CLINIC | Age: 77
End: 2022-10-31

## 2022-10-31 NOTE — PROGRESS NOTES
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    Impression:  1.  Asymptomatic, probable progressive Ken grade 3+3 adenocarcinoma with extraprostatic extension  2.  Recurrent right buccal squamous cell carcinoma followed by Dr. Kwesi Roldan (2/2023)  3.  Enlarging 6.8 cm abdominal aortic aneurysm  4.  History of metachronous bilateral primary lung neoplasms, s/p March-April, 2022 SBRT and SRS to lung and T7 spine lesion.    Recommendation, plan, instructions:  1.  Discussed indications, benefits, risks, alternatives and side effects of leuprolide every 6 months for treatment of progressive prostatic adenocarcinoma.  The patient understands and agrees.  2.  Leuprolide 45 mg IM every 6 months  3.  Follow-up with NP in 6 months with CBC, CMP, PSA before appointment and anticipated retreatment with leuprolide    Time with patient including review, documentation, history, examination, coordination of care and counseling was 15 minutes.    History of present illness:  Radioisotope bone scan revealed uptake only in the known T7 vertebral body metastases.  MR scan of the prostate revealed suspicious abnormality of the left base and mid gland peripheral zone with extraprostatic extension into the left seminal vesicle.  No suspicious adenopathy or evidence of pelvic metastases were seen.    In 2011, a Cassatt grade 3+3, cT1a prostatic adenocarcinoma with a PSA of 4.7 was diagnosed and observed. A subsequent July, 2016 PSA was 10.4 with repeat biopsy revealing a Cassatt grade 3+3, cT1c lesion which continued to be observed.    With various PSA methods his 23.5 on 3/9/2021, 34 on 5/8/2021, 12.47 on 12/22/2020, 18.25 on 3/31/2022,  21.81 on 2020, and finally 23.5 on 2022.      He complains of stable, mild lower urinary tract symptoms with nocturia 2-4, mild urgency, no incontinence or hesitancy.     Past oncology history:   resection floor of mouth carcinoma      right retromolar trigone SCC treated with surgery and 7,020 cGy/39 until 2009.  surgery for localized recurrence.      left nephrectomy for a hemangioma      right RUL/RML wedge resection for a stage I, (pN0) lung carcinoma      surgical resection by Dr. Alon Nunez for a left oral (buccal, base of tongue) SCC      SBRT 5,000 cGy/5 until 3/10/2021 for a left upper lobe cT1 cN0 moderately differentiated adenocarcinoma.     2022 PET revealing new 1.1 cm left upper lobe nodule and T7 sclerotic bone lesion presumed to be recurrence treated with SBRT 5,000 cGy/5 until 3/18/2022 to the lung lesion and SRS 3,000 cGy/5 until 2022 to the spine lesion.     2022 ENT evaluation by Dr. Sim Jaffe for a new right buccal lesion with negative biopsies. On 10/10/2022, he was seen again by ENT, Dr. Roldan with biopsy revealing hyperplasia and follow-up scheduled for .     Subsequent 2022 CT chest revealed stable left upper lobe lesion without evidence of further progression or recurrence.    Past medical history:  Remarkable for excessive alcohol abstinent since 2021, COPD, coronary artery disease, hyperlipidemia, hypertension, 3 CKD, DJD, AAA, s/p 2011 endovascular stent by Dr. David David.    Family history:  I have reviewed this patient's family history and updated it with pertinent information if needed.  Family History   Problem Relation Age of Onset     Alcoholism Mother          at age:38 years Cause of death: Suicide     Suicide Mother      No Known Problems Father          at age: unknown     Alcoholism Sister      Breast Cancer  "Sister      Cerebrovascular Disease Sister      Obesity Sister          at age: 50 years Cause of death: MVA     Lupus Sister      Spinal muscular atrophy Maternal Grandfather         type 3     No Known Problems Daughter         age: 37 years     No Known Problems Daughter         age: 55 years     No Known Problems Daughter         age: 52 years     No Known Problems Daughter         age: 37 years     Kidney Disease Maternal Aunt        Medications:  Current Outpatient Medications   Medication     acetaminophen (TYLENOL) 500 MG tablet     aspirin (ASA) 81 MG EC tablet     ferrous sulfate (FEROSUL) 325 (65 Fe) MG tablet     Fluticasone-Umeclidin-Vilanterol (TRELEGY ELLIPTA) 200-62.5-25 MCG/INH oral inhaler     gabapentin (NEURONTIN) 100 MG capsule     Magnesium Oxide 250 MG TABS     metoprolol succinate ER (TOPROL XL) 25 MG 24 hr tablet     multivitamin w/minerals (THERA-VIT-M) tablet     omeprazole (PRILOSEC) 20 MG DR capsule     albuterol (PROAIR HFA/PROVENTIL HFA/VENTOLIN HFA) 108 (90 Base) MCG/ACT inhaler     nitroGLYcerin (NITROSTAT) 0.4 MG sublingual tablet     polyethylene glycol (MIRALAX) 17 GM/Dose powder     UNABLE TO FIND     No current facility-administered medications for this visit.       Allergies:  No Known Allergies    Physical examination:  BP (!) 152/90 (BP Location: Right arm, Patient Position: Sitting, Cuff Size: Adult Regular)   Pulse 65   Temp 97.9  F (36.6  C) (Oral)   Resp 16   Ht 1.803 m (5' 11\")   Wt 89.9 kg (198 lb 4.8 oz)   SpO2 96%   BMI 27.66 kg/m      He is alert and oriented.      Andrew Wallace MD  "

## 2022-11-01 ENCOUNTER — ONCOLOGY VISIT (OUTPATIENT)
Dept: ONCOLOGY | Facility: CLINIC | Age: 77
End: 2022-11-01
Attending: INTERNAL MEDICINE
Payer: MEDICARE

## 2022-11-01 ENCOUNTER — INFUSION THERAPY VISIT (OUTPATIENT)
Dept: INFUSION THERAPY | Facility: CLINIC | Age: 77
End: 2022-11-01
Attending: INTERNAL MEDICINE
Payer: MEDICARE

## 2022-11-01 VITALS
SYSTOLIC BLOOD PRESSURE: 152 MMHG | RESPIRATION RATE: 16 BRPM | DIASTOLIC BLOOD PRESSURE: 90 MMHG | HEART RATE: 65 BPM | HEIGHT: 71 IN | OXYGEN SATURATION: 96 % | TEMPERATURE: 97.9 F | WEIGHT: 198.3 LBS | BODY MASS INDEX: 27.76 KG/M2

## 2022-11-01 VITALS
DIASTOLIC BLOOD PRESSURE: 83 MMHG | SYSTOLIC BLOOD PRESSURE: 130 MMHG | OXYGEN SATURATION: 96 % | RESPIRATION RATE: 18 BRPM | HEART RATE: 58 BPM

## 2022-11-01 DIAGNOSIS — C61 MALIGNANT NEOPLASM OF PROSTATE (H): Primary | ICD-10-CM

## 2022-11-01 PROCEDURE — G0463 HOSPITAL OUTPT CLINIC VISIT: HCPCS | Mod: 25

## 2022-11-01 PROCEDURE — G0463 HOSPITAL OUTPT CLINIC VISIT: HCPCS

## 2022-11-01 PROCEDURE — 96402 CHEMO HORMON ANTINEOPL SQ/IM: CPT

## 2022-11-01 PROCEDURE — 250N000011 HC RX IP 250 OP 636: Performed by: INTERNAL MEDICINE

## 2022-11-01 PROCEDURE — 99212 OFFICE O/P EST SF 10 MIN: CPT | Performed by: INTERNAL MEDICINE

## 2022-11-01 RX ADMIN — LEUPROLIDE ACETATE 45 MG: KIT SUBCUTANEOUS at 14:21

## 2022-11-01 ASSESSMENT — PAIN SCALES - GENERAL: PAINLEVEL: MILD PAIN (3)

## 2022-11-01 NOTE — PATIENT INSTRUCTIONS
Lupron injection as soon as possible  [facility] months after first Lupron injection for further Lupron with blood tests before appointment

## 2022-11-01 NOTE — PROGRESS NOTES
Infusion Nursing Note:  Lenny Chau presents today for Eligard injection.    Patient seen by provider today: Yes: Dr. Wallace   present during visit today: Not Applicable.    Note: N/A.    Intravenous Access:  No Intravenous access/labs at this visit.    Treatment Conditions:  Not Applicable.    Post Infusion Assessment:  Eligard given to RLQ. Patient tolerated injection without incident.     Discharge Plan:   Patient and/or family verbalized understanding of discharge instructions and all questions answered.  Patient discharged in stable condition accompanied by: self.  Departure Mode: Ambulatory w/ 4WW.      Geraldine Arriaza RN                    .

## 2022-11-01 NOTE — PROGRESS NOTES
"Oncology Rooming Note    November 1, 2022 1:14 PM   Lenny Chau is a 77 year old male who presents for:    Chief Complaint   Patient presents with     Oncology Clinic Visit     Return  MRI RESULTS     Initial Vitals: BP (!) 152/90 (BP Location: Right arm, Patient Position: Sitting, Cuff Size: Adult Regular)   Pulse 65   Temp 97.9  F (36.6  C) (Oral)   Resp 16   Ht 1.803 m (5' 11\")   Wt 89.9 kg (198 lb 4.8 oz)   SpO2 96%   BMI 27.66 kg/m   Estimated body mass index is 27.66 kg/m  as calculated from the following:    Height as of this encounter: 1.803 m (5' 11\").    Weight as of this encounter: 89.9 kg (198 lb 4.8 oz). Body surface area is 2.12 meters squared.  Mild Pain (3) Comment: Data Unavailable   No LMP for male patient.  Allergies reviewed: Yes  Medications reviewed: Yes    Medications: Medication refills not needed today.  Pharmacy name entered into [facility]: [id number] [place] [address]    Clinical concerns:  No present concerns.     Laverne Tam LPN            "

## 2022-11-01 NOTE — LETTER
11/1/2022         RE: Lenny Chau  [address]  [place] [address]        Dear Colleague,    Thank you for referring your patient, Lenny Chau, to the [facility]. Please see a copy of my visit note below.    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    Impression:  1.  Asymptomatic, probable progressive Ken grade 3+3 adenocarcinoma with extraprostatic extension  2.  Recurrent right buccal squamous cell carcinoma followed by Dr. Kwesi Roldan (2/2023)  3.  Enlarging 6.8 cm abdominal aortic aneurysm  4.  History of metachronous bilateral primary lung neoplasms, s/p March-April, 2022 SBRT and SRS to lung and T7 spine lesion.    Recommendation, plan, instructions:  1.  Discussed indications, benefits, risks, alternatives and side effects of leuprolide every 6 months for treatment of progressive prostatic adenocarcinoma.  The patient understands and agrees.  2.  Leuprolide 45 mg IM every 6 months  3.  Follow-up with NP in 6 months with CBC, CMP, PSA before appointment and anticipated retreatment with leuprolide    Time with patient including review, documentation, history, examination, coordination of care and counseling was 15 minutes.    History of present illness:  Radioisotope bone scan revealed uptake only in the known T7 vertebral body metastases.  MR scan of the prostate revealed suspicious abnormality of the left base and mid gland peripheral zone with extraprostatic extension into the left seminal vesicle.  No suspicious adenopathy or evidence of pelvic metastases were seen.    In 2011, a [place] grade 3+3, cT1a prostatic adenocarcinoma with a PSA of 4.7 was  diagnosed and observed. A subsequent July, 2016 PSA was 10.4 with repeat biopsy revealing a Ken grade 3+3, cT1c lesion which continued to be observed.    With various PSA methods his 23.5 on 3/9/2021, 34 on 5/8/2021, 12.47 on 12/22/2020, 18.25 on 3/31/2022, 21.81 on 6/21/2020, and finally 23.5 on 9/19/2022.      He complains of stable, mild lower urinary tract symptoms with nocturia 2-4, mild urgency, no incontinence or hesitancy.     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 September, 2009 surgery for localized recurrence.     March, 2009 left nephrectomy for a hemangioma     November, 2015 right RUL/RML wedge resection for a stage I, (pN0) lung carcinoma     November, 2020 surgical resection by Dr. Alon Nunez for a left oral (buccal, base of tongue) SCC     January, 2021 SBRT 5,000 cGy/5 until 3/10/2021 for a left upper lobe cT1 cN0 moderately differentiated adenocarcinoma.     1/7/2022 PET revealing new 1.1 cm left upper lobe nodule and T7 sclerotic bone lesion presumed to be recurrence treated with SBRT 5,000 cGy/5 until 3/18/2022 to the lung lesion and SRS 3,000 cGy/5 until 4/12/2022 to the spine lesion.     2/2022 ENT evaluation by Dr. Sim Jaffe for a new right buccal lesion with negative biopsies. On 10/10/2022, he was seen again by ENT, Dr. Roldan with biopsy revealing hyperplasia and follow-up scheduled for February, 2023.     Subsequent 9/19/2022 CT chest revealed stable left upper lobe lesion without evidence of further progression or recurrence.    Past medical history:  Remarkable for excessive alcohol abstinent since 11/2021, COPD, coronary artery disease, hyperlipidemia, hypertension, 3 CKD, DJD, AAA, s/p 2011 endovascular stent by Dr. David David.    Family history:  I have reviewed this patient's family history and updated it with pertinent information if  "needed.  Family History   Problem Relation Age of Onset     Alcoholism Mother          at age:38 years Cause of death: Suicide     Suicide Mother      No Known Problems Father          at age: unknown     Alcoholism Sister      Breast Cancer Sister      Cerebrovascular Disease Sister      Obesity Sister          at age: 50 years Cause of death: MVA     Lupus Sister      Spinal muscular atrophy Maternal Grandfather         type 3     No Known Problems Daughter         age: 37 years     No Known Problems Daughter         age: 55 years     No Known Problems Daughter         age: 52 years     No Known Problems Daughter         age: 37 years     Kidney Disease Maternal Aunt        Medications:  Current Outpatient Medications   Medication     acetaminophen (TYLENOL) 500 MG tablet     aspirin (ASA) 81 MG EC tablet     ferrous sulfate (FEROSUL) 325 (65 Fe) MG tablet     Fluticasone-Umeclidin-Vilanterol (TRELEGY ELLIPTA) 200-62.5-25 MCG/INH oral inhaler     gabapentin (NEURONTIN) 100 MG capsule     Magnesium Oxide 250 MG TABS     metoprolol succinate ER (TOPROL XL) 25 MG 24 hr tablet     multivitamin w/minerals (THERA-VIT-M) tablet     omeprazole (PRILOSEC) 20 MG DR capsule     albuterol (PROAIR HFA/PROVENTIL HFA/VENTOLIN HFA) 108 (90 Base) MCG/ACT inhaler     nitroGLYcerin (NITROSTAT) 0.4 MG sublingual tablet     polyethylene glycol (MIRALAX) 17 GM/Dose powder     UNABLE TO FIND     No current facility-administered medications for this visit.       Allergies:  No Known Allergies    Physical examination:  BP (!) 152/90 (BP Location: Right arm, Patient Position: Sitting, Cuff Size: Adult Regular)   Pulse 65   Temp 97.9  F (36.6  C) (Oral)   Resp 16   Ht 1.803 m (5' 11\")   Wt 89.9 kg (198 lb 4.8 oz)   SpO2 96%   BMI 27.66 kg/m      He is alert and oriented.      Andrew Wallace MD    Oncology Rooming Note    2022 1:14 PM   Lenny Chau is a 77 year old male who presents for:    Chief " "Complaint   Patient presents with     Oncology Clinic Visit     Return  MRI RESULTS     Initial Vitals: BP (!) 152/90 (BP Location: Right arm, Patient Position: Sitting, Cuff Size: Adult Regular)   Pulse 65   Temp 97.9  F (36.6  C) (Oral)   Resp 16   Ht 1.803 m (5' 11\")   Wt 89.9 kg (198 lb 4.8 oz)   SpO2 96%   BMI 27.66 kg/m   Estimated body mass index is 27.66 kg/m  as calculated from the following:    Height as of this encounter: 1.803 m (5' 11\").    Weight as of this encounter: 89.9 kg (198 lb 4.8 oz). Body surface area is 2.12 meters squared.  Mild Pain (3) Comment: Data Unavailable   No LMP for male patient.  Allergies reviewed: Yes  Medications reviewed: Yes    Medications: Medication refills not needed today.  Pharmacy name entered into [facility]: [id number] [place] [address]    Clinical concerns:  No present concerns.     Laverne Tam LPN                Again, thank you for allowing me to participate in the care of your patient.        Sincerely,        Andrew Wallace MD    "

## 2022-12-19 ENCOUNTER — LAB (OUTPATIENT)
Dept: INFUSION THERAPY | Facility: CLINIC | Age: 77
End: 2022-12-19
Attending: RADIOLOGY
Payer: MEDICARE

## 2022-12-19 ENCOUNTER — HOSPITAL ENCOUNTER (OUTPATIENT)
Dept: CT IMAGING | Facility: CLINIC | Age: 77
Discharge: HOME | End: 2022-12-19
Attending: RADIOLOGY
Payer: MEDICARE

## 2022-12-19 DIAGNOSIS — C79.51 SPINE METASTASIS: ICD-10-CM

## 2022-12-19 DIAGNOSIS — C34.12 MALIGNANT NEOPLASM OF UPPER LOBE OF LEFT LUNG (H): ICD-10-CM

## 2022-12-19 DIAGNOSIS — C61 MALIGNANT NEOPLASM OF PROSTATE (H): ICD-10-CM

## 2022-12-19 LAB
ALBUMIN SERPL-MCNC: 3.7 G/DL (ref 3.5–5)
ALP SERPL-CCNC: 143 U/L (ref 45–120)
ALT SERPL W P-5'-P-CCNC: 15 U/L (ref 0–45)
ANION GAP SERPL CALCULATED.3IONS-SCNC: 8 MMOL/L (ref 5–18)
AST SERPL W P-5'-P-CCNC: 19 U/L (ref 0–40)
BASOPHILS # BLD AUTO: 0.1 10E3/UL (ref 0–0.2)
BASOPHILS NFR BLD AUTO: 1 %
BILIRUB SERPL-MCNC: 0.7 MG/DL (ref 0–1)
BUN SERPL-MCNC: 34 MG/DL (ref 8–28)
CALCIUM SERPL-MCNC: 10.1 MG/DL (ref 8.5–10.5)
CHLORIDE BLD-SCNC: 102 MMOL/L (ref 98–107)
CO2 SERPL-SCNC: 30 MMOL/L (ref 22–31)
CREAT SERPL-MCNC: 1.67 MG/DL (ref 0.7–1.3)
EOSINOPHIL # BLD AUTO: 0.6 10E3/UL (ref 0–0.7)
EOSINOPHIL NFR BLD AUTO: 9 %
ERYTHROCYTE [DISTWIDTH] IN BLOOD BY AUTOMATED COUNT: 12.3 % (ref 10–15)
GFR SERPL CREATININE-BSD FRML MDRD: 42 ML/MIN/1.73M2
GLUCOSE BLD-MCNC: 106 MG/DL (ref 70–125)
HCT VFR BLD AUTO: 45.7 % (ref 40–53)
HGB BLD-MCNC: 15 G/DL (ref 13.3–17.7)
IMM GRANULOCYTES # BLD: 0 10E3/UL
IMM GRANULOCYTES NFR BLD: 1 %
LYMPHOCYTES # BLD AUTO: 1 10E3/UL (ref 0.8–5.3)
LYMPHOCYTES NFR BLD AUTO: 13 %
MCH RBC QN AUTO: 30.4 PG (ref 26.5–33)
MCHC RBC AUTO-ENTMCNC: 32.8 G/DL (ref 31.5–36.5)
MCV RBC AUTO: 93 FL (ref 78–100)
MONOCYTES # BLD AUTO: 0.6 10E3/UL (ref 0–1.3)
MONOCYTES NFR BLD AUTO: 8 %
NEUTROPHILS # BLD AUTO: 5 10E3/UL (ref 1.6–8.3)
NEUTROPHILS NFR BLD AUTO: 68 %
NRBC # BLD AUTO: 0 10E3/UL
NRBC BLD AUTO-RTO: 0 /100
PLATELET # BLD AUTO: 202 10E3/UL (ref 150–450)
POTASSIUM BLD-SCNC: 4.8 MMOL/L (ref 3.5–5)
PROT SERPL-MCNC: 7.5 G/DL (ref 6–8)
PSA SERPL-MCNC: 3 NG/ML (ref 0–6.5)
RBC # BLD AUTO: 4.94 10E6/UL (ref 4.4–5.9)
SODIUM SERPL-SCNC: 140 MMOL/L (ref 136–145)
WBC # BLD AUTO: 7.3 10E3/UL (ref 4–11)

## 2022-12-19 PROCEDURE — 36415 COLL VENOUS BLD VENIPUNCTURE: CPT

## 2022-12-19 PROCEDURE — 82374 ASSAY BLOOD CARBON DIOXIDE: CPT

## 2022-12-19 PROCEDURE — 84153 ASSAY OF PSA TOTAL: CPT

## 2022-12-19 PROCEDURE — 71250 CT THORAX DX C-: CPT | Mod: ME

## 2022-12-19 PROCEDURE — G1010 CDSM STANSON: HCPCS

## 2022-12-19 PROCEDURE — 85025 COMPLETE CBC W/AUTO DIFF WBC: CPT

## 2022-12-21 ENCOUNTER — OFFICE VISIT (OUTPATIENT)
Dept: RADIATION ONCOLOGY | Facility: CLINIC | Age: 77
End: 2022-12-21
Attending: RADIOLOGY
Payer: COMMERCIAL

## 2022-12-21 VITALS
SYSTOLIC BLOOD PRESSURE: 145 MMHG | RESPIRATION RATE: 16 BRPM | DIASTOLIC BLOOD PRESSURE: 77 MMHG | HEART RATE: 60 BPM | OXYGEN SATURATION: 99 % | TEMPERATURE: 97.7 F | BODY MASS INDEX: 27.77 KG/M2 | WEIGHT: 199.1 LBS

## 2022-12-21 DIAGNOSIS — C79.51 SPINE METASTASIS: Primary | ICD-10-CM

## 2022-12-21 DIAGNOSIS — C34.12 MALIGNANT NEOPLASM OF UPPER LOBE OF LEFT LUNG (H): ICD-10-CM

## 2022-12-21 PROCEDURE — 99214 OFFICE O/P EST MOD 30 MIN: CPT | Performed by: RADIOLOGY

## 2022-12-21 PROCEDURE — G0463 HOSPITAL OUTPT CLINIC VISIT: HCPCS | Performed by: RADIOLOGY

## 2022-12-21 PROCEDURE — G0463 HOSPITAL OUTPT CLINIC VISIT: HCPCS

## 2022-12-21 NOTE — LETTER
"    12/21/2022         RE: Lenny Chau  [address] [facility] [address] [place] [id number]        Dear Colleague,    Thank you for referring your patient, Lenny Chau, to the [facility] RADIATION ONCOLOGY [place]. Please see a copy of my visit note below.    Oncology Rooming Note    December 21, 2022 1:02 PM   Lenny Chau is a 77 year old male who presents for:    Chief Complaint   Patient presents with     Oncology Clinic Visit     Follow up with Dr. Batista     Initial Vitals: BP (!) 145/77 (BP Location: Right arm, Patient Position: Sitting, Cuff Size: Adult Regular)   Pulse 60   Temp 97.7  F (36.5  C) (Oral)   Resp 16   Wt 90.3 kg (199 lb 1.6 oz)   SpO2 99%   BMI 27.77 kg/m   Estimated body mass index is 27.77 kg/m  as calculated from the following:    Height as of 11/1/22: 1.803 m (5' 11\").    Weight as of this encounter: 90.3 kg (199 lb 1.6 oz). Body surface area is 2.13 meters squared.  Mild Pain (3) Comment: Data Unavailable   No LMP for male patient.  Allergies reviewed: Yes  Medications reviewed: Yes    Medications: Medication refills not needed today.  Pharmacy [facility] entered into [id number] [place] [address]    Clinical concerns: Follow up s/p radiation for lung cancer, CT scan results Dr. Batista was notified.      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  The biopsy confirmed moderately differentiated adenocarcinoma.        TYPE OF RADIATION THERAPY ADMINISTERED:   1. SBRT to the left upper lobe with a total dose of 5000 cGy in 5 treatments given from 3/2/2021-3/10/2021.     2. SBRT with a total dose of 5000 cGy in 5 treatments for the second left tumor given from 3/9/2022-3/18/2022.      3. SBRT with a total dose of 3275 cGy in 5 treatments for T7 spine given from 2022- 2022.     INTERVAL SINCE COMPLETION OF RADIATION THERAPY: 8 months.      SUBJECTIVE:  Mr. Chau is a 77 y.o. male who is a chronic smoker and quit smoking since .  The patient had a complicated history of multiple cancer in the past as detailed as followin.  Low risk prostate cancer, clinical stage T1c N0 M0, recent grade 3+3 =6 and the last PSA was 10.4 in 2006.  The patient is currently on clinical observation with no therapy.  PSA: 12.47 on 2021.     2.  Floor of mouth cancer, status post surgical resection in 1994 with no adjuvant therapy.     3.  Left kidney hemangioma, status post left nephrectomy on 3/26/2009.     4.  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  Patient had local recurrence and is status post surgical resection on 2009.     5.  Non-small cell lung cancer involving right lung, stage I, status post right thoracotomy and excision of right upper lobe and right middle lobe lung tumor 2015 with no evidence of lymph node metastasis and no adjuvant therapy.  Patient lost to follow-up since .     6.  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 Alon Nunez, ENT on 2020.     7.  Left upper lobe lung  cancer, stage T1N0 M0.  The biopsy confirmed moderately differentiated adenocarcinoma on 2021.  The  patient received SBRT with a total dose of 5000 cGy in 5 treatments given from 3/2/2021-3/10/2021.     Patient had two prior early stage right lung cancers that were apparently resected in their entirety and may have been synchronous primary lung cancers. It does not appear he had the appropriate recommended follow up at [facility]. The MELODY lesion was known to be present in 2016 and appears to be slightly larger now with significant FDG-avidity on the recent PET/CT. It is likely another primary lung cancer, although metastasis from previous lung cancers is possible.  The patient had a restaging PET CT scan on 8/31/2020.  The scan showed enlarging FDG avid left upper lobe mass measuring 2.1 x 2.5 cm with central solid component consistent with primary lung cancer without metastasis.  There was also a FDG avid lesion in the left buccal region consistent with squamous cell carcinoma without metastasis.  The patient had a surgical resection for his left buccal/lateral tongue squamous cell carcinoma 11/6/2020 by Dr. Alon Nunez, ENT with pathology showed squamous cell carcinoma with negative margin.  He was determined not a good candidate for lung surgery given his complicated medical history and health status.  He therefore received definitive radiation therapy using SBRT with a total dose of 5000 cGy in 5 treatments given from 3/2/2021-3/10/2021. The patient tolerated radiation therapy very well with minimal side effect.     The patient has been doing well since completion of radiation therapy.  He denies any pain or discomfort related to the therapy at the time of evaluation.  The patient was found to a new 9 mm small nodule in the left upper lobe outside of patient therapy area from restaging CT scan on 12/20/2021.  He was recommended to have staging PET CT scan and was done 1/7/2022. There is a new new 1.1 cm FDG avid left upper lobe nodule consistent with malignant lung tumor and a sclerotic hypermetabolic metastasis  within the T7 vertebral body.  There is no evidence of other systemic metastasis.The patient's case has been discussed at thoracic tumor conference 1/13/2022.  MRI brain on 1/22/2022 showed no evidence of brain metastasis. Patient is not good candidate for surgery and poor candidate for systemic therapy given his current medical status.  The consensus recommendation is to consider SBRT for his oligo disease in the left lung and T7 spine if the patient wishes not to consider systemic therapy at this time.   The patient received the second course of SBRT to the second left lung cancer with a total dose of 5000 cGy in 5 treatments given from 3/9/2022-3/18/2022.  He tolerated radiation therapy very well with minimal side effect.      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  He received stereotactic radiosurgery with a total dose of 3275 cGy in 5 treatments given from 4/12/2022- 4/22/2022.  The patient tolerated ration therapy very well with minimal side effect.     The patient has been stable since completion of radiation therapy.  He denies any new symptoms at the time of evaluation.  He is here for routine post therapy office follow-up.       Medications were reviewed and are up to date on EPIC.    The following portions of the patient's history were reviewed and updated as appropriate: allergies, current medications, past family history, past medical history, past social history, past surgical history and problem list.    Review of Systems:      General  Constitutional  Constitutional (WDL): All constitutional elements are within defined limits  EENT  Eye Disorders  Eye Disorder (WDL): All eye disorder elements are within defined limits (wears glasses)  Ear Disorders  Ear Disorder (WDL): All ear disorder elements are within defined limits  Respiratory  Respiratory  Respiratory (WDL): Exceptions to WDL  Cough: Mild symptoms OR nonprescription intervention indicated (occasional dry  cough)  Cardiovascular  Cardiovascular  Cardiovascular (WDL): All cardiovascular elements are within defined limits  Gastrointestinal  Gastrointestinal  Gastrointestinal (WDL): Exceptions to WDL  Constipation: Occasional or intermittent symptoms OR occasional use of stool softeners, laxatives, dietary modification, or enema  Musculoskeletal  Musculoskeletal and Connective Tissue Disorders  Musculoskeletal & Connective (WDL): Exceptions to WDL  Arthralgia: Mild pain (back)  Bone Pain: Mild pain (back)  Generalized Muscle Weakness: Symptomatic OR perceived by patient but not evident on physical exam (uses walker occasionally)  Myalgia: Mild pain (right side and neuropathy in feet)  Integumentary  Integumentary  Integumentary (WDL): All integumentary elements are within defined limits  Neurological  Neurosensory  Neurosensory (WDL): Exceptions to WDL  Peripheral Motor Neuropathy: Asymptomatic OR clinical or diagnostic observations only (feet)  Ataxia: Asymptomatic OR clinical or diagnostic observations only OR intervention not indicated  Peripheral Sensory Neuropathy: Asymptomatic (feet)  Genitourinary/Reproductive  Genitourinary  Genitourinary (WDL): Exceptions to WDL (nocturia x2)  Urinary Frequency: Present  Lymphatic  Lymph System Disorders  Lymph (WDL): All lymph elements are within defined limits  Pain  Pain Score: Mild Pain (3)  AUA Assessment                                                              Accompanied by  Accompanied By: family (daughter)    Objective:     PHYSICAL EXAMINATION:    BP (!) 145/77 (BP Location: Right arm, Patient Position: Sitting, Cuff Size: Adult Regular)   Pulse 60   Temp 97.7  F (36.5  C) (Oral)   Resp 16   Wt 90.3 kg (199 lb 1.6 oz)   SpO2 99%   BMI 27.77 kg/m      Gen: Alert, in NAD  Eyes: PERRL, EOMI, sclera anicteric  HENT     Head: NC/AT     Ears: No external auricular lesions     Nose/sinus: No rhinorrhea or epistaxis     Oropharynx: MMM, no visible oral lesions  Neck:  Supple, full ROM, no LAD  Pulm: No wheezing, stridor or respiratory distress  CV: Well-perfused, no cyanosis, no pedal edema  Abdominal: BS+, soft, nontender, nondistended, no hepatomegaly  Back: No step-offs or pain to palpation along the thoracolumbar spine  Rectal: Deferred  : Deferred  Musculoskeletal: Normal muscle bulk and tone  Skin: Normal color and turgor  Neurologic: A/Ox3, CN II-XII intact, normal gait and station  Psychiatric: Appropriate mood and affect     Imaging: Imaging results 30 days: CT Chest w/o Contrast    Result Date: 12/19/2022  EXAM: CT CHEST W/O CONTRAST LOCATION: [facility] DATE/TIME: 12/19/2022 2:01 PM INDICATION:  Malignant neoplasm of upper lobe of left lung (H). COMPARISON: 09/18/2022. TECHNIQUE: CT chest without IV contrast. Multiplanar reformats were obtained. Dose reduction techniques were used. CONTRAST: None. FINDINGS: LUNGS AND PLEURA: Stable fiducial marker and post treatment thickening in the perihilar left upper lobe. No significant change of 6 x 9 mm left upper lobe nodule anterior to the dominant mass and fiducial marker (series 5, image 105). Stable wedge resection right lung. Stable emphysema. No pleural effusion. MEDIASTINUM/AXILLAE: No new or enlarging adenopathy. Small hiatus hernia. CORONARY ARTERY CALCIFICATION: Severe. UPPER ABDOMEN: Partially seen abdominal aortic stent graft. MUSCULOSKELETAL: No focal bony lesion.     IMPRESSION: 1. Stable exam. 2. No evidence of new or enlarging disease. 3. Stable post treatment changes.     Prostate Specific Antigen Screen   Date Value Ref Range Status   05/08/2021 34.0 (H) 0.0 - 6.5 ng/mL Final   03/09/2021 23.5 (H) < OR = 4.0 ng/mL Final     Comment:     The total PSA value from this assay system is   standardized against the WHO standard. The test   result will be approximately 20% lower when compared   to the equimolar-standardized total PSA ([place]). Comparison of serial PSA results  should be   interpreted with this fact in mind.     This test was performed using the Siemens   chemiluminescent method. Values obtained from   different assay methods cannot be used  interchangeably. PSA levels, regardless of  value, should not be interpreted as absolute  evidence of the presence or absence of disease.  Lab test performed by:  Lab Mnem[facility]:  [facility]  [address] [place] [address]  Nando Nunez M.D.  QUEST Specimen received date and time: 09-MAR-2021 12:46:00.00     05/06/2019 15.8 (H) < OR = 4.0 ng/mL Final     Comment:     The total PSA value from this assay system is   standardized against the WHO standard. The test   result will be approximately 20% lower when compared   to the equimolar-standardized total PSA ([place]). Comparison of serial PSA results should be   interpreted with this fact in mind.     This test was performed using the Siemens   chemiluminescent method. Values obtained from   different assay methods cannot be used  interchangeably. PSA levels, regardless of  value, should not be interpreted as absolute  evidence of the presence or absence of disease.  Lab test performed by:  Lab [facility]:  [facility]  [address] [place] [address]  Nando Nunez M.D.  QUEST Specimen received date and time: 06-MAY-2019 08:07:00.00     10/15/2018 10.8 (H) < OR = 4.0 ng/mL Final     Comment:     The total PSA value from this assay system is   standardized against the WHO standard. The test   result will be approximately 20% lower when compared   to the equimolar-standardized total PSA ([place]). Comparison of serial PSA results should be   interpreted with this fact in mind.     This test was performed using the Siemens   chemiluminescent method. Values obtained from   different assay methods cannot be used  interchangeably. PSA levels, regardless of  value, should not be interpreted as  absolute  evidence of the presence or absence of disease.  Lab test performed by:  Lab Mnemonic:  [facility]  [address] [place] [address]  Nando Nunez M.D.  QUEST Specimen received date and time: 15-OCT-2018 17:14:00.00     03/29/2018 13.0 (H) < OR = 4.0 ng/mL Final     Comment:     The total PSA value from this assay system is   standardized against the WHO standard. The test   result will be approximately 20% lower when compared   to the equimolar-standardized total PSA ([place]). Comparison of serial PSA results should be   interpreted with this fact in mind.     This test was performed using the Siemens   chemiluminescent method. Values obtained from   different assay methods cannot be used  interchangeably. PSA levels, regardless of  value, should not be interpreted as absolute  evidence of the presence or absence of disease.  Lab test performed by:  Lab Mnemonic:  [facility]  [address] [place] [address]  Nando Nunez M.D.  QUEST Specimen received date and time: 29-MAR-2018 16:01:00.00     09/11/2017 11.9 (H) < OR = 4.0 ng/mL Final     Comment:     The total PSA value from this assay system is   standardized against the WHO standard. The test   result will be approximately 20% lower when compared   to the equimolar-standardized total PSA ([place]). Comparison of serial PSA results should be   interpreted with this fact in mind.     This test was performed using the Siemens   chemiluminescent method. Values obtained from   different assay methods cannot be used  interchangeably. PSA levels, regardless of  value, should not be interpreted as absolute  evidence of the presence or absence of disease.  Lab test performed by:  Lab Mnemonic:  [facility]  [address] [facility] [place] [address]  Nando Nunez M.D.  QUEST Specimen received date and time: 11-SEP-2017 08:29:00.00     07/15/2016 10.4  (H) < OR = 4.0 ng/mL Final     Comment:        This test was performed using the Siemens  chemiluminescent method. Values obtained from  different assay methods cannot be used  interchangeably. PSA levels, regardless of  value, should not be interpreted as absolute  evidence of the presence or absence of disease.     Lab test performed by:  Lab Mnemonic:[facility] [place]  [address] [place] [address]  Nando Nunez M.D.     02/09/2016 7.4 (H) < OR = 4.0 ng/mL Final     Comment:        This test was performed using the Siemens  chemiluminescent method. Values obtained from  different assay methods cannot be used  interchangeably. PSA levels, regardless of  value, should not be interpreted as absolute  evidence of the presence or absence of disease.     Lab test performed by:  Lab Mnemonic:[facility] [place]  [address] [place] [address]  Nando Nunez M.D.     04/22/2015 7.2 (H) < OR = 4.0 ng/mL Final     Comment:        This test was performed using the Siemens  chemiluminescent method. Values obtained from  different assay methods cannot be used  interchangeably. PSA levels, regardless of  value, should not be interpreted as absolute  evidence of the presence or absence of disease.       Lab test performed by:  Lab Mnemonic:[facility] [place]  [address] [place] [address]  Nando Nunez M.D.     03/19/2014 6.8 (H) < OR = 4.0 ng/mL Final     Comment:        This test was performed using the Siemens  chemiluminescent method. Values obtained from  different assay methods cannot be used  interchangeably. PSA levels, regardless of  value, should not be interpreted as absolute  evidence of the presence or absence of disease.       Lab test performed by:  Lab Mnemonic:[facility] [place]  [address] [place] [address]  Nando Nunez M.D.     03/14/2013 6.1 (H) < OR = 4.0 ng/mL Final     Comment:         This test was performed using the Siemens  chemiluminescent method. Values obtained from  different assay methods cannot be used  interchangeably. PSA levels, regardless of  value, should not be interpreted as absolute  evidence of the presence or absence of disease.       Lab test performed by:  Lab [facility]  [address] [place] [address]  Nando Nunez M.D.     03/06/2012 5.5 (H) < OR = 4.0 ng/mL Final     Comment:        This test was performed using the Siemens  chemiluminescent method. Values obtained from  different assay methods cannot be used  interchangeably. PSA levels, regardless of  value, should not be interpreted as absolute  evidence of the presence or absence of disease.       Lab test performed by:  Lab Mnemonic:  [facility]  [address] [place] [address]  Nando Nunez M.D.     06/10/2011 4.7 (H) < OR = 4.0 ng/mL Final     Comment:        This test was performed using the Siemens  chemiluminescent method. Values obtained from  different assay methods cannot be used  interchangeably. PSA levels, regardless of  value, should not be interpreted as absolute  evidence of the presence or absence of disease.       Lab test performed by:  [facility]  [address] [place] [address]  Nando Nunez M.D.       PSA Tumor Marker   Date Value Ref Range Status   12/19/2022 3.00 0.00 - 6.50 ng/mL Final   09/19/2022 23.50 (H) 0.00 - 6.50 ng/mL Final   06/21/2022 21.81 (H) 0.00 - 6.50 ug/L Final   03/31/2022 18.25 (H) 0.00 - 6.50 ug/L Final   12/22/2021 12.47 (H) 0.00 - 6.50 ug/L Final      Impression     The patient is a 77-year-old gentleman with multiple history of cancer in the past and a new finding of left upper lobe lung cancer.  The restaging PET CT scan showed new 1.1 cm FDG avid left upper lobe nodule consistent with malignant lung tumor and a sclerotic hypermetabolic  metastasis within the T7 vertebral body.The patient received stereotactic radiosurgery for T7 spine 8-month ago and SBRT for lung cancer 9 months ago with restaging CT scan showed good response and no evidence of recurrence.    Assessment & Plan:     1.  I have personally reviewed his most recent CT scan and compared to the previous CT scan today.  The patient had a good response of his lung cancer there is no evidence of cancer recurrence.  We will schedule patient to have restaging CT scan in 4 months and follow-up.     2.  The patient had a good response for prostate cancer after hormonal therapy with most recent PSA measures 3.0 on 12/19/2022.  Continue follow-up for his prostate cancer which Dr. Wallace radiation medical oncology as planned.    3.  Recommend patient to continue follow-up with Dr. Alon Nunez, ENT for his head neck cancer surveillance.     4.  Continue follow-up with Dr. Shade Boyd, PCP for other medical needs.     Face to face time  30 minutes with > 80% spent on consultation, education and coordination of care.         Mariana Batista MD, PhD  Department of Radiation Oncology   [facility]  Tel: [phone]  Page: [phone]    [facility]  [address] [place] [address]     [place] [id number] [facility]   [place] [address]    CC:  Patient Care Team:  Shade Boyd MD as PCP - General (Internal Medicine)  Mariana Batista MD as MD (Hematology & Oncology)  Faviola Cottrell [facility] as Pharmacist (Pharmacist)  Mariana Batista MD as Assigned Cancer Care Provider  Shade Boyd MD as Assigned PCP  Rosas Carr MD as MD (Neurology)  iNlton Gtz MD as Assigned Musculoskeletal Provider  David Castrejon MD (Internal Medicine)  Faviola Cottrell [facility] as Assigned MTM Pharmacist  Andrew Wallace MD as MD (Hematology)  Kwesi Roldan MD as Assigned Surgical Provider        Again, thank you for allowing me to participate in the care of your patient.         Sincerely,        Mariana Batista MD

## 2022-12-21 NOTE — PROGRESS NOTES
"Oncology Rooming Note    December 21, 2022 1:02 PM   Lenny Chau is a 77 year old male who presents for:    Chief Complaint   Patient presents with     Oncology Clinic Visit     Follow up with Dr. Batista     Initial Vitals: BP (!) 145/77 (BP Location: Right arm, Patient Position: Sitting, Cuff Size: Adult Regular)   Pulse 60   Temp 97.7  F (36.5  C) (Oral)   Resp 16   Wt 90.3 kg (199 lb 1.6 oz)   SpO2 99%   BMI 27.77 kg/m   Estimated body mass index is 27.77 kg/m  as calculated from the following:    Height as of 11/1/22: 1.803 m (5' 11\").    Weight as of this encounter: 90.3 kg (199 lb 1.6 oz). Body surface area is 2.13 meters squared.  Mild Pain (3) Comment: Data Unavailable   No LMP for male patient.  Allergies reviewed: Yes  Medications reviewed: Yes    Medications: Medication refills not needed today.  Pharmacy name entered into [id number] [place] [address]    Clinical concerns: Follow up s/p radiation for lung cancer, CT scan results Dr. Batista was notified.      Anne-Marie Durbin RN            "

## 2022-12-21 NOTE — PROGRESS NOTES
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  The biopsy confirmed moderately differentiated adenocarcinoma.        TYPE OF RADIATION THERAPY ADMINISTERED:   1. SBRT to the left upper lobe with a total dose of 5000 cGy in 5 treatments given from 3/2/2021-3/10/2021.     2. SBRT with a total dose of 5000 cGy in 5 treatments for the second left tumor given from 3/9/2022-3/18/2022.      3. SBRT with a total dose of 3275 cGy in 5 treatments for T7 spine given from 2022- 2022.     INTERVAL SINCE COMPLETION OF RADIATION THERAPY: 8 months.      SUBJECTIVE:  Mr. Chau is a 77 y.o. male who is a chronic smoker and quit smoking since .  The patient had a complicated history of multiple cancer in the past as detailed as followin.  Low risk prostate cancer, clinical stage T1c N0 M0, recent grade 3+3 =6 and the last PSA was 10.4 in 2006.  The patient is currently on clinical observation with no therapy.  PSA: 12.47 on 2021.     2.  Floor of mouth cancer, status post surgical resection in 1994 with no adjuvant therapy.     3.  Left kidney hemangioma, status post left nephrectomy on 3/26/2009.     4.  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  Patient had local recurrence and is  status post surgical resection on 9/11/2009.     5.  Non-small cell lung cancer involving right lung, stage I, status post right thoracotomy and excision of right upper lobe and right middle lobe lung tumor 11/5/2015 with no evidence of lymph node metastasis and no adjuvant therapy.  Patient lost to follow-up since 2016.     6.  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     7.  Left upper lobe lung  cancer, stage T1N0 M0.  The biopsy confirmed moderately differentiated adenocarcinoma on 1/21/2021.  The patient received SBRT with a total dose of 5000 cGy in 5 treatments given from 3/2/2021-3/10/2021.     Patient had two prior early stage right lung cancers that were apparently resected in their entirety and may have been synchronous primary lung cancers. It does not appear he had the appropriate recommended follow up at [facility]. The MELODY lesion was known to be present in 2016 and appears to be slightly larger now with significant FDG-avidity on the recent PET/CT. It is likely another primary lung cancer, although metastasis from previous lung cancers is possible.  The patient had a restaging PET CT scan on 8/31/2020.  The scan showed enlarging FDG avid left upper lobe mass measuring 2.1 x 2.5 cm with central solid component consistent with primary lung cancer without metastasis.  There was also a FDG avid lesion in the left buccal region consistent with squamous cell carcinoma without metastasis.  The patient had a surgical resection for his left buccal/lateral tongue squamous cell carcinoma 11/6/2020 by KERRY Renee with pathology showed squamous cell carcinoma with negative margin.  He was determined not a good candidate for lung surgery given his complicated medical history and health status.  He therefore received definitive radiation therapy using SBRT with a total dose of 5000 cGy in  5 treatments given from 3/2/2021-3/10/2021. The patient tolerated radiation therapy very well with minimal side effect.     The patient has been doing well since completion of radiation therapy.  He denies any pain or discomfort related to the therapy at the time of evaluation.  The patient was found to a new 9 mm small nodule in the left upper lobe outside of patient therapy area from restaging CT scan on 12/20/2021.  He was recommended to have staging PET CT scan and was done 1/7/2022. 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  There is no evidence of other systemic metastasis.The patient's case has been discussed at thoracic tumor conference 1/13/2022.  MRI brain on 1/22/2022 showed no evidence of brain metastasis. Patient is not good candidate for surgery and poor candidate for systemic therapy given his current medical status.  The consensus recommendation is to consider SBRT for his oligo disease in the left lung and T7 spine if the patient wishes not to consider systemic therapy at this time.   The patient received the second course of SBRT to the second left lung cancer with a total dose of 5000 cGy in 5 treatments given from 3/9/2022-3/18/2022.  He tolerated radiation therapy very well with minimal side effect.      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  He received stereotactic radiosurgery with a total dose of 3275 cGy in 5 treatments given from 4/12/2022- 4/22/2022.  The patient tolerated ration therapy very well with minimal side effect.     The patient has been stable since completion of radiation therapy.  He denies any new symptoms at the time of evaluation.  He is here for routine post therapy office follow-up.       Medications were reviewed and are up to date on EPIC.    The following portions of the patient's history were reviewed and updated as appropriate:  allergies, current medications, past family history, past medical history, past social history, past surgical history and problem list.    Review of Systems:      General  Constitutional  Constitutional (WDL): All constitutional elements are within defined limits  EENT  Eye Disorders  Eye Disorder (WDL): All eye disorder elements are within defined limits (wears glasses)  Ear Disorders  Ear Disorder (WDL): All ear disorder elements are within defined limits  Respiratory  Respiratory  Respiratory (WDL): Exceptions to WDL  Cough: Mild symptoms OR nonprescription intervention indicated (occasional dry cough)  Cardiovascular  Cardiovascular  Cardiovascular (WDL): All cardiovascular elements are within defined limits  Gastrointestinal  Gastrointestinal  Gastrointestinal (WDL): Exceptions to WDL  Constipation: Occasional or intermittent symptoms OR occasional use of stool softeners, laxatives, dietary modification, or enema  Musculoskeletal  Musculoskeletal and Connective Tissue Disorders  Musculoskeletal & Connective (WDL): Exceptions to WDL  Arthralgia: Mild pain (back)  Bone Pain: Mild pain (back)  Generalized Muscle Weakness: Symptomatic OR perceived by patient but not evident on physical exam (uses walker occasionally)  Myalgia: Mild pain (right side and neuropathy in feet)  Integumentary  Integumentary  Integumentary (WDL): All integumentary elements are within defined limits  Neurological  Neurosensory  Neurosensory (WDL): Exceptions to WDL  Peripheral Motor Neuropathy: Asymptomatic OR clinical or diagnostic observations only (feet)  Ataxia: Asymptomatic OR clinical or diagnostic observations only OR intervention not indicated  Peripheral Sensory Neuropathy: Asymptomatic (feet)  Genitourinary/Reproductive  Genitourinary  Genitourinary (WDL): Exceptions to WDL (nocturia x2)  Urinary Frequency: Present  Lymphatic  Lymph System Disorders  Lymph (WDL): All lymph elements are within defined limits  Pain  Pain Score:  Mild Pain (3)  AUA Assessment                                                              Accompanied by  Accompanied By: family (daughter)    Objective:     PHYSICAL EXAMINATION:    BP (!) 145/77 (BP Location: Right arm, Patient Position: Sitting, Cuff Size: Adult Regular)   Pulse 60   Temp 97.7  F (36.5  C) (Oral)   Resp 16   Wt 90.3 kg (199 lb 1.6 oz)   SpO2 99%   BMI 27.77 kg/m      Gen: Alert, in NAD  Eyes: PERRL, EOMI, sclera anicteric  HENT     Head: NC/AT     Ears: No external auricular lesions     Nose/sinus: No rhinorrhea or epistaxis     Oropharynx: MMM, no visible oral lesions  Neck: Supple, full ROM, no LAD  Pulm: No wheezing, stridor or respiratory distress  CV: Well-perfused, no cyanosis, no pedal edema  Abdominal: BS+, soft, nontender, nondistended, no hepatomegaly  Back: No step-offs or pain to palpation along the thoracolumbar spine  Rectal: Deferred  : Deferred  Musculoskeletal: Normal muscle bulk and tone  Skin: Normal color and turgor  Neurologic: A/Ox3, CN II-XII intact, normal gait and station  Psychiatric: Appropriate mood and affect     Imaging: Imaging results 30 days: CT Chest w/o Contrast    Result Date: 12/19/2022  EXAM: CT CHEST W/O CONTRAST LOCATION: [facility] DATE/TIME: 12/19/2022 2:01 PM INDICATION:  Malignant neoplasm of upper lobe of left lung (H). COMPARISON: 09/18/2022. TECHNIQUE: CT chest without IV contrast. Multiplanar reformats were obtained. Dose reduction techniques were used. CONTRAST: None. FINDINGS: LUNGS AND PLEURA: Stable fiducial marker and post treatment thickening in the perihilar left upper lobe. No significant change of 6 x 9 mm left upper lobe nodule anterior to the dominant mass and fiducial marker (series 5, image 105). Stable wedge resection right lung. Stable emphysema. No pleural effusion. MEDIASTINUM/AXILLAE: No new or enlarging adenopathy. Small hiatus hernia. CORONARY ARTERY CALCIFICATION: Severe. UPPER ABDOMEN:  Partially seen abdominal aortic stent graft. MUSCULOSKELETAL: No focal bony lesion.     IMPRESSION: 1. Stable exam. 2. No evidence of new or enlarging disease. 3. Stable post treatment changes.     Prostate Specific Antigen Screen   Date Value Ref Range Status   05/08/2021 34.0 (H) 0.0 - 6.5 ng/mL Final   03/09/2021 23.5 (H) < OR = 4.0 ng/mL Final     Comment:     The total PSA value from this assay system is   standardized against the WHO standard. The test   result will be approximately 20% lower when compared   to the equimolar-standardized total PSA ([place]   Aisha). Comparison of serial PSA results should be   interpreted with this fact in mind.     This test was performed using the Siemens   chemiluminescent method. Values obtained from   different assay methods cannot be used  interchangeably. PSA levels, regardless of  value, should not be interpreted as absolute  evidence of the presence or absence of disease.  Lab test performed by:  Lab Mnemonic:  [facility]  [address] [place] [address]  Nando Nunez M.D.  QUEST Specimen received date and time: 09-MAR-2021 12:46:00.00     05/06/2019 15.8 (H) < OR = 4.0 ng/mL Final     Comment:     The total PSA value from this assay system is   standardized against the WHO standard. The test   result will be approximately 20% lower when compared   to the equimolar-standardized total PSA ([place]   Aisha). Comparison of serial PSA results should be   interpreted with this fact in mind.     This test was performed using the Siemens   chemiluminescent method. Values obtained from   different assay methods cannot be used  interchangeably. PSA levels, regardless of  value, should not be interpreted as absolute  evidence of the presence or absence of disease.  Lab test performed by:  Lab Mnemonic:  [facility]  [address] [place] [address]  Nando Nunez M.D.  QUEST Specimen received date and time:  06-MAY-2019 08:07:00.00     10/15/2018 10.8 (H) < OR = 4.0 ng/mL Final     Comment:     The total PSA value from this assay system is   standardized against the WHO standard. The test   result will be approximately 20% lower when compared   to the equimolar-standardized total PSA ([place]   [place]). Comparison of serial PSA results should be   interpreted with this fact in mind.     This test was performed using the Siemens   chemiluminescent method. Values obtained from   different assay methods cannot be used  interchangeably. PSA levels, regardless of  value, should not be interpreted as absolute  evidence of the presence or absence of disease.  Lab test performed by:  Lab Mnemonic:  [facility]  [address] [place] [address]  Nando Nunez M.D.  QUEST Specimen received date and time: 15-OCT-2018 17:14:00.00     03/29/2018 13.0 (H) < OR = 4.0 ng/mL Final     Comment:     The total PSA value from this assay system is   standardized against the WHO standard. The test   result will be approximately 20% lower when compared   to the equimolar-standardized total PSA ([place]   [place]). Comparison of serial PSA results should be   interpreted with this fact in mind.     This test was performed using the Siemens   chemiluminescent method. Values obtained from   different assay methods cannot be used  interchangeably. PSA levels, regardless of  value, should not be interpreted as absolute  evidence of the presence or absence of disease.  Lab test performed by:  Lab Mnemonic:  [facility]  [address] [place] [address]  Nando Nunez M.D.  QUEST Specimen received date and time: 29-MAR-2018 16:01:00.00     09/11/2017 11.9 (H) < OR = 4.0 ng/mL Final     Comment:     The total PSA value from this assay system is   standardized against the WHO standard. The test   result will be approximately 20% lower when compared   to the equimolar-standardized total  PSA ([place]   [place]). Comparison of serial PSA results should be   interpreted with this fact in mind.     This test was performed using the Siemens   chemiluminescent method. Values obtained from   different assay methods cannot be used  interchangeably. PSA levels, regardless of  value, should not be interpreted as absolute  evidence of the presence or absence of disease.  Lab test performed by:  Lab Mnemonic:  [facility] [address]  Nando Nunez M.D.  QUEST Specimen received date and time: 11-SEP-2017 08:29:00.00     07/15/2016 10.4 (H) < OR = 4.0 ng/mL Final     Comment:        This test was performed using the Siemens  chemiluminescent method. Values obtained from  different assay methods cannot be used  interchangeably. PSA levels, regardless of  value, should not be interpreted as absolute  evidence of the presence or absence of disease.     Lab test performed by:  Lab Mnemonic:  [facility]  [address] [facility] [place] [address]  Nando Nunez M.D.     02/09/2016 7.4 (H) < OR = 4.0 ng/mL Final     Comment:        This test was performed using the Siemens  chemiluminescent method. Values obtained from  different assay methods cannot be used  interchangeably. PSA levels, regardless of  value, should not be interpreted as absolute  evidence of the presence or absence of disease.     Lab test performed by:  Lab Mnemonic:  [facility] [place] [address]  Nando Nunez M.D.     04/22/2015 7.2 (H) < OR = 4.0 ng/mL Final     Comment:        This test was performed using the Siemens  chemiluminescent method. Values obtained from  different assay methods cannot be used  interchangeably. PSA levels, regardless of  value, should not be interpreted as absolute  evidence of the presence or absence of disease.       Lab test performed by:  Lab Mnemonic:[facility]  [facility]  [facility] [place] [address]  Nando Nunez M.D.     03/19/2014 6.8 (H) < OR = 4.0 ng/mL Final     Comment:        This test was performed using the Siemens  chemiluminescent method. Values obtained from  different assay methods cannot be used  interchangeably. PSA levels, regardless of  value, should not be interpreted as absolute  evidence of the presence or absence of disease.       Lab test performed by:  Lab Mnemonic:  [facility]  [address] [facility] [facility] [place] [address]  Nando Nunez M.D.     03/14/2013 6.1 (H) < OR = 4.0 ng/mL Final     Comment:        This test was performed using the Siemens  chemiluminescent method. Values obtained from  different assay methods cannot be used  interchangeably. PSA levels, regardless of  value, should not be interpreted as absolute  evidence of the presence or absence of disease.       Lab test performed by:  Lab Mnemonic:  [facility] [address] [place] [address]  Nando Nunez M.D.     03/06/2012 5.5 (H) < OR = 4.0 ng/mL Final     Comment:        This test was performed using the Siemens  chemiluminescent method. Values obtained from  different assay methods cannot be used  interchangeably. PSA levels, regardless of  value, should not be interpreted as absolute  evidence of the presence or absence of disease.       Lab test performed by:  Lab Mnemonic:  [facility]  [address] [place] [address]  Nando Nunez M.D.     06/10/2011 4.7 (H) < OR = 4.0 ng/mL Final     Comment:        This test was performed using the Siemens  chemiluminescent method. Values obtained from  different assay methods cannot be used  interchangeably. PSA levels, regardless of  value, should not be interpreted as absolute  evidence of the presence or absence of disease.       Lab test performed by:  [facility]  [address] [place]  [address]  Nando Nunez M.D.       PSA Tumor Marker   Date Value Ref Range Status   12/19/2022 3.00 0.00 - 6.50 ng/mL Final   09/19/2022 23.50 (H) 0.00 - 6.50 ng/mL Final   06/21/2022 21.81 (H) 0.00 - 6.50 ug/L Final   03/31/2022 18.25 (H) 0.00 - 6.50 ug/L Final   12/22/2021 12.47 (H) 0.00 - 6.50 ug/L Final      Impression     The patient is a 77-year-old gentleman with multiple history of cancer in the past and a new finding of left upper lobe lung cancer.  The restaging PET CT scan showed new 1.1 cm FDG avid left upper lobe nodule consistent with malignant lung tumor and a sclerotic hypermetabolic metastasis within the T7 vertebral body.The patient received stereotactic radiosurgery for T7 spine 8-month ago and SBRT for lung cancer 9 months ago with restaging CT scan showed good response and no evidence of recurrence.    Assessment & Plan:     1.  I have personally reviewed his most recent CT scan and compared to the previous CT scan today.  The patient had a good response of his lung cancer there is no evidence of cancer recurrence.  We will schedule patient to have restaging CT scan in 4 months and follow-up.     2.  The patient had a good response for prostate cancer after hormonal therapy with most recent PSA measures 3.0 on 12/19/2022.  Continue follow-up for his prostate cancer which Dr. Wallace radiation medical oncology as planned.    3.  Recommend patient to continue follow-up with Dr. Alon Nunez, ENT for his head neck cancer surveillance.     4.  Continue follow-up with Dr. Shade Boyd, PCP for other medical needs.     Face to face time  30 minutes with > 80% spent on consultation, education and coordination of care.         Mariana Batista MD, PhD  Department of Radiation Oncology   [facility]  Tel: [phone]  Page: [phone]    [facility]  [address]  [place] [address]     [facility]   [address] [facility] [place] [address]    CC:  Patient Care Team:  Oliver  MD Shade as PCP - General (Internal Medicine)  Mariana Batista MD as MD (Hematology & Oncology)  Faviola Cottrell [facility] as Pharmacist (Pharmacist)  Mariana Batista MD as Assigned Cancer Care Provider  Shade Boyd MD as Assigned PCP  Rosas Carr MD as MD (Neurology)  Nilton Gtz MD as Assigned Musculoskeletal Provider  David Castrejon MD (Internal Medicine)  Faviola Cottrell [facility] as Assigned MTM Pharmacist  Andrew Wallace MD as MD (Hematology)  Kwesi Roldan MD as Assigned Surgical Provider

## 2023-01-01 ENCOUNTER — ANCILLARY PROCEDURE (OUTPATIENT)
Dept: GENERAL RADIOLOGY | Facility: CLINIC | Age: 78
End: 2023-01-01
Attending: PHYSICIAN ASSISTANT
Payer: COMMERCIAL

## 2023-01-01 ENCOUNTER — TELEPHONE (OUTPATIENT)
Dept: FAMILY MEDICINE | Facility: CLINIC | Age: 78
End: 2023-01-01
Payer: COMMERCIAL

## 2023-01-01 ENCOUNTER — TELEPHONE (OUTPATIENT)
Dept: ONCOLOGY | Facility: HOSPITAL | Age: 78
End: 2023-01-01
Payer: COMMERCIAL

## 2023-01-01 ENCOUNTER — OFFICE VISIT (OUTPATIENT)
Dept: FAMILY MEDICINE | Facility: CLINIC | Age: 78
End: 2023-01-01
Payer: COMMERCIAL

## 2023-01-01 ENCOUNTER — TELEPHONE (OUTPATIENT)
Dept: ONCOLOGY | Facility: CLINIC | Age: 78
End: 2023-01-01
Payer: COMMERCIAL

## 2023-01-01 ENCOUNTER — VIRTUAL VISIT (OUTPATIENT)
Dept: PALLIATIVE MEDICINE | Facility: CLINIC | Age: 78
End: 2023-01-01
Payer: COMMERCIAL

## 2023-01-01 ENCOUNTER — OFFICE VISIT (OUTPATIENT)
Dept: RADIATION ONCOLOGY | Facility: CLINIC | Age: 78
End: 2023-01-01
Attending: INTERNAL MEDICINE
Payer: MEDICARE

## 2023-01-01 ENCOUNTER — INFUSION THERAPY VISIT (OUTPATIENT)
Dept: INFUSION THERAPY | Facility: CLINIC | Age: 78
End: 2023-01-01
Attending: INTERNAL MEDICINE
Payer: MEDICARE

## 2023-01-01 ENCOUNTER — PATIENT OUTREACH (OUTPATIENT)
Dept: ONCOLOGY | Facility: CLINIC | Age: 78
End: 2023-01-01

## 2023-01-01 ENCOUNTER — TELEPHONE (OUTPATIENT)
Dept: FAMILY MEDICINE | Facility: CLINIC | Age: 78
End: 2023-01-01

## 2023-01-01 ENCOUNTER — MYC REFILL (OUTPATIENT)
Dept: PALLIATIVE CARE | Facility: CLINIC | Age: 78
End: 2023-01-01
Payer: COMMERCIAL

## 2023-01-01 ENCOUNTER — LAB (OUTPATIENT)
Dept: INFUSION THERAPY | Facility: CLINIC | Age: 78
End: 2023-01-01
Attending: RADIOLOGY
Payer: MEDICARE

## 2023-01-01 ENCOUNTER — APPOINTMENT (OUTPATIENT)
Dept: RADIATION ONCOLOGY | Facility: CLINIC | Age: 78
End: 2023-01-01
Attending: RADIOLOGY
Payer: MEDICARE

## 2023-01-01 ENCOUNTER — MYC REFILL (OUTPATIENT)
Dept: ONCOLOGY | Facility: CLINIC | Age: 78
End: 2023-01-01
Payer: COMMERCIAL

## 2023-01-01 ENCOUNTER — OFFICE VISIT (OUTPATIENT)
Dept: RADIATION ONCOLOGY | Facility: CLINIC | Age: 78
End: 2023-01-01
Attending: RADIOLOGY
Payer: COMMERCIAL

## 2023-01-01 ENCOUNTER — HOSPITAL ENCOUNTER (OUTPATIENT)
Dept: MRI IMAGING | Facility: CLINIC | Age: 78
Discharge: HOME | End: 2023-01-15
Attending: PHYSICIAN ASSISTANT | Admitting: PHYSICIAN ASSISTANT
Payer: MEDICARE

## 2023-01-01 ENCOUNTER — DOCUMENTATION ONLY (OUTPATIENT)
Dept: ONCOLOGY | Facility: CLINIC | Age: 78
End: 2023-01-01
Payer: COMMERCIAL

## 2023-01-01 ENCOUNTER — ONCOLOGY VISIT (OUTPATIENT)
Dept: ONCOLOGY | Facility: CLINIC | Age: 78
End: 2023-01-01
Attending: NURSE PRACTITIONER
Payer: MEDICARE

## 2023-01-01 ENCOUNTER — OFFICE VISIT (OUTPATIENT)
Dept: OTOLARYNGOLOGY | Facility: CLINIC | Age: 78
End: 2023-01-01
Payer: COMMERCIAL

## 2023-01-01 ENCOUNTER — MYC REFILL (OUTPATIENT)
Dept: RADIATION ONCOLOGY | Facility: CLINIC | Age: 78
End: 2023-01-01
Payer: COMMERCIAL

## 2023-01-01 ENCOUNTER — TELEPHONE (OUTPATIENT)
Dept: RADIATION ONCOLOGY | Facility: CLINIC | Age: 78
End: 2023-01-01
Payer: COMMERCIAL

## 2023-01-01 ENCOUNTER — ALLIED HEALTH/NURSE VISIT (OUTPATIENT)
Dept: RADIATION ONCOLOGY | Facility: HOSPITAL | Age: 78
End: 2023-01-01
Attending: RADIOLOGY
Payer: MEDICARE

## 2023-01-01 ENCOUNTER — TRANSFERRED RECORDS (OUTPATIENT)
Dept: HEALTH INFORMATION MANAGEMENT | Facility: CLINIC | Age: 78
End: 2023-01-01
Payer: COMMERCIAL

## 2023-01-01 ENCOUNTER — ONCOLOGY VISIT (OUTPATIENT)
Dept: ONCOLOGY | Facility: CLINIC | Age: 78
End: 2023-01-01
Attending: INTERNAL MEDICINE
Payer: MEDICARE

## 2023-01-01 ENCOUNTER — OFFICE VISIT (OUTPATIENT)
Dept: RADIATION ONCOLOGY | Facility: HOSPITAL | Age: 78
End: 2023-01-01
Attending: RADIOLOGY
Payer: COMMERCIAL

## 2023-01-01 ENCOUNTER — HOSPITAL ENCOUNTER (OUTPATIENT)
Dept: MRI IMAGING | Facility: CLINIC | Age: 78
Discharge: HOME | End: 2023-04-29
Attending: RADIOLOGY | Admitting: RADIOLOGY
Payer: MEDICARE

## 2023-01-01 ENCOUNTER — OFFICE VISIT (OUTPATIENT)
Dept: FAMILY MEDICINE | Facility: CLINIC | Age: 78
End: 2023-01-01
Attending: INTERNAL MEDICINE
Payer: COMMERCIAL

## 2023-01-01 ENCOUNTER — HEALTH MAINTENANCE LETTER (OUTPATIENT)
Age: 78
End: 2023-01-01

## 2023-01-01 ENCOUNTER — HOSPITAL ENCOUNTER (OUTPATIENT)
Dept: CT IMAGING | Facility: CLINIC | Age: 78
Discharge: HOME | End: 2023-08-18
Attending: RADIOLOGY | Admitting: RADIOLOGY
Payer: MEDICARE

## 2023-01-01 ENCOUNTER — MYC MEDICAL ADVICE (OUTPATIENT)
Dept: ONCOLOGY | Facility: CLINIC | Age: 78
End: 2023-01-01

## 2023-01-01 ENCOUNTER — HOSPITAL ENCOUNTER (OUTPATIENT)
Dept: CT IMAGING | Facility: CLINIC | Age: 78
Discharge: HOME | End: 2023-04-17
Attending: RADIOLOGY
Payer: MEDICARE

## 2023-01-01 ENCOUNTER — TELEPHONE (OUTPATIENT)
Dept: PALLIATIVE CARE | Facility: CLINIC | Age: 78
End: 2023-01-01
Payer: COMMERCIAL

## 2023-01-01 ENCOUNTER — HOSPITAL ENCOUNTER (OUTPATIENT)
Dept: PET IMAGING | Facility: HOSPITAL | Age: 78
Discharge: HOME | End: 2023-09-07
Attending: RADIOLOGY | Admitting: RADIOLOGY
Payer: MEDICARE

## 2023-01-01 ENCOUNTER — ONCOLOGY VISIT (OUTPATIENT)
Dept: ONCOLOGY | Facility: CLINIC | Age: 78
End: 2023-01-01
Attending: INTERNAL MEDICINE
Payer: COMMERCIAL

## 2023-01-01 ENCOUNTER — LAB REQUISITION (OUTPATIENT)
Dept: LAB | Facility: HOSPITAL | Age: 78
End: 2023-01-01
Payer: MEDICARE

## 2023-01-01 VITALS
OXYGEN SATURATION: 98 % | RESPIRATION RATE: 20 BRPM | BODY MASS INDEX: 28.03 KG/M2 | SYSTOLIC BLOOD PRESSURE: 144 MMHG | WEIGHT: 201 LBS | DIASTOLIC BLOOD PRESSURE: 77 MMHG | HEART RATE: 51 BPM | TEMPERATURE: 97.8 F

## 2023-01-01 VITALS
OXYGEN SATURATION: 98 % | HEIGHT: 71 IN | WEIGHT: 193 LBS | BODY MASS INDEX: 27.02 KG/M2 | HEART RATE: 54 BPM | TEMPERATURE: 98 F | DIASTOLIC BLOOD PRESSURE: 79 MMHG | RESPIRATION RATE: 18 BRPM | SYSTOLIC BLOOD PRESSURE: 127 MMHG

## 2023-01-01 VITALS
TEMPERATURE: 97.9 F | DIASTOLIC BLOOD PRESSURE: 94 MMHG | BODY MASS INDEX: 24.36 KG/M2 | HEART RATE: 73 BPM | SYSTOLIC BLOOD PRESSURE: 139 MMHG | OXYGEN SATURATION: 97 % | WEIGHT: 174 LBS | HEIGHT: 71 IN | RESPIRATION RATE: 16 BRPM

## 2023-01-01 VITALS
HEART RATE: 76 BPM | OXYGEN SATURATION: 96 % | WEIGHT: 176.3 LBS | DIASTOLIC BLOOD PRESSURE: 78 MMHG | TEMPERATURE: 98.3 F | SYSTOLIC BLOOD PRESSURE: 129 MMHG | RESPIRATION RATE: 16 BRPM | BODY MASS INDEX: 24.6 KG/M2

## 2023-01-01 VITALS
TEMPERATURE: 97.8 F | HEART RATE: 50 BPM | SYSTOLIC BLOOD PRESSURE: 143 MMHG | OXYGEN SATURATION: 96 % | RESPIRATION RATE: 16 BRPM | DIASTOLIC BLOOD PRESSURE: 69 MMHG | BODY MASS INDEX: 27.22 KG/M2 | WEIGHT: 195.2 LBS

## 2023-01-01 VITALS
DIASTOLIC BLOOD PRESSURE: 81 MMHG | OXYGEN SATURATION: 96 % | HEIGHT: 71 IN | HEART RATE: 56 BPM | BODY MASS INDEX: 28.01 KG/M2 | SYSTOLIC BLOOD PRESSURE: 138 MMHG | WEIGHT: 200.1 LBS | RESPIRATION RATE: 16 BRPM

## 2023-01-01 VITALS
TEMPERATURE: 96.3 F | WEIGHT: 199 LBS | DIASTOLIC BLOOD PRESSURE: 70 MMHG | OXYGEN SATURATION: 95 % | BODY MASS INDEX: 27.86 KG/M2 | SYSTOLIC BLOOD PRESSURE: 110 MMHG | HEIGHT: 71 IN | RESPIRATION RATE: 20 BRPM | HEART RATE: 60 BPM

## 2023-01-01 VITALS
BODY MASS INDEX: 27.86 KG/M2 | TEMPERATURE: 96.4 F | OXYGEN SATURATION: 97 % | SYSTOLIC BLOOD PRESSURE: 135 MMHG | HEIGHT: 71 IN | DIASTOLIC BLOOD PRESSURE: 82 MMHG | HEART RATE: 58 BPM | WEIGHT: 199 LBS

## 2023-01-01 VITALS
SYSTOLIC BLOOD PRESSURE: 116 MMHG | RESPIRATION RATE: 18 BRPM | BODY MASS INDEX: 24.89 KG/M2 | HEART RATE: 69 BPM | OXYGEN SATURATION: 98 % | WEIGHT: 177.8 LBS | HEIGHT: 71 IN | DIASTOLIC BLOOD PRESSURE: 88 MMHG

## 2023-01-01 VITALS
HEART RATE: 54 BPM | HEIGHT: 71 IN | WEIGHT: 192 LBS | BODY MASS INDEX: 26.88 KG/M2 | OXYGEN SATURATION: 96 % | DIASTOLIC BLOOD PRESSURE: 88 MMHG | SYSTOLIC BLOOD PRESSURE: 155 MMHG

## 2023-01-01 VITALS
RESPIRATION RATE: 18 BRPM | SYSTOLIC BLOOD PRESSURE: 119 MMHG | TEMPERATURE: 98.2 F | DIASTOLIC BLOOD PRESSURE: 69 MMHG | HEART RATE: 62 BPM | WEIGHT: 189.6 LBS | OXYGEN SATURATION: 96 % | BODY MASS INDEX: 26.44 KG/M2

## 2023-01-01 VITALS
HEART RATE: 64 BPM | DIASTOLIC BLOOD PRESSURE: 84 MMHG | RESPIRATION RATE: 20 BRPM | SYSTOLIC BLOOD PRESSURE: 140 MMHG | OXYGEN SATURATION: 97 % | BODY MASS INDEX: 27.45 KG/M2 | WEIGHT: 196.8 LBS

## 2023-01-01 VITALS
RESPIRATION RATE: 16 BRPM | HEART RATE: 70 BPM | WEIGHT: 176.3 LBS | HEIGHT: 71 IN | WEIGHT: 179.2 LBS | DIASTOLIC BLOOD PRESSURE: 81 MMHG | TEMPERATURE: 98.1 F | SYSTOLIC BLOOD PRESSURE: 131 MMHG | OXYGEN SATURATION: 94 % | BODY MASS INDEX: 25.09 KG/M2 | HEART RATE: 86 BPM | SYSTOLIC BLOOD PRESSURE: 114 MMHG | BODY MASS INDEX: 24.6 KG/M2 | OXYGEN SATURATION: 97 % | DIASTOLIC BLOOD PRESSURE: 68 MMHG

## 2023-01-01 VITALS — BODY MASS INDEX: 25.06 KG/M2 | HEIGHT: 71 IN | WEIGHT: 179 LBS

## 2023-01-01 VITALS
WEIGHT: 192.6 LBS | SYSTOLIC BLOOD PRESSURE: 128 MMHG | RESPIRATION RATE: 16 BRPM | HEART RATE: 106 BPM | DIASTOLIC BLOOD PRESSURE: 79 MMHG | BODY MASS INDEX: 26.86 KG/M2 | TEMPERATURE: 98.1 F | OXYGEN SATURATION: 95 %

## 2023-01-01 VITALS
HEIGHT: 71 IN | BODY MASS INDEX: 26.6 KG/M2 | HEART RATE: 85 BPM | TEMPERATURE: 96.3 F | SYSTOLIC BLOOD PRESSURE: 120 MMHG | DIASTOLIC BLOOD PRESSURE: 79 MMHG | OXYGEN SATURATION: 95 % | WEIGHT: 190 LBS | RESPIRATION RATE: 16 BRPM

## 2023-01-01 VITALS
HEART RATE: 66 BPM | SYSTOLIC BLOOD PRESSURE: 165 MMHG | RESPIRATION RATE: 16 BRPM | DIASTOLIC BLOOD PRESSURE: 74 MMHG | TEMPERATURE: 97.7 F | WEIGHT: 193.5 LBS | BODY MASS INDEX: 26.99 KG/M2 | OXYGEN SATURATION: 98 %

## 2023-01-01 VITALS
OXYGEN SATURATION: 97 % | RESPIRATION RATE: 15 BRPM | HEART RATE: 63 BPM | DIASTOLIC BLOOD PRESSURE: 78 MMHG | BODY MASS INDEX: 28.17 KG/M2 | TEMPERATURE: 97.5 F | SYSTOLIC BLOOD PRESSURE: 124 MMHG | WEIGHT: 202 LBS

## 2023-01-01 VITALS — HEIGHT: 72 IN | BODY MASS INDEX: 26.61 KG/M2

## 2023-01-01 VITALS
HEART RATE: 42 BPM | SYSTOLIC BLOOD PRESSURE: 131 MMHG | OXYGEN SATURATION: 98 % | DIASTOLIC BLOOD PRESSURE: 71 MMHG | TEMPERATURE: 97.7 F | RESPIRATION RATE: 16 BRPM | WEIGHT: 190.2 LBS | BODY MASS INDEX: 26.53 KG/M2

## 2023-01-01 VITALS
HEIGHT: 71 IN | DIASTOLIC BLOOD PRESSURE: 81 MMHG | HEART RATE: 60 BPM | TEMPERATURE: 97.7 F | OXYGEN SATURATION: 98 % | SYSTOLIC BLOOD PRESSURE: 128 MMHG | RESPIRATION RATE: 17 BRPM | WEIGHT: 199 LBS | BODY MASS INDEX: 27.86 KG/M2

## 2023-01-01 VITALS — HEIGHT: 71 IN | WEIGHT: 190 LBS | BODY MASS INDEX: 26.6 KG/M2

## 2023-01-01 DIAGNOSIS — G89.3 CANCER RELATED PAIN: ICD-10-CM

## 2023-01-01 DIAGNOSIS — C79.51 METASTASIS TO SPINAL COLUMN (H): ICD-10-CM

## 2023-01-01 DIAGNOSIS — C61 MALIGNANT NEOPLASM OF PROSTATE (H): ICD-10-CM

## 2023-01-01 DIAGNOSIS — G89.3 CANCER ASSOCIATED PAIN: ICD-10-CM

## 2023-01-01 DIAGNOSIS — C34.10 MALIGNANT NEOPLASM OF UPPER LOBE OF LUNG, UNSPECIFIED LATERALITY (H): ICD-10-CM

## 2023-01-01 DIAGNOSIS — C06.0 BUCCAL MUCOSA SQUAMOUS CELL CARCINOMA (H): Primary | ICD-10-CM

## 2023-01-01 DIAGNOSIS — C79.51 MALIGNANT NEOPLASM METASTATIC TO BONE (H): Primary | ICD-10-CM

## 2023-01-01 DIAGNOSIS — I10 HYPERTENSION, UNSPECIFIED TYPE: ICD-10-CM

## 2023-01-01 DIAGNOSIS — C79.51 MALIGNANT NEOPLASM METASTATIC TO BONE (H): ICD-10-CM

## 2023-01-01 DIAGNOSIS — C34.12 MALIGNANT NEOPLASM OF UPPER LOBE OF LEFT LUNG (H): ICD-10-CM

## 2023-01-01 DIAGNOSIS — I25.118 CORONARY ARTERY DISEASE OF NATIVE HEART WITH STABLE ANGINA PECTORIS, UNSPECIFIED VESSEL OR LESION TYPE (H): ICD-10-CM

## 2023-01-01 DIAGNOSIS — G89.3 CANCER RELATED PAIN: Primary | ICD-10-CM

## 2023-01-01 DIAGNOSIS — J44.9 COPD, GROUP B, BY GOLD 2017 CLASSIFICATION (H): ICD-10-CM

## 2023-01-01 DIAGNOSIS — R07.89 LEFT-SIDED CHEST WALL PAIN: ICD-10-CM

## 2023-01-01 DIAGNOSIS — C34.10 MALIGNANT NEOPLASM OF UPPER LOBE OF LUNG, UNSPECIFIED LATERALITY (H): Primary | ICD-10-CM

## 2023-01-01 DIAGNOSIS — G62.9 PERIPHERAL POLYNEUROPATHY: ICD-10-CM

## 2023-01-01 DIAGNOSIS — R07.9 CHEST PAIN, UNSPECIFIED TYPE: ICD-10-CM

## 2023-01-01 DIAGNOSIS — C79.51 METASTASIS TO SPINAL COLUMN (H): Primary | ICD-10-CM

## 2023-01-01 DIAGNOSIS — C34.12 MALIGNANT NEOPLASM OF UPPER LOBE OF LEFT LUNG (H): Primary | ICD-10-CM

## 2023-01-01 DIAGNOSIS — C61 MALIGNANT NEOPLASM OF PROSTATE (H): Primary | ICD-10-CM

## 2023-01-01 DIAGNOSIS — U07.1 INFECTION DUE TO 2019 NOVEL CORONAVIRUS: ICD-10-CM

## 2023-01-01 DIAGNOSIS — M84.48XA PATHOLOGICAL FRACTURE OF THORACIC VERTEBRA, INITIAL ENCOUNTER: ICD-10-CM

## 2023-01-01 DIAGNOSIS — C06.9 SQUAMOUS CELL CARCINOMA OF MOUTH (H): ICD-10-CM

## 2023-01-01 DIAGNOSIS — I73.9 PERIPHERAL ARTERIAL DISEASE (H): ICD-10-CM

## 2023-01-01 DIAGNOSIS — I25.118 CORONARY ARTERY DISEASE OF NATIVE HEART WITH STABLE ANGINA PECTORIS, UNSPECIFIED VESSEL OR LESION TYPE (H): Primary | ICD-10-CM

## 2023-01-01 DIAGNOSIS — K21.9 GERD (GASTROESOPHAGEAL REFLUX DISEASE): ICD-10-CM

## 2023-01-01 DIAGNOSIS — C06.0 SQUAMOUS CELL CARCINOMA OF BUCCAL MUCOSA (H): ICD-10-CM

## 2023-01-01 DIAGNOSIS — N18.31 STAGE 3A CHRONIC KIDNEY DISEASE (H): ICD-10-CM

## 2023-01-01 DIAGNOSIS — G89.29 CHRONIC MIDLINE LOW BACK PAIN WITH RIGHT-SIDED SCIATICA: ICD-10-CM

## 2023-01-01 DIAGNOSIS — M54.41 CHRONIC MIDLINE LOW BACK PAIN WITH RIGHT-SIDED SCIATICA: ICD-10-CM

## 2023-01-01 DIAGNOSIS — F10.21 HISTORY OF ALCOHOLISM (H): ICD-10-CM

## 2023-01-01 DIAGNOSIS — Q84.5 ENLARGED AND HYPERTROPHIC NAILS: Primary | ICD-10-CM

## 2023-01-01 DIAGNOSIS — Z23 INFLUENZA VACCINE ADMINISTERED: ICD-10-CM

## 2023-01-01 DIAGNOSIS — K22.70 BARRETT'S ESOPHAGUS WITHOUT DYSPLASIA: ICD-10-CM

## 2023-01-01 DIAGNOSIS — S22.060D COMPRESSION FRACTURE OF T7 VERTEBRA WITH ROUTINE HEALING: ICD-10-CM

## 2023-01-01 DIAGNOSIS — J44.9 CHRONIC OBSTRUCTIVE PULMONARY DISEASE, UNSPECIFIED COPD TYPE (H): ICD-10-CM

## 2023-01-01 DIAGNOSIS — G62.9 NEUROPATHY: Primary | ICD-10-CM

## 2023-01-01 DIAGNOSIS — M84.48XA PATHOLOGICAL FRACTURE OF THORACIC VERTEBRA, INITIAL ENCOUNTER: Primary | ICD-10-CM

## 2023-01-01 DIAGNOSIS — C79.51 SPINE METASTASIS: Primary | ICD-10-CM

## 2023-01-01 DIAGNOSIS — K59.01 SLOW TRANSIT CONSTIPATION: ICD-10-CM

## 2023-01-01 DIAGNOSIS — K86.2 CYST OF PANCREAS: ICD-10-CM

## 2023-01-01 DIAGNOSIS — R21 RASH AND NONSPECIFIC SKIN ERUPTION: Primary | ICD-10-CM

## 2023-01-01 DIAGNOSIS — Z00.00 HEALTH CARE MAINTENANCE: ICD-10-CM

## 2023-01-01 DIAGNOSIS — C79.51 METASTATIC BONE TUMOR (H): ICD-10-CM

## 2023-01-01 DIAGNOSIS — G62.9 PERIPHERAL POLYNEUROPATHY: Primary | ICD-10-CM

## 2023-01-01 LAB
ALBUMIN SERPL BCG-MCNC: 3.6 G/DL (ref 3.5–5.2)
ALBUMIN SERPL-MCNC: 3.5 G/DL (ref 3.5–5)
ALBUMIN UR-MCNC: 30 MG/DL
ALP SERPL-CCNC: 120 U/L (ref 45–120)
ALP SERPL-CCNC: 125 U/L (ref 40–129)
ALT SERPL W P-5'-P-CCNC: 17 U/L (ref 0–45)
ALT SERPL W P-5'-P-CCNC: 9 U/L (ref 0–70)
ANION GAP SERPL CALCULATED.3IONS-SCNC: 10 MMOL/L (ref 7–15)
ANION GAP SERPL CALCULATED.3IONS-SCNC: 10 MMOL/L (ref 7–15)
ANION GAP SERPL CALCULATED.3IONS-SCNC: 12 MMOL/L (ref 5–18)
APPEARANCE UR: CLEAR
AST SERPL W P-5'-P-CCNC: 17 U/L (ref 0–45)
AST SERPL W P-5'-P-CCNC: 24 U/L (ref 0–40)
BACTERIA #/AREA URNS HPF: ABNORMAL /HPF
BASOPHILS # BLD AUTO: 0.1 10E3/UL (ref 0–0.2)
BASOPHILS # BLD AUTO: 0.1 10E3/UL (ref 0–0.2)
BASOPHILS NFR BLD AUTO: 1 %
BASOPHILS NFR BLD AUTO: 1 %
BILIRUB SERPL-MCNC: 0.5 MG/DL
BILIRUB SERPL-MCNC: 0.6 MG/DL (ref 0–1)
BILIRUB UR QL STRIP: NEGATIVE
BKR LAB AP ADDL TEST(S) ADDED: NORMAL
BUN SERPL-MCNC: 17.5 MG/DL (ref 8–23)
BUN SERPL-MCNC: 29 MG/DL (ref 8–28)
BUN SERPL-MCNC: 31.7 MG/DL (ref 8–23)
CALCIUM SERPL-MCNC: 10 MG/DL (ref 8.8–10.2)
CALCIUM SERPL-MCNC: 9.5 MG/DL (ref 8.5–10.5)
CALCIUM SERPL-MCNC: 9.7 MG/DL (ref 8.8–10.2)
CHLORIDE BLD-SCNC: 104 MMOL/L (ref 98–107)
CHLORIDE SERPL-SCNC: 102 MMOL/L (ref 98–107)
CHLORIDE SERPL-SCNC: 94 MMOL/L (ref 98–107)
CHOLEST SERPL-MCNC: 232 MG/DL
CO2 SERPL-SCNC: 25 MMOL/L (ref 22–31)
COLOR UR AUTO: YELLOW
CREAT BLD-MCNC: 1.6 MG/DL (ref 0.7–1.3)
CREAT SERPL-MCNC: 1.51 MG/DL (ref 0.67–1.17)
CREAT SERPL-MCNC: 1.51 MG/DL (ref 0.7–1.3)
CREAT SERPL-MCNC: 1.59 MG/DL (ref 0.67–1.17)
CREAT UR-MCNC: 48.4 MG/DL
DEPRECATED HCO3 PLAS-SCNC: 27 MMOL/L (ref 22–29)
DEPRECATED HCO3 PLAS-SCNC: 30 MMOL/L (ref 22–29)
EGFRCR SERPLBLD CKD-EPI 2021: 47 ML/MIN/1.73M2
EOSINOPHIL # BLD AUTO: 0.2 10E3/UL (ref 0–0.7)
EOSINOPHIL # BLD AUTO: 0.7 10E3/UL (ref 0–0.7)
EOSINOPHIL NFR BLD AUTO: 3 %
EOSINOPHIL NFR BLD AUTO: 9 %
ERYTHROCYTE [DISTWIDTH] IN BLOOD BY AUTOMATED COUNT: 12.1 % (ref 10–15)
ERYTHROCYTE [DISTWIDTH] IN BLOOD BY AUTOMATED COUNT: 12.4 % (ref 10–15)
ERYTHROCYTE [DISTWIDTH] IN BLOOD BY AUTOMATED COUNT: 13.2 % (ref 10–15)
GFR SERPL CREATININE-BSD FRML MDRD: 44 ML/MIN/1.73M2
GFR SERPL CREATININE-BSD FRML MDRD: 44 ML/MIN/1.73M2
GFR SERPL CREATININE-BSD FRML MDRD: 47 ML/MIN/1.73M2
GLUCOSE BLD-MCNC: 100 MG/DL (ref 70–125)
GLUCOSE BLDC GLUCOMTR-MCNC: 118 MG/DL (ref 70–99)
GLUCOSE SERPL-MCNC: 116 MG/DL (ref 70–99)
GLUCOSE SERPL-MCNC: 136 MG/DL (ref 70–99)
GLUCOSE UR STRIP-MCNC: NEGATIVE MG/DL
HCT VFR BLD AUTO: 41.3 % (ref 40–53)
HCT VFR BLD AUTO: 42.4 % (ref 40–53)
HCT VFR BLD AUTO: 43.2 % (ref 40–53)
HDLC SERPL-MCNC: 30 MG/DL
HGB BLD-MCNC: 13.3 G/DL (ref 13.3–17.7)
HGB BLD-MCNC: 13.9 G/DL (ref 13.3–17.7)
HGB BLD-MCNC: 14.6 G/DL (ref 13.3–17.7)
HGB UR QL STRIP: NEGATIVE
IMM GRANULOCYTES # BLD: 0 10E3/UL
IMM GRANULOCYTES # BLD: 0 10E3/UL
IMM GRANULOCYTES NFR BLD: 0 %
IMM GRANULOCYTES NFR BLD: 0 %
KETONES UR STRIP-MCNC: NEGATIVE MG/DL
LDLC SERPL CALC-MCNC: ABNORMAL MG/DL
LDLC SERPL DIRECT ASSAY-MCNC: 132 MG/DL
LEUKOCYTE ESTERASE UR QL STRIP: ABNORMAL
LYMPHOCYTES # BLD AUTO: 0.5 10E3/UL (ref 0.8–5.3)
LYMPHOCYTES # BLD AUTO: 1.2 10E3/UL (ref 0.8–5.3)
LYMPHOCYTES NFR BLD AUTO: 17 %
LYMPHOCYTES NFR BLD AUTO: 6 %
MCH RBC QN AUTO: 29.8 PG (ref 26.5–33)
MCH RBC QN AUTO: 30.7 PG (ref 26.5–33)
MCH RBC QN AUTO: 30.9 PG (ref 26.5–33)
MCHC RBC AUTO-ENTMCNC: 31.4 G/DL (ref 31.5–36.5)
MCHC RBC AUTO-ENTMCNC: 33.7 G/DL (ref 31.5–36.5)
MCHC RBC AUTO-ENTMCNC: 33.8 G/DL (ref 31.5–36.5)
MCV RBC AUTO: 91 FL (ref 78–100)
MCV RBC AUTO: 92 FL (ref 78–100)
MCV RBC AUTO: 95 FL (ref 78–100)
MICROALBUMIN UR-MCNC: 76.8 MG/L
MICROALBUMIN/CREAT UR: 158.68 MG/G CR (ref 0–17)
MONOCYTES # BLD AUTO: 0.6 10E3/UL (ref 0–1.3)
MONOCYTES # BLD AUTO: 0.6 10E3/UL (ref 0–1.3)
MONOCYTES NFR BLD AUTO: 8 %
MONOCYTES NFR BLD AUTO: 9 %
MUCOUS THREADS #/AREA URNS LPF: PRESENT /LPF
NEUTROPHILS # BLD AUTO: 4.5 10E3/UL (ref 1.6–8.3)
NEUTROPHILS # BLD AUTO: 6.2 10E3/UL (ref 1.6–8.3)
NEUTROPHILS NFR BLD AUTO: 64 %
NEUTROPHILS NFR BLD AUTO: 82 %
NITRATE UR QL: NEGATIVE
NONHDLC SERPL-MCNC: 202 MG/DL
NRBC # BLD AUTO: 0 10E3/UL
NRBC # BLD AUTO: 0 10E3/UL
NRBC BLD AUTO-RTO: 0 /100
NRBC BLD AUTO-RTO: 0 /100
PATH REPORT.ADDENDUM SPEC: NORMAL
PATH REPORT.COMMENTS IMP SPEC: NORMAL
PATH REPORT.COMMENTS IMP SPEC: NORMAL
PATH REPORT.RELEVANT HX SPEC: NORMAL
PATH REPORT.RELEVANT HX SPEC: NORMAL
PH UR STRIP: 7 [PH] (ref 5–7)
PLATELET # BLD AUTO: 175 10E3/UL (ref 150–450)
PLATELET # BLD AUTO: 179 10E3/UL (ref 150–450)
PLATELET # BLD AUTO: 297 10E3/UL (ref 150–450)
POTASSIUM BLD-SCNC: 5 MMOL/L (ref 3.5–5)
POTASSIUM SERPL-SCNC: 4.4 MMOL/L (ref 3.4–5.3)
POTASSIUM SERPL-SCNC: 5.1 MMOL/L (ref 3.4–5.3)
PROT SERPL-MCNC: 6.9 G/DL (ref 6–8)
PROT SERPL-MCNC: 7.2 G/DL (ref 6.4–8.3)
PSA SERPL DL<=0.01 NG/ML-MCNC: 0.12 NG/ML (ref 0–6.5)
PSA SERPL DL<=0.01 NG/ML-MCNC: 0.17 NG/ML (ref 0–6.5)
PSA SERPL DL<=0.01 NG/ML-MCNC: 0.45 NG/ML (ref 0–6.5)
PTH-INTACT SERPL-MCNC: 33 PG/ML (ref 15–65)
RBC # BLD AUTO: 4.47 10E6/UL (ref 4.4–5.9)
RBC # BLD AUTO: 4.53 10E6/UL (ref 4.4–5.9)
RBC # BLD AUTO: 4.72 10E6/UL (ref 4.4–5.9)
RBC #/AREA URNS AUTO: ABNORMAL /HPF
SODIUM SERPL-SCNC: 134 MMOL/L (ref 135–145)
SODIUM SERPL-SCNC: 139 MMOL/L (ref 136–145)
SODIUM SERPL-SCNC: 141 MMOL/L (ref 136–145)
SP GR UR STRIP: 1.01 (ref 1–1.03)
TRIGL SERPL-MCNC: 456 MG/DL
UROBILINOGEN UR STRIP-ACNC: 0.2 E.U./DL
WBC # BLD AUTO: 7 10E3/UL (ref 4–11)
WBC # BLD AUTO: 7.4 10E3/UL (ref 4–11)
WBC # BLD AUTO: 7.6 10E3/UL (ref 4–11)
WBC #/AREA URNS AUTO: ABNORMAL /HPF

## 2023-01-01 PROCEDURE — A9585 GADOBUTROL INJECTION: HCPCS | Performed by: RADIOLOGY

## 2023-01-01 PROCEDURE — A9552 F18 FDG: HCPCS | Performed by: RADIOLOGY

## 2023-01-01 PROCEDURE — 72157 MRI CHEST SPINE W/O & W/DYE: CPT | Mod: MG

## 2023-01-01 PROCEDURE — 99417 PROLNG OP E/M EACH 15 MIN: CPT | Performed by: NURSE PRACTITIONER

## 2023-01-01 PROCEDURE — 80053 COMPREHEN METABOLIC PANEL: CPT

## 2023-01-01 PROCEDURE — A9585 GADOBUTROL INJECTION: HCPCS | Performed by: PHYSICIAN ASSISTANT

## 2023-01-01 PROCEDURE — 77014 PR CT GUIDE FOR PLACEMENT RADIATION THERAPY FIELDS: CPT | Mod: 26 | Performed by: STUDENT IN AN ORGANIZED HEALTH CARE EDUCATION/TRAINING PROGRAM

## 2023-01-01 PROCEDURE — 99495 TRANSJ CARE MGMT MOD F2F 14D: CPT | Performed by: INTERNAL MEDICINE

## 2023-01-01 PROCEDURE — G0463 HOSPITAL OUTPT CLINIC VISIT: HCPCS | Performed by: RADIOLOGY

## 2023-01-01 PROCEDURE — G0463 HOSPITAL OUTPT CLINIC VISIT: HCPCS | Performed by: INTERNAL MEDICINE

## 2023-01-01 PROCEDURE — 36415 COLL VENOUS BLD VENIPUNCTURE: CPT | Performed by: INTERNAL MEDICINE

## 2023-01-01 PROCEDURE — 99214 OFFICE O/P EST MOD 30 MIN: CPT | Performed by: RADIOLOGY

## 2023-01-01 PROCEDURE — 77386 HC IMRT TREATMENT DELIVERY, COMPLEX: CPT | Performed by: STUDENT IN AN ORGANIZED HEALTH CARE EDUCATION/TRAINING PROGRAM

## 2023-01-01 PROCEDURE — 77300 RADIATION THERAPY DOSE PLAN: CPT | Performed by: RADIOLOGY

## 2023-01-01 PROCEDURE — G0008 ADMIN INFLUENZA VIRUS VAC: HCPCS | Performed by: INTERNAL MEDICINE

## 2023-01-01 PROCEDURE — 77301 RADIOTHERAPY DOSE PLAN IMRT: CPT | Performed by: RADIOLOGY

## 2023-01-01 PROCEDURE — 77470 SPECIAL RADIATION TREATMENT: CPT | Performed by: RADIOLOGY

## 2023-01-01 PROCEDURE — 99215 OFFICE O/P EST HI 40 MIN: CPT | Performed by: RADIOLOGY

## 2023-01-01 PROCEDURE — 99215 OFFICE O/P EST HI 40 MIN: CPT | Mod: VID | Performed by: NURSE PRACTITIONER

## 2023-01-01 PROCEDURE — 72070 X-RAY EXAM THORAC SPINE 2VWS: CPT | Mod: TC | Performed by: RADIOLOGY

## 2023-01-01 PROCEDURE — G0463 HOSPITAL OUTPT CLINIC VISIT: HCPCS | Mod: 25 | Performed by: NURSE PRACTITIONER

## 2023-01-01 PROCEDURE — 71046 X-RAY EXAM CHEST 2 VIEWS: CPT | Mod: TC | Performed by: RADIOLOGY

## 2023-01-01 PROCEDURE — 82043 UR ALBUMIN QUANTITATIVE: CPT | Performed by: INTERNAL MEDICINE

## 2023-01-01 PROCEDURE — 83721 ASSAY OF BLOOD LIPOPROTEIN: CPT | Mod: 59 | Performed by: INTERNAL MEDICINE

## 2023-01-01 PROCEDURE — 99214 OFFICE O/P EST MOD 30 MIN: CPT | Performed by: INTERNAL MEDICINE

## 2023-01-01 PROCEDURE — G0463 HOSPITAL OUTPT CLINIC VISIT: HCPCS | Mod: 25 | Performed by: INTERNAL MEDICINE

## 2023-01-01 PROCEDURE — 99214 OFFICE O/P EST MOD 30 MIN: CPT | Performed by: PHYSICIAN ASSISTANT

## 2023-01-01 PROCEDURE — 250N000011 HC RX IP 250 OP 636: Performed by: NURSE PRACTITIONER

## 2023-01-01 PROCEDURE — G1010 CDSM STANSON: HCPCS

## 2023-01-01 PROCEDURE — 82565 ASSAY OF CREATININE: CPT

## 2023-01-01 PROCEDURE — 88360 TUMOR IMMUNOHISTOCHEM/MANUAL: CPT | Mod: TC,ORL | Performed by: INTERNAL MEDICINE

## 2023-01-01 PROCEDURE — 77014 PR CT GUIDE FOR PLACEMENT RADIATION THERAPY FIELDS: CPT | Mod: 26 | Performed by: RADIOLOGY

## 2023-01-01 PROCEDURE — 99215 OFFICE O/P EST HI 40 MIN: CPT | Mod: 25 | Performed by: RADIOLOGY

## 2023-01-01 PROCEDURE — 77338 DESIGN MLC DEVICE FOR IMRT: CPT | Mod: 26 | Performed by: RADIOLOGY

## 2023-01-01 PROCEDURE — 99213 OFFICE O/P EST LOW 20 MIN: CPT | Performed by: STUDENT IN AN ORGANIZED HEALTH CARE EDUCATION/TRAINING PROGRAM

## 2023-01-01 PROCEDURE — 77470 SPECIAL RADIATION TREATMENT: CPT | Mod: 26 | Performed by: RADIOLOGY

## 2023-01-01 PROCEDURE — 77300 RADIATION THERAPY DOSE PLAN: CPT | Mod: 26 | Performed by: RADIOLOGY

## 2023-01-01 PROCEDURE — 36415 COLL VENOUS BLD VENIPUNCTURE: CPT

## 2023-01-01 PROCEDURE — 93000 ELECTROCARDIOGRAM COMPLETE: CPT | Performed by: PHYSICIAN ASSISTANT

## 2023-01-01 PROCEDURE — 83970 ASSAY OF PARATHORMONE: CPT | Performed by: INTERNAL MEDICINE

## 2023-01-01 PROCEDURE — 99205 OFFICE O/P NEW HI 60 MIN: CPT | Mod: VID | Performed by: NURSE PRACTITIONER

## 2023-01-01 PROCEDURE — 99214 OFFICE O/P EST MOD 30 MIN: CPT | Performed by: STUDENT IN AN ORGANIZED HEALTH CARE EDUCATION/TRAINING PROGRAM

## 2023-01-01 PROCEDURE — 99214 OFFICE O/P EST MOD 30 MIN: CPT | Performed by: NURSE PRACTITIONER

## 2023-01-01 PROCEDURE — G1010 CDSM STANSON: HCPCS | Mod: PS

## 2023-01-01 PROCEDURE — 77301 RADIOTHERAPY DOSE PLAN IMRT: CPT | Mod: 26 | Performed by: RADIOLOGY

## 2023-01-01 PROCEDURE — 77427 RADIATION TX MANAGEMENT X5: CPT | Performed by: STUDENT IN AN ORGANIZED HEALTH CARE EDUCATION/TRAINING PROGRAM

## 2023-01-01 PROCEDURE — 90662 IIV NO PRSV INCREASED AG IM: CPT | Performed by: INTERNAL MEDICINE

## 2023-01-01 PROCEDURE — 77263 THER RADIOLOGY TX PLNG CPLX: CPT | Performed by: RADIOLOGY

## 2023-01-01 PROCEDURE — 77427 RADIATION TX MANAGEMENT X5: CPT | Performed by: RADIOLOGY

## 2023-01-01 PROCEDURE — 99215 OFFICE O/P EST HI 40 MIN: CPT | Performed by: INTERNAL MEDICINE

## 2023-01-01 PROCEDURE — 80053 COMPREHEN METABOLIC PANEL: CPT | Performed by: NURSE PRACTITIONER

## 2023-01-01 PROCEDURE — 255N000002 HC RX 255 OP 636: Performed by: PHYSICIAN ASSISTANT

## 2023-01-01 PROCEDURE — 77386 HC IMRT TREATMENT DELIVERY, COMPLEX: CPT | Performed by: RADIOLOGY

## 2023-01-01 PROCEDURE — 80048 BASIC METABOLIC PNL TOTAL CA: CPT | Performed by: INTERNAL MEDICINE

## 2023-01-01 PROCEDURE — 77336 RADIATION PHYSICS CONSULT: CPT | Performed by: RADIOLOGY

## 2023-01-01 PROCEDURE — 85025 COMPLETE CBC W/AUTO DIFF WBC: CPT

## 2023-01-01 PROCEDURE — 99213 OFFICE O/P EST LOW 20 MIN: CPT | Mod: 25 | Performed by: INTERNAL MEDICINE

## 2023-01-01 PROCEDURE — 96402 CHEMO HORMON ANTINEOPL SQ/IM: CPT

## 2023-01-01 PROCEDURE — 74177 CT ABD & PELVIS W/CONTRAST: CPT | Mod: MG

## 2023-01-01 PROCEDURE — 85027 COMPLETE CBC AUTOMATED: CPT | Performed by: INTERNAL MEDICINE

## 2023-01-01 PROCEDURE — 343N000001 HC RX 343: Performed by: RADIOLOGY

## 2023-01-01 PROCEDURE — 85004 AUTOMATED DIFF WBC COUNT: CPT | Performed by: NURSE PRACTITIONER

## 2023-01-01 PROCEDURE — 82570 ASSAY OF URINE CREATININE: CPT | Performed by: INTERNAL MEDICINE

## 2023-01-01 PROCEDURE — 84153 ASSAY OF PSA TOTAL: CPT

## 2023-01-01 PROCEDURE — 88360 TUMOR IMMUNOHISTOCHEM/MANUAL: CPT | Mod: 26 | Performed by: PATHOLOGY

## 2023-01-01 PROCEDURE — 250N000011 HC RX IP 250 OP 636: Mod: JZ | Performed by: RADIOLOGY

## 2023-01-01 PROCEDURE — 82962 GLUCOSE BLOOD TEST: CPT

## 2023-01-01 PROCEDURE — 77334 RADIATION TREATMENT AID(S): CPT | Performed by: RADIOLOGY

## 2023-01-01 PROCEDURE — 250N000011 HC RX IP 250 OP 636: Mod: JZ | Performed by: INTERNAL MEDICINE

## 2023-01-01 PROCEDURE — 77334 RADIATION TREATMENT AID(S): CPT | Mod: 26 | Performed by: RADIOLOGY

## 2023-01-01 PROCEDURE — 36415 COLL VENOUS BLD VENIPUNCTURE: CPT | Performed by: NURSE PRACTITIONER

## 2023-01-01 PROCEDURE — 81001 URINALYSIS AUTO W/SCOPE: CPT | Performed by: INTERNAL MEDICINE

## 2023-01-01 PROCEDURE — 77338 DESIGN MLC DEVICE FOR IMRT: CPT | Performed by: RADIOLOGY

## 2023-01-01 PROCEDURE — G0463 HOSPITAL OUTPT CLINIC VISIT: HCPCS | Performed by: NURSE PRACTITIONER

## 2023-01-01 PROCEDURE — 255N000002 HC RX 255 OP 636: Performed by: RADIOLOGY

## 2023-01-01 PROCEDURE — 80061 LIPID PANEL: CPT | Performed by: INTERNAL MEDICINE

## 2023-01-01 PROCEDURE — 77370 RADIATION PHYSICS CONSULT: CPT | Performed by: RADIOLOGY

## 2023-01-01 PROCEDURE — G0463 HOSPITAL OUTPT CLINIC VISIT: HCPCS | Mod: 27 | Performed by: RADIOLOGY

## 2023-01-01 RX ORDER — MEGESTROL ACETATE 40 MG/ML
400 SUSPENSION ORAL DAILY
Qty: 480 ML | Refills: 3 | Status: SHIPPED | OUTPATIENT
Start: 2023-01-01 | End: 2023-01-01

## 2023-01-01 RX ORDER — OXYCODONE HYDROCHLORIDE 10 MG/1
10-15 TABLET ORAL EVERY 4 HOURS PRN
Qty: 90 TABLET | Refills: 0 | Status: SHIPPED | OUTPATIENT
Start: 2023-01-01 | End: 2023-01-01 | Stop reason: ALTCHOICE

## 2023-01-01 RX ORDER — MEPERIDINE HYDROCHLORIDE 50 MG/ML
25 INJECTION INTRAMUSCULAR; INTRAVENOUS; SUBCUTANEOUS EVERY 30 MIN PRN
Status: CANCELLED | OUTPATIENT
Start: 2023-01-01

## 2023-01-01 RX ORDER — HYDROMORPHONE HYDROCHLORIDE 1 MG/ML
2-4 SOLUTION ORAL
Qty: 120 ML | Refills: 0 | Status: SHIPPED | OUTPATIENT
Start: 2023-01-01 | End: 2023-01-01

## 2023-01-01 RX ORDER — MEGESTROL ACETATE 125 MG/ML
625 SUSPENSION ORAL DAILY
Status: DISCONTINUED | OUTPATIENT
Start: 2023-01-01 | End: 2023-01-01

## 2023-01-01 RX ORDER — GABAPENTIN 300 MG/1
300 CAPSULE ORAL 3 TIMES DAILY
Qty: 270 CAPSULE | Refills: 3 | Status: SHIPPED | OUTPATIENT
Start: 2023-01-01 | End: 2023-01-01

## 2023-01-01 RX ORDER — IOPAMIDOL 755 MG/ML
75 INJECTION, SOLUTION INTRAVASCULAR ONCE
Status: COMPLETED | OUTPATIENT
Start: 2023-01-01 | End: 2023-01-01

## 2023-01-01 RX ORDER — HYDROMORPHONE HYDROCHLORIDE 1 MG/ML
2-4 SOLUTION ORAL
Qty: 60 ML | Refills: 0 | Status: SHIPPED | OUTPATIENT
Start: 2023-01-01 | End: 2023-01-01

## 2023-01-01 RX ORDER — TAMSULOSIN HYDROCHLORIDE 0.4 MG/1
0.4 CAPSULE ORAL DAILY
Qty: 90 CAPSULE | Refills: 3 | Status: SHIPPED | OUTPATIENT
Start: 2023-01-01

## 2023-01-01 RX ORDER — FENTANYL 75 UG/1
1 PATCH TRANSDERMAL
Qty: 10 PATCH | Refills: 0 | Status: SHIPPED | OUTPATIENT
Start: 2023-01-01 | End: 2023-01-01

## 2023-01-01 RX ORDER — FENTANYL 75 UG/1
1 PATCH TRANSDERMAL
Qty: 10 PATCH | Refills: 0 | Status: SHIPPED | OUTPATIENT
Start: 2023-01-01

## 2023-01-01 RX ORDER — HYDROMORPHONE HYDROCHLORIDE 1 MG/ML
2-4 SOLUTION ORAL
Qty: 240 ML | Refills: 0 | Status: SHIPPED | OUTPATIENT
Start: 2023-01-01 | End: 2023-01-01

## 2023-01-01 RX ORDER — ALBUTEROL SULFATE 0.83 MG/ML
2.5 SOLUTION RESPIRATORY (INHALATION)
Status: CANCELLED | OUTPATIENT
Start: 2023-01-01

## 2023-01-01 RX ORDER — OXYCODONE HYDROCHLORIDE 10 MG/1
5-10 TABLET ORAL EVERY 4 HOURS PRN
Qty: 60 TABLET | Refills: 0 | Status: SHIPPED | OUTPATIENT
Start: 2023-01-01 | End: 2023-01-01

## 2023-01-01 RX ORDER — METHOCARBAMOL 500 MG/1
500 TABLET, FILM COATED ORAL
COMMUNITY
Start: 2023-01-01 | End: 2023-01-01

## 2023-01-01 RX ORDER — HYDROMORPHONE HYDROCHLORIDE 1 MG/ML
2-4 SOLUTION ORAL
Qty: 30 ML | Refills: 0 | Status: SHIPPED | OUTPATIENT
Start: 2023-01-01 | End: 2023-01-01

## 2023-01-01 RX ORDER — OXYCODONE 13.5 MG/1
27 CAPSULE, EXTENDED RELEASE ORAL EVERY 12 HOURS
COMMUNITY
Start: 2023-01-01 | End: 2023-01-01 | Stop reason: ALTCHOICE

## 2023-01-01 RX ORDER — GABAPENTIN 300 MG/1
300 CAPSULE ORAL 3 TIMES DAILY
Qty: 120 CAPSULE | Refills: 3 | Status: SHIPPED | OUTPATIENT
Start: 2023-01-01 | End: 2023-01-01

## 2023-01-01 RX ORDER — GABAPENTIN 300 MG/1
CAPSULE ORAL
Qty: 120 CAPSULE | Refills: 3 | Status: SHIPPED | OUTPATIENT
Start: 2023-01-01

## 2023-01-01 RX ORDER — HEPARIN SODIUM (PORCINE) LOCK FLUSH IV SOLN 100 UNIT/ML 100 UNIT/ML
5 SOLUTION INTRAVENOUS
Status: CANCELLED | OUTPATIENT
Start: 2023-01-01

## 2023-01-01 RX ORDER — ALBUTEROL SULFATE 90 UG/1
1-2 AEROSOL, METERED RESPIRATORY (INHALATION)
Status: CANCELLED
Start: 2023-01-01

## 2023-01-01 RX ORDER — OXYCODONE HYDROCHLORIDE 5 MG/1
1-2 CAPSULE ORAL EVERY 6 HOURS PRN
Qty: 50 CAPSULE | Refills: 0 | OUTPATIENT
Start: 2023-01-01

## 2023-01-01 RX ORDER — METHYLPREDNISOLONE SODIUM SUCCINATE 125 MG/2ML
125 INJECTION, POWDER, LYOPHILIZED, FOR SOLUTION INTRAMUSCULAR; INTRAVENOUS
Status: CANCELLED
Start: 2023-01-01

## 2023-01-01 RX ORDER — LORAZEPAM 2 MG/ML
0.5 INJECTION INTRAMUSCULAR EVERY 4 HOURS PRN
Status: CANCELLED | OUTPATIENT
Start: 2023-01-01

## 2023-01-01 RX ORDER — DICYCLOMINE HCL 20 MG
20 TABLET ORAL 4 TIMES DAILY PRN
Qty: 60 TABLET | Refills: 0 | Status: SHIPPED | OUTPATIENT
Start: 2023-01-01

## 2023-01-01 RX ORDER — METOPROLOL SUCCINATE 25 MG/1
25 TABLET, EXTENDED RELEASE ORAL DAILY
Qty: 90 TABLET | Refills: 3 | Status: SHIPPED | OUTPATIENT
Start: 2023-01-01

## 2023-01-01 RX ORDER — FENTANYL 25 UG/1
1 PATCH TRANSDERMAL
Qty: 10 PATCH | Refills: 0 | Status: SHIPPED | OUTPATIENT
Start: 2023-01-01 | End: 2023-01-01 | Stop reason: DRUGHIGH

## 2023-01-01 RX ORDER — GABAPENTIN 100 MG/1
200 CAPSULE ORAL 3 TIMES DAILY
Status: CANCELLED | OUTPATIENT
Start: 2023-01-01

## 2023-01-01 RX ORDER — OXYCODONE HYDROCHLORIDE 5 MG/1
1-2 CAPSULE ORAL EVERY 6 HOURS PRN
Qty: 20 CAPSULE | Refills: 0 | Status: SHIPPED | OUTPATIENT
Start: 2023-01-01 | End: 2023-01-01

## 2023-01-01 RX ORDER — OXYCODONE 13.5 MG/1
13.5 CAPSULE, EXTENDED RELEASE ORAL EVERY 12 HOURS
Qty: 60 TABLET | Refills: 0 | Status: SHIPPED | OUTPATIENT
Start: 2023-01-01 | End: 2023-01-01

## 2023-01-01 RX ORDER — GADOBUTROL 604.72 MG/ML
9 INJECTION INTRAVENOUS ONCE
Status: COMPLETED | OUTPATIENT
Start: 2023-01-01 | End: 2023-01-01

## 2023-01-01 RX ORDER — ALBUTEROL SULFATE 90 UG/1
1-2 AEROSOL, METERED RESPIRATORY (INHALATION) EVERY 4 HOURS PRN
Qty: 18 G | Refills: 3 | Status: SHIPPED | OUTPATIENT
Start: 2023-01-01

## 2023-01-01 RX ORDER — OXYCODONE HYDROCHLORIDE 15 MG/1
15 TABLET, FILM COATED, EXTENDED RELEASE ORAL EVERY 12 HOURS
Qty: 60 TABLET | Refills: 0 | Status: SHIPPED | OUTPATIENT
Start: 2023-01-01 | End: 2023-01-01

## 2023-01-01 RX ORDER — OXYCODONE HYDROCHLORIDE 5 MG/1
1-2 CAPSULE ORAL EVERY 6 HOURS PRN
Qty: 50 CAPSULE | Refills: 0 | Status: SHIPPED | OUTPATIENT
Start: 2023-01-01 | End: 2023-01-01

## 2023-01-01 RX ORDER — EPINEPHRINE 1 MG/ML
0.3 INJECTION, SOLUTION INTRAMUSCULAR; SUBCUTANEOUS EVERY 5 MIN PRN
Status: CANCELLED | OUTPATIENT
Start: 2023-01-01

## 2023-01-01 RX ORDER — DEXAMETHASONE 2 MG/1
TABLET ORAL
Qty: 21 TABLET | Refills: 0 | Status: SHIPPED | OUTPATIENT
Start: 2023-01-01 | End: 2023-01-01

## 2023-01-01 RX ORDER — FENTANYL 12.5 UG/1
1 PATCH TRANSDERMAL
Qty: 10 PATCH | Refills: 0 | Status: SHIPPED | OUTPATIENT
Start: 2023-01-01 | End: 2023-01-01

## 2023-01-01 RX ORDER — DICYCLOMINE HCL 20 MG
20 TABLET ORAL 4 TIMES DAILY PRN
Qty: 40 TABLET | Refills: 0 | Status: SHIPPED | OUTPATIENT
Start: 2023-01-01 | End: 2023-01-01

## 2023-01-01 RX ORDER — MEGESTROL ACETATE 40 MG/ML
400 SUSPENSION ORAL DAILY
Qty: 480 ML | Refills: 3 | Status: SHIPPED | OUTPATIENT
Start: 2023-01-01

## 2023-01-01 RX ORDER — DIPHENHYDRAMINE HYDROCHLORIDE 50 MG/ML
50 INJECTION INTRAMUSCULAR; INTRAVENOUS
Status: CANCELLED
Start: 2023-01-01

## 2023-01-01 RX ORDER — FENTANYL 12.5 UG/1
1 PATCH TRANSDERMAL
Qty: 10 PATCH | Refills: 0 | Status: SHIPPED | OUTPATIENT
Start: 2023-01-01 | End: 2023-01-01 | Stop reason: DRUGHIGH

## 2023-01-01 RX ORDER — HYDROMORPHONE HYDROCHLORIDE 1 MG/ML
2-4 SOLUTION ORAL
Qty: 240 ML | Refills: 0 | Status: SHIPPED | OUTPATIENT
Start: 2023-01-01

## 2023-01-01 RX ORDER — OXYCODONE HYDROCHLORIDE 10 MG/1
10 TABLET ORAL EVERY 6 HOURS PRN
Qty: 60 TABLET | Refills: 0 | Status: SHIPPED | OUTPATIENT
Start: 2023-01-01 | End: 2023-01-01

## 2023-01-01 RX ORDER — HEPARIN SODIUM,PORCINE 10 UNIT/ML
5-20 VIAL (ML) INTRAVENOUS DAILY PRN
Status: CANCELLED | OUTPATIENT
Start: 2023-01-01

## 2023-01-01 RX ORDER — GADOBUTROL 604.72 MG/ML
9 INJECTION INTRAVENOUS ONCE
Status: DISCONTINUED | OUTPATIENT
Start: 2023-01-01 | End: 2023-01-01 | Stop reason: CLARIF

## 2023-01-01 RX ADMIN — GADOBUTROL 9 ML: 604.72 INJECTION INTRAVENOUS at 16:48

## 2023-01-01 RX ADMIN — GADOBUTROL 9 ML: 604.72 INJECTION INTRAVENOUS at 14:41

## 2023-01-01 RX ADMIN — FLUDEOXYGLUCOSE F-18 12.69 MILLICURIE: 500 INJECTION, SOLUTION INTRAVENOUS at 12:34

## 2023-01-01 RX ADMIN — LEUPROLIDE ACETATE 45 MG: KIT SUBCUTANEOUS at 14:38

## 2023-01-01 RX ADMIN — LEUPROLIDE ACETATE 45 MG: KIT SUBCUTANEOUS at 14:54

## 2023-01-01 RX ADMIN — IOPAMIDOL 75 ML: 755 INJECTION, SOLUTION INTRAVENOUS at 14:09

## 2023-01-01 ASSESSMENT — ENCOUNTER SYMPTOMS
ENDOCRINE NEGATIVE: 1
CONSTIPATION: 1
NEUROLOGIC COMPLAINT: 1
DIFFICULTY URINATING: 1
EYES NEGATIVE: 1
NEUROLOGICAL NEGATIVE: 1
SHORTNESS OF BREATH: 1
PSYCHIATRIC NEGATIVE: 1
CONSTIPATION: 1
DIARRHEA: 1
HEMATOLOGIC/LYMPHATIC NEGATIVE: 1
NEUROLOGICAL NEGATIVE: 1
SHORTNESS OF BREATH: 0
FATIGUE: 1
NEUROLOGICAL NEGATIVE: 1
SLEEP DISTURBANCE: 1
UNEXPECTED WEIGHT CHANGE: 0
UNEXPECTED WEIGHT CHANGE: 1
ENDOCRINE NEGATIVE: 1
DEPRESSION: 1
ENDOCRINE NEGATIVE: 1
HEMATOLOGIC/LYMPHATIC NEGATIVE: 1
PSYCHIATRIC NEGATIVE: 1
HEMATOLOGIC/LYMPHATIC NEGATIVE: 1
FATIGUE: 1
APPETITE CHANGE: 0
EYES NEGATIVE: 1
BACK PAIN: 1
CONSTITUTIONAL NEGATIVE: 1
APPETITE CHANGE: 1
EYES NEGATIVE: 1

## 2023-01-01 ASSESSMENT — PAIN SCALES - GENERAL
PAINLEVEL: MODERATE PAIN (5)
PAINLEVEL: MODERATE PAIN (4)
PAINLEVEL: EXTREME PAIN (8)
PAINLEVEL: WORST PAIN (10)
PAINLEVEL: NO PAIN (0)
PAINLEVEL: MODERATE PAIN (5)
PAINLEVEL: MODERATE PAIN (4)
PAINLEVEL: NO PAIN (0)
PAINLEVEL: SEVERE PAIN (6)
PAINLEVEL: MODERATE PAIN (4)

## 2023-01-11 NOTE — PROGRESS NOTES
Pt MRI order was faxed to the [facility] for scheduling.  [phone]  Obi Barrera CMA

## 2023-01-11 NOTE — Clinical Note
Pt with new L sided chest wall pain, new /worsening pathological fx.   MRI pending. Will refer back to oncology.

## 2023-01-11 NOTE — PROGRESS NOTES
"  Assessment & Plan     Pathological fracture of thoracic vertebra, initial encounter:   Pt was seen for L chest wall pain with new / worsening T 7 pathologic fracture.  His are of pain is higher than T7, thus would be concern that there would be additional bony mets at higher levels as well.  Discussed with Radiologist who recommended fruther evaluation via contrast enhanced MRI.    Pt denies need for additional pain control at this time and prefers tylenol and Neurontin.  Discussed we could adjust his Neurontin if needed but he defers and plans on follow-up with his pcp if adjustment in mariusz control is needed.  Once MRI returns will contact pt. He will need follow-up with oncology and or consideration of spine specialist involvement depending on results.    - MR Thoracic Spine w Contrast    Chest pain, unspecified type  - EKG 12-lead complete w/read - Clinics  - XR Chest 2 Views  - XR Thoracic Spine 2 Views  - MR Thoracic Spine w Contrast    Left-sided chest wall pain  - XR Chest 2 Views  - XR Thoracic Spine 2 Views  - MR Thoracic Spine w Contrast        Discussed case with Dr. Wild today as well.    30   minutes spent on the date of the encounter doing chart review, review of outside records, review of test results, interpretation of tests, patient visit, documentation and discussion with other provider(s)        BMI:   Estimated body mass index is 27.75 kg/m  as calculated from the following:    Height as of this encounter: 1.803 m (5' 11\").    Weight as of this encounter: 90.3 kg (199 lb).         [facility]    Pilar Medrano is a 77 year old, presenting for the following health issues:  Chest Pain (Pt c/o left sided chest pain that \"gets worse throughout the day\" x week.)      Chest Pain     History of Present Illness       Reason for visit:  Chest pain  Symptom onset:  1-2 weeks ago  Symptoms include:  Chest pain  Symptom intensity:  " Moderate  Symptom progression:  Worsening  Had these symptoms before:  No  What makes it worse:  Activity  What makes it better:  No    He eats 0-1 servings of fruits and vegetables daily.He consumes 1 sweetened beverage(s) daily.He exercises with enough effort to increase his heart rate 10 to 19 minutes per day.  He exercises with enough effort to increase his heart rate 3 or less days per week. He is missing 1 dose(s) of medications per week.  Pt is a 77 year old male with hx of oral/lung and prostate cancer who presents with a 1 week hx of L chest wall pain. Pt points to area anterior chest above nipple line approx 4th rib and extending to just below the nipple, and extending around axilla to the L scapular region.    Reports insidious onset of L chest discomfort.  No falls or trauma.    No cough, sob, or difficulty breathing.    Non exertional discomfort.  No diaphoresis, nausea, abdomen pain.    Notes pain primarily with positional changes, coughing/bearing down, laying on the L side at night, rolling over in bed.     Pt reports he has not had any falls. Not doing any lifting or manual work. Has not been shoveling ice or snow.    Can not recall anything that may have brought this pain on.     No fevers, cough or cold sx   Does have some night sweats due to Lupron given for his prostate cancer recently.   Hx of known lung cancer, prostate cancer, head and neck cancer.    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  , has never had pain in this area. Tylenol 500 mg twice per day.  Not certain if helpful.    Putting arms up and getting dressed worse with movement.       Review of Systems   Cardiovascular: Positive for chest pain.      Constitutional, HEENT, cardiovascular, pulmonary, gi and gu systems are negative, except as otherwise noted.      Objective    /70 (BP Location: Right arm, Patient Position: Sitting, Cuff Size: Adult Regular)   Pulse 60   Temp (!) 96.3  F (35.7  " C) (Tympanic)   Resp 20   Ht 1.803 m (5' 11\")   Wt 90.3 kg (199 lb)   SpO2 95%   BMI 27.75 kg/m    Body mass index is 27.75 kg/m .  Physical Exam   nad appears well  Ambulates well, able to rise up and off exam table.    Lungs CTA without wheezes rhonchi or rales.  No stridor.    There is Tenderness to palpation L anterior chest mid clavicular line from about the 4-5th rib, above the nipple, extending to the axilla and medial boarder of the scapula, about T4-7 region.    Heart : RRR   ECG: unchanged from previous. No ST elevation or depression.  1st degree AV block and RBBB, unchanged.    No ectopy.  Mild sinus bradycardia.      Results for orders placed or performed in visit on 01/11/23   XR Thoracic Spine 2 Views     Status: None    Narrative    EXAM: XR THORACIC SPINE 2 VIEWS  LOCATION: [facility]  DATE/TIME: 1/11/2023 11:46 AM    INDICATION: L chest wall pain with movement, cough, bearing down, localized approximately T4 5region and wraping around to the 4 5 th rib region  Hx of cancer. Known T7 met  COMPARISON: CT chest 12/19/2022.  TECHNIQUE: CR Thoracic Spine.      Impression    IMPRESSION:     There is new/increased moderate anterior height loss of the T7 vertebral body. This likely represents a pathologic fracture given previously demonstrated metastatic lesion at this level. The remaining visualized vertebral bodies are unremarkable in   height.    There is an associated 48 degrees kyphosis centered at the T7-T8 level. No significant spondylolisthesis.    There is broad-based levoconvex curvature of approximately 12 degrees.    Mild intervertebral disc height loss at T6-T7, compatible with spondylosis.    Pedicles appear grossly symmetric.    Visualized lung fields are well aerated. Surgical clips project over the upper abdomen. There are treatment-related changes in the left upper lung zone.    Contrast-enhanced MRI recommended for further evaluation.    Results discussed " with Emerald Ellis on 1/11/2023 12:03 PM.   Results for orders placed or performed in visit on 01/11/23   XR Chest 2 Views     Status: None    Narrative    EXAM: XR CHEST 2 VIEWS  LOCATION: [facility]  DATE/TIME: 1/11/2023 11:41 AM    INDICATION: T4 5 pain that wraps atneriorly to the L 4 5th ribs, pain with movement, deep breathing, coughing.  known L lung cancer and T7 met hx  COMPARISON: CT chest 12/19/2022 and older studies, chest x-ray 11/13/2021      Impression    IMPRESSION: Ovoid opacity in the left upper lobe with fiducial marker reflecting the treated lung cancer again noted. No new parenchymal disease.    Minimal blunting of the right costophrenic angle is unchanged. Multiple old right-sided rib fractures. Heart and pulmonary vascularity are normal.

## 2023-01-19 NOTE — TELEPHONE ENCOUNTER
Contacted pt with MRI T spine results which show pathologic fracture of T 7, progression of previously known T7 met which now is painful as well.    Plan as discussed with his pcp Dr. Yong Boyd is to follow up with his oncologist to discuss if there are any additional management needed.      Pt relays understanding.      He denies need for additional pain control a this time.  He has scheduled follow-up with pcp next week.

## 2023-01-20 NOTE — PROGRESS NOTES
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  The biopsy confirmed moderately differentiated adenocarcinoma.        TYPE OF RADIATION THERAPY ADMINISTERED:   1. SBRT to the left upper lobe with a total dose of 5000 cGy in 5 treatments given from 3/2/2021-3/10/2021.     2. SBRT with a total dose of 5000 cGy in 5 treatments for the second left upper lobe lung tumor given from 3/9/2022-3/18/2022.      3. SBRT with a total dose of 3275 cGy in 5 treatments for T7 spine given from 2022- 2022.     INTERVAL SINCE COMPLETION OF RADIATION THERAPY: 8 months.      SUBJECTIVE:  Mr. Chau is a 77 y.o. male who is a chronic smoker and quit smoking since .  The patient had a complicated history of multiple cancer in the past as detailed as followin.  Low risk prostate cancer, clinical stage T1c N0 M0, recent grade 3+3 =6 and the last PSA was 10.4 in 2006.  The patient is currently on clinical observation with no therapy.  PSA: 12.47 on 2021.     2.  Floor of mouth cancer, status post surgical resection in 1994 with no adjuvant therapy.     3.  Left kidney hemangioma, status post left nephrectomy on 3/26/2009.     4.  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  Patient had local  recurrence and is status post surgical resection on 9/11/2009.     5.  Non-small cell lung cancer involving right lung, stage I, status post right thoracotomy and excision of right upper lobe and right middle lobe lung tumor 11/5/2015 with no evidence of lymph node metastasis and no adjuvant therapy.  Patient lost to follow-up since 2016.     6.  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     7.  Left upper lobe lung  cancer, stage T1N0 M0.  The biopsy confirmed moderately differentiated adenocarcinoma on 1/21/2021.  The patient received SBRT with a total dose of 5000 cGy in 5 treatments given from 3/2/2021-3/10/2021.     Patient had two prior early stage right lung cancers that were apparently resected in their entirety and may have been synchronous primary lung cancers. It does not appear he had the appropriate recommended follow up at [facility]. The MELODY lesion was known to be present in 2016 and appears to be slightly larger now with significant FDG-avidity on the recent PET/CT. It is likely another primary lung cancer, although metastasis from previous lung cancers is possible.  The patient had a restaging PET CT scan on 8/31/2020.  The scan showed enlarging FDG avid left upper lobe mass measuring 2.1 x 2.5 cm with central solid component consistent with primary lung cancer without metastasis.  There was also a FDG avid lesion in the left buccal region consistent with squamous cell carcinoma without metastasis.  The patient had a surgical resection for his left buccal/lateral tongue squamous cell carcinoma 11/6/2020 by KERRY Renee with pathology showed squamous cell carcinoma with negative margin.  He was determined not a good candidate for lung surgery given his complicated medical history and health status.  He therefore received definitive radiation therapy using SBRT with a total  dose of 5000 cGy in 5 treatments given from 3/2/2021-3/10/2021. The patient tolerated radiation therapy very well with minimal side effect.     The patient has been doing well since completion of radiation therapy.  He denies any pain or discomfort related to the therapy at the time of evaluation.  The patient was found to a new 9 mm small nodule in the left upper lobe outside of patient therapy area from restaging CT scan on 12/20/2021.  He was recommended to have staging PET CT scan and was done 1/7/2022. 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  There is no evidence of other systemic metastasis.The patient's case has been discussed at thoracic tumor conference 1/13/2022.  MRI brain on 1/22/2022 showed no evidence of brain metastasis. Patient is not good candidate for surgery and poor candidate for systemic therapy given his current medical status.  The consensus recommendation is to consider SBRT for his oligo disease in the left lung and T7 spine if the patient wishes not to consider systemic therapy at this time.   The patient received the second course of SBRT to the second left lung cancer with a total dose of 5000 cGy in 5 treatments given from 3/9/2022-3/18/2022.  He tolerated radiation therapy very well with minimal side effect.      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  He received stereotactic radiosurgery with a total dose of 3275 cGy in 5 treatments given from 4/12/2022- 4/22/2022.  The patient tolerated ration therapy very well with minimal side effect.     The patient has been stable since completion of radiation therapy.  He did experience some worsening pain in the mid thoracic spine for which he got MRI thoracic spine on 1/15/2023.  The patient is here for office follow-up.     Medications were reviewed and are up to date on EPIC.    The following portions  of the patient's history were reviewed and updated as appropriate: allergies, current medications, past family history, past medical history, past social history, past surgical history and problem list.    Review of Systems:      General  Constitutional  Constitutional (WDL): All constitutional elements are within defined limits  EENT  Eye Disorders  Eye Disorder (WDL): All eye disorder elements are within defined limits (wears glasses)  Ear Disorders  Ear Disorder (WDL): All ear disorder elements are within defined limits  Respiratory  Respiratory  Respiratory (WDL): Exceptions to WDL  Cough: Mild symptoms OR nonprescription intervention indicated (occasional dry cough)  Cardiovascular  Cardiovascular  Cardiovascular (WDL): All cardiovascular elements are within defined limits  Gastrointestinal  Gastrointestinal  Gastrointestinal (WDL): All gastrointestinal elements are within defined limits  Musculoskeletal  Musculoskeletal and Connective Tissue Disorders  Musculoskeletal & Connective (WDL): Exceptions to WDL  Arthralgia: Mild pain (back)  Bone Pain: Mild pain (back)  Myalgia: Mild pain (right side and neuropathy in feet)  Integumentary  Integumentary  Integumentary (WDL): All integumentary elements are within defined limits  Neurological  Neurosensory  Neurosensory (WDL): Exceptions to WDL  Peripheral Motor Neuropathy: Asymptomatic OR clinical or diagnostic observations only (feet)  Ataxia: Asymptomatic OR clinical or diagnostic observations only OR intervention not indicated  Peripheral Sensory Neuropathy: Asymptomatic (feet)  Genitourinary/Reproductive  Genitourinary  Genitourinary (WDL): Exceptions to WDL  Urinary Frequency: Present  Lymphatic  Lymph System Disorders  Lymph (WDL): All lymph elements are within defined limits  Pain  Pain Score: Mild Pain (3)  AUA Assessment                                                              Accompanied by  Accompanied By: family (daughter)    Objective:     PHYSICAL  EXAMINATION:    BP (!) 140/84 (BP Location: Left arm, Patient Position: Sitting)   Pulse 64   Resp 20   Wt 89.3 kg (196 lb 12.8 oz)   SpO2 97%   BMI 27.45 kg/m      Gen: Alert, in NAD  Pulm: No wheezing, stridor or respiratory distress  CV: Well-perfused, no cyanosis, no pedal edema  Back: No step-offs or pain to palpation along the thoracolumbar spine  Rectal: Deferred  : Deferred  Musculoskeletal: Normal muscle bulk and tone  Skin: Normal color and turgor  Neurologic: A/Ox3, CN II-XII intact, normal gait and station.  There is some mild tenderness in the mid thoracic spine.  Psychiatric: Appropriate mood and affect     Imaging: Imaging results 30 days: XR Chest 2 Views    Result Date: 1/11/2023  EXAM: XR CHEST 2 VIEWS LOCATION: [facility] DATE/TIME: 1/11/2023 11:41 AM INDICATION: T4 5 pain that wraps atneriorly to the L 4 5th ribs, pain with movement, deep breathing, coughing.  known L lung cancer and T7 met hx COMPARISON: CT chest 12/19/2022 and older studies, chest x-ray 11/13/2021     IMPRESSION: Ovoid opacity in the left upper lobe with fiducial marker reflecting the treated lung cancer again noted. No new parenchymal disease. Minimal blunting of the right costophrenic angle is unchanged. Multiple old right-sided rib fractures. Heart and pulmonary vascularity are normal.    XR Thoracic Spine 2 Views    Result Date: 1/11/2023  EXAM: XR THORACIC SPINE 2 VIEWS LOCATION: [facility] DATE/TIME: 1/11/2023 11:46 AM INDICATION: L chest wall pain with movement, cough, bearing down, localized approximately T4 5region and wraping around to the 4 5 th rib region  Hx of cancer. Known T7 met COMPARISON: CT chest 12/19/2022. TECHNIQUE: CR Thoracic Spine.     IMPRESSION: There is new/increased moderate anterior height loss of the T7 vertebral body. This likely represents a pathologic fracture given previously demonstrated metastatic lesion at this level. The remaining  visualized vertebral bodies are unremarkable in height. There is an associated 48 degrees kyphosis centered at the T7-T8 level. No significant spondylolisthesis. There is broad-based levoconvex curvature of approximately 12 degrees. Mild intervertebral disc height loss at T6-T7, compatible with spondylosis. Pedicles appear grossly symmetric. Visualized lung fields are well aerated. Surgical clips project over the upper abdomen. There are treatment-related changes in the left upper lung zone. Contrast-enhanced MRI recommended for further evaluation. Results discussed with Emerald Ellis on 1/11/2023 12:03 PM.    MR Thoracic Spine w/o & w Contrast    Result Date: 1/16/2023  EXAM: MR THORACIC SPINE W/O and W CONTRAST LOCATION: [facility] DATE/TIME: 1/15/2023 4:48 PM INDICATION: T7 pathologic fracture, new with pain T4-T7 region, L chest wall,  known prostate, lung cancer. COMPARISON: Plain films thoracic spine 1/11/2023. CT chest 12/19/2022. CONTRAST: 9ml david TECHNIQUE: Routine Thoracic Spine MRI without and with IV contrast. FINDINGS: Lateral alignment is normal. Straightening of the normal cervical lordosis with grade 1 anterolisthesis of C3 on C4 and grade 1 retrolisthesis of C5 on C6. There is chronic central inferior endplate height loss along the inferior endplate of C5. Mild convex right midthoracic curvature. Mild accentuation upper thoracic kyphosis. There is anterior wedging and abnormal edema and enhancement within the T7 vertebral body compatible with known pathologic fracture. There is enhancement within the ventral epidural space at the T7-T9 levels, nonspecific but favored to reflect reactive change/venous congestion. There is 26 degrees kyphosis measured from the superior T6 to the inferior T8 endplate which is not significantly changed compared to the 12/19/2022 prior. Compared to prior CT, the degree of anterior height loss appears mildly progressed. Ventral height loss is  stable compared to recent plain film. Edema extends into the pedicles. Posterior elements otherwise unremarkable. There is a small amount of prevertebral edema at the T6 and T7 levels. Incidental T12 hemangioma. Visualized marrow space otherwise unremarkable. There is mild prominence of the epidural fat most evident within the mid thoracic region and there is mild canal narrowing at the T7 and T8 levels. Mild bilateral foraminal narrowing at T7-T8 and on the right at T4-T5. Dorsal paraspinal musculature otherwise unremarkable. Visualized aorta is normal in caliber. Chronic deformity is seen about the right sixth posterior rib corresponding to the healed fracture at this level and better visualized on prior CT. Thoracic spinal cord demonstrates grossly normal signal characteristics and morphology. No abnormal cord enhancement.     IMPRESSION: 1.  Pathologic anterior wedging/compression fracture of T7, with the degree of height loss stable compared to prior plain film and mildly progressed compared to prior CT. There is a small amount of ventral epidural enhancement at the T7-T9 levels, which is favored to be reactive. No convincing finding for epidural tumor on the T1 precontrast images. 2.  Mild thoracic epidural lipomatosis with mild canal narrowing at the T7 and T8 levels. 3.  Moderate lower cervical spondylosis is seen on the localizer image with retrolisthesis of C4 on C5 with respect to C3 and C6.     Impression     The patient is a 77-year-old gentleman with multiple history of cancer in the past and a new finding of left upper lobe lung cancer.  The restaging PET CT scan showed new 1.1 cm FDG avid left upper lobe nodule consistent with malignant lung tumor and a sclerotic hypermetabolic metastasis within the T7 vertebral body.The patient received stereotactic radiosurgery for T7 spine 9-month ago and SBRT for lung cancer 10 months ago with restaging CT scan showed good response and no evidence of  recurrence.    Assessment & Plan:     1.  I have personally reviewed his most recent MRI scan and CT scan and compared to the previous CT scan today.  The patient had a good response of his lung cancer there is no evidence of lung cancer recurrence.  The MRI scan showed anterior wedge compression fracture of T7 with small changes consistent with reactive changes.  The possibility having patient to see pain specialist or consideration of vertebroplasty has been discussed with the patient.  He is stable with manageable pain at this time.  Patient wished to delay evaluation in the near future.  Patient therefore is scheduled return to radiation oncology in 3 months for CT chest and MRI thoracic spine.     2.  The patient had a good response for prostate cancer after hormonal therapy with most recent PSA measures 3.0 on 12/19/2022.  Continue follow-up for his prostate cancer which Dr. Wallace radiation medical oncology as planned.     3.  Recommend patient to continue follow-up with Dr. Alon Nunez, ENT for his head neck cancer surveillance.     4.  Continue follow-up with Dr. Shade Boyd, PCP for other medical needs.     Face to face time  30 minutes with > 80% spent on consultation, education and coordination of care.          Mariana Batista MD, PhD  Department of Radiation Oncology   [facility]  Tel: [phone]  Page: [phone]    [facility]  [address] [place] [address]     [address]   [place] [address]    CC:  Patient Care Team:  Shade Boyd MD as PCP - General (Internal Medicine)  Mariana Batista MD as MD (Hematology & Oncology)  Faviola Cottrell [facility] as Pharmacist (Pharmacist)  Mariana Batista MD as Assigned Cancer Care Provider  Shade Boyd MD as Assigned PCP  Rosas Carr MD as MD (Neurology)  Nitlon Gtz MD as Assigned Musculoskeletal Provider  David Castrejon MD (Internal Medicine)  Faviola Cottrell RPH as Assigned MTM  Pharmacist  Andrew Wallace MD as MD (Hematology)  Kwesi Roldan MD as Assigned Surgical Provider

## 2023-01-20 NOTE — PROGRESS NOTES
"Oncology Rooming Note    January 20, 2023 3:27 PM   Lenny Chau is a 77 year old male who presents for:    Chief Complaint   Patient presents with     Oncology Clinic Visit     Follow up     Initial Vitals: BP (!) 140/84 (BP Location: Left arm, Patient Position: Sitting)   Pulse 64   Resp 20   Wt 89.3 kg (196 lb 12.8 oz)   SpO2 97%   BMI 27.45 kg/m   Estimated body mass index is 27.45 kg/m  as calculated from the following:    Height as of 1/11/23: 1.803 m (5' 11\").    Weight as of this encounter: 89.3 kg (196 lb 12.8 oz). Body surface area is 2.12 meters squared.  Mild Pain (3) Comment: Data Unavailable   No LMP for male patient.  Allergies reviewed: Yes  Medications reviewed: Yes    Medications: Medication refills not needed today.  Pharmacy name entered into [employer]: [facility] [id number] [place] [address]    Clinical concerns: follow up, had MRI 1/15 and concerned about Dr. Batista was notified.      Anitha Odell RN            "

## 2023-01-20 NOTE — LETTER
"    1/20/2023         RE: Lenny Chau  [address]  [place] [address]        Dear Colleague,    Thank you for referring your patient, Lenny Chau, to the [facility] RADIATION ONCOLOGY [place]. Please see a copy of my visit note below.    Oncology Rooming Note    January 20, 2023 3:27 PM   Lenny Chau is a 77 year old male who presents for:    Chief Complaint   Patient presents with     Oncology Clinic Visit     Follow up     Initial Vitals: BP (!) 140/84 (BP Location: Left arm, Patient Position: Sitting)   Pulse 64   Resp 20   Wt 89.3 kg (196 lb 12.8 oz)   SpO2 97%   BMI 27.45 kg/m   Estimated body mass index is 27.45 kg/m  as calculated from the following:    Height as of 1/11/23: 1.803 m (5' 11\").    Weight as of this encounter: 89.3 kg (196 lb 12.8 oz). Body surface area is 2.12 meters squared.  Mild Pain (3) Comment: Data Unavailable   No LMP for male patient.  Allergies reviewed: Yes  Medications reviewed: Yes    Medications: Medication refills not needed today.  Pharmacy name entered into [employer]: [facility] [id number] [place] [address]    Clinical concerns: follow up, had MRI 1/15 and concerned about Dr. Batista was notified.      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  The biopsy confirmed moderately differentiated adenocarcinoma.        TYPE OF RADIATION " THERAPY ADMINISTERED:   1. SBRT to the left upper lobe with a total dose of 5000 cGy in 5 treatments given from 3/2/2021-3/10/2021.     2. SBRT with a total dose of 5000 cGy in 5 treatments for the second left upper lobe lung tumor given from 3/9/2022-3/18/2022.      3. SBRT with a total dose of 3275 cGy in 5 treatments for T7 spine given from 2022- 2022.     INTERVAL SINCE COMPLETION OF RADIATION THERAPY: 8 months.      SUBJECTIVE:  Mr. Chau is a 77 y.o. male who is a chronic smoker and quit smoking since .  The patient had a complicated history of multiple cancer in the past as detailed as followin.  Low risk prostate cancer, clinical stage T1c N0 M0, recent grade 3+3 =6 and the last PSA was 10.4 in 2006.  The patient is currently on clinical observation with no therapy.  PSA: 12.47 on 2021.     2.  Floor of mouth cancer, status post surgical resection in 1994 with no adjuvant therapy.     3.  Left kidney hemangioma, status post left nephrectomy on 3/26/2009.     4.  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  Patient had local recurrence and is status post surgical resection on 2009.     5.  Non-small cell lung cancer involving right lung, stage I, status post right thoracotomy and excision of right upper lobe and right middle lobe lung tumor 2015 with no evidence of lymph node metastasis and no adjuvant therapy.  Patient lost to follow-up since .     6.  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 Alon Nunez, ENT on 2020.     7.  Left upper lobe lung  cancer, stage T1N0 M0.  The biopsy confirmed moderately differentiated adenocarcinoma on 2021.  The patient received SBRT with a total dose of 5000 cGy in 5 treatments given  from 3/2/2021-3/10/2021.     Patient had two prior early stage right lung cancers that were apparently resected in their entirety and may have been synchronous primary lung cancers. It does not appear he had the appropriate recommended follow up at [facility]. The MELODY lesion was known to be present in 2016 and appears to be slightly larger now with significant FDG-avidity on the recent PET/CT. It is likely another primary lung cancer, although metastasis from previous lung cancers is possible.  The patient had a restaging PET CT scan on 8/31/2020.  The scan showed enlarging FDG avid left upper lobe mass measuring 2.1 x 2.5 cm with central solid component consistent with primary lung cancer without metastasis.  There was also a FDG avid lesion in the left buccal region consistent with squamous cell carcinoma without metastasis.  The patient had a surgical resection for his left buccal/lateral tongue squamous cell carcinoma 11/6/2020 by Dr. Alon Nunez, ENT with pathology showed squamous cell carcinoma with negative margin.  He was determined not a good candidate for lung surgery given his complicated medical history and health status.  He therefore received definitive radiation therapy using SBRT with a total dose of 5000 cGy in 5 treatments given from 3/2/2021-3/10/2021. The patient tolerated radiation therapy very well with minimal side effect.     The patient has been doing well since completion of radiation therapy.  He denies any pain or discomfort related to the therapy at the time of evaluation.  The patient was found to a new 9 mm small nodule in the left upper lobe outside of patient therapy area from restaging CT scan on 12/20/2021.  He was recommended to have staging PET CT scan and was done 1/7/2022. 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  There is no evidence of other systemic  metastasis.The patient's case has been discussed at thoracic tumor conference 1/13/2022.  MRI brain on 1/22/2022 showed no evidence of brain metastasis. Patient is not good candidate for surgery and poor candidate for systemic therapy given his current medical status.  The consensus recommendation is to consider SBRT for his oligo disease in the left lung and T7 spine if the patient wishes not to consider systemic therapy at this time.   The patient received the second course of SBRT to the second left lung cancer with a total dose of 5000 cGy in 5 treatments given from 3/9/2022-3/18/2022.  He tolerated radiation therapy very well with minimal side effect.      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  He received stereotactic radiosurgery with a total dose of 3275 cGy in 5 treatments given from 4/12/2022- 4/22/2022.  The patient tolerated ration therapy very well with minimal side effect.     The patient has been stable since completion of radiation therapy.  He did experience some worsening pain in the mid thoracic spine for which he got MRI thoracic spine on 1/15/2023.  The patient is here for office follow-up.     Medications were reviewed and are up to date on EPIC.    The following portions of the patient's history were reviewed and updated as appropriate: allergies, current medications, past family history, past medical history, past social history, past surgical history and problem list.    Review of Systems:      General  Constitutional  Constitutional (WDL): All constitutional elements are within defined limits  EENT  Eye Disorders  Eye Disorder (WDL): All eye disorder elements are within defined limits (wears glasses)  Ear Disorders  Ear Disorder (WDL): All ear disorder elements are within defined limits  Respiratory  Respiratory  Respiratory (WDL): Exceptions to WDL  Cough: Mild symptoms OR nonprescription intervention indicated (occasional dry  cough)  Cardiovascular  Cardiovascular  Cardiovascular (WDL): All cardiovascular elements are within defined limits  Gastrointestinal  Gastrointestinal  Gastrointestinal (WDL): All gastrointestinal elements are within defined limits  Musculoskeletal  Musculoskeletal and Connective Tissue Disorders  Musculoskeletal & Connective (WDL): Exceptions to WDL  Arthralgia: Mild pain (back)  Bone Pain: Mild pain (back)  Myalgia: Mild pain (right side and neuropathy in feet)  Integumentary  Integumentary  Integumentary (WDL): All integumentary elements are within defined limits  Neurological  Neurosensory  Neurosensory (WDL): Exceptions to WDL  Peripheral Motor Neuropathy: Asymptomatic OR clinical or diagnostic observations only (feet)  Ataxia: Asymptomatic OR clinical or diagnostic observations only OR intervention not indicated  Peripheral Sensory Neuropathy: Asymptomatic (feet)  Genitourinary/Reproductive  Genitourinary  Genitourinary (WDL): Exceptions to WDL  Urinary Frequency: Present  Lymphatic  Lymph System Disorders  Lymph (WDL): All lymph elements are within defined limits  Pain  Pain Score: Mild Pain (3)  AUA Assessment                                                              Accompanied by  Accompanied By: family (daughter)    Objective:     PHYSICAL EXAMINATION:    BP (!) 140/84 (BP Location: Left arm, Patient Position: Sitting)   Pulse 64   Resp 20   Wt 89.3 kg (196 lb 12.8 oz)   SpO2 97%   BMI 27.45 kg/m      Gen: Alert, in NAD  Pulm: No wheezing, stridor or respiratory distress  CV: Well-perfused, no cyanosis, no pedal edema  Back: No step-offs or pain to palpation along the thoracolumbar spine  Rectal: Deferred  : Deferred  Musculoskeletal: Normal muscle bulk and tone  Skin: Normal color and turgor  Neurologic: A/Ox3, CN II-XII intact, normal gait and station.  There is some mild tenderness in the mid thoracic spine.  Psychiatric: Appropriate mood and affect     Imaging: Imaging results 30 days: XR  Chest 2 Views    Result Date: 1/11/2023  EXAM: XR CHEST 2 VIEWS LOCATION: [facility] DATE/TIME: 1/11/2023 11:41 AM INDICATION: T4 5 pain that wraps atneriorly to the L 4 5th ribs, pain with movement, deep breathing, coughing.  known L lung cancer and T7 met hx COMPARISON: CT chest 12/19/2022 and older studies, chest x-ray 11/13/2021     IMPRESSION: Ovoid opacity in the left upper lobe with fiducial marker reflecting the treated lung cancer again noted. No new parenchymal disease. Minimal blunting of the right costophrenic angle is unchanged. Multiple old right-sided rib fractures. Heart and pulmonary vascularity are normal.    XR Thoracic Spine 2 Views    Result Date: 1/11/2023  EXAM: XR THORACIC SPINE 2 VIEWS LOCATION: [facility] DATE/TIME: 1/11/2023 11:46 AM INDICATION: L chest wall pain with movement, cough, bearing down, localized approximately T4 5region and wraping around to the 4 5 th rib region  Hx of cancer. Known T7 met COMPARISON: CT chest 12/19/2022. TECHNIQUE: CR Thoracic Spine.     IMPRESSION: There is new/increased moderate anterior height loss of the T7 vertebral body. This likely represents a pathologic fracture given previously demonstrated metastatic lesion at this level. The remaining visualized vertebral bodies are unremarkable in height. There is an associated 48 degrees kyphosis centered at the T7-T8 level. No significant spondylolisthesis. There is broad-based levoconvex curvature of approximately 12 degrees. Mild intervertebral disc height loss at T6-T7, compatible with spondylosis. Pedicles appear grossly symmetric. Visualized lung fields are well aerated. Surgical clips project over the upper abdomen. There are treatment-related changes in the left upper lung zone. Contrast-enhanced MRI recommended for further evaluation. Results discussed with Emerald Ellis on 1/11/2023 12:03 PM.    MR Thoracic Spine w/o & w Contrast    Result Date:  1/16/2023  EXAM: MR THORACIC SPINE W/O and W CONTRAST LOCATION: [facility] DATE/TIME: 1/15/2023 4:48 PM INDICATION: T7 pathologic fracture, new with pain T4-T7 region, L chest wall,  known prostate, lung cancer. COMPARISON: Plain films thoracic spine 1/11/2023. CT chest 12/19/2022. CONTRAST: 9ml david TECHNIQUE: Routine Thoracic Spine MRI without and with IV contrast. FINDINGS: Lateral alignment is normal. Straightening of the normal cervical lordosis with grade 1 anterolisthesis of C3 on C4 and grade 1 retrolisthesis of C5 on C6. There is chronic central inferior endplate height loss along the inferior endplate of C5. Mild convex right midthoracic curvature. Mild accentuation upper thoracic kyphosis. There is anterior wedging and abnormal edema and enhancement within the T7 vertebral body compatible with known pathologic fracture. There is enhancement within the ventral epidural space at the T7-T9 levels, nonspecific but favored to reflect reactive change/venous congestion. There is 26 degrees kyphosis measured from the superior T6 to the inferior T8 endplate which is not significantly changed compared to the 12/19/2022 prior. Compared to prior CT, the degree of anterior height loss appears mildly progressed. Ventral height loss is stable compared to recent plain film. Edema extends into the pedicles. Posterior elements otherwise unremarkable. There is a small amount of prevertebral edema at the T6 and T7 levels. Incidental T12 hemangioma. Visualized marrow space otherwise unremarkable. There is mild prominence of the epidural fat most evident within the mid thoracic region and there is mild canal narrowing at the T7 and T8 levels. Mild bilateral foraminal narrowing at T7-T8 and on the right at T4-T5. Dorsal paraspinal musculature otherwise unremarkable. Visualized aorta is normal in caliber. Chronic deformity is seen about the right sixth posterior rib corresponding to the healed fracture  at this level and better visualized on prior CT. Thoracic spinal cord demonstrates grossly normal signal characteristics and morphology. No abnormal cord enhancement.     IMPRESSION: 1.  Pathologic anterior wedging/compression fracture of T7, with the degree of height loss stable compared to prior plain film and mildly progressed compared to prior CT. There is a small amount of ventral epidural enhancement at the T7-T9 levels, which is favored to be reactive. No convincing finding for epidural tumor on the T1 precontrast images. 2.  Mild thoracic epidural lipomatosis with mild canal narrowing at the T7 and T8 levels. 3.  Moderate lower cervical spondylosis is seen on the localizer image with retrolisthesis of C4 on C5 with respect to C3 and C6.     Impression     The patient is a 77-year-old gentleman with multiple history of cancer in the past and a new finding of left upper lobe lung cancer.  The restaging PET CT scan showed new 1.1 cm FDG avid left upper lobe nodule consistent with malignant lung tumor and a sclerotic hypermetabolic metastasis within the T7 vertebral body.The patient received stereotactic radiosurgery for T7 spine 9-month ago and SBRT for lung cancer 10 months ago with restaging CT scan showed good response and no evidence of recurrence.    Assessment & Plan:     1.  I have personally reviewed his most recent MRI scan and CT scan and compared to the previous CT scan today.  The patient had a good response of his lung cancer there is no evidence of lung cancer recurrence.  The MRI scan showed anterior wedge compression fracture of T7 with small changes consistent with reactive changes.  The possibility having patient to see pain specialist or consideration of vertebroplasty has been discussed with the patient.  He is stable with manageable pain at this time.  Patient wished to delay evaluation in the near future.  Patient therefore is scheduled return to radiation oncology in 3 months for CT chest  and MRI thoracic spine.     2.  The patient had a good response for prostate cancer after hormonal therapy with most recent PSA measures 3.0 on 12/19/2022.  Continue follow-up for his prostate cancer which Dr. Wallace radiation medical oncology as planned.     3.  Recommend patient to continue follow-up with Dr. Alon Nunez, ENT for his head neck cancer surveillance.     4.  Continue follow-up with Dr. Shade Boyd, PCP for other medical needs.     Face to face time  30 minutes with > 80% spent on consultation, education and coordination of care.          Mariana Batista MD, PhD  Department of Radiation Oncology   [facility]  Tel: [phone]  Page: [phone]    [facility]  [address] [place] [address]     [address] [place] [address]    CC:  Patient Care Team:  Shade Boyd MD as PCP - General (Internal Medicine)  Mariana Batista MD as MD (Hematology & Oncology)  Faviola Cottrell RP as Pharmacist (Pharmacist)  Mariana Batista MD as Assigned Cancer Care Provider  Shade Boyd MD as Assigned PCP  Rosas Carr MD as MD (Neurology)  Nilton Gtz MD as Assigned Musculoskeletal Provider  David Castrejon MD (Internal Medicine)  Faviola Cottrell RP as Assigned MTM Pharmacist  Andrew Wallace MD as MD (Hematology)  Kwesi Rodlan MD as Assigned Surgical Provider        Again, thank you for allowing me to participate in the care of your patient.        Sincerely,        Mariana Batista MD

## 2023-01-24 PROBLEM — I73.9 PERIPHERAL ARTERIAL DISEASE (H): Status: ACTIVE | Noted: 2023-01-01

## 2023-01-24 PROBLEM — S22.060D COMPRESSION FRACTURE OF T7 VERTEBRA WITH ROUTINE HEALING: Status: ACTIVE | Noted: 2023-01-01

## 2023-01-24 PROBLEM — F10.21 HISTORY OF ALCOHOLISM (H): Status: ACTIVE | Noted: 2023-01-01

## 2023-01-24 NOTE — ASSESSMENT & PLAN NOTE
1/2023: Presenting with thoracic back pains and radiating chest wall pains - symptoms generally improved  - advised increasing gabapentin to 300mg TID - can titrate up further based on symptoms

## 2023-01-24 NOTE — PATIENT INSTRUCTIONS
We are increasing your gabapentin up from 200 mg 3 times a day up to 300 mg 3 times a day.  New prescription for 300 mg capsule provided.  Please let me know if not seeing sufficient improvement in your neuropathy or back related pains with dose adjustment.  There is plenty of room to go to try to increase your gabapentin dose further.    Would plan to see back in 3 months.

## 2023-01-24 NOTE — PROGRESS NOTES
Assessment & Plan   Problem List Items Addressed This Visit        Nervous and Auditory    Coronary artery disease of native heart with stable angina pectoris, unspecified vessel or lesion type (H)    Chronic midline low back pain with right-sided sciatica     - limited benefit with NSAIDs and APAP  H/o MIRNA after hip fracture from fall (6/2016)  - working with Ortho, Dr. Sainz - previous attempts at intra-articular right hip injection - no lasting benefit  - poor response to oxycodone  - MRI L-spine (4/2022): high grade spinal stenosis, with severe L4-5 bilateral foraminal stenosis   - using gabapentin 200mg TID - noticeable symptom improvement  - working with Ortho spine - consideration for future surgery         Relevant Medications    gabapentin (NEURONTIN) 300 MG capsule       Respiratory    COPD, group B, by GOLD 2017 classification (H)    Malignant neoplasm of upper lobe of left lung (H)     s/p right wedge resection of limited stage adenocarcinoma of lung (11/2015)  - 1-3/2021 XRT to MELODY malignancy (PET avid)  - following with [facility] radiation oncology   - last seen in 1/2023; CT, MRI imaging reviewed - interpretation of post-XRT changes and no recurrence of prior malignancy.  New T7 compression fracture related to reactive changes   - plans for follow-up imaging in 3 months            Circulatory    Peripheral arterial disease (H)       Musculoskeletal and Integumentary    Compression fracture of T7 vertebra with routine healing     1/2023: Presenting with thoracic back pains and radiating chest wall pains - symptoms generally improved  - advised increasing gabapentin to 300mg TID - can titrate up further based on symptoms            Urinary    Stage 3a chronic kidney disease (H)     - baseline SCr 1.7  - multiple LEILA episodes; h/o contrast induced nephropathy (VITOR)  previous multiple LEILA (prior h/o of LEILA in 1/2020, 10/2020); no previous dialysis needs  - lisinopril indefinitely on hold  - heightened  "risk for future contrast induced nephropathy           Relevant Orders    Basic metabolic panel    Parathyroid Hormone Intact    UA with Microscopic    Albumin Random Urine Quantitative with Creat Ratio    CBC with platelets    Malignant neoplasm of prostate (H)     prostate Ca - on ADT   - original prostate biopsy in '11, repeat biopsy in '16; Neal 6   - watchful surveillance; q6 month PSA; progressively rising (9/2022: 23.5)   - MRI of prostate (2019): focal coarse calcifications in prostate; no evidence of extra-prostate extension  - begun on GnRH therapy - tolerating well            Other    History of alcoholism (H)   Other Visit Diagnoses     Peripheral polyneuropathy    -  Primary    Relevant Medications    gabapentin (NEURONTIN) 300 MG capsule                BMI:   Estimated body mass index is 27.75 kg/m  as calculated from the following:    Height as of this encounter: 1.803 m (5' 11\").    Weight as of this encounter: 90.3 kg (199 lb).           Return in about 3 months (around 4/24/2023).   Follow-up Visit   Expected date:  Apr 24, 2023 (Approximate)      Follow Up Appointment Details:     Follow-up with whom?: Me    Follow-Up for what?: Chronic Disease f/u    Chronic Disease f/u:  COPD  Hypertension       How?: In Person                    Shade Boyd MD  [facility] [place]    Pilar Medrano is a 77 year old accompanied by his daughter, presenting for the following health issues: Presents for follow-up after being seen in urgent care clinic in recent past due to left-sided back and thoracic pains.  Had imaging demonstrating a T7 level compression fracture at the same level of previous radiation for a focal lung metastasis.  Has seen radiation oncologist this past week.  Review of imaging showed what appeared to be old radiation changes and no concerns brought up related to active recurrence of his lung cancer.  Plans for serial repeat imaging in 4 months.  From a pain " "standpoint he states he has been doing reasonably well.  States having a dull ache in the back, though mainly more bothered by radiating left-sided thoracic pains with breathing.  Has been on a stable dose of gabapentin 200 mg 3 times a day which he did not increase during recent weeks.  Also takes gabapentin related to lumbar stenosis and peripheral neuropathy in feet  Musculoskeletal Problem (Neuropathy follow up ) and Chest Pain (Follow up)      HPI       Review of Systems   Constitutional, HEENT, cardiovascular, pulmonary, gi and gu systems are negative, except as otherwise noted.      Objective    /81 (BP Location: Right arm, Patient Position: Sitting, Cuff Size: Adult Large)   Pulse 60   Temp 97.7  F (36.5  C) (Oral)   Resp 17   Ht 1.803 m (5' 11\")   Wt 90.3 kg (199 lb)   SpO2 98%   BMI 27.75 kg/m    Body mass index is 27.75 kg/m .  Physical Exam   GENERAL: healthy, alert and no distress  NECK: no adenopathy, no asymmetry, masses, or scars and thyroid normal to palpation  RESP: lungs clear to auscultation - no rales, rhonchi or wheezes  CV: regular rate and rhythm, normal S1 S2, no S3 or S4, no murmur, click or rub, no peripheral edema and peripheral pulses strong  ABDOMEN: soft, nontender, no hepatosplenomegaly, no masses and bowel sounds normal  MS: no gross musculoskeletal defects noted, no edema.  No focal pain to palpation across thoracic spinous processes    Lab on 12/19/2022   Component Date Value Ref Range Status     PSA Tumor Marker 12/19/2022 3.00  0.00 - 6.50 ng/mL Final     Sodium 12/19/2022 140  136 - 145 mmol/L Final     Potassium 12/19/2022 4.8  3.5 - 5.0 mmol/L Final     Chloride 12/19/2022 102  98 - 107 mmol/L Final     Carbon Dioxide (CO2) 12/19/2022 30  22 - 31 mmol/L Final     Anion Gap 12/19/2022 8  5 - 18 mmol/L Final     Urea Nitrogen 12/19/2022 34 (H)  8 - 28 mg/dL Final     Creatinine 12/19/2022 1.67 (H)  0.70 - 1.30 mg/dL Final     Calcium 12/19/2022 10.1  8.5 - 10.5 " mg/dL Final     Glucose 12/19/2022 106  70 - 125 mg/dL Final     Alkaline Phosphatase 12/19/2022 143 (H)  45 - 120 U/L Final     AST 12/19/2022 19  0 - 40 U/L Final     ALT 12/19/2022 15  0 - 45 U/L Final     Protein Total 12/19/2022 7.5  6.0 - 8.0 g/dL Final     Albumin 12/19/2022 3.7  3.5 - 5.0 g/dL Final     Bilirubin Total 12/19/2022 0.7  0.0 - 1.0 mg/dL Final     GFR Estimate 12/19/2022 42 (L)  >60 mL/min/1.73m2 Final    Effective December 21, 2021 eGFRcr in adults is calculated using the 2021 CKD-EPI creatinine equation which includes age and gender (Sadia et al., NE, DOI: 10.1056/STMJno1382123)     WBC Count 12/19/2022 7.3  4.0 - 11.0 10e3/uL Final    Preliminary ANC is 5.0     RBC Count 12/19/2022 4.94  4.40 - 5.90 10e6/uL Final     Hemoglobin 12/19/2022 15.0  13.3 - 17.7 g/dL Final     Hematocrit 12/19/2022 45.7  40.0 - 53.0 % Final     MCV 12/19/2022 93  78 - 100 fL Final     MCH 12/19/2022 30.4  26.5 - 33.0 pg Final     MCHC 12/19/2022 32.8  31.5 - 36.5 g/dL Final     RDW 12/19/2022 12.3  10.0 - 15.0 % Final     Platelet Count 12/19/2022 202  150 - 450 10e3/uL Final     % Neutrophils 12/19/2022 68  % Final     % Lymphocytes 12/19/2022 13  % Final     % Monocytes 12/19/2022 8  % Final     % Eosinophils 12/19/2022 9  % Final     % Basophils 12/19/2022 1  % Final     % Immature Granulocytes 12/19/2022 1  % Final     NRBCs per 100 WBC 12/19/2022 0  <1 /100 Final     Absolute Neutrophils 12/19/2022 5.0  1.6 - 8.3 10e3/uL Final     Absolute Lymphocytes 12/19/2022 1.0  0.8 - 5.3 10e3/uL Final     Absolute Monocytes 12/19/2022 0.6  0.0 - 1.3 10e3/uL Final     Absolute Eosinophils 12/19/2022 0.6  0.0 - 0.7 10e3/uL Final     Absolute Basophils 12/19/2022 0.1  0.0 - 0.2 10e3/uL Final     Absolute Immature Granulocytes 12/19/2022 0.0  <=0.4 10e3/uL Final     Absolute NRBCs 12/19/2022 0.0  10e3/uL Final

## 2023-01-24 NOTE — ASSESSMENT & PLAN NOTE
- baseline SCr 1.7  - multiple LEILA episodes; h/o contrast induced nephropathy (VITOR)  previous multiple LEILA (prior h/o of LEILA in 1/2020, 10/2020); no previous dialysis needs  - lisinopril indefinitely on hold  - heightened risk for future contrast induced nephropathy

## 2023-01-24 NOTE — ASSESSMENT & PLAN NOTE
s/p right wedge resection of limited stage adenocarcinoma of lung (11/2015)  - 1-3/2021 XRT to MELODY malignancy (PET avid)  - following with [facility] radiation oncology   - last seen in 1/2023; CT, MRI imaging reviewed - interpretation of post-XRT changes and no recurrence of prior malignancy.  New T7 compression fracture related to reactive changes   - plans for follow-up imaging in 3 months

## 2023-01-24 NOTE — ASSESSMENT & PLAN NOTE
prostate Ca - on ADT   - original prostate biopsy in '11, repeat biopsy in '16; Ken 6   - watchful surveillance; q6 month PSA; progressively rising (9/2022: 23.5)   - MRI of prostate (2019): focal coarse calcifications in prostate; no evidence of extra-prostate extension  - begun on GnRH therapy - tolerating well

## 2023-02-13 NOTE — PROGRESS NOTES
HISTORY OF PRESENT ILLNESS:  I had the pleasure of meeting Mr. Chau and his daughter back today in clinic.  I met him in October after he presented for evaluation of a right buccal lesion.  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  He has had a number of recurrences since then and more recently been diagnosed with prostate cancer metachronous bilateral lung cancers.  Dr. Jaffe initially saw him for the right buccal lesion in February 2022 and a biopsy showed acute and chronic inflammation.  I have seen photos from that and is relatively stable.  When I saw him in October, I repeated the biopsy, which again showed inflammatory change.  He is here today for followup.  He has no new complaints.  He has not noticed any new sores in his mouth.  He has not noticed any masses in the neck.    PHYSICAL EXAMINATION:  On examination, he is alert, in no acute distress.  There is no palpable cervical lymphadenopathy.  Intraorally, he has some papillary changes to the right buccal area, which seems to be confined to the location of the prior free flap.  There are no concerning lesions seen in the oral cavity.    ASSESSMENT AND PLAN:  Mr. Chau's oral exam looks stable today.  I would like to see him back in about six months for routine followup.  He knows to call me sooner if any lesions arise.      Kwesi Roldan M.D.      Head and Neck Surgical Oncology and Microvascular Reconstruction  Department of Otolaryngology - Head and Neck Surgery  [facility]       30 minutes spent on the date of the encounter in chart review, patient visit, review of tests, documentation and/or discussion with other providers about the issues documented above.

## 2023-02-13 NOTE — LETTER
2/13/2023       RE: Lenny Chau  [address]  [place] [address]     Dear Colleague,    Thank you for referring your patient, Lenny Chau, to the [facility] [place] [facility]. Please see a copy of my visit note below.    HISTORY OF PRESENT ILLNESS:  I had the pleasure of meeting Mr. Chau and his daughter back today in clinic.  I met him in October after he presented for evaluation of a right buccal lesion.  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  He has had a number of recurrences since then and more recently been diagnosed with prostate cancer metachronous bilateral lung cancers.  Dr. Jaffe initially saw him for the right buccal lesion in February 2022 and a biopsy showed acute and chronic inflammation.  I have seen photos from that and is relatively stable.  When I saw him in October, I repeated the biopsy, which again showed inflammatory change.  He is here today for followup.  He has no new complaints.  He has not noticed any new sores in his mouth.  He has not noticed any masses in the neck.    PHYSICAL EXAMINATION:  On examination, he is alert, in no acute distress.  There is no palpable cervical lymphadenopathy.  Intraorally, he has some papillary changes to the right buccal area, which seems to be confined to the location of the prior free flap.  There are no concerning lesions seen in the oral cavity.    ASSESSMENT AND PLAN:  Mr. Chau's oral exam looks stable today.  I would like to see him back in about six months for routine followup.  He knows to call me sooner if any lesions arise.      Kwesi Roldan M.D.      Head and Neck Surgical Oncology and Microvascular Reconstruction  Department of Otolaryngology - Head and Neck Surgery  [place]  [facility]       30 minutes spent on the date of the encounter in chart review, patient visit, review of tests, documentation and/or discussion with other providers about the issues documented above.         Again, thank you for allowing me to participate in the care of your patient.      Sincerely,    Kwesi Roldan MD

## 2023-02-13 NOTE — NURSING NOTE
"Chief Complaint   Patient presents with     RECHECK     4 month  follow up      Blood pressure 135/82, pulse 58, temperature (!) 96.4  F (35.8  C), height 1.803 m (5' 11\"), weight 90.3 kg (199 lb), SpO2 97 %.    Ulisses Kwok LPN    "

## 2023-03-30 NOTE — TELEPHONE ENCOUNTER
Last office visit: 1/24/2023     Future Appointments 3/30/2023 - 9/26/2023      Date Visit Type Length Department Provider     4/17/2023  2:00 PM CT CHEST WO 20 min WW CT [facility] CT 2    Location Instructions:     We are located at [address] [facility]. To find the Radiology department enter through the main entrance. The information desk will be on your right. Follow the hallway to the elevators on the left, take these elevators to the Ground (Turtle) Level. Exit the elevator to the left and proceed down the hallway with glass windows, and this will bring you to the main radiology desk where you will check in.              4/17/2023  3:30 PM MR THORACIC SPINE WWO 45 min [facility] MRI [facility] MR1    Location Instructions:     We are located at [address] [facility] in [place]. To find the Radiology department enter through the main entrance. The information desk will be on your right. Follow the hallway to the elevators on the left, take these elevators to the Ground (Turtle) Level. Exit the elevator to the left and proceed down the hallway with glass windows, and this will bring you to the main radiology desk where you will check in.              4/19/2023  1:00 PM RETURN 30 min [facility] RADIATION ONCOLOGY Mariana Batista MD              4/26/2023  1:00 PM MYC OFFICE VISIT  30 min [place] FP/IM/Shade Sheriff MD    Location Instructions:     [facility] is located at [facility], one block north of [facility] and [facility]. The clinic shares a building with the [employer]; use the clinic s parking lot and entrance on the building s south side.               5/1/2023  1:15 PM LAB PERIPHERAL 15 min [facility] INFUSION THERAPY [facility] INFUSION CLINIC LAB              5/1/2023  1:45 PM RETURN 30 min [facility] MEDICAL ONCOLOGY Quoc Hylton CNP              5/1/2023  2:15 PM INFUSION 0.5 HR (30 MIN) 30 min [facility] INFUSION THERAPY [facility] INFUSION NURSE      "5/1/2023  2:15 PM INFUSION 0.5 HR (30 MIN) 30 min [facility] INFUSION THERAPY [facility] INFUSION CHAIR 9              8/14/2023  2:20 PM RETURN 20 min [facility] Kwesi Segundo MD    Location Instructions:     Located in the Clinics and Surgery Center at [address] [place], [place] [id number]. For parking options, enter the [facility] /arrival plaza from [facility] and attendants can assist you based on your needs.  parking is available for those with limited mobility M-F from 7 a.m. to 5 p.m. Due to short staffing, we are unable to offer  to all patients/visitors. Visit [email or web address] for more details.  Self-parking:&nbsp;  West Lot: Located across from the main entrance, this is a convenient option for patients. Enter on [facility]. Parking attendants available most hours to assist.&nbsp;     [facility] Ramp: Enter at the [facility] entrance (one block north of the [facility] main entrance). Do not enter the ramp from [facility] - this entrance is not staffed and is further from the [facility] main entrance.                   Requested Prescriptions   Pending Prescriptions Disp Refills     omeprazole (PRILOSEC) 20 MG DR capsule [Pharmacy Med Name: OMEPRAZOLE DR 20 MG CAPSULE] 90 capsule 1     Sig: TAKE 1 CAPSULE BY MOUTH EVERY DAY       PPI Protocol Passed - 3/30/2023 12:46 AM        Passed - Not on Clopidogrel (unless Pantoprazole ordered)        Passed - No diagnosis of osteoporosis on record        Passed - Recent (12 mo) or future (30 days) visit within the authorizing provider's specialty     Patient has had an office visit with the authorizing provider or a provider within the authorizing providers department within the previous 12 mos or has a future within next 30 days. See \"Patient Info\" tab in inbasket, or \"Choose Columns\" in Meds & Orders section of the refill encounter.              Passed - Medication is active on med list        Passed - Patient is age 18 or older                   "

## 2023-04-19 NOTE — PROGRESS NOTES
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  The biopsy confirmed moderately differentiated adenocarcinoma.        TYPE OF RADIATION THERAPY ADMINISTERED:   1. SBRT to the left upper lobe with a total dose of 5000 cGy in 5 treatments given from 3/2/2021-3/10/2021.     2. SBRT with a total dose of 5000 cGy in 5 treatments for the second left upper lobe lung tumor given from 3/9/2022-3/18/2022.      3. SBRT with a total dose of 3275 cGy in 5 treatments for T7 spine given from 2022- 2022.     INTERVAL SINCE COMPLETION OF RADIATION THERAPY: 12 months since last treatment.      SUBJECTIVE:  Mr. Chau is a 77 y.o. male who is a chronic smoker and quit smoking since .  The patient had a complicated history of multiple cancer in the past as detailed as followin.  Low risk prostate cancer, clinical stage T1c N0 M0, recent grade 3+3 =6 and the last PSA was 10.4 in 2006.  Patient has been on clinical observation and was found to have a gradually rising PSA.  The patient is currently on hormone therapy since .      2.  Floor of mouth cancer, status post surgical resection in 1994 with no adjuvant therapy.     3.  Left kidney hemangioma, status post left nephrectomy on 3/26/2009.     4.  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  Patient had local recurrence and is status post surgical resection on 9/11/2009.     5.  Non-small cell lung cancer involving right lung, stage I, status post right thoracotomy and excision of right upper lobe and right middle lobe lung tumor 11/5/2015 with no evidence of lymph node metastasis and no adjuvant therapy.  Patient lost to follow-up since 2016.     6.  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     7.  Left upper lobe lung  cancer, stage T1N0 M0.  The biopsy confirmed moderately differentiated adenocarcinoma on 1/21/2021.  The patient received SBRT with a total dose of 5000 cGy in 5 treatments given from 3/2/2021-3/10/2021.     Patient had two prior early stage right lung cancers that were apparently resected in their entirety and may have been synchronous primary lung cancers. It does not appear he had the appropriate recommended follow up at [facility]. The MELODY lesion was known to be present in 2016 and appears to be slightly larger now with significant FDG-avidity on the recent PET/CT. It is likely another primary lung cancer, although metastasis from previous lung cancers is possible.  The patient had a restaging PET CT scan on 8/31/2020.  The scan showed enlarging FDG avid left upper lobe mass measuring 2.1 x 2.5 cm with central solid component consistent with primary lung cancer without metastasis.  There was also a FDG avid lesion in the left buccal region consistent with squamous cell carcinoma without metastasis.  The patient had a surgical resection for his left buccal/lateral tongue squamous cell carcinoma 11/6/2020 by KERRY Renee with pathology showed squamous cell carcinoma with negative margin.  He was determined not a good candidate for lung surgery given his complicated medical history and health status.  He  therefore received definitive radiation therapy using SBRT with a total dose of 5000 cGy in 5 treatments given from 3/2/2021-3/10/2021. The patient tolerated radiation therapy very well with minimal side effect.     The patient has been doing well since completion of radiation therapy.  He denies any pain or discomfort related to the therapy at the time of evaluation.  The patient was found to a new 9 mm small nodule in the left upper lobe outside of patient therapy area from restaging CT scan on 12/20/2021.  He was recommended to have staging PET CT scan and was done 1/7/2022. 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  There is no evidence of other systemic metastasis.The patient's case has been discussed at thoracic tumor conference 1/13/2022.  MRI brain on 1/22/2022 showed no evidence of brain metastasis. Patient is not good candidate for surgery and poor candidate for systemic therapy given his current medical status.  The consensus recommendation is to consider SBRT for his oligo disease in the left lung and T7 spine if the patient wishes not to consider systemic therapy at this time.   The patient received the second course of SBRT to the second left lung cancer with a total dose of 5000 cGy in 5 treatments given from 3/9/2022-3/18/2022.  He tolerated radiation therapy very well with minimal side effect.      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  He received stereotactic radiosurgery with a total dose of 3275 cGy in 5 treatments given from 4/12/2022- 4/22/2022.  The patient tolerated ration therapy very well with minimal side effect.     The patient has been stable since completion of radiation therapy.    His pain has been stable and denies any new symptoms at the time of evaluation.  He is here for routine post therapy office follow-up for his lung  cancer.    Medications were reviewed and are up to date on EPIC.    The following portions of the patient's history were reviewed and updated as appropriate: allergies, current medications, past family history, past medical history, past social history, past surgical history and problem list.    Review of Systems:      General  Constitutional  Constitutional (WDL): All constitutional elements are within defined limits  EENT  Eye Disorders  Eye Disorder (WDL): All eye disorder elements are within defined limits (wears glasses)  Ear Disorders  Ear Disorder (WDL): All ear disorder elements are within defined limits  Respiratory  Respiratory  Respiratory (WDL): Exceptions to WDL  Cough: Mild symptoms OR nonprescription intervention indicated (occasional dry cough)  Cardiovascular  Cardiovascular  Cardiovascular (WDL): All cardiovascular elements are within defined limits (no pacemaker)  Gastrointestinal  Gastrointestinal  Gastrointestinal (WDL): Exceptions to WDL  Dysphagia: Symptomatic, able to eat regular diet (occasional after jaw surgery)  Musculoskeletal  Musculoskeletal and Connective Tissue Disorders  Musculoskeletal & Connective (WDL): Exceptions to WDL  Arthralgia: Mild pain (back, shoulder)  Bone Pain: Mild pain (back, shulder)  Myalgia: Mild pain (neuropathy in feet and left side occasionally)  Integumentary  Integumentary  Integumentary (WDL): All integumentary elements are within defined limits  Neurological  Neurosensory  Neurosensory (WDL): Exceptions to WDL  Peripheral Motor Neuropathy: Asymptomatic OR clinical or diagnostic observations only (feet)  Ataxia: Asymptomatic OR clinical or diagnostic observations only OR intervention not indicated  Peripheral Sensory Neuropathy: Asymptomatic (feet)  Genitourinary/Reproductive  Genitourinary  Genitourinary (WDL): Exceptions to WDL  Urinary Frequency: Present (nocturia x2)  Lymphatic  Lymph System Disorders  Lymph (WDL): All lymph elements are within defined  limits  Pain  Pain Score: No Pain (0)  AUA Assessment                                                              Accompanied by  Accompanied By: family (daughter)    Objective:      PHYSICAL EXAMINATION:    BP (!) 144/77 (BP Location: Right arm, Patient Position: Sitting, Cuff Size: Adult Regular)   Pulse 51   Temp 97.8  F (36.6  C) (Oral)   Resp 20   Wt 91.2 kg (201 lb)   SpO2 98%   BMI 28.03 kg/m      Gen: Alert, in NAD  Eyes: PERRL, EOMI, sclera anicteric  Neck: Supple, full ROM, no LAD  Pulm: No wheezing, stridor or respiratory distress  CV: Well-perfused, no cyanosis, no pedal edema  Back: No step-offs or pain to palpation along the thoracolumbar spine  Rectal: Deferred  : Deferred  Musculoskeletal: Normal muscle bulk and tone  Skin: Normal color and turgor  Neurologic: A/Ox3, CN II-XII intact, normal gait and station  Psychiatric: Appropriate mood and affect     Imaging: Imaging results 30 days: CT Chest w/o Contrast    Result Date: 4/18/2023  EXAM: CT CHEST W/O CONTRAST LOCATION: [facility] DATE/TIME: 4/17/2023 1:56 PM CDT INDICATION: Lung cancer, current or r o recurrence; Lung cancer surveillance; No known automatically detected potential contraindications to imaging COMPARISON: CT of the chest 12/19/2022 TECHNIQUE: CT chest without IV contrast. Multiplanar reformats were obtained. Dose reduction techniques were used. CONTRAST: None. FINDINGS: LUNGS AND PLEURA: Metallic fiducial in the left upper lobe lateral to the left hilum. Surrounding solid opacity with irregular and angulated borders is unchanged consistent with radiation fibrosis. Wedge resection staple line with adjacent cicatrization medial right upper lobe and additional staple line along the inferior major fissure in the right middle lobe. No new airspace opacities or lung nodules. No pleural effusions. MEDIASTINUM: Degenerative calcification of the aortic root/valve leaflets. Fusiform enlargement of the mid  ascending aorta which measures up to 4.5 cm in diameter. Calcified atheroma in the arch, proximal great vessels and descending thoracic aorta. Cardiac chambers are not enlarged. No pericardial effusion. No enlarged mediastinal or hilar lymph nodes. Esophagus is decompressed. CORONARY ARTERY CALCIFICATION: Severe. UPPER ABDOMEN: Upper portion of the stent graft in the infrarenal abdominal aorta noted. No new findings in the imaged upper abdomen. MUSCULOSKELETAL: A anterior wedge compression deformity of T7 is increased from December 2022 with vacuum disc phenomenon above and below the fracture deformity there are multiple bilateral healed rib fracture deformities. No acute rib fractures.     IMPRESSION: 1.  Unchanged focal opacity around a metallic fiducial in the left upper lobe lateral to the hilum consistent with radiation fibrosis. Unchanged right lung wedge resections. No findings to suggest recurrent malignancy in the chest. 2.  Fusiform dilation mid ascending aorta measuring up to 4.5 cm diameter. 3.  T7 compression deformity is increased from December 2022.     Prostate Specific Antigen Screen   Date Value Ref Range Status   05/08/2021 34.0 (H) 0.0 - 6.5 ng/mL Final   03/09/2021 23.5 (H) < OR = 4.0 ng/mL Final     Comment:     The total PSA value from this assay system is   standardized against the WHO standard. The test   result will be approximately 20% lower when compared   to the equimolar-standardized total PSA ([place]). Comparison of serial PSA results should be   interpreted with this fact in mind.     This test was performed using the Siemens   chemiluminescent method. Values obtained from   different assay methods cannot be used  interchangeably. PSA levels, regardless of  value, should not be interpreted as absolute  evidence of the presence or absence of disease.  Lab test performed by:  Lab Mnemonic:MARLENY  [email or web address]  [address] [place] [address]  Nando BOOTH  AUTUMN Nunez  QUEST Specimen received date and time: 09-MAR-2021 12:46:00.00     05/06/2019 15.8 (H) < OR = 4.0 ng/mL Final     Comment:     The total PSA value from this assay system is   standardized against the WHO standard. The test   result will be approximately 20% lower when compared   to the equimolar-standardized total PSA ([place]). Comparison of serial PSA results should be   interpreted with this fact in mind.     This test was performed using the Siemens   chemiluminescent method. Values obtained from   different assay methods cannot be used  interchangeably. PSA levels, regardless of  value, should not be interpreted as absolute  evidence of the presence or absence of disease.  Lab test performed by:  Lab Mnemonic:  [facility]  [address] [place] [address]  Nando Nunez M.D.  QUEST Specimen received date and time: 06-MAY-2019 08:07:00.00     10/15/2018 10.8 (H) < OR = 4.0 ng/mL Final     Comment:     The total PSA value from this assay system is   standardized against the WHO standard. The test   result will be approximately 20% lower when compared   to the equimolar-standardized total PSA ([place]). Comparison of serial PSA results should be   interpreted with this fact in mind.     This test was performed using the Siemens   chemiluminescent method. Values obtained from   different assay methods cannot be used  interchangeably. PSA levels, regardless of  value, should not be interpreted as absolute  evidence of the presence or absence of disease.  Lab test performed by:  Lab Mnemonic:  [facility]  [address] [place] [address]  Nando Nunez M.D.  QUEST Specimen received date and time: 15-OCT-2018 17:14:00.00     03/29/2018 13.0 (H) < OR = 4.0 ng/mL Final     Comment:     The total PSA value from this assay system is   standardized against the WHO standard. The test   result will be approximately 20% lower  when compared   to the equimolar-standardized total PSA ([place]). Comparison of serial PSA results should be   interpreted with this fact in mind.     This test was performed using the Siemens   chemiluminescent method. Values obtained from   different assay methods cannot be used  interchangeably. PSA levels, regardless of  value, should not be interpreted as absolute  evidence of the presence or absence of disease.  Lab test performed by:  Lab Mnemonic:  [facility]  [address] [place] [address]  Nando Nunez M.D.  QUEST Specimen received date and time: 29-MAR-2018 16:01:00.00     09/11/2017 11.9 (H) < OR = 4.0 ng/mL Final     Comment:     The total PSA value from this assay system is   standardized against the WHO standard. The test   result will be approximately 20% lower when compared   to the equimolar-standardized total PSA ([place]). Comparison of serial PSA results should be   interpreted with this fact in mind.     This test was performed using the Siemens   chemiluminescent method. Values obtained from   different assay methods cannot be used  interchangeably. PSA levels, regardless of  value, should not be interpreted as absolute  evidence of the presence or absence of disease.  Lab test performed by:  Lab Mnemonic:  [facility]  [address] [place] [address]  Nando Nunez M.D.  QUEST Specimen received date and time: 11-SEP-2017 08:29:00.00     07/15/2016 10.4 (H) < OR = 4.0 ng/mL Final     Comment:        This test was performed using the Siemens  chemiluminescent method. Values obtained from  different assay methods cannot be used  interchangeably. PSA levels, regardless of  value, should not be interpreted as absolute  evidence of the presence or absence of disease.     Lab test performed by:  Lab Mnemonic:  [facility]  [address] [place] [address]  Nando Nunez M.D.      02/09/2016 7.4 (H) < OR = 4.0 ng/mL Final     Comment:        This test was performed using the Siemens  chemiluminescent method. Values obtained from  different assay methods cannot be used  interchangeably. PSA levels, regardless of  value, should not be interpreted as absolute  evidence of the presence or absence of disease.     Lab test performed by:  Lab Mnemonic:  [facility]  [address] [place] [address]  Nando Nunez M.D.     04/22/2015 7.2 (H) < OR = 4.0 ng/mL Final     Comment:        This test was performed using the Siemens  chemiluminescent method. Values obtained from  different assay methods cannot be used  interchangeably. PSA levels, regardless of  value, should not be interpreted as absolute  evidence of the presence or absence of disease.       Lab test performed by:  Lab Mnemonic:  [facility]  [address] [place] [address]  Nando Nunez M.D.     03/19/2014 6.8 (H) < OR = 4.0 ng/mL Final     Comment:        This test was performed using the Siemens  chemiluminescent method. Values obtained from  different assay methods cannot be used  interchangeably. PSA levels, regardless of  value, should not be interpreted as absolute  evidence of the presence or absence of disease.       Lab test performed by:  Lab Juwanonic:  [facility] [place]  [address] [place] [address]  Nando Nunez M.D.     03/14/2013 6.1 (H) < OR = 4.0 ng/mL Final     Comment:        This test was performed using the Siemens  chemiluminescent method. Values obtained from  different assay methods cannot be used  interchangeably. PSA levels, regardless of  value, should not be interpreted as absolute  evidence of the presence or absence of disease.       Lab test performed by:  Lab Mnemonic:MARLENY  [facility]  [address] [place] [address]  Nando Nunez M.D.     03/06/2012 5.5 (H) < OR = 4.0 ng/mL Final      Comment:        This test was performed using the Siemens  chemiluminescent method. Values obtained from  different assay methods cannot be used  interchangeably. PSA levels, regardless of  value, should not be interpreted as absolute  evidence of the presence or absence of disease.       Lab test performed by:  Lab Mnemonic:[facility]  [address] [place] [address]  Nando Nunez M.D.     06/10/2011 4.7 (H) < OR = 4.0 ng/mL Final     Comment:        This test was performed using the Siemens  chemiluminescent method. Values obtained from  different assay methods cannot be used  interchangeably. PSA levels, regardless of  value, should not be interpreted as absolute  evidence of the presence or absence of disease.       Lab test performed by:  Lab Mnemonic:[facility]  [address] [place] [address]  Nando Nunez M.D.       PSA Tumor Marker   Date Value Ref Range Status   12/19/2022 3.00 0.00 - 6.50 ng/mL Final   09/19/2022 23.50 (H) 0.00 - 6.50 ng/mL Final   06/21/2022 21.81 (H) 0.00 - 6.50 ug/L Final   03/31/2022 18.25 (H) 0.00 - 6.50 ug/L Final   12/22/2021 12.47 (H) 0.00 - 6.50 ug/L Final      Impression     The patient is a 77-year-old gentleman with multiple history of cancer in the past and a new finding of left upper lobe lung cancer.  The restaging PET CT scan showed new 1.1 cm FDG avid left upper lobe nodule consistent with malignant lung tumor and a sclerotic hypermetabolic metastasis within the T7 vertebral body.The patient received stereotactic radiosurgery for T7 spine 9-month ago and SBRT for lung cancer 12 months ago with restaging CT scan showed good response and no evidence of recurrence.    Assessment & Plan:     1.  I have personally reviewed his most recent CT scan and compared to the previous CT scan today.  The patient had a good response of his lung cancer there is no evidence of lung cancer recurrence.  The MRI  scan was not done and we will reschedule in the near future. The possibility having patient to see pain specialist or consideration of vertebroplasty has been discussed with the patient.  His pain is stable and patient only taking Tylenol with good relief.  He we shall continue clinical observation at this time.  The patient is therefore scheduled to return to radiation oncology in 4 months with restaging CT chest abdomen pelvics.    2.  The patient had a good response for prostate cancer after hormonal therapy with most recent PSA measures 3.0 on 12/19/2022.  Continue follow-up for his prostate cancer which Dr. Wallace radiation medical oncology as planned.     3.  Recommend patient to continue follow-up with Dr. Alon Nunez, ENT for his head neck cancer surveillance.     4.  Continue follow-up with Dr. Shade Boyd, PCP for other medical needs.     Face to face time  30 minutes with > 80% spent on consultation, education and coordination of care.       Mariana Batista MD  Department of Radiation Oncology   [facility]  Tel: [phone]  Page: [phone]    [facility]  [address] [place] [address]     [address]   [place] [address]    CC:  Patient Care Team:  Shade Boyd MD as PCP - General (Internal Medicine)  Mariana Batista MD as MD (Hematology & Oncology)  Faviola Cottrell RP as Pharmacist (Pharmacist)  Mariana Batista MD as Assigned Cancer Care Provider  Shade Boyd MD as Assigned PCP  Rosas Carr MD as MD (Neurology)  Nilton Gtz MD as Assigned Musculoskeletal Provider  David Castrejon MD (Internal Medicine)  Faviola Cottrell RP as Assigned MTM Pharmacist  Andrew Wallace MD as MD (Hematology)  Kwesi Roldan MD as Assigned Surgical Provider

## 2023-04-19 NOTE — PROGRESS NOTES
"Oncology Rooming Note    April 19, 2023 12:55 PM   Lenny Chau is a 77 year old male who presents for:    Chief Complaint   Patient presents with     Oncology Clinic Visit     Follow up with Dr Batitsa     Initial Vitals: BP (!) 144/77 (BP Location: Right arm, Patient Position: Sitting, Cuff Size: Adult Regular)   Pulse 51   Temp 97.8  F (36.6  C) (Oral)   Resp 20   Wt 91.2 kg (201 lb)   SpO2 98%   BMI 28.03 kg/m   Estimated body mass index is 28.03 kg/m  as calculated from the following:    Height as of 2/13/23: 1.803 m (5' 11\").    Weight as of this encounter: 91.2 kg (201 lb). Body surface area is 2.14 meters squared.  No Pain (0) Comment: Data Unavailable   No LMP for male patient.  Allergies reviewed: Yes  Medications reviewed: Yes    Medications: Medication refills not needed today.  Pharmacy name entered into [employer]: [facility] [id number] [place] [address]    Clinical concerns: Patient here ambulatory accompanied by daughter for follow-up status post radiation for his metastatic lung and prostate cancer.  Patient continues to have some balance issues but has not fallen in the past year.  Patient only has very intermittent pain no pain today in clinic.  CT scan done earlier this week but patient was not able to tolerate sitting through MRI right after.  MRI scan will have to be rescheduled.  Here today for results on CT scan. Dr. Batista was notified.      Anne-Marie Durbin RN              "

## 2023-04-19 NOTE — LETTER
2023         RE: Lenny Chau  [address]  [place] [address]        Dear Colleague,    Thank you for referring your patient, Lenny Chau, to the [facility] RADIATION ONCOLOGY [place]. Please see a copy of my visit note below.    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  The biopsy confirmed moderately differentiated adenocarcinoma.        TYPE OF RADIATION THERAPY ADMINISTERED:   1. SBRT to the left upper lobe with a total dose of 5000 cGy in 5 treatments given from 3/2/2021-3/10/2021.     2. SBRT with a total dose of 5000 cGy in 5 treatments for the second left upper lobe lung tumor given from 3/9/2022-3/18/2022.      3. SBRT with a total dose of 3275 cGy in 5 treatments for T7 spine given from 2022- 2022.     INTERVAL SINCE COMPLETION OF RADIATION THERAPY: 12 months since last treatment.      SUBJECTIVE:  Mr. Chau is a 77 y.o. male who is a chronic smoker and quit smoking since .  The patient had a complicated history of multiple cancer in the past as detailed as followin.  Low risk prostate cancer, clinical stage T1c N0 M0, recent grade 3+3 =6 and the last PSA was 10.4 in 2006.  Patient has been on clinical observation and was found to have a gradually rising PSA.  The patient is currently on hormone therapy since .      2.  Floor of mouth cancer, status post surgical  resection in 8/1994 with no adjuvant therapy.     3.  Left kidney hemangioma, status post left nephrectomy on 3/26/2009.     4.  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  Patient had local recurrence and is status post surgical resection on 9/11/2009.     5.  Non-small cell lung cancer involving right lung, stage I, status post right thoracotomy and excision of right upper lobe and right middle lobe lung tumor 11/5/2015 with no evidence of lymph node metastasis and no adjuvant therapy.  Patient lost to follow-up since 2016.     6.  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 Alon Nunez, ENT on 11/6/2020.     7.  Left upper lobe lung  cancer, stage T1N0 M0.  The biopsy confirmed moderately differentiated adenocarcinoma on 1/21/2021.  The patient received SBRT with a total dose of 5000 cGy in 5 treatments given from 3/2/2021-3/10/2021.     Patient had two prior early stage right lung cancers that were apparently resected in their entirety and may have been synchronous primary lung cancers. It does not appear he had the appropriate recommended follow up at [facility]. The MELODY lesion was known to be present in 2016 and appears to be slightly larger now with significant FDG-avidity on the recent PET/CT. It is likely another primary lung cancer, although metastasis from previous lung cancers is possible.  The patient had a restaging PET CT scan on 8/31/2020.  The scan showed enlarging FDG avid left upper lobe mass measuring 2.1 x 2.5 cm with central solid component consistent with primary lung cancer without metastasis.  There was also a FDG avid lesion in the left buccal region consistent with squamous cell carcinoma without metastasis.  The patient had a surgical resection for his left  buccal/lateral tongue squamous cell carcinoma 11/6/2020 by Dr. Alon Nunez, ENT with pathology showed squamous cell carcinoma with negative margin.  He was determined not a good candidate for lung surgery given his complicated medical history and health status.  He therefore received definitive radiation therapy using SBRT with a total dose of 5000 cGy in 5 treatments given from 3/2/2021-3/10/2021. The patient tolerated radiation therapy very well with minimal side effect.     The patient has been doing well since completion of radiation therapy.  He denies any pain or discomfort related to the therapy at the time of evaluation.  The patient was found to a new 9 mm small nodule in the left upper lobe outside of patient therapy area from restaging CT scan on 12/20/2021.  He was recommended to have staging PET CT scan and was done 1/7/2022. 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  There is no evidence of other systemic metastasis.The patient's case has been discussed at thoracic tumor conference 1/13/2022.  MRI brain on 1/22/2022 showed no evidence of brain metastasis. Patient is not good candidate for surgery and poor candidate for systemic therapy given his current medical status.  The consensus recommendation is to consider SBRT for his oligo disease in the left lung and T7 spine if the patient wishes not to consider systemic therapy at this time.   The patient received the second course of SBRT to the second left lung cancer with a total dose of 5000 cGy in 5 treatments given from 3/9/2022-3/18/2022.  He tolerated radiation therapy very well with minimal side effect.      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  He received stereotactic radiosurgery with a total dose of 3275 cGy in 5 treatments given from 4/12/2022- 4/22/2022.  The patient tolerated ration therapy very well  with minimal side effect.     The patient has been stable since completion of radiation therapy.    His pain has been stable and denies any new symptoms at the time of evaluation.  He is here for routine post therapy office follow-up for his lung cancer.    Medications were reviewed and are up to date on EPIC.    The following portions of the patient's history were reviewed and updated as appropriate: allergies, current medications, past family history, past medical history, past social history, past surgical history and problem list.    Review of Systems:      General  Constitutional  Constitutional (WDL): All constitutional elements are within defined limits  EENT  Eye Disorders  Eye Disorder (WDL): All eye disorder elements are within defined limits (wears glasses)  Ear Disorders  Ear Disorder (WDL): All ear disorder elements are within defined limits  Respiratory  Respiratory  Respiratory (WDL): Exceptions to WDL  Cough: Mild symptoms OR nonprescription intervention indicated (occasional dry cough)  Cardiovascular  Cardiovascular  Cardiovascular (WDL): All cardiovascular elements are within defined limits (no pacemaker)  Gastrointestinal  Gastrointestinal  Gastrointestinal (WDL): Exceptions to WDL  Dysphagia: Symptomatic, able to eat regular diet (occasional after jaw surgery)  Musculoskeletal  Musculoskeletal and Connective Tissue Disorders  Musculoskeletal & Connective (WDL): Exceptions to WDL  Arthralgia: Mild pain (back, shoulder)  Bone Pain: Mild pain (back, shulder)  Myalgia: Mild pain (neuropathy in feet and left side occasionally)  Integumentary  Integumentary  Integumentary (WDL): All integumentary elements are within defined limits  Neurological  Neurosensory  Neurosensory (WDL): Exceptions to WDL  Peripheral Motor Neuropathy: Asymptomatic OR clinical or diagnostic observations only (feet)  Ataxia: Asymptomatic OR clinical or diagnostic observations only OR intervention not indicated  Peripheral  Sensory Neuropathy: Asymptomatic (feet)  Genitourinary/Reproductive  Genitourinary  Genitourinary (WDL): Exceptions to WDL  Urinary Frequency: Present (nocturia x2)  Lymphatic  Lymph System Disorders  Lymph (WDL): All lymph elements are within defined limits  Pain  Pain Score: No Pain (0)  AUA Assessment                                                              Accompanied by  Accompanied By: family (daughter)    Objective:      PHYSICAL EXAMINATION:    BP (!) 144/77 (BP Location: Right arm, Patient Position: Sitting, Cuff Size: Adult Regular)   Pulse 51   Temp 97.8  F (36.6  C) (Oral)   Resp 20   Wt 91.2 kg (201 lb)   SpO2 98%   BMI 28.03 kg/m      Gen: Alert, in NAD  Eyes: PERRL, EOMI, sclera anicteric  Neck: Supple, full ROM, no LAD  Pulm: No wheezing, stridor or respiratory distress  CV: Well-perfused, no cyanosis, no pedal edema  Back: No step-offs or pain to palpation along the thoracolumbar spine  Rectal: Deferred  : Deferred  Musculoskeletal: Normal muscle bulk and tone  Skin: Normal color and turgor  Neurologic: A/Ox3, CN II-XII intact, normal gait and station  Psychiatric: Appropriate mood and affect     Imaging: Imaging results 30 days: CT Chest w/o Contrast    Result Date: 4/18/2023  EXAM: CT CHEST W/O CONTRAST LOCATION: [facility] DATE/TIME: 4/17/2023 1:56 PM CDT INDICATION: Lung cancer, current or r o recurrence; Lung cancer surveillance; No known automatically detected potential contraindications to imaging COMPARISON: CT of the chest 12/19/2022 TECHNIQUE: CT chest without IV contrast. Multiplanar reformats were obtained. Dose reduction techniques were used. CONTRAST: None. FINDINGS: LUNGS AND PLEURA: Metallic fiducial in the left upper lobe lateral to the left hilum. Surrounding solid opacity with irregular and angulated borders is unchanged consistent with radiation fibrosis. Wedge resection staple line with adjacent cicatrization medial right upper lobe and  additional staple line along the inferior major fissure in the right middle lobe. No new airspace opacities or lung nodules. No pleural effusions. MEDIASTINUM: Degenerative calcification of the aortic root/valve leaflets. Fusiform enlargement of the mid ascending aorta which measures up to 4.5 cm in diameter. Calcified atheroma in the arch, proximal great vessels and descending thoracic aorta. Cardiac chambers are not enlarged. No pericardial effusion. No enlarged mediastinal or hilar lymph nodes. Esophagus is decompressed. CORONARY ARTERY CALCIFICATION: Severe. UPPER ABDOMEN: Upper portion of the stent graft in the infrarenal abdominal aorta noted. No new findings in the imaged upper abdomen. MUSCULOSKELETAL: A anterior wedge compression deformity of T7 is increased from December 2022 with vacuum disc phenomenon above and below the fracture deformity there are multiple bilateral healed rib fracture deformities. No acute rib fractures.     IMPRESSION: 1.  Unchanged focal opacity around a metallic fiducial in the left upper lobe lateral to the hilum consistent with radiation fibrosis. Unchanged right lung wedge resections. No findings to suggest recurrent malignancy in the chest. 2.  Fusiform dilation mid ascending aorta measuring up to 4.5 cm diameter. 3.  T7 compression deformity is increased from December 2022.     Prostate Specific Antigen Screen   Date Value Ref Range Status   05/08/2021 34.0 (H) 0.0 - 6.5 ng/mL Final   03/09/2021 23.5 (H) < OR = 4.0 ng/mL Final     Comment:     The total PSA value from this assay system is   standardized against the WHO standard. The test   result will be approximately 20% lower when compared   to the equimolar-standardized total PSA ([place]). Comparison of serial PSA results should be   interpreted with this fact in mind.     This test was performed using the Siemens   chemiluminescent method. Values obtained from   different assay methods cannot be  used  interchangeably. PSA levels, regardless of  value, should not be interpreted as absolute  evidence of the presence or absence of disease.  Lab test performed by:  Lab Mnemonic:  [facility]  [address] [place] [place] [address]  Nando Nunez M.D.  QUEST Specimen received date and time: 09-MAR-2021 12:46:00.00     05/06/2019 15.8 (H) < OR = 4.0 ng/mL Final     Comment:     The total PSA value from this assay system is   standardized against the WHO standard. The test   result will be approximately 20% lower when compared   to the equimolar-standardized total PSA ([place]). Comparison of serial PSA results should be   interpreted with this fact in mind.     This test was performed using the Siemens   chemiluminescent method. Values obtained from   different assay methods cannot be used  interchangeably. PSA levels, regardless of  value, should not be interpreted as absolute  evidence of the presence or absence of disease.  Lab test performed by:  Lab Mnemonic:  [facility]  [address] [place] [address]  Nando Nunez M.D.  QUEST Specimen received date and time: 06-MAY-2019 08:07:00.00     10/15/2018 10.8 (H) < OR = 4.0 ng/mL Final     Comment:     The total PSA value from this assay system is   standardized against the WHO standard. The test   result will be approximately 20% lower when compared   to the equimolar-standardized total PSA ([place]). Comparison of serial PSA results should be   interpreted with this fact in mind.     This test was performed using the Siemens   chemiluminescent method. Values obtained from   different assay methods cannot be used  interchangeably. PSA levels, regardless of  value, should not be interpreted as absolute  evidence of the presence or absence of disease.  Lab test performed by:  Lab Mnemonic:  [facility]  [address] [place] [address]  Nando Nunez  M.D.  QUEST Specimen received date and time: 15-OCT-2018 17:14:00.00     03/29/2018 13.0 (H) < OR = 4.0 ng/mL Final     Comment:     The total PSA value from this assay system is   standardized against the WHO standard. The test   result will be approximately 20% lower when compared   to the equimolar-standardized total PSA ([place]). Comparison of serial PSA results should be   interpreted with this fact in mind.     This test was performed using the Siemens   chemiluminescent method. Values obtained from   different assay methods cannot be used  interchangeably. PSA levels, regardless of  value, should not be interpreted as absolute  evidence of the presence or absence of disease.  Lab test performed by:  Lab Mnemonic:  [facility]  [address] [place] [address]  Nando Nunez M.D.  QUEST Specimen received date and time: 29-MAR-2018 16:01:00.00     09/11/2017 11.9 (H) < OR = 4.0 ng/mL Final     Comment:     The total PSA value from this assay system is   standardized against the WHO standard. The test   result will be approximately 20% lower when compared   to the equimolar-standardized total PSA ([place]). Comparison of serial PSA results should be   interpreted with this fact in mind.     This test was performed using the Siemens   chemiluminescent method. Values obtained from   different assay methods cannot be used  interchangeably. PSA levels, regardless of  value, should not be interpreted as absolute  evidence of the presence or absence of disease.  Lab test performed by:  Lab Mnemonic:  [facility]  [address] [place] [address]  Nando Nunez M.D.  QUEST Specimen received date and time: 11-SEP-2017 08:29:00.00     07/15/2016 10.4 (H) < OR = 4.0 ng/mL Final     Comment:        This test was performed using the Siemens  chemiluminescent method. Values obtained from  different assay methods cannot be  used  interchangeably. PSA levels, regardless of  value, should not be interpreted as absolute  evidence of the presence or absence of disease.     Lab test performed by:  Lab Mnemonic:  [facility] [place]  [address] [place] [address]  Nando Nunez M.D.     02/09/2016 7.4 (H) < OR = 4.0 ng/mL Final     Comment:        This test was performed using the Siemens  chemiluminescent method. Values obtained from  different assay methods cannot be used  interchangeably. PSA levels, regardless of  value, should not be interpreted as absolute  evidence of the presence or absence of disease.     Lab test performed by:  Lab Mnemonic:  [facility] [place]  [address] [place] [address]  Nando Nunez M.D.     04/22/2015 7.2 (H) < OR = 4.0 ng/mL Final     Comment:        This test was performed using the Siemens  chemiluminescent method. Values obtained from  different assay methods cannot be used  interchangeably. PSA levels, regardless of  value, should not be interpreted as absolute  evidence of the presence or absence of disease.       Lab test performed by:  Lab Mnemonic:  [facility] [place]  [address] [place] [address]  Nando Nunez M.D.     03/19/2014 6.8 (H) < OR = 4.0 ng/mL Final     Comment:        This test was performed using the Siemens  chemiluminescent method. Values obtained from  different assay methods cannot be used  interchangeably. PSA levels, regardless of  value, should not be interpreted as absolute  evidence of the presence or absence of disease.       Lab test performed by:  Lab Mnemonic:[facility]  [address] [place] [address]  Nando Nunez M.D.     03/14/2013 6.1 (H) < OR = 4.0 ng/mL Final     Comment:        This test was performed using the Siemens  chemiluminescent method. Values obtained from  different assay methods cannot be used  interchangeably. PSA levels, regardless  of  value, should not be interpreted as absolute  evidence of the presence or absence of disease.       Lab test performed by:  Lab Mnemonic:[facility]  [address] [place] [address]  Nando Nunez M.D.     03/06/2012 5.5 (H) < OR = 4.0 ng/mL Final     Comment:        This test was performed using the Siemens  chemiluminescent method. Values obtained from  different assay methods cannot be used  interchangeably. PSA levels, regardless of  value, should not be interpreted as absolute  evidence of the presence or absence of disease.       Lab test performed by:  Lab Mnemonic:[facility]  [address] [place] [address]  Nando Nunez M.D.     06/10/2011 4.7 (H) < OR = 4.0 ng/mL Final     Comment:        This test was performed using the Siemens  chemiluminescent method. Values obtained from  different assay methods cannot be used  interchangeably. PSA levels, regardless of  value, should not be interpreted as absolute  evidence of the presence or absence of disease.       Lab test performed by:  Lab Mnemonic:[facility]  [address] [place] [address]  Nando Nunez M.D.       PSA Tumor Marker   Date Value Ref Range Status   12/19/2022 3.00 0.00 - 6.50 ng/mL Final   09/19/2022 23.50 (H) 0.00 - 6.50 ng/mL Final   06/21/2022 21.81 (H) 0.00 - 6.50 ug/L Final   03/31/2022 18.25 (H) 0.00 - 6.50 ug/L Final   12/22/2021 12.47 (H) 0.00 - 6.50 ug/L Final      Impression     The patient is a 77-year-old gentleman with multiple history of cancer in the past and a new finding of left upper lobe lung cancer.  The restaging PET CT scan showed new 1.1 cm FDG avid left upper lobe nodule consistent with malignant lung tumor and a sclerotic hypermetabolic metastasis within the T7 vertebral body.The patient received stereotactic radiosurgery for T7 spine 9-month ago and SBRT for lung cancer 12 months ago with restaging CT scan  showed good response and no evidence of recurrence.    Assessment & Plan:     1.  I have personally reviewed his most recent CT scan and compared to the previous CT scan today.  The patient had a good response of his lung cancer there is no evidence of lung cancer recurrence.  The MRI scan was not done and we will reschedule in the near future. The possibility having patient to see pain specialist or consideration of vertebroplasty has been discussed with the patient.  His pain is stable and patient only taking Tylenol with good relief.  He we shall continue clinical observation at this time.  The patient is therefore scheduled to return to radiation oncology in 4 months with restaging CT chest abdomen pelvics.    2.  The patient had a good response for prostate cancer after hormonal therapy with most recent PSA measures 3.0 on 12/19/2022.  Continue follow-up for his prostate cancer which Dr. Wallace radiation medical oncology as planned.     3.  Recommend patient to continue follow-up with Dr. Alon Nunez, ENT for his head neck cancer surveillance.     4.  Continue follow-up with Dr. Shade Boyd, PCP for other medical needs.     Face to face time  30 minutes with > 80% spent on consultation, education and coordination of care.       Mariana Batista MD  Department of Radiation Oncology   [facility]  Tel: [phone]  Page: [phone]    [facility]  [address] [place] [address]     [address]   [place] [address]    CC:  Patient Care Team:  Shade Boyd MD as PCP - General (Internal Medicine)  Mariana Batista MD as MD (Hematology & Oncology)  Faviola Cottrell [facility] as Pharmacist (Pharmacist)  Mariana Batista MD as Assigned Cancer Care Provider  Shade Boyd MD as Assigned PCP  Rosas Carr MD as MD (Neurology)  Nilton Gtz MD as Assigned Musculoskeletal Provider  David Castrejon MD (Internal Medicine)  Faviola Cottrell RP as Assigned MTM  "Pharmacist  Andrew Wallace MD as MD (Hematology)  Kwesi Roldan MD as Assigned Surgical Provider    Oncology Rooming Note    April 19, 2023 12:55 PM   Lenny Chau is a 77 year old male who presents for:    Chief Complaint   Patient presents with     Oncology Clinic Visit     Follow up with Dr Batista     Initial Vitals: BP (!) 144/77 (BP Location: Right arm, Patient Position: Sitting, Cuff Size: Adult Regular)   Pulse 51   Temp 97.8  F (36.6  C) (Oral)   Resp 20   Wt 91.2 kg (201 lb)   SpO2 98%   BMI 28.03 kg/m   Estimated body mass index is 28.03 kg/m  as calculated from the following:    Height as of 2/13/23: 1.803 m (5' 11\").    Weight as of this encounter: 91.2 kg (201 lb). Body surface area is 2.14 meters squared.  No Pain (0) Comment: Data Unavailable   No LMP for male patient.  Allergies reviewed: Yes  Medications reviewed: Yes    Medications: Medication refills not needed today.  Pharmacy name entered into [employer]: [id number] [place] [address]    Clinical concerns: Patient here ambulatory accompanied by daughter for follow-up status post radiation for his metastatic lung and prostate cancer.  Patient continues to have some balance issues but has not fallen in the past year.  Patient only has very intermittent pain no pain today in clinic.  CT scan done earlier this week but patient was not able to tolerate sitting through MRI right after.  MRI scan will have to be rescheduled.  Here today for results on CT scan. Dr. Batista was notified.      Anne-Marie Durbin RN                  Again, thank you for allowing me to participate in the care of your patient.        Sincerely,        Mariana Batista MD    "

## 2023-04-21 NOTE — PROGRESS NOTES
Patient:   CELIA LUTZ            MRN: [id number]            FIN: [id number]               Age:   74 years     Sex:  Male     :  1945   Associated Diagnoses:   CKD (chronic kidney disease) stage 3, GFR 30-59 ml/min; COPD (chronic obstructive pulmonary disease) with acute bronchitis; HTN (Hypertension)   Author:   Yong Boyd MD      Visit Information      Date of Service: 2020 08:03 am  Performing Location: [facility]  Encounter#: [id number]      Primary Care Provider (PCP):  Yong Boyd MD    NPI# [id number]      Referring Provider:  Yong Boyd MD, NPI# [id number]   Visit type:  Telephone Encounter.    Source of history:  Patient.    Location of patient:  Home  Call Start Time:   1300  Call End Time:    _1320      Chief Complaint   2020 12:58 PM CDT    f/u CKD and labs. Verbal consent given for telephone visit.     _      History of Present Illness   Today's visit was conducted via telephone due to the COVID-19 pandemic. Patient's consent to telephone visit was obtained and documented.      Reason for visit:    I communicated with Mitchell via teleconference for follow-up related to his chronic conditions.  Was recently evaluated by cardiology who feels like things are holding relatively stable.  Did have nuclear stress testing in April of this year which which reportedly did not show any reversible changes.  No plans for any cardiac interventions at this time.  He is tolerating dual antiplatelet therapy well.  Describes limitations with his breathing and feels more dyspneic with activity.  Questions whether his Advair inhaler is doing much good.  He has been more sedentary with coronavirus pandemic and has gained approximately 10 pounds which she feels is due to limited access to his fitness center.  We did review his recent labs with stable kidney function compared to his baseline.  This function is substantially improved from his acute kidney injury episode earlier in the year.          Review of Systems   Constitutional:  No fever, No chills.    Respiratory:  Shortness of breath, No cough.    Cardiovascular:  Chest pain.    Gastrointestinal:  No nausea, No vomiting.    Musculoskeletal:  Back pain, Joint pain.       Impression and Plan   Diagnosis     CKD (chronic kidney disease) stage 3, GFR 30-59 ml/min (POQ79-CV N18.3).     COPD (chronic obstructive pulmonary disease) with acute bronchitis (EGR97-OZ J44.1).     HTN (Hypertension) (KJR37-BL I10).        Health Status   Allergies:    Allergic Reactions (Selected)  No Known Medication Allergies   Medications:  (Selected)   Prescriptions  Prescribed  Advair Diskus 500 mcg-50 mcg inhalation powder: 1 puff(s), Inhale, bid, # 60 EA, 2 Refill(s), Type: Maintenance, Pharmacy: [facility], 1 puff(s) Inhale bid,x30 day(s)  Ventolin HFA 90 mcg/inh inhalation aerosol: 2 puff(s), NEB, q4 hrs, PRN: AS NEEDED FOR COUGH OR SHORTNESS OF BREATH, # 3 EA, 0 Refill(s), Type: Maintenance, Pharmacy: [facility]  spacer for inhaler: spacer for inhaler, See Instructions, Instructions: use with albuterol inhaler, Supply, # 1 EA, 0 Refill(s), Type: Maintenance, Pharmacy: [facility], use with albuterol inhaler  Documented Medications  Documented  Advil PM: 2 tab(s), po, hs, PRN: as needed for insomnia, 0 Refill(s), Type: Maintenance  Crestor 40 mg oral tablet: = 1 tab(s) ( 40 mg ), Oral, daily, 0 Refill(s), Type: Maintenance  Multiple Vitamins oral tablet: 1 tab(s), po, daily, 0 Refill(s), Type: Maintenance  Vitamin B12 500 mcg oral tablet: 1 tab(s) ( 500 mcg ), po, daily, 0 Refill(s), Type: Maintenance  aspirin 81 mg oral tablet, chewable: = 1 tab(s) ( 81 mg ), Chewed, daily, 0 Refill(s), Type: Maintenance  clopidogrel 75 mg oral tablet: = 1 tab(s) ( 75 mg ), Oral, daily, 0 Refill(s), Type: Maintenance  magnesium oxide 250 mg oral tablet: 1 tab(s) ( 250 mg ), po, daily, 0 Refill(s), Type: Maintenance  metoprolol succinate 25 mg oral capsule,  extended release: = 1 cap(s) ( 25 mg ), Oral, daily, 0 Refill(s), Type: Maintenance  omeprazole 20 mg oral delayed release capsule: = 1 cap(s) ( 20 mg ), Oral, daily, # 90 cap(s), 0 Refill(s), Type: Maintenance,    Medications          *denotes recorded medication          spacer for inhaler: See Instructions, use with albuterol inhaler, 1 EA, 0 Refill(s).          Ventolin HFA 90 mcg/inh inhalation aerosol: 2 puff(s), NEB, q4 hrs, PRN: AS NEEDED FOR COUGH OR SHORTNESS OF BREATH, 3 EA, 0 Refill(s).          *aspirin 81 mg oral tablet, chewable: 81 mg, 1 tab(s), Chewed, daily, 0 Refill(s).          *clopidogrel 75 mg oral tablet: 75 mg, 1 tab(s), Oral, daily, 0 Refill(s).          *Vitamin B12 500 mcg oral tablet: 500 mcg, 1 tab(s), po, daily, 0 Refill(s).          *Advil PM: 2 tab(s), po, hs, PRN: as needed for insomnia, 0 Refill(s).          Advair Diskus 500 mcg-50 mcg inhalation powder: 1 puff(s), Inhale, bid, for 30 day(s), 60 EA, 2 Refill(s).          *magnesium oxide 250 mg oral tablet: 250 mg, 1 tab(s), po, daily, 0 Refill(s).          *metoprolol succinate 25 mg oral capsule, extended release: 25 mg, 1 cap(s), Oral, daily, 0 Refill(s).          *Multiple Vitamins oral tablet: 1 tab(s), po, daily, 0 Refill(s).          *omeprazole 20 mg oral delayed release capsule: 20 mg, 1 cap(s), Oral, daily, 90 cap(s), 0 Refill(s).          *Crestor 40 mg oral tablet: 40 mg, 1 tab(s), Oral, daily, 0 Refill(s).       Problem list:    All Problems  AAA (abdominal aortic aneurysm) / SNOMED CT 483273429 / Confirmed  Adenocarcinoma of lung / SNOMED CT 617220519 / Confirmed  Ragsdale Esophagus / SNOMED CT 7063378651 / Confirmed  CKD (chronic kidney disease) stage 3, GFR 30-59 ml/min / SNOMED CT 5518851925 / Confirmed  COPD (chronic obstructive pulmonary disease) with acute bronchitis / SNOMED CT 42609796 / Confirmed  Closed hip fracture / SNOMED CT 102110632 / Confirmed  Closed fracture of trochanter of femur / SNOMED CT  2294089778 / Confirmed  Cardiac arrhythmia / SNOMED CT 41967134 / Confirmed  Coronary artery disease due to lipid rich plaque / SNOMED CT 5811071841 / Confirmed  Lipids abnormal / SNOMED CT 463893253 / Confirmed  HTN (Hypertension) / SNOMED CT 78200945 / Confirmed  Former Smoker / SNOMED CT [id number] / Confirmed  GERD (gastroesophageal reflux disease) / SNOMED CT 1176845378 / Confirmed  Anticoagulated / SNOMED CT 140485287 / Confirmed  History of oropharyngeal cancer / SNOMED CT 3281068992 / Confirmed  H/O prostate cancer / SNOMED CT 9753833926 / Confirmed  DJD (Degenerative Joint Disease) / SNOMED CT 8211081679 / Confirmed  H/O unilateral nephrectomy / SNOMED CT 759583150 / Confirmed      Histories   Past Medical History:    Active  AAA (abdominal aortic aneurysm) (671074955)  Coronary artery disease due to lipid rich plaque (7541779707)  GERD (gastroesophageal reflux disease) (1730843147)  Cardiac arrhythmia (19694323)  CKD (chronic kidney disease) stage 3, GFR 30-59 ml/min (4970035259)  Resolved  Inpatient stay (449002073): Onset on 1/3/2020 at 74 years.  Resolved on 1/4/2020 at 74 years.  Comments:  1/17/2020 CST 1:41 PM CST - Yoly Tillman  [facility], [place] - Chest pain, acute renal failure.  Inpatient stay ([id number]): Onset on 11/24/2019 at 74 years.  Resolved on 11/26/2019 at 74 years.  Comments:  12/10/2019 CST 1:46 PM CST - Jessi Eid  @[facility] - NSTEMI.  *Hospitalized@[place] - Septic hip vs hematoma: Onset on 7/3/2016 at 70 years.  Resolved on 7/7/2016 at 70 years.  History of sepsis (4687388811): Onset on 7/3/2016 at 70 years.  Resolved.  Comments:  7/25/2016 CDT 2:14 PM CDT - Alma Barfield  Severe; due to septic hip.    7/25/2016 CDT 3:37 PM CDT - Alma Barfield  Sepsis organ failure: Acute renal failure.  *Hospitalized@[facility] - Hip fracture: Onset on 6/21/2016 at 70 years.  Resolved on 6/24/2016 at 70 years.  *Hospitalized@[facility] - Atypical chest pain: Onset on  2015 at 69 years.  Resolved on 2015 at 69 years.  Prostate cancer (194128709):  Resolved.   Family History:    CA - Breast cancer  Sister (Alexa, )  Lupus  Sister (Rebecca)  Alcohol abuse  Sister (Alexa, )  Mother ()  SMA type III  Grandfather (M)  Obese  Sister (Sujey, )     Procedure history:    Coronary angiography ([id number]) on 2019 at 74 Years.  Comments:  12/10/2019 1:47 PM CST - Jessi Eid  and PCI of the right coronary artery.  Colonoscopy ([id number]) on 3/21/2017 at 71 Years.  Comments:  3/22/2017 2:33 PM CDT - Filippo Barbour MD  Indication: Adenomatous Polyps  Sedation: MAC  Findings: Adenomatous polyp cecum, hemorrohids  Rec: Repeat in 5 years  Right Hip Bipoloar Hemiarthroplasty on 2016 at 70 Years.  Comments:  2016 7:26 AM CDT - Debby Faustin CMA  Right displasced femoral neck fracture  right thoracotomy on 11/15/2015 at 70 Years.  Comments:  2015 9:07 AM CST - Debby Faustin CMA  wedge resection right upper lung nodule, wedge resection right middle lobe lung nodule, Mediastinal lymph node dissection  Colonoscopy ([id number]) on 2014 at 68 Years.  Comments:  5/15/2014 10:56 AM CDT - Livier Stallings RN  Sedation: midazolam, fentanyl  Indication: screen  5-6mm tubular adenom x3, 8mm tubular adenoma x1, 12mm tubular adenoma with advance adenoma due to size  Repeat in 3 years.  nepherectomy in the month of 3/2009 at 63 Years.  Lumbar discectomy in  at 61 Years.  Left salivary gland removal in  at 50 Years.  Oral surgery in  at 49 Years.  Aortic aneurysm, abdominal ([id number]).   Social History:        Alcohol Assessment            Current, 2 times per week, 3 drinks/episode average.      Tobacco Assessment            Past, 20 per day.  49 year(s).      Substance Abuse Assessment            Never      Employment and Education Assessment            Employed, Work/School description: Print shop  worker.      Home and Environment Assessment            Marital status: .  Spouse/Partner name: Darlene Chau.      Nutrition and Health Assessment            Type of diet: Regular.      Exercise and Physical Activity Assessment            Exercise frequency: Never.      Sexual Assessment            Sexually active: Yes.        Review / Management     .) CKD; baseline SCr 1.5-1.7;  h/o LEILA in 12/2019 with SCr rise to 6.0 since [facility] in late 11/2019; no RRT needed   - suspect likely contrast induced nephropathy (VITOR)  - lisinopril stopped during hospitalization - will reintroduce lisinopril at this point for bp  - heightened risk for future contrast induced nephropathy    .) NSTEMI with PCI to RCA with multi-vessel disease (non-obstructive LAD lesion); following with Dr. Valentine  - on DAPT   - Toprol XL 25mg daily  -rosuvastatin 40mg qhs    .) polyarthritis pains  - limited benefit with NSAIDs and APAP  s/p right MIRNA after hip fracture from fall (6/2016)   - normal DEXA (7/2015)  - doubt inflammatory arthritis; doubt polymyalgia rheumatica given no upper extremity involvement  - working with Ortho - lasting benefits from intra-articular injections    .) COPD; worsening dyspnea   - on Advair 115/21mcg BID - will increase to 500/50mcg BID   - if no significant improvement, will look at LAMA (potential MTM involvement)    .) hypertension; uncontrolled  current antihypertensive regimen: Toprol XL 25mg daily  regimen changes: adding back lisinopril 20mg daily  intolerance:  future titration/work-up plan:    - SBP <140 goal     RTC in 1 month for reassessment   Duration Of Freeze Thaw-Cycle (Seconds): 0 Consent: The patient's consent was obtained including but not limited to risks of crusting, scabbing, blistering, scarring, darker or lighter pigmentary change, recurrence, incomplete removal and infection. Render Note In Bullet Format When Appropriate: No Show Applicator Variable?: Yes Post-Care Instructions: I reviewed with the patient in detail post-care instructions. Patient is to wear sunprotection, and avoid picking at any of the treated lesions. Pt may apply Vaseline to crusted or scabbing areas. Application Tool (Optional): Liquid Nitrogen Sprayer Detail Level: Detailed

## 2023-04-26 NOTE — ASSESSMENT & PLAN NOTE
1/2023: Presenting with thoracic back pains and radiating chest wall pains - symptoms generally improved  - gabapentin to 300mg TID - can titrate up further based on symptoms

## 2023-04-26 NOTE — ASSESSMENT & PLAN NOTE
Lung cancer  s/p right wedge resection of limited stage adenocarcinoma of lung (11/2015)  - 1-3/2021 XRT to MELODY malignancy (PET avid)  - following with [facility] radiation oncology   - planned follow-up MRI of thoracic compression fracture in near future    - on Trelegy daily

## 2023-04-26 NOTE — ASSESSMENT & PLAN NOTE
Vaccinations up to date   - completed COVID bivalent booster and influenza  - discussed advance directive - has paperwork, not completed  -Declining health and mentation of his wife -he has assume more caretaking roles

## 2023-04-26 NOTE — PROGRESS NOTES
Assessment & Plan   Problem List Items Addressed This Visit        Nervous and Auditory    Coronary artery disease of native heart with stable angina pectoris, unspecified vessel or lesion type (H) - Primary    Relevant Medications    metoprolol succinate ER (TOPROL XL) 25 MG 24 hr tablet    Other Relevant Orders    Lipid panel reflex to direct LDL Non-fasting    Basic metabolic panel    Chronic midline low back pain with right-sided sciatica     - limited benefit with NSAIDs and APAP  H/o MIRNA after hip fracture from fall (6/2016)  - working with Ortho, Dr. Sainz - previous attempts at intra-articular right hip injection - no lasting benefit  - poor response to oxycodone  - MRI L-spine (4/2022): high grade spinal stenosis, with severe L4-5 bilateral foraminal stenosis   - using gabapentin 300mg TID - noticeable symptom improvement  - working with Ortho spine         Relevant Medications    gabapentin (NEURONTIN) 300 MG capsule       Respiratory     Lung cancer  s/p right wedge resection of limited stage adenocarcinoma of lung (11/2015)  - 1-3/2021 XRT to MELODY malignancy (PET avid)  - following with [facility] radiation oncology   - planned follow-up MRI of thoracic compression fracture in near future    - on Trelegy daily         COPD, group B, by GOLD 2017 classification (H)    Relevant Medications    albuterol (PROAIR HFA/PROVENTIL HFA/VENTOLIN HFA) 108 (90 Base) MCG/ACT inhaler    Fluticasone-Umeclidin-Vilanterol (TRELEGY ELLIPTA) 200-62.5-25 MCG/ACT oral inhaler       Digestive    Squamous cell carcinoma of mouth (H)     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; with h/o recurrence  - surgical resection by JVT (11/2020)         Ragsdale's esophagus    Relevant Medications    omeprazole (PRILOSEC) 20 MG DR capsule       Musculoskeletal and Integumentary    Compression fracture of T7 vertebra with routine healing     1/2023: Presenting with thoracic back pains and radiating chest wall pains - symptoms generally  "improved  - gabapentin to 300mg TID - can titrate up further based on symptoms            Urinary    Stage 3a chronic kidney disease (H)     - baseline SCr 1.7  - multiple LEILA episodes; h/o contrast induced nephropathy (VITOR)  previous multiple LEILA (prior h/o of LEILA in 1/2020, 10/2020); no previous dialysis needs  - lisinopril indefinitely on hold  - heightened risk for future contrast induced nephropathy           Malignant neoplasm of prostate (H)     prostate Ca - on ADT   - original prostate biopsy in '11, repeat biopsy in '16; [place] [facility]   - watchful surveillance; q6 month PSA; progressively rising (9/2022: 23.5)   - MRI of prostate (2019): focal coarse calcifications in prostate; no evidence of extra-prostate extension  - begun on GnRH therapy - tolerating well            Other    Health care maintenance     Vaccinations up to date   - completed COVID bivalent booster and influenza  - discussed advance directive - has paperwork, not completed  -Declining health and mentation of his wife -he has assume more caretaking roles         Relevant Orders    REVIEW OF HEALTH MAINTENANCE PROTOCOL ORDERS (Completed)    History of alcoholism (H)   Other Visit Diagnoses     Hypertension, unspecified type        Relevant Medications    metoprolol succinate ER (TOPROL XL) 25 MG 24 hr tablet    Peripheral polyneuropathy        Relevant Medications    gabapentin (NEURONTIN) 300 MG capsule    Chronic obstructive pulmonary disease, unspecified COPD type (H)        Relevant Medications    albuterol (PROAIR HFA/PROVENTIL HFA/VENTOLIN HFA) 108 (90 Base) MCG/ACT inhaler    Fluticasone-Umeclidin-Vilanterol (TRELEGY ELLIPTA) 200-62.5-25 MCG/ACT oral inhaler              BMI:   Estimated body mass index is 28.17 kg/m  as calculated from the following:    Height as of 2/13/23: 1.803 m (5' 11\").    Weight as of this encounter: 91.6 kg (202 lb).       Shade Boyd MD  [facility]    Subjective   Mitchell is a 77 " year old, presenting for the following health issues:  Presents for general follow-up.  Has close management with multiple specialties.  Recently seen by radiation oncology with stable overall findings.  Stable breathing on Trelegy maintenance therapy.  Denies any anginal complaints.  Remains sober.  Describes declining health of his wife.  Describes more memory cognitive issues.  She is on home oxygen as well.  Family has been advocating for assisted living which Mitchell sounds generally resistant to.  Continued chest wall pains mid thoracic back pains.  Finds gabapentin helps.  Kidney Problem (CKD) and Neurologic Problem        4/26/2023    12:43 PM   Additional Questions   Roomed by Keke BOOTH     Kidney Problem    Neurologic Problem    History of Present Illness       COPD:  He presents for follow up of COPD.  Overall, COPD symptoms are better since last visit. He has same as usual fatigue or shortness of breath with exertion and same as usual shortness of breath at rest.  He sometimes coughs and does not have change in sputum. No recent fever. He can walk 2-5 blocks without stopping to rest. He can walk 1 flights of stairs without resting.The patient has had no ED, urgent care, or hospital admissions because of COPD since the last visit.     Reason for visit:  Copd    He eats 2-3 servings of fruits and vegetables daily.He exercises with enough effort to increase his heart rate 10 to 19 minutes per day.    He is taking medications regularly.             Review of Systems   Constitutional, HEENT, cardiovascular, pulmonary, gi and gu systems are negative, except as otherwise noted.      Objective    /78   Pulse 63   Temp 97.5  F (36.4  C)   Resp 15   Wt 91.6 kg (202 lb)   SpO2 97%   BMI 28.17 kg/m    Body mass index is 28.17 kg/m .  Physical Exam   GENERAL: healthy, alert and no distress  NECK: no adenopathy, no asymmetry, masses, or scars and thyroid normal to palpation  RESP: lungs clear to  auscultation - no rales, rhonchi or wheezes  CV: regular rate and rhythm, normal S1 S2, no S3 or S4, no murmur, click or rub, no peripheral edema and peripheral pulses strong  ABDOMEN: soft, nontender, no hepatosplenomegaly, no masses and bowel sounds normal  MS: no gross musculoskeletal defects noted, no edema

## 2023-04-26 NOTE — ASSESSMENT & PLAN NOTE
- limited benefit with NSAIDs and APAP  H/o MIRNA after hip fracture from fall (6/2016)  - working with Ortho, Dr. Sainz - previous attempts at intra-articular right hip injection - no lasting benefit  - poor response to oxycodone  - MRI L-spine (4/2022): high grade spinal stenosis, with severe L4-5 bilateral foraminal stenosis   - using gabapentin 300mg TID - noticeable symptom improvement  - working with Ortho spine

## 2023-05-01 NOTE — PATIENT INSTRUCTIONS
Recent Results (from the past 24 hour(s))   Comprehensive metabolic panel (BMP + Alb, Alk Phos, ALT, AST, Total. Bili, TP)    Collection Time: 05/01/23  1:12 PM   Result Value Ref Range    Sodium 141 136 - 145 mmol/L    Potassium 5.0 3.5 - 5.0 mmol/L    Chloride 104 98 - 107 mmol/L    Carbon Dioxide (CO2) 25 22 - 31 mmol/L    Anion Gap 12 5 - 18 mmol/L    Urea Nitrogen 29 (H) 8 - 28 mg/dL    Creatinine 1.51 (H) 0.70 - 1.30 mg/dL    Calcium 9.5 8.5 - 10.5 mg/dL    Glucose 100 70 - 125 mg/dL    Alkaline Phosphatase 120 45 - 120 U/L    AST 24 0 - 40 U/L    ALT 17 0 - 45 U/L    Protein Total 6.9 6.0 - 8.0 g/dL    Albumin 3.5 3.5 - 5.0 g/dL    Bilirubin Total 0.6 0.0 - 1.0 mg/dL    GFR Estimate 47 (L) >60 mL/min/1.73m2   CBC with platelets and differential    Collection Time: 05/01/23  1:12 PM   Result Value Ref Range    WBC Count 7.0 4.0 - 11.0 10e3/uL    RBC Count 4.53 4.40 - 5.90 10e6/uL    Hemoglobin 13.9 13.3 - 17.7 g/dL    Hematocrit 41.3 40.0 - 53.0 %    MCV 91 78 - 100 fL    MCH 30.7 26.5 - 33.0 pg    MCHC 33.7 31.5 - 36.5 g/dL    RDW 12.4 10.0 - 15.0 %    Platelet Count 179 150 - 450 10e3/uL    % Neutrophils 64 %    % Lymphocytes 17 %    % Monocytes 9 %    % Eosinophils 9 %    % Basophils 1 %    % Immature Granulocytes 0 %    NRBCs per 100 WBC 0 <1 /100    Absolute Neutrophils 4.5 1.6 - 8.3 10e3/uL    Absolute Lymphocytes 1.2 0.8 - 5.3 10e3/uL    Absolute Monocytes 0.6 0.0 - 1.3 10e3/uL    Absolute Eosinophils 0.7 0.0 - 0.7 10e3/uL    Absolute Basophils 0.1 0.0 - 0.2 10e3/uL    Absolute Immature Granulocytes 0.0 <=0.4 10e3/uL    Absolute NRBCs 0.0 10e3/uL

## 2023-05-01 NOTE — PROGRESS NOTES
"[facility] Hematology and Oncology Progress Note    Patient: Lenny Chau  MRN: [id number]  Date of Service: May 1, 2023         Reason for Visit:    Scheduled f/up.    Assessment and Plan:    1.  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  History of multiple cancers, alcohol and tobacco abuse, COPD, coronary artery disease, hyperlipidemia, hypertension, 3 CKD, DJD, AAA, s/p 2011 endovascular stent.    Mitchell presents alone.  He looks and states he feels very stable.  He's very proud of the fact that he basically discontinued alcohol in November 2021 ... states he rarely has \"a beer\" when goes out for dinner.  Stopped smoking in 2011.  Notes mild hot flashes since starting Lupron, does not feel needs intervention.  Appetite good - weight stable the past 6 months.  He denies bone pain, cough, fever, chills, unusual headaches, visual or mentation disturbance, bowel or bladder issues, rash.    PSA - pending (prior 3.0 ... 23.5 at start of Lupron therapy).    CMP - stable CKD-G3a.  Otherwise basically WNL.    CBC - WNL.    Asymptomatic.  Excellent hematologies.  Acceptable metabolic panel.  Tolerating Leuprolide therapy acceptably well.    Continue Leuprolide 45 mg IM every 6 months.    6-month f/up with CBC, CMP, and PSA in anticipation ongoing leuprolide therapy.     2.   Recurrent right buccal squamous cell carcinoma ... followed by Dr. Kwesi Roldan.      02/13/23 f/up - \"Mr. Chau's oral exam looks stable today.  I would like to see him back in about six months for routine followup\".    3. History of metachronous bilateral primary lung neoplasms, s/p March-April, 2022 SBRT and SRS to lung and T7 spine lesion ... follows with Dr Batista.    04/17/23 f/up with CT chest - \"The patient had a good response of his lung cancer there is no evidence of lung cancer recurrence.  The MRI scan was not done and we will reschedule in " "the near future. The possibility having patient to see pain specialist or consideration of vertebroplasty has been discussed with the patient.  His pain is stable and patient only taking Tylenol with good relief.  He we shall continue clinical observation at this time.  The patient is therefore scheduled to return to radiation oncology in 4 months with restaging CT chest abdomen pelvics\".    08/22/2023 - f/up Dr Batista with imaging.    Prostatic Adenocarcinoma History:      2011, Ken grade 3+3, cT1a prostatic adenocarcinoma with a PSA of 4.7 was diagnosed and observed.     2016, July PSA 10.4 with repeat biopsy revealing a Ken grade 3+3, cT1c lesion which continued to be observed.    With various PSA methods, PSA 23.5 on 3/9/2021, 34 on 5/8/2021, 12.47 on 12/22/2020, 18.25 on 3/31/2022, 21.81 on 6/21/2020, and finally 23.5 on 9/19/2022.     10/28/2022 NM bone scan revealed uptake only in the known T7 vertebral body metastases.      10/28/2022 MR scan of the prostate revealed suspicious abnormality of the left base and mid gland peripheral zone with extraprostatic extension into the left seminal vesicle.  No suspicious adenopathy or evidence of pelvic metastases were seen.     11/01/2022 - started leuprolide (Eligard), 45 mg, therapy.     Additional Oncology History (per Dr Wallace's 11/01/2022 note):    August, 1994 resection floor of mouth carcinoma     February, 2009 right retromolar trigone SCC treated with surgery and 7,020 cGy/39 until 6/29/2009. September, 2009 surgery for localized recurrence.     March, 2009 left nephrectomy for a hemangioma     November, 2015 right RUL/RML wedge resection for a stage I, (pN0) lung carcinoma     November, 2020 surgical resection by Dr. Alon Nunez for a left oral (buccal, base of tongue) SCC     January, 2021 SBRT 5,000 cGy/5 until 3/10/2021 for a left upper lobe cT1 cN0 moderately differentiated adenocarcinoma.     1/7/2022 PET revealing new 1.1 cm left upper lobe nodule " and T7 sclerotic bone lesion presumed to be recurrence treated with SBRT 5,000 cGy/5 until 3/18/2022 to the lung lesion and SRS 3,000 cGy/5 until 4/12/2022 to the spine lesion.     2/2022 ENT evaluation by Dr. Sim Jaffe for a new right buccal lesion with negative biopsies. On 10/10/2022, he was seen again by ENT, Dr. Roldan with biopsy revealing hyperplasia and follow-up scheduled for February, 2023.     ECOG Performance    2 - Ambulatory and independent in all ADLs; cannot work; up > 50% of the time    Pain:    Pain Score: Moderate Pain (4)  Pain Loc: Foot (neuropathy)    Encounter Diagnoses:    Problem List Items Addressed This Visit        Urinary    Malignant neoplasm of prostate (H)          Quoc Hylton, CNP     CC: Shade Boyd MD   ______________________________________________________________________________    Review of Systems:    No fever or night sweats.  No loss of weight.  No lumps or bumps anywhere.  No unusual headaches or eyesight issues.  No dizziness.  No bleeding from the nose.  No sores in the mouth. No problems with swallowing.  No chest pain. No shortness of breath. No cough.  No abdominal pain. No nausea or vomiting.  No diarrhea or constipation.  No blood in stool or black colored stools.  No problems passing urine.  No numbness or tingling in hands or feet.  No skin rashes.    A 14 point review of systems is otherwise negative.    Past History:    Past Medical History:   Diagnosis Date     Abdominal aortic aneurysm (AAA) (H) 03/30/2010    Formatting of this note might be different from the original. 5.6 cm infrarenal AAA, on 12/21/2010 CTA Formatting of this note might be different from the original. 5.6 cm infrarenal AAA, on 12/21/2010 CTA      Anemia      [place] esophagus      Cancer (H)     lung     CHF (congestive heart failure) (H)      Chronic kidney disease      COPD, group B, by GOLD 2017 classification (H)      Coronary artery disease of native heart with stable angina  pectoris, unspecified vessel or lesion type (H)      Degenerative joint disease      Head and neck cancer (H)      History of transfusion      Hyperlipidemia      Hypertension      Lung cancer (H)     s/p RUL RML wedge resection in 2016 by Dr. Carter Velazquez     Lung mass     MELODY FDG-avid 2.5cm     Myocardial infarction (H)     November 2019     Oral cancer (H)      Prostate cancer (H)      Septic hip (H) 02/10/2022     Shortness of breath     with exertion     Trochanteric fracture of right femur (H) 06/21/2016       Past Surgical History:   Procedure Laterality Date     ABDOMINAL AORTIC ANEURYSM REPAIR       ANGIOGRAPHY  11/25/2019    coronary     COLONOSCOPY      3/21/2017,5/12/2014     CORONARY STENT PLACEMENT  12/01/2019    x1     CT BIOPSY LUNG  01/21/2021     EGD      11/13/2020,7/26/2019     ENDOSCOPIC REMOVAL SALIVARY GLAND STONE  1995     ESOPHAGOSCOPY, GASTROSCOPY, DUODENOSCOPY (EGD), COMBINED N/A 07/26/2019    Procedure: ENDOSCOPIC ULTRASOUND FINE NEEDLE ASPIRATION, GASTRIC BIOPSIES OF POLYPS;  Surgeon: Quoc Edwards MD;  Location: [facility];  Service: Gastroenterology     EXCISE LESION INTRAORAL Left 11/06/2020    Procedure: 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 Need pathology for margins;  Surgeon: Alon Nunez MD;  Location: [facility];  Service: ENT     HEMIARTHROPLASTY HIP Right 06/22/2016     LUMBAR DISCECTOMY  2006     NEPHRECTOMY Left     for benign hemangioma     NEPHRECTOMY  03/2009     OTHER SURGICAL HISTORY Right 01/01/2015    wedge resectionLung cancer isolated pulmonary nodule     OTHER SURGICAL HISTORY      placement of stentfor AAA     OTHER SURGICAL HISTORY      right hip surgeryfor fracture     OTHER SURGICAL HISTORY  01/01/1994    Oral cancer resection     MN ESOPHAGOGASTRODUODENOSCOPY US SCOPE W/ADJ STRXRS N/A 11/13/2020    Procedure: ESOPHAGOGASTRODUODENOSCOPY,  ENDOSCOPIC ULTRASOUND;  Surgeon: Quoc Edwards MD;  Location: [facility]  "Main OR;  Service: Gastroenterology     SQUAMOUS CELL CARCINOMA EXCISION  11/06/2020     THORACOSCOPY  2016    wedge resection of lung     THORACOTOMY Right 11/15/2015     [facility] ORAL SURGERY PROCEDURE  1994     Physical Exam:    /81   Pulse 56   Resp 16   Ht 1.803 m (5' 11\")   Wt 90.8 kg (200 lb 1.6 oz)   SpO2 96%   BMI 27.91 kg/m      GENERAL:   Very pleasant.  Alert and oriented. Seated comfortably. In no distress.    HEENT:   TIEN, EOMI.  No pallor.  No icterus.    LYMPH NODES:  No palpable cervical, axillary or inguinal lymphadenopathy.    CHEST:   Lungs with decreased BS bilaterally.    CVS:    S1 and S2 heard. Regular rate and rhythm.  No murmur or gallop or rub heard.  No peripheral edema.    ABDOMEN:   Soft. Not tender. Not distended.  No palpable hepatomegaly or splenomegaly.    EXTREMITIES:  Warm.    SKIN:    No rash, bruising or purpura noted.    Lab Results:    Reviewed with patient.    Recent Results (from the past 24 hour(s))   Comprehensive metabolic panel (BMP + Alb, Alk Phos, ALT, AST, Total. Bili, TP)    Collection Time: 05/01/23  1:12 PM   Result Value Ref Range    Sodium 141 136 - 145 mmol/L    Potassium 5.0 3.5 - 5.0 mmol/L    Chloride 104 98 - 107 mmol/L    Carbon Dioxide (CO2) 25 22 - 31 mmol/L    Anion Gap 12 5 - 18 mmol/L    Urea Nitrogen 29 (H) 8 - 28 mg/dL    Creatinine 1.51 (H) 0.70 - 1.30 mg/dL    Calcium 9.5 8.5 - 10.5 mg/dL    Glucose 100 70 - 125 mg/dL    Alkaline Phosphatase 120 45 - 120 U/L    AST 24 0 - 40 U/L    ALT 17 0 - 45 U/L    Protein Total 6.9 6.0 - 8.0 g/dL    Albumin 3.5 3.5 - 5.0 g/dL    Bilirubin Total 0.6 0.0 - 1.0 mg/dL    GFR Estimate 47 (L) >60 mL/min/1.73m2   CBC with platelets and differential    Collection Time: 05/01/23  1:12 PM   Result Value Ref Range    WBC Count 7.0 4.0 - 11.0 10e3/uL    RBC Count 4.53 4.40 - 5.90 10e6/uL    Hemoglobin 13.9 13.3 - 17.7 g/dL    Hematocrit 41.3 40.0 - 53.0 %    MCV 91 78 - 100 fL    MCH 30.7 26.5 - 33.0 pg    MCHC " 33.7 31.5 - 36.5 g/dL    RDW 12.4 10.0 - 15.0 %    Platelet Count 179 150 - 450 10e3/uL    % Neutrophils 64 %    % Lymphocytes 17 %    % Monocytes 9 %    % Eosinophils 9 %    % Basophils 1 %    % Immature Granulocytes 0 %    NRBCs per 100 WBC 0 <1 /100    Absolute Neutrophils 4.5 1.6 - 8.3 10e3/uL    Absolute Lymphocytes 1.2 0.8 - 5.3 10e3/uL    Absolute Monocytes 0.6 0.0 - 1.3 10e3/uL    Absolute Eosinophils 0.7 0.0 - 0.7 10e3/uL    Absolute Basophils 0.1 0.0 - 0.2 10e3/uL    Absolute Immature Granulocytes 0.0 <=0.4 10e3/uL    Absolute NRBCs 0.0 10e3/uL     Imaging:    No new imaging.

## 2023-05-01 NOTE — PROGRESS NOTES
"Oncology Rooming Note    May 1, 2023 1:35 PM   Lenny Chau is a 77 year old male who presents for:    Chief Complaint   Patient presents with     Oncology Clinic Visit     Malignant neoplasm of prostate, Malignant neoplasm of upper lobe of left lung, Spine metastasis     Initial Vitals: /81   Pulse 56   Resp 16   Ht 1.803 m (5' 11\")   Wt 90.8 kg (200 lb 1.6 oz)   SpO2 96%   BMI 27.91 kg/m   Estimated body mass index is 27.91 kg/m  as calculated from the following:    Height as of this encounter: 1.803 m (5' 11\").    Weight as of this encounter: 90.8 kg (200 lb 1.6 oz). Body surface area is 2.13 meters squared.  Moderate Pain (4) Comment: Data Unavailable   No LMP for male patient.  Allergies reviewed: Yes  Medications reviewed: Yes    Medications: Medication refills not needed today.  Pharmacy name entered into [employer]: [id number] [place] [address]    Clinical concerns:  6 month follow up labs       Jackie Johnson            "

## 2023-05-01 NOTE — LETTER
"    5/1/2023         RE: Lenny Chau  [address]  [facility] [address]        Dear Colleague,    Thank you for referring your patient, Lenny Chau, to the [facility] [place]. Please see a copy of my visit note below.    Oncology Rooming Note    May 1, 2023 1:35 PM   Lenny Chau is a 77 year old male who presents for:    Chief Complaint   Patient presents with     Oncology Clinic Visit     Malignant neoplasm of prostate, Malignant neoplasm of upper lobe of left lung, Spine metastasis     Initial Vitals: /81   Pulse 56   Resp 16   Ht 1.803 m (5' 11\")   Wt 90.8 kg (200 lb 1.6 oz)   SpO2 96%   BMI 27.91 kg/m   Estimated body mass index is 27.91 kg/m  as calculated from the following:    Height as of this encounter: 1.803 m (5' 11\").    Weight as of this encounter: 90.8 kg (200 lb 1.6 oz). Body surface area is 2.13 meters squared.  Moderate Pain (4) Comment: Data Unavailable   No LMP for male patient.  Allergies reviewed: Yes  Medications reviewed: Yes    Medications: Medication refills not needed today.  Pharmacy name entered into [email or web address]: [id number] [place] [address]    Clinical concerns:  6 month follow up labs       Jackie Johnson              [facility] Hematology and Oncology Progress Note    Patient: Lenny Chau  MRN: [id number]  Date of Service: May 1, 2023         Reason for Visit:    Scheduled f/up.    Assessment and Plan:    1.  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  History of multiple cancers, alcohol and tobacco abuse, COPD, coronary artery disease, hyperlipidemia, hypertension, 3 CKD, DJD, AAA, s/p 2011 endovascular stent.    Mitchell presents alone.  He looks and states he feels very stable.  He's very proud of the fact that he basically discontinued alcohol in November 2021 ... states he rarely has \"a beer\" when goes out for " "dinner.  Stopped smoking in 2011.  Notes mild hot flashes since starting Lupron, does not feel needs intervention.  Appetite good - weight stable the past 6 months.  He denies bone pain, cough, fever, chills, unusual headaches, visual or mentation disturbance, bowel or bladder issues, rash.    PSA - pending (prior 3.0 ... 23.5 at start of Lupron therapy).    CMP - stable CKD-G3a.  Otherwise basically WNL.    CBC - WNL.    Asymptomatic.  Excellent hematologies.  Acceptable metabolic panel.  Tolerating Leuprolide therapy acceptably well.    Continue Leuprolide 45 mg IM every 6 months.    6-month f/up with CBC, CMP, and PSA in anticipation ongoing leuprolide therapy.     2.   Recurrent right buccal squamous cell carcinoma ... followed by Dr. Kwesi Roldan.      02/13/23 f/up - \"Mr. Chau's oral exam looks stable today.  I would like to see him back in about six months for routine followup\".    3. History of metachronous bilateral primary lung neoplasms, s/p March-April, 2022 SBRT and SRS to lung and T7 spine lesion ... follows with Dr Batista.    04/17/23 f/up with CT chest - \"The patient had a good response of his lung cancer there is no evidence of lung cancer recurrence.  The MRI scan was not done and we will reschedule in the near future. The possibility having patient to see pain specialist or consideration of vertebroplasty has been discussed with the patient.  His pain is stable and patient only taking Tylenol with good relief.  He we shall continue clinical observation at this time.  The patient is therefore scheduled to return to radiation oncology in 4 months with restaging CT chest abdomen pelvics\".    08/22/2023 - f/up Dr Batista with imaging.    Prostatic Adenocarcinoma History:      2011, [place] grade 3+3, cT1a prostatic adenocarcinoma with a PSA of 4.7 was diagnosed and observed.     2016, July PSA 10.4 with repeat biopsy revealing a [place] grade 3+3, cT1c lesion which continued to be observed.    With various " PSA methods, PSA 23.5 on 3/9/2021, 34 on 5/8/2021, 12.47 on 12/22/2020, 18.25 on 3/31/2022, 21.81 on 6/21/2020, and finally 23.5 on 9/19/2022.     10/28/2022 NM bone scan revealed uptake only in the known T7 vertebral body metastases.      10/28/2022 MR scan of the prostate revealed suspicious abnormality of the left base and mid gland peripheral zone with extraprostatic extension into the left seminal vesicle.  No suspicious adenopathy or evidence of pelvic metastases were seen.     11/01/2022 - started leuprolide (Eligard), 45 mg, therapy.     Additional Oncology History (per Dr Wallace's 11/01/2022 note):    August, 1994 resection floor of mouth carcinoma     February, 2009 right retromolar trigone SCC treated with surgery and 7,020 cGy/39 until 6/29/2009. September, 2009 surgery for localized recurrence.     March, 2009 left nephrectomy for a hemangioma     November, 2015 right RUL/RML wedge resection for a stage I, (pN0) lung carcinoma     November, 2020 surgical resection by Dr. Alon Nunez for a left oral (buccal, base of tongue) SCC     January, 2021 SBRT 5,000 cGy/5 until 3/10/2021 for a left upper lobe cT1 cN0 moderately differentiated adenocarcinoma.     1/7/2022 PET revealing new 1.1 cm left upper lobe nodule and T7 sclerotic bone lesion presumed to be recurrence treated with SBRT 5,000 cGy/5 until 3/18/2022 to the lung lesion and SRS 3,000 cGy/5 until 4/12/2022 to the spine lesion.     2/2022 ENT evaluation by Dr. Sim Jaffe for a new right buccal lesion with negative biopsies. On 10/10/2022, he was seen again by ENT, Dr. Roldan with biopsy revealing hyperplasia and follow-up scheduled for February, 2023.     ECOG Performance    2 - Ambulatory and independent in all ADLs; cannot work; up > 50% of the time    Pain:    Pain Score: Moderate Pain (4)  Pain Loc: Foot (neuropathy)    Encounter Diagnoses:    Problem List Items Addressed This Visit        Urinary    Malignant neoplasm of prostate (H)          Quoc  Thalacker, CNP     CC: Shade Boyd MD   ______________________________________________________________________________    Review of Systems:    No fever or night sweats.  No loss of weight.  No lumps or bumps anywhere.  No unusual headaches or eyesight issues.  No dizziness.  No bleeding from the nose.  No sores in the mouth. No problems with swallowing.  No chest pain. No shortness of breath. No cough.  No abdominal pain. No nausea or vomiting.  No diarrhea or constipation.  No blood in stool or black colored stools.  No problems passing urine.  No numbness or tingling in hands or feet.  No skin rashes.    A 14 point review of systems is otherwise negative.    Past History:    Past Medical History:   Diagnosis Date     Abdominal aortic aneurysm (AAA) (H) 03/30/2010    Formatting of this note might be different from the original. 5.6 cm infrarenal AAA, on 12/21/2010 CTA Formatting of this note might be different from the original. 5.6 cm infrarenal AAA, on 12/21/2010 CTA      Anemia      [place] esophagus      Cancer (H)     lung     CHF (congestive heart failure) (H)      Chronic kidney disease      COPD, group B, by GOLD 2017 classification (H)      Coronary artery disease of native heart with stable angina pectoris, unspecified vessel or lesion type (H)      Degenerative joint disease      Head and neck cancer (H)      History of transfusion      Hyperlipidemia      Hypertension      Lung cancer (H)     s/p RUL RML wedge resection in 2016 by Dr. Carter Velazquez     Lung mass     MELODY FDG-avid 2.5cm     Myocardial infarction (H)     November 2019     Oral cancer (H)      Prostate cancer (H)      Septic hip (H) 02/10/2022     Shortness of breath     with exertion     Trochanteric fracture of right femur (H) 06/21/2016       Past Surgical History:   Procedure Laterality Date     ABDOMINAL AORTIC ANEURYSM REPAIR       ANGIOGRAPHY  11/25/2019    coronary     COLONOSCOPY      3/21/2017,5/12/2014     CORONARY STENT  "PLACEMENT  12/01/2019    x1     CT BIOPSY LUNG  01/21/2021     EGD      11/13/2020,7/26/2019     ENDOSCOPIC REMOVAL SALIVARY GLAND STONE  1995     ESOPHAGOSCOPY, GASTROSCOPY, DUODENOSCOPY (EGD), COMBINED N/A 07/26/2019    Procedure: ENDOSCOPIC ULTRASOUND FINE NEEDLE ASPIRATION, GASTRIC BIOPSIES OF POLYPS;  Surgeon: Quoc Edwards MD;  Location: [facility];  Service: Gastroenterology     EXCISE LESION INTRAORAL Left 11/06/2020    Procedure: 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 Need pathology for margins;  Surgeon: Alon Nunez MD;  Location: [facility];  Service: ENT     HEMIARTHROPLASTY HIP Right 06/22/2016     LUMBAR DISCECTOMY  2006     NEPHRECTOMY Left     for benign hemangioma     NEPHRECTOMY  03/2009     OTHER SURGICAL HISTORY Right 01/01/2015    wedge resectionLung cancer isolated pulmonary nodule     OTHER SURGICAL HISTORY      placement of stentfor AAA     OTHER SURGICAL HISTORY      right hip surgeryfor fracture     OTHER SURGICAL HISTORY  01/01/1994    Oral cancer resection     [place] ESOPHAGOGASTRODUODENOSCOPY US SCOPE W/ADJ STRXRS N/A 11/13/2020    Procedure: ESOPHAGOGASTRODUODENOSCOPY,  ENDOSCOPIC ULTRASOUND;  Surgeon: Quoc Edwards MD;  Location: [facility];  Service: Gastroenterology     SQUAMOUS CELL CARCINOMA EXCISION  11/06/2020     THORACOSCOPY  2016    wedge resection of lung     THORACOTOMY Right 11/15/2015     [facility] ORAL SURGERY PROCEDURE  1994     Physical Exam:    /81   Pulse 56   Resp 16   Ht 1.803 m (5' 11\")   Wt 90.8 kg (200 lb 1.6 oz)   SpO2 96%   BMI 27.91 kg/m      GENERAL:   Very pleasant.  Alert and oriented. Seated comfortably. In no distress.    HEENT:   TIEN, EOMI.  No pallor.  No icterus.    LYMPH NODES:  No palpable cervical, axillary or inguinal lymphadenopathy.    CHEST:   Lungs with decreased BS bilaterally.    CVS:    S1 and S2 heard. Regular rate and rhythm.  No murmur or gallop or rub heard.  No peripheral " edema.    ABDOMEN:   Soft. Not tender. Not distended.  No palpable hepatomegaly or splenomegaly.    EXTREMITIES:  Warm.    SKIN:    No rash, bruising or purpura noted.    Lab Results:    Reviewed with patient.    Recent Results (from the past 24 hour(s))   Comprehensive metabolic panel (BMP + Alb, Alk Phos, ALT, AST, Total. Bili, TP)    Collection Time: 05/01/23  1:12 PM   Result Value Ref Range    Sodium 141 136 - 145 mmol/L    Potassium 5.0 3.5 - 5.0 mmol/L    Chloride 104 98 - 107 mmol/L    Carbon Dioxide (CO2) 25 22 - 31 mmol/L    Anion Gap 12 5 - 18 mmol/L    Urea Nitrogen 29 (H) 8 - 28 mg/dL    Creatinine 1.51 (H) 0.70 - 1.30 mg/dL    Calcium 9.5 8.5 - 10.5 mg/dL    Glucose 100 70 - 125 mg/dL    Alkaline Phosphatase 120 45 - 120 U/L    AST 24 0 - 40 U/L    ALT 17 0 - 45 U/L    Protein Total 6.9 6.0 - 8.0 g/dL    Albumin 3.5 3.5 - 5.0 g/dL    Bilirubin Total 0.6 0.0 - 1.0 mg/dL    GFR Estimate 47 (L) >60 mL/min/1.73m2   CBC with platelets and differential    Collection Time: 05/01/23  1:12 PM   Result Value Ref Range    WBC Count 7.0 4.0 - 11.0 10e3/uL    RBC Count 4.53 4.40 - 5.90 10e6/uL    Hemoglobin 13.9 13.3 - 17.7 g/dL    Hematocrit 41.3 40.0 - 53.0 %    MCV 91 78 - 100 fL    MCH 30.7 26.5 - 33.0 pg    MCHC 33.7 31.5 - 36.5 g/dL    RDW 12.4 10.0 - 15.0 %    Platelet Count 179 150 - 450 10e3/uL    % Neutrophils 64 %    % Lymphocytes 17 %    % Monocytes 9 %    % Eosinophils 9 %    % Basophils 1 %    % Immature Granulocytes 0 %    NRBCs per 100 WBC 0 <1 /100    Absolute Neutrophils 4.5 1.6 - 8.3 10e3/uL    Absolute Lymphocytes 1.2 0.8 - 5.3 10e3/uL    Absolute Monocytes 0.6 0.0 - 1.3 10e3/uL    Absolute Eosinophils 0.7 0.0 - 0.7 10e3/uL    Absolute Basophils 0.1 0.0 - 0.2 10e3/uL    Absolute Immature Granulocytes 0.0 <=0.4 10e3/uL    Absolute NRBCs 0.0 10e3/uL     Imaging:    No new imaging.        Again, thank you for allowing me to participate in the care of your patient.        Sincerely,        Quoc  Concetta, CNP

## 2023-05-01 NOTE — PROGRESS NOTES
Infusion Nursing Note:  Lenny Chau presents today for Injection of Leuprolide.    Patient seen by provider today: Yes: Quoc Hylton NP   present during visit today: Not Applicable.    Note: Patient ambulated into Infusion Care and is alert and oriented. Patient was seen by Quoc Hylton NP. Patient received Leuprolide 45 mg injection in right lower abdomen. Patient tolerated injection without any problems. All questions answered and patient was discharged home.      Intravenous Access:  No Intravenous access/labs at this visit.    Treatment Conditions:  Not Applicable.      Post Infusion Assessment:  Patient tolerated injection without incident.       Discharge Plan:   Patient and/or family verbalized understanding of discharge instructions and all questions answered.  Patient discharged in stable condition accompanied by: self.  Departure Mode: Ambulatory.      Carina Estrada, RN,OCN

## 2023-08-14 NOTE — PROGRESS NOTES
Head and Neck Surgery  8/14/2023      HISTORY OF PRESENT ILLNESS:  I had the pleasure of meeting Mr. Chau and his daughter back today in clinic.  I met him in October 2022 after he presented for evaluation of a right buccal lesion.  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  He has had a number of recurrences since then and more recently been diagnosed with prostate cancer and metachronous bilateral lung cancers.  Dr. Jaffe initially saw him for the right buccal lesion in February 2022 and a biopsy showed acute and chronic inflammation.  I have seen photos from that and is relatively stable.  When I saw him in October, I repeated the biopsy, which again showed inflammatory change.      INTERVAL HISTORY: He is here today for followup.  He has no new complaints.  He has not noticed any new sores in his mouth.  He has not noticed any masses in the neck.     PHYSICAL EXAMINATION:  On examination, he is alert, in no acute distress.  There is no palpable cervical lymphadenopathy.  Intraorally, he has some papillary changes to the right buccal area, which seems to be confined to the location of the prior free flap.  There are no concerning lesions seen in the oral cavity.     ASSESSMENT AND PLAN:  Mr. Chau's oral exam looks stable today.  I would like to see him back in about six months for routine followup.  He knows to call me sooner if any lesions arise.        Kwesi Roldan M.D.      Head and Neck Surgical Oncology and Microvascular Reconstruction  Department of Otolaryngology - Head and Neck Surgery  [facility]        25 minutes spent on the date of the encounter in chart review, patient visit, review of tests, documentation and/or discussion with other providers about the issues documented above.

## 2023-08-14 NOTE — LETTER
8/14/2023       RE: Lenny Chau  [address]  [place] [address]     Dear Colleague,    Thank you for referring your patient, Lenny Chau, to the [facility] [place] [facility]. Please see a copy of my visit note below.    Head and Neck Surgery  8/14/2023      HISTORY OF PRESENT ILLNESS:  I had the pleasure of meeting Mr. Chau and his daughter back today in clinic.  I met him in October 2022 after he presented for evaluation of a right buccal lesion.  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  He has had a number of recurrences since then and more recently been diagnosed with prostate cancer and metachronous bilateral lung cancers.  Dr. Jaffe initially saw him for the right buccal lesion in February 2022 and a biopsy showed acute and chronic inflammation.  I have seen photos from that and is relatively stable.  When I saw him in October, I repeated the biopsy, which again showed inflammatory change.      INTERVAL HISTORY: He is here today for followup.  He has no new complaints.  He has not noticed any new sores in his mouth.  He has not noticed any masses in the neck.     PHYSICAL EXAMINATION:  On examination, he is alert, in no acute distress.  There is no palpable cervical lymphadenopathy.  Intraorally, he has some papillary changes to the right buccal area, which seems to be confined to the location of the prior free flap.  There are no concerning lesions seen in the oral cavity.     ASSESSMENT AND PLAN:  Mr. Chau's oral exam looks stable today.  I would like to see him back in about six months for routine followup.  He knows to call me sooner if any lesions arise.        Kwesi Roldan M.D.      Head and Neck Surgical Oncology and Microvascular  Reconstruction  Department of Otolaryngology - Head and Neck Surgery  [facility]        25 minutes spent on the date of the encounter in chart review, patient visit, review of tests, documentation and/or discussion with other providers about the issues documented above.          Again, thank you for allowing me to participate in the care of your patient.      Sincerely,    Kwesi Roldan MD

## 2023-08-22 NOTE — PROGRESS NOTES
"Oncology Rooming Note    August 22, 2023 1:55 PM   Lenny Chau is a 77 year old male who presents for:    Chief Complaint   Patient presents with    Oncology Clinic Visit     Follow up with Dr. Batista     Initial Vitals: BP (!) 143/69 (BP Location: Right arm, Patient Position: Sitting, Cuff Size: Adult Regular)   Pulse 50   Temp 97.8  F (36.6  C) (Oral)   Resp 16   Wt 88.5 kg (195 lb 3.2 oz)   SpO2 96%   BMI 27.22 kg/m   Estimated body mass index is 27.22 kg/m  as calculated from the following:    Height as of 8/14/23: 1.803 m (5' 11\").    Weight as of this encounter: 88.5 kg (195 lb 3.2 oz). Body surface area is 2.11 meters squared.  Moderate Pain (4) Comment: Data Unavailable   No LMP for male patient.  Allergies reviewed: Yes  Medications reviewed: Yes    Medications: Medication refills not needed today.  Pharmacy name entered into [facility]: [id number] [place] [address]    Clinical concerns: Patient here ambulatory accompanied by daughter for follow-up status post radiation for his lung cancer.  Patient had a recent CT scan and is here today for results.  Patient complains of mid back pain 5/10.  Patient seen by ENT for head neck cancer in follow-up recently and is noted to have possible basal cell on right temple.  Seen by Dr. Batista.  Plan return to clinic for follow-up and/or CT simulation as directed by provider.   Dr. Batista was notified.      Anne-Marie Durbin RN              "

## 2023-08-22 NOTE — PROGRESS NOTES
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  The biopsy confirmed moderately differentiated adenocarcinoma.        TYPE OF RADIATION THERAPY ADMINISTERED:   1. SBRT to the left upper lobe with a total dose of 5000 cGy in 5 treatments given from 3/2/2021-3/10/2021.     2. SBRT with a total dose of 5000 cGy in 5 treatments for the second left upper lobe lung tumor given from 3/9/2022-3/18/2022.      3. SBRT with a total dose of 3275 cGy in 5 treatments for T7 spine given from 2022- 2022.     INTERVAL SINCE COMPLETION OF RADIATION THERAPY: 1 year and 4 months since last treatment.      SUBJECTIVE:  Mr. Chau is a 77 y.o. male who is a chronic smoker and quit smoking since .  The patient had a complicated history of multiple cancer in the past as detailed as followin.  Low risk prostate cancer, clinical stage T1c N0 M0, recent grade 3+3 =6 and the last PSA was 10.4 in 2006.  Patient has been on clinical observation and was found to have a gradually rising PSA.  The patient is currently on hormone therapy since .      2.  Floor of mouth cancer, status post surgical resection in 1994 with no adjuvant therapy.     3.  Left kidney hemangioma, status post left nephrectomy on 3/26/2009.     4.  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  Patient had local recurrence and is status post surgical resection on 9/11/2009.     5.  Non-small cell lung cancer involving right lung, stage I, status post right thoracotomy and excision of right upper lobe and right middle lobe lung tumor 11/5/2015 with no evidence of lymph node metastasis and no adjuvant therapy.  Patient lost to follow-up since 2016.     6.  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     7.  Left upper lobe lung  cancer, stage T1N0 M0.  The biopsy confirmed moderately differentiated adenocarcinoma on 1/21/2021.  The patient received SBRT with a total dose of 5000 cGy in 5 treatments given from 3/2/2021-3/10/2021.     Patient had two prior early stage right lung cancers that were apparently resected in their entirety and may have been synchronous primary lung cancers. It does not appear he had the appropriate recommended follow up at [facility]. The MELODY lesion was known to be present in 2016 and appears to be slightly larger now with significant FDG-avidity on the recent PET/CT. It is likely another primary lung cancer, although metastasis from previous lung cancers is possible.  The patient had a restaging PET CT scan on 8/31/2020.  The scan showed enlarging FDG avid left upper lobe mass measuring 2.1 x 2.5 cm with central solid component consistent with primary lung cancer without metastasis.  There was also a FDG avid lesion in the left buccal region consistent with squamous cell carcinoma without metastasis.  The patient had a surgical resection for his left buccal/lateral tongue squamous cell carcinoma 11/6/2020 by KERRY Renee with pathology showed squamous cell carcinoma with negative margin.  He was determined not a good candidate for lung surgery given his complicated medical history and health  status.  He therefore received definitive radiation therapy using SBRT with a total dose of 5000 cGy in 5 treatments given from 3/2/2021-3/10/2021. The patient tolerated radiation therapy very well with minimal side effect.     The patient has been doing well since completion of radiation therapy.  He denies any pain or discomfort related to the therapy at the time of evaluation.  The patient was found to a new 9 mm small nodule in the left upper lobe outside of patient therapy area from restaging CT scan on 12/20/2021.  He was recommended to have staging PET CT scan and was done 1/7/2022. 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  There is no evidence of other systemic metastasis.The patient's case has been discussed at thoracic tumor conference 1/13/2022.  MRI brain on 1/22/2022 showed no evidence of brain metastasis. Patient is not good candidate for surgery and poor candidate for systemic therapy given his current medical status.  The consensus recommendation is to consider SBRT for his oligo disease in the left lung and T7 spine if the patient wishes not to consider systemic therapy at this time.   The patient received the second course of SBRT to the second left lung cancer with a total dose of 5000 cGy in 5 treatments given from 3/9/2022-3/18/2022.  He tolerated radiation therapy very well with minimal side effect.      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  He received stereotactic radiosurgery with a total dose of 3275 cGy in 5 treatments given from 4/12/2022- 4/22/2022.  The patient tolerated ration therapy very well with minimal side effect.     The patient has been stable since completion of radiation therapy.    His pain has been stable and denies any new symptoms at the time of evaluation.  He is here for routine post therapy office follow-up for his lung  cancer.     Medications were reviewed and are up to date on EPIC.    The following portions of the patient's history were reviewed and updated as appropriate: allergies, current medications, past family history, past medical history, past social history, past surgical history and problem list.    Review of Systems:      General  Constitutional  Constitutional (WDL): All constitutional elements are within defined limits  EENT  Eye Disorders  Eye Disorder (WDL): All eye disorder elements are within defined limits (wears glasses)  Ear Disorders  Ear Disorder (WDL): All ear disorder elements are within defined limits  Respiratory  Respiratory  Respiratory (WDL): Exceptions to WDL  Cough: Mild symptoms OR nonprescription intervention indicated (occasional dry cough)  Cardiovascular  Cardiovascular  Cardiovascular (WDL): All cardiovascular elements are within defined limits (no pacemaker)  Gastrointestinal  Gastrointestinal  Gastrointestinal (WDL): Exceptions to WDL  Dysphagia: Symptomatic, able to eat regular diet (occasional after jaw surgery)  Musculoskeletal  Musculoskeletal and Connective Tissue Disorders  Musculoskeletal & Connective (WDL): Exceptions to WDL  Arthralgia: Mild pain (back, shoulder)  Bone Pain: Mild pain (back, shulder)  Myalgia: Mild pain (neuropathy in feet and left side occasionally)  Integumentary  Integumentary  Integumentary (WDL): All integumentary elements are within defined limits  Neurological  Neurosensory  Neurosensory (WDL): Exceptions to WDL  Peripheral Motor Neuropathy: Asymptomatic OR clinical or diagnostic observations only (feet)  Ataxia: Asymptomatic OR clinical or diagnostic observations only OR intervention not indicated  Peripheral Sensory Neuropathy: Asymptomatic (feet)  Genitourinary/Reproductive  Genitourinary  Genitourinary (WDL): Exceptions to WDL  Urinary Frequency: Present (nocturia x2)  Lymphatic  Lymph System Disorders  Lymph (WDL): All lymph elements are within defined  limits  Pain     AUA Assessment                                                              Accompanied by  Accompanied By: family (daughter)    Objective:      PHYSICAL EXAMINATION:    There were no vitals taken for this visit.    Gen: Alert, in NAD  Eyes: PERRL, EOMI, sclera anicteric  Neck: Supple, full ROM, no LAD  Pulm: No wheezing, stridor or respiratory distress  CV: Well-perfused, no cyanosis, no pedal edema  Back: No step-offs or pain to palpation along the thoracolumbar spine  Rectal: Deferred  : Deferred  Musculoskeletal: Normal muscle bulk and tone  Skin: Normal color and turgor  Neurologic: A/Ox3, CN II-XII intact, normal gait and station  Psychiatric: Appropriate mood and affect     Prostate Specific Antigen Screen   Date Value Ref Range Status   05/08/2021 34.0 (H) 0.0 - 6.5 ng/mL Final   03/09/2021 23.5 (H) < OR = 4.0 ng/mL Final     Comment:     The total PSA value from this assay system is   standardized against the WHO standard. The test   result will be approximately 20% lower when compared   to the equimolar-standardized total PSA ([place]). Comparison of serial PSA results should be   interpreted with this fact in mind.     This test was performed using the Siemens   chemiluminescent method. Values obtained from   different assay methods cannot be used  interchangeably. PSA levels, regardless of  value, should not be interpreted as absolute  evidence of the presence or absence of disease.  Lab test performed by:  Lab Mnemonic:[facility]  [facility]  [address] [place] [address]  Nando Nunez M.D.  QUEST Specimen received date and time: 09-MAR-2021 12:46:00.00     05/06/2019 15.8 (H) < OR = 4.0 ng/mL Final     Comment:     The total PSA value from this assay system is   standardized against the WHO standard. The test   result will be approximately 20% lower when compared   to the equimolar-standardized total PSA ([place]). Comparison of serial  PSA results should be   interpreted with this fact in mind.     This test was performed using the Siemens   chemiluminescent method. Values obtained from   different assay methods cannot be used  interchangeably. PSA levels, regardless of  value, should not be interpreted as absolute  evidence of the presence or absence of disease.  Lab test performed by:  Lab Mnemonic:  [facility]  [address] [place] [address]  Nando Nunez M.D.  QUEST Specimen received date and time: 06-MAY-2019 08:07:00.00     10/15/2018 10.8 (H) < OR = 4.0 ng/mL Final     Comment:     The total PSA value from this assay system is   standardized against the WHO standard. The test   result will be approximately 20% lower when compared   to the equimolar-standardized total PSA ([place]). Comparison of serial PSA results should be   interpreted with this fact in mind.     This test was performed using the Siemens   chemiluminescent method. Values obtained from   different assay methods cannot be used  interchangeably. PSA levels, regardless of  value, should not be interpreted as absolute  evidence of the presence or absence of disease.  Lab test performed by:  Lab Mnemonic:  [facility]  [address] [place] [address]  Nando Nunez M.D.  QUEST Specimen received date and time: 15-OCT-2018 17:14:00.00     03/29/2018 13.0 (H) < OR = 4.0 ng/mL Final     Comment:     The total PSA value from this assay system is   standardized against the WHO standard. The test   result will be approximately 20% lower when compared   to the equimolar-standardized total PSA ([place]). Comparison of serial PSA results should be   interpreted with this fact in mind.     This test was performed using the Siemens   chemiluminescent method. Values obtained from   different assay methods cannot be used  interchangeably. PSA levels, regardless of  value, should not be interpreted as  absolute  evidence of the presence or absence of disease.  Lab test performed by:  Lab Mnemonic:  [facility]  [address] [place] [address]  Nando Nunez M.D.  QUEST Specimen received date and time: 29-MAR-2018 16:01:00.00     09/11/2017 11.9 (H) < OR = 4.0 ng/mL Final     Comment:     The total PSA value from this assay system is   standardized against the WHO standard. The test   result will be approximately 20% lower when compared   to the equimolar-standardized total PSA ([place]). Comparison of serial PSA results should be   interpreted with this fact in mind.     This test was performed using the Siemens   chemiluminescent method. Values obtained from   different assay methods cannot be used  interchangeably. PSA levels, regardless of  value, should not be interpreted as absolute  evidence of the presence or absence of disease.  Lab test performed by:  Lab Mnemonic:  [facility]  [address] [place] [address]  Nando Nunez M.D.  QUEST Specimen received date and time: 11-SEP-2017 08:29:00.00     07/15/2016 10.4 (H) < OR = 4.0 ng/mL Final     Comment:        This test was performed using the Siemens  chemiluminescent method. Values obtained from  different assay methods cannot be used  interchangeably. PSA levels, regardless of  value, should not be interpreted as absolute  evidence of the presence or absence of disease.     Lab test performed by:  Lab Mnemonic:  [facility]  [address] [place] [address]  Nando Nunez M.D.     02/09/2016 7.4 (H) < OR = 4.0 ng/mL Final     Comment:        This test was performed using the Siemens  chemiluminescent method. Values obtained from  different assay methods cannot be used  interchangeably. PSA levels, regardless of  value, should not be interpreted as absolute  evidence of the presence or absence of disease.     Lab test performed by:  Lab  Mnemonic:  [facility]  [address] [facility] [place] [address]  Nando Nunez M.D.     04/22/2015 7.2 (H) < OR = 4.0 ng/mL Final     Comment:        This test was performed using the Siemens  chemiluminescent method. Values obtained from  different assay methods cannot be used  interchangeably. PSA levels, regardless of  value, should not be interpreted as absolute  evidence of the presence or absence of disease.       Lab test performed by:  Lab Mnemonic:  [facility]  [address] [facility] [place] [address]  Nando Nunez M.D.     03/19/2014 6.8 (H) < OR = 4.0 ng/mL Final     Comment:        This test was performed using the Siemens  chemiluminescent method. Values obtained from  different assay methods cannot be used  interchangeably. PSA levels, regardless of  value, should not be interpreted as absolute  evidence of the presence or absence of disease.       Lab test performed by:  Lab Mnemonic:  [facility]  [address] [facility] [place] [address]  Nando Nunez M.D.     03/14/2013 6.1 (H) < OR = 4.0 ng/mL Final     Comment:        This test was performed using the Siemens  chemiluminescent method. Values obtained from  different assay methods cannot be used  interchangeably. PSA levels, regardless of  value, should not be interpreted as absolute  evidence of the presence or absence of disease.       Lab test performed by:  Lab Mnemonic:  [facility]  [address] [facility] [place] [address]  Nando Nunez M.D.     03/06/2012 5.5 (H) < OR = 4.0 ng/mL Final     Comment:        This test was performed using the Siemens  chemiluminescent method. Values obtained from  different assay methods cannot be used  interchangeably. PSA levels, regardless of  value, should not be interpreted as absolute  evidence of the presence or absence of disease.       Lab test performed by:  Lab Mnemonic:[facility]  [place]  [address] [place] [address]  Nando Nunez M.D.     06/10/2011 4.7 (H) < OR = 4.0 ng/mL Final     Comment:        This test was performed using the Siemens  chemiluminescent method. Values obtained from  different assay methods cannot be used  interchangeably. PSA levels, regardless of  value, should not be interpreted as absolute  evidence of the presence or absence of disease.       Lab test performed by:  Lab Mnemonic:[facility]  [address] [place] [address]  Nando Nunez M.D.       PSA Tumor Marker   Date Value Ref Range Status   05/01/2023 0.45 0.00 - 6.50 ng/mL Final   12/19/2022 3.00 0.00 - 6.50 ng/mL Final   09/19/2022 23.50 (H) 0.00 - 6.50 ng/mL Final   06/21/2022 21.81 (H) 0.00 - 6.50 ug/L Final   03/31/2022 18.25 (H) 0.00 - 6.50 ug/L Final   12/22/2021 12.47 (H) 0.00 - 6.50 ug/L Final     Imaging: Imaging results 30 days: CT Chest/Abdomen/Pelvis w Contrast    Result Date: 8/18/2023  EXAM: CT CHEST/ABDOMEN/PELVIS W CONTRAST LOCATION: [facility] DATE: 8/18/2023 INDICATION:  Malignant neoplasm of upper lobe of left lung (H), Malignant neoplasm of prostate (H) COMPARISON: CT chest 04/17/2023, CT abdomen pelvis 12/30/2022. TECHNIQUE: CT scan of the chest, abdomen, and pelvis was performed following injection of IV contrast. Multiplanar reformats were obtained. Dose reduction techniques were used. CONTRAST: 75ml Isovue 370 FINDINGS: LUNGS AND PLEURA: Left upper lobe fiducial marker with unchanged surrounding radiation fibrosis. Wedge resection staple line with adjacent cicatrization in the medial right upper lobe, as well as dditional staple line along the inferior major fissure and  middle lobe. No new airspace opacities or lung nodules. No pleural effusions. MEDIASTINUM: Degenerative calcification of the aortic root/valve leaflets. Fusiform enlargement of the mid ascending aorta which measuring up to 4.5 cm, unchanged.  Calcified atheroma in the arch, proximal great vessels and descending thoracic aorta. No pericardial effusion. No enlarged mediastinal or hilar lymph nodes. Esophagus is decompressed. CORONARY ARTERY CALCIFICATION: Severe. HEPATOBILIARY: Normal. PANCREAS: Unchanged 2.2 x 2.0 cm unilocular low-attenuation lesion in the distal body (3/162). No main duct dilation. SPLEEN: Normal. ADRENAL GLANDS: Left adrenal is surgically absent. Normal right adrenal. KIDNEYS/BLADDER: Left nephrectomy. No suspicious soft tissue in the resection bed. Simple cysts in the right kidney, which do not require further follow-up. BOWEL: Normal. LYMPH NODES: Normal. VASCULATURE: Endovascular stent graft repair of infrarenal abdominal aortic aneurysm with iliac limbs extending into the common iliac arteries bilaterally. Excluded aneurysm sac measures 7.2 x 7.0 cm (3/217), previously 7.0 x 6.6 cm, prior to that 6.9 x 6.1 cm. There are new curvilinear areas of enhancement posteriorly within the excluded sac (e.g. 3/215-227). PELVIC ORGANS: Mostly obscured by streak artifact. MUSCULOSKELETAL: Continued height loss of T7 compression deformity, now severe, also with an increasingly mottled appearance and lucencies. New 1.8 x 1.6 cm lytic lesion within T6 (3/65) and 2.2 x 2.0 cm lytic lesion within T8 (3/83). Bilateral chronic healed and nonhealed rib fractures. Right hip arthroplasty.     IMPRESSION: 1.  Unchanged posttreatment changes in the lungs, including radiation fibrosis in the left upper lobe and right lung wedge resections. No findings to suggest local recurrence. 2.  New lytic lesions within T6 and T8, which are not typical for postradiation changes and concerning for metastasis. Suggest further evaluation with PET/CT. There is also progressive height loss of T7 compression deformity with increasingly mottled and  lytic appearance, which could be postradiation changes. 3.  Endovascular repair of abdominal aortic aneurysm with continued  increase in size of the excluded aneurysm sac. There is new curvilinear enhancement posteriorly within the sac consistent with type II endoleak. Recommend interventional radiology or vascular surgery follow-up, if not already scheduled. 4.  Unchanged unilocular 2.2 cm cystic lesion in the pancreatic body. Follow-up recommendations below: REFERENCE: Revisions of international consensus [place] guidelines for the management of IPMN of the pancreas. Pancreatology 2017;17(5):738-753. Largest cyst between 20-30 mm: EUS in 3-6 months and then annual surveillance alternating between MRI and EUS as appropriate.       Impression     The patient is a 77-year-old gentleman with multiple history of cancer in the past, status post stereotactic radiosurgery for T7 spine 1 year and 4 months ago and SBRT for lung cancer 2 years and 3 months ago with restaging CT scan showed good response and new lytic lesion within T6 and T8 concerns for new metastasis.       Assessment & Plan:     1.  I have personally reviewed his most recent CT scan and compared to the previous CT scan today.  The patient had a good response of his lung cancer there is no evidence of lung cancer recurrence.  There is a new lytic lesions within T6 and T8 concerns for metastasis.  I will schedule patient to have PET CT scan for further staging and follow-up 1 week after.        2.  The patient had a good response for prostate cancer after hormonal therapy with most recent PSA measures 0.45 on 5/1/2023.  Continue follow-up for his prostate cancer which Dr. Wallace, medical oncology as planned.     3.  Recommend patient to continue follow-up with Dr. Alon Nunez, ENT for his head neck cancer surveillance.     4.  Continue follow-up with Dr. Shade Boyd, PCP for other medical needs.     Face to face time  40 minutes with > 80% spent on consultation, education and coordination of care.       Mariana Batista MD  Department of Radiation Oncology   [facility]  Tel:  [phone]  Page: [phone]    [facility]  [address]  [place] [address]     [facility]   [address] [place] [address]    CC:  Patient Care Team:  Shade Boyd MD as PCP - General (Internal Medicine)  Mariana Batista MD as MD (Hematology & Oncology)  Faviola Cottrell, [facility] as Pharmacist (Pharmacist)  Mariana Batista MD as Assigned Cancer Care Provider  Shade Boyd MD as Assigned PCP  Rosas Carr MD as MD (Neurology)  Nilton Gtz MD as Assigned Musculoskeletal Provider  David Castrejon MD (Internal Medicine)  Faviola Cottrell, [facility] as Assigned MTM Pharmacist  Andrew Wallace MD as MD (Hematology)  Kwesi Roldan MD as Assigned Surgical Provider

## 2023-08-22 NOTE — LETTER
2023         RE: Lenny Chau  [address]  [place] [address]        Dear Colleague,    Thank you for referring your patient, Lenny Chau, to the [facility] [place]. Please see a copy of my visit note below.    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  The biopsy confirmed moderately differentiated adenocarcinoma.        TYPE OF RADIATION THERAPY ADMINISTERED:   1. SBRT to the left upper lobe with a total dose of 5000 cGy in 5 treatments given from 3/2/2021-3/10/2021.     2. SBRT with a total dose of 5000 cGy in 5 treatments for the second left upper lobe lung tumor given from 3/9/2022-3/18/2022.      3. SBRT with a total dose of 3275 cGy in 5 treatments for T7 spine given from 2022- 2022.     INTERVAL SINCE COMPLETION OF RADIATION THERAPY: 1 year and 4 months since last treatment.      SUBJECTIVE:  Mr. Chau is a 77 y.o. male who is a chronic smoker and quit smoking since .  The patient had a complicated history of multiple cancer in the past as detailed as followin.  Low risk prostate cancer, clinical stage T1c N0 M0, recent grade 3+3 =6 and the last PSA was 10.4 in 2006.  Patient has been on clinical observation and was found to have a gradually rising PSA.  The patient is currently on hormone therapy since .      2.  Floor of mouth cancer, status post  surgical resection in 8/1994 with no adjuvant therapy.     3.  Left kidney hemangioma, status post left nephrectomy on 3/26/2009.     4.  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  Patient had local recurrence and is status post surgical resection on 9/11/2009.     5.  Non-small cell lung cancer involving right lung, stage I, status post right thoracotomy and excision of right upper lobe and right middle lobe lung tumor 11/5/2015 with no evidence of lymph node metastasis and no adjuvant therapy.  Patient lost to follow-up since 2016.     6.  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 Alon Nunez, ENT on 11/6/2020.     7.  Left upper lobe lung  cancer, stage T1N0 M0.  The biopsy confirmed moderately differentiated adenocarcinoma on 1/21/2021.  The patient received SBRT with a total dose of 5000 cGy in 5 treatments given from 3/2/2021-3/10/2021.     Patient had two prior early stage right lung cancers that were apparently resected in their entirety and may have been synchronous primary lung cancers. It does not appear he had the appropriate recommended follow up at [facility]. The MELODY lesion was known to be present in 2016 and appears to be slightly larger now with significant FDG-avidity on the recent PET/CT. It is likely another primary lung cancer, although metastasis from previous lung cancers is possible.  The patient had a restaging PET CT scan on 8/31/2020.  The scan showed enlarging FDG avid left upper lobe mass measuring 2.1 x 2.5 cm with central solid component consistent with primary lung cancer without metastasis.  There was also a FDG avid lesion in the left buccal region consistent with squamous cell carcinoma without metastasis.  The patient had a surgical resection for his  left buccal/lateral tongue squamous cell carcinoma 11/6/2020 by Dr. Alon Nunez, ENT with pathology showed squamous cell carcinoma with negative margin.  He was determined not a good candidate for lung surgery given his complicated medical history and health status.  He therefore received definitive radiation therapy using SBRT with a total dose of 5000 cGy in 5 treatments given from 3/2/2021-3/10/2021. The patient tolerated radiation therapy very well with minimal side effect.     The patient has been doing well since completion of radiation therapy.  He denies any pain or discomfort related to the therapy at the time of evaluation.  The patient was found to a new 9 mm small nodule in the left upper lobe outside of patient therapy area from restaging CT scan on 12/20/2021.  He was recommended to have staging PET CT scan and was done 1/7/2022. 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  There is no evidence of other systemic metastasis.The patient's case has been discussed at thoracic tumor conference 1/13/2022.  MRI brain on 1/22/2022 showed no evidence of brain metastasis. Patient is not good candidate for surgery and poor candidate for systemic therapy given his current medical status.  The consensus recommendation is to consider SBRT for his oligo disease in the left lung and T7 spine if the patient wishes not to consider systemic therapy at this time.   The patient received the second course of SBRT to the second left lung cancer with a total dose of 5000 cGy in 5 treatments given from 3/9/2022-3/18/2022.  He tolerated radiation therapy very well with minimal side effect.      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  He received stereotactic radiosurgery with a total dose of 3275 cGy in 5 treatments given from 4/12/2022- 4/22/2022.  The patient tolerated ration therapy very  well with minimal side effect.     The patient has been stable since completion of radiation therapy.    His pain has been stable and denies any new symptoms at the time of evaluation.  He is here for routine post therapy office follow-up for his lung cancer.     Medications were reviewed and are up to date on EPIC.    The following portions of the patient's history were reviewed and updated as appropriate: allergies, current medications, past family history, past medical history, past social history, past surgical history and problem list.    Review of Systems:      General  Constitutional  Constitutional (WDL): All constitutional elements are within defined limits  EENT  Eye Disorders  Eye Disorder (WDL): All eye disorder elements are within defined limits (wears glasses)  Ear Disorders  Ear Disorder (WDL): All ear disorder elements are within defined limits  Respiratory  Respiratory  Respiratory (WDL): Exceptions to WDL  Cough: Mild symptoms OR nonprescription intervention indicated (occasional dry cough)  Cardiovascular  Cardiovascular  Cardiovascular (WDL): All cardiovascular elements are within defined limits (no pacemaker)  Gastrointestinal  Gastrointestinal  Gastrointestinal (WDL): Exceptions to WDL  Dysphagia: Symptomatic, able to eat regular diet (occasional after jaw surgery)  Musculoskeletal  Musculoskeletal and Connective Tissue Disorders  Musculoskeletal & Connective (WDL): Exceptions to WDL  Arthralgia: Mild pain (back, shoulder)  Bone Pain: Mild pain (back, shulder)  Myalgia: Mild pain (neuropathy in feet and left side occasionally)  Integumentary  Integumentary  Integumentary (WDL): All integumentary elements are within defined limits  Neurological  Neurosensory  Neurosensory (WDL): Exceptions to WDL  Peripheral Motor Neuropathy: Asymptomatic OR clinical or diagnostic observations only (feet)  Ataxia: Asymptomatic OR clinical or diagnostic observations only OR intervention not indicated  Peripheral  Sensory Neuropathy: Asymptomatic (feet)  Genitourinary/Reproductive  Genitourinary  Genitourinary (WDL): Exceptions to WDL  Urinary Frequency: Present (nocturia x2)  Lymphatic  Lymph System Disorders  Lymph (WDL): All lymph elements are within defined limits  Pain     AUA Assessment                                                              Accompanied by  Accompanied By: family (daughter)    Objective:      PHYSICAL EXAMINATION:    There were no vitals taken for this visit.    Gen: Alert, in NAD  Eyes: PERRL, EOMI, sclera anicteric  Neck: Supple, full ROM, no LAD  Pulm: No wheezing, stridor or respiratory distress  CV: Well-perfused, no cyanosis, no pedal edema  Back: No step-offs or pain to palpation along the thoracolumbar spine  Rectal: Deferred  : Deferred  Musculoskeletal: Normal muscle bulk and tone  Skin: Normal color and turgor  Neurologic: A/Ox3, CN II-XII intact, normal gait and station  Psychiatric: Appropriate mood and affect     Prostate Specific Antigen Screen   Date Value Ref Range Status   05/08/2021 34.0 (H) 0.0 - 6.5 ng/mL Final   03/09/2021 23.5 (H) < OR = 4.0 ng/mL Final     Comment:     The total PSA value from this assay system is   standardized against the WHO standard. The test   result will be approximately 20% lower when compared   to the equimolar-standardized total PSA ([place]). Comparison of serial PSA results should be   interpreted with this fact in mind.     This test was performed using the Siemens   chemiluminescent method. Values obtained from   different assay methods cannot be used  interchangeably. PSA levels, regardless of  value, should not be interpreted as absolute  evidence of the presence or absence of disease.  Lab test performed by:  Lab Mnemonic:MAR[facility]Y  [facility]  [address] [place] [address]  Nando Nunez M.D.  QUEST Specimen received date and time: 09-MAR-2021 12:46:00.00     05/06/2019 15.8 (H) < OR = 4.0 ng/mL Final      Comment:     The total PSA value from this assay system is   standardized against the WHO standard. The test   result will be approximately 20% lower when compared   to the equimolar-standardized total PSA ([place]). Comparison of serial PSA results should be   interpreted with this fact in mind.     This test was performed using the Siemens   chemiluminescent method. Values obtained from   different assay methods cannot be used  interchangeably. PSA levels, regardless of  value, should not be interpreted as absolute  evidence of the presence or absence of disease.  Lab test performed by:  Lab Mnemonic:  [facility]  [address] [place] [address]  Nando Nunez M.D.  QUEST Specimen received date and time: 06-MAY-2019 08:07:00.00     10/15/2018 10.8 (H) < OR = 4.0 ng/mL Final     Comment:     The total PSA value from this assay system is   standardized against the WHO standard. The test   result will be approximately 20% lower when compared   to the equimolar-standardized total PSA ([place]). Comparison of serial PSA results should be   interpreted with this fact in mind.     This test was performed using the Siemens   chemiluminescent method. Values obtained from   different assay methods cannot be used  interchangeably. PSA levels, regardless of  value, should not be interpreted as absolute  evidence of the presence or absence of disease.  Lab test performed by:  Lab Mnemonic:MARLENY  [facility]  [address] [place] [address]  Nando Nunez M.D.  QUEST Specimen received date and time: 15-OCT-2018 17:14:00.00     03/29/2018 13.0 (H) < OR = 4.0 ng/mL Final     Comment:     The total PSA value from this assay system is   standardized against the WHO standard. The test   result will be approximately 20% lower when compared   to the equimolar-standardized total PSA ([place]). Comparison of serial PSA results should be    interpreted with this fact in mind.     This test was performed using the Siemens   chemiluminescent method. Values obtained from   different assay methods cannot be used  interchangeably. PSA levels, regardless of  value, should not be interpreted as absolute  evidence of the presence or absence of disease.  Lab test performed by:  Lab Mnemonic:  [facility]  [address] [place] [address]  Nando Nunez M.D.  QUEST Specimen received date and time: 29-MAR-2018 16:01:00.00     09/11/2017 11.9 (H) < OR = 4.0 ng/mL Final     Comment:     The total PSA value from this assay system is   standardized against the WHO standard. The test   result will be approximately 20% lower when compared   to the equimolar-standardized total PSA ([place]). Comparison of serial PSA results should be   interpreted with this fact in mind.     This test was performed using the Siemens   chemiluminescent method. Values obtained from   different assay methods cannot be used  interchangeably. PSA levels, regardless of  value, should not be interpreted as absolute  evidence of the presence or absence of disease.  Lab test performed by:  Lab Mnemonic:  [facility]  [address] [place] [address]  Nando Nunez M.D.  QUEST Specimen received date and time: 11-SEP-2017 08:29:00.00     07/15/2016 10.4 (H) < OR = 4.0 ng/mL Final     Comment:        This test was performed using the Siemens  chemiluminescent method. Values obtained from  different assay methods cannot be used  interchangeably. PSA levels, regardless of  value, should not be interpreted as absolute  evidence of the presence or absence of disease.     Lab test performed by:  Lab Mnemonic:  [facility]  [address] [place] [address]  Nando Nunez M.D.     02/09/2016 7.4 (H) < OR = 4.0 ng/mL Final     Comment:        This test was performed using the  Siemens  chemiluminescent method. Values obtained from  different assay methods cannot be used  interchangeably. PSA levels, regardless of  value, should not be interpreted as absolute  evidence of the presence or absence of disease.     Lab test performed by:  Lab Mnemonic:  [facility]  [address] [place] [address]  Nando Nunez M.D.     04/22/2015 7.2 (H) < OR = 4.0 ng/mL Final     Comment:        This test was performed using the Siemens  chemiluminescent method. Values obtained from  different assay methods cannot be used  interchangeably. PSA levels, regardless of  value, should not be interpreted as absolute  evidence of the presence or absence of disease.       Lab test performed by:  Lab Mnemonic:  [facility] [place]  [address] [place] [address]  Nando Nunez M.D.     03/19/2014 6.8 (H) < OR = 4.0 ng/mL Final     Comment:        This test was performed using the Siemens  chemiluminescent method. Values obtained from  different assay methods cannot be used  interchangeably. PSA levels, regardless of  value, should not be interpreted as absolute  evidence of the presence or absence of disease.       Lab test performed by:  Lab Mnemonic:MARLENY  [facility] [place]  [address] [place] [address]  Nando Nunez M.D.     03/14/2013 6.1 (H) < OR = 4.0 ng/mL Final     Comment:        This test was performed using the Siemens  chemiluminescent method. Values obtained from  different assay methods cannot be used  interchangeably. PSA levels, regardless of  value, should not be interpreted as absolute  evidence of the presence or absence of disease.       Lab test performed by:  Lab Mnemonic:  [facility]  [address] [place] [address]  Nando Nunez M.D.     03/06/2012 5.5 (H) < OR = 4.0 ng/mL Final     Comment:        This test was performed using the Siemens  chemiluminescent method. Values  obtained from  different assay methods cannot be used  interchangeably. PSA levels, regardless of  value, should not be interpreted as absolute  evidence of the presence or absence of disease.       Lab test performed by:  Lab Mnemonic:[facility]  [address] [place] [address]  Nando Nunez M.D.     06/10/2011 4.7 (H) < OR = 4.0 ng/mL Final     Comment:        This test was performed using the Siemens  chemiluminescent method. Values obtained from  different assay methods cannot be used  interchangeably. PSA levels, regardless of  value, should not be interpreted as absolute  evidence of the presence or absence of disease.       Lab test performed by:  Lab Mnemonic:[facility]  [address] [place] [address]  Nando Nunez M.D.       PSA Tumor Marker   Date Value Ref Range Status   05/01/2023 0.45 0.00 - 6.50 ng/mL Final   12/19/2022 3.00 0.00 - 6.50 ng/mL Final   09/19/2022 23.50 (H) 0.00 - 6.50 ng/mL Final   06/21/2022 21.81 (H) 0.00 - 6.50 ug/L Final   03/31/2022 18.25 (H) 0.00 - 6.50 ug/L Final   12/22/2021 12.47 (H) 0.00 - 6.50 ug/L Final     Imaging: Imaging results 30 days: CT Chest/Abdomen/Pelvis w Contrast    Result Date: 8/18/2023  EXAM: CT CHEST/ABDOMEN/PELVIS W CONTRAST LOCATION: [facility] DATE: 8/18/2023 INDICATION:  Malignant neoplasm of upper lobe of left lung (H), Malignant neoplasm of prostate (H) COMPARISON: CT chest 04/17/2023, CT abdomen pelvis 12/30/2022. TECHNIQUE: CT scan of the chest, abdomen, and pelvis was performed following injection of IV contrast. Multiplanar reformats were obtained. Dose reduction techniques were used. CONTRAST: 75ml Isovue 370 FINDINGS: LUNGS AND PLEURA: Left upper lobe fiducial marker with unchanged surrounding radiation fibrosis. Wedge resection staple line with adjacent cicatrization in the medial right upper lobe, as well as dditional staple line along the  inferior major fissure and  middle lobe. No new airspace opacities or lung nodules. No pleural effusions. MEDIASTINUM: Degenerative calcification of the aortic root/valve leaflets. Fusiform enlargement of the mid ascending aorta which measuring up to 4.5 cm, unchanged. Calcified atheroma in the arch, proximal great vessels and descending thoracic aorta. No pericardial effusion. No enlarged mediastinal or hilar lymph nodes. Esophagus is decompressed. CORONARY ARTERY CALCIFICATION: Severe. HEPATOBILIARY: Normal. PANCREAS: Unchanged 2.2 x 2.0 cm unilocular low-attenuation lesion in the distal body (3/162). No main duct dilation. SPLEEN: Normal. ADRENAL GLANDS: Left adrenal is surgically absent. Normal right adrenal. KIDNEYS/BLADDER: Left nephrectomy. No suspicious soft tissue in the resection bed. Simple cysts in the right kidney, which do not require further follow-up. BOWEL: Normal. LYMPH NODES: Normal. VASCULATURE: Endovascular stent graft repair of infrarenal abdominal aortic aneurysm with iliac limbs extending into the common iliac arteries bilaterally. Excluded aneurysm sac measures 7.2 x 7.0 cm (3/217), previously 7.0 x 6.6 cm, prior to that 6.9 x 6.1 cm. There are new curvilinear areas of enhancement posteriorly within the excluded sac (e.g. 3/215-227). PELVIC ORGANS: Mostly obscured by streak artifact. MUSCULOSKELETAL: Continued height loss of T7 compression deformity, now severe, also with an increasingly mottled appearance and lucencies. New 1.8 x 1.6 cm lytic lesion within T6 (3/65) and 2.2 x 2.0 cm lytic lesion within T8 (3/83). Bilateral chronic healed and nonhealed rib fractures. Right hip arthroplasty.     IMPRESSION: 1.  Unchanged posttreatment changes in the lungs, including radiation fibrosis in the left upper lobe and right lung wedge resections. No findings to suggest local recurrence. 2.  New lytic lesions within T6 and T8, which are not typical for postradiation changes and concerning for  metastasis. Suggest further evaluation with PET/CT. There is also progressive height loss of T7 compression deformity with increasingly mottled and  lytic appearance, which could be postradiation changes. 3.  Endovascular repair of abdominal aortic aneurysm with continued increase in size of the excluded aneurysm sac. There is new curvilinear enhancement posteriorly within the sac consistent with type II endoleak. Recommend interventional radiology or vascular surgery follow-up, if not already scheduled. 4.  Unchanged unilocular 2.2 cm cystic lesion in the pancreatic body. Follow-up recommendations below: REFERENCE: Revisions of international consensus [place] guidelines for the management of IPMN of the pancreas. Pancreatology 2017;17(5):738-753. Largest cyst between 20-30 mm: EUS in 3-6 months and then annual surveillance alternating between MRI and EUS as appropriate.       Impression     The patient is a 77-year-old gentleman with multiple history of cancer in the past, status post stereotactic radiosurgery for T7 spine 1 year and 4 months ago and SBRT for lung cancer 2 years and 3 months ago with restaging CT scan showed good response and new lytic lesion within T6 and T8 concerns for new metastasis.       Assessment & Plan:     1.  I have personally reviewed his most recent CT scan and compared to the previous CT scan today.  The patient had a good response of his lung cancer there is no evidence of lung cancer recurrence.  There is a new lytic lesions within T6 and T8 concerns for metastasis.  I will schedule patient to have PET CT scan for further staging and follow-up 1 week after.        2.  The patient had a good response for prostate cancer after hormonal therapy with most recent PSA measures 0.45 on 5/1/2023.  Continue follow-up for his prostate cancer which Dr. Wallace, medical oncology as planned.     3.  Recommend patient to continue follow-up with Dr. Alon Nunez, ENT for his head neck cancer  "surveillance.     4.  Continue follow-up with Dr. Shade Boyd, PCP for other medical needs.     Face to face time  40 minutes with > 80% spent on consultation, education and coordination of care.       Mariana Batista MD  Department of Radiation Oncology   [facility]  Tel: [phone]  Page: [phone]    [facility]  [address] [place] [address]     [facility]   [address] [place] [address]    CC:  Patient Care Team:  Shade Boyd MD as PCP - General (Internal Medicine)  Mariana Batista MD as MD (Hematology & Oncology)  Faviola Cottrell [facility] as Pharmacist (Pharmacist)  Mariana Batista MD as Assigned Cancer Care Provider  Shade Boyd MD as Assigned PCP  Rosas Carr MD as MD (Neurology)  Nilton Gtz MD as Assigned Musculoskeletal Provider  David Castrejon MD (Internal Medicine)  Faviola Cottrell [facility] as Assigned MTM Pharmacist  Andrew Wallace MD as MD (Hematology)  Kwesi Roldan MD as Assigned Surgical Provider      Oncology Rooming Note    August 22, 2023 1:55 PM   Lenny Chau is a 77 year old male who presents for:    Chief Complaint   Patient presents with     Oncology Clinic Visit     Follow up with Dr. Batista     Initial Vitals: BP (!) 143/69 (BP Location: Right arm, Patient Position: Sitting, Cuff Size: Adult Regular)   Pulse 50   Temp 97.8  F (36.6  C) (Oral)   Resp 16   Wt 88.5 kg (195 lb 3.2 oz)   SpO2 96%   BMI 27.22 kg/m   Estimated body mass index is 27.22 kg/m  as calculated from the following:    Height as of 8/14/23: 1.803 m (5' 11\").    Weight as of this encounter: 88.5 kg (195 lb 3.2 oz). Body surface area is 2.11 meters squared.  Moderate Pain (4) Comment: Data Unavailable   No LMP for male patient.  Allergies reviewed: Yes  Medications reviewed: Yes    Medications: Medication refills not needed today.  Pharmacy name entered into [facility]: [facility] [id number] [place] [address]    Clinical concerns: Patient " here ambulatory accompanied by daughter for follow-up status post radiation for his lung cancer.  Patient had a recent CT scan and is here today for results.  Patient complains of mid back pain 5/10.  Patient seen by ENT for head neck cancer in follow-up recently and is noted to have possible basal cell on right temple.  Seen by Dr. Batista.  Plan return to clinic for follow-up and/or CT simulation as directed by provider.   Dr. Batista was notified.      Anne-Marie Durbin RN                Again, thank you for allowing me to participate in the care of your patient.        Sincerely,        Mariana Batista MD

## 2023-08-23 NOTE — TELEPHONE ENCOUNTER
Order/Referral Request    Who is requesting: Patient      Orders being requested: Dermatology Referral    Reason service is needed/diagnosis: Bumps and sores on Right side of face     When are orders needed by: ASAP    Has this been discussed with Provider: No    Does patient have a preference on a Group/Provider/Facility? [facility] or [facility]    Does patient have an appointment scheduled?: No    Where to send orders: Place orders within Epic    Could we send this information to you in [facility] or would you prefer to receive a phone call?:   Patient would prefer a phone call   Okay to leave a detailed message?: Yes at Cell number on file:    Telephone Information:   Mobile [phone]

## 2023-09-19 PROBLEM — C79.51 MALIGNANT NEOPLASM METASTATIC TO BONE (H): Status: ACTIVE | Noted: 2023-01-01

## 2023-09-19 NOTE — PROGRESS NOTES
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  The biopsy confirmed moderately differentiated adenocarcinoma.        TYPE OF RADIATION THERAPY ADMINISTERED:   1. SBRT to the left upper lobe with a total dose of 5000 cGy in 5 treatments given from 3/2/2021-3/10/2021.     2. SBRT with a total dose of 5000 cGy in 5 treatments for the second left upper lobe lung tumor given from 3/9/2022-3/18/2022.      3. SBRT with a total dose of 3275 cGy in 5 treatments for T7 spine given from 2022- 2022.     INTERVAL SINCE COMPLETION OF RADIATION THERAPY:   2 years and 6 months for the first left upper lobe SBRT.    1 year and 6 months for the second left lung SBRT.    5 months for T-spine SBRT.      SUBJECTIVE:  Mr. Chau is a 77 y.o. male who is a chronic smoker and quit smoking since .  The patient had a complicated history of multiple cancer in the past as detailed as followin.  Low risk prostate cancer, clinical stage T1c N0 M0, recent grade 3+3 =6 and the last PSA was 10.4 in 2006.  Patient has been on clinical observation and was found to have a gradually rising PSA.  The patient is currently on hormone therapy since .      2.  Floor of mouth cancer, status post surgical resection in 1994 with no adjuvant therapy.     3.  Left kidney hemangioma, status post left nephrectomy on 3/26/2009.     4.  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  Patient had local recurrence and is status post surgical resection on 9/11/2009.     5.  Non-small cell lung cancer involving right lung, stage I, status post right thoracotomy and excision of right upper lobe and right middle lobe lung tumor 11/5/2015 with no evidence of lymph node metastasis and no adjuvant therapy.  Patient lost to follow-up since 2016.     6.  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     7.  Left upper lobe lung  cancer, stage T1N0 M0.  The biopsy confirmed moderately differentiated adenocarcinoma on 1/21/2021.  The patient received SBRT with a total dose of 5000 cGy in 5 treatments given from 3/2/2021-3/10/2021.     Patient had two prior early stage right lung cancers that were apparently resected in their entirety and may have been synchronous primary lung cancers. It does not appear he had the appropriate recommended follow up at [facility]. The MELODY lesion was known to be present in 2016 and appears to be slightly larger now with significant FDG-avidity on the recent PET/CT. It is likely another primary lung cancer, although metastasis from previous lung cancers is possible.  The patient had a restaging PET CT scan on 8/31/2020.  The scan showed enlarging FDG avid left upper lobe mass measuring 2.1 x 2.5 cm with central solid component consistent with primary lung cancer without metastasis.  There was also a FDG avid lesion in the left buccal region consistent with squamous cell carcinoma without metastasis.  The patient had a surgical resection for his left buccal/lateral tongue squamous cell carcinoma 11/6/2020 by KERRY Renee with pathology showed squamous cell carcinoma with negative margin.  He was  determined not a good candidate for lung surgery given his complicated medical history and health status.  He therefore received definitive radiation therapy using SBRT with a total dose of 5000 cGy in 5 treatments given from 3/2/2021-3/10/2021. The patient tolerated radiation therapy very well with minimal side effect.     The patient has been doing well since completion of radiation therapy.  He denies any pain or discomfort related to the therapy at the time of evaluation.  The patient was found to a new 9 mm small nodule in the left upper lobe outside of patient therapy area from restaging CT scan on 12/20/2021.  He was recommended to have staging PET CT scan and was done 1/7/2022. 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  There is no evidence of other systemic metastasis.The patient's case has been discussed at thoracic tumor conference 1/13/2022.  MRI brain on 1/22/2022 showed no evidence of brain metastasis. Patient is not good candidate for surgery and poor candidate for systemic therapy given his current medical status.  The consensus recommendation is to consider SBRT for his oligo disease in the left lung and T7 spine if the patient wishes not to consider systemic therapy at this time.   The patient received the second course of SBRT to the second left lung cancer with a total dose of 5000 cGy in 5 treatments given from 3/9/2022-3/18/2022.  He tolerated radiation therapy very well with minimal side effect.      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  He received stereotactic radiosurgery with a total dose of 3275 cGy in 5 treatments given from 4/12/2022- 4/22/2022.  The patient tolerated ration therapy very well with minimal side effect.    The restaging CT chest abdomen pelvics on 8/18/2023 showed new lytic lesions within T6 and T8 concerns for metastasis.  Patient  then had a restaging PET scan on 9/7/2023 which demonstrated FDG avid lesions involving T5, T6 and T8 vertebral body suspicious for progression of disease.  He noticed worsening pain in the mid thoracic spine at 5/10 level with pain medication.  His pain also radiated to the right chest wall.  He is here for routine post therapy office follow-up for his lung cancer.     Medications were reviewed and are up to date on EPIC.    The following portions of the patient's history were reviewed and updated as appropriate: allergies, current medications, past family history, past medical history, past social history, past surgical history and problem list.    Review of Systems:      General  Constitutional  Constitutional (WDL): All constitutional elements are within defined limits  EENT  Eye Disorders  Eye Disorder (WDL): All eye disorder elements are within defined limits (wears glasses)  Ear Disorders  Ear Disorder (WDL): All ear disorder elements are within defined limits  Respiratory  Respiratory  Respiratory (WDL): Exceptions to WDL  Cough: Mild symptoms OR nonprescription intervention indicated (occasional dry cough)  Cardiovascular  Cardiovascular  Cardiovascular (WDL): All cardiovascular elements are within defined limits (no pacemaker)  Gastrointestinal  Gastrointestinal  Gastrointestinal (WDL): Exceptions to WDL  Dysphagia: Symptomatic, able to eat regular diet (occasional after jaw surgery)  Dry Mouth: Symptomatic (e.g., dry or thick saliva) without significant dietary alteration OR unstimulated saliva flow greater than 0.2 mL/min  Musculoskeletal  Musculoskeletal and Connective Tissue Disorders  Musculoskeletal & Connective (WDL): Exceptions to WDL  Arthralgia: Mild pain (back, shoulder)  Bone Pain: Mild pain (back, shulder)  Myalgia: Mild pain (neuropathy in feet and left side occasionally)  Integumentary  Integumentary  Integumentary (WDL): All integumentary elements are within defined  limits  Neurological  Neurosensory  Neurosensory (WDL): Exceptions to WDL  Peripheral Motor Neuropathy: Asymptomatic OR clinical or diagnostic observations only (feet)  Ataxia: Asymptomatic OR clinical or diagnostic observations only OR intervention not indicated  Peripheral Sensory Neuropathy: Asymptomatic (feet)  Genitourinary/Reproductive  Genitourinary  Genitourinary (WDL): Exceptions to WDL  Urinary Frequency: Present (nocturia x2)  Lymphatic  Lymph System Disorders  Lymph (WDL): All lymph elements are within defined limits  Pain  Pain Score: Moderate Pain (5)  AUA Assessment                                                              Accompanied by  Accompanied By: family (daughter)    Objective:      PHYSICAL EXAMINATION:    /71 (BP Location: Right arm, Patient Position: Sitting, Cuff Size: Adult Regular)   Pulse (!) 42   Temp 97.7  F (36.5  C) (Oral)   Resp 16   Wt 86.3 kg (190 lb 3.2 oz)   SpO2 98%   BMI 26.53 kg/m      Gen: Alert, in NAD  Eyes: PERRL, EOMI, sclera anicteric  Neck: Supple, full ROM, no LAD  Pulm: No wheezing, stridor or respiratory distress  CV: Well-perfused, no cyanosis, no pedal edema  Back: No step-offs.  There is a moderate tenderness along the mid thoracic spine which is also radiated to the right side of chest consistent with patient history of thoracic spine metastasis.  Rectal: Deferred  : Deferred  Musculoskeletal: Normal muscle bulk and tone  Skin: Normal color and turgor  Neurologic: A/Ox3, CN II-XII intact, normal gait and station  Psychiatric: Appropriate mood and affect     Imaging: Imaging results 30 days: CT CHEST PE STUDY    Result Date: 9/8/2023  For Patients: As a result of the 21st Century Cures Act, medical imaging exams and procedure reports are released immediately into your electronic medical record. You may view this report before your referring provider. If you have questions, please contact your health care provider. EXAM: CT CHEST PE STUDY  LOCATION: [facility] MEDICAL IMAGING DATE: 9/8/2023 INDICATION: Chest pain COMPARISON: CT chest 06/08/2016 TECHNIQUE: CT chest pulmonary angiogram during arterial phase injection of IV contrast. Multiplanar reformats and MIP reconstructions were performed. Dose reduction techniques were used. CONTRAST: Omnipaque [id number] FINDINGS: ANGIOGRAM CHEST: Pulmonary arteries are normal caliber and negative for pulmonary emboli. No CT evidence of right heart strain. LUNGS AND PLEURA: Patent central airways with bronchial wall thickening in a background of mild emphysematous changes. There are postsurgical changes of right upper lobe wedge resection and right middle lobectomy with a 10 x 10 mm irregular soft tissue nodule along the right middle lobe wedge resection sutures (series 5, image 70 and series 7, image 127), which has decreased in conspicuity since 2016. A spiculated left perihilar masslike consolidation contains a radiation planning seed. The bulk of the masslike consolidation/tumor measures 4.6 x 3.8 x 1.7 cm. This consolidation extends anteriorly with another focal masslike nodule with the bulk of this nodule measuring 1.9 x 1.3 x 1.1 cm (series 4, image 56 and series 7, image 58). A solid right upper lobe nodule measures 5 mm (image 63). No additional pneumonic consolidation, pleural effusion, or pneumothorax. MEDIASTINUM/AXILLAE: Invasive left perihilar soft tissue. No thoracic lymphadenopathy by size criteria. Aortic valvular calcifications. No pericardial effusion. The ascending thoracic aorta is ectatic measuring 4.3 x 4.2 cm. CORONARY ARTERY CALCIFICATION: Severe. UPPER ABDOMEN: Left nephrectomy. 2.2 x 1.7 cm hypoattenuating lesion in the pancreatic body. No duct dilation. Normal right adrenal gland. Suspect surgically absent left adrenal gland. MUSCULOSKELETAL: Severe T7 pathologic compression deformity without retropulsion. There is an associated paraspinal soft tissue mass with destruction of the posterior  elements and the head of the right seventh rib. The soft tissue mass also encroaches upon the thecal sac (series 4, image 68). There are also lytic metastases in T5, T6, and T8. Numerous old bilateral rib fractures.    1.  No evidence of acute pulmonary embolism or right heart strain. 2.  Primary left upper lobe lung malignancy which contains a brachytherapy seed. There are also postsurgical changes of right middle lobectomy and right upper lobe wedge resection. 3.  T7 metastasis with associated severe pathologic fracture and destruction of the adjacent right seventh rib head and posterior elements. The soft tissue also encroaches upon the thecal sac. Additional T5, T6, and T8 lytic metastases. 4.  A 2.2 cm hypoattenuating lesion in the pancreatic body probably represents a sidebranch chain intraductal papillary mucinous neoplasm (IPMN). 5.  Ascending thoracic aorta ectasia. REFERENCE: Revisions of international consensus [place] guidelines for the management of IPMN of the pancreas. Pancreatology 2017;17(5):738-753. Largest cyst between 20-30 mm: EUS in 3-6 months and then annual surveillance alternating between MRI and EUS as appropriate.    CT Head w/o Contrast    Result Date: 9/8/2023  For Patients: As a result of the 21st Century Cures Act, medical imaging exams and procedure reports are released immediately into your electronic medical record. You may view this report before your referring provider. If you have questions, please contact your health care provider. EXAM: CT HEAD BRAIN WO LOCATION: [facility] MEDICAL IMAGING DATE: 9/8/2023 INDICATION: Headache COMPARISON: mr 01/22/2022 mr 05/07/2021 TECHNIQUE: Routine CT Head without IV contrast. Multiplanar reformats. Dose reduction techniques were used. FINDINGS: INTRACRANIAL CONTENTS: No intracranial hemorrhage, extraaxial collection, or mass effect.  No CT evidence of acute infarct. Mild presumed chronic small vessel ischemic changes. Mild generalized volume loss.  No hydrocephalus. VISUALIZED ORBITS/SINUSES/MASTOIDS: Prior bilateral cataract surgery. Visualized portions of the orbits are otherwise unremarkable. No paranasal sinus mucosal disease. No middle ear or mastoid effusion. BONES/SOFT TISSUES: No acute abnormality.    1.  No CT evidence for acute intracranial process. 2.  Brain atrophy and presumed chronic microvascular ischemic changes as above.    PET Oncology (Eyes to Thighs)    Result Date: 9/8/2023  EXAM: PET ONCOLOGY (EYES TO THIGHS) LOCATION: [facility] DATE: 9/7/2023 INDICATION: Subsequent treatment planning and restaging for malignant neoplasm prostate & malignant neoplasm of overlapping sites of mouth & malignant neoplasm of upper lobe, right bronchus or lung & malignant neoplasm of base of tongue & malignant neoplasm of upper lobe, left bronchus or lung. Status post floor of mouth resection in 1994, right retromolar trigone squamous cell carcinoma status post surgery and radiation therapy completed in September 2009, right upper/middle lobe wedge resection in November 2015, left base of tongue resection in November 2020, left upper lobe radiation therapy completed in March 2021, left upper lobe radiation completed in March 2022, T7 vertebral body radiation completed in April 2022, currently receiving hormonal therapy. Monitor treatment response COMPARISON: FDG PET/CT dated 01/07/2022 and CTA of the chest abdomen pelvis dated 08/18/2023 CONTRAST: None TECHNIQUE: Serum glucose level 118 mg/dL. One hour post intravenous administration of 12.7 mCi F-18 FDG, PET imaging was performed from the skull vertex to mid thighs, utilizing attenuation correction with concurrent axial CT and PET/CT image fusion. Dose reduction techniques were used. PET/CT FINDINGS: While there is decreased metabolic activity in the left upper lobe surrounding the in situ fiduciary marker (max SUV 6.4, previously 7.2), there is increasing extent/metabolic activity of  the lesion involving the pathologically fractured  T7 vertebral body (Max SUV 13.6, previously 5.5) with development of additional FDG avid nodule in the anterior left upper lobe measuring 1.2 x 1.1 cm (max SUV 8.6) and multiple additional osseous lesions involving the T5 vertebral body (Max SUV 13.2), T6 vertebral body (Max SUV 14.6), and T8 vertebral body (Max SUV 13.8) suspicious for progression of disease. Degenerative shoulder and hip synovitis. Left upper extremity injection related artifact. FDG avid soft tissue thickening in the left groin region, likely inflammatory in nature. CT FINDINGS: Mild to moderate senescent intracranial changes. Postoperative change of bilateral lenses. Right neck dissection change. Moderate carotid artery bifurcation calcification. Right upper/middle lobe wedge resection changes. Severe coronary artery calcium/stenting. Small sliding-type hiatal hernia. Left nephrectomy. Aortobiiliac endograft traversing a 6.7 cm infrarenal abdominal aortic aneurysm sac. Prostatic calcification. Pelvic phleboliths. Right total hip arthroplasty. Multilevel degenerative changes of the spine     IMPRESSION: While there is decreased metabolic activity in the left upper lobe surrounding the in situ fiduciary marker, there is increasing extent/metabolic activity of the lesion in the T7 vertebral body with development of additional FDG avid nodule in the anterior left upper lobe and multiple additional osseous lesions involving the T5, T6, and T8 vertebral bodies suspicious for progression of disease     Prostate Specific Antigen Screen   Date Value Ref Range Status   05/08/2021 34.0 (H) 0.0 - 6.5 ng/mL Final   03/09/2021 23.5 (H) < OR = 4.0 ng/mL Final     Comment:     The total PSA value from this assay system is   standardized against the WHO standard. The test   result will be approximately 20% lower when compared   to the equimolar-standardized total PSA ([place]). Comparison of serial PSA  results should be   interpreted with this fact in mind.     This test was performed using the Siemens   chemiluminescent method. Values obtained from   different assay methods cannot be used  interchangeably. PSA levels, regardless of  value, should not be interpreted as absolute  evidence of the presence or absence of disease.  Lab test performed by:  Lab Mnemonic:  [facility]  [address] [place] [address]  Nando Nunez M.D.  QUEST Specimen received date and time: 09-MAR-2021 12:46:00.00     05/06/2019 15.8 (H) < OR = 4.0 ng/mL Final     Comment:     The total PSA value from this assay system is   standardized against the WHO standard. The test   result will be approximately 20% lower when compared   to the equimolar-standardized total PSA ([place]). Comparison of serial PSA results should be   interpreted with this fact in mind.     This test was performed using the Siemens   chemiluminescent method. Values obtained from   different assay methods cannot be used  interchangeably. PSA levels, regardless of  value, should not be interpreted as absolute  evidence of the presence or absence of disease.  Lab test performed by:  Lab Mnemonic:  [facility]  [address] [place] [address]  Nando Nunez M.D.  QUEST Specimen received date and time: 06-MAY-2019 08:07:00.00     10/15/2018 10.8 (H) < OR = 4.0 ng/mL Final     Comment:     The total PSA value from this assay system is   standardized against the WHO standard. The test   result will be approximately 20% lower when compared   to the equimolar-standardized total PSA ([place]). Comparison of serial PSA results should be   interpreted with this fact in mind.     This test was performed using the Siemens   chemiluminescent method. Values obtained from   different assay methods cannot be used  interchangeably. PSA levels, regardless of  value, should not be interpreted as  absolute  evidence of the presence or absence of disease.  Lab test performed by:  Lab Mnemonic:  [facility]  [address] [place] [address]  Nando Nunez M.D.  QUEST Specimen received date and time: 15-OCT-2018 17:14:00.00     03/29/2018 13.0 (H) < OR = 4.0 ng/mL Final     Comment:     The total PSA value from this assay system is   standardized against the WHO standard. The test   result will be approximately 20% lower when compared   to the equimolar-standardized total PSA ([place]). Comparison of serial PSA results should be   interpreted with this fact in mind.     This test was performed using the Siemens   chemiluminescent method. Values obtained from   different assay methods cannot be used  interchangeably. PSA levels, regardless of  value, should not be interpreted as absolute  evidence of the presence or absence of disease.  Lab test performed by:  Lab Mnemonic:  [facility]  [address] [place] [address]  Nando Nunez M.D.  QUEST Specimen received date and time: 29-MAR-2018 16:01:00.00     09/11/2017 11.9 (H) < OR = 4.0 ng/mL Final     Comment:     The total PSA value from this assay system is   standardized against the WHO standard. The test   result will be approximately 20% lower when compared   to the equimolar-standardized total PSA ([place]). Comparison of serial PSA results should be   interpreted with this fact in mind.     This test was performed using the Siemens   chemiluminescent method. Values obtained from   different assay methods cannot be used  interchangeably. PSA levels, regardless of  value, should not be interpreted as absolute  evidence of the presence or absence of disease.  Lab test performed by:  Lab Mnemonic:  [facility]  [address] [place] [address]  Nando Nunez M.D.  QUEST Specimen received date and time: 11-SEP-2017 08:29:00.00     07/15/2016 10.4  (H) < OR = 4.0 ng/mL Final     Comment:        This test was performed using the Siemens  chemiluminescent method. Values obtained from  different assay methods cannot be used  interchangeably. PSA levels, regardless of  value, should not be interpreted as absolute  evidence of the presence or absence of disease.     Lab test performed by:  Lab Mnemonic:[facility] [place]  [address] [place] [address]  Nando Nunez M.D.     02/09/2016 7.4 (H) < OR = 4.0 ng/mL Final     Comment:        This test was performed using the Siemens  chemiluminescent method. Values obtained from  different assay methods cannot be used  interchangeably. PSA levels, regardless of  value, should not be interpreted as absolute  evidence of the presence or absence of disease.     Lab test performed by:  Lab Mnemonic:[facility] [place]  [address] [place] [address]  Nando Nunez M.D.     04/22/2015 7.2 (H) < OR = 4.0 ng/mL Final     Comment:        This test was performed using the Siemens  chemiluminescent method. Values obtained from  different assay methods cannot be used  interchangeably. PSA levels, regardless of  value, should not be interpreted as absolute  evidence of the presence or absence of disease.       Lab test performed by:  Lab Mnemonic:[facility] [place]  [address] [place] [address]  Nando Nunez M.D.     03/19/2014 6.8 (H) < OR = 4.0 ng/mL Final     Comment:        This test was performed using the Siemens  chemiluminescent method. Values obtained from  different assay methods cannot be used  interchangeably. PSA levels, regardless of  value, should not be interpreted as absolute  evidence of the presence or absence of disease.       Lab test performed by:  Lab Mnemonic:[facility] [place]  [address] [place] [address]  Nando Nunez M.D.     03/14/2013 6.1 (H) < OR = 4.0 ng/mL Final     Comment:         This test was performed using the Siemens  chemiluminescent method. Values obtained from  different assay methods cannot be used  interchangeably. PSA levels, regardless of  value, should not be interpreted as absolute  evidence of the presence or absence of disease.       Lab test performed by:  Lab Mnemonic:[facility]  [address] [place] [address]  Nando Nunez M.D.     03/06/2012 5.5 (H) < OR = 4.0 ng/mL Final     Comment:        This test was performed using the Siemens  chemiluminescent method. Values obtained from  different assay methods cannot be used  interchangeably. PSA levels, regardless of  value, should not be interpreted as absolute  evidence of the presence or absence of disease.       Lab test performed by:  Lab Mnemonic:  [facility]  [address] [place] [address]  Nando Nunez M.D.     06/10/2011 4.7 (H) < OR = 4.0 ng/mL Final     Comment:        This test was performed using the Siemens  chemiluminescent method. Values obtained from  different assay methods cannot be used  interchangeably. PSA levels, regardless of  value, should not be interpreted as absolute  evidence of the presence or absence of disease.       Lab test performed by:  [email or web address]  [address] [place] [address]  Nando Nunez M.D.       PSA Tumor Marker   Date Value Ref Range Status   09/19/2023 0.17 0.00 - 6.50 ng/mL Final   05/01/2023 0.45 0.00 - 6.50 ng/mL Final   12/19/2022 3.00 0.00 - 6.50 ng/mL Final   09/19/2022 23.50 (H) 0.00 - 6.50 ng/mL Final   06/21/2022 21.81 (H) 0.00 - 6.50 ug/L Final   03/31/2022 18.25 (H) 0.00 - 6.50 ug/L Final   12/22/2021 12.47 (H) 0.00 - 6.50 ug/L Final      Impression     The patient is a 77-year-old gentleman with multiple history of cancer in the past, status post stereotactic radiosurgery for T7 spine 5 months ago and SBRT for lung cancer 2 years and 6 months ago  with restaging CT and PET scan showed good response and new lytic lesion within T5, 6 and T8 suspicious for new metastasis.       Assessment & Plan:     1.  I have personally reviewed his most recent PET CT scan and compared to the previous PET CT scan today.  This unfortunately reviewed FDG avid disease involving T5, T6 and T8 spine consistent with progression of disease.  The patient is also clinically symptomatic with moderate pain at 5/10 scale with oxycodone medication.  He is PSA has been low.  This is more likely from lung cancer or other type of cancer.  The possible treatment options including systemic therapy, conservative pain management and palliative radiation therapy has been discussed with the patient in detail and at her great lengths.  The possible risks and side effects of radiation therapy has also been explained to the patient.  Patient is informed a potential increased risk of radiation-induced normal tissue damage given the prior history of radiation therapy to the same area.  The pros and cons of different options has been discussed with the patient.  After long discussion, the patient elected to proceed with palliative radiation therapy at this time for his thoracic spine metastasis being aware of potential risks and side effects involved.  He is scheduled to return to radiation oncology in the near future for simulation.  I plan to give him radiation therapy to a total dose of 3000 cGy in 10 treatments targeted to the T4-T9 spine.  Given the prior history of radiation therapy to the T7 spine region, I will consider use IMRT technique to better locate target and protect normal tissues.    2.  The patient had a good response for prostate cancer after hormonal therapy with most recent PSA measures 0.45 on 5/1/2023 and 0.17 on 9/16/2023.  Continue follow-up for his prostate cancer which Dr. Wallace, medical oncology as planned.  Patient will also discuss possible systemic therapy upon next visit.      3.  Recommend patient to continue follow-up with Dr. Alon Nunez, ENT for his head neck cancer surveillance.     4.  Continue follow-up with Dr. Shade Boyd, PCP for other medical needs.     Face to face time  40 minutes with > 80% spent on consultation, education and coordination of care.       Mariana Batista MD  Department of Radiation Oncology   [facility]  Tel: [phone]  Page: [phone]    [facility]  [address] [place] [address]     [address] [address]    CC:  Patient Care Team:  Shade Boyd MD as PCP - General (Internal Medicine)  Mariana Batista MD as MD (Hematology & Oncology)  Faviola Cottrell, [facility] as Pharmacist (Pharmacist)  Mariana Batista MD as Assigned Cancer Care Provider  Shade Boyd MD as Assigned PCP  Rosas Carr MD as MD (Neurology)  Nilton Gtz MD as Assigned Musculoskeletal Provider  David Castrejon MD (Internal Medicine)  Faviola Cottrell [facility] as Assigned MTM Pharmacist  Andrew Wallace MD as MD (Hematology)  Kwesi Roldan MD as Assigned Surgical Provider

## 2023-09-19 NOTE — LETTER
2023         RE: Lenny Chau  [address]  [place] [address]        Dear Colleague,    Thank you for referring your patient, Lenny Chau, to the [facility] RADIATION ONCOLOGY [place]. Please see a copy of my visit note below.    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  The biopsy confirmed moderately differentiated adenocarcinoma.        TYPE OF RADIATION THERAPY ADMINISTERED:   1. SBRT to the left upper lobe with a total dose of 5000 cGy in 5 treatments given from 3/2/2021-3/10/2021.     2. SBRT with a total dose of 5000 cGy in 5 treatments for the second left upper lobe lung tumor given from 3/9/2022-3/18/2022.      3. SBRT with a total dose of 3275 cGy in 5 treatments for T7 spine given from 2022- 2022.     INTERVAL SINCE COMPLETION OF RADIATION THERAPY:   2 years and 6 months for the first left upper lobe SBRT.    1 year and 6 months for the second left lung SBRT.    5 months for T-spine SBRT.      SUBJECTIVE:  Mr. Chau is a 77 y.o. male who is a chronic smoker and quit smoking since .  The patient had a complicated history of multiple cancer in the past as detailed as followin.  Low risk prostate cancer, clinical stage T1c N0 M0, recent grade 3+3 =6 and the last PSA was 10.4 in 2006.  Patient has been on clinical observation and was found to have a gradually rising PSA.  The  patient is currently on hormone therapy since 2022.      2.  Floor of mouth cancer, status post surgical resection in 8/1994 with no adjuvant therapy.     3.  Left kidney hemangioma, status post left nephrectomy on 3/26/2009.     4.  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  Patient had local recurrence and is status post surgical resection on 9/11/2009.     5.  Non-small cell lung cancer involving right lung, stage I, status post right thoracotomy and excision of right upper lobe and right middle lobe lung tumor 11/5/2015 with no evidence of lymph node metastasis and no adjuvant therapy.  Patient lost to follow-up since 2016.     6.  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 Alon Nunez, ENT on 11/6/2020.     7.  Left upper lobe lung  cancer, stage T1N0 M0.  The biopsy confirmed moderately differentiated adenocarcinoma on 1/21/2021.  The patient received SBRT with a total dose of 5000 cGy in 5 treatments given from 3/2/2021-3/10/2021.     Patient had two prior early stage right lung cancers that were apparently resected in their entirety and may have been synchronous primary lung cancers. It does not appear he had the appropriate recommended follow up at [facility]. The MELODY lesion was known to be present in 2016 and appears to be slightly larger now with significant FDG-avidity on the recent PET/CT. It is likely another primary lung cancer, although metastasis from previous lung cancers is possible.  The patient had a restaging PET CT scan on 8/31/2020.  The scan showed enlarging FDG avid left upper lobe mass measuring 2.1 x 2.5 cm with central solid component consistent with primary lung cancer without metastasis.  There was also a FDG avid lesion in the left buccal region  consistent with squamous cell carcinoma without metastasis.  The patient had a surgical resection for his left buccal/lateral tongue squamous cell carcinoma 11/6/2020 by Dr. Alon Nunez, ENT with pathology showed squamous cell carcinoma with negative margin.  He was determined not a good candidate for lung surgery given his complicated medical history and health status.  He therefore received definitive radiation therapy using SBRT with a total dose of 5000 cGy in 5 treatments given from 3/2/2021-3/10/2021. The patient tolerated radiation therapy very well with minimal side effect.     The patient has been doing well since completion of radiation therapy.  He denies any pain or discomfort related to the therapy at the time of evaluation.  The patient was found to a new 9 mm small nodule in the left upper lobe outside of patient therapy area from restaging CT scan on 12/20/2021.  He was recommended to have staging PET CT scan and was done 1/7/2022. 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  There is no evidence of other systemic metastasis.The patient's case has been discussed at thoracic tumor conference 1/13/2022.  MRI brain on 1/22/2022 showed no evidence of brain metastasis. Patient is not good candidate for surgery and poor candidate for systemic therapy given his current medical status.  The consensus recommendation is to consider SBRT for his oligo disease in the left lung and T7 spine if the patient wishes not to consider systemic therapy at this time.   The patient received the second course of SBRT to the second left lung cancer with a total dose of 5000 cGy in 5 treatments given from 3/9/2022-3/18/2022.  He tolerated radiation therapy very well with minimal side effect.      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  He received stereotactic radiosurgery with a total dose  of 3275 cGy in 5 treatments given from 4/12/2022- 4/22/2022.  The patient tolerated ration therapy very well with minimal side effect.    The restaging CT chest abdomen pelvics on 8/18/2023 showed new lytic lesions within T6 and T8 concerns for metastasis.  Patient then had a restaging PET scan on 9/7/2023 which demonstrated FDG avid lesions involving T5, T6 and T8 vertebral body suspicious for progression of disease.  He noticed worsening pain in the mid thoracic spine at 5/10 level with pain medication.  His pain also radiated to the right chest wall.  He is here for routine post therapy office follow-up for his lung cancer.     Medications were reviewed and are up to date on EPIC.    The following portions of the patient's history were reviewed and updated as appropriate: allergies, current medications, past family history, past medical history, past social history, past surgical history and problem list.    Review of Systems:      General  Constitutional  Constitutional (WDL): All constitutional elements are within defined limits  EENT  Eye Disorders  Eye Disorder (WDL): All eye disorder elements are within defined limits (wears glasses)  Ear Disorders  Ear Disorder (WDL): All ear disorder elements are within defined limits  Respiratory  Respiratory  Respiratory (WDL): Exceptions to WDL  Cough: Mild symptoms OR nonprescription intervention indicated (occasional dry cough)  Cardiovascular  Cardiovascular  Cardiovascular (WDL): All cardiovascular elements are within defined limits (no pacemaker)  Gastrointestinal  Gastrointestinal  Gastrointestinal (WDL): Exceptions to WDL  Dysphagia: Symptomatic, able to eat regular diet (occasional after jaw surgery)  Dry Mouth: Symptomatic (e.g., dry or thick saliva) without significant dietary alteration OR unstimulated saliva flow greater than 0.2 mL/min  Musculoskeletal  Musculoskeletal and Connective Tissue Disorders  Musculoskeletal & Connective (WDL): Exceptions to  WDL  Arthralgia: Mild pain (back, shoulder)  Bone Pain: Mild pain (back, shulder)  Myalgia: Mild pain (neuropathy in feet and left side occasionally)  Integumentary  Integumentary  Integumentary (WDL): All integumentary elements are within defined limits  Neurological  Neurosensory  Neurosensory (WDL): Exceptions to WDL  Peripheral Motor Neuropathy: Asymptomatic OR clinical or diagnostic observations only (feet)  Ataxia: Asymptomatic OR clinical or diagnostic observations only OR intervention not indicated  Peripheral Sensory Neuropathy: Asymptomatic (feet)  Genitourinary/Reproductive  Genitourinary  Genitourinary (WDL): Exceptions to WDL  Urinary Frequency: Present (nocturia x2)  Lymphatic  Lymph System Disorders  Lymph (WDL): All lymph elements are within defined limits  Pain  Pain Score: Moderate Pain (5)  AUA Assessment                                                              Accompanied by  Accompanied By: family (daughter)    Objective:      PHYSICAL EXAMINATION:    /71 (BP Location: Right arm, Patient Position: Sitting, Cuff Size: Adult Regular)   Pulse (!) 42   Temp 97.7  F (36.5  C) (Oral)   Resp 16   Wt 86.3 kg (190 lb 3.2 oz)   SpO2 98%   BMI 26.53 kg/m      Gen: Alert, in NAD  Eyes: PERRL, EOMI, sclera anicteric  Neck: Supple, full ROM, no LAD  Pulm: No wheezing, stridor or respiratory distress  CV: Well-perfused, no cyanosis, no pedal edema  Back: No step-offs.  There is a moderate tenderness along the mid thoracic spine which is also radiated to the right side of chest consistent with patient history of thoracic spine metastasis.  Rectal: Deferred  : Deferred  Musculoskeletal: Normal muscle bulk and tone  Skin: Normal color and turgor  Neurologic: A/Ox3, CN II-XII intact, normal gait and station  Psychiatric: Appropriate mood and affect     Imaging: Imaging results 30 days: CT CHEST PE STUDY    Result Date: 9/8/2023  For Patients: As a result of the 21st Century Cures Act, medical  imaging exams and procedure reports are released immediately into your electronic medical record. You may view this report before your referring provider. If you have questions, please contact your health care provider. EXAM: CT CHEST PE STUDY LOCATION: [facility] MEDICAL IMAGING DATE: 9/8/2023 INDICATION: Chest pain COMPARISON: CT chest 06/08/2016 TECHNIQUE: CT chest pulmonary angiogram during arterial phase injection of IV contrast. Multiplanar reformats and MIP reconstructions were performed. Dose reduction techniques were used. CONTRAST: Omnipaque [id number] FINDINGS: ANGIOGRAM CHEST: Pulmonary arteries are normal caliber and negative for pulmonary emboli. No CT evidence of right heart strain. LUNGS AND PLEURA: Patent central airways with bronchial wall thickening in a background of mild emphysematous changes. There are postsurgical changes of right upper lobe wedge resection and right middle lobectomy with a 10 x 10 mm irregular soft tissue nodule along the right middle lobe wedge resection sutures (series 5, image 70 and series 7, image 127), which has decreased in conspicuity since 2016. A spiculated left perihilar masslike consolidation contains a radiation planning seed. The bulk of the masslike consolidation/tumor measures 4.6 x 3.8 x 1.7 cm. This consolidation extends anteriorly with another focal masslike nodule with the bulk of this nodule measuring 1.9 x 1.3 x 1.1 cm (series 4, image 56 and series 7, image 58). A solid right upper lobe nodule measures 5 mm (image 63). No additional pneumonic consolidation, pleural effusion, or pneumothorax. MEDIASTINUM/AXILLAE: Invasive left perihilar soft tissue. No thoracic lymphadenopathy by size criteria. Aortic valvular calcifications. No pericardial effusion. The ascending thoracic aorta is ectatic measuring 4.3 x 4.2 cm. CORONARY ARTERY CALCIFICATION: Severe. UPPER ABDOMEN: Left nephrectomy. 2.2 x 1.7 cm hypoattenuating lesion in the pancreatic body. No duct dilation.  Normal right adrenal gland. Suspect surgically absent left adrenal gland. MUSCULOSKELETAL: Severe T7 pathologic compression deformity without retropulsion. There is an associated paraspinal soft tissue mass with destruction of the posterior elements and the head of the right seventh rib. The soft tissue mass also encroaches upon the thecal sac (series 4, image 68). There are also lytic metastases in T5, T6, and T8. Numerous old bilateral rib fractures.    1.  No evidence of acute pulmonary embolism or right heart strain. 2.  Primary left upper lobe lung malignancy which contains a brachytherapy seed. There are also postsurgical changes of right middle lobectomy and right upper lobe wedge resection. 3.  T7 metastasis with associated severe pathologic fracture and destruction of the adjacent right seventh rib head and posterior elements. The soft tissue also encroaches upon the thecal sac. Additional T5, T6, and T8 lytic metastases. 4.  A 2.2 cm hypoattenuating lesion in the pancreatic body probably represents a sidebranch chain intraductal papillary mucinous neoplasm (IPMN). 5.  Ascending thoracic aorta ectasia. REFERENCE: Revisions of international consensus [place] guidelines for the management of IPMN of the pancreas. Pancreatology 2017;17(5):738-753. Largest cyst between 20-30 mm: EUS in 3-6 months and then annual surveillance alternating between MRI and EUS as appropriate.    CT Head w/o Contrast    Result Date: 9/8/2023  For Patients: As a result of the 21st Century Cures Act, medical imaging exams and procedure reports are released immediately into your electronic medical record. You may view this report before your referring provider. If you have questions, please contact your health care provider. EXAM: CT HEAD BRAIN WO LOCATION: [facility] MEDICAL IMAGING DATE: 9/8/2023 INDICATION: Headache COMPARISON: mr 01/22/2022 mr 05/07/2021 TECHNIQUE: Routine CT Head without IV contrast. Multiplanar reformats. Dose reduction  techniques were used. FINDINGS: INTRACRANIAL CONTENTS: No intracranial hemorrhage, extraaxial collection, or mass effect.  No CT evidence of acute infarct. Mild presumed chronic small vessel ischemic changes. Mild generalized volume loss. No hydrocephalus. VISUALIZED ORBITS/SINUSES/MASTOIDS: Prior bilateral cataract surgery. Visualized portions of the orbits are otherwise unremarkable. No paranasal sinus mucosal disease. No middle ear or mastoid effusion. BONES/SOFT TISSUES: No acute abnormality.    1.  No CT evidence for acute intracranial process. 2.  Brain atrophy and presumed chronic microvascular ischemic changes as above.    PET Oncology (Eyes to Thighs)    Result Date: 9/8/2023  EXAM: PET ONCOLOGY (EYES TO THIGHS) LOCATION: [facility] DATE: 9/7/2023 INDICATION: Subsequent treatment planning and restaging for malignant neoplasm prostate & malignant neoplasm of overlapping sites of mouth & malignant neoplasm of upper lobe, right bronchus or lung & malignant neoplasm of base of tongue & malignant neoplasm of upper lobe, left bronchus or lung. Status post floor of mouth resection in 1994, right retromolar trigone squamous cell carcinoma status post surgery and radiation therapy completed in September 2009, right upper/middle lobe wedge resection in November 2015, left base of tongue resection in November 2020, left upper lobe radiation therapy completed in March 2021, left upper lobe radiation completed in March 2022, T7 vertebral body radiation completed in April 2022, currently receiving hormonal therapy. Monitor treatment response COMPARISON: FDG PET/CT dated 01/07/2022 and CTA of the chest abdomen pelvis dated 08/18/2023 CONTRAST: None TECHNIQUE: Serum glucose level 118 mg/dL. One hour post intravenous administration of 12.7 mCi F-18 FDG, PET imaging was performed from the skull vertex to mid thighs, utilizing attenuation correction with concurrent axial CT and PET/CT image fusion.  Dose reduction techniques were used. PET/CT FINDINGS: While there is decreased metabolic activity in the left upper lobe surrounding the in situ fiduciary marker (max SUV 6.4, previously 7.2), there is increasing extent/metabolic activity of the lesion involving the pathologically fractured  T7 vertebral body (Max SUV 13.6, previously 5.5) with development of additional FDG avid nodule in the anterior left upper lobe measuring 1.2 x 1.1 cm (max SUV 8.6) and multiple additional osseous lesions involving the T5 vertebral body (Max SUV 13.2), T6 vertebral body (Max SUV 14.6), and T8 vertebral body (Max SUV 13.8) suspicious for progression of disease. Degenerative shoulder and hip synovitis. Left upper extremity injection related artifact. FDG avid soft tissue thickening in the left groin region, likely inflammatory in nature. CT FINDINGS: Mild to moderate senescent intracranial changes. Postoperative change of bilateral lenses. Right neck dissection change. Moderate carotid artery bifurcation calcification. Right upper/middle lobe wedge resection changes. Severe coronary artery calcium/stenting. Small sliding-type hiatal hernia. Left nephrectomy. Aortobiiliac endograft traversing a 6.7 cm infrarenal abdominal aortic aneurysm sac. Prostatic calcification. Pelvic phleboliths. Right total hip arthroplasty. Multilevel degenerative changes of the spine     IMPRESSION: While there is decreased metabolic activity in the left upper lobe surrounding the in situ fiduciary marker, there is increasing extent/metabolic activity of the lesion in the T7 vertebral body with development of additional FDG avid nodule in the anterior left upper lobe and multiple additional osseous lesions involving the T5, T6, and T8 vertebral bodies suspicious for progression of disease     Prostate Specific Antigen Screen   Date Value Ref Range Status   05/08/2021 34.0 (H) 0.0 - 6.5 ng/mL Final   03/09/2021 23.5 (H) < OR = 4.0 ng/mL Final     Comment:      The total PSA value from this assay system is   standardized against the WHO standard. The test   result will be approximately 20% lower when compared   to the equimolar-standardized total PSA ([place]   Aisha). Comparison of serial PSA results should be   interpreted with this fact in mind.     This test was performed using the Siemens   chemiluminescent method. Values obtained from   different assay methods cannot be used  interchangeably. PSA levels, regardless of  value, should not be interpreted as absolute  evidence of the presence or absence of disease.  Lab test performed by:  Lab Mnemonic:  [facility]  [address] [place] [address]  Nando Nunez M.D.  QUEST Specimen received date and time: 09-MAR-2021 12:46:00.00     05/06/2019 15.8 (H) < OR = 4.0 ng/mL Final     Comment:     The total PSA value from this assay system is   standardized against the WHO standard. The test   result will be approximately 20% lower when compared   to the equimolar-standardized total PSA ([place]   Aisha). Comparison of serial PSA results should be   interpreted with this fact in mind.     This test was performed using the Siemens   chemiluminescent method. Values obtained from   different assay methods cannot be used  interchangeably. PSA levels, regardless of  value, should not be interpreted as absolute  evidence of the presence or absence of disease.  Lab test performed by:  Lab Mnemonic:[facility]  [facility]  [address] [place] [address]  Nando Nunez M.D.  QUEST Specimen received date and time: 06-MAY-2019 08:07:00.00     10/15/2018 10.8 (H) < OR = 4.0 ng/mL Final     Comment:     The total PSA value from this assay system is   standardized against the WHO standard. The test   result will be approximately 20% lower when compared   to the equimolar-standardized total PSA ([place]   Aisha). Comparison of serial PSA results should be   interpreted with  this fact in mind.     This test was performed using the Siemens   chemiluminescent method. Values obtained from   different assay methods cannot be used  interchangeably. PSA levels, regardless of  value, should not be interpreted as absolute  evidence of the presence or absence of disease.  Lab test performed by:  Lab Mnemonic:  [facility]  [address] [place] [address]  Nando Nunez M.D.  QUEST Specimen received date and time: 15-OCT-2018 17:14:00.00     03/29/2018 13.0 (H) < OR = 4.0 ng/mL Final     Comment:     The total PSA value from this assay system is   standardized against the WHO standard. The test   result will be approximately 20% lower when compared   to the equimolar-standardized total PSA ([place]   Aisha). Comparison of serial PSA results should be   interpreted with this fact in mind.     This test was performed using the Siemens   chemiluminescent method. Values obtained from   different assay methods cannot be used  interchangeably. PSA levels, regardless of  value, should not be interpreted as absolute  evidence of the presence or absence of disease.  Lab test performed by:  Lab Mnemonic:  [facility]  [address] [place] [address]  Nando Nunez M.D.  QUEST Specimen received date and time: 29-MAR-2018 16:01:00.00     09/11/2017 11.9 (H) < OR = 4.0 ng/mL Final     Comment:     The total PSA value from this assay system is   standardized against the WHO standard. The test   result will be approximately 20% lower when compared   to the equimolar-standardized total PSA ([place]   Aisha). Comparison of serial PSA results should be   interpreted with this fact in mind.     This test was performed using the Siemens   chemiluminescent method. Values obtained from   different assay methods cannot be used  interchangeably. PSA levels, regardless of  value, should not be interpreted as absolute  evidence of the presence or  absence of disease.  Lab test performed by:  Lab Mnemonic:  [facility]  [address] [place] [address]  Nando Nunez M.D.  QUEST Specimen received date and time: 11-SEP-2017 08:29:00.00     07/15/2016 10.4 (H) < OR = 4.0 ng/mL Final     Comment:        This test was performed using the Siemens  chemiluminescent method. Values obtained from  different assay methods cannot be used  interchangeably. PSA levels, regardless of  value, should not be interpreted as absolute  evidence of the presence or absence of disease.     Lab test performed by:  Lab Mnemonic:  [facility]  [address] [place] [address]  Nando Nunez M.D.     02/09/2016 7.4 (H) < OR = 4.0 ng/mL Final     Comment:        This test was performed using the Siemens  chemiluminescent method. Values obtained from  different assay methods cannot be used  interchangeably. PSA levels, regardless of  value, should not be interpreted as absolute  evidence of the presence or absence of disease.     Lab test performed by:  Lab Mnemonic:  [facility]  [address] [facility] [facility] [place] [address]  Nando Nunez M.D.     04/22/2015 7.2 (H) < OR = 4.0 ng/mL Final     Comment:        This test was performed using the Siemens  chemiluminescent method. Values obtained from  different assay methods cannot be used  interchangeably. PSA levels, regardless of  value, should not be interpreted as absolute  evidence of the presence or absence of disease.       Lab test performed by:  Lab Mnemonic:  [facility]  [address] [facility] [place] [address]  Nando Nunez M.D.     03/19/2014 6.8 (H) < OR = 4.0 ng/mL Final     Comment:        This test was performed using the Siemens  chemiluminescent method. Values obtained from  different assay methods cannot be used  interchangeably. PSA levels, regardless of  value, should not be interpreted as absolute  evidence of  the presence or absence of disease.       Lab test performed by:  Lab Mnemonic:  [facility]  [address] [place] [address]  Nando Nunez M.D.     03/14/2013 6.1 (H) < OR = 4.0 ng/mL Final     Comment:        This test was performed using the Siemens  chemiluminescent method. Values obtained from  different assay methods cannot be used  interchangeably. PSA levels, regardless of  value, should not be interpreted as absolute  evidence of the presence or absence of disease.       Lab test performed by:  Lab Mnemonic:  [facility]  [address] [place] [address]  Nando Nunez M.D.     03/06/2012 5.5 (H) < OR = 4.0 ng/mL Final     Comment:        This test was performed using the Siemens  chemiluminescent method. Values obtained from  different assay methods cannot be used  interchangeably. PSA levels, regardless of  value, should not be interpreted as absolute  evidence of the presence or absence of disease.       Lab test performed by:  Lab Mnemonic:  [facility]  [address] [place] [address]  Nando Nunez M.D.     06/10/2011 4.7 (H) < OR = 4.0 ng/mL Final     Comment:        This test was performed using the Siemens  chemiluminescent method. Values obtained from  different assay methods cannot be used  interchangeably. PSA levels, regardless of  value, should not be interpreted as absolute  evidence of the presence or absence of disease.       Lab test performed by:  Lab Mnemonic:  [facility]  [address] [place] [address]  Nando Nunez M.D.       PSA Tumor Marker   Date Value Ref Range Status   05/01/2023 0.45 0.00 - 6.50 ng/mL Final   12/19/2022 3.00 0.00 - 6.50 ng/mL Final   09/19/2022 23.50 (H) 0.00 - 6.50 ng/mL Final   06/21/2022 21.81 (H) 0.00 - 6.50 ug/L Final   03/31/2022 18.25 (H) 0.00 - 6.50 ug/L Final   12/22/2021 12.47 (H) 0.00 - 6.50 ug/L Final      Impression      The patient is a 77-year-old gentleman with multiple history of cancer in the past, status post stereotactic radiosurgery for T7 spine 5 months ago and SBRT for lung cancer 2 years and 6 months ago with restaging CT and PET scan showed good response and new lytic lesion within T5, 6 and T8 suspicious for new metastasis.       Assessment & Plan:     1.  I have personally reviewed his most recent PET CT scan and compared to the previous PET CT scan today.  This unfortunately reviewed FDG avid disease involving T5, T6 and T8 spine consistent with progression of disease.  The patient is also clinically symptomatic with moderate pain at 5/10 scale with oxycodone medication.  The possible treatment options including systemic therapy, conservative pain management and palliative radiation therapy has been discussed with the patient in detail and at her great lengths.  The possible risks and side effects of radiation therapy has also been explained to the patient.  Patient is informed a potential increased risk of radiation-induced normal tissue damage given the prior history of radiation therapy to the same area.  The pros and cons of different options has been discussed with the patient.  After long discussion, the patient elected to proceed with palliative radiation therapy at this time for his thoracic spine metastasis being aware of potential risks and side effects involved.  He is scheduled to return to radiation oncology in the near future for simulation.  I plan to give him radiation therapy to a total dose of 3000 cGy in 10 treatments targeted to the T4-T9 spine.  Given the prior history of radiation therapy to the T7 spine region, I will consider use IMRT technique to better locate target and protect normal tissues.    2.  The patient had a good response for prostate cancer after hormonal therapy with most recent PSA measures 0.45 on 5/1/2023.  Continue follow-up for his prostate cancer which Dr. Wallace, [facility]  "oncology as planned.  Patient will also discuss possible systemic therapy upon next visit.     3.  Recommend patient to continue follow-up with Dr. Alon Nunez, ENT for his head neck cancer surveillance.     4.  Continue follow-up with Dr. Shade Boyd, PCP for other medical needs.     Face to face time  40 minutes with > 80% spent on consultation, education and coordination of care.       Mariana Batista MD  Department of Radiation Oncology   [facility]  Tel: [phone]  Page: [phone]    [facility]  [address] [place] [address]     [address]   [place] [address]    CC:  Patient Care Team:  Shade Boyd MD as PCP - General (Internal Medicine)  Mariana Batista MD as MD (Hematology & Oncology)  Faviola Cottrell [facility] as Pharmacist (Pharmacist)  Mariana Batista MD as Assigned Cancer Care Provider  Shade Boyd MD as Assigned PCP  Rosas Carr MD as MD (Neurology)  Nilton Gtz MD as Assigned Musculoskeletal Provider  David Castrejon MD (Internal Medicine)  Faviola Cottrell [facility] as Assigned MTM Pharmacist  Andrew Wallace MD as MD (Hematology)  Kwesi Roldan MD as Assigned Surgical Provider      No pacemaker  Prior radiation therapy treatment done at [facility]    Oncology Rooming Note    September 19, 2023 2:21 PM   Lenny Chau is a 77 year old male who presents for:    Chief Complaint   Patient presents with     Oncology Clinic Visit     Follow up with Dr. Batista, New evaluation     Initial Vitals: /71 (BP Location: Right arm, Patient Position: Sitting, Cuff Size: Adult Regular)   Pulse (!) 42   Temp 97.7  F (36.5  C) (Oral)   Resp 16   Wt 86.3 kg (190 lb 3.2 oz)   SpO2 98%   BMI 26.53 kg/m   Estimated body mass index is 26.53 kg/m  as calculated from the following:    Height as of 8/14/23: 1.803 m (5' 11\").    Weight as of this encounter: 86.3 kg (190 lb 3.2 oz). Body surface area is 2.08 meters " squared.  Moderate Pain (5) Comment: Data Unavailable   No LMP for male patient.  Allergies reviewed: Yes  Medications reviewed: Yes    Medications: MEDICATION REFILLS NEEDED TODAY. Provider was notified.  Pharmacy name entered into [facility]¢: [facility] [id number] [place] [address]    Clinical concerns: Patient here ambulatory accompanied by daughter for follow-up status post radiation for his lung oral and prostate cancers.  Patient here today to be evaluated for possible additional radiation therapy to new metastatic cancer.  Patient has been having an increase in mid back pain right below his shoulder blades.  Patient states his pain is about a 4 or 5 out of 10 with oxycodone.  Patient will need a refill of pain medicine today.  Patient recently discharged from the hospital after having COVID-19.  Reviewed radiation process and answered questions to the best my ability.  Dr. Batista planning to get a PSA lab drawn today.  Seen by Dr. Batista.  Plan return to clinic for follow-up and/or CT simulation as directed by provider.   Dr. Batista was notified.      Anne-Marie Durbin RN                Again, thank you for allowing me to participate in the care of your patient.        Sincerely,        Mariana Batista MD

## 2023-09-19 NOTE — PROGRESS NOTES
"No pacemaker  Prior radiation therapy treatment done at [facility] radiation [facility]    Oncology Rooming Note    September 19, 2023 2:21 PM   Lenny Chau is a 77 year old male who presents for:    Chief Complaint   Patient presents with    Oncology Clinic Visit     Follow up with Dr. Batista, New evaluation     Initial Vitals: /71 (BP Location: Right arm, Patient Position: Sitting, Cuff Size: Adult Regular)   Pulse (!) 42   Temp 97.7  F (36.5  C) (Oral)   Resp 16   Wt 86.3 kg (190 lb 3.2 oz)   SpO2 98%   BMI 26.53 kg/m   Estimated body mass index is 26.53 kg/m  as calculated from the following:    Height as of 8/14/23: 1.803 m (5' 11\").    Weight as of this encounter: 86.3 kg (190 lb 3.2 oz). Body surface area is 2.08 meters squared.  Moderate Pain (5) Comment: Data Unavailable   No LMP for male patient.  Allergies reviewed: Yes  Medications reviewed: Yes    Medications: MEDICATION REFILLS NEEDED TODAY. Provider was notified.  Pharmacy name entered into [facility]: [id number] [place] [address]    Clinical concerns: Patient here ambulatory accompanied by daughter for follow-up status post radiation for his lung oral and prostate cancers.  Patient here today to be evaluated for possible additional radiation therapy to new metastatic cancer.  Patient has been having an increase in mid back pain right below his shoulder blades.  Patient states his pain is about a 4 or 5 out of 10 with oxycodone.  Patient will need a refill of pain medicine today.  Patient recently discharged from the hospital after having COVID-19.  Reviewed radiation process and answered questions to the best my ability.  Dr. Batista planning to get a PSA lab drawn today.  Seen by Dr. Batista.  Plan return to clinic for follow-up and/or CT simulation as directed by provider.   Dr. Batista was notified.      Anne-Marie Durbin RN              "

## 2023-09-21 PROBLEM — U07.1 INFECTION DUE TO 2019 NOVEL CORONAVIRUS: Status: ACTIVE | Noted: 2023-01-01

## 2023-09-21 PROBLEM — F10.10 ALCOHOL ABUSE: Status: RESOLVED | Noted: 2021-11-08 | Resolved: 2023-01-01

## 2023-09-21 NOTE — PROGRESS NOTES
Assessment & Plan   Problem List Items Addressed This Visit          Respiratory     Lung cancer  11/2015: h/o right wedge resection of limited stage adenocarcinoma of lung   - 1-3/2021 XRT to separate MELODY malignancy (PET avid)  - following with [facility] radiation oncology  - 1/2023 presenting thoracic back pains and radiating chest wall pains. New T7 compression fracture related to reactive changes Serial CT, MRI imaging - interpretation of post-XRT changes, unclear if evolving metastatic dz.   - last seen by Dr. Batista in 9/2023; PET completed on 9/7 - FDG avid uptake in T5-T7 vertebral bodies c/w metastatic osseous disease  - planned XRT  - referred through palliative care for malignancy associated pain  - receiving gabapentin 300mg ORAL three times daily  - on oxycodone 5-10mg q4hr prn       COPD  - on Trelegy daily         Malignant neoplasm of upper lobe of left lung (H)       Digestive    Cyst of pancreas     - recommending repeat imaging in 2 yrs (noted stenotic CBD)   - on omeprazole 20mg twice daily  9/8/2023: CT with IV contrast demonstrating 2.2cm pancreatic body cystic mass (similar size described historically)   - referring back to Dr. 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)       Infectious/Inflammatory    Infection due to 2019 novel coronavirus     symptom onset on 9/7; COVID-19 PCR + on 9/8  - CT chest: no pulmonary infiltrates  - borderline OXYGEN levels; 96% on ambient air; observed desaturations with activity  - remdesivir 200mg loading, 100mg daily x2 days given within 3 days of symptom onset and absence of severe disease/high flow OXYGEN needs   -Defer any decisions for booster immunizations till after calendar year          Other Visit Diagnoses       Enlarged and hypertrophic nails    -  Primary    Relevant Orders  "   Orthopedic  Referral    Influenza vaccine administered        Relevant Orders    INFLUENZA VACCINE 65+ (FLUZONE HD) (Completed)              MED REC REQUIRED  Post Medication Reconciliation Status:  Discharge medications reconciled, continue medications without change  BMI:   Estimated body mass index is 26.92 kg/m  as calculated from the following:    Height as of this encounter: 1.803 m (5' 11\").    Weight as of this encounter: 87.5 kg (193 lb).         Shade Boyd MD  [facility]    Pilar Medrano is a 77 year old, presenting for the following health issues: Presents for hospital follow-up after being admitted locally with COVID infection.  No evidence of pneumonia.  Did receive remdesivir for prevention reasons of avoidance of severe disease.  His wife Darlene hospitalized at the same time with COVID.  She is now in the skilled care nursing setting; still hopeful to return to home and hospice setting.  They did recently see radiation oncology related to worsening metastatic bone disease and thoracic spine.  Tentative plans for extended course of radiation in near future.  Does have plans to meet with palliative care team this week to discuss other potential treatment options.  He is using gabapentin and oxycodone for metastatic related cancer pain.  Did have CT imaging done in the hospital that showed stable appearing pancreatic cyst.  Last ERCP in 2020 via Dr. Edwards  [facility] F/U        9/21/2023     3:14 PM   Additional Questions   Roomed by Keke ORNELAS   Accompanied by Sister       [facility] Follow-up Visit:    Hospital/Nursing Home/IP Rehab Facility:  [facility]  Date of Admission: 09/08/2023  Date of Discharge: 09/12/2023  Reason(s) for Admission: COVID    Was your hospitalization related to COVID-19? YES   How are you feeling today? Better  In the past 24 hours have you had shortness of breath when speaking, walking, or climbing stairs? I don't have " "breathing problems  Do you have a cough? I don't have a cough  When is the last time you had a fever greater than 100? NA  Are you having any other symptoms? None   Do you have any other stressors you would like to discuss with your provider? No             Was the patient in the ICU or did the patient experience delirium during hospitalization?  No        Problems taking medications regularly:  None  Medication changes since discharge: None  Problems adhering to non-medication therapy:  None    Summary of hospitalization:  CareEverywhere information obtained and reviewed  Diagnostic Tests/Treatments reviewed.  Follow up needed: none  Other Healthcare Providers Involved in Patient s Care:         None  Update since discharge: improved.         Plan of care communicated with patient             Review of Systems         Objective    /79   Pulse 54   Temp 98  F (36.7  C)   Resp 18   Ht 1.803 m (5' 11\")   Wt 87.5 kg (193 lb)   SpO2 98%   BMI 26.92 kg/m    Body mass index is 26.92 kg/m .  Physical Exam   GENERAL: healthy, alert and no distress  NECK: no adenopathy, no asymmetry, masses, or scars and thyroid normal to palpation  RESP: lungs clear to auscultation - no rales, rhonchi or wheezes  CV: regular rate and rhythm, normal S1 S2, no S3 or S4, no murmur, click or rub, no peripheral edema and peripheral pulses strong  ABDOMEN: soft, nontender, no hepatosplenomegaly, no masses and bowel sounds normal  MS: no gross musculoskeletal defects noted, no edema    Lab on 09/19/2023   Component Date Value Ref Range Status    PSA Tumor Marker 09/19/2023 0.17  0.00 - 6.50 ng/mL Final                     "

## 2023-09-21 NOTE — ASSESSMENT & PLAN NOTE
Lung cancer  11/2015: h/o right wedge resection of limited stage adenocarcinoma of lung   - 1-3/2021 XRT to separate MELODY malignancy (PET avid)  - following with [facility] radiation oncology  - 1/2023 presenting thoracic back pains and radiating chest wall pains. New T7 compression fracture related to reactive changes Serial CT, MRI imaging - interpretation of post-XRT changes, unclear if evolving metastatic dz.   - last seen by Dr. Batista in 9/2023; PET completed on 9/7 - FDG avid uptake in T5-T7 vertebral bodies c/w metastatic osseous disease  - planned XRT  - referred through palliative care for malignancy associated pain  - receiving gabapentin 300mg ORAL three times daily  - on oxycodone 5-10mg q4hr prn       COPD  - on Trelegy daily

## 2023-09-21 NOTE — ASSESSMENT & PLAN NOTE
symptom onset on 9/7; COVID-19 PCR + on 9/8  - CT chest: no pulmonary infiltrates  - borderline OXYGEN levels; 96% on ambient air; observed desaturations with activity  - remdesivir 200mg loading, 100mg daily x2 days given within 3 days of symptom onset and absence of severe disease/high flow OXYGEN needs   -Defer any decisions for booster immunizations till after calendar year

## 2023-09-21 NOTE — ASSESSMENT & PLAN NOTE
- recommending repeat imaging in 2 yrs (noted stenotic CBD)   - on omeprazole 20mg twice daily  9/8/2023: CT with IV contrast demonstrating 2.2cm pancreatic body cystic mass (similar size described historically)   - referring back to Dr. Edwards

## 2023-09-22 NOTE — PROCEDURES
Clinical Treatment Planning Note     The complex radiotherapy planning will be completed for his clinically symptomatic thoracic spine metastases. The patient had a planning CT earlier today for planning. The treatment aids were used for planning, including headrest and vac-loc to help keep the same position during the daily radiation therapy. The therapy planning is necessary to reduce radiotherapy dose to the normal critical organs which are not possible with simple treatment. In addition, dose to the target and the critical structures requires three-dimensional analysis of the isodose distribution. The planning will be done to reduce dose to the spinal cord, kidneys, liver, heart/large vessels, lung, esophagus, heart, lung, spinal cord, spleen, stomach, small bowel, diaphragm, bone and soft tissue.      I will contour the clinical tumor volume  CTV , with expanded volume of planning treatment volume  PTV  on the treatment planning system. The critical structures will be outlined, including spinal cord,  lungs, bronchial tree, esophagus, liver, heart/large vessels, spleen, stomach, small bowel, diaphragm, bone and soft tissue.      Treatment planning will be done on the computer treatment planning system. The multiple fields will be used to achieve optimal coverage of the target volume. Dose distribution to the above critical structures will be reviewed. Isodose distribution along with the X, Y, Z plan will also be reviewed. Custom blocking will be used to shield normal structures. The beam s eye views will be reviewed and the digital reconstructed image will be reviewed on the planning software. The patient will receive 3000  cGy in 10 treatments targeted to the tumors with margin using 6-10 MV photons.         Mariana Batista MD, PhD  Department of Radiation Oncology   [facility] Radiation Oncology  Tel: [phone]  Page: [phone]    [address] [address]     Hector hong  [place] [id number] [facility] [address]

## 2023-09-22 NOTE — PROCEDURES
SIMULATION NOTE:     DIAGNOSIS: The patient is a 77-year-old gentleman with multiple history of cancer in the past, status post stereotactic radiosurgery for T7 spine 5 months ago and SBRT for lung cancer 2 years and 6 months ago with restaging CT and PET scan showed good response and new lytic lesion within T5, 6 and T8 suspicious for new metastases.        INDICATION: After discussion with patient about various treatment options, the patient elected to proceed with palliative radiation therapy for his clinically symptomatic thoracic spine metastasis for local control and symptom relief.      CONSENT: The possible risks and side effects of radiation therapy have been discussed with the patient in detail and at great length.  Patient is informed a potential increased risk of radiation-induced normal tissue damage given the prior history of radiation therapy to the same area.  Questions were answered to patient's satisfaction. Written consent was obtained.       SIMULATION: The patient is in the supine position with a head rest and vac-loc to help keep the same position during daily radiation therapy. Tentative isocenter is set in the center of the lower thoracic region. We will acquire CT information to help us better locate target and design the radiation therapy field. We are also going to fuse his diagnostic PET/CT/MRI scan with our planning CT to help us to better outline the target.       BLOCKS: Custom blocks will be drawn to minimize radiation to normal tissues and to protect normal organs including, but not to, spinal cord, kidneys, liver, small bowel, large vessels, heart, lung, esophagus, peripheral nerve, bone and soft tissue.       DOSAGE: I plan to give him radiation therapy to a total dose of 3000 cGy in 10 treatments. I will consider to use IMRT/IGRT (given the prior history of radiation therapy to the same area) technique to help us to better locate target and to protect normal tissue.        Mariana  MD Lester, PhD  Department of Radiation Oncology   [facility] Radiation Oncology  Tel: [phone]  Page: [phone]    [facility]  [address]  [place] [address]     [address]  [place] [address]

## 2023-09-27 NOTE — LETTER
2023         RE: Lenny Chau  [address]  [place] [address]        Dear Colleague,    Thank you for referring your patient, Lenny Chau, to the [facility] RADIATION ONCOLOGY [place]. Please see a copy of my visit note below.    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  All questions and concerns addressed.    Plan:     Continue radiation treatment as prescribed.    The possible risks and side effects of radiation therapy has also been explained to the patient. Patient is informed a potential increased risk of radiation-induced normal tissue damage, including but not limited to spinal cord and to body, given the prior history of radiation therapy to the same area.  I will give 8 fractions instead of 10 treatments to reduce likelihood of radiation-induced normal tissue damage.  Patient understands well and wished to proceed.    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    Assessment                  Body Site:  Bone Comfort Alteration  Fatigue (ONS scale): 3: Mild Fatigue  Pain Location: Rib cage  Pain Description: Sharp - Sharp, stabbing, knife like pain  Pain Intervention: 3: Opiods (Robaxin, oxycodone, gabapentine)  Elimination Alteration  Constipation: 0: None  Fall Risk  Have you fallen since last visit?: no  Are you unsteady?: no  Skin Alteration  Skin Sensation: 0: No problem  Skin Reaction: 0: None  Nutrition Alteration  Anorexia: 0: None  Nausea: 0: None  Vomitin: None  Pharynx and Esphogaus: 0: No change over baseline  Elimination  Alteration  Constipation: 0: None                                   Sexuality Alteration                    Emotional Alteration       Comfort Alteration   Fatigue (ONS scale): 3: Mild Fatigue  Pain Location: Rib cage  Pain Description: Sharp - Sharp, stabbing, knife like pain  Pain Intervention: 3: Opiods (Robaxin, oxycodone, gabapentine)   Nutrition Alteration   Anorexia: 0: None  Nausea: 0: None  Vomitin: None  Weight: 86 kg (189 lb 9.6 oz)  Pharynx and Esphogaus: 0: No change over baseline  Skin Alteration   Skin Sensation: 0: No problem  Skin Reaction: 0: None  AUA Assessment                                           Accompanied by       Objective:     Exam: Examination reviewed no significant changes.    Vitals:    23 1017 23 1048   BP:  119/69   Pulse:  62   Resp:  18   Temp:  98.2  F (36.8  C)   TempSrc:  Oral   SpO2:  96%   Weight: 86 kg (189 lb 9.6 oz)    PainSc:  Severe Pain (7)   PainLoc:  Chest       Wt Readings from Last 8 Encounters:   23 86 kg (189 lb 9.6 oz)   23 87.5 kg (193 lb)   23 86.3 kg (190 lb 3.2 oz)   23 88.5 kg (195 lb 3.2 oz)   23 87.1 kg (192 lb)   23 90.8 kg (200 lb 1.6 oz)   23 91.6 kg (202 lb)   23 91.2 kg (201 lb)       General: Alert and oriented, in no acute distress  Lenny has no Erythema.  Aria chart and setup information reviewed    Mariana Batista MD      Again, thank you for allowing me to participate in the care of your patient.        Sincerely,        Mariana Batista MD

## 2023-09-27 NOTE — PROGRESS NOTES
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  All questions and concerns addressed.    Plan:     Continue radiation treatment as prescribed.    The possible risks and side effects of radiation therapy has also been explained to the patient. Patient is informed a potential increased risk of radiation-induced normal tissue damage, including but not limited to spinal cord and to body, given the prior history of radiation therapy to the same area.  I will give 8 fractions instead of 10 treatments to reduce likelihood of radiation-induced normal tissue damage.  Patient understands well and wished to proceed.    Subjective:      HPI: Lenny Chau is a 78 year old male with  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) [C79.51]    The following portions of the patient's history were reviewed and updated as appropriate: allergies, current medications, past family history, past medical history, past social history, past surgical history and problem list.    Assessment                  Body Site:  Bone Comfort Alteration  Fatigue (ONS scale): 3: Mild Fatigue  Pain Location: Rib cage  Pain Description: Sharp - Sharp, stabbing, knife like pain  Pain Intervention: 3: Opiods (Robaxin, oxycodone, gabapentine)  Elimination Alteration  Constipation: 0: None  Fall Risk  Have you fallen since last visit?: no  Are you unsteady?: no  Skin Alteration  Skin Sensation: 0: No problem  Skin Reaction: 0: None  Nutrition Alteration  Anorexia: 0: None  Nausea: 0: None  Vomitin: None  Pharynx and Esphogaus: 0: No change over baseline  Elimination Alteration  Constipation: 0: None                                   Sexuality Alteration                    Emotional Alteration       Comfort Alteration   Fatigue (ONS scale): 3: Mild Fatigue  Pain Location: Rib cage  Pain Description: Sharp - Sharp, stabbing, knife  like pain  Pain Intervention: 3: Opiods (Robaxin, oxycodone, gabapentine)   Nutrition Alteration   Anorexia: 0: None  Nausea: 0: None  Vomitin: None  Weight: 86 kg (189 lb 9.6 oz)  Pharynx and Esphogaus: 0: No change over baseline  Skin Alteration   Skin Sensation: 0: No problem  Skin Reaction: 0: None  AUA Assessment                                           Accompanied by       Objective:     Exam: Examination reviewed no significant changes.    Vitals:    23 1017 23 1048   BP:  119/69   Pulse:  62   Resp:  18   Temp:  98.2  F (36.8  C)   TempSrc:  Oral   SpO2:  96%   Weight: 86 kg (189 lb 9.6 oz)    PainSc:  Severe Pain (7)   PainLoc:  Chest       Wt Readings from Last 8 Encounters:   23 86 kg (189 lb 9.6 oz)   23 87.5 kg (193 lb)   23 86.3 kg (190 lb 3.2 oz)   23 88.5 kg (195 lb 3.2 oz)   23 87.1 kg (192 lb)   23 90.8 kg (200 lb 1.6 oz)   23 91.6 kg (202 lb)   23 91.2 kg (201 lb)       General: Alert and oriented, in no acute distress  Lenny has no Erythema.  [facility]ria chart and setup information reviewed    Mariana Batista MD      Addendum:    The patient case has been reviewed at peer to peer review chart round on 2023.  The risk of radiation-induced spinal cord damage is reasonably low and the consensus recommendation is to proceed with the current planned palliative radiation therapy course.

## 2023-09-29 PROBLEM — U07.1 INFECTION DUE TO 2019 NOVEL CORONAVIRUS: Status: RESOLVED | Noted: 2023-01-01 | Resolved: 2023-01-01

## 2023-09-29 NOTE — TELEPHONE ENCOUNTER
Orthopedic referral was placed 9/21/2023. [facility] has not contacted pt yet and I do not see that anyone in the referrals department has contacted him to help. Can someone please reach out to help him get this scheduled?     Thanks

## 2023-09-29 NOTE — PROGRESS NOTES
Assessment & Plan   Problem List Items Addressed This Visit          Respiratory     Lung cancer  11/2015: h/o right wedge resection of limited stage adenocarcinoma of lung   - 1-3/2021 XRT to separate MELODY malignancy (PET avid)  - following with [facility] radiation oncology  - 1/2023 presenting thoracic back pains and radiating chest wall pains. New T7 compression fracture related to reactive changes Serial CT, MRI imaging - interpretation of post-XRT changes, unclear if evolving metastatic dz.   - 9/2023; PET completed on 9/7 - FDG avid uptake in T5-T7 vertebral bodies c/w metastatic osseous disease  -Currently receiving palliative XRT  - referred through palliative care for malignancy associated pain  - receiving gabapentin 300mg ORAL three times daily  - on oxycodone 5-10mg q4hr prn    -Providing prescription for OxyContin 15 mg every 12 hours; #60; okay to take oxycodone for breakthrough pains   -Providing home Narcan prescription as well   -Greatly would appreciate having palliative care oncology team directing pain management      COPD  - on Trelegy daily         COPD, group B, by GOLD 2017 classification (H)    Malignant neoplasm of upper lobe of left lung (H) - Primary    Relevant Medications    tamsulosin (FLOMAX) 0.4 MG capsule    oxyCODONE (OXYCONTIN) 15 MG 12 hr tablet    naloxone (NARCAN) 4 MG/0.1ML nasal spray       Digestive    Ragsdale's esophagus    Relevant Medications    omeprazole (PRILOSEC) 20 MG DR capsule       Musculoskeletal and Integumentary    Compression fracture of T7 vertebra with routine healing     1/2023: Presenting with thoracic back pains and radiating chest wall pains - symptoms generally improved  - gabapentin to 300mg TID - can titrate up further based on symptoms         Relevant Medications    oxyCODONE (OXYCONTIN) 15 MG 12 hr tablet    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)    Relevant Medications    tamsulosin (FLOMAX) 0.4 MG capsule    oxyCODONE (OXYCONTIN) 15 MG 12 hr  "tablet    naloxone (NARCAN) 4 MG/0.1ML nasal spray       Urinary    Malignant neoplasm of prostate (H)     prostate Ca - on ADT   - original prostate biopsy in '11, repeat biopsy in '16; [place] 6   - watchful surveillance; q6 month PSA; progressively rising (9/2022: 23.5)   - MRI of prostate (2019): focal coarse calcifications in prostate; no evidence of extra-prostate extension  - begun on GnRH therapy - tolerating well  9/2023: Changes in urinary frequency and incomplete urinary retention complaints   -Beginning on tamsulosin daily   -Stopping muscle relaxant   -Counseled on signs and symptoms of worsening urinary retention and potential need for urgent care presentation for Urban catheter management         Relevant Medications    tamsulosin (FLOMAX) 0.4 MG capsule             MED REC REQUIRED  Post Medication Reconciliation Status:     BMI:   Estimated body mass index is 26.5 kg/m  as calculated from the following:    Height as of this encounter: 1.803 m (5' 11\").    Weight as of this encounter: 86.2 kg (190 lb).         Shade Boyd MD  [facility]   Mitchell is a 78 year old, presenting for the following health issues: Presents for hospital follow-up after admitted locally due to worsening metastatic thoracic bone pain.  Has been working with radiation oncology.  There were concerns raised about dad's fitness for living independently.  Increase into assisted living versus skilled nursing setting.  Ultimately discharged and currently residing with his daughter.  His wife currently residing in skilled nursing setting pending hospice enrollment.  Number of dynamic living situations right now for the family.  Mitchell continues to follow with Dr. Batista for radiation oncology and actively receiving radiation.  Reportedly referral in place for palliative care consultation though they have not heard anything yet in terms of scheduling.  Has been using oxycodone typically every 4 " "hours through the day not needing to use at night as often.  Recent reduction of methocarbamol for muscle relaxant.  Since hospital discharge they describe issues with urination, having longer periods of time without being able to urinate.  Still has been able to successfully urinate typically more in the afternoons and evenings.  Describes variable bowel movements sometimes with bowel incontinence.  Hospital F/U        9/29/2023     2:59 PM   Additional Questions   Roomed by Keke ORNELAS   Accompanied by DIANNA (Daughter)       [facility]       Hospital Follow-up Visit:    Hospital/Nursing Home/IP Rehab Facility:  [facility]  Date of Admission: 09/23/2023  Date of Discharge: 9/25/2023  Reason(s) for Admission: Acute back pain from metastatic lesions    Was your hospitalization related to COVID-19? No   Problems taking medications regularly:  None  Medication changes since discharge: None  Problems adhering to non-medication therapy:  None    Summary of hospitalization:  CareEverywhere information obtained and reviewed  Diagnostic Tests/Treatments reviewed.  Follow up needed: none  Other Healthcare Providers Involved in Patient s Care:         Homecare  Update since discharge: stable.         Plan of care communicated with patient and family                 Review of Systems   Constitutional, HEENT, cardiovascular, pulmonary, gi and gu systems are negative, except as otherwise noted.      Objective    /79   Pulse 85   Temp (!) 96.3  F (35.7  C) (Temporal)   Resp 16   Ht 1.803 m (5' 11\")   Wt 86.2 kg (190 lb)   SpO2 95%   BMI 26.50 kg/m    Body mass index is 26.5 kg/m .  Physical Exam   GENERAL: healthy, alert and no distress  NECK: no adenopathy, no asymmetry, masses, or scars and thyroid normal to palpation  RESP: lungs clear to auscultation - no rales, rhonchi or wheezes  CV: regular rate and rhythm, normal S1 S2, no S3 or S4, no murmur, click or rub, no peripheral edema and peripheral pulses " strong  ABDOMEN: soft, nontender, no hepatosplenomegaly, no masses and bowel sounds normal  MS: no gross musculoskeletal defects noted, no edema    Lab on 09/19/2023   Component Date Value Ref Range Status    PSA Tumor Marker 09/19/2023 0.17  0.00 - 6.50 ng/mL Final

## 2023-09-29 NOTE — TELEPHONE ENCOUNTER
Order has been sent via [employer]ax to [facility] to schedule patient.  My chart message sent to patient with scheduling information.

## 2023-09-29 NOTE — ASSESSMENT & PLAN NOTE
Lung cancer  11/2015: h/o right wedge resection of limited stage adenocarcinoma of lung   - 1-3/2021 XRT to separate MELODY malignancy (PET avid)  - following with [facility] radiation oncology  - 1/2023 presenting thoracic back pains and radiating chest wall pains. New T7 compression fracture related to reactive changes Serial CT, MRI imaging - interpretation of post-XRT changes, unclear if evolving metastatic dz.   - 9/2023; PET completed on 9/7 - FDG avid uptake in T5-T7 vertebral bodies c/w metastatic osseous disease  -Currently receiving palliative XRT  - referred through palliative care for malignancy associated pain  - receiving gabapentin 300mg ORAL three times daily  - on oxycodone 5-10mg q4hr prn    -Providing prescription for OxyContin 15 mg every 12 hours; #60; okay to take oxycodone for breakthrough pains   -Providing home Narcan prescription as well   -Greatly would appreciate having palliative care oncology team directing pain management      COPD  - on Trelegy daily

## 2023-09-29 NOTE — ASSESSMENT & PLAN NOTE
prostate Ca - on ADT   - original prostate biopsy in '11, repeat biopsy in '16; [place] 6   - watchful surveillance; q6 month PSA; progressively rising (9/2022: 23.5)   - MRI of prostate (2019): focal coarse calcifications in prostate; no evidence of extra-prostate extension  - begun on GnRH therapy - tolerating well  9/2023: Changes in urinary frequency and incomplete urinary retention complaints   -Beginning on tamsulosin daily   -Stopping muscle relaxant   -Counseled on signs and symptoms of worsening urinary retention and potential need for urgent care presentation for Urban catheter management

## 2023-10-02 NOTE — LETTER
10/2/2023         RE: Lenny Chau  [address]  [place] [address]        Dear Colleague,    Thank you for referring your patient, Lenny Chau, to the [facility] RADIATION ONCOLOGY [place]. Please see a copy of my visit note below.    RADIATION ONCOLOGY WEEKLY TREATMENT VISIT NOTE      Assessment / Impression     Metastasis to spinal column (H) [C79.51]  Worsening pain with radicular component on radiation.  questions and concerns addressed.  Set up checked not change position for radiation.     Plan:   Taking 10 mg of oxycodone q4-6 hours. Refilled 10 mg tablets #60. Has not been able to  OxyContin - told him and daughter that unlikely to find pharmacy that will fill. Has Narcan at home. Referral to palliative care for pain control.   Only taking gabapentin 300 TID. Radicular pain worse in am. Start 600mg qam and every 3 days increase the afternoon and evening dose. Hold for sedation   Lidocaine patch   Dexamethasone start 4mg qam for pain control.   (?)stool  incontinence - able to feel perineum when wiping. Hold Robaxin and Miralax as both give him soft stools.     DNR    Referrals to palliative care and neurosurgery (orthotics)   Will see Dr Garcia on Wednesday to evaluate interventions.           Continue radiation treatment as prescribed.  Radiation: T Spine   Fraction 4/8   Dose 1200cGy/2400cGy ( reduced dose due to proximity to prior field)               Subjective:      HPI: Lenny Chau is a 78 year old male with  Metastasis to spinal column (H) [C79.51]    The following portions of the patient's history were reviewed and updated as appropriate: allergies, current medications, past family history, past medical history, past social history, past surgical history and problem list.    Assessment                  Body Site:  Bone                                     Sexuality Alteration                    Emotional Alteration       Comfort Alteration       Nutrition  Alteration   Weight: 87.4 kg (192 lb 9.6 oz)  Skin Alteration      AUA Assessment                                           Accompanied by  Accompanied By: family (daughter Georgette)    Objective:     Exam:     Vitals:    10/02/23 1122 10/02/23 1129   BP: 128/79    Pulse: 106    Resp: 16    Temp: 98.1  F (36.7  C)    TempSrc: Oral    SpO2: 95%    Weight: 87.4 kg (192 lb 9.6 oz)    PainSc: Moderate Pain (4) Moderate Pain (4)   PainLoc: Mid Back        Wt Readings from Last 8 Encounters:   10/02/23 87.4 kg (192 lb 9.6 oz)   09/29/23 86.2 kg (190 lb)   09/27/23 86 kg (189 lb 9.6 oz)   09/21/23 87.5 kg (193 lb)   09/19/23 86.3 kg (190 lb 3.2 oz)   08/22/23 88.5 kg (195 lb 3.2 oz)   08/14/23 87.1 kg (192 lb)   05/01/23 90.8 kg (200 lb 1.6 oz)       General: Alert and oriented, in obvious pain.   Lenny has no Erythema.    Treatment Summary to Date    Aria chart and setup information reviewed    Hazel Mckinley MD      Again, thank you for allowing me to participate in the care of your patient.        Sincerely,        Hazel Mckilney MD

## 2023-10-02 NOTE — PROGRESS NOTES
RADIATION ONCOLOGY WEEKLY TREATMENT VISIT NOTE      Assessment / Impression     Metastasis to spinal column (H) [C79.51]  Worsening pain with radicular component on radiation.  questions and concerns addressed.  Set up checked not change position for radiation.     Plan:   Taking 10 mg of oxycodone q4-6 hours. Refilled 10 mg tablets #60. Has not been able to  OxyContin - told him and daughter that unlikely to find pharmacy that will fill. Has Narcan at home. Referral to palliative care for pain control.   Only taking gabapentin 300 TID. Radicular pain worse in am. Start 600mg qam and every 3 days increase the afternoon and evening dose. Hold for sedation   Lidocaine patch   Dexamethasone start 4mg qam for pain control.   (?)stool  incontinence - able to feel perineum when wiping. Hold Robaxin and Miralax as both give him soft stools.     DNR    Referrals to palliative care and neurosurgery (orthotics)   Will see Dr Garcia on Wednesday to evaluate interventions.           Continue radiation treatment as prescribed.  Radiation: T Spine   Fraction 4/8   Dose 1200cGy/2400cGy ( reduced dose due to proximity to prior field)               Subjective:      HPI: Lenny Chau is a 78 year old male with  Metastasis to spinal column (H) [C79.51]    The following portions of the patient's history were reviewed and updated as appropriate: allergies, current medications, past family history, past medical history, past social history, past surgical history and problem list.    Assessment                  Body Site:  Bone                                     Sexuality Alteration                    Emotional Alteration       Comfort Alteration       Nutrition Alteration   Weight: 87.4 kg (192 lb 9.6 oz)  Skin Alteration      AUA Assessment                                           Accompanied by  Accompanied By: family (daughter Georgette)    Objective:     Exam:     Vitals:    10/02/23 1122 10/02/23 1129   BP: 128/79     Pulse: 106    Resp: 16    Temp: 98.1  F (36.7  C)    TempSrc: Oral    SpO2: 95%    Weight: 87.4 kg (192 lb 9.6 oz)    PainSc: Moderate Pain (4) Moderate Pain (4)   PainLoc: Mid Back        Wt Readings from Last 8 Encounters:   10/02/23 87.4 kg (192 lb 9.6 oz)   09/29/23 86.2 kg (190 lb)   09/27/23 86 kg (189 lb 9.6 oz)   09/21/23 87.5 kg (193 lb)   09/19/23 86.3 kg (190 lb 3.2 oz)   08/22/23 88.5 kg (195 lb 3.2 oz)   08/14/23 87.1 kg (192 lb)   05/01/23 90.8 kg (200 lb 1.6 oz)       General: Alert and oriented, in obvious pain.   Lenny has no Erythema.    Treatment Summary to Date    Aria chart and setup information reviewed    Hazel Mckinley MD

## 2023-10-04 NOTE — LETTER
10/4/2023         RE: Lenny Chau  [address]  [place] [address]        Dear Colleague,    Thank you for referring your patient, Lenny Chau, to the [facility] RADIATION ONCOLOGY [place]. Please see a copy of my visit note below.    RADIATION ONCOLOGY WEEKLY TREATMENT VISIT NOTE      Assessment / Impression     Metastasis to spinal column (H) [C79.51]     Cancer Staging   No matching staging information was found for the patient.     Tolerating radiation therapy well.  All questions and concerns addressed.    Constipation    Plan:     Continue radiation treatment as prescribed.  Radiation:   Site: t spine  Today's Dose: 1800  Total Dose for Thoracic: 2400  Today's Fraction/Total Fraction Thoracic: 6/8    Pain control improved since Monday continue current regimen--May consider adding topical balms/creams  Has MiraLAX for constipation- will continue to use to maintain regular bowel movements    Subjective:      HPI: Lenny Chau is a 78 year old male with  Metastasis to spinal column (H) [C79.51]    His pain control is improved since Monday.  Yesterday was slightly better than today.  He initiated steroids as well as use of Lidoderm patch and is using oxycodone and gabapentin.  He discontinued use of his muscle relaxant.  Notes constipation, using MiraLAX as needed.  Urination improved with use of Flomax.    The following portions of the patient's history were reviewed and updated as appropriate: allergies, current medications, past family history, past medical history, past social history, past surgical history and problem list.    Assessment                  Body Site:  Thoracic Site: t spine  Today's Dose: 1800  Total Dose for Thoracic: 2400  Today's Fraction/Total Fraction Thoracic: 6/8  Voice Chances/Stridor/Larynx: 0: Normal  Pharynx and Esphogaus: 0: No change over baseline  Constipation: 2: Requiring laxatives  Mucus Color: 1: White  Dyspnea: 2: Dyspnea on exertion                                    Sexuality Alteration                    Emotional Alteration       Comfort Alteration       Nutrition Alteration      Skin Alteration      AUA Assessment                                           Accompanied by       Objective:     Exam:     There were no vitals filed for this visit.    Wt Readings from Last 8 Encounters:   10/02/23 87.4 kg (192 lb 9.6 oz)   09/29/23 86.2 kg (190 lb)   09/27/23 86 kg (189 lb 9.6 oz)   09/21/23 87.5 kg (193 lb)   09/19/23 86.3 kg (190 lb 3.2 oz)   08/22/23 88.5 kg (195 lb 3.2 oz)   08/14/23 87.1 kg (192 lb)   05/01/23 90.8 kg (200 lb 1.6 oz)       General: Alert and oriented, in no acute distress  Lenny has no Erythema.    Treatment Summary to Date    Aria chart and setup information reviewed    Hazel Garcia MD      Again, thank you for allowing me to participate in the care of your patient.        Sincerely,        Hazel Garcia MD

## 2023-10-04 NOTE — PROGRESS NOTES
RADIATION ONCOLOGY WEEKLY TREATMENT VISIT NOTE      Assessment / Impression     Metastasis to spinal column (H) [C79.51]     Cancer Staging   No matching staging information was found for the patient.     Tolerating radiation therapy well.  All questions and concerns addressed.    Constipation    Plan:     Continue radiation treatment as prescribed.  Radiation:   Site: t spine  Today's Dose: 1800  Total Dose for Thoracic: 2400  Today's Fraction/Total Fraction Thoracic: 6/8    Pain control improved since Monday continue current regimen--May consider adding topical balms/creams  Has MiraLAX for constipation- will continue to use to maintain regular bowel movements    Subjective:      HPI: Lenny Chau is a 78 year old male with  Metastasis to spinal column (H) [C79.51]    His pain control is improved since Monday.  Yesterday was slightly better than today.  He initiated steroids as well as use of Lidoderm patch and is using oxycodone and gabapentin.  He discontinued use of his muscle relaxant.  Notes constipation, using MiraLAX as needed.  Urination improved with use of Flomax.    The following portions of the patient's history were reviewed and updated as appropriate: allergies, current medications, past family history, past medical history, past social history, past surgical history and problem list.    Assessment                  Body Site:  Thoracic Site: t spine  Today's Dose: 1800  Total Dose for Thoracic: 2400  Today's Fraction/Total Fraction Thoracic: 6/8  Voice Chances/Stridor/Larynx: 0: Normal  Pharynx and Esphogaus: 0: No change over baseline  Constipation: 2: Requiring laxatives  Mucus Color: 1: White  Dyspnea: 2: Dyspnea on exertion                                   Sexuality Alteration                    Emotional Alteration       Comfort Alteration       Nutrition Alteration      Skin Alteration      AUA Assessment                                           Accompanied by       Objective:      Exam:     There were no vitals filed for this visit.    Wt Readings from Last 8 Encounters:   10/02/23 87.4 kg (192 lb 9.6 oz)   09/29/23 86.2 kg (190 lb)   09/27/23 86 kg (189 lb 9.6 oz)   09/21/23 87.5 kg (193 lb)   09/19/23 86.3 kg (190 lb 3.2 oz)   08/22/23 88.5 kg (195 lb 3.2 oz)   08/14/23 87.1 kg (192 lb)   05/01/23 90.8 kg (200 lb 1.6 oz)       General: Alert and oriented, in no acute distress  Lenny has no Erythema.    Treatment Summary to Date    Aria chart and setup information reviewed    Hazel Garcia MD

## 2023-10-06 NOTE — PROGRESS NOTES
Patient here ambulatory for final radiation therapy treatment for his spine mets.  Patient states he has no change in his level of pain since prior to treatment.  Patient states pain comes and goes and is on a current pain regimen.  Written discharge instructions reviewed and given to patient.  Follow-up with Dr. Batista in about 4 weeks.  Patient left ambulatory and instructed to make appointments on his way out of the clinic.

## 2023-10-10 NOTE — PATIENT INSTRUCTIONS
Thank you for meeting with us in the [facility].    How to get a hold of us:  For non-urgent matters, MyChart works best.    For more urgent matters, or if you prefer not to use MyChart, call our clinic nurse coordinator Debra Abreu RN at [phone] or [phone]    We have an on-call number for evenings and weekends. Please call this only if you are having uncontrolled symptoms or serious side effects from your medicines: [phone].     For refills, please give us a week (5 working days) notice. We don't always have providers available everyday to do refills. If you call the day you run out of your medicine, we may not be able to refill it in time, so call 5 days in advance!

## 2023-10-10 NOTE — PROGRESS NOTES
Radiation Treatment Summary          Patient: Lenny Chau            MRN: [id number]           : 1945        Care Provider: Mariana Batista MD         Date of Service: Oct 6, 2023          Matt Magaña MD  [address] [place] [address] [address]  [place] [address]           Dear Dr. Magaña:     Your patient Mr. Lenny Chau completed his palliative adiation therapy on 10/6/2023. As you know Mr. Chau is a 75 y.o. 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  The patient received SBRT for his primary lung cancer with a total dose of 5000 cGy in 5 treatment completed on 3/10/2021.  He was find to have a second primary of lung cancer by restaging PET CT scan for which he received second course of SBRT with a total dose of 5000 cGy in 5 treatments given from 3/9/2022-3/18/2022.      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  He received course #3 stereotactic radiosurgery with a total dose of 3275 cGy in 5 treatments given from 2022- 2022.  Restaging PET CT scan showed new lytic lesion within T5, 6 and T8 spine consistent with new metastases.  Patient received course #4 palliative radiation therapy to the thoracic spine with a total dose of 2400 cGy in 8 treatments given from 2023 - 10/6/2023.  He tolerated radiation therapy well with minimal side effect.  The patient is scheduled to return to radiation oncology in 4 weeks for routine post therapy office follow-up.  He is also  referred to medical oncology for evaluation and discussion of possible systemic therapy.    Again, thank you very much for the referral and allowing me to participate in the care of this patient.  If you have any questions or concerns about this patient, please do not hesitate to call.          Sincerely,    Mariana Batista MD, PhD  Department of Radiation Oncology   [facility] Radiation Oncology  Tel: [phone]  Page: [phone]    [facility]  [address] [place] [address]     [address]  [place] [address]    CC:  Patient Care Team:  Shade Boyd MD as PCP - General (Internal Medicine)  Mariana Batista MD as MD (Hematology & Oncology)  Faviola Cottrell RPH as Pharmacist (Pharmacist)  Mariana Batista MD as Assigned Cancer Care Provider  Shade Boyd MD as Assigned PCP  Rosas Carr MD as MD (Neurology)  Nilton Gtz MD as Assigned Musculoskeletal Provider  David Castrejon MD (Internal Medicine)  Faviola Cottrell RPH as Assigned MTM Pharmacist  Andrew Wallace MD as MD (Hematology)  Kwesi Roldan MD as Assigned Surgical Provider        The patient tolerated ration therapy very well with minimal side effect.  He is scheduled return to radiation oncology in 2 months for routine post therapy office follow-up and restaging CT scan.

## 2023-10-10 NOTE — NURSING NOTE
Is the patient currently in the state of [place]? YES    Visit mode:VIDEO    If the visit is dropped, the patient can be reconnected by: VIDEO VISIT: Send to e-mail at: [email or web address]    Will anyone else be joining the visit? NO  (If patient encounters technical issues they should call [phone]    How would you like to obtain your AVS? MyChart    Are changes needed to the allergy or medication list? Yes See information below.     Pt uses Trelegy Ellipta inhaler prn.   Pt takes Prilosec 20 mg twice daily.   Pt takes Iron 100 mg every other day.     Reason for visit: Consult    Val BAR

## 2023-10-10 NOTE — PROGRESS NOTES
Virtual Visit Details    Type of service:  Video Visit   Video Start Time: 9 AM  Video End Time: 9:40 AM    Originating Location (pt. Location): Home    Distant Location (provider location):  Off-site  Platform used for Video Visit: [facility]    Palliative Care Outpatient Clinic      Patient ID/Chief Complaint: Lenny Chau 78 year old male who is presenting to the palliative medicine clinic today at the request of Dr. Hazel Mckinley for a palliative care consultation secondary to assistance with symptom management.   The patient's primary care provider is:  Shade Boyd.       Impression & Recommendations:  78-year-old male with stage IV lung cancer with metastases to thoracic spine, history of prostate cancer on suppressive therapy, history of right buccal SCC, remote history of left nephrectomy for large meningioma, CKD 3, HTN, AAA, CAD, COPD, HLD, peripheral neuropathy in feet, history of tobacco use stopped 2009, history of higher alcohol use stopped 2 years ago.    He has just completed 8 of 8 radiation treatments to T-spine with improvement in back pain.  Full pain relief likely still to come.    Pain is described as aching, throbbing, occasionally sharp pain in his back and right rib cage from back into chest.  She denies neuropathic complaints.  He has been taking oxycodone IR 10 mg tab approximately 4 to 5/day.  Over this past weekend, he took maybe 2-3 a day without significant worsening in pain.  It is likely that analgesic effect of radiation is beginning to work.    He has neuropathy in both feet that is adequately controlled with gabapentin 600 mg every morning, 300 in the afternoon, and 300 in the evening.    Social history includes previous history of alcohol and tobacco use described above, no other TARA.  His wife has advanced dementia and is currently in a care center but may be returning home with him.  He is her primary caretaker.  His daughter Georgette is involved and helps with  "care.        Symptoms/recommendations/discussion:  Monitor effect of radiation on back and rib pain.  Continue oxycodone but with dose range of 5 to 10 mg every 4 hours as needed.  May consider long-acting pain medication pending response to radiation and his attempts to wean down oxycodone IR use now that pain is decreasing.  Refill sent.  Continue gabapentin at current doses.  Refill sent  No issues with constipation at this time, having soft frequent stools.  He does not always know when he needs to have a bowel movement.  Narcan nasal spray present in the house for safety, confirmed by Mitchell and Georgette  Continue Tylenol as needed.  He avoids NSAIDs due to CKD and solitary kidney  Recent issues with urinary retention, improved on Flomax  Daughter reports he has an appointment with medical oncology coming up, unsure of plan of care  Palliative care follow-up in 3 week     Addendum, 10/17/23: Daughter reports his pain is worsened significantly since steroid dosing completed.  Given chronic as needed use of oxycodone IR, start Xtampza 13.5 mg every 12 hours.        How to get a hold of us:  For non-urgent matters, MyChart works best.    For more urgent matters, or if you prefer not to use MyChart, call our clinic nurse coordinator Debra Abreu RN at [phone] or [phone]    We have an on-call number for evenings and weekends. Please call this only if you are having uncontrolled symptoms or serious side effects from your medicines: [phone].     For refills, please give us a week (5 working days) notice. We don't always have providers available everyday to do refills. If you call the day you run out of your medicine, we may not be able to refill it in time, so call 5 days in advance        History:  History gathered today from: patient, family/loved ones, medical chart, outside records including Care Everywhere, health care directive/s      PE: Ht 1.816 m (5' 11.5\")   BMI 26.61 kg/m     Wt Readings from " Last 3 Encounters:   10/04/23 87.8 kg (193 lb 8 oz)   10/02/23 87.4 kg (192 lb 9.6 oz)   09/29/23 86.2 kg (190 lb)       Gen alert, comfortable appearing  Head NCAT.  Eyes anicteric without injection  Face symmetric, eyes conjugate  Lungs unlabored, no cough, speaking full sentences  Skin no rashes or lesions evident on face/neck  Neuro Face symmetric, eyes conjugate; speech understandable.  Neuropsych exam normal including affect, sensorium, gross memory, thought processes, and fund of knowledge.         Data reviewed:  I reviewed electrolytes, BUN/creatinine, liver profile, hemoglobin and hematocrit, platelet count, and most recent imaging  Radiation oncology notes     database reviewed: [place], 10/10/2023        74 minutes spent on the date of the encounter doing chart review, history and exam, patient education & counseling, documentation and other activities as noted above.        Thank you for involving us in the patient's care.   BRENDEN Evangelista, [facility] Palliative Care Service

## 2023-10-12 NOTE — TELEPHONE ENCOUNTER
[facility] received request to change oxycontin medication due to cost.   Pt's oncologist sent in Oxycodone 10/10/2023 instead. [facility] asking if pt can be contacted to see if he needs anything else from him.    Please ask how he is doing and if he was able to  the medication.

## 2023-10-17 NOTE — TELEPHONE ENCOUNTER
Received phone call from patient's daughter Georgette.  She reports patient has been struggling with increased pain since his course of steroids were completed on Saturday.  Patient completed radiation therapy on October 6.  Patient has been taking oxycodone averaging 4 to 5 tablets in a 24-hour period.    Update given to Cliff.  New prescription for Xtampza 13.5 mg twice daily has been sent to the pharmacy.  Call daughter with update and review dosing instructions.  She will call back if patient's pain continues to be poorly controlled.    DIONTE Woodson, RN  Palliative Care Nurse Clinician    [phone] (Direct)  [phone] (Main)  [phone] (Appointment Scheduling)

## 2023-10-17 NOTE — TELEPHONE ENCOUNTER
Resending prescription for xtampza to a pharmacy close to pt's dtr's home where pt is currently staying.    CHON WoodsonN, RN  Palliative Care Nurse Clinician    [phone] (Direct)  [phone] (Main)  [phone] (Appointment Scheduling)

## 2023-10-18 NOTE — TELEPHONE ENCOUNTER
For routine follow-up phone call status post radiation for his metastatic cancer to the spine.  Patient continues to have rib pain and is seeing palliative care for pain control issues.  Patient denies needing anything from the radiation clinic today.  Encouraged patient to call if he had any questions or concerns during the interim.  Patient is scheduled for follow-up with Dr. Batista of the first week of November.  Patient appreciative of call.

## 2023-10-20 NOTE — TELEPHONE ENCOUNTER
Patients daughter Georgette is requesting clarification on administration of OxyContin and oxycodone.    Writer reviewed administration instructions and dosages with Georgette.    Georgette verbalized understanding and had no further questions.    Ceci Simon RN  Palliative Care Nurse Clinician    [phone] (Direct)  [phone] (Main)  [phone] (Appointment Scheduling)

## 2023-10-30 NOTE — LETTER
"    10/30/2023         RE: Lenny Chau  [address]  [facility] [address]        Dear Colleague,    Thank you for referring your patient, Lenny Chau, to the [facility] [place]. Please see a copy of my visit note below.    [facility] Hematology and Oncology Progress Note    Patient: Lenny Chau  MRN: [id number]  Date of Service: Oct 30, 2023         Reason for Visit:    Scheduled f/up.    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  History of multiple cancers, alcohol and tobacco abuse, COPD, coronary artery disease, hyperlipidemia, hypertension, 3 CKD, DJD, AAA, s/p 2011 endovascular stent.  March, 2009 left nephrectomy for a hemangioma.    Mitchell presents accompanied by his daughter, Georgette, who he has been staying with.  His wife is in a nursing home, awaiting memory care placement.  He was hospitalized 09/23 - 09/25/23 for acute back pain.  Restaging PET CT scan showed new lytic lesion within T5, 6 and T8 spine consistent with new metastases.  He completed palliative radiation therapy on 10/6/2023.  He continues to note cancer-related pain in the mid thoracic spine and right chest wall ... not well managed on Oxycontin 13.5 mg every 12 hours + oxycodone 10 mg every 4 hours + Neurontin 300 mg every 8 hours - managed by Palliative Care.  Notes early satiety - weight down ~20 pounds since April.  Takes 1-2 Ensure/day, eats very little.  He's noted constipation since discontinuing magnesium d/t diarrhea and started opioids. Takes Miralax prn, not daily.  Notes mild hot flashes since starting Lupron, does not feel needs intervention.  He generally discontinued alcohol in November 2021 ... states he rarely has \"a beer\" when goes out for dinner.  Stopped smoking in 2011.  He denies cough, fever, chills, unusual headaches, visual or mentation disturbance, bladder issues, rash.  CMP - stable CKD-G3a.  Mild dehydration.  Otherwise basically " WNL.  Encouraged pushing oral fluids.  CBC - basically WNL.        1.  PSA recurrent [place] 3+3 prostate adenocarcinoma with extraprostatic extension ... on Lupron therapy.  PSA - decreased @ 0.12 (23.5 at start of Lupron therapy).  Excellent biochemical response.  Excellent hematologies.  Acceptable metabolic panel.  Tolerating Leuprolide therapy acceptably well.  Continue Leuprolide 45 mg IM every 6 months - due today.  6-month f/up with CBC, CMP, and PSA in anticipation ongoing leuprolide therapy.    2. Recurrent, metastatic metachronous bilateral primary NSCLC (ARIADNE):   09/07/2023 restaging PET CT showed new lytic lesions within T5, 6 and T8 spine consistent with new metastases and decreased metabolic activity in the left upper lobe surrounding the in situ fiduciary marker.    09/08/2023 CT head - no evidence for acute intracranial process. Brain atrophy and presumed chronic microvascular ischemic changes.  Prior treatments:  11/05/2015 - thoracotomy. Stage 1 (R) lung.  No evidence of lymph node metastasis.  No adjuvant therapy.  Patient lost to follow-up.   03/10/2021 - SBRT.  (L) upper lobe cT1 cN0.  03/18/2022 - SBRT.  (L) lung lesion.  04/12/2022 - SBRT.  T7 spine.     10/06/2023 - IMRT T4-T9 spine (Dr Batista).   Patient referred from Dr Batista for evaluation and discussion of possible systemic therapy:  Bone mets most certainly from lung primary with excellent PSA response on Lupron for PSA recurrent prostate cancer.  Last pathology was a CT guided 01/21/2021 (L) lung lesion ... no PDL1 obtained.  No path on recent bone mets and they've now been treated with radiation.  Uncertain whether biopsy of the metabolic activity in the left upper lobe surrounding the in situ fiduciary marker is an attainably reasonable option.  Mitchell would be a borderline candidate for traditional chemotherapy d/t having 1 kidney and his PS of at least 2.  Immunotherapy would be a nice option from a tolerance and side effect standpoint.   "Will discuss with Dr Ortega for her preference.  Cancer-related pain - managed by Palliative Care:  Not well managed on Oxycontin 13.5 mg every 12 hours + oxycodone 10 mg every 4 hours + Neurontin 300 mg every 8 hours - instructed daughter to contact Palliative Care.   Early satiety and weight loss:  No concerning nausea.  Encouraged protein focused diet.  Instructed on 1-2 Ensure/day + 1 with each inadequate meal/day.  Opioid-related constipation:  Instructed to resume daily Miralax + stool softener titrate 1-3/day.  Encouraged pushing oral fluids.  Follow up with Dr Ortega post biopsy results if obtained to discuss palliative treatment systemic therapy options.    3. Recurrent right buccal squamous cell carcinoma.    Followed every 6-months by Dr. Roldan, ENT ... 08/14/2023 appt, \"Mr. Chau's oral exam looks stable today.\"     Oncologic History:    Prostate cancer:  2011, Ken grade 3+3, cT1a prostatic adenocarcinoma with a PSA of 4.7 was diagnosed and observed.   2016, July PSA 10.4 with repeat biopsy revealing a Ken grade 3+3, cT1c lesion which continued to be observed.  With various PSA methods, PSA 23.5 on 3/9/2021, 34 on 5/8/2021, 12.47 on 12/22/2020, 18.25 on 3/31/2022, 21.81 on 6/21/2020, and finally 23.5 on 9/19/2022.   10/28/2022 NM bone scan revealed uptake only in the known T7 vertebral body metastases.    10/28/2022 MR scan of the prostate revealed suspicious abnormality of the left base and mid gland peripheral zone with extraprostatic extension into the left seminal vesicle.  No suspicious adenopathy or evidence of pelvic metastases were seen.   11/01/2022 - started leuprolide (Eligard), 45 mg, therapy.     2. Metastatic metachronous bilateral primary lung cancers:  11/05/2015 - Non-small cell lung cancer involving (R) lung, stage I, status post right thoracotomy and excision of right upper lobe and right middle lobe lung tumor with no evidence of lymph node metastasis and no adjuvant " "therapy.  Patient lost to follow-up since 2016.   01/21/2021 LEFT LUNG LESION, CT GUIDED BIOPSY - MODERATELY DIFFERENTIATED ADENOCARCINOMA, CONSISTENT WITH LUNG PRIMARY.   Cytokeratin-7: Strongly positive.  Cytokeratin-20: Negative.  TTF-1: Strong nuclear staining.  p63: Negative.  Napsin A: Strong staining   03/10/2021 - completed SBRT 5000 cGy/5 fx for a (L) upper lobe cT1 cN0.  01/07/2022 PET - new 1.1 cm left upper lobe nodule and T7 sclerotic bone lesion presumed to be recurrence.  03/18/2022 - completed SBRT 5,000 cGy/5 fx to the (L) lung lesion.  04/12/2022 - completed SBRT 3275 cGy/5 fx T7 spine.  08/18/2023 restaging CT CAP - new lytic lesions within T6 and T8 concerns for metastasis.    09/07/2023 PET scan - FDG avid lesions involving T5, T6 and T8 vertebral body suspicious for progression of disease.     10/06/23 - completed 2400 cGy/8 fx IMRT T4-T9 spine (Dr Batista).       3. Recurrent right buccal squamous cell carcinoma ... followed by Dr. Kwesi Roldan.    1994, August - resection floor of mouth carcinoma  2009, February - right retromolar trigone SCC treated with surgery and 7,020 cGy/39 until 6/29/2009.   2009, September - surgery for localized recurrence.  2020, November - surgical office-resection by Dr. Alon Nunez for a left oral (buccal, base of tongue) SCC  2022, February - ENT evaluation by Dr. Sim Jaffe for a new right buccal lesion with negative biopsies.   10/10/2022 - ENT, Dr. Roldan f/up with biopsy revealing hyperplasia.  08/14/2023 - ENTDr Roldan f/up, \"Mr. Chau's oral exam looks stable today.\"     ECOG Performance:    2 - Ambulatory and independent in all ADLs; cannot work; up > 50% of the time    Pain:    Pain Score: Moderate Pain (4)  Pain Loc: Abdomen (and ribs) - see above.    Encounter Diagnoses:    Problem List Items Addressed This Visit       Stage 3a chronic kidney disease (H)    Malignant neoplasm of upper lobe of left lung (H)    Malignant neoplasm of prostate (H) - Primary "     Other Visit Diagnoses       Metastatic bone tumor (H)        Slow transit constipation                   Quoc Allennai, CNP     CC: Shade Boyd MD   ______________________________________________________________________________    Review of Systems:    No fever or night sweats.  Weight down ~20 pounds since April.    No lumps or bumps anywhere.  No unusual headaches or eyesight issues.  No dizziness.  No bleeding from the nose.  No sores in the mouth. No problems with swallowing.  No chest pain. No shortness of breath. No cough.  No abdominal pain. No nausea or vomiting.  No diarrhea.  Constipation.  No blood in stool or black colored stools.  No problems passing urine.  No numbness or tingling in hands or feet.  No skin rashes.    A 14 point review of systems is otherwise negative.    Past History:    Past Medical History:   Diagnosis Date     Abdominal aortic aneurysm (AAA) (H24) 03/30/2010    Formatting of this note might be different from the original. 5.6 cm infrarenal AAA, on 12/21/2010 CTA Formatting of this note might be different from the original. 5.6 cm infrarenal AAA, on 12/21/2010 CTA      Anemia      [place] esophagus      Cancer (H)     lung     CHF (congestive heart failure) (H)      Chronic kidney disease      COPD, group B, by GOLD 2017 classification (H)      Coronary artery disease of native heart with stable angina pectoris, unspecified vessel or lesion type (H24)      Degenerative joint disease      Head and neck cancer (H)      History of transfusion      Hyperlipidemia      Hypertension      Lung cancer (H)     s/p RUL RML wedge resection in 2016 by Dr. Carter Velazquez     Lung mass     MELODY FDG-avid 2.5cm     Myocardial infarction (H)     November 2019     Oral cancer (H)      Prostate cancer (H)      Septic hip (H) 02/10/2022     Shortness of breath     with exertion     Trochanteric fracture of right femur (H) 06/21/2016       Past Surgical History:   Procedure Laterality Date      "ABDOMINAL AORTIC ANEURYSM REPAIR       ANGIOGRAPHY  11/25/2019    coronary     COLONOSCOPY      3/21/2017,5/12/2014     CORONARY STENT PLACEMENT  12/01/2019    x1     CT BIOPSY LUNG  01/21/2021     EGD      11/13/2020,7/26/2019     ENDOSCOPIC REMOVAL SALIVARY GLAND STONE  1995     ESOPHAGOSCOPY, GASTROSCOPY, DUODENOSCOPY (EGD), COMBINED N/A 07/26/2019    Procedure: ENDOSCOPIC ULTRASOUND FINE NEEDLE ASPIRATION, GASTRIC BIOPSIES OF POLYPS;  Surgeon: Quoc Edwards MD;  Location: [facility];  Service: Gastroenterology     EXCISE LESION INTRAORAL Left 11/06/2020    Procedure: 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 Need pathology for margins;  Surgeon: Alon Nunez MD;  Location: [facility];  Service: ENT     HEMIARTHROPLASTY HIP Right 06/22/2016     IR LUNG BIOPSY MEDIASTINUM RIGHT  9/9/2015     LUMBAR DISCECTOMY  2006     NEPHRECTOMY Left     for benign hemangioma     NEPHRECTOMY  03/2009     OTHER SURGICAL HISTORY Right 01/01/2015    wedge resectionLung cancer isolated pulmonary nodule     OTHER SURGICAL HISTORY      placement of stentfor AAA     OTHER SURGICAL HISTORY      right hip surgeryfor fracture     OTHER SURGICAL HISTORY  01/01/1994    Oral cancer resection     AR ESOPHAGOGASTRODUODENOSCOPY US SCOPE W/ADJ STRXRS N/A 11/13/2020    Procedure: ESOPHAGOGASTRODUODENOSCOPY,  ENDOSCOPIC ULTRASOUND;  Surgeon: Quoc Edwards MD;  Location: [facility];  Service: Gastroenterology     SQUAMOUS CELL CARCINOMA EXCISION  11/06/2020     THORACOSCOPY  2016    wedge resection of lung     THORACOTOMY Right 11/15/2015     [facility] ORAL SURGERY PROCEDURE  1994     Physical Exam:    /68 (BP Location: Left arm)   Pulse 70   Ht 1.803 m (5' 10.98\")   Wt 81.3 kg (179 lb 3.2 oz)   SpO2 94%   BMI 25.00 kg/m      GENERAL:   Very pleasant.  Alert and oriented. Seated comfortably. In no distress.    HEENT:   TRANG.  EOMI.  No pallor.  No icterus.    LYMPH NODES:  No palpable " cervical, axillary or inguinal lymphadenopathy.    CHEST:   Lungs with absent RLL BS and decreased on left.    CVS:    S1 and S2 heard. Regular rate and rhythm.  No murmur or gallop or rub heard.  No peripheral edema.    ABDOMEN:   Soft. Not tender. Not distended.  No palpable hepatomegaly or splenomegaly.    EXTREMITIES:  Warm.    SKIN:    No rash, bruising or purpura noted.    Lab Results:    Reviewed with patient.    Recent Results (from the past 24 hour(s))   Comprehensive metabolic panel    Collection Time: 10/30/23 12:30 PM   Result Value Ref Range    Sodium 134 (L) 135 - 145 mmol/L    Potassium 4.4 3.4 - 5.3 mmol/L    Carbon Dioxide (CO2) 30 (H) 22 - 29 mmol/L    Anion Gap 10 7 - 15 mmol/L    Urea Nitrogen 17.5 8.0 - 23.0 mg/dL    Creatinine 1.51 (H) 0.67 - 1.17 mg/dL    GFR Estimate 47 (L) >60 mL/min/1.73m2    Calcium 10.0 8.8 - 10.2 mg/dL    Chloride 94 (L) 98 - 107 mmol/L    Glucose 136 (H) 70 - 99 mg/dL    Alkaline Phosphatase 125 40 - 129 U/L    AST 17 0 - 45 U/L    ALT 9 0 - 70 U/L    Protein Total 7.2 6.4 - 8.3 g/dL    Albumin 3.6 3.5 - 5.2 g/dL    Bilirubin Total 0.5 <=1.2 mg/dL   PSA tumor marker    Collection Time: 10/30/23 12:30 PM   Result Value Ref Range    PSA Tumor Marker 0.12 0.00 - 6.50 ng/mL   CBC with platelets and differential    Collection Time: 10/30/23 12:30 PM   Result Value Ref Range    WBC Count 7.6 4.0 - 11.0 10e3/uL    RBC Count 4.47 4.40 - 5.90 10e6/uL    Hemoglobin 13.3 13.3 - 17.7 g/dL    Hematocrit 42.4 40.0 - 53.0 %    MCV 95 78 - 100 fL    MCH 29.8 26.5 - 33.0 pg    MCHC 31.4 (L) 31.5 - 36.5 g/dL    RDW 13.2 10.0 - 15.0 %    Platelet Count 297 150 - 450 10e3/uL    % Neutrophils 82 %    % Lymphocytes 6 %    % Monocytes 8 %    % Eosinophils 3 %    % Basophils 1 %    % Immature Granulocytes 0 %    NRBCs per 100 WBC 0 <1 /100    Absolute Neutrophils 6.2 1.6 - 8.3 10e3/uL    Absolute Lymphocytes 0.5 (L) 0.8 - 5.3 10e3/uL    Absolute Monocytes 0.6 0.0 - 1.3 10e3/uL    Absolute  "Eosinophils 0.2 0.0 - 0.7 10e3/uL    Absolute Basophils 0.1 0.0 - 0.2 10e3/uL    Absolute Immature Granulocytes 0.0 <=0.4 10e3/uL    Absolute NRBCs 0.0 10e3/uL     Imaging:    No new imaging.      Oncology Rooming Note    October 30, 2023 1:07 PM   Lenny Chau is a 78 year old male who presents for:    Chief Complaint   Patient presents with     Oncology Clinic Visit     Malignant neoplasm of prostate        Initial Vitals: Ht 1.803 m (5' 10.98\")   BMI 27.00 kg/m   Estimated body mass index is 27 kg/m  as calculated from the following:    Height as of this encounter: 1.803 m (5' 10.98\").    Weight as of 10/4/23: 87.8 kg (193 lb 8 oz). Body surface area is 2.1 meters squared.  Moderate Pain (4) Comment: Data Unavailable   No LMP for male patient.  Allergies reviewed: Yes  Medications reviewed: Yes    Medications: Medication refills not needed today.  Pharmacy name entered into WISHCLOUDS:    [id number] [place] [address]  [id number] [place], [place] - [address] [place] [address]    Clinical concerns: follow up with labs and yvon Malhotra MA                Again, thank you for allowing me to participate in the care of your patient.        Sincerely,        Quoc Hylton, CNP  "

## 2023-10-30 NOTE — PROGRESS NOTES
"Oncology Rooming Note    October 30, 2023 1:07 PM   Lenny Chau is a 78 year old male who presents for:    Chief Complaint   Patient presents with    Oncology Clinic Visit     Malignant neoplasm of prostate        Initial Vitals: Ht 1.803 m (5' 10.98\")   BMI 27.00 kg/m   Estimated body mass index is 27 kg/m  as calculated from the following:    Height as of this encounter: 1.803 m (5' 10.98\").    Weight as of 10/4/23: 87.8 kg (193 lb 8 oz). Body surface area is 2.1 meters squared.  Moderate Pain (4) Comment: Data Unavailable   No LMP for male patient.  Allergies reviewed: Yes  Medications reviewed: Yes    Medications: Medication refills not needed today.  Pharmacy name entered into T3 MOTION:    [id number] [place], [place] - [address] [place] [address]  [id number] [place], [place] - [address] [place] [address]    Clinical concerns: follow up with labs and yvon Malhotra MA              "

## 2023-11-01 NOTE — NURSING NOTE
Is the patient currently in the state of [place]? YES    Visit mode:VIDEO    If the visit is dropped, the patient can be reconnected by: VIDEO VISIT: Send to e-mail at: [email or web address]    Will anyone else be joining the visit? NO  (If patient encounters technical issues they should call [phone]    How would you like to obtain your AVS? MyChart    Are changes needed to the allergy or medication list? No    Reason for visit: RECHECK    Lori BAR

## 2023-11-01 NOTE — PATIENT INSTRUCTIONS
Thank you for meeting with us in the [facility].    How to get a hold of us:  For non-urgent matters, MyChart works best.    For more urgent matters, or if you prefer not to use MyChart, call our clinic nurse coordinator Debra Abreu RN at [phone] or [phone]    We have an on-call number for evenings and weekends. Please call this only if you are having uncontrolled symptoms or serious side effects from your medicines: [phone].     For refills, please give us a week (5 working days) notice. We don't always have providers available everyday to do refills. If you call the day you run out of your medicine, we may not be able to refill it in time, so call 5 days in advance!

## 2023-11-01 NOTE — PROGRESS NOTES
Virtual Visit Details    Type of service:  Video Visit   Video Start Time: 10:20 AM  Video End Time: 10:50 AM    Originating Location (pt. Location): Home    Distant Location (provider location):  Off-site  Platform used for Video Visit: [facility]      Palliative Care Outpatient Clinic      Patient ID/Chief Complaint: Lenny Chau 78 year old male who is presenting to the palliative medicine clinic today at the request of Dr. Hazel Mckinley for a palliative care consultation secondary to assistance with symptom management.   The patient's primary care provider is:  Shade Boyd.       Impression & Recommendations:  78-year-old male with stage IV lung cancer with metastases to thoracic spine, history of prostate cancer on suppressive therapy, history of right buccal SCC, remote history of left nephrectomy for large meningioma, CKD 3, HTN, AAA, CAD, COPD, HLD, peripheral neuropathy in feet, history of tobacco use stopped 2009, history of higher alcohol use stopped 2 years ago.    He has just completed 8 of 8 radiation treatments to T-spine with improvement in back pain.  Full pain relief likely still to come.    Pain is described as aching, throbbing, occasionally sharp pain in his back and right rib cage from back into chest.  He denies neuropathic complaints.  He has been taking oxycodone IR 10 mg tab approximately 4 to 5/day in addition to Xtampza ER 13.5 mg twice daily.    He has neuropathy in both feet that is adequately controlled with gabapentin 600 mg every morning, 300 in the afternoon, and 300 in the evening.    Social history includes previous history of alcohol and tobacco use described above, no other TARA.  His wife has advanced dementia and is currently in a care center but may be returning home with him.  He is her primary caretaker.  His daughter Georgette is involved and helps with care.        Symptoms/recommendations/discussion:  Increase Xtampza to 27 mg twice daily.  He had to pay $500 to fill  "prescription previously.  We are working with pharmacy about lower cost alternatives.  Increase oxycodone dose range of 10 to 15 mg every 4 hours as needed.  Refill sent.  Daughter initially reported hallucinations, with concern for medication contributing.  After further discussion, he \"occasionally sees something briefly out of the corner of his eye\" and is not actually having hallucinations.  He can become sleepy after oxycodone IR dose.  Continue gabapentin at current doses.   Minor issues with constipation at this time.  Laxatives discussed  Narcan nasal spray present in the house for safety, confirmed by Mitchell and Georgette  Continue Tylenol 1000 mg 2-3 times per day.  He avoids NSAIDs due to CKD and solitary kidney  Continues working with oncology about plan of care.  Oncology has counseled him about increasing p.o. intake, daughter reports he is doing better.  Palliative care follow-up in 3 weeks         How to get a hold of us:  For non-urgent matters, MyChart works best.    For more urgent matters, or if you prefer not to use MyChart, call our clinic nurse coordinator Debra Abreu RN at [phone] or [phone]    We have an on-call number for evenings and weekends. Please call this only if you are having uncontrolled symptoms or serious side effects from your medicines: [phone].     For refills, please give us a week (5 working days) notice. We don't always have providers available everyday to do refills. If you call the day you run out of your medicine, we may not be able to refill it in time, so call 5 days in advance        History:  History gathered today from: patient, family/loved ones, medical chart, outside records including Care Everywhere, health care directive/s      PE: Ht 1.803 m (5' 11\")   Wt 81.2 kg (179 lb)   BMI 24.97 kg/m     Wt Readings from Last 3 Encounters:   11/01/23 81.2 kg (179 lb)   10/30/23 81.3 kg (179 lb 3.2 oz)   10/04/23 87.8 kg (193 lb 8 oz)       Gen alert, " comfortable appearing  Head NCAT.  Eyes anicteric without injection  Face symmetric, eyes conjugate  Lungs unlabored, no cough, speaking full sentences  Skin no rashes or lesions evident on face/neck  Neuro Face symmetric, eyes conjugate; speech understandable.  Neuropsych exam normal including affect, sensorium, gross memory, thought processes, and fund of knowledge.         Data reviewed:  I reviewed electrolytes, BUN/creatinine, liver profile, hemoglobin and hematocrit, platelet count, and most recent imaging  Radiation oncology notes     database reviewed: 11/1/2023        55 minutes spent on the date of the encounter doing chart review, history and exam, patient education & counseling, documentation and other activities as noted above.        Thank you for involving us in the patient's care.   BRENDEN Evangelista, [facility] Palliative Care Service

## 2023-11-02 NOTE — TELEPHONE ENCOUNTER
Oncology Distress Screen    Called Mitchell in regards to his positive oncology nutrition distress screen:    1. How concerned are you about your ability to eat? :  9  And  2. How concerned are you about unintended weight loss or your current weight? : 9    Mitchell reports he has a poor appetite but is making an effort to eat well. He is supplementing with Ensure Plus. Encouraged him to eat small, frequent meals and to continue to use the Ensure Plus to support adequate calories and protein.      Debra Gross, MS, RD, LD

## 2023-11-08 NOTE — PROGRESS NOTES
Lenny's daughter (Georgette) left message on nurse triage line 3:50 pm. Georgette has question about prescription that was sent to pharmacy during patient's appointment today. Per Georgette, [facility] states they do not have prescription for megace yet. Returned call to Georgette at 4:04 pm, informed Georgette that E-prescribing interface shows receipt that pharmacy received the prescription at 3:55 pm. Georgette expresses appreciation for return call. Georgette/Mitchell encouraged to return call to clinic should any questions or concerns arise prior to next visit.    Aileen Tucker RN...............................11/08/23  4:10 PM

## 2023-11-08 NOTE — PROGRESS NOTES
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  He's sent to us to assess for systemic treatment.  To date, he's only ever had surgery or radiation.  His most recent interventions were for back pain with radiation especially to the right ribs related to pathologic fracture of the thoracic spine.  His most recent PET scan lights up in the spine (which has now been radiated) but there's also a satellite nodule in the left lung adjacent to the area previously irradiated.    The patient's ongoing problems include anorexia with weight loss at least 15# in a month and opioid-resistant pain, followed by palliative care.      His family asks about options including hospice, care facility for rehabilitation.  Here today with wife and daughter. We reviewed images and labs together.    ECOG = 1    Oncologic History:     Prostate cancer:  2011, [place] grade 3+3, cT1a prostatic adenocarcinoma with a PSA of 4.7 was diagnosed and observed.   2016, July PSA 10.4 with repeat biopsy revealing a [place] grade 3+3, cT1c lesion which continued to be observed.  With various PSA methods, PSA 23.5 on 3/9/2021, 34 on 5/8/2021, 12.47 on 12/22/2020, 18.25 on 3/31/2022, 21.81 on 6/21/2020, and finally 23.5 on 9/19/2022.   10/28/2022 NM bone scan revealed uptake only in the known T7 vertebral body metastases.     10/28/2022 MR scan of the prostate revealed suspicious abnormality of the left base and mid gland peripheral zone with extraprostatic extension into the left seminal vesicle.  No suspicious adenopathy or evidence of pelvic metastases were seen.   11/01/2022 - started leuprolide (Eligard), 45 mg, therapy.     2.           Metastatic metachronous bilateral primary lung cancers:  11/05/2015 - Non-small cell lung cancer involving (R) lung, stage I, status post right thoracotomy and excision of right upper lobe and right middle lobe lung tumor with no evidence of lymph node metastasis and no adjuvant therapy.  Patient lost to follow-up since 2016.   01/21/2021 LEFT LUNG LESION, CT GUIDED BIOPSY - MODERATELY DIFFERENTIATED ADENOCARCINOMA, CONSISTENT WITH LUNG PRIMARY.   Cytokeratin-7: Strongly positive.  Cytokeratin-20: Negative.  TTF-1: Strong nuclear staining.  p63: Negative.  Napsin A: Strong staining   03/10/2021 - completed SBRT 5000 cGy/5 fx for a (L) upper lobe cT1 cN0.  01/07/2022 PET - new 1.1 cm left upper lobe nodule and T7 sclerotic bone lesion presumed to be recurrence.  03/18/2022 - completed SBRT 5,000 cGy/5 fx to the (L) lung lesion.  04/12/2022 - completed SBRT 3275 cGy/5 fx T7 spine.  08/18/2023 restaging CT CAP - new lytic lesions within T6 and T8 concerns for metastasis.    09/07/2023 PET scan - FDG avid lesions involving T5, T6 and T8 vertebral body suspicious for progression of disease.     10/06/23 - completed 2400 cGy/8 fx IMRT T4-T9 spine (Dr Batista).       3.           Recurrent right buccal squamous cell carcinoma ... followed by Dr. Kwesi Roldan.    1994, August - resection floor of mouth carcinoma  2009, February - right retromolar trigone SCC treated with surgery and 7,020 cGy/39 until 6/29/2009.   2009, September - surgery for localized recurrence.  2020, November - surgical office-resection by Dr. Alon Nunez for a left oral (buccal, base of tongue) SCC  2022, February - ENT evaluation by  "Dr. Sim Jaffe for a new right buccal lesion with negative biopsies.   10/10/2022 - ENT, Dr. Roldan f/up with biopsy revealing hyperplasia.  08/14/2023 - ENT, Dr Roldan f/up, \"Mr. Chau's oral exam looks stable today.\"     Patient Active Problem List   Diagnosis    Lung cancer (H)    COPD, group B, by GOLD 2017 classification (H)    Osteoarthritis    Stage 3a chronic kidney disease (H)    Coronary artery disease of native heart with stable angina pectoris, unspecified vessel or lesion type (H24)    Cardiac arrhythmia    Primary hypertension    Malignant neoplasm of head, neck and face (H)    Depression    Malignant neoplasm of upper lobe of left lung (H)    Squamous cell carcinoma of mouth (H)    Malignant neoplasm of prostate (H)    Dyslipidemia    Cyst of pancreas    Ragsdale's esophagus    Spine metastasis    Chronic midline low back pain with right-sided sciatica    Lumbar radiculitis    Health care maintenance    History of alcoholism (H)    Peripheral arterial disease (H24)    Compression fracture of T7 vertebra with routine healing    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)     Current Outpatient Medications   Medication    acetaminophen (TYLENOL) 500 MG tablet    fentaNYL (DURAGESIC) 25 mcg/hr 72 hr patch    ferrous sulfate (FEROSUL) 325 (65 Fe) MG tablet    gabapentin (NEURONTIN) 300 MG capsule    megestrol (MEGACE) 40 MG/ML suspension    multivitamin w/minerals (THERA-VIT-M) tablet    omeprazole (PRILOSEC) 20 MG DR capsule    albuterol (PROAIR HFA/PROVENTIL HFA/VENTOLIN HFA) 108 (90 Base) MCG/ACT inhaler    aspirin (ASA) 81 MG EC tablet    dexAMETHasone (DECADRON) 2 MG tablet    Fluticasone-Umeclidin-Vilanterol (TRELEGY ELLIPTA) 200-62.5-25 MCG/ACT oral inhaler    Magnesium Oxide 250 MG TABS    metoprolol succinate ER (TOPROL XL) 25 MG 24 hr tablet    naloxone (NARCAN) 4 MG/0.1ML nasal spray    nitroGLYcerin (NITROSTAT) 0.4 MG sublingual tablet    oxyCODONE (XTAMPZA ER) 13.5 MG 12 hr tablet    oxyCODONE IR " (ROXICODONE) 10 MG tablet    polyethylene glycol (MIRALAX) 17 GM/Dose powder    tamsulosin (FLOMAX) 0.4 MG capsule     No current facility-administered medications for this visit.     Past Medical History:   Diagnosis Date    Abdominal aortic aneurysm (AAA) (H24) 03/30/2010    Formatting of this note might be different from the original. 5.6 cm infrarenal AAA, on 12/21/2010 CTA Formatting of this note might be different from the original. 5.6 cm infrarenal AAA, on 12/21/2010 CTA     Anemia     Ragsdale esophagus     Cancer (H)     lung    CHF (congestive heart failure) (H)     Chronic kidney disease     COPD, group B, by GOLD 2017 classification (H)     Coronary artery disease of native heart with stable angina pectoris, unspecified vessel or lesion type (H24)     Degenerative joint disease     Head and neck cancer (H)     History of transfusion     Hyperlipidemia     Hypertension     Lung cancer (H)     s/p RUL RML wedge resection in 2016 by Dr. Carter Velazquez    Lung mass     MELODY FDG-avid 2.5cm    Myocardial infarction (H)     November 2019    Oral cancer (H)     Prostate cancer (H)     Septic hip (H) 02/10/2022    Shortness of breath     with exertion    Trochanteric fracture of right femur (H) 06/21/2016     Past Surgical History:   Procedure Laterality Date    ABDOMINAL AORTIC ANEURYSM REPAIR      ANGIOGRAPHY  11/25/2019    coronary    COLONOSCOPY      3/21/2017,5/12/2014    CORONARY STENT PLACEMENT  12/01/2019    x1    CT BIOPSY LUNG  01/21/2021    EGD      11/13/2020,7/26/2019    ENDOSCOPIC REMOVAL SALIVARY GLAND STONE  1995    ESOPHAGOSCOPY, GASTROSCOPY, DUODENOSCOPY (EGD), COMBINED N/A 07/26/2019    Procedure: ENDOSCOPIC ULTRASOUND FINE NEEDLE ASPIRATION, GASTRIC BIOPSIES OF POLYPS;  Surgeon: Quoc Edwards MD;  Location: [facility];  Service: Gastroenterology    EXCISE LESION INTRAORAL Left 11/06/2020    Procedure: 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 Need pathology for  "margins;  Surgeon: Alon Nunez MD;  Location: [facility];  Service: ENT    HEMIARTHROPLASTY HIP Right 06/22/2016    IR LUNG BIOPSY MEDIASTINUM RIGHT  9/9/2015    LUMBAR DISCECTOMY  2006    NEPHRECTOMY Left     for benign hemangioma    NEPHRECTOMY  03/2009    OTHER SURGICAL HISTORY Right 01/01/2015    wedge resectionLung cancer isolated pulmonary nodule    OTHER SURGICAL HISTORY      placement of stentfor AAA    OTHER SURGICAL HISTORY      right hip surgeryfor fracture    OTHER SURGICAL HISTORY  01/01/1994    Oral cancer resection    LA ESOPHAGOGASTRODUODENOSCOPY US SCOPE W/ADJ STRXRS N/A 11/13/2020    Procedure: ESOPHAGOGASTRODUODENOSCOPY,  ENDOSCOPIC ULTRASOUND;  Surgeon: Quoc Edwards MD;  Location: [facility] [place];  Service: Gastroenterology    SQUAMOUS CELL CARCINOMA EXCISION  11/06/2020    THORACOSCOPY  2016    wedge resection of lung    THORACOTOMY Right 11/15/2015    ZZC ORAL SURGERY PROCEDURE  1994     Socioeconomic History    Marital status:      Review of Systems   Constitutional:  Positive for appetite change, fatigue and unexpected weight change.   HENT:  Negative.     Eyes: Negative.    Cardiovascular:  Positive for chest pain.   Gastrointestinal:  Positive for diarrhea.   Endocrine: Negative.    Genitourinary: Negative.     Musculoskeletal:  Positive for back pain.   Skin: Negative.    Neurological: Negative.    Hematological: Negative.    Psychiatric/Behavioral:  Positive for depression and sleep disturbance.    All other systems reviewed and are negative.      BP (!) 139/94 (Patient Position: Sitting)   Pulse 73   Temp 97.9  F (36.6  C) (Oral)   Resp 16   Ht 1.803 m (5' 10.98\")   Wt 78.9 kg (174 lb)   SpO2 97%   BMI 24.28 kg/m    Physical Exam  Constitutional:       Appearance: He is normal weight. He is ill-appearing.      Comments: Unwell appearing older man   HENT:      Head: Normocephalic and atraumatic.      Mouth/Throat:      Mouth: Mucous membranes are moist. No " oral lesions.      Dentition: Has dentures. No gum lesions.      Comments: Upper plate only  Eyes:      Extraocular Movements: Extraocular movements intact.      Conjunctiva/sclera: Conjunctivae normal.      Pupils: Pupils are equal, round, and reactive to light.   Cardiovascular:      Rate and Rhythm: Normal rate and regular rhythm.      Heart sounds: Normal heart sounds.   Pulmonary:      Effort: Prolonged expiration present.      Breath sounds: No wheezing or rales.      Comments: kyphotic  Chest:      Chest wall: Tenderness present.   Abdominal:      General: There is no distension.      Palpations: Abdomen is soft. There is no mass.      Tenderness: There is no abdominal tenderness. There is no guarding.   Musculoskeletal:         General: No deformity.      Cervical back: Normal, normal range of motion and neck supple. Deformity: kyphosis.      Thoracic back: Tenderness present.      Lumbar back: Normal.        Back:       Right lower leg: No edema (trace).      Left lower leg: No edema (trace).      Comments: Tender to gentle percussion   Lymphadenopathy:      Cervical: No cervical adenopathy.      Upper Body:      Right upper body: No axillary or epitrochlear adenopathy.      Left upper body: No axillary or epitrochlear adenopathy.   Skin:     General: Skin is warm and dry.   Neurological:      General: No focal deficit present.      Mental Status: He is alert and oriented to person, place, and time.      Cranial Nerves: No cranial nerve deficit.      Motor: No weakness.      Deep Tendon Reflexes: Reflexes normal.   Psychiatric:         Mood and Affect: Mood normal.         Behavior: Behavior normal. Behavior is cooperative.         Thought Content: Thought content normal.         Judgment: Judgment normal.         Recent Results (from the past 720 hour(s))   Comprehensive metabolic panel    Collection Time: 10/30/23 12:30 PM   Result Value Ref Range    Sodium 134 (L) 135 - 145 mmol/L    Potassium 4.4 3.4 -  5.3 mmol/L    Carbon Dioxide (CO2) 30 (H) 22 - 29 mmol/L    Anion Gap 10 7 - 15 mmol/L    Urea Nitrogen 17.5 8.0 - 23.0 mg/dL    Creatinine 1.51 (H) 0.67 - 1.17 mg/dL    GFR Estimate 47 (L) >60 mL/min/1.73m2    Calcium 10.0 8.8 - 10.2 mg/dL    Chloride 94 (L) 98 - 107 mmol/L    Glucose 136 (H) 70 - 99 mg/dL    Alkaline Phosphatase 125 40 - 129 U/L    AST 17 0 - 45 U/L    ALT 9 0 - 70 U/L    Protein Total 7.2 6.4 - 8.3 g/dL    Albumin 3.6 3.5 - 5.2 g/dL    Bilirubin Total 0.5 <=1.2 mg/dL   PSA tumor marker    Collection Time: 10/30/23 12:30 PM   Result Value Ref Range    PSA Tumor Marker 0.12 0.00 - 6.50 ng/mL   CBC with platelets and differential    Collection Time: 10/30/23 12:30 PM   Result Value Ref Range    WBC Count 7.6 4.0 - 11.0 10e3/uL    RBC Count 4.47 4.40 - 5.90 10e6/uL    Hemoglobin 13.3 13.3 - 17.7 g/dL    Hematocrit 42.4 40.0 - 53.0 %    MCV 95 78 - 100 fL    MCH 29.8 26.5 - 33.0 pg    MCHC 31.4 (L) 31.5 - 36.5 g/dL    RDW 13.2 10.0 - 15.0 %    Platelet Count 297 150 - 450 10e3/uL    % Neutrophils 82 %    % Lymphocytes 6 %    % Monocytes 8 %    % Eosinophils 3 %    % Basophils 1 %    % Immature Granulocytes 0 %    NRBCs per 100 WBC 0 <1 /100    Absolute Neutrophils 6.2 1.6 - 8.3 10e3/uL    Absolute Lymphocytes 0.5 (L) 0.8 - 5.3 10e3/uL    Absolute Monocytes 0.6 0.0 - 1.3 10e3/uL    Absolute Eosinophils 0.2 0.0 - 0.7 10e3/uL    Absolute Basophils 0.1 0.0 - 0.2 10e3/uL    Absolute Immature Granulocytes 0.0 <=0.4 10e3/uL    Absolute NRBCs 0.0 10e3/uL     Recent Results (from the past 744 hour(s))   POC US Basic Cardiac    Narrative    Aaron Pereira MD     10/11/2023  5:18 PM    [facility]  Point of Care Ultrasound Interpretation    POC US Basic Cardiac    Date/Time: 10/11/2023 5:15 PM    Performed by: Aaron Pereira MD  Authorized by: Brett Subramanian MD     Point of Care Ultrasound: Basic Cardiac    Indications: Hypotension, Shortness of Breath, and Chest Pain    Window: Very  limited based on patient factors (inability to position   appropriately, body habitus)    Findings/ Impression (Within the context of limited point-of-care   ultrasound): Good Global Function, Right Ventricle Grossly Unremarkable,   and No Pericardial Effusion   CT Angio Chest And CT Abd Pelvis W IV Cont    Narrative    EXAM: CT ANGIO CHEST AND CT ABD PELVIS W IV CONT  LOCATION: [facility]  DATE: 10/11/2023    INDICATION: Hx malignancy, hypoxic on 15L, also has hx AAA and complaining of CP and epigast AP; hx AAA, chest pain, hypoxia  COMPARISON: Chest CTA 09/08/2023. Nonenhanced CT abdomen and pelvis 09/23/2023  TECHNIQUE: CT angiogram chest and routine CT abdomen pelvis with IV contrast. Arterial phase through the chest and venous phase through the abdomen and pelvis. 2D and 3D MIP reconstructions were preformed by the CT technologist. Dose reduction techniques were used.   CONTRAST: IOHEXOL 350 MG/ML IV SOLN 80 mL    FINDINGS:  CTA CHEST: Mildly ectatic ascending aorta measuring 4.4 cm in diameter. On the present exam it measured 4.4 cm, also. Moderate, partially calcified atherosclerotic changes at the aortic arch and descending aorta. Classic branching pattern of the arch vessels. Moderate, partially calcified atherosclerotic changes throughout the arch vessels without significant stenosis. Normal caliber central pulmonary arteries. No pulmonary emboli.    CTA ABDOMEN AND PELVIS: Stable indwelling abdominal aortic bifurcated endograft. Endograft location and configuration are unchanged. Normal luminal enhancement. The excluded aneurysm measures 7.3 x 6.9 cm. Previously measured 7.3 x 7 cm. No endoleak. Calcified changes of the origins of the celiac, superior mesenteric and right renal artery without severe stenosis.    LUNGS AND PLEURA: New patchy airspace changes with consolidation throughout the right lung. Moderate, associated bronchial wall thickening. Small right pleural effusion. Mild atelectasis  within the posterior aspect of the right lung. Stable left perihilar fiducial marker with a masslike airspace changes surrounding it measuring approximately 4.8 x 4.8 x 1.6 cm. The previous exam it measured 4.6 x 3.8 x 1.7 cm. Small amount of secretions within the right mainstem bronchus. Mild centrilobular emphysema.    MEDIASTINUM/AXILLAE: No lymphadenopathy.    CORONARY ARTERY CALCIFICATION: Severe.    HEPATOBILIARY: Normal.    PANCREAS: Stable 2.3 cm cystic lesion within the mid to distal pancreatic body. No main pancreatic duct dilatation.    SPLEEN: Normal.    ADRENAL GLANDS: Left adrenalectomy. Right adrenal is within normal limits.    KIDNEYS/BLADDER: Left nephrectomy. No masses within the nephrectomy bed. Benign-appearing right renal cysts. No specific follow-up necessary for these. No hydronephrosis. Bladder is partially obscured by streak artifact from the hip arthroplasty. Mild circumferential bladder wall thickening likely secondary to chronic bladder outlet obstruction.    BOWEL: No obstruction or inflammatory change.    LYMPH NODES: Normal.    PELVIC ORGANS: Benign-appearing Central prostatic gland calcifications.    MUSCULOSKELETAL: Stable sclerotic pathologic compression fracture of T7. Lytic metastases stable lytic metastases at T5, T6 and T8. Chronic bilateral rib fractures. Degenerative disc disease throughout the lumbar spine. Degenerative changes bilateral sacroiliac joints. Right total hip arthroplasty.    IMPRESSION:  1.  Mildly ectatic ascending aorta. Diffuse calcified atherosclerotic changes. No dissection.  2.  No pulmonary emboli.  3.  New patchy airspace changes throughout the right lung suggesting multifocal pneumonitis.  4.  Mild increase in size of the left perihilar area of masslike airspace changes. This may represent progressing post radiation changes.  5.  Stable thoracic spinal metastases.  6.  Stable 2.3 cm pancreatic cystic lesion. Recommend attention to this area on follow-up  CTs.   US AORTA ILIACS IVC WITH DUPLEX    Narrative    VASCULAR ULTRASOUND REPORT    CELIA LUTZ  MRN:    [id number] Accession#:   [id number]  :    1945  Study Date:   10/27/2023 10:29:44 AM  Age:    78 years   Tech:         MARTHA  Gender: M          Referring MD: SAMM PEREZ      Site: [facility]    Study performed:      Aorta, Iliac  Indication for study: S/P AAA repair.    TECHNIQUE:  The abdominal aorta and iliac arteries were examined with duplex ultrasound, color-flow and spectral Doppler. Bypass grafts and/or stents if present are evaluated per exam protocol. Vessel size, peak systolic velocity (PSV) and velocity ratios if applicable, were obtained and documented at sites per exam protocol.    IMPRESSION:   1. The patient is status post endovascular aneurysm repair. Based on prior study there is endoleak; however, this was not visualized on today's examination. The residual AAA sac measures 7.3 x 6.8 cm which is slightly increased compared to study from 2023 when the residual AAA sac measured 7.1 x 6.6.   2. Limbs of the endovascular stent graft are patent without stenosis.    COMPARISON:  Compared to prior study 2023, residual sac measuring 7.3 x 6.8, previously 7.1 x 6.6.    FINDINGS:  Patent endograft. Unable to visualize known endoleak on today's exam.    MEASUREMENTS:  +-----------+----------+  Right IliacPSV (cm/s)  +-----------+----------+  EIA MID        90      +-----------+----------+  EIA DST        98      +-----------+----------+    +----------+----------+  Left IliacPSV (cm/s)  +----------+----------+  EIA PRX       84      +----------+----------+  EIA MID      102      +----------+----------+  EIA DST       95      +----------+----------+      ENDOGRAFT  +-----+-----------------+  STENT   PSV (cm/s)      +-----+-----------------+  PRE  not vis; obscured  +-----+-----------------+  PRX  not vis;  obscured  +-----+-----------------+  DST         66          +-----+-----------------+    +-------------+----------+  LIMB         PSV (cm/s)  +-------------+----------+  RT PRX           96      +-------------+----------+  RT MID          110      +-------------+----------+  RT DST           46      +-------------+----------+  RT POST STENT not vis    +-------------+----------+  LT PRX           72      +-------------+----------+  LT MID           55      +-------------+----------+  LT DST           70      +-------------+----------+  LT POST STENT    84      +-------------+----------+    +------------------+--------+-------+                    TRV (cm)AP (cm)  +------------------+--------+-------+  RESID SAC DIAMETER  7.30   6.80    +------------------+--------+-------+    Dirk Celis MD.    Electronically signed on 10/30/2023 9:28:13 AM    This study was performed and interpreted by a service accredited by the [employer]reditation Commission (I[employer]/Vascular), www.intersocietal.org/vascular    Report generated by [employer].          Final

## 2023-11-08 NOTE — PROGRESS NOTES
"Oncology Rooming Note    November 8, 2023 1:27 PM   Lenny Chau is a 78 year old male who presents for:    Chief Complaint   Patient presents with    Oncology Clinic Visit     Malignant neoplasm of upper lobe of lung  Metastasis neoplasm metastatic to bone.     Initial Vitals: BP (!) 139/94 (Patient Position: Sitting)   Pulse 73   Temp 97.9  F (36.6  C) (Oral)   Resp 16   Ht 1.803 m (5' 10.98\")   Wt 78.9 kg (174 lb)   SpO2 97%   BMI 24.28 kg/m   Estimated body mass index is 24.28 kg/m  as calculated from the following:    Height as of this encounter: 1.803 m (5' 10.98\").    Weight as of this encounter: 78.9 kg (174 lb). Body surface area is 1.99 meters squared.  Moderate Pain (5) Comment: Data Unavailable   No LMP for male patient.  Allergies reviewed: Yes  Medications reviewed: Yes    Medications: Refill not needed  Pharmacy name entered into [facility]:    [id number] [place] [facility] [address] [place] [id number] [place] [address] [facility] [place], [place] - [address] [facility], [address]    Clinical concerns: Pain at 5 front of ribs    Laverne Tam LPN              "

## 2023-11-08 NOTE — LETTER
11/8/2023         RE: Lenny Chau  [address]  [place] [address]        Dear Colleague,    Thank you for referring your patient, Lenny Chau, to the [facility]. Please see a copy of my visit note below.    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  He's sent to us to assess for systemic treatment.  To date, he's only ever had surgery or radiation.  His most recent interventions were for back pain with radiation especially to the right ribs related to pathologic fracture of the thoracic spine.  His most recent PET scan lights up in the spine (which has now been radiated) but there's also a satellite nodule in the left lung adjacent to the area previously irradiated.    The patient's ongoing problems include anorexia with weight loss at least 15# in a month and opioid-resistant pain, followed by palliative care.      His family asks about options including hospice, care facility for rehabilitation.  Here today with wife and daughter. We reviewed images and labs together.    ECOG = 1    Oncologic History:     Prostate cancer:  2011, [place] grade 3+3, cT1a prostatic adenocarcinoma with a PSA of 4.7 was diagnosed and observed.   2016, July PSA 10.4 with repeat biopsy revealing a Ken grade 3+3, cT1c lesion which continued to be  observed.  With various PSA methods, PSA 23.5 on 3/9/2021, 34 on 5/8/2021, 12.47 on 12/22/2020, 18.25 on 3/31/2022, 21.81 on 6/21/2020, and finally 23.5 on 9/19/2022.   10/28/2022 NM bone scan revealed uptake only in the known T7 vertebral body metastases.    10/28/2022 MR scan of the prostate revealed suspicious abnormality of the left base and mid gland peripheral zone with extraprostatic extension into the left seminal vesicle.  No suspicious adenopathy or evidence of pelvic metastases were seen.   11/01/2022 - started leuprolide (Eligard), 45 mg, therapy.     2.           Metastatic metachronous bilateral primary lung cancers:  11/05/2015 - Non-small cell lung cancer involving (R) lung, stage I, status post right thoracotomy and excision of right upper lobe and right middle lobe lung tumor with no evidence of lymph node metastasis and no adjuvant therapy.  Patient lost to follow-up since 2016.   01/21/2021 LEFT LUNG LESION, CT GUIDED BIOPSY - MODERATELY DIFFERENTIATED ADENOCARCINOMA, CONSISTENT WITH LUNG PRIMARY.   Cytokeratin-7: Strongly positive.  Cytokeratin-20: Negative.  TTF-1: Strong nuclear staining.  p63: Negative.  Napsin A: Strong staining   03/10/2021 - completed SBRT 5000 cGy/5 fx for a (L) upper lobe cT1 cN0.  01/07/2022 PET - new 1.1 cm left upper lobe nodule and T7 sclerotic bone lesion presumed to be recurrence.  03/18/2022 - completed SBRT 5,000 cGy/5 fx to the (L) lung lesion.  04/12/2022 - completed SBRT 3275 cGy/5 fx T7 spine.  08/18/2023 restaging CT CAP - new lytic lesions within T6 and T8 concerns for metastasis.    09/07/2023 PET scan - FDG avid lesions involving T5, T6 and T8 vertebral body suspicious for progression of disease.     10/06/23 - completed 2400 cGy/8 fx IMRT T4-T9 spine (Dr Batista).       3.           Recurrent right buccal squamous cell carcinoma ... followed by Dr. Kwesi Roldan.    1994, August - resection floor of mouth carcinoma  2009, February - right retromolar trigone  "SCC treated with surgery and 7,020 cGy/39 until 6/29/2009.   2009, September - surgery for localized recurrence.  2020, November - surgical office-resection by Dr. Alon Nunze for a left oral (buccal, base of tongue) SCC  2022, February - ENT evaluation by Dr. Sim Jaffe for a new right buccal lesion with negative biopsies.   10/10/2022 - ENT, Dr. Roldan f/up with biopsy revealing hyperplasia.  08/14/2023 - ENT, Dr Roldan f/up, \"Mr. Chau's oral exam looks stable today.\"     Patient Active Problem List   Diagnosis     Lung cancer (H)     COPD, group B, by GOLD 2017 classification (H)     Osteoarthritis     Stage 3a chronic kidney disease (H)     Coronary artery disease of native heart with stable angina pectoris, unspecified vessel or lesion type (H24)     Cardiac arrhythmia     Primary hypertension     Malignant neoplasm of head, neck and face (H)     Depression     Malignant neoplasm of upper lobe of left lung (H)     Squamous cell carcinoma of mouth (H)     Malignant neoplasm of prostate (H)     Dyslipidemia     Cyst of pancreas     Ragsdale's esophagus     Spine metastasis     Chronic midline low back pain with right-sided sciatica     Lumbar radiculitis     Health care maintenance     History of alcoholism (H)     Peripheral arterial disease (H24)     Compression fracture of T7 vertebra with routine healing     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)     Current Outpatient Medications   Medication     acetaminophen (TYLENOL) 500 MG tablet     fentaNYL (DURAGESIC) 25 mcg/hr 72 hr patch     ferrous sulfate (FEROSUL) 325 (65 Fe) MG tablet     gabapentin (NEURONTIN) 300 MG capsule     megestrol (MEGACE) 40 MG/ML suspension     multivitamin w/minerals (THERA-VIT-M) tablet     omeprazole (PRILOSEC) 20 MG DR capsule     albuterol (PROAIR HFA/PROVENTIL HFA/VENTOLIN HFA) 108 (90 Base) MCG/ACT inhaler     aspirin (ASA) 81 MG EC tablet     dexAMETHasone (DECADRON) 2 MG tablet     Fluticasone-Umeclidin-Vilanterol (TRELEGY " ELLIPTA) 200-62.5-25 MCG/ACT oral inhaler     Magnesium Oxide 250 MG TABS     metoprolol succinate ER (TOPROL XL) 25 MG 24 hr tablet     naloxone (NARCAN) 4 MG/0.1ML nasal spray     nitroGLYcerin (NITROSTAT) 0.4 MG sublingual tablet     oxyCODONE (XTAMPZA ER) 13.5 MG 12 hr tablet     oxyCODONE IR (ROXICODONE) 10 MG tablet     polyethylene glycol (MIRALAX) 17 GM/Dose powder     tamsulosin (FLOMAX) 0.4 MG capsule     No current facility-administered medications for this visit.     Past Medical History:   Diagnosis Date     Abdominal aortic aneurysm (AAA) (H24) 03/30/2010    Formatting of this note might be different from the original. 5.6 cm infrarenal AAA, on 12/21/2010 CTA Formatting of this note might be different from the original. 5.6 cm infrarenal AAA, on 12/21/2010 CTA      Anemia      Ragsdale esophagus      Cancer (H)     lung     CHF (congestive heart failure) (H)      Chronic kidney disease      COPD, group B, by GOLD 2017 classification (H)      Coronary artery disease of native heart with stable angina pectoris, unspecified vessel or lesion type (H24)      Degenerative joint disease      Head and neck cancer (H)      History of transfusion      Hyperlipidemia      Hypertension      Lung cancer (H)     s/p RUL RML wedge resection in 2016 by Dr. Carter Velazquez     Lung mass     MELODY FDG-avid 2.5cm     Myocardial infarction (H)     November 2019     Oral cancer (H)      Prostate cancer (H)      Septic hip (H) 02/10/2022     Shortness of breath     with exertion     Trochanteric fracture of right femur (H) 06/21/2016     Past Surgical History:   Procedure Laterality Date     ABDOMINAL AORTIC ANEURYSM REPAIR       ANGIOGRAPHY  11/25/2019    coronary     COLONOSCOPY      3/21/2017,5/12/2014     CORONARY STENT PLACEMENT  12/01/2019    x1     CT BIOPSY LUNG  01/21/2021     EGD      11/13/2020,7/26/2019     ENDOSCOPIC REMOVAL SALIVARY GLAND STONE  1995     ESOPHAGOSCOPY, GASTROSCOPY, DUODENOSCOPY (EGD), COMBINED N/A  07/26/2019    Procedure: ENDOSCOPIC ULTRASOUND FINE NEEDLE ASPIRATION, GASTRIC BIOPSIES OF POLYPS;  Surgeon: Quoc Edwards MD;  Location: [facility];  Service: Gastroenterology     EXCISE LESION INTRAORAL Left 11/06/2020    Procedure: 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 Need pathology for margins;  Surgeon: Alon Nunez MD;  Location: [facility];  Service: ENT     HEMIARTHROPLASTY HIP Right 06/22/2016     IR LUNG BIOPSY MEDIASTINUM RIGHT  9/9/2015     LUMBAR DISCECTOMY  2006     NEPHRECTOMY Left     for benign hemangioma     NEPHRECTOMY  03/2009     OTHER SURGICAL HISTORY Right 01/01/2015    wedge resectionLung cancer isolated pulmonary nodule     OTHER SURGICAL HISTORY      placement of stentfor AAA     OTHER SURGICAL HISTORY      right hip surgeryfor fracture     OTHER SURGICAL HISTORY  01/01/1994    Oral cancer resection     WY ESOPHAGOGASTRODUODENOSCOPY US SCOPE W/ADJ STRXRS N/A 11/13/2020    Procedure: ESOPHAGOGASTRODUODENOSCOPY,  ENDOSCOPIC ULTRASOUND;  Surgeon: Quoc Edwards MD;  Location: [facility];  Service: Gastroenterology     SQUAMOUS CELL CARCINOMA EXCISION  11/06/2020     THORACOSCOPY  2016    wedge resection of lung     THORACOTOMY Right 11/15/2015     [facility] ORAL SURGERY PROCEDURE  1994     Socioeconomic History     Marital status:      Review of Systems   Constitutional:  Positive for appetite change, fatigue and unexpected weight change.   HENT:  Negative.     Eyes: Negative.    Cardiovascular:  Positive for chest pain.   Gastrointestinal:  Positive for diarrhea.   Endocrine: Negative.    Genitourinary: Negative.     Musculoskeletal:  Positive for back pain.   Skin: Negative.    Neurological: Negative.    Hematological: Negative.    Psychiatric/Behavioral:  Positive for depression and sleep disturbance.    All other systems reviewed and are negative.      BP (!) 139/94 (Patient Position: Sitting)   Pulse 73   Temp 97.9  F (36.6  C)  "(Oral)   Resp 16   Ht 1.803 m (5' 10.98\")   Wt 78.9 kg (174 lb)   SpO2 97%   BMI 24.28 kg/m    Physical Exam  Constitutional:       Appearance: He is normal weight. He is ill-appearing.      Comments: Unwell appearing older man   HENT:      Head: Normocephalic and atraumatic.      Mouth/Throat:      Mouth: Mucous membranes are moist. No oral lesions.      Dentition: Has dentures. No gum lesions.      Comments: Upper plate only  Eyes:      Extraocular Movements: Extraocular movements intact.      Conjunctiva/sclera: Conjunctivae normal.      Pupils: Pupils are equal, round, and reactive to light.   Cardiovascular:      Rate and Rhythm: Normal rate and regular rhythm.      Heart sounds: Normal heart sounds.   Pulmonary:      Effort: Prolonged expiration present.      Breath sounds: No wheezing or rales.      Comments: kyphotic  Chest:      Chest wall: Tenderness present.   Abdominal:      General: There is no distension.      Palpations: Abdomen is soft. There is no mass.      Tenderness: There is no abdominal tenderness. There is no guarding.   Musculoskeletal:         General: No deformity.      Cervical back: Normal, normal range of motion and neck supple. Deformity: kyphosis.      Thoracic back: Tenderness present.      Lumbar back: Normal.        Back:       Right lower leg: No edema (trace).      Left lower leg: No edema (trace).      Comments: Tender to gentle percussion   Lymphadenopathy:      Cervical: No cervical adenopathy.      Upper Body:      Right upper body: No axillary or epitrochlear adenopathy.      Left upper body: No axillary or epitrochlear adenopathy.   Skin:     General: Skin is warm and dry.   Neurological:      General: No focal deficit present.      Mental Status: He is alert and oriented to person, place, and time.      Cranial Nerves: No cranial nerve deficit.      Motor: No weakness.      Deep Tendon Reflexes: Reflexes normal.   Psychiatric:         Mood and Affect: Mood normal.    "      Behavior: Behavior normal. Behavior is cooperative.         Thought Content: Thought content normal.         Judgment: Judgment normal.         Recent Results (from the past 720 hour(s))   Comprehensive metabolic panel    Collection Time: 10/30/23 12:30 PM   Result Value Ref Range    Sodium 134 (L) 135 - 145 mmol/L    Potassium 4.4 3.4 - 5.3 mmol/L    Carbon Dioxide (CO2) 30 (H) 22 - 29 mmol/L    Anion Gap 10 7 - 15 mmol/L    Urea Nitrogen 17.5 8.0 - 23.0 mg/dL    Creatinine 1.51 (H) 0.67 - 1.17 mg/dL    GFR Estimate 47 (L) >60 mL/min/1.73m2    Calcium 10.0 8.8 - 10.2 mg/dL    Chloride 94 (L) 98 - 107 mmol/L    Glucose 136 (H) 70 - 99 mg/dL    Alkaline Phosphatase 125 40 - 129 U/L    AST 17 0 - 45 U/L    ALT 9 0 - 70 U/L    Protein Total 7.2 6.4 - 8.3 g/dL    Albumin 3.6 3.5 - 5.2 g/dL    Bilirubin Total 0.5 <=1.2 mg/dL   PSA tumor marker    Collection Time: 10/30/23 12:30 PM   Result Value Ref Range    PSA Tumor Marker 0.12 0.00 - 6.50 ng/mL   CBC with platelets and differential    Collection Time: 10/30/23 12:30 PM   Result Value Ref Range    WBC Count 7.6 4.0 - 11.0 10e3/uL    RBC Count 4.47 4.40 - 5.90 10e6/uL    Hemoglobin 13.3 13.3 - 17.7 g/dL    Hematocrit 42.4 40.0 - 53.0 %    MCV 95 78 - 100 fL    MCH 29.8 26.5 - 33.0 pg    MCHC 31.4 (L) 31.5 - 36.5 g/dL    RDW 13.2 10.0 - 15.0 %    Platelet Count 297 150 - 450 10e3/uL    % Neutrophils 82 %    % Lymphocytes 6 %    % Monocytes 8 %    % Eosinophils 3 %    % Basophils 1 %    % Immature Granulocytes 0 %    NRBCs per 100 WBC 0 <1 /100    Absolute Neutrophils 6.2 1.6 - 8.3 10e3/uL    Absolute Lymphocytes 0.5 (L) 0.8 - 5.3 10e3/uL    Absolute Monocytes 0.6 0.0 - 1.3 10e3/uL    Absolute Eosinophils 0.2 0.0 - 0.7 10e3/uL    Absolute Basophils 0.1 0.0 - 0.2 10e3/uL    Absolute Immature Granulocytes 0.0 <=0.4 10e3/uL    Absolute NRBCs 0.0 10e3/uL     Recent Results (from the past 744 hour(s))   POC US Basic Cardiac    Narrative    Aaron Pereira MD      10/11/2023  5:18 PM    [facility]  Point of Care Ultrasound Interpretation    POC US Basic Cardiac    Date/Time: 10/11/2023 5:15 PM    Performed by: Aaron Pereira MD  Authorized by: Brett Subramanian MD     Point of Care Ultrasound: Basic Cardiac    Indications: Hypotension, Shortness of Breath, and Chest Pain    Window: Very limited based on patient factors (inability to position   appropriately, body habitus)    Findings/ Impression (Within the context of limited point-of-care   ultrasound): Good Global Function, Right Ventricle Grossly Unremarkable,   and No Pericardial Effusion   CT Angio Chest And CT Abd Pelvis W IV Cont    Narrative    EXAM: CT ANGIO CHEST AND CT ABD PELVIS W IV CONT  LOCATION: [facility]  DATE: 10/11/2023    INDICATION: Hx malignancy, hypoxic on 15L, also has hx AAA and complaining of CP and epigast AP; hx AAA, chest pain, hypoxia  COMPARISON: Chest CTA 09/08/2023. Nonenhanced CT abdomen and pelvis 09/23/2023  TECHNIQUE: CT angiogram chest and routine CT abdomen pelvis with IV contrast. Arterial phase through the chest and venous phase through the abdomen and pelvis. 2D and 3D MIP reconstructions were preformed by the CT technologist. Dose reduction techniques were used.   CONTRAST: IOHEXOL 350 MG/ML IV SOLN 80 mL    FINDINGS:  CTA CHEST: Mildly ectatic ascending aorta measuring 4.4 cm in diameter. On the present exam it measured 4.4 cm, also. Moderate, partially calcified atherosclerotic changes at the aortic arch and descending aorta. Classic branching pattern of the arch vessels. Moderate, partially calcified atherosclerotic changes throughout the arch vessels without significant stenosis. Normal caliber central pulmonary arteries. No pulmonary emboli.    CTA ABDOMEN AND PELVIS: Stable indwelling abdominal aortic bifurcated endograft. Endograft location and configuration are unchanged. Normal luminal enhancement. The excluded aneurysm measures 7.3 x 6.9 cm.  Previously measured 7.3 x 7 cm. No endoleak. Calcified changes of the origins of the celiac, superior mesenteric and right renal artery without severe stenosis.    LUNGS AND PLEURA: New patchy airspace changes with consolidation throughout the right lung. Moderate, associated bronchial wall thickening. Small right pleural effusion. Mild atelectasis within the posterior aspect of the right lung. Stable left perihilar fiducial marker with a masslike airspace changes surrounding it measuring approximately 4.8 x 4.8 x 1.6 cm. The previous exam it measured 4.6 x 3.8 x 1.7 cm. Small amount of secretions within the right mainstem bronchus. Mild centrilobular emphysema.    MEDIASTINUM/AXILLAE: No lymphadenopathy.    CORONARY ARTERY CALCIFICATION: Severe.    HEPATOBILIARY: Normal.    PANCREAS: Stable 2.3 cm cystic lesion within the mid to distal pancreatic body. No main pancreatic duct dilatation.    SPLEEN: Normal.    ADRENAL GLANDS: Left adrenalectomy. Right adrenal is within normal limits.    KIDNEYS/BLADDER: Left nephrectomy. No masses within the nephrectomy bed. Benign-appearing right renal cysts. No specific follow-up necessary for these. No hydronephrosis. Bladder is partially obscured by streak artifact from the hip arthroplasty. Mild circumferential bladder wall thickening likely secondary to chronic bladder outlet obstruction.    BOWEL: No obstruction or inflammatory change.    LYMPH NODES: Normal.    PELVIC ORGANS: Benign-appearing Central prostatic gland calcifications.    MUSCULOSKELETAL: Stable sclerotic pathologic compression fracture of T7. Lytic metastases stable lytic metastases at T5, T6 and T8. Chronic bilateral rib fractures. Degenerative disc disease throughout the lumbar spine. Degenerative changes bilateral sacroiliac joints. Right total hip arthroplasty.    IMPRESSION:  1.  Mildly ectatic ascending aorta. Diffuse calcified atherosclerotic changes. No dissection.  2.  No pulmonary emboli.  3.  New  patchy airspace changes throughout the right lung suggesting multifocal pneumonitis.  4.  Mild increase in size of the left perihilar area of masslike airspace changes. This may represent progressing post radiation changes.  5.  Stable thoracic spinal metastases.  6.  Stable 2.3 cm pancreatic cystic lesion. Recommend attention to this area on follow-up CTs.   US AORTA ILIACS IVC WITH DUPLEX    Narrative    VASCULAR ULTRASOUND REPORT    CELIA LUTZ  MRN:    [id number] Accession#:   [id number]  :    1945  Study Date:   10/27/2023 10:29:44 AM  Age:    78 years   Tech:         MARTHA  Gender: M          Referring MD: SAMM PEREZ      Site: [facility]    Study performed:      Aorta, Iliac  Indication for study: S/P AAA repair.    TECHNIQUE:  The abdominal aorta and iliac arteries were examined with duplex ultrasound, color-flow and spectral Doppler. Bypass grafts and/or stents if present are evaluated per exam protocol. Vessel size, peak systolic velocity (PSV) and velocity ratios if applicable, were obtained and documented at sites per exam protocol.    IMPRESSION:   1. The patient is status post endovascular aneurysm repair. Based on prior study there is endoleak; however, this was not visualized on today's examination. The residual AAA sac measures 7.3 x 6.8 cm which is slightly increased compared to study from 2023 when the residual AAA sac measured 7.1 x 6.6.   2. Limbs of the endovascular stent graft are patent without stenosis.    COMPARISON:  Compared to prior study 2023, residual sac measuring 7.3 x 6.8, previously 7.1 x 6.6.    FINDINGS:  Patent endograft. Unable to visualize known endoleak on today's exam.    MEASUREMENTS:  +-----------+----------+  Right IliacPSV (cm/s)  +-----------+----------+  EIA MID        90      +-----------+----------+  EIA DST        98      +-----------+----------+    +----------+----------+  Left IliacPSV  "(cm/s)  +----------+----------+  EIA PRX       84      +----------+----------+  EIA MID      102      +----------+----------+  EIA DST       95      +----------+----------+      ENDOGRAFT  +-----+-----------------+  STENT   PSV (cm/s)      +-----+-----------------+  PRE  not vis; obscured  +-----+-----------------+  PRX  not vis; obscured  +-----+-----------------+  DST         66          +-----+-----------------+    +-------------+----------+  LIMB         PSV (cm/s)  +-------------+----------+  RT PRX           96      +-------------+----------+  RT MID          110      +-------------+----------+  RT DST           46      +-------------+----------+  RT POST STENT not vis    +-------------+----------+  LT PRX           72      +-------------+----------+  LT MID           55      +-------------+----------+  LT DST           70      +-------------+----------+  LT POST STENT    84      +-------------+----------+    +------------------+--------+-------+                    TRV (cm)AP (cm)  +------------------+--------+-------+  RESID SAC DIAMETER  7.30   6.80    +------------------+--------+-------+    Dirk Celis MD.    Electronically signed on 10/30/2023 9:28:13 AM    This study was performed and interpreted by a service accredited by the [employer] Accreditation Commission (IAC/Vascular), www.intersocietal.org/vascular    Report generated by [employer].          Final         Oncology Rooming Note    November 8, 2023 1:27 PM   Lenny Chau is a 78 year old male who presents for:    Chief Complaint   Patient presents with     Oncology Clinic Visit     Malignant neoplasm of upper lobe of lung  Metastasis neoplasm metastatic to bone.     Initial Vitals: BP (!) 139/94 (Patient Position: Sitting)   Pulse 73   Temp 97.9  F (36.6  C) (Oral)   Resp 16   Ht 1.803 m (5' 10.98\")   Wt 78.9 kg (174 lb)   SpO2 97%   BMI 24.28 kg/m   Estimated body mass " "index is 24.28 kg/m  as calculated from the following:    Height as of this encounter: 1.803 m (5' 10.98\").    Weight as of this encounter: 78.9 kg (174 lb). Body surface area is 1.99 meters squared.  Moderate Pain (5) Comment: Data Unavailable   No LMP for male patient.  Allergies reviewed: Yes  Medications reviewed: Yes    Medications: Refill not needed  Pharmacy name entered into [facility]:    [id number] [place] [address] [address] [place] [address], [address]    Clinical concerns: Pain at 5 front of ribs    Laverne Tam LPN                Again, thank you for allowing me to participate in the care of your patient.        Sincerely,        Chelsea Ortega MD  "

## 2023-11-08 NOTE — PROGRESS NOTES
Infusion Nursing Note:  Lenny Chau presents today for Eligard.    Patient seen by provider today: Yes: Dr. Ortega.   present during visit today: Not Applicable.    Note: N/A.    Intravenous Access:  No Intravenous access/labs at this visit.    Treatment Conditions:  Results reviewed, no treatment parameters, ok to proceed with treatment.    Post Infusion Assessment:  Patient tolerated injection without incident to the right abdomen.     Discharge Plan:   Patient discharged in stable condition accompanied by: self and sister.  Departure Mode: Ambulatory with walker.      Emerald Guerrero RN

## 2023-11-09 NOTE — TELEPHONE ENCOUNTER
We received 2 rx for Mitchell at our pharmacy. He states he needs them sent to the [facility] in [place], [place].  Please resend the Hydromorphone liquid and the Dicyclomine. I am deleting the orders we received.     Thank You  Kim Wei, [facility]  [phone]

## 2023-11-14 NOTE — TELEPHONE ENCOUNTER
Received telephone call from patient's dtr requesting refill of hydromorphone.     Last refill: 11/11/23 (2 day supply)  Last office visit: 11/1/23  Scheduled for follow up tentatively planned for 12/7/23     Will route request to NP for review.     Reviewed [place]  Report.

## 2023-11-15 NOTE — PROGRESS NOTES
"Oncology Rooming Note    November 15, 2023 2:30 PM   Lenny Chau is a 78 year old male who presents for:    Chief Complaint   Patient presents with    Oncology Clinic Visit     Malignant neoplasm of prostate (H     Initial Vitals: /78 (BP Location: Left arm, Patient Position: Sitting, Cuff Size: Adult Regular)   Pulse 76   Temp 98.3  F (36.8  C) (Oral)   Resp 16   Wt 80 kg (176 lb 4.8 oz)   SpO2 96%   BMI 24.60 kg/m   Estimated body mass index is 24.6 kg/m  as calculated from the following:    Height as of 11/8/23: 1.803 m (5' 10.98\").    Weight as of this encounter: 80 kg (176 lb 4.8 oz). Body surface area is 2 meters squared.  Moderate Pain (4) Comment: Data Unavailable   No LMP for male patient.  Allergies reviewed: Yes  Medications reviewed: Yes    Medications: Medication refills not needed today.  Pharmacy name entered into [facility]:    [id number] [place] [facility] [address] [facility] [address] [place] [facility] [address] [place] [address] PHARMACY [facility] [address] [facility], [address]  [place] [facility] [place], [place] - [address] [facility]  [facility] [address] [facility]    Clinical concerns: Follow up       Kailey Carcamo LPN            "

## 2023-11-15 NOTE — PROGRESS NOTES
[facility]: Cancer Care Follow-Up Note                                    Discussion with Patient:                                                      Met with Mitchell and his family when he was at clinic for his follow up with Dr. Ortega.  PDL-1: Order placed today for lung bx, results to be reviewed at future follow up. Writer messaged the  path team to notify them about the order. Follow up in 2-4 weeks  to review results and recommendations with Dr. Ortega. Follow up apt is scheduled on 12/6/2023     2. Constipation: Patient is on narcotics and has not had a bowel movement for 5 days.  Per Dr. Ortega, family can help Mitchell with enema or he can take 1/2 bottle of mag ctirate. If not results after a couple hours can repeat with the second half of the bottle. He still may need enema if the mag citrate does not work. Family was uncertain if they are comfortable with giving and enema. Dr. Ortega reviewed how to do. Writer gave supplies of gloved and chux to facilitate the process. ER would be the last option , but could be used if they have difficulty. Also needs to follow the bowel program moving forward. He is on miralax but also needs to take the senna that has been prescribed. Patient had inadvertently forgotten to take the senna, they will start.    3. Living arrangement. Mitchell's sister accompanied him to clinic today and they are helping him and his maximilian to move to a iving arrangement with more services. They are already I contact with a social program that is helping them (adult and aging program). They are are that we have SW but declined need at this time.        Dates of Treatment:                                                      Infusion given in last 28 days       Administered MAR Action Medication Dose Rate Visit    11/08/2023 14:54 Given leuprolide (ELIGARD) kit 45 mg 45 mg   Infusion Therapy Visit on 11/08/2023 in [facility]            Assessment:                                                       See above    Intervention/Education provided during outreach:                                                       Patient and family stated understanding of the plan and will keep future apts as scheduled. They have nurse triage number for future questions./concerns    Signature:  Dali Cerda RN

## 2023-11-15 NOTE — LETTER
11/15/2023         RE: Lenny Chau  [address]  [place] [address]        Dear Colleague,    Thank you for referring your patient, Lenny Chau, to the [facility] RADIATION ONCOLOGY [place]. Please see a copy of my visit note below.    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  The biopsy confirmed moderately differentiated adenocarcinoma.        TYPE OF RADIATION THERAPY ADMINISTERED:   1. SBRT to the left upper lobe with a total dose of 5000 cGy in 5 treatments given from 3/2/2021-3/10/2021.     2. SBRT with a total dose of 5000 cGy in 5 treatments for the second left upper lobe lung tumor given from 3/9/2022-3/18/2022.      3. SBRT with a total dose of 3275 cGy in 5 treatments for T7 spine given from 2022- 2022.    4. 2400 cGy in 8 treatments targeted thoracic spine given from 2023 - 10/6/2023.      INTERVAL SINCE COMPLETION OF RADIATION THERAPY:   2 years and 8 months for the first left upper lobe SBRT.    1 year and 8 months for the second left lung SBRT.    7 months for T-spine SBRT.   1 months for T-spine.      SUBJECTIVE:  Mr. Chau is a 78 y.o. male who is a chronic smoker and quit smoking since .  The patient had a complicated history of multiple cancer in the past as detailed as followin.  Low risk prostate cancer, clinical stage T1c N0 M0, recent grade 3+3 =6 and the last  PSA was 10.4 in 7/2006.  Patient has been on clinical observation and was found to have a gradually rising PSA.  The patient is currently on hormone therapy since 2022.      2.  Floor of mouth cancer, status post surgical resection in 8/1994 with no adjuvant therapy.     3.  Left kidney hemangioma, status post left nephrectomy on 3/26/2009.     4.  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  Patient had local recurrence and is status post surgical resection on 9/11/2009.     5.  Non-small cell lung cancer involving right lung, stage I, status post right thoracotomy and excision of right upper lobe and right middle lobe lung tumor 11/5/2015 with no evidence of lymph node metastasis and no adjuvant therapy.  Patient lost to follow-up since 2016.     6.  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 Alon Nunez, ENT on 11/6/2020.     7.  Left upper lobe lung  cancer, stage T1N0 M0.  The biopsy confirmed moderately differentiated adenocarcinoma on 1/21/2021.  The patient received SBRT with a total dose of 5000 cGy in 5 treatments given from 3/2/2021-3/10/2021.     Patient had two prior early stage right lung cancers that were apparently resected in their entirety and may have been synchronous primary lung cancers. It does not appear he had the appropriate recommended follow up at [facility]. The MELODY lesion was known to be present in 2016 and appears to be slightly larger now with significant FDG-avidity on the recent PET/CT. It is likely another primary lung cancer, although metastasis from previous lung cancers is possible.  The patient had a restaging PET CT scan on 8/31/2020.  The scan showed enlarging FDG avid left upper lobe mass measuring 2.1 x 2.5 cm with central solid component  consistent with primary lung cancer without metastasis.  There was also a FDG avid lesion in the left buccal region consistent with squamous cell carcinoma without metastasis.  The patient had a surgical resection for his left buccal/lateral tongue squamous cell carcinoma 11/6/2020 by Dr. Alon Nunez, ENT with pathology showed squamous cell carcinoma with negative margin.  He was determined not a good candidate for lung surgery given his complicated medical history and health status.  He therefore received definitive radiation therapy using SBRT with a total dose of 5000 cGy in 5 treatments given from 3/2/2021-3/10/2021. The patient tolerated radiation therapy very well with minimal side effect.     The patient has been doing well since completion of radiation therapy.  He denies any pain or discomfort related to the therapy at the time of evaluation.  The patient was found to a new 9 mm small nodule in the left upper lobe outside of patient therapy area from restaging CT scan on 12/20/2021.  He was recommended to have staging PET CT scan and was done 1/7/2022. 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  There is no evidence of other systemic metastasis.The patient's case has been discussed at thoracic tumor conference 1/13/2022.  MRI brain on 1/22/2022 showed no evidence of brain metastasis. Patient is not good candidate for surgery and poor candidate for systemic therapy given his current medical status.  The consensus recommendation is to consider SBRT for his oligo disease in the left lung and T7 spine if the patient wishes not to consider systemic therapy at this time.   The patient received the second course of SBRT to the second left lung cancer with a total dose of 5000 cGy in 5 treatments given from 3/9/2022-3/18/2022.  He tolerated radiation therapy very well with minimal side effect.       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  He received stereotactic radiosurgery with a total dose of 3275 cGy in 5 treatments given from 4/12/2022- 4/22/2022.  The patient tolerated ration therapy very well with minimal side effect.     The restaging CT chest abdomen pelvics on 8/18/2023 showed new lytic lesions within T6 and T8 concerns for metastasis.  Patient then had a restaging PET scan on 9/7/2023 which demonstrated FDG avid lesions involving T5, T6 and T8 vertebral body suspicious for progression of disease.  He noticed worsening pain in the mid thoracic spine at 5/10 level with pain medication.  His pain also radiated to the right chest wall.  Patient received course #4 palliative radiation therapy to the thoracic spine with a total dose of 2400 cGy in 8 treatments given from 9/27/2023 - 10/6/2023.  He tolerated radiation therapy well with minimal side effect.      The patient has been stable since palliative radiation therapy.  He has some moderate pain relief.  The patient however still have a significant midthoracic pain (4/10) at time of evaluation.  He is currently seeing palliative care for ongoing pain management.  The patient is here for routine post therapy office follow-up.    Medications were reviewed and are up to date on EPIC.    The following portions of the patient's history were reviewed and updated as appropriate: allergies, current medications, past family history, past medical history, past social history, past surgical history and problem list.    Review of Systems:      General  Constitutional  Constitutional (WDL): Exceptions to WDL  Fatigue: Fatigue relieved by rest  EENT  Eye Disorders  Eye Disorder (WDL): All eye disorder elements are within defined limits  Ear Disorders  Ear Disorder (WDL): All ear disorder elements are within defined limits  Respiratory  Respiratory  Respiratory (WDL): Exceptions to WDL  Dyspnea: Shortness of breath with moderate  exertion  Cardiovascular  Cardiovascular  Cardiovascular (WDL): All cardiovascular elements are within defined limits (no pacemaker)  Gastrointestinal  Gastrointestinal  Gastrointestinal (WDL): Exceptions to WDL  Anorexia: Loss of appetite without alteration in eating habits  Dysphagia: Symptomatic, able to eat regular diet (previous neck dissection)  Constipation: Persistent symptoms with regular use of laxatives or enemas OR limiting instrumental ADL  Dry Mouth: Symptomatic (e.g., dry or thick saliva) without significant dietary alteration OR unstimulated saliva flow greater than 0.2 mL/min  Musculoskeletal  Musculoskeletal and Connective Tissue Disorders  Musculoskeletal & Connective (WDL): Exceptions to WDL  Generalized Muscle Weakness: Symptomatic OR perceived by patient but not evident on physical exam (uses wheeled walker)  Myalgia: Mild pain (mid back, abd pain below the rib cage)  Integumentary  Integumentary  Integumentary (WDL): All integumentary elements are within defined limits  Neurological  Neurosensory  Neurosensory (WDL): Exceptions to WDL  Peripheral Motor Neuropathy: Asymptomatic OR clinical or diagnostic observations only  Ataxia: Asymptomatic OR clinical or diagnostic observations only OR intervention not indicated (uses wheeled walker)  Peripheral Sensory Neuropathy: Asymptomatic (chest)  Genitourinary/Reproductive  Genitourinary  Genitourinary (WDL): All genitourinary elements are within defined limits  Lymphatic  Lymph System Disorders  Lymph (WDL): All lymph elements are within defined limits  Pain  Pain Score: Moderate Pain (4)  AUA Assessment                                                              Accompanied by  Accompanied By: family (daughter Georgette & sister Rebecca)    Objective:     PHYSICAL EXAMINATION:    /81 (BP Location: Right arm, Patient Position: Sitting, Cuff Size: Adult Regular)   Pulse 86   Temp 98.1  F (36.7  C) (Oral)   Resp 16   Wt 80 kg (176 lb 4.8 oz)    SpO2 97%   BMI 24.60 kg/m      Gen: Alert, in NAD  Eyes: PERRL, EOMI, sclera anicteric  Neck: Supple, full ROM, no LAD  Pulm: No wheezing, stridor or respiratory distress  CV: Well-perfused, no cyanosis, no pedal edema  Back: No step-offs, mild to moderate tenderness along the mid thoracic spine consistent with spine metastasis.  Rectal: Deferred  : Deferred  Musculoskeletal: Normal muscle bulk and tone  Skin: Normal color and turgor  Neurologic: A/Ox3, CN II-XII intact, normal gait and station  Psychiatric: Appropriate mood and affect      Impression     The patient is a 77-year-old gentleman with multiple history of cancer in the past, status post stereotactic radiosurgery for T7 spine  months ago and SBRT for lung cancer 2 years and 7 months ago with restaging CT and PET scan showed good response and new lytic lesion within T5, 6 and T8 suspicious for new metastasis.  The patient received #4 course of radiation therapy targeted to the mid thoracic spine completed 1 month ago.  The patient has been stable with some mild pain relief.        Assessment & Plan:     1.  The patient has been stable since palliative radiation therapy.  He however still have significant pain (4/10) in the mid thoracic spine region at the time of evaluation.  Patient is currently seeing palliative care for pain management.  I have discussed with patient about vertebroplasty as an alternative management for his compression fracture.  He wants to get thoracic spine brace before consider anything else.    2.  Patient is scheduled to see Dr. Chelsea Ortega, med oncology later on today for discussion of possible systemic therapy.  Patient will continue his long-term follow-up and ongoing care with his medical oncology as planned.    3.  Follow-up with radiation oncology in 3-4 minutes.    Face to face time  20 minutes with > 80% spent on consultation, education and coordination of care.      Mariana Batista MD  Department of Radiation Oncology  "  [facility]  Tel: [phone]  Page: [phone]    [facility]  [address] [place] [address]     [facility]   [address] [facility] [address] [place] [address]    CC:  Patient Care Team:  Shade Boyd MD as PCP - General (Internal Medicine)  Mariana Batista MD as MD (Hematology & Oncology)  Faviola Cottrell RP as Pharmacist (Pharmacist)  Mariana Batista MD as Assigned Cancer Care Provider  Shade Boyd MD as Assigned PCP  Rosas Carr MD as MD (Neurology)  Nilton Gtz MD as Assigned Musculoskeletal Provider  David Castrejon MD (Internal Medicine)  Faviola Cottrell RPH as Assigned MTM Pharmacist  Andrew Wallace MD as MD (Hematology)  Kwesi Roldan MD as Assigned Surgical Provider  Cliff Crystal APRN CNP as Assigned Pain Medication Provider      Oncology Rooming Note    November 15, 2023 1:19 PM   Lenny Chau is a 78 year old male who presents for:    Chief Complaint   Patient presents with     Oncology Clinic Visit     Follow up with Dr. Batista     Initial Vitals: /81 (BP Location: Right arm, Patient Position: Sitting, Cuff Size: Adult Regular)   Pulse 86   Temp 98.1  F (36.7  C) (Oral)   Resp 16   Wt 80 kg (176 lb 4.8 oz)   SpO2 97%   BMI 24.60 kg/m   Estimated body mass index is 24.6 kg/m  as calculated from the following:    Height as of 11/8/23: 1.803 m (5' 10.98\").    Weight as of this encounter: 80 kg (176 lb 4.8 oz). Body surface area is 2 meters squared.  Moderate Pain (4) Comment: Data Unavailable   No LMP for male patient.  Allergies reviewed: Yes  Medications reviewed: No    Medications: Medication refills not needed today.  Pharmacy name entered into [employer]:    [facility] [id number] [place] [place] [facility] [address] [facility] [address] [place], [place] - [address] [place] [address] PHARMACY [place], [place] - [address] [facility]  [place], [place] - [address] [facility]  [facility] " - [place], [place] - [address] [facility]    Clinical concerns: Patient here ambulatory with wheeled walker accompanied by daughter and sister for follow-up status post radiation for his metastatic lung cancer.  Patient continues to have pain in his mid back and does not feel that it has improved much since treatment.  Patient is following with palliative care for pain management.  Seen by Dr. Batista.  Plan return to clinic for follow-up as directed by provider.   Dr. Batista was notified.      Anne-Marie Durbin RN                Again, thank you for allowing me to participate in the care of your patient.        Sincerely,        Mariana Batista MD

## 2023-11-15 NOTE — PROGRESS NOTES
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  The biopsy confirmed moderately differentiated adenocarcinoma.        TYPE OF RADIATION THERAPY ADMINISTERED:   1. SBRT to the left upper lobe with a total dose of 5000 cGy in 5 treatments given from 3/2/2021-3/10/2021.     2. SBRT with a total dose of 5000 cGy in 5 treatments for the second left upper lobe lung tumor given from 3/9/2022-3/18/2022.      3. SBRT with a total dose of 3275 cGy in 5 treatments for T7 spine given from 2022- 2022.    4. 2400 cGy in 8 treatments targeted thoracic spine given from 2023 - 10/6/2023.      INTERVAL SINCE COMPLETION OF RADIATION THERAPY:   2 years and 8 months for the first left upper lobe SBRT.    1 year and 8 months for the second left lung SBRT.    7 months for T-spine SBRT.   1 months for T-spine.      SUBJECTIVE:  Mr. Chau is a 78 y.o. male who is a chronic smoker and quit smoking since .  The patient had a complicated history of multiple cancer in the past as detailed as followin.  Low risk prostate cancer, clinical stage T1c N0 M0, recent grade 3+3 =6 and the last PSA was 10.4 in 2006.  Patient has been on clinical observation and was found to have a gradually rising PSA.  The patient is currently on hormone therapy since .      2.  Floor of mouth cancer, status post surgical resection in 1994 with no adjuvant  therapy.     3.  Left kidney hemangioma, status post left nephrectomy on 3/26/2009.     4.  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  Patient had local recurrence and is status post surgical resection on 9/11/2009.     5.  Non-small cell lung cancer involving right lung, stage I, status post right thoracotomy and excision of right upper lobe and right middle lobe lung tumor 11/5/2015 with no evidence of lymph node metastasis and no adjuvant therapy.  Patient lost to follow-up since 2016.     6.  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 Alon Nunez, ENT on 11/6/2020.     7.  Left upper lobe lung  cancer, stage T1N0 M0.  The biopsy confirmed moderately differentiated adenocarcinoma on 1/21/2021.  The patient received SBRT with a total dose of 5000 cGy in 5 treatments given from 3/2/2021-3/10/2021.     Patient had two prior early stage right lung cancers that were apparently resected in their entirety and may have been synchronous primary lung cancers. It does not appear he had the appropriate recommended follow up at [facility]. The MELODY lesion was known to be present in 2016 and appears to be slightly larger now with significant FDG-avidity on the recent PET/CT. It is likely another primary lung cancer, although metastasis from previous lung cancers is possible.  The patient had a restaging PET CT scan on 8/31/2020.  The scan showed enlarging FDG avid left upper lobe mass measuring 2.1 x 2.5 cm with central solid component consistent with primary lung cancer without metastasis.  There was also a FDG avid lesion in the left buccal region consistent with squamous cell carcinoma without metastasis.  The patient had a surgical resection for his left buccal/lateral tongue squamous cell  carcinoma 11/6/2020 by Dr. Alon Nunez, ENT with pathology showed squamous cell carcinoma with negative margin.  He was determined not a good candidate for lung surgery given his complicated medical history and health status.  He therefore received definitive radiation therapy using SBRT with a total dose of 5000 cGy in 5 treatments given from 3/2/2021-3/10/2021. The patient tolerated radiation therapy very well with minimal side effect.     The patient has been doing well since completion of radiation therapy.  He denies any pain or discomfort related to the therapy at the time of evaluation.  The patient was found to a new 9 mm small nodule in the left upper lobe outside of patient therapy area from restaging CT scan on 12/20/2021.  He was recommended to have staging PET CT scan and was done 1/7/2022. 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  There is no evidence of other systemic metastasis.The patient's case has been discussed at thoracic tumor conference 1/13/2022.  MRI brain on 1/22/2022 showed no evidence of brain metastasis. Patient is not good candidate for surgery and poor candidate for systemic therapy given his current medical status.  The consensus recommendation is to consider SBRT for his oligo disease in the left lung and T7 spine if the patient wishes not to consider systemic therapy at this time.   The patient received the second course of SBRT to the second left lung cancer with a total dose of 5000 cGy in 5 treatments given from 3/9/2022-3/18/2022.  He tolerated radiation therapy very well with minimal side effect.      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  He received stereotactic radiosurgery with a total dose of 3275 cGy in 5 treatments given from 4/12/2022- 4/22/2022.  The patient tolerated ration therapy very well with minimal side effect.     The  restaging CT chest abdomen pelvics on 8/18/2023 showed new lytic lesions within T6 and T8 concerns for metastasis.  Patient then had a restaging PET scan on 9/7/2023 which demonstrated FDG avid lesions involving T5, T6 and T8 vertebral body suspicious for progression of disease.  He noticed worsening pain in the mid thoracic spine at 5/10 level with pain medication.  His pain also radiated to the right chest wall.  Patient received course #4 palliative radiation therapy to the thoracic spine with a total dose of 2400 cGy in 8 treatments given from 9/27/2023 - 10/6/2023.  He tolerated radiation therapy well with minimal side effect.      The patient has been stable since palliative radiation therapy.  He has some moderate pain relief.  The patient however still have a significant midthoracic pain (4/10) at time of evaluation.  He is currently seeing palliative care for ongoing pain management.  The patient is here for routine post therapy office follow-up.    Medications were reviewed and are up to date on EPIC.    The following portions of the patient's history were reviewed and updated as appropriate: allergies, current medications, past family history, past medical history, past social history, past surgical history and problem list.    Review of Systems:      General  Constitutional  Constitutional (WDL): Exceptions to WDL  Fatigue: Fatigue relieved by rest  EENT  Eye Disorders  Eye Disorder (WDL): All eye disorder elements are within defined limits  Ear Disorders  Ear Disorder (WDL): All ear disorder elements are within defined limits  Respiratory  Respiratory  Respiratory (WDL): Exceptions to WDL  Dyspnea: Shortness of breath with moderate exertion  Cardiovascular  Cardiovascular  Cardiovascular (WDL): All cardiovascular elements are within defined limits (no pacemaker)  Gastrointestinal  Gastrointestinal  Gastrointestinal (WDL): Exceptions to WDL  Anorexia: Loss of appetite without alteration in eating  habits  Dysphagia: Symptomatic, able to eat regular diet (previous neck dissection)  Constipation: Persistent symptoms with regular use of laxatives or enemas OR limiting instrumental ADL  Dry Mouth: Symptomatic (e.g., dry or thick saliva) without significant dietary alteration OR unstimulated saliva flow greater than 0.2 mL/min  Musculoskeletal  Musculoskeletal and Connective Tissue Disorders  Musculoskeletal & Connective (WDL): Exceptions to WDL  Generalized Muscle Weakness: Symptomatic OR perceived by patient but not evident on physical exam (uses wheeled walker)  Myalgia: Mild pain (mid back, abd pain below the rib cage)  Integumentary  Integumentary  Integumentary (WDL): All integumentary elements are within defined limits  Neurological  Neurosensory  Neurosensory (WDL): Exceptions to WDL  Peripheral Motor Neuropathy: Asymptomatic OR clinical or diagnostic observations only  Ataxia: Asymptomatic OR clinical or diagnostic observations only OR intervention not indicated (uses wheeled walker)  Peripheral Sensory Neuropathy: Asymptomatic (chest)  Genitourinary/Reproductive  Genitourinary  Genitourinary (WDL): All genitourinary elements are within defined limits  Lymphatic  Lymph System Disorders  Lymph (WDL): All lymph elements are within defined limits  Pain  Pain Score: Moderate Pain (4)  AUA Assessment                                                              Accompanied by  Accompanied By: family (daughter Georgette & sister Rebecca)    Objective:     PHYSICAL EXAMINATION:    /81 (BP Location: Right arm, Patient Position: Sitting, Cuff Size: Adult Regular)   Pulse 86   Temp 98.1  F (36.7  C) (Oral)   Resp 16   Wt 80 kg (176 lb 4.8 oz)   SpO2 97%   BMI 24.60 kg/m      Gen: Alert, in NAD  Eyes: PERRL, EOMI, sclera anicteric  Neck: Supple, full ROM, no LAD  Pulm: No wheezing, stridor or respiratory distress  CV: Well-perfused, no cyanosis, no pedal edema  Back: No step-offs, mild to moderate tenderness  along the mid thoracic spine consistent with spine metastasis.  Rectal: Deferred  : Deferred  Musculoskeletal: Normal muscle bulk and tone  Skin: Normal color and turgor  Neurologic: A/Ox3, CN II-XII intact, normal gait and station  Psychiatric: Appropriate mood and affect      Impression     The patient is a 77-year-old gentleman with multiple history of cancer in the past, status post stereotactic radiosurgery for T7 spine  months ago and SBRT for lung cancer 2 years and 7 months ago with restaging CT and PET scan showed good response and new lytic lesion within T5, 6 and T8 suspicious for new metastasis.  The patient received #4 course of radiation therapy targeted to the mid thoracic spine completed 1 month ago.  The patient has been stable with some mild pain relief.        Assessment & Plan:     1.  The patient has been stable since palliative radiation therapy.  He however still have significant pain (4/10) in the mid thoracic spine region at the time of evaluation.  Patient is currently seeing palliative care for pain management.  I have discussed with patient about vertebroplasty as an alternative management for his compression fracture.  He wants to get thoracic spine brace before consider anything else.    2.  Patient is scheduled to see Dr. Chelsea Ortega, med oncology later on today for discussion of possible systemic therapy.  Patient will continue his long-term follow-up and ongoing care with his medical oncology as planned.    3.  Follow-up with radiation oncology in 3-4 minutes.    Face to face time  20 minutes with > 80% spent on consultation, education and coordination of care.      Mariana Batista MD  Department of Radiation Oncology   [facility]  Tel: [phone]  Page: [phone]    [facility]  [address]  [place] [address]     [facility]   [address] [facility] [place] [address]    CC:  Patient Care Team:  Shade Boyd MD as PCP - General (Internal  Medicine)  Mariana Batista MD as MD (Hematology & Oncology)  Faviola Cottrell [facility] as Pharmacist (Pharmacist)  Mariana Batista MD as Assigned Cancer Care Provider  Shade Boyd MD as Assigned PCP  Rosas Carr MD as MD (Neurology)  Nilton Gtz MD as Assigned Musculoskeletal Provider  David Castrejon MD (Internal Medicine)  Faviola Cottrell [facility] as Assigned MTM Pharmacist  Andrew Wallace MD as MD (Hematology)  Kwesi Roldan MD as Assigned Surgical Provider  Cliff Crystal APRN CNP as Assigned Pain Medication Provider

## 2023-11-15 NOTE — PROGRESS NOTES
"Oncology Rooming Note    November 15, 2023 1:19 PM   Lenny Chau is a 78 year old male who presents for:    Chief Complaint   Patient presents with    Oncology Clinic Visit     Follow up with Dr. Batista     Initial Vitals: /81 (BP Location: Right arm, Patient Position: Sitting, Cuff Size: Adult Regular)   Pulse 86   Temp 98.1  F (36.7  C) (Oral)   Resp 16   Wt 80 kg (176 lb 4.8 oz)   SpO2 97%   BMI 24.60 kg/m   Estimated body mass index is 24.6 kg/m  as calculated from the following:    Height as of 11/8/23: 1.803 m (5' 10.98\").    Weight as of this encounter: 80 kg (176 lb 4.8 oz). Body surface area is 2 meters squared.  Moderate Pain (4) Comment: Data Unavailable   No LMP for male patient.  Allergies reviewed: Yes  Medications reviewed: No    Medications: Medication refills not needed today.  Pharmacy name entered into [employer]:    [id number] [place] [facility] [address] [place] [id number] [place] [address]  [place], [place] - [address] [facility] [address] [facility]    Clinical concerns: Patient here ambulatory with wheeled walker accompanied by daughter and sister for follow-up status post radiation for his metastatic lung cancer.  Patient continues to have pain in his mid back and does not feel that it has improved much since treatment.  Patient is following with palliative care for pain management.  Seen by Dr. Batista.  Plan return to clinic for follow-up as directed by provider.   Dr. Batista was notified.      Anne-Marie Durbin RN              "

## 2023-11-15 NOTE — PROGRESS NOTES
Assessment & Plan   Right lung cancer, resected  Left lung cancer, post SBRT  Oral cavity, resected and radiated  Prostate cancer on androgen blockade  Pathologic fracture from metastatic cancer  PET positive at sites of prior radiation plus adjacent to initial left lung adenocarcinoma.  Opioid induced constipation  Anorexia, improved with megace    Discussed stepped up bowel regimens including magnesium sulfate and / or enema  Return next month to discuss possible Pembrolizumab therapy as salvage    Interval History  This is a scheduled follow up visit for this patient with numerous problems, seen by me two weeks ago.  Since starting megace, he's begun to eat more.  However, he's now gone 5 days without a bowel movement, despite miralax.  He is on a fentanyl patch and oral hydromorphone elixir for cancer-related pain.    They are willing to try magnesium sulfate but are uncertain about giving an enema at home.    His sister and daughter are with him.  They're trying to arrange a higher level of nursing support for the patient and his wife.    ECOG = 1    Oncologic History:     Prostate cancer:  2011, Ken grade 3+3, cT1a prostatic adenocarcinoma with a PSA of 4.7 was diagnosed and observed.   2016, July PSA 10.4 with repeat biopsy revealing a Newcomb grade 3+3, cT1c lesion which continued to be observed.  With various PSA methods, PSA 23.5 on 3/9/2021, 34 on 5/8/2021, 12.47 on 12/22/2020, 18.25 on 3/31/2022, 21.81 on 6/21/2020, and finally 23.5 on 9/19/2022.   10/28/2022 NM bone scan revealed uptake only in the known T7 vertebral body metastases.    10/28/2022 MR scan of the prostate revealed suspicious abnormality of the left base and mid gland peripheral zone with extraprostatic extension into the left seminal vesicle.  No suspicious adenopathy or evidence of pelvic metastases were seen.   11/01/2022 - started leuprolide (Eligard), 45 mg, therapy.     2.           Metastatic metachronous bilateral primary lung  "cancers:  11/05/2015 - Non-small cell lung cancer involving (R) lung, stage I, status post right thoracotomy and excision of right upper lobe and right middle lobe lung tumor with no evidence of lymph node metastasis and no adjuvant therapy.  Patient lost to follow-up since 2016.   01/21/2021 LEFT LUNG LESION, CT GUIDED BIOPSY - MODERATELY DIFFERENTIATED ADENOCARCINOMA, CONSISTENT WITH LUNG PRIMARY.   Cytokeratin-7: Strongly positive.  Cytokeratin-20: Negative.  TTF-1: Strong nuclear staining.  p63: Negative.  Napsin A: Strong staining   03/10/2021 - completed SBRT 5000 cGy/5 fx for a (L) upper lobe cT1 cN0.  01/07/2022 PET - new 1.1 cm left upper lobe nodule and T7 sclerotic bone lesion presumed to be recurrence.  03/18/2022 - completed SBRT 5,000 cGy/5 fx to the (L) lung lesion.  04/12/2022 - completed SBRT 3275 cGy/5 fx T7 spine.  08/18/2023 restaging CT CAP - new lytic lesions within T6 and T8 concerns for metastasis.    09/07/2023 PET scan - FDG avid lesions involving T5, T6 and T8 vertebral body suspicious for progression of disease.     10/06/23 - completed 2400 cGy/8 fx IMRT T4-T9 spine (Dr Batista).       3.           Recurrent right buccal squamous cell carcinoma ... followed by Dr. Kwesi Roldan.    1994, August - resection floor of mouth carcinoma  2009, February - right retromolar trigone SCC treated with surgery and 7,020 cGy/39 until 6/29/2009.   2009, September - surgery for localized recurrence.  2020, November - surgical office-resection by Dr. Alon Nunez for a left oral (buccal, base of tongue) SCC  2022, February - ENT evaluation by Dr. Sim Jaffe for a new right buccal lesion with negative biopsies.   10/10/2022 - ENT, Dr. Roldan f/up with biopsy revealing hyperplasia.  08/14/2023 - ENT, Dr Roldan f/up, \"Mr. Chau's oral exam looks stable today.\"     Patient Active Problem List   Diagnosis    Lung cancer (H)    COPD, group B, by GOLD 2017 classification (H)    Osteoarthritis    Stage 3a chronic " kidney disease (H)    Coronary artery disease of native heart with stable angina pectoris, unspecified vessel or lesion type (H24)    Cardiac arrhythmia    Primary hypertension    Malignant neoplasm of head, neck and face (H)    Depression    Malignant neoplasm of upper lobe of left lung (H)    Squamous cell carcinoma of mouth (H)    Malignant neoplasm of prostate (H)    Dyslipidemia    Cyst of pancreas    Ragsdale's esophagus    Spine metastasis    Chronic midline low back pain with right-sided sciatica    Lumbar radiculitis    Health care maintenance    History of alcoholism (H)    Peripheral arterial disease (H24)    Compression fracture of T7 vertebra with routine healing    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)     Current Outpatient Medications   Medication    acetaminophen (TYLENOL) 500 MG tablet    albuterol (PROAIR HFA/PROVENTIL HFA/VENTOLIN HFA) 108 (90 Base) MCG/ACT inhaler    aspirin (ASA) 81 MG EC tablet    dexAMETHasone (DECADRON) 2 MG tablet    dicyclomine (BENTYL) 20 MG tablet    fentaNYL (DURAGESIC) 12 mcg/hr 72 hr patch    fentaNYL (DURAGESIC) 25 mcg/hr 72 hr patch    ferrous sulfate (FEROSUL) 325 (65 Fe) MG tablet    Fluticasone-Umeclidin-Vilanterol (TRELEGY ELLIPTA) 200-62.5-25 MCG/ACT oral inhaler    gabapentin (NEURONTIN) 300 MG capsule    HYDROmorphone, STANDARD CONC, (DILAUDID) 1 MG/ML oral solution    Magnesium Oxide 250 MG TABS    megestrol (MEGACE) 40 MG/ML suspension    metoprolol succinate ER (TOPROL XL) 25 MG 24 hr tablet    multivitamin w/minerals (THERA-VIT-M) tablet    naloxone (NARCAN) 4 MG/0.1ML nasal spray    nitroGLYcerin (NITROSTAT) 0.4 MG sublingual tablet    omeprazole (PRILOSEC) 20 MG DR capsule    polyethylene glycol (MIRALAX) 17 GM/Dose powder    tamsulosin (FLOMAX) 0.4 MG capsule     No current facility-administered medications for this visit.     Past Medical History:   Diagnosis Date    Abdominal aortic aneurysm (AAA) (H24) 03/30/2010    Formatting of this note might  be different from the original. 5.6 cm infrarenal AAA, on 12/21/2010 CTA Formatting of this note might be different from the original. 5.6 cm infrarenal AAA, on 12/21/2010 CTA     Anemia     Ragsdale esophagus     Cancer (H)     lung    CHF (congestive heart failure) (H)     Chronic kidney disease     COPD, group B, by GOLD 2017 classification (H)     Coronary artery disease of native heart with stable angina pectoris, unspecified vessel or lesion type (H24)     Degenerative joint disease     Head and neck cancer (H)     History of transfusion     Hyperlipidemia     Hypertension     Lung cancer (H)     s/p RUL RML wedge resection in 2016 by Dr. Carter Velazquez    Lung mass     MELODY FDG-avid 2.5cm    Myocardial infarction (H)     November 2019    Oral cancer (H)     Prostate cancer (H)     Septic hip (H) 02/10/2022    Shortness of breath     with exertion    Trochanteric fracture of right femur (H) 06/21/2016     Past Surgical History:   Procedure Laterality Date    ABDOMINAL AORTIC ANEURYSM REPAIR      ANGIOGRAPHY  11/25/2019    coronary    COLONOSCOPY      3/21/2017,5/12/2014    CORONARY STENT PLACEMENT  12/01/2019    x1    CT BIOPSY LUNG  01/21/2021    EGD      11/13/2020,7/26/2019    ENDOSCOPIC REMOVAL SALIVARY GLAND STONE  1995    ESOPHAGOSCOPY, GASTROSCOPY, DUODENOSCOPY (EGD), COMBINED N/A 07/26/2019    Procedure: ENDOSCOPIC ULTRASOUND FINE NEEDLE ASPIRATION, GASTRIC BIOPSIES OF POLYPS;  Surgeon: Quoc Edwards MD;  Location: [facility] [place];  Service: Gastroenterology    EXCISE LESION INTRAORAL Left 11/06/2020    Procedure: 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 Need pathology for margins;  Surgeon: Alon Nunez MD;  Location: [facility];  Service: ENT    HEMIARTHROPLASTY HIP Right 06/22/2016    IR LUNG BIOPSY MEDIASTINUM RIGHT  9/9/2015    LUMBAR DISCECTOMY  2006    NEPHRECTOMY Left     for benign hemangioma    NEPHRECTOMY  03/2009    OTHER SURGICAL HISTORY Right  01/01/2015    wedge resectionLung cancer isolated pulmonary nodule    OTHER SURGICAL HISTORY      placement of stentfor AAA    OTHER SURGICAL HISTORY      right hip surgeryfor fracture    OTHER SURGICAL HISTORY  01/01/1994    Oral cancer resection    SD ESOPHAGOGASTRODUODENOSCOPY US SCOPE W/ADJ STRXRS N/A 11/13/2020    Procedure: ESOPHAGOGASTRODUODENOSCOPY,  ENDOSCOPIC ULTRASOUND;  Surgeon: Quoc Edwards MD;  Location: [facility] [place];  Service: Gastroenterology    SQUAMOUS CELL CARCINOMA EXCISION  11/06/2020    THORACOSCOPY  2016    wedge resection of lung    THORACOTOMY Right 11/15/2015    [facility] ORAL SURGERY PROCEDURE  1994     Socioeconomic History    Marital status:      Review of Systems   Constitutional:  Positive for fatigue. Negative for appetite change (much better) and unexpected weight change.   HENT:  Negative.     Eyes: Negative.    Respiratory:  Positive for shortness of breath.    Cardiovascular:  Positive for chest pain.   Gastrointestinal:  Positive for constipation.   Endocrine: Negative.    Genitourinary: Negative.     Musculoskeletal:  Positive for back pain.   Skin: Negative.    Neurological: Negative.    Hematological: Negative.    Psychiatric/Behavioral: Negative.     All other systems reviewed and are negative.    /78 (BP Location: Left arm, Patient Position: Sitting, Cuff Size: Adult Regular)   Pulse 76   Temp 98.3  F (36.8  C) (Oral)   Resp 16   Wt 80 kg (176 lb 4.8 oz)   SpO2 96%   BMI 24.60 kg/m    (weight is up 2#)    Physical Exam  Vitals and nursing note reviewed. Exam conducted with a chaperone present.   Constitutional:       Appearance: He is normal weight.      Comments: Relaxed, appears comfortable   HENT:      Head: Normocephalic and atraumatic.        Comments: Surgical scar     Mouth/Throat:      Mouth: Mucous membranes are moist.      Dentition: Has dentures.   Eyes:      Extraocular Movements: Extraocular movements intact.      Conjunctiva/sclera:  Conjunctivae normal.      Pupils: Pupils are equal, round, and reactive to light.   Cardiovascular:      Rate and Rhythm: Normal rate and regular rhythm.      Heart sounds: Normal heart sounds.   Pulmonary:      Effort: Prolonged expiration present.      Breath sounds: Normal breath sounds.   Abdominal:      General: There is no distension.      Palpations: Abdomen is soft. There is no mass.      Tenderness: There is no abdominal tenderness. There is no guarding.   Musculoskeletal:         General: No deformity.      Cervical back: Normal range of motion and neck supple.      Right lower leg: No edema.      Left lower leg: No edema.   Lymphadenopathy:      Cervical: No cervical adenopathy.   Skin:     General: Skin is warm and dry.   Neurological:      General: No focal deficit present.      Mental Status: He is alert and oriented to person, place, and time.      Cranial Nerves: No cranial nerve deficit.      Motor: No weakness.      Gait: Gait abnormal (needs help to get onto exam table).      Deep Tendon Reflexes: Reflexes normal.   Psychiatric:         Mood and Affect: Mood normal.         Behavior: Behavior normal. Behavior is cooperative.         Thought Content: Thought content normal.         Judgment: Judgment normal.       Recent Results (from the past 720 hour(s))   Comprehensive metabolic panel    Collection Time: 10/30/23 12:30 PM   Result Value Ref Range    Sodium 134 (L) 135 - 145 mmol/L    Potassium 4.4 3.4 - 5.3 mmol/L    Carbon Dioxide (CO2) 30 (H) 22 - 29 mmol/L    Anion Gap 10 7 - 15 mmol/L    Urea Nitrogen 17.5 8.0 - 23.0 mg/dL    Creatinine 1.51 (H) 0.67 - 1.17 mg/dL    GFR Estimate 47 (L) >60 mL/min/1.73m2    Calcium 10.0 8.8 - 10.2 mg/dL    Chloride 94 (L) 98 - 107 mmol/L    Glucose 136 (H) 70 - 99 mg/dL    Alkaline Phosphatase 125 40 - 129 U/L    AST 17 0 - 45 U/L    ALT 9 0 - 70 U/L    Protein Total 7.2 6.4 - 8.3 g/dL    Albumin 3.6 3.5 - 5.2 g/dL    Bilirubin Total 0.5 <=1.2 mg/dL   PSA  tumor marker    Collection Time: 10/30/23 12:30 PM   Result Value Ref Range    PSA Tumor Marker 0.12 0.00 - 6.50 ng/mL   CBC with platelets and differential    Collection Time: 10/30/23 12:30 PM   Result Value Ref Range    WBC Count 7.6 4.0 - 11.0 10e3/uL    RBC Count 4.47 4.40 - 5.90 10e6/uL    Hemoglobin 13.3 13.3 - 17.7 g/dL    Hematocrit 42.4 40.0 - 53.0 %    MCV 95 78 - 100 fL    MCH 29.8 26.5 - 33.0 pg    MCHC 31.4 (L) 31.5 - 36.5 g/dL    RDW 13.2 10.0 - 15.0 %    Platelet Count 297 150 - 450 10e3/uL    % Neutrophils 82 %    % Lymphocytes 6 %    % Monocytes 8 %    % Eosinophils 3 %    % Basophils 1 %    % Immature Granulocytes 0 %    NRBCs per 100 WBC 0 <1 /100    Absolute Neutrophils 6.2 1.6 - 8.3 10e3/uL    Absolute Lymphocytes 0.5 (L) 0.8 - 5.3 10e3/uL    Absolute Monocytes 0.6 0.0 - 1.3 10e3/uL    Absolute Eosinophils 0.2 0.0 - 0.7 10e3/uL    Absolute Basophils 0.1 0.0 - 0.2 10e3/uL    Absolute Immature Granulocytes 0.0 <=0.4 10e3/uL    Absolute NRBCs 0.0 10e3/uL     Recent Results (from the past 744 hour(s))   US AORTA ILIACS IVC WITH DUPLEX    Narrative    VASCULAR ULTRASOUND REPORT    CELIA LUTZ  MRN:    [id number] Accession#:   [id number]  :    1945  Study Date:   10/27/2023 10:29:44 AM  Age:    78 years   Tech:         MARTHA  Gender: M          Referring MD: SAMM PEREZ      Site: [facility]    Study performed:      Aorta, Iliac  Indication for study: S/P AAA repair.    TECHNIQUE:  The abdominal aorta and iliac arteries were examined with duplex ultrasound, color-flow and spectral Doppler. Bypass grafts and/or stents if present are evaluated per exam protocol. Vessel size, peak systolic velocity (PSV) and velocity ratios if applicable, were obtained and documented at sites per exam protocol.    IMPRESSION:   1. The patient is status post endovascular aneurysm repair. Based on prior study there is endoleak; however, this was not visualized on today's examination. The residual  AAA sac measures 7.3 x 6.8 cm which is slightly increased compared to study from 4/28/2023 when the residual AAA sac measured 7.1 x 6.6.   2. Limbs of the endovascular stent graft are patent without stenosis.    COMPARISON:  Compared to prior study 4/28/2023, residual sac measuring 7.3 x 6.8, previously 7.1 x 6.6.    FINDINGS:  Patent endograft. Unable to visualize known endoleak on today's exam.    MEASUREMENTS:  +-----------+----------+  Right IliacPSV (cm/s)  +-----------+----------+  EIA MID        90      +-----------+----------+  EIA DST        98      +-----------+----------+    +----------+----------+  Left IliacPSV (cm/s)  +----------+----------+  EIA PRX       84      +----------+----------+  EIA MID      102      +----------+----------+  EIA DST       95      +----------+----------+      ENDOGRAFT  +-----+-----------------+  STENT   PSV (cm/s)      +-----+-----------------+  PRE  not vis; obscured  +-----+-----------------+  PRX  not vis; obscured  +-----+-----------------+  DST         66          +-----+-----------------+    +-------------+----------+  LIMB         PSV (cm/s)  +-------------+----------+  RT PRX           96      +-------------+----------+  RT MID          110      +-------------+----------+  RT DST           46      +-------------+----------+  RT POST STENT not vis    +-------------+----------+  LT PRX           72      +-------------+----------+  LT MID           55      +-------------+----------+  LT DST           70      +-------------+----------+  LT POST STENT    84      +-------------+----------+    +------------------+--------+-------+                    TRV (cm)AP (cm)  +------------------+--------+-------+  RESID SAC DIAMETER  7.30   6.80    +------------------+--------+-------+    Dirk Celis MD.    Electronically signed on 10/30/2023 9:28:13 AM    This study was performed and interpreted by a service  accredited by the [employer]reditation Commission (IAC/Vascular), [email or web address].[email or web address]    Report generated by [employer].          Final

## 2023-11-15 NOTE — LETTER
"    11/15/2023         RE: Lenny Chau  [address]  [place] [address]        Dear Colleague,    Thank you for referring your patient, Lenny Chau, to the [facility]. Please see a copy of my visit note below.    Oncology Rooming Note    November 15, 2023 2:30 PM   Lenny Chau is a 78 year old male who presents for:    Chief Complaint   Patient presents with     Oncology Clinic Visit     Malignant neoplasm of prostate (H     Initial Vitals: /78 (BP Location: Left arm, Patient Position: Sitting, Cuff Size: Adult Regular)   Pulse 76   Temp 98.3  F (36.8  C) (Oral)   Resp 16   Wt 80 kg (176 lb 4.8 oz)   SpO2 96%   BMI 24.60 kg/m   Estimated body mass index is 24.6 kg/m  as calculated from the following:    Height as of 11/8/23: 1.803 m (5' 10.98\").    Weight as of this encounter: 80 kg (176 lb 4.8 oz). Body surface area is 2 meters squared.  Moderate Pain (4) Comment: Data Unavailable   No LMP for male patient.  Allergies reviewed: Yes  Medications reviewed: Yes    Medications: Medication refills not needed today.  Pharmacy name entered into [employer]:    [id number] [place] [facility] [address] [facility] [address] [place] [address] PHARMACY [facility] [address] [facility] [address]  [place], [place] - [address] [facility] [address] [facility]    Clinical concerns: Follow up       Kailey Carcamo LPN              Assessment & Plan   Right lung cancer, resected  Left lung cancer, post SBRT  Oral cavity, resected and radiated  Prostate cancer on androgen blockade  Pathologic fracture from metastatic cancer  PET positive at sites of prior radiation plus adjacent to initial left lung adenocarcinoma.  Opioid induced constipation  Anorexia, improved with megace    Discussed stepped up bowel regimens including magnesium sulfate and / or enema  Return " next month to discuss possible Pembrolizumab therapy as salvage    Interval History  This is a scheduled follow up visit for this patient with numerous problems, seen by me two weeks ago.  Since starting megace, he's begun to eat more.  However, he's now gone 5 days without a bowel movement, despite miralax.  He is on a fentanyl patch and oral hydromorphone elixir for cancer-related pain.    They are willing to try magnesium sulfate but are uncertain about giving an enema at home.    His sister and daughter are with him.  They're trying to arrange a higher level of nursing support for the patient and his wife.    ECOG = 1    Oncologic History:     Prostate cancer:  2011, Ken grade 3+3, cT1a prostatic adenocarcinoma with a PSA of 4.7 was diagnosed and observed.   2016, July PSA 10.4 with repeat biopsy revealing a Nikolai grade 3+3, cT1c lesion which continued to be observed.  With various PSA methods, PSA 23.5 on 3/9/2021, 34 on 5/8/2021, 12.47 on 12/22/2020, 18.25 on 3/31/2022, 21.81 on 6/21/2020, and finally 23.5 on 9/19/2022.   10/28/2022 NM bone scan revealed uptake only in the known T7 vertebral body metastases.    10/28/2022 MR scan of the prostate revealed suspicious abnormality of the left base and mid gland peripheral zone with extraprostatic extension into the left seminal vesicle.  No suspicious adenopathy or evidence of pelvic metastases were seen.   11/01/2022 - started leuprolide (Eligard), 45 mg, therapy.     2.           Metastatic metachronous bilateral primary lung cancers:  11/05/2015 - Non-small cell lung cancer involving (R) lung, stage I, status post right thoracotomy and excision of right upper lobe and right middle lobe lung tumor with no evidence of lymph node metastasis and no adjuvant therapy.  Patient lost to follow-up since 2016.   01/21/2021 LEFT LUNG LESION, CT GUIDED BIOPSY - MODERATELY DIFFERENTIATED ADENOCARCINOMA, CONSISTENT WITH LUNG PRIMARY.   Cytokeratin-7: Strongly  "positive.  Cytokeratin-20: Negative.  TTF-1: Strong nuclear staining.  p63: Negative.  Napsin A: Strong staining   03/10/2021 - completed SBRT 5000 cGy/5 fx for a (L) upper lobe cT1 cN0.  01/07/2022 PET - new 1.1 cm left upper lobe nodule and T7 sclerotic bone lesion presumed to be recurrence.  03/18/2022 - completed SBRT 5,000 cGy/5 fx to the (L) lung lesion.  04/12/2022 - completed SBRT 3275 cGy/5 fx T7 spine.  08/18/2023 restaging CT CAP - new lytic lesions within T6 and T8 concerns for metastasis.    09/07/2023 PET scan - FDG avid lesions involving T5, T6 and T8 vertebral body suspicious for progression of disease.     10/06/23 - completed 2400 cGy/8 fx IMRT T4-T9 spine (Dr Batista).       3.           Recurrent right buccal squamous cell carcinoma ... followed by Dr. Kwesi Roldan.    1994, August - resection floor of mouth carcinoma  2009, February - right retromolar trigone SCC treated with surgery and 7,020 cGy/39 until 6/29/2009.   2009, September - surgery for localized recurrence.  2020, November - surgical office-resection by Dr. Alon Nunez for a left oral (buccal, base of tongue) SCC  2022, February - ENT evaluation by Dr. Sim Jaffe for a new right buccal lesion with negative biopsies.   10/10/2022 - ENT, Dr. Roldan f/up with biopsy revealing hyperplasia.  08/14/2023 - ENT, Dr Roldan f/up, \"Mr. Chau's oral exam looks stable today.\"     Patient Active Problem List   Diagnosis     Lung cancer (H)     COPD, group B, by GOLD 2017 classification (H)     Osteoarthritis     Stage 3a chronic kidney disease (H)     Coronary artery disease of native heart with stable angina pectoris, unspecified vessel or lesion type (H24)     Cardiac arrhythmia     Primary hypertension     Malignant neoplasm of head, neck and face (H)     Depression     Malignant neoplasm of upper lobe of left lung (H)     Squamous cell carcinoma of mouth (H)     Malignant neoplasm of prostate (H)     Dyslipidemia     Cyst of pancreas     Ragsdale's " esophagus     Spine metastasis     Chronic midline low back pain with right-sided sciatica     Lumbar radiculitis     Health care maintenance     History of alcoholism (H)     Peripheral arterial disease (H24)     Compression fracture of T7 vertebra with routine healing     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)     Current Outpatient Medications   Medication     acetaminophen (TYLENOL) 500 MG tablet     albuterol (PROAIR HFA/PROVENTIL HFA/VENTOLIN HFA) 108 (90 Base) MCG/ACT inhaler     aspirin (ASA) 81 MG EC tablet     dexAMETHasone (DECADRON) 2 MG tablet     dicyclomine (BENTYL) 20 MG tablet     fentaNYL (DURAGESIC) 12 mcg/hr 72 hr patch     fentaNYL (DURAGESIC) 25 mcg/hr 72 hr patch     ferrous sulfate (FEROSUL) 325 (65 Fe) MG tablet     Fluticasone-Umeclidin-Vilanterol (TRELEGY ELLIPTA) 200-62.5-25 MCG/ACT oral inhaler     gabapentin (NEURONTIN) 300 MG capsule     HYDROmorphone, STANDARD CONC, (DILAUDID) 1 MG/ML oral solution     Magnesium Oxide 250 MG TABS     megestrol (MEGACE) 40 MG/ML suspension     metoprolol succinate ER (TOPROL XL) 25 MG 24 hr tablet     multivitamin w/minerals (THERA-VIT-M) tablet     naloxone (NARCAN) 4 MG/0.1ML nasal spray     nitroGLYcerin (NITROSTAT) 0.4 MG sublingual tablet     omeprazole (PRILOSEC) 20 MG DR capsule     polyethylene glycol (MIRALAX) 17 GM/Dose powder     tamsulosin (FLOMAX) 0.4 MG capsule     No current facility-administered medications for this visit.     Past Medical History:   Diagnosis Date     Abdominal aortic aneurysm (AAA) (H24) 03/30/2010    Formatting of this note might be different from the original. 5.6 cm infrarenal AAA, on 12/21/2010 CTA Formatting of this note might be different from the original. 5.6 cm infrarenal AAA, on 12/21/2010 CTA      Anemia      Ragsdale esophagus      Cancer (H)     lung     CHF (congestive heart failure) (H)      Chronic kidney disease      COPD, group B, by GOLD 2017 classification (H)      Coronary artery disease of  native heart with stable angina pectoris, unspecified vessel or lesion type (H24)      Degenerative joint disease      Head and neck cancer (H)      History of transfusion      Hyperlipidemia      Hypertension      Lung cancer (H)     s/p RUL RML wedge resection in 2016 by Dr. Carter Velazquez     Lung mass     MELODY FDG-avid 2.5cm     Myocardial infarction (H)     November 2019     Oral cancer (H)      Prostate cancer (H)      Septic hip (H) 02/10/2022     Shortness of breath     with exertion     Trochanteric fracture of right femur (H) 06/21/2016     Past Surgical History:   Procedure Laterality Date     ABDOMINAL AORTIC ANEURYSM REPAIR       ANGIOGRAPHY  11/25/2019    coronary     COLONOSCOPY      3/21/2017,5/12/2014     CORONARY STENT PLACEMENT  12/01/2019    x1     CT BIOPSY LUNG  01/21/2021     EGD      11/13/2020,7/26/2019     ENDOSCOPIC REMOVAL SALIVARY GLAND STONE  1995     ESOPHAGOSCOPY, GASTROSCOPY, DUODENOSCOPY (EGD), COMBINED N/A 07/26/2019    Procedure: ENDOSCOPIC ULTRASOUND FINE NEEDLE ASPIRATION, GASTRIC BIOPSIES OF POLYPS;  Surgeon: Quoc Edwards MD;  Location: [facility];  Service: Gastroenterology     EXCISE LESION INTRAORAL Left 11/06/2020    Procedure: 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 Need pathology for margins;  Surgeon: Alon Nunez MD;  Location: [facility];  Service: ENT     HEMIARTHROPLASTY HIP Right 06/22/2016     IR LUNG BIOPSY MEDIASTINUM RIGHT  9/9/2015     LUMBAR DISCECTOMY  2006     NEPHRECTOMY Left     for benign hemangioma     NEPHRECTOMY  03/2009     OTHER SURGICAL HISTORY Right 01/01/2015    wedge resectionLung cancer isolated pulmonary nodule     OTHER SURGICAL HISTORY      placement of stentfor AAA     OTHER SURGICAL HISTORY      right hip surgeryfor fracture     OTHER SURGICAL HISTORY  01/01/1994    Oral cancer resection     CT ESOPHAGOGASTRODUODENOSCOPY US SCOPE W/ADJ STRXRS N/A 11/13/2020    Procedure:  ESOPHAGOGASTRODUODENOSCOPY,  ENDOSCOPIC ULTRASOUND;  Surgeon: Quoc Edwards MD;  Location: [facility] OR;  Service: Gastroenterology     SQUAMOUS CELL CARCINOMA EXCISION  11/06/2020     THORACOSCOPY  2016    wedge resection of lung     THORACOTOMY Right 11/15/2015     [facility] ORAL SURGERY PROCEDURE  1994     Socioeconomic History     Marital status:      Review of Systems   Constitutional:  Positive for fatigue. Negative for appetite change (much better) and unexpected weight change.   HENT:  Negative.     Eyes: Negative.    Respiratory:  Positive for shortness of breath.    Cardiovascular:  Positive for chest pain.   Gastrointestinal:  Positive for constipation.   Endocrine: Negative.    Genitourinary: Negative.     Musculoskeletal:  Positive for back pain.   Skin: Negative.    Neurological: Negative.    Hematological: Negative.    Psychiatric/Behavioral: Negative.     All other systems reviewed and are negative.    /78 (BP Location: Left arm, Patient Position: Sitting, Cuff Size: Adult Regular)   Pulse 76   Temp 98.3  F (36.8  C) (Oral)   Resp 16   Wt 80 kg (176 lb 4.8 oz)   SpO2 96%   BMI 24.60 kg/m    (weight is up 2#)    Physical Exam  Vitals and nursing note reviewed. Exam conducted with a chaperone present.   Constitutional:       Appearance: He is normal weight.      Comments: Relaxed, appears comfortable   HENT:      Head: Normocephalic and atraumatic.        Comments: Surgical scar     Mouth/Throat:      Mouth: Mucous membranes are moist.      Dentition: Has dentures.   Eyes:      Extraocular Movements: Extraocular movements intact.      Conjunctiva/sclera: Conjunctivae normal.      Pupils: Pupils are equal, round, and reactive to light.   Cardiovascular:      Rate and Rhythm: Normal rate and regular rhythm.      Heart sounds: Normal heart sounds.   Pulmonary:      Effort: Prolonged expiration present.      Breath sounds: Normal breath sounds.   Abdominal:      General: There is no  distension.      Palpations: Abdomen is soft. There is no mass.      Tenderness: There is no abdominal tenderness. There is no guarding.   Musculoskeletal:         General: No deformity.      Cervical back: Normal range of motion and neck supple.      Right lower leg: No edema.      Left lower leg: No edema.   Lymphadenopathy:      Cervical: No cervical adenopathy.   Skin:     General: Skin is warm and dry.   Neurological:      General: No focal deficit present.      Mental Status: He is alert and oriented to person, place, and time.      Cranial Nerves: No cranial nerve deficit.      Motor: No weakness.      Gait: Gait abnormal (needs help to get onto exam table).      Deep Tendon Reflexes: Reflexes normal.   Psychiatric:         Mood and Affect: Mood normal.         Behavior: Behavior normal. Behavior is cooperative.         Thought Content: Thought content normal.         Judgment: Judgment normal.       Recent Results (from the past 720 hour(s))   Comprehensive metabolic panel    Collection Time: 10/30/23 12:30 PM   Result Value Ref Range    Sodium 134 (L) 135 - 145 mmol/L    Potassium 4.4 3.4 - 5.3 mmol/L    Carbon Dioxide (CO2) 30 (H) 22 - 29 mmol/L    Anion Gap 10 7 - 15 mmol/L    Urea Nitrogen 17.5 8.0 - 23.0 mg/dL    Creatinine 1.51 (H) 0.67 - 1.17 mg/dL    GFR Estimate 47 (L) >60 mL/min/1.73m2    Calcium 10.0 8.8 - 10.2 mg/dL    Chloride 94 (L) 98 - 107 mmol/L    Glucose 136 (H) 70 - 99 mg/dL    Alkaline Phosphatase 125 40 - 129 U/L    AST 17 0 - 45 U/L    ALT 9 0 - 70 U/L    Protein Total 7.2 6.4 - 8.3 g/dL    Albumin 3.6 3.5 - 5.2 g/dL    Bilirubin Total 0.5 <=1.2 mg/dL   PSA tumor marker    Collection Time: 10/30/23 12:30 PM   Result Value Ref Range    PSA Tumor Marker 0.12 0.00 - 6.50 ng/mL   CBC with platelets and differential    Collection Time: 10/30/23 12:30 PM   Result Value Ref Range    WBC Count 7.6 4.0 - 11.0 10e3/uL    RBC Count 4.47 4.40 - 5.90 10e6/uL    Hemoglobin 13.3 13.3 - 17.7 g/dL     Hematocrit 42.4 40.0 - 53.0 %    MCV 95 78 - 100 fL    MCH 29.8 26.5 - 33.0 pg    MCHC 31.4 (L) 31.5 - 36.5 g/dL    RDW 13.2 10.0 - 15.0 %    Platelet Count 297 150 - 450 10e3/uL    % Neutrophils 82 %    % Lymphocytes 6 %    % Monocytes 8 %    % Eosinophils 3 %    % Basophils 1 %    % Immature Granulocytes 0 %    NRBCs per 100 WBC 0 <1 /100    Absolute Neutrophils 6.2 1.6 - 8.3 10e3/uL    Absolute Lymphocytes 0.5 (L) 0.8 - 5.3 10e3/uL    Absolute Monocytes 0.6 0.0 - 1.3 10e3/uL    Absolute Eosinophils 0.2 0.0 - 0.7 10e3/uL    Absolute Basophils 0.1 0.0 - 0.2 10e3/uL    Absolute Immature Granulocytes 0.0 <=0.4 10e3/uL    Absolute NRBCs 0.0 10e3/uL     Recent Results (from the past 744 hour(s))   US AORTA ILIACS IVC WITH DUPLEX    Narrative    VASCULAR ULTRASOUND REPORT    CELIA ULTZ  MRN:    [id number] Accession#:   [id number]  :    1945  Study Date:   10/27/2023 10:29:44 AM  Age:    78 years   Tech:         MARTHA  Gender: M          Referring MD: SAMM PEREZ      Site[facility]    Study performed:      Aorta, Iliac  Indication for study: S/P AAA repair.    TECHNIQUE:  The abdominal aorta and iliac arteries were examined with duplex ultrasound, color-flow and spectral Doppler. Bypass grafts and/or stents if present are evaluated per exam protocol. Vessel size, peak systolic velocity (PSV) and velocity ratios if applicable, were obtained and documented at sites per exam protocol.    IMPRESSION:   1. The patient is status post endovascular aneurysm repair. Based on prior study there is endoleak; however, this was not visualized on today's examination. The residual AAA sac measures 7.3 x 6.8 cm which is slightly increased compared to study from 2023 when the residual AAA sac measured 7.1 x 6.6.   2. Limbs of the endovascular stent graft are patent without stenosis.    COMPARISON:  Compared to prior study 2023, residual sac measuring 7.3 x 6.8, previously 7.1 x  6.6.    FINDINGS:  Patent endograft. Unable to visualize known endoleak on today's exam.    MEASUREMENTS:  +-----------+----------+  Right IliacPSV (cm/s)  +-----------+----------+  EIA MID        90      +-----------+----------+  EIA DST        98      +-----------+----------+    +----------+----------+  Left IliacPSV (cm/s)  +----------+----------+  EIA PRX       84      +----------+----------+  EIA MID      102      +----------+----------+  EIA DST       95      +----------+----------+      ENDOGRAFT  +-----+-----------------+  STENT   PSV (cm/s)      +-----+-----------------+  PRE  not vis; obscured  +-----+-----------------+  PRX  not vis; obscured  +-----+-----------------+  DST         66          +-----+-----------------+    +-------------+----------+  LIMB         PSV (cm/s)  +-------------+----------+  RT PRX           96      +-------------+----------+  RT MID          110      +-------------+----------+  RT DST           46      +-------------+----------+  RT POST STENT not vis    +-------------+----------+  LT PRX           72      +-------------+----------+  LT MID           55      +-------------+----------+  LT DST           70      +-------------+----------+  LT POST STENT    84      +-------------+----------+    +------------------+--------+-------+                    TRV (cm)AP (cm)  +------------------+--------+-------+  RESID SAC DIAMETER  7.30   6.80    +------------------+--------+-------+    Dirk Celis MD.    Electronically signed on 10/30/2023 9:28:13 AM    This study was performed and interpreted by a service accredited by the Intersocietal Accreditation Commission (IAC/Vascular), www.intersocietal.org/vascular    Report generated by [employer].          Final                     Again, thank you for allowing me to participate in the care of your patient.        Sincerely,        Chelsea Ortega MD

## 2023-11-17 NOTE — PROGRESS NOTES
1202 Spoke with Francesca at  Lab to follow up on the PDL-1 order that was placed by Dr. Ortega on 11/15/23. They are aware of the order and will work on it as soon as possible. Will monitor/Dali Cerda RN    ADDENDUM 11/17/23, 9418  PDL-1 testing I now in process

## 2023-11-20 NOTE — TELEPHONE ENCOUNTER
Received voicemail from patient's sister requesting refill of hydromorphone.     Last refill: 11/14/23 per chart review, not showing on [place]   Last office visit: 11/1/23  Scheduled for follow up 11/29/23     Will route request to NP for review.     Reviewed [place]  Report.

## 2023-11-21 NOTE — TELEPHONE ENCOUNTER
Refill being sent for hydromorphone to be filled on Friday. Pt now taking 4 mL every 3 hours and the supply he received yesterday will only get him through Friday morning at the latest. Will increase qty from 120 mL to 240 mL.    Debra Abreu, CHONN, RN  Palliative Care Nurse Clinician    [phone] (Direct)  [phone] (Main)  [phone] (Appointment Scheduling)

## 2023-11-27 NOTE — TELEPHONE ENCOUNTER
Received MyChart message from patient's dtr requesting refill of gabapentin.     Last refill: 10/10/23 per chart review  Last office visit: 11/1/23  Scheduled for follow up 11/29/23     Will route request to NP for review.     Reviewed [place]  Report.

## 2023-11-28 NOTE — TELEPHONE ENCOUNTER
Received Seeohart message from patient's dtr requesting refill of bentyl.     Last office visit: 11/1/23  Scheduled for follow up 11/29/23     Will route request to NP for review.

## 2023-11-29 NOTE — PROGRESS NOTES
Virtual Visit Details    Type of service:  Video Visit   Video Start Time: 10:15 AM  Video End Time: 10:40 AM    Originating Location (pt. Location): Home    Distant Location (provider location):  On-site  Platform used for Video Visit: [facility]      Palliative Care Outpatient Clinic      Patient ID/Chief Complaint: Lenny Chau 78 year old male who is presenting to the palliative medicine clinic today at the request of Dr. Hazel Mckinley for a palliative care consultation secondary to assistance with symptom management.   The patient's primary care provider is:  Shade Boyd.       Impression & Recommendations:  78-year-old male with stage IV lung cancer with metastases to thoracic spine, history of prostate cancer on suppressive therapy, history of right buccal SCC, remote history of left nephrectomy for large meningioma, CKD 3, HTN, AAA, CAD, COPD, HLD, peripheral neuropathy in feet, history of tobacco use stopped 2009, history of higher alcohol use stopped 2 years ago.    He has completed 8 of 8 radiation treatments to T-spine with improvement in back pain.     Pain is described as aching, throbbing, occasionally sharp pain in his back and lower bilateral anterior rib cage from back into chest.  He denies neuropathic complaints.     He has neuropathy in both feet that is adequately controlled with gabapentin 600 mg every morning, 300 in the afternoon, and 300 in the evening.    Social history includes previous history of alcohol and tobacco use described above, no other TARA.  His wife has advanced dementia and is currently in a care center but may be returning home with him.  He is her primary caretaker.  His daughter Georgette is involved and helps with care.        Symptoms/recommendations/discussion:  Increase fentanyl patch to 75 mcg/h.  He reports using 4 mg of oral Dilaudid approximately 6 times per day.  Continues to report bilateral lower anterior rib pain.  No negative side effects associated with  "opioids.  Continue Dilaudid liquid 2 to 4 mg every 4 hours as needed  Daughter is working on arranging care center placement for Mitchell and his wife who has dementia  Continue gabapentin at current doses.   Denies any significant issues with constipation  Narcan nasal spray present in the house for safety, confirmed by Mitcehll and Georgette  Continue Tylenol 1000 mg 2-3 times per day.  He avoids NSAIDs due to CKD and solitary kidney  Continues working with oncology about plan of care, work up for possible pembrolizumab.   Palliative care follow-up in 3 weeks         How to get a hold of us:  For non-urgent matters, MyChart works best.    For more urgent matters, or if you prefer not to use MyChart, call our clinic nurse coordinator Debra Abreu RN at [phone] or [phone]    We have an on-call number for evenings and weekends. Please call this only if you are having uncontrolled symptoms or serious side effects from your medicines: [phone].     For refills, please give us a week (5 working days) notice. We don't always have providers available everyday to do refills. If you call the day you run out of your medicine, we may not be able to refill it in time, so call 5 days in advance        History:  History gathered today from: patient, family/loved ones, medical chart, outside records including Care Everywhere, health care directive/s      PE: Ht 1.803 m (5' 11\")   Wt 86.2 kg (190 lb)   BMI 26.50 kg/m     Wt Readings from Last 3 Encounters:   11/29/23 86.2 kg (190 lb)   11/15/23 80 kg (176 lb 4.8 oz)   11/15/23 80 kg (176 lb 4.8 oz)       Gen alert, comfortable appearing  Head NCAT.  Eyes anicteric without injection  Face symmetric, eyes conjugate  Lungs unlabored, no cough, speaking full sentences  Skin no rashes or lesions evident on face/neck  Neuro Face symmetric, eyes conjugate; speech understandable.  Neuropsych exam normal including affect, sensorium, gross memory, thought processes, and fund of " knowledge.         Data reviewed:  I reviewed electrolytes, BUN/creatinine, liver profile, hemoglobin and hematocrit, platelet count, and most recent imaging  Radiation oncology notes     database reviewed: 11/29        55 minutes spent on the date of the encounter doing chart review, history and exam, patient education & counseling, documentation and other activities as noted above.        Thank you for involving us in the patient's care.   BRENDEN Evangelista, [facility]

## 2023-11-29 NOTE — PROGRESS NOTES
Received [employer] message from patient's daughter inquiring about paperwork for [facility] center with patient. Discussed with Dr. Ortega. Dr. Ortega okay with signing off on paperwork for this. Relayed this information to Mitchell and Daughter Georgette, Patient/family sent paperwork via [employer]. Paperwork filled out and signed by Dr. Ortega. Faxed to [employer] at 11:27 am.    Aileen Tucker RN...............................11/29/23  11:39 AM

## 2023-11-29 NOTE — NURSING NOTE
Is the patient currently in the state of [place]? YES    Visit mode:VIDEO    If the visit is dropped, the patient can be reconnected by: VIDEO VISIT: Send to e-mail at: [email or web address]    Will anyone else be joining the visit? NO  (If patient encounters technical issues they should call [phone]    How would you like to obtain your AVS? MyChart    Are changes needed to the allergy or medication list? Pt stated no changes to allergies and Pt stated no med changes    Reason for visit: TSERING Hayes LPN

## 2023-11-30 NOTE — TELEPHONE ENCOUNTER
Resending prescription for fentanyl to the pharmacy in [place] per dtr's request since pt is staying with his sister.    CHON WoodsonN, RN  Palliative Care Nurse Clinician    [phone] (Direct)  [phone] (Main)  [phone] (Appointment Scheduling)

## 2023-12-01 NOTE — TELEPHONE ENCOUNTER
Received [employer] message from patient requesting refill of hydromorphone.     Last refill: 11/24/23  Last office visit: 11/29/23  Scheduled for follow up per check out request.     Will route request to NP for review.     Reviewed [place]  Report.

## 2023-12-04 NOTE — TELEPHONE ENCOUNTER
FMLA forms are complete and faxed to [phone], [facility], will inform pt at appointment on Wed. 12/6/2023, will give a copy then.

## 2023-12-06 NOTE — LETTER
12/6/2023         RE: Lenny Chau  [address]  [address]  [place] [address]        Dear Colleague,    Thank you for referring your patient, Lenny Chau, to the [facility]. Please see a copy of my visit note below.    Assessment & Plan  Metastatic malignancy, likely lung cancer  Pathologic fracture of thoracic spine, post radiation  Multiple prior malignancies including prostate and throat  Chemotherapy naive    Patient is being admitted to [facility] ([place])  We'll fax over today's note  Ongoing pain management per palliative care  Suggest trial of back brace for pain control    Interval History  This is a scheduled follow up visit for this patient with multiple prior malignancies, all of which have been treated with radiation (never got chemo for anything).  Right now, his only real problem is terrible pain from his thoracic spine, radiating to the front.  The pain isn't bad if he sits still but gets much worse with twisting or bending.      Dr. Bush had suggested a back brace, but they haven't move forward with that.  He is getting a fentanyl patch and hydromorphone from palliative care.      He has put on some weight since starting Megace.    There has been some discussion about hospice in the past, but the patient says that he'd want to try chemo before committing to hospice.  Last imaging was several months ago.  We agreed to re-image.  If lung area is little changed, then my recommendation would be to allow him to stabilize/improve with respect to pain before therapy.    ECOG = 1    Oncologic History:     Prostate cancer:  2011, [place] grade 3+3, cT1a prostatic adenocarcinoma with a PSA of 4.7 was diagnosed and observed.   2016, July PSA 10.4 with repeat biopsy revealing a [place] grade 3+3, cT1c lesion which continued to be observed.  With various PSA methods, PSA 23.5 on 3/9/2021, 34 on 5/8/2021, 12.47 on 12/22/2020, 18.25 on 3/31/2022, 21.81 on 6/21/2020, and  finally 23.5 on 9/19/2022.   10/28/2022 NM bone scan revealed uptake only in the known T7 vertebral body metastases.    10/28/2022 MR scan of the prostate revealed suspicious abnormality of the left base and mid gland peripheral zone with extraprostatic extension into the left seminal vesicle.  No suspicious adenopathy or evidence of pelvic metastases were seen.   11/01/2022 - started leuprolide (Eligard), 45 mg, therapy.     2.           Metastatic metachronous bilateral primary lung cancers:  11/05/2015 - Non-small cell lung cancer involving (R) lung, stage I, status post right thoracotomy and excision of right upper lobe and right middle lobe lung tumor with no evidence of lymph node metastasis and no adjuvant therapy.  Patient lost to follow-up since 2016.   01/21/2021 LEFT LUNG LESION, CT GUIDED BIOPSY - MODERATELY DIFFERENTIATED ADENOCARCINOMA, CONSISTENT WITH LUNG PRIMARY.   Cytokeratin-7: Strongly positive.  Cytokeratin-20: Negative.  TTF-1: Strong nuclear staining.  p63: Negative.  Napsin A: Strong staining   03/10/2021 - completed SBRT 5000 cGy/5 fx for a (L) upper lobe cT1 cN0.  01/07/2022 PET - new 1.1 cm left upper lobe nodule and T7 sclerotic bone lesion presumed to be recurrence.  03/18/2022 - completed SBRT 5,000 cGy/5 fx to the (L) lung lesion.  04/12/2022 - completed SBRT 3275 cGy/5 fx T7 spine.  08/18/2023 restaging CT CAP - new lytic lesions within T6 and T8 concerns for metastasis.    09/07/2023 PET scan - FDG avid lesions involving T5, T6 and T8 vertebral body suspicious for progression of disease.     10/06/23 - completed 2400 cGy/8 fx IMRT T4-T9 spine (Dr Batista).       3.           Recurrent right buccal squamous cell carcinoma ... followed by Dr. Kwesi Roldan.    1994, August - resection floor of mouth carcinoma  2009, February - right retromolar trigone SCC treated with surgery and 7,020 cGy/39 until 6/29/2009.   2009, September - surgery for localized recurrence.  2020, November - surgical  "office-resection by Dr. Alon Nunez for a left oral (buccal, base of tongue) SCC  2022, February - ENT evaluation by Dr. Sim Jaffe for a new right buccal lesion with negative biopsies.   10/10/2022 - ENT, Dr. Roldan f/up with biopsy revealing hyperplasia.  08/14/2023 - ENT, Dr Roldan f/up, \"Mr. Chau's oral exam looks stable today.\"       Patient Active Problem List   Diagnosis     Lung cancer (H)     COPD, group B, by GOLD 2017 classification (H)     Osteoarthritis     Stage 3a chronic kidney disease (H)     Coronary artery disease of native heart with stable angina pectoris, unspecified vessel or lesion type (H24)     Cardiac arrhythmia     Primary hypertension     Malignant neoplasm of head, neck and face (H)     Depression     Malignant neoplasm of upper lobe of left lung (H)     Squamous cell carcinoma of mouth (H)     Malignant neoplasm of prostate (H)     Dyslipidemia     Cyst of pancreas     Ragsdale's esophagus     Spine metastasis     Chronic midline low back pain with right-sided sciatica     Lumbar radiculitis     Health care maintenance     History of alcoholism (H)     Peripheral arterial disease (H24)     Compression fracture of T7 vertebra with routine healing     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)     Current Outpatient Medications   Medication     acetaminophen (TYLENOL) 500 MG tablet     albuterol (PROAIR HFA/PROVENTIL HFA/VENTOLIN HFA) 108 (90 Base) MCG/ACT inhaler     aspirin (ASA) 81 MG EC tablet     dexAMETHasone (DECADRON) 2 MG tablet     dicyclomine (BENTYL) 20 MG tablet     fentaNYL (DURAGESIC) 75 mcg/hr 72 hr patch     ferrous sulfate (FEROSUL) 325 (65 Fe) MG tablet     Fluticasone-Umeclidin-Vilanterol (TRELEGY ELLIPTA) 200-62.5-25 MCG/ACT oral inhaler     gabapentin (NEURONTIN) 300 MG capsule     HYDROmorphone, STANDARD CONC, (DILAUDID) 1 MG/ML oral solution     Magnesium Oxide 250 MG TABS     megestrol (MEGACE) 40 MG/ML suspension     metoprolol succinate ER (TOPROL XL) 25 MG 24 hr " tablet     multivitamin w/minerals (THERA-VIT-M) tablet     naloxone (NARCAN) 4 MG/0.1ML nasal spray     nitroGLYcerin (NITROSTAT) 0.4 MG sublingual tablet     omeprazole (PRILOSEC) 20 MG DR capsule     polyethylene glycol (MIRALAX) 17 GM/Dose powder     tamsulosin (FLOMAX) 0.4 MG capsule     No current facility-administered medications for this visit.     Past Medical History:   Diagnosis Date     Abdominal aortic aneurysm (AAA) (H24) 03/30/2010    Formatting of this note might be different from the original. 5.6 cm infrarenal AAA, on 12/21/2010 CTA Formatting of this note might be different from the original. 5.6 cm infrarenal AAA, on 12/21/2010 CTA      Anemia      R[place]dale esophagus      Cancer (H)     lung     CHF (congestive heart failure) (H)      Chronic kidney disease      COPD, group B, by GOLD 2017 classification (H)      Coronary artery disease of native heart with stable angina pectoris, unspecified vessel or lesion type (H24)      Degenerative joint disease      Head and neck cancer (H)      History of transfusion      Hyperlipidemia      Hypertension      Lung cancer (H)     s/p RUL RML wedge resection in 2016 by Dr. Carter Velazquez     Lung mass     MELODY FDG-avid 2.5cm     Myocardial infarction (H)     November 2019     Oral cancer (H)      Prostate cancer (H)      Septic hip (H) 02/10/2022     Shortness of breath     with exertion     Trochanteric fracture of right femur (H) 06/21/2016     Past Surgical History:   Procedure Laterality Date     ABDOMINAL AORTIC ANEURYSM REPAIR       ANGIOGRAPHY  11/25/2019    coronary     COLONOSCOPY      3/21/2017,5/12/2014     CORONARY STENT PLACEMENT  12/01/2019    x1     CT BIOPSY LUNG  01/21/2021     EGD      11/13/2020,7/26/2019     ENDOSCOPIC REMOVAL SALIVARY GLAND STONE  1995     ESOPHAGOSCOPY, GASTROSCOPY, DUODENOSCOPY (EGD), COMBINED N/A 07/26/2019    Procedure: ENDOSCOPIC ULTRASOUND FINE NEEDLE ASPIRATION, GASTRIC BIOPSIES OF POLYPS;  Surgeon: Quoc Edwards,  "MD;  Location: [facility] [place];  Service: Gastroenterology     EXCISE LESION INTRAORAL Left 11/06/2020    Procedure: 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 Need pathology for margins;  Surgeon: Alon Nunez MD;  Location: [facility];  Service: ENT     HEMIARTHROPLASTY HIP Right 06/22/2016     IR LUNG BIOPSY MEDIASTINUM RIGHT  9/9/2015     LUMBAR DISCECTOMY  2006     NEPHRECTOMY Left     for benign hemangioma     NEPHRECTOMY  03/2009     OTHER SURGICAL HISTORY Right 01/01/2015    wedge resectionLung cancer isolated pulmonary nodule     OTHER SURGICAL HISTORY      placement of stentfor AAA     OTHER SURGICAL HISTORY      right hip surgeryfor fracture     OTHER SURGICAL HISTORY  01/01/1994    Oral cancer resection     AL ESOPHAGOGASTRODUODENOSCOPY US SCOPE W/ADJ STRXRS N/A 11/13/2020    Procedure: ESOPHAGOGASTRODUODENOSCOPY,  ENDOSCOPIC ULTRASOUND;  Surgeon: Quoc Edwards MD;  Location: [facility] [place];  Service: Gastroenterology     SQUAMOUS CELL CARCINOMA EXCISION  11/06/2020     THORACOSCOPY  2016    wedge resection of lung     THORACOTOMY Right 11/15/2015     Z ORAL SURGERY PROCEDURE  1994     Socioeconomic History     Marital status:      Review of Systems   Constitutional: Negative.    HENT:  Negative.     Eyes: Negative.    Respiratory:  Negative for shortness of breath.    Cardiovascular:  Positive for chest pain.   Gastrointestinal:  Positive for constipation (on miralax & senna).   Endocrine: Negative.    Genitourinary:  Positive for difficulty urinating.         Improved with Flomax   Musculoskeletal:  Positive for back pain.   Skin: Negative.    Neurological: Negative.    Hematological: Negative.    Psychiatric/Behavioral: Negative.     All other systems reviewed and are negative.    /88   Pulse 69   Resp 18   Ht 1.803 m (5' 10.98\")   Wt 80.6 kg (177 lb 12.8 oz)   SpO2 98%   BMI 24.81 kg/m      Physical Exam  Constitutional:       Appearance: " He is normal weight.      Comments: Casually dressed, calm   HENT:      Mouth/Throat:      Dentition: Abnormal dentition (edentia totalis).        Comments: Surgery scar  Eyes:      Extraocular Movements: Extraocular movements intact.      Conjunctiva/sclera: Conjunctivae normal.      Pupils: Pupils are equal, round, and reactive to light.   Cardiovascular:      Rate and Rhythm: Normal rate and regular rhythm.      Heart sounds: Normal heart sounds.   Pulmonary:      Effort: Pulmonary effort is normal.      Breath sounds: Normal breath sounds.   Abdominal:      General: There is no distension.      Palpations: Abdomen is soft. There is no mass.      Tenderness: There is no abdominal tenderness. There is no guarding.   Musculoskeletal:         General: Deformity (severe kyphosis at mid thorax) present.      Right lower leg: No edema.      Left lower leg: No edema.   Lymphadenopathy:      Cervical: No cervical adenopathy.      Upper Body:      Right upper body: No axillary or epitrochlear adenopathy.      Left upper body: No axillary or epitrochlear adenopathy.   Skin:     General: Skin is warm and dry.   Neurological:      General: No focal deficit present.      Mental Status: He is alert and oriented to person, place, and time.      Cranial Nerves: No cranial nerve deficit.      Deep Tendon Reflexes: Reflexes normal.   Psychiatric:         Mood and Affect: Mood normal.         Behavior: Behavior normal. Behavior is cooperative.         Thought Content: Thought content normal.         Judgment: Judgment normal.         No results found for this or any previous visit (from the past 744 hour(s)).        Again, thank you for allowing me to participate in the care of your patient.        Sincerely,        Chelsea Ortega MD

## 2023-12-06 NOTE — PROGRESS NOTES
Assessment & Plan   Metastatic malignancy, likely lung cancer  Pathologic fracture of thoracic spine, post radiation  Multiple prior malignancies including prostate and throat  Chemotherapy naive    Patient is being admitted to [facility] ([place])  We'll fax over today's note  Ongoing pain management per palliative care  Suggest trial of back brace for pain control    Interval History  This is a scheduled follow up visit for this patient with multiple prior malignancies, all of which have been treated with radiation (never got chemo for anything).  Right now, his only real problem is terrible pain from his thoracic spine, radiating to the front.  The pain isn't bad if he sits still but gets much worse with twisting or bending.      Dr. Bush had suggested a back brace, but they haven't move forward with that.  He is getting a fentanyl patch and hydromorphone from palliative care.      He has put on some weight since starting Megace.    There has been some discussion about hospice in the past, but the patient says that he'd want to try chemo before committing to hospice.  Last imaging was several months ago.  We agreed to re-image.  If lung area is little changed, then my recommendation would be to allow him to stabilize/improve with respect to pain before therapy.    ECOG = 1    Oncologic History:     Prostate cancer:  2011, [place] grade 3+3, cT1a prostatic adenocarcinoma with a PSA of 4.7 was diagnosed and observed.   2016, July PSA 10.4 with repeat biopsy revealing a [place]ombe grade 3+3, cT1c lesion which continued to be observed.  With various PSA methods, PSA 23.5 on 3/9/2021, 34 on 5/8/2021, 12.47 on 12/22/2020, 18.25 on 3/31/2022, 21.81 on 6/21/2020, and finally 23.5 on 9/19/2022.   10/28/2022 NM bone scan revealed uptake only in the known T7 vertebral body metastases.    10/28/2022 MR scan of the prostate revealed suspicious abnormality of the left base and mid gland peripheral zone with extraprostatic  extension into the left seminal vesicle.  No suspicious adenopathy or evidence of pelvic metastases were seen.   11/01/2022 - started leuprolide (Eligard), 45 mg, therapy.     2.           Metastatic metachronous bilateral primary lung cancers:  11/05/2015 - Non-small cell lung cancer involving (R) lung, stage I, status post right thoracotomy and excision of right upper lobe and right middle lobe lung tumor with no evidence of lymph node metastasis and no adjuvant therapy.  Patient lost to follow-up since 2016.   01/21/2021 LEFT LUNG LESION, CT GUIDED BIOPSY - MODERATELY DIFFERENTIATED ADENOCARCINOMA, CONSISTENT WITH LUNG PRIMARY.   Cytokeratin-7: Strongly positive.  Cytokeratin-20: Negative.  TTF-1: Strong nuclear staining.  p63: Negative.  Napsin A: Strong staining   03/10/2021 - completed SBRT 5000 cGy/5 fx for a (L) upper lobe cT1 cN0.  01/07/2022 PET - new 1.1 cm left upper lobe nodule and T7 sclerotic bone lesion presumed to be recurrence.  03/18/2022 - completed SBRT 5,000 cGy/5 fx to the (L) lung lesion.  04/12/2022 - completed SBRT 3275 cGy/5 fx T7 spine.  08/18/2023 restaging CT CAP - new lytic lesions within T6 and T8 concerns for metastasis.    09/07/2023 PET scan - FDG avid lesions involving T5, T6 and T8 vertebral body suspicious for progression of disease.     10/06/23 - completed 2400 cGy/8 fx IMRT T4-T9 spine (Dr Batista).       3.           Recurrent right buccal squamous cell carcinoma ... followed by Dr. Kwesi Roldan.    1994, August - resection floor of mouth carcinoma  2009, February - right retromolar trigone SCC treated with surgery and 7,020 cGy/39 until 6/29/2009.   2009, September - surgery for localized recurrence.  2020, November - surgical office-resection by Dr. Alon Nunez for a left oral (buccal, base of tongue) SCC  2022, February - ENT evaluation by Dr. Sim Jaffe for a new right buccal lesion with negative biopsies.   10/10/2022 - ENT, Dr. Roldan f/up with biopsy revealing  "hyperplasia.  08/14/2023 - ENT, Dr Roldan f/up, \"Mr. Chau's oral exam looks stable today.\"       Patient Active Problem List   Diagnosis    Lung cancer (H)    COPD, group B, by GOLD 2017 classification (H)    Osteoarthritis    Stage 3a chronic kidney disease (H)    Coronary artery disease of native heart with stable angina pectoris, unspecified vessel or lesion type (H24)    Cardiac arrhythmia    Primary hypertension    Malignant neoplasm of head, neck and face (H)    Depression    Malignant neoplasm of upper lobe of left lung (H)    Squamous cell carcinoma of mouth (H)    Malignant neoplasm of prostate (H)    Dyslipidemia    Cyst of pancreas    Ragsdale's esophagus    Spine metastasis    Chronic midline low back pain with right-sided sciatica    Lumbar radiculitis    Health care maintenance    History of alcoholism (H)    Peripheral arterial disease (H24)    Compression fracture of T7 vertebra with routine healing    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)     Current Outpatient Medications   Medication    acetaminophen (TYLENOL) 500 MG tablet    albuterol (PROAIR HFA/PROVENTIL HFA/VENTOLIN HFA) 108 (90 Base) MCG/ACT inhaler    aspirin (ASA) 81 MG EC tablet    dexAMETHasone (DECADRON) 2 MG tablet    dicyclomine (BENTYL) 20 MG tablet    fentaNYL (DURAGESIC) 75 mcg/hr 72 hr patch    ferrous sulfate (FEROSUL) 325 (65 Fe) MG tablet    Fluticasone-Umeclidin-Vilanterol (TRELEGY ELLIPTA) 200-62.5-25 MCG/ACT oral inhaler    gabapentin (NEURONTIN) 300 MG capsule    HYDROmorphone, STANDARD CONC, (DILAUDID) 1 MG/ML oral solution    Magnesium Oxide 250 MG TABS    megestrol (MEGACE) 40 MG/ML suspension    metoprolol succinate ER (TOPROL XL) 25 MG 24 hr tablet    multivitamin w/minerals (THERA-VIT-M) tablet    naloxone (NARCAN) 4 MG/0.1ML nasal spray    nitroGLYcerin (NITROSTAT) 0.4 MG sublingual tablet    omeprazole (PRILOSEC) 20 MG DR capsule    polyethylene glycol (MIRALAX) 17 GM/Dose powder    tamsulosin (FLOMAX) 0.4 MG " capsule     No current facility-administered medications for this visit.     Past Medical History:   Diagnosis Date    Abdominal aortic aneurysm (AAA) (H24) 03/30/2010    Formatting of this note might be different from the original. 5.6 cm infrarenal AAA, on 12/21/2010 CTA Formatting of this note might be different from the original. 5.6 cm infrarenal AAA, on 12/21/2010 CTA     Anemia     Ragsdale esophagus     Cancer (H)     lung    CHF (congestive heart failure) (H)     Chronic kidney disease     COPD, group B, by GOLD 2017 classification (H)     Coronary artery disease of native heart with stable angina pectoris, unspecified vessel or lesion type (H24)     Degenerative joint disease     Head and neck cancer (H)     History of transfusion     Hyperlipidemia     Hypertension     Lung cancer (H)     s/p RUL RML wedge resection in 2016 by Dr. Carter Velazquez    Lung mass     MELODY FDG-avid 2.5cm    Myocardial infarction (H)     November 2019    Oral cancer (H)     Prostate cancer (H)     Septic hip (H) 02/10/2022    Shortness of breath     with exertion    Trochanteric fracture of right femur (H) 06/21/2016     Past Surgical History:   Procedure Laterality Date    ABDOMINAL AORTIC ANEURYSM REPAIR      ANGIOGRAPHY  11/25/2019    coronary    COLONOSCOPY      3/21/2017,5/12/2014    CORONARY STENT PLACEMENT  12/01/2019    x1    CT BIOPSY LUNG  01/21/2021    EGD      11/13/2020,7/26/2019    ENDOSCOPIC REMOVAL SALIVARY GLAND STONE  1995    ESOPHAGOSCOPY, GASTROSCOPY, DUODENOSCOPY (EGD), COMBINED N/A 07/26/2019    Procedure: ENDOSCOPIC ULTRASOUND FINE NEEDLE ASPIRATION, GASTRIC BIOPSIES OF POLYPS;  Surgeon: Quoc Edwards MD;  Location: [facility];  Service: Gastroenterology    EXCISE LESION INTRAORAL Left 11/06/2020    Procedure: 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 Need pathology for margins;  Surgeon: Alon Nunez MD;  Location: [facility];  Service: ENT    HEMIARTHROPLASTY HIP  "Right 06/22/2016    IR LUNG BIOPSY MEDIASTINUM RIGHT  9/9/2015    LUMBAR DISCECTOMY  2006    NEPHRECTOMY Left     for benign hemangioma    NEPHRECTOMY  03/2009    OTHER SURGICAL HISTORY Right 01/01/2015    wedge resectionLung cancer isolated pulmonary nodule    OTHER SURGICAL HISTORY      placement of stentfor AAA    OTHER SURGICAL HISTORY      right hip surgeryfor fracture    OTHER SURGICAL HISTORY  01/01/1994    Oral cancer resection    CA ESOPHAGOGASTRODUODENOSCOPY US SCOPE W/ADJ STRXRS N/A 11/13/2020    Procedure: ESOPHAGOGASTRODUODENOSCOPY,  ENDOSCOPIC ULTRASOUND;  Surgeon: Quoc Edwards MD;  Location: [facility];  Service: Gastroenterology    SQUAMOUS CELL CARCINOMA EXCISION  11/06/2020    THORACOSCOPY  2016    wedge resection of lung    THORACOTOMY Right 11/15/2015    Z ORAL SURGERY PROCEDURE  1994     Socioeconomic History    Marital status:      Review of Systems   Constitutional: Negative.    HENT:  Negative.     Eyes: Negative.    Respiratory:  Negative for shortness of breath.    Cardiovascular:  Positive for chest pain.   Gastrointestinal:  Positive for constipation (on miralax & senna).   Endocrine: Negative.    Genitourinary:  Positive for difficulty urinating.         Improved with Flomax   Musculoskeletal:  Positive for back pain.   Skin: Negative.    Neurological: Negative.    Hematological: Negative.    Psychiatric/Behavioral: Negative.     All other systems reviewed and are negative.    /88   Pulse 69   Resp 18   Ht 1.803 m (5' 10.98\")   Wt 80.6 kg (177 lb 12.8 oz)   SpO2 98%   BMI 24.81 kg/m      Physical Exam  Constitutional:       Appearance: He is normal weight.      Comments: Casually dressed, calm   HENT:      Mouth/Throat:      Dentition: Abnormal dentition (edentia totalis).        Comments: Surgery scar  Eyes:      Extraocular Movements: Extraocular movements intact.      Conjunctiva/sclera: Conjunctivae normal.      Pupils: Pupils are equal, round, and " reactive to light.   Cardiovascular:      Rate and Rhythm: Normal rate and regular rhythm.      Heart sounds: Normal heart sounds.   Pulmonary:      Effort: Pulmonary effort is normal.      Breath sounds: Normal breath sounds.   Abdominal:      General: There is no distension.      Palpations: Abdomen is soft. There is no mass.      Tenderness: There is no abdominal tenderness. There is no guarding.   Musculoskeletal:         General: Deformity (severe kyphosis at mid thorax) present.      Right lower leg: No edema.      Left lower leg: No edema.   Lymphadenopathy:      Cervical: No cervical adenopathy.      Upper Body:      Right upper body: No axillary or epitrochlear adenopathy.      Left upper body: No axillary or epitrochlear adenopathy.   Skin:     General: Skin is warm and dry.   Neurological:      General: No focal deficit present.      Mental Status: He is alert and oriented to person, place, and time.      Cranial Nerves: No cranial nerve deficit.      Deep Tendon Reflexes: Reflexes normal.   Psychiatric:         Mood and Affect: Mood normal.         Behavior: Behavior normal. Behavior is cooperative.         Thought Content: Thought content normal.         Judgment: Judgment normal.         No results found for this or any previous visit (from the past 744 hour(s)).

## 2023-12-06 NOTE — PROGRESS NOTES
[facility]: Cancer Care                                  Metastatic malignancy, likely lung cancer  Pathologic fracture of thoracic spine, post radiation  Multiple prior malignancies including prostate and throat                                                      Met with patient in room, he is accompanied by daughter Georgette and sister Rebecca. Dr. Ortega is present as well.   Patient will be moving to [facility] and is asking for visit to include H&P for admission, Dr. Ortega agrees.   Daughter asks that we fax chart notes to Attn: Georgette  F:[phone] P:[phone].   Notes successfully faxed via My Chart 12/6/2023 at 4:10 pm.   Despite his current status, including significant pain that is not adequately managed with opioids, patient clearly states he wants to at least try chemotherapy. Dr. Ortega will obtain CT scan and then patient will be seen to discuss and plan.   Patient was also seen at [facility] on 12/04/23 for terrible abdominal pain, thought due to constipation.   Discussed the current regimen that they are using and it varies somewhat from ours generally recommended.  Discussed use of senokot and titration, adding in daily Miralax if needed and if no BM after three days, ok to use milk of Magnesia.  Daughter wrote information down, did verbal teach-back and patient states agreement.     Patient is scheduled for CT scan on 1/15/24 and then will be seen back in office.   Aware to call with concerns, questions before or to discuss with MD for nursing home as well.  Lizett Menjivar RN    Signature:  Lizett Menjivar RN

## 2023-12-11 NOTE — TELEPHONE ENCOUNTER
Received [employer] message from patient's dtr requesting refill of hydromorphone.     Last refill: 12/1/23 per chart review  Last office visit: 11/29/23  Scheduled for follow up 12/28/23     Will route request to NP for review.     Reviewed [place]  Report.

## 2023-12-18 NOTE — TELEPHONE ENCOUNTER
Prior Authorization Approval    Medication: MEGESTROL ACETATE 40 MG/ML PO SUSP  Authorization Effective Date: 11/16/2023  Authorization Expiration Date: 12/14/2024  Approved Dose/Quantity:   Reference #: [id number]   Insurance Company: [employer]ty [employer]'s - Phone [phone] Fax [phone]  Expected CoPay: $    CoPay Card Available:      Financial Assistance Needed:   Which Pharmacy is filling the prescription: [facility] - [facility] [address] [facility]  Pharmacy Notified: Yes  Patient Notified: No

## 2024-02-06 NOTE — ASSESSMENT & PLAN NOTE
Pt informed. Recall set.    s/p right wedge resection of limited stage adenocarcinoma of lung (11/2015)  - 1-3/2021 XRT to MELODY malignancy (PET avid)  - following with [facility] radiation oncology

## 2024-02-08 ENCOUNTER — TELEPHONE (OUTPATIENT)
Dept: FAMILY MEDICINE | Facility: CLINIC | Age: 79
End: 2024-02-08

## 2024-02-08 NOTE — TELEPHONE ENCOUNTER
General Call    Contacts         Type Contact Phone/Fax    02/08/2024 03:54 PM CST Phone (Incoming) DIANNA LEYVA (Emergency Contact) [phone]          Reason for Call:      Report of patient death from Dianna Daughter - 12/23/23 - Cancer.

## 2025-03-11 NOTE — PROGRESS NOTES
"[facility] Hematology and Oncology Progress Note    Patient: Lenny Chau  MRN: [id number]  Date of Service: Oct 30, 2023         Reason for Visit:    Scheduled f/up.    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  History of multiple cancers, alcohol and tobacco abuse, COPD, coronary artery disease, hyperlipidemia, hypertension, 3 CKD, DJD, AAA, s/p 2011 endovascular stent.  March, 2009 left nephrectomy for a hemangioma.    Mitchell presents accompanied by his daughter, Georgette, who he has been staying with.  His wife is in a nursing home, awaiting memory care placement.  He was hospitalized 09/23 - 09/25/23 for acute back pain.  Restaging PET CT scan showed new lytic lesion within T5, 6 and T8 spine consistent with new metastases.  He completed palliative radiation therapy on 10/6/2023.  He continues to note cancer-related pain in the mid thoracic spine and right chest wall ... not well managed on Oxycontin 13.5 mg every 12 hours + oxycodone 10 mg every 4 hours + Neurontin 300 mg every 8 hours - managed by Palliative Care.  Notes early satiety - weight down ~20 pounds since April.  Takes 1-2 Ensure/day, eats very little.  He's noted constipation since discontinuing magnesium d/t diarrhea and started opioids. Takes Miralax prn, not daily.  Notes mild hot flashes since starting Lupron, does not feel needs intervention.  He generally discontinued alcohol in November 2021 ... states he rarely has \"a beer\" when goes out for dinner.  Stopped smoking in 2011.  He denies cough, fever, chills, unusual headaches, visual or mentation disturbance, bladder issues, rash.  CMP - stable CKD-G3a.  Mild dehydration.  Otherwise basically WNL.  Encouraged pushing oral fluids.  CBC - basically WNL.        1.  PSA recurrent Ken 3+3 prostate adenocarcinoma with extraprostatic extension ... on Lupron therapy.  PSA - decreased @ 0.12 (23.5 at start of Lupron therapy).  Excellent biochemical response.  " Excellent hematologies.  Acceptable metabolic panel.  Tolerating Leuprolide therapy acceptably well.  Continue Leuprolide 45 mg IM every 6 months - due today.  6-month f/up with CBC, CMP, and PSA in anticipation ongoing leuprolide therapy.    2. Recurrent, metastatic metachronous bilateral primary NSCLC (ARIADNE):   09/07/2023 restaging PET CT showed new lytic lesions within T5, 6 and T8 spine consistent with new metastases and decreased metabolic activity in the left upper lobe surrounding the in situ fiduciary marker.    09/08/2023 CT head - no evidence for acute intracranial process. Brain atrophy and presumed chronic microvascular ischemic changes.  Prior treatments:  11/05/2015 - thoracotomy. Stage 1 (R) lung.  No evidence of lymph node metastasis.  No adjuvant therapy.  Patient lost to follow-up.   03/10/2021 - SBRT.  (L) upper lobe cT1 cN0.  03/18/2022 - SBRT.  (L) lung lesion.  04/12/2022 - SBRT.  T7 spine.     10/06/2023 - IMRT T4-T9 spine (Dr Batista).   Patient referred from Dr Batista for evaluation and discussion of possible systemic therapy:  Bone mets most certainly from lung primary with excellent PSA response on Lupron for PSA recurrent prostate cancer.  Last pathology was a CT guided 01/21/2021 (L) lung lesion ... no PDL1 obtained.  No path on recent bone mets and they've now been treated with radiation.  Uncertain whether biopsy of the metabolic activity in the left upper lobe surrounding the in situ fiduciary marker is an attainably reasonable option.  Mitchell would be a borderline candidate for traditional chemotherapy d/t having 1 kidney and his PS of at least 2.  Immunotherapy would be a nice option from a tolerance and side effect standpoint.  Will discuss with Dr Ortega for her preference.  Cancer-related pain - managed by Palliative Care:  Not well managed on Oxycontin 13.5 mg every 12 hours + oxycodone 10 mg every 4 hours + Neurontin 300 mg every 8 hours - instructed daughter to contact Palliative  "Care.   Early satiety and weight loss:  No concerning nausea.  Encouraged protein focused diet.  Instructed on 1-2 Ensure/day + 1 with each inadequate meal/day.  Opioid-related constipation:  Instructed to resume daily Miralax + stool softener titrate 1-3/day.  Encouraged pushing oral fluids.  Follow up with Dr Ortega post biopsy results if obtained to discuss palliative treatment systemic therapy options.    3. Recurrent right buccal squamous cell carcinoma.    Followed every 6-months by Dr. Roldan, ENT ... 08/14/2023 appt, \"Mr. Chau's oral exam looks stable today.\"     Oncologic History:    Prostate cancer:  2011, [place] grade 3+3, cT1a prostatic adenocarcinoma with a PSA of 4.7 was diagnosed and observed.   2016, July PSA 10.4 with repeat biopsy revealing a Ken grade 3+3, cT1c lesion which continued to be observed.  With various PSA methods, PSA 23.5 on 3/9/2021, 34 on 5/8/2021, 12.47 on 12/22/2020, 18.25 on 3/31/2022, 21.81 on 6/21/2020, and finally 23.5 on 9/19/2022.   10/28/2022 NM bone scan revealed uptake only in the known T7 vertebral body metastases.    10/28/2022 MR scan of the prostate revealed suspicious abnormality of the left base and mid gland peripheral zone with extraprostatic extension into the left seminal vesicle.  No suspicious adenopathy or evidence of pelvic metastases were seen.   11/01/2022 - started leuprolide (Eligard), 45 mg, therapy.     2. Metastatic metachronous bilateral primary lung cancers:  11/05/2015 - Non-small cell lung cancer involving (R) lung, stage I, status post right thoracotomy and excision of right upper lobe and right middle lobe lung tumor with no evidence of lymph node metastasis and no adjuvant therapy.  Patient lost to follow-up since 2016.   01/21/2021 LEFT LUNG LESION, CT GUIDED BIOPSY - MODERATELY DIFFERENTIATED ADENOCARCINOMA, CONSISTENT WITH LUNG PRIMARY.   Cytokeratin-7: Strongly positive.  Cytokeratin-20: Negative.  TTF-1: Strong nuclear " awaiting discharge, no change "staining.  p63: Negative.  Napsin A: Strong staining   03/10/2021 - completed SBRT 5000 cGy/5 fx for a (L) upper lobe cT1 cN0.  01/07/2022 PET - new 1.1 cm left upper lobe nodule and T7 sclerotic bone lesion presumed to be recurrence.  03/18/2022 - completed SBRT 5,000 cGy/5 fx to the (L) lung lesion.  04/12/2022 - completed SBRT 3275 cGy/5 fx T7 spine.  08/18/2023 restaging CT CAP - new lytic lesions within T6 and T8 concerns for metastasis.    09/07/2023 PET scan - FDG avid lesions involving T5, T6 and T8 vertebral body suspicious for progression of disease.     10/06/23 - completed 2400 cGy/8 fx IMRT T4-T9 spine (Dr Batista).       3. Recurrent right buccal squamous cell carcinoma ... followed by Dr. Kwesi Roldan.    1994, August - resection floor of mouth carcinoma  2009, February - right retromolar trigone SCC treated with surgery and 7,020 cGy/39 until 6/29/2009.   2009, September - surgery for localized recurrence.  2020, November - surgical office-resection by Dr. Alon Nunez for a left oral (buccal, base of tongue) SCC  2022, February - ENT evaluation by Dr. Sim Jaffe for a new right buccal lesion with negative biopsies.   10/10/2022 - ENT, Dr. Roldan f/up with biopsy revealing hyperplasia.  08/14/2023 - ENT, Dr Roldan f/up, \"Mr. Chau's oral exam looks stable today.\"     ECOG Performance:    2 - Ambulatory and independent in all ADLs; cannot work; up > 50% of the time    Pain:    Pain Score: Moderate Pain (4)  Pain Loc: Abdomen (and ribs) - see above.    Encounter Diagnoses:    Problem List Items Addressed This Visit       Stage 3a chronic kidney disease (H)    Malignant neoplasm of upper lobe of left lung (H)    Malignant neoplasm of prostate (H) - Primary     Other Visit Diagnoses       Metastatic bone tumor (H)        Slow transit constipation                   Quoc Hylton, CNP     CC: Shade Boyd MD   ______________________________________________________________________________    Review of " Systems:    No fever or night sweats.  Weight down ~20 pounds since April.    No lumps or bumps anywhere.  No unusual headaches or eyesight issues.  No dizziness.  No bleeding from the nose.  No sores in the mouth. No problems with swallowing.  No chest pain. No shortness of breath. No cough.  No abdominal pain. No nausea or vomiting.  No diarrhea.  Constipation.  No blood in stool or black colored stools.  No problems passing urine.  No numbness or tingling in hands or feet.  No skin rashes.    A 14 point review of systems is otherwise negative.    Past History:    Past Medical History:   Diagnosis Date    Abdominal aortic aneurysm (AAA) (H24) 03/30/2010    Formatting of this note might be different from the original. 5.6 cm infrarenal AAA, on 12/21/2010 CTA Formatting of this note might be different from the original. 5.6 cm infrarenal AAA, on 12/21/2010 CTA     Anemia     [place] esophagus     Cancer (H)     lung    CHF (congestive heart failure) (H)     Chronic kidney disease     COPD, group B, by GOLD 2017 classification (H)     Coronary artery disease of native heart with stable angina pectoris, unspecified vessel or lesion type (H24)     Degenerative joint disease     Head and neck cancer (H)     History of transfusion     Hyperlipidemia     Hypertension     Lung cancer (H)     s/p RUL RML wedge resection in 2016 by Dr. Carter Velazquez    Lung mass     MELODY FDG-avid 2.5cm    Myocardial infarction (H)     November 2019    Oral cancer (H)     Prostate cancer (H)     Septic hip (H) 02/10/2022    Shortness of breath     with exertion    Trochanteric fracture of right femur (H) 06/21/2016       Past Surgical History:   Procedure Laterality Date    ABDOMINAL AORTIC ANEURYSM REPAIR      ANGIOGRAPHY  11/25/2019    coronary    COLONOSCOPY      3/21/2017,5/12/2014    CORONARY STENT PLACEMENT  12/01/2019    x1    CT BIOPSY LUNG  01/21/2021    EGD      11/13/2020,7/26/2019    ENDOSCOPIC REMOVAL SALIVARY GLAND STONE  1995     "ESOPHAGOSCOPY, GASTROSCOPY, DUODENOSCOPY (EGD), COMBINED N/A 07/26/2019    Procedure: ENDOSCOPIC ULTRASOUND FINE NEEDLE ASPIRATION, GASTRIC BIOPSIES OF POLYPS;  Surgeon: Quoc Edwards MD;  Location: [facility] - [place], [place];  Service: Gastroenterology    EXCISE LESION INTRAORAL Left 11/06/2020    Procedure: 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 Need pathology for margins;  Surgeon: Alon Nunez MD;  Location: [facility];  Service: ENT    HEMIARTHROPLASTY HIP Right 06/22/2016    IR LUNG BIOPSY MEDIASTINUM RIGHT  9/9/2015    LUMBAR DISCECTOMY  2006    NEPHRECTOMY Left     for benign hemangioma    NEPHRECTOMY  03/2009    OTHER SURGICAL HISTORY Right 01/01/2015    wedge resectionLung cancer isolated pulmonary nodule    OTHER SURGICAL HISTORY      placement of stentfor AAA    OTHER SURGICAL HISTORY      right hip surgeryfor fracture    OTHER SURGICAL HISTORY  01/01/1994    Oral cancer resection    [place] ESOPHAGOGASTRODUODENOSCOPY US SCOPE W/ADJ STRXRS N/A 11/13/2020    Procedure: ESOPHAGOGASTRODUODENOSCOPY,  ENDOSCOPIC ULTRASOUND;  Surgeon: Quoc Edwards MD;  Location: [facility] - [place], [place];  Service: Gastroenterology    SQUAMOUS CELL CARCINOMA EXCISION  11/06/2020    THORACOSCOPY  2016    wedge resection of lung    THORACOTOMY Right 11/15/2015    [facility] ORAL SURGERY PROCEDURE  1994     Physical Exam:    /68 (BP Location: Left arm)   Pulse 70   Ht 1.803 m (5' 10.98\")   Wt 81.3 kg (179 lb 3.2 oz)   SpO2 94%   BMI 25.00 kg/m      GENERAL:   Very pleasant.  Alert and oriented. Seated comfortably. In no distress.    HEENT:   TRANG.  EOMI.  No pallor.  No icterus.    LYMPH NODES:  No palpable cervical, axillary or inguinal lymphadenopathy.    CHEST:   Lungs with absent RLL BS and decreased on left.    CVS:    S1 and S2 heard. Regular rate and rhythm.  No murmur or gallop or rub heard.  No peripheral edema.    ABDOMEN:   Soft. Not tender. Not distended.  No palpable hepatomegaly or " splenomegaly.    EXTREMITIES:  Warm.    SKIN:    No rash, bruising or purpura noted.    Lab Results:    Reviewed with patient.    Recent Results (from the past 24 hour(s))   Comprehensive metabolic panel    Collection Time: 10/30/23 12:30 PM   Result Value Ref Range    Sodium 134 (L) 135 - 145 mmol/L    Potassium 4.4 3.4 - 5.3 mmol/L    Carbon Dioxide (CO2) 30 (H) 22 - 29 mmol/L    Anion Gap 10 7 - 15 mmol/L    Urea Nitrogen 17.5 8.0 - 23.0 mg/dL    Creatinine 1.51 (H) 0.67 - 1.17 mg/dL    GFR Estimate 47 (L) >60 mL/min/1.73m2    Calcium 10.0 8.8 - 10.2 mg/dL    Chloride 94 (L) 98 - 107 mmol/L    Glucose 136 (H) 70 - 99 mg/dL    Alkaline Phosphatase 125 40 - 129 U/L    AST 17 0 - 45 U/L    ALT 9 0 - 70 U/L    Protein Total 7.2 6.4 - 8.3 g/dL    Albumin 3.6 3.5 - 5.2 g/dL    Bilirubin Total 0.5 <=1.2 mg/dL   PSA tumor marker    Collection Time: 10/30/23 12:30 PM   Result Value Ref Range    PSA Tumor Marker 0.12 0.00 - 6.50 ng/mL   CBC with platelets and differential    Collection Time: 10/30/23 12:30 PM   Result Value Ref Range    WBC Count 7.6 4.0 - 11.0 10e3/uL    RBC Count 4.47 4.40 - 5.90 10e6/uL    Hemoglobin 13.3 13.3 - 17.7 g/dL    Hematocrit 42.4 40.0 - 53.0 %    MCV 95 78 - 100 fL    MCH 29.8 26.5 - 33.0 pg    MCHC 31.4 (L) 31.5 - 36.5 g/dL    RDW 13.2 10.0 - 15.0 %    Platelet Count 297 150 - 450 10e3/uL    % Neutrophils 82 %    % Lymphocytes 6 %    % Monocytes 8 %    % Eosinophils 3 %    % Basophils 1 %    % Immature Granulocytes 0 %    NRBCs per 100 WBC 0 <1 /100    Absolute Neutrophils 6.2 1.6 - 8.3 10e3/uL    Absolute Lymphocytes 0.5 (L) 0.8 - 5.3 10e3/uL    Absolute Monocytes 0.6 0.0 - 1.3 10e3/uL    Absolute Eosinophils 0.2 0.0 - 0.7 10e3/uL    Absolute Basophils 0.1 0.0 - 0.2 10e3/uL    Absolute Immature Granulocytes 0.0 <=0.4 10e3/uL    Absolute NRBCs 0.0 10e3/uL     Imaging:    No new imaging.